# Patient Record
Sex: FEMALE | Race: BLACK OR AFRICAN AMERICAN | Employment: OTHER | URBAN - METROPOLITAN AREA
[De-identification: names, ages, dates, MRNs, and addresses within clinical notes are randomized per-mention and may not be internally consistent; named-entity substitution may affect disease eponyms.]

---

## 2017-06-09 ENCOUNTER — OP HISTORICAL/CONVERTED ENCOUNTER (OUTPATIENT)
Dept: OTHER | Age: 77
End: 2017-06-09

## 2017-08-28 ENCOUNTER — ED HISTORICAL/CONVERTED ENCOUNTER (OUTPATIENT)
Dept: OTHER | Age: 77
End: 2017-08-28

## 2018-10-19 ENCOUNTER — OP HISTORICAL/CONVERTED ENCOUNTER (OUTPATIENT)
Dept: OTHER | Age: 78
End: 2018-10-19

## 2019-12-11 ENCOUNTER — OP HISTORICAL/CONVERTED ENCOUNTER (OUTPATIENT)
Dept: OTHER | Age: 79
End: 2019-12-11

## 2020-05-05 ENCOUNTER — IP HISTORICAL/CONVERTED ENCOUNTER (OUTPATIENT)
Dept: OTHER | Age: 80
End: 2020-05-05

## 2021-04-09 ENCOUNTER — TRANSCRIBE ORDER (OUTPATIENT)
Dept: SCHEDULING | Age: 81
End: 2021-04-09

## 2021-04-09 DIAGNOSIS — Z12.31 VISIT FOR SCREENING MAMMOGRAM: Primary | ICD-10-CM

## 2021-04-27 ENCOUNTER — HOSPITAL ENCOUNTER (EMERGENCY)
Age: 81
Discharge: ANOTHER HOSPITAL | End: 2021-04-28
Attending: EMERGENCY MEDICINE
Payer: MEDICARE

## 2021-04-27 DIAGNOSIS — K92.2 GASTROINTESTINAL HEMORRHAGE, UNSPECIFIED GASTROINTESTINAL HEMORRHAGE TYPE: Primary | ICD-10-CM

## 2021-04-27 PROCEDURE — 36415 COLL VENOUS BLD VENIPUNCTURE: CPT

## 2021-04-27 PROCEDURE — 80053 COMPREHEN METABOLIC PANEL: CPT

## 2021-04-27 PROCEDURE — 86901 BLOOD TYPING SEROLOGIC RH(D): CPT

## 2021-04-27 PROCEDURE — 85025 COMPLETE CBC W/AUTO DIFF WBC: CPT

## 2021-04-27 PROCEDURE — 85610 PROTHROMBIN TIME: CPT

## 2021-04-27 PROCEDURE — 99283 EMERGENCY DEPT VISIT LOW MDM: CPT

## 2021-04-27 RX ORDER — METOPROLOL TARTRATE 50 MG/1
50 TABLET ORAL DAILY
COMMUNITY
End: 2021-04-28

## 2021-04-27 RX ORDER — VERAPAMIL HYDROCHLORIDE 240 MG/1
240 CAPSULE, EXTENDED RELEASE ORAL DAILY
COMMUNITY
End: 2021-10-22

## 2021-04-27 RX ORDER — GLIMEPIRIDE 4 MG/1
4 TABLET ORAL
COMMUNITY
End: 2021-10-22

## 2021-04-28 ENCOUNTER — HOSPITAL ENCOUNTER (INPATIENT)
Age: 81
LOS: 3 days | Discharge: HOME | DRG: 378 | End: 2021-05-01
Attending: EMERGENCY MEDICINE | Admitting: FAMILY MEDICINE
Payer: MEDICARE

## 2021-04-28 ENCOUNTER — APPOINTMENT (OUTPATIENT)
Dept: CT IMAGING | Age: 81
End: 2021-04-28
Attending: EMERGENCY MEDICINE
Payer: MEDICARE

## 2021-04-28 VITALS
HEART RATE: 72 BPM | SYSTOLIC BLOOD PRESSURE: 138 MMHG | TEMPERATURE: 98.3 F | HEIGHT: 63 IN | DIASTOLIC BLOOD PRESSURE: 68 MMHG | RESPIRATION RATE: 18 BRPM | WEIGHT: 150 LBS | OXYGEN SATURATION: 96 % | BODY MASS INDEX: 26.58 KG/M2

## 2021-04-28 DIAGNOSIS — K92.2 LOWER GI BLEED: Primary | ICD-10-CM

## 2021-04-28 LAB
ABO + RH BLD: NORMAL
ALBUMIN SERPL-MCNC: 3.5 G/DL (ref 3.5–5)
ALBUMIN/GLOB SERPL: 0.9 {RATIO} (ref 1.1–2.2)
ALP SERPL-CCNC: 258 U/L (ref 45–117)
ALT SERPL-CCNC: 21 U/L (ref 12–78)
ANION GAP SERPL CALC-SCNC: 10 MMOL/L (ref 5–15)
ANION GAP SERPL CALC-SCNC: 6 MMOL/L (ref 5–15)
AST SERPL W P-5'-P-CCNC: 10 U/L (ref 15–37)
ATRIAL RATE: 73 BPM
BASOPHILS # BLD: 0 K/UL (ref 0–0.1)
BASOPHILS NFR BLD: 0 % (ref 0–1)
BILIRUB SERPL-MCNC: 0.4 MG/DL (ref 0.2–1)
BLOOD GROUP ANTIBODIES SERPL: NEGATIVE
BUN SERPL-MCNC: 16 MG/DL (ref 6–20)
BUN SERPL-MCNC: 16 MG/DL (ref 6–20)
BUN/CREAT SERPL: 16 (ref 12–20)
BUN/CREAT SERPL: 20 (ref 12–20)
CA-I BLD-MCNC: 9.2 MG/DL (ref 8.5–10.1)
CALCIUM SERPL-MCNC: 9.2 MG/DL (ref 8.5–10.1)
CALCULATED P AXIS, ECG09: 63 DEGREES
CALCULATED R AXIS, ECG10: -22 DEGREES
CALCULATED T AXIS, ECG11: -32 DEGREES
CHLORIDE SERPL-SCNC: 103 MMOL/L (ref 97–108)
CHLORIDE SERPL-SCNC: 107 MMOL/L (ref 97–108)
CO2 SERPL-SCNC: 22 MMOL/L (ref 21–32)
CO2 SERPL-SCNC: 28 MMOL/L (ref 21–32)
COMMENT, HOLDF: NORMAL
COVID-19 RAPID TEST, COVR: NOT DETECTED
CREAT SERPL-MCNC: 0.79 MG/DL (ref 0.55–1.02)
CREAT SERPL-MCNC: 1.01 MG/DL (ref 0.55–1.02)
DIAGNOSIS, 93000: NORMAL
DIFFERENTIAL METHOD BLD: ABNORMAL
EOSINOPHIL # BLD: 0.1 K/UL (ref 0–0.4)
EOSINOPHIL NFR BLD: 2 % (ref 0–7)
ERYTHROCYTE [DISTWIDTH] IN BLOOD BY AUTOMATED COUNT: 13.1 % (ref 11.5–14.5)
ERYTHROCYTE [DISTWIDTH] IN BLOOD BY AUTOMATED COUNT: 13.2 % (ref 11.5–14.5)
EST. AVERAGE GLUCOSE BLD GHB EST-MCNC: 315 MG/DL
GLOBULIN SER CALC-MCNC: 3.7 G/DL (ref 2–4)
GLUCOSE BLD STRIP.AUTO-MCNC: 164 MG/DL (ref 65–100)
GLUCOSE BLD STRIP.AUTO-MCNC: 260 MG/DL (ref 65–100)
GLUCOSE BLD STRIP.AUTO-MCNC: 270 MG/DL (ref 65–100)
GLUCOSE BLD STRIP.AUTO-MCNC: 364 MG/DL (ref 65–100)
GLUCOSE SERPL-MCNC: 389 MG/DL (ref 65–100)
GLUCOSE SERPL-MCNC: 416 MG/DL (ref 65–100)
HBA1C MFR BLD: 12.6 % (ref 4–5.6)
HCT VFR BLD AUTO: 30.7 % (ref 35–47)
HCT VFR BLD AUTO: 32.6 % (ref 35–47)
HCT VFR BLD AUTO: 36.7 % (ref 35–47)
HCT VFR BLD AUTO: 40.2 % (ref 35–47)
HGB BLD-MCNC: 10.4 G/DL (ref 11.5–16)
HGB BLD-MCNC: 11.5 G/DL (ref 11.5–16)
HGB BLD-MCNC: 13.4 G/DL (ref 11.5–16)
HGB BLD-MCNC: 9.7 G/DL (ref 11.5–16)
IMM GRANULOCYTES # BLD AUTO: 0 K/UL (ref 0–0.04)
IMM GRANULOCYTES NFR BLD AUTO: 0 % (ref 0–0.5)
INR PPP: 1.2 (ref 0.9–1.1)
LYMPHOCYTES # BLD: 2.6 K/UL (ref 0.8–3.5)
LYMPHOCYTES NFR BLD: 48 % (ref 12–49)
MCH RBC QN AUTO: 27.3 PG (ref 26–34)
MCH RBC QN AUTO: 28 PG (ref 26–34)
MCHC RBC AUTO-ENTMCNC: 31.3 G/DL (ref 30–36.5)
MCHC RBC AUTO-ENTMCNC: 33.3 G/DL (ref 30–36.5)
MCV RBC AUTO: 84.1 FL (ref 80–99)
MCV RBC AUTO: 87 FL (ref 80–99)
MONOCYTES # BLD: 0.5 K/UL (ref 0–1)
MONOCYTES NFR BLD: 8 % (ref 5–13)
NEUTS SEG # BLD: 2.4 K/UL (ref 1.8–8)
NEUTS SEG NFR BLD: 42 % (ref 32–75)
NRBC # BLD: 0 K/UL (ref 0–0.01)
NRBC BLD-RTO: 0 PER 100 WBC
P-R INTERVAL, ECG05: 142 MS
PLATELET # BLD AUTO: 137 K/UL (ref 150–400)
PLATELET # BLD AUTO: 178 K/UL (ref 150–400)
PMV BLD AUTO: 13 FL (ref 8.9–12.9)
PMV BLD AUTO: 13.4 FL (ref 8.9–12.9)
POTASSIUM SERPL-SCNC: 3.5 MMOL/L (ref 3.5–5.1)
POTASSIUM SERPL-SCNC: 3.7 MMOL/L (ref 3.5–5.1)
PROT SERPL-MCNC: 7.2 G/DL (ref 6.4–8.2)
PROTHROMBIN TIME: 15.3 SEC (ref 11.9–14.7)
Q-T INTERVAL, ECG07: 434 MS
QRS DURATION, ECG06: 90 MS
QTC CALCULATION (BEZET), ECG08: 478 MS
RBC # BLD AUTO: 4.22 M/UL (ref 3.8–5.2)
RBC # BLD AUTO: 4.78 M/UL (ref 3.8–5.2)
SAMPLES BEING HELD,HOLD: NORMAL
SERVICE CMNT-IMP: ABNORMAL
SODIUM SERPL-SCNC: 137 MMOL/L (ref 136–145)
SODIUM SERPL-SCNC: 139 MMOL/L (ref 136–145)
SOURCE, COVRS: NORMAL
SPECIMEN EXP DATE BLD: NORMAL
VENTRICULAR RATE, ECG03: 73 BPM
WBC # BLD AUTO: 5.6 K/UL (ref 3.6–11)
WBC # BLD AUTO: 7.5 K/UL (ref 3.6–11)

## 2021-04-28 PROCEDURE — 36415 COLL VENOUS BLD VENIPUNCTURE: CPT

## 2021-04-28 PROCEDURE — 74011250636 HC RX REV CODE- 250/636: Performed by: EMERGENCY MEDICINE

## 2021-04-28 PROCEDURE — 93005 ELECTROCARDIOGRAM TRACING: CPT

## 2021-04-28 PROCEDURE — 74011000636 HC RX REV CODE- 636: Performed by: EMERGENCY MEDICINE

## 2021-04-28 PROCEDURE — 99285 EMERGENCY DEPT VISIT HI MDM: CPT

## 2021-04-28 PROCEDURE — 82962 GLUCOSE BLOOD TEST: CPT

## 2021-04-28 PROCEDURE — C9113 INJ PANTOPRAZOLE SODIUM, VIA: HCPCS | Performed by: NURSE PRACTITIONER

## 2021-04-28 PROCEDURE — 74011636637 HC RX REV CODE- 636/637: Performed by: NURSE PRACTITIONER

## 2021-04-28 PROCEDURE — 74011000250 HC RX REV CODE- 250: Performed by: PHYSICIAN ASSISTANT

## 2021-04-28 PROCEDURE — 83036 HEMOGLOBIN GLYCOSYLATED A1C: CPT

## 2021-04-28 PROCEDURE — 74174 CTA ABD&PLVS W/CONTRAST: CPT

## 2021-04-28 PROCEDURE — 85027 COMPLETE CBC AUTOMATED: CPT

## 2021-04-28 PROCEDURE — 96374 THER/PROPH/DIAG INJ IV PUSH: CPT

## 2021-04-28 PROCEDURE — 94761 N-INVAS EAR/PLS OXIMETRY MLT: CPT

## 2021-04-28 PROCEDURE — 85014 HEMATOCRIT: CPT

## 2021-04-28 PROCEDURE — 80048 BASIC METABOLIC PNL TOTAL CA: CPT

## 2021-04-28 PROCEDURE — 74011250636 HC RX REV CODE- 250/636: Performed by: NURSE PRACTITIONER

## 2021-04-28 PROCEDURE — 74011000250 HC RX REV CODE- 250: Performed by: NURSE PRACTITIONER

## 2021-04-28 PROCEDURE — 87635 SARS-COV-2 COVID-19 AMP PRB: CPT

## 2021-04-28 PROCEDURE — 65660000000 HC RM CCU STEPDOWN

## 2021-04-28 PROCEDURE — 74011250636 HC RX REV CODE- 250/636: Performed by: FAMILY MEDICINE

## 2021-04-28 RX ORDER — POLYETHYLENE GLYCOL 3350, SODIUM SULFATE, SODIUM CHLORIDE, POTASSIUM CHLORIDE, SODIUM ASCORBATE, AND ASCORBIC ACID 7.5-2.691G
1 KIT ORAL ONCE
Status: COMPLETED | OUTPATIENT
Start: 2021-04-28 | End: 2021-04-28

## 2021-04-28 RX ORDER — SODIUM CHLORIDE 0.9 % (FLUSH) 0.9 %
5-40 SYRINGE (ML) INJECTION EVERY 8 HOURS
Status: DISCONTINUED | OUTPATIENT
Start: 2021-04-28 | End: 2021-05-01 | Stop reason: HOSPADM

## 2021-04-28 RX ORDER — MAGNESIUM SULFATE 100 %
4 CRYSTALS MISCELLANEOUS AS NEEDED
Status: DISCONTINUED | OUTPATIENT
Start: 2021-04-28 | End: 2021-05-01 | Stop reason: HOSPADM

## 2021-04-28 RX ORDER — NAPROXEN SODIUM 220 MG
220 TABLET ORAL 2 TIMES DAILY WITH MEALS
COMMUNITY
End: 2021-05-01

## 2021-04-28 RX ORDER — SODIUM CHLORIDE 9 MG/ML
75 INJECTION, SOLUTION INTRAVENOUS CONTINUOUS
Status: DISCONTINUED | OUTPATIENT
Start: 2021-04-28 | End: 2021-05-01 | Stop reason: HOSPADM

## 2021-04-28 RX ORDER — DEXTROSE 50 % IN WATER (D50W) INTRAVENOUS SYRINGE
25-50 AS NEEDED
Status: DISCONTINUED | OUTPATIENT
Start: 2021-04-28 | End: 2021-05-01 | Stop reason: HOSPADM

## 2021-04-28 RX ORDER — SODIUM CHLORIDE 0.9 % (FLUSH) 0.9 %
5-40 SYRINGE (ML) INJECTION AS NEEDED
Status: DISCONTINUED | OUTPATIENT
Start: 2021-04-28 | End: 2021-05-01 | Stop reason: HOSPADM

## 2021-04-28 RX ORDER — INSULIN LISPRO 100 [IU]/ML
INJECTION, SOLUTION INTRAVENOUS; SUBCUTANEOUS
Status: DISCONTINUED | OUTPATIENT
Start: 2021-04-28 | End: 2021-05-01 | Stop reason: HOSPADM

## 2021-04-28 RX ORDER — ONDANSETRON 2 MG/ML
4 INJECTION INTRAMUSCULAR; INTRAVENOUS
Status: DISCONTINUED | OUTPATIENT
Start: 2021-04-28 | End: 2021-05-01 | Stop reason: HOSPADM

## 2021-04-28 RX ORDER — IBUPROFEN 400 MG/1
600 TABLET ORAL
COMMUNITY
End: 2021-04-28

## 2021-04-28 RX ADMIN — SODIUM CHLORIDE 500 ML: 9 INJECTION, SOLUTION INTRAVENOUS at 01:39

## 2021-04-28 RX ADMIN — SODIUM CHLORIDE 10 ML: 9 INJECTION INTRAMUSCULAR; INTRAVENOUS; SUBCUTANEOUS at 14:18

## 2021-04-28 RX ADMIN — PEG-3350, SODIUM SULFATE, SODIUM CHLORIDE, POTASSIUM CHLORIDE, SODIUM ASCORBATE AND ASCORBIC ACID 1 L: KIT at 15:00

## 2021-04-28 RX ADMIN — SODIUM CHLORIDE 1000 ML: 9 INJECTION, SOLUTION INTRAVENOUS at 06:19

## 2021-04-28 RX ADMIN — IOPAMIDOL 100 ML: 755 INJECTION, SOLUTION INTRAVENOUS at 04:35

## 2021-04-28 RX ADMIN — SODIUM CHLORIDE 10 ML: 9 INJECTION INTRAMUSCULAR; INTRAVENOUS; SUBCUTANEOUS at 22:11

## 2021-04-28 RX ADMIN — FAMOTIDINE 20 MG: 10 INJECTION INTRAVENOUS at 03:15

## 2021-04-28 RX ADMIN — SODIUM CHLORIDE 75 ML/HR: 9 INJECTION, SOLUTION INTRAVENOUS at 13:58

## 2021-04-28 RX ADMIN — SODIUM CHLORIDE 40 MG: 9 INJECTION INTRAMUSCULAR; INTRAVENOUS; SUBCUTANEOUS at 09:18

## 2021-04-28 RX ADMIN — SODIUM CHLORIDE 40 MG: 9 INJECTION INTRAMUSCULAR; INTRAVENOUS; SUBCUTANEOUS at 22:11

## 2021-04-28 RX ADMIN — INSULIN LISPRO 5 UNITS: 100 INJECTION, SOLUTION INTRAVENOUS; SUBCUTANEOUS at 14:17

## 2021-04-28 RX ADMIN — INSULIN LISPRO 7 UNITS: 100 INJECTION, SOLUTION INTRAVENOUS; SUBCUTANEOUS at 17:43

## 2021-04-28 NOTE — ED NOTES
Verbal shift change report given to Yves RN and Daiana Newton RN (oncoming nurse) by [address] (offgoing nurse). Report included the following information SBAR, ED Summary, MAR and Recent Results.

## 2021-04-28 NOTE — ACP (ADVANCE CARE PLANNING)
Advance Care Planning     Advance Care Planning (ACP) Physician/NP/PA Conversation      Date of Conversation: 4/28/2021  Conducted with: Patient with [address] Decision Maker:     Click here to complete [address] including selection of the [address] Relationship (ie \"Primary\")  Today we documented Decision Maker(s) consistent with Legal Next of Kin hierarchy. Yolette Landrum (son) [phone]    Care Preferences:    Hospitalization: \"If your health worsens and it becomes clear that your chance of recovery is unlikely, what would be your preference regarding hospitalization? \"  The patient would prefer hospitalization. Ventilation: \"If you were unable to breathe on your own and your chance of recovery was unlikely, what would be your preference about the use of a ventilator (breathing machine) if it was available to you? \"   The patient would desire the use of a ventilator. Resuscitation: \"In the event your heart stopped as a result of an underlying serious health condition, would you want attempts to be made to restart your heart, or would you prefer a natural death? \"   Yes, attempt to resuscitate. Additional topics discussed: treatment goals. Discussed with patient the possibility of needing a blood transfusion if her hgb decreases. She states that she is a Alev[employer] and she refuses any blood transfusions and products. She would like all other measures done to treat her and/or save her life.      Conversation Outcomes / Follow-Up Plan:   ACP complete - no further action today  Reviewed DNR/DNI and patient elects Full Code (Attempt Resuscitation)     Length of Voluntary ACP Conversation in minutes:  20 minutes    Afia Dumont NP

## 2021-04-28 NOTE — ED NOTES
Verbal shift change report given to [address] [place] [address] [address] (oncoming nurse) by Jia Camp RN (offgoing nurse). Report included the following information SBAR, Kardex, ED Summary, [facility] and Recent Results.

## 2021-04-28 NOTE — ED PROVIDER NOTES
EMERGENCY DEPARTMENT HISTORY AND PHYSICAL EXAM      Date: 4/27/2021  Patient Name: Salazar Zambrano      History of Presenting Illness     Chief Complaint   Patient presents with    Other       History Provided By: Patient    HPI: Salazar Zambrano, [de-identified] y.o. female with a past medical history significant diabetes and hypertension presents to the ED with cc of rectal bleed x2 that is status of that this afternoon. Patient denies taking any blood thinners. However she admits to take ibuprofen as needed pain. She took one yesterday. Patient denies any vomiting. Mild left lower quadrant abdominal pain. No chest pain or shortness of breath. No weakness. No vaginal bleed. No other complaints. There are no other complaints, changes, or physical findings at this time. PCP: Yosvany Hanson MD    Current Outpatient Medications   Medication Sig Dispense Refill    verapamil ER (VERELAN) 240 mg ER capsule Take 240 mg by mouth daily.  glimepiride (AMARYL) 4 mg tablet Take 4 mg by mouth every morning.  metoprolol tartrate (LOPRESSOR) 50 mg tablet Take 50 mg by mouth daily. Past History     Past Medical History:  Past Medical History:   Diagnosis Date    Diabetes ([address]     Hypertension        Past Surgical History:  No past surgical history on file. Family History:  No family history on file. Social History:  Social History     Tobacco Use    Smoking status: Never Smoker   Substance Use Topics    Alcohol use: Not on file    Drug use: Not on file       Allergies:  No Known Allergies      Review of Systems     Review of Systems   Constitutional: Negative. HENT: Negative. Eyes: Negative. Respiratory: Negative. Cardiovascular: Negative. Gastrointestinal: Positive for anal bleeding and blood in stool. Endocrine: Negative. Genitourinary: Negative. Musculoskeletal: Negative. Skin: Negative. Neurological: Negative. Psychiatric/Behavioral: Negative.     All other systems reviewed and are negative. Physical Exam     Physical Exam  Vitals signs and nursing note reviewed. Constitutional:       General: She is not in acute distress. Appearance: Normal appearance. She is normal weight. She is not ill-appearing or diaphoretic. HENT:      Head: Normocephalic. Right Ear: External ear normal.      Left Ear: External ear normal.      Nose: Nose normal. No congestion or rhinorrhea. Mouth/Throat:      Pharynx: Oropharynx is clear. No oropharyngeal exudate or posterior oropharyngeal erythema. Eyes:      General: No scleral icterus. Right eye: No discharge. Left eye: No discharge. Extraocular Movements: Extraocular movements intact. Conjunctiva/sclera: Conjunctivae normal.      Pupils: Pupils are equal, round, and reactive to light. Neck:      Musculoskeletal: Normal range of motion and neck supple. No neck rigidity or muscular tenderness. Cardiovascular:      Rate and Rhythm: Normal rate and regular rhythm. Pulses: Normal pulses. Heart sounds: Normal heart sounds. No murmur. Pulmonary:      Effort: Pulmonary effort is normal. No respiratory distress. Breath sounds: Normal breath sounds. No stridor. No wheezing, rhonchi or rales. Chest:      Chest wall: No tenderness. Abdominal:      General: Abdomen is flat. Bowel sounds are normal. There is no distension. Palpations: Abdomen is soft. Tenderness: There is no abdominal tenderness. There is no right CVA tenderness, left CVA tenderness, guarding or rebound. Genitourinary:     Rectum: Guaiac result positive. Comments: Patient had 1 episode of bowel movement which was bloody. Musculoskeletal: Normal range of motion. General: No swelling, tenderness, deformity or signs of injury. Right lower leg: No edema. Left lower leg: No edema. Skin:     General: Skin is warm. Capillary Refill: Capillary refill takes less than 2 seconds. Coloration: Skin is not jaundiced or pale. Findings: No bruising, erythema, lesion or rash. Neurological:      General: No focal deficit present. Mental Status: She is alert and oriented to person, place, and time. Mental status is at baseline. Cranial Nerves: No cranial nerve deficit. Sensory: No sensory deficit. Motor: No weakness. Coordination: Coordination normal.   Psychiatric:         Mood and Affect: Mood normal.         Behavior: Behavior normal.         Thought Content: Thought content normal.         Judgment: Judgment normal.         Lab and Diagnostic Study Results     Labs -     Recent Results (from the past 12 hour(s))   CBC WITH AUTOMATED DIFF    Collection Time: 04/27/21 11:04 PM   Result Value Ref Range    WBC 5.6 3.6 - 11.0 K/uL    RBC 4.78 3.80 - 5.20 M/uL    HGB 13.4 11.5 - 16.0 g/dL    HCT 40.2 35.0 - 47.0 %    MCV 84.1 80.0 - 99.0 FL    MCH 28.0 26.0 - 34.0 PG    MCHC 33.3 30.0 - 36.5 g/dL    RDW 13.2 11.5 - 14.5 %    PLATELET 783 939 - 574 K/uL    MPV 13.4 (H) 8.9 - 12.9 FL    NEUTROPHILS 42 32 - 75 %    LYMPHOCYTES 48 12 - 49 %    MONOCYTES 8 5 - 13 %    EOSINOPHILS 2 0 - 7 %    BASOPHILS 0 0 - 1 %    IMMATURE GRANULOCYTES 0 0.0 - 0.5 %    ABS. NEUTROPHILS 2.4 1.8 - 8.0 K/UL    ABS. LYMPHOCYTES 2.6 0.8 - 3.5 K/UL    ABS. MONOCYTES 0.5 0.0 - 1.0 K/UL    ABS. EOSINOPHILS 0.1 0.0 - 0.4 K/UL    ABS. BASOPHILS 0.0 0.0 - 0.1 K/UL    ABS. IMM.  GRANS. 0.0 0.00 - 0.04 K/UL    DF AUTOMATED     METABOLIC PANEL, COMPREHENSIVE    Collection Time: 04/27/21 11:04 PM   Result Value Ref Range    Sodium 137 136 - 145 mmol/L    Potassium 3.5 3.5 - 5.1 mmol/L    Chloride 103 97 - 108 mmol/L    CO2 28 21 - 32 mmol/L    Anion gap 6 5 - 15 mmol/L    Glucose 416 (H) 65 - 100 mg/dL    BUN 16 6 - 20 mg/dL    Creatinine 1.01 0.55 - 1.02 mg/dL    BUN/Creatinine ratio 16 12 - 20      GFR est AA >60 >60 ml/min/1.73m2    GFR est non-AA 53 (L) >60 ml/min/1.73m2    Calcium 9.2 8.5 - 10.1 mg/dL Bilirubin, total 0.4 0.2 - 1.0 mg/dL    AST (SGOT) 10 (L) 15 - 37 U/L    ALT (SGPT) 21 12 - 78 U/L    Alk. phosphatase 258 (H) 45 - 117 U/L    Protein, total 7.2 6.4 - 8.2 g/dL    Albumin 3.5 3.5 - 5.0 g/dL    Globulin 3.7 2.0 - 4.0 g/dL    A-G Ratio 0.9 (L) 1.1 - 2.2     TYPE & SCREEN    Collection Time: 04/27/21 11:04 PM   Result Value Ref Range    Crossmatch Expiration 04/30/2021,2359     ABO/Rh(D) Rosie Squire Positive     Antibody screen Negative    PROTHROMBIN TIME + INR    Collection Time: 04/27/21 11:04 PM   Result Value Ref Range    Prothrombin time 15.3 (H) 11.9 - 14.7 sec    INR 1.2 (H) 0.9 - 1.1         Radiologic Studies -   [unfilled]  CT Results  (Last 48 hours)    None        CXR Results  (Last 48 hours)    None          Medical Decision Making and ED Course   - I am the first and primary provider for this patient AND AM THE PRIMARY PROVIDER OF RECORD. - I reviewed the vital signs, available nursing notes, past medical history, past surgical history, family history and social history. - Initial assessment performed. The patients presenting problems have been discussed, and the staff are in agreement with the care plan formulated and outlined with them. I have encouraged them to ask questions as they arise throughout their visit. Vital Signs-Reviewed the patient's vital signs. Patient Vitals for the past 12 hrs:   Temp Pulse Resp BP SpO2   04/28/21 0305  72  138/68 96 %   04/28/21 0220  67  (!) 68/42 98 %   04/28/21 0210  60  (!) 147/130 100 %   04/27/21 2250 98.3 °F (36.8 °C) 96 18 (!) 187/105 97 %       EKG interpretation: (Preliminary): EKG was done at 3:22 AM.  Normal sinus rhythm. Rate of 73. Left axis deviation. Positive LVH. Positive T wave inversion in anterolateral leads. No STEMI. No ST elevation. No old EKG available for comparison at this time.       Records Reviewed: Nursing Notes        ED Course:              Provider Notes (Medical Decision Making):     MDM  Number of Diagnoses or Management Options  Gastrointestinal hemorrhage, unspecified gastrointestinal hemorrhage type: new, needed workup  Diagnosis management comments: Differential diagnosis include acute rectal bleed, GI bleed, diverticulosis, AV malformation, peptic ulcer disease, colon cancer, coagulopathy. Amount and/or Complexity of Data Reviewed  Clinical lab tests: ordered and reviewed  Tests in the radiology section of CPT®: ordered and reviewed  Tests in the medicine section of CPT®: reviewed and ordered  Independent visualization of images, tracings, or specimens: yes (EKG was done at 3:22 AM.  Normal sinus rhythm. Rate of 73. Left axis deviation. Positive LVH. Positive T wave inversion in anterolateral leads. No STEMI. No ST elevation. No old EKG available for comparison at this time.)    Risk of Complications, Morbidity, and/or Mortality  Presenting problems: high  Diagnostic procedures: high  Management options: high  General comments: Patient remained stable throughout course of treatment. First hemoglobin is 13.5. Vital signs stable. While patient is in our emergency department she had 2 more episodes of GI bleed. She also starts passing blood a blood clot while she was sitting on the ER bed. Case initially discussed with our admitting physician. She has stated that patient is a Jehovah witness and refused blood transfusion. She needs to have bleeding scan done. However we do not have the staff to do bleeding scan. Decision was made to transfer patient. Case discussed with Dr. Addie Mckeon, the IR at [facility].  He wanted us to transfer patient to [facility] however he wants us to do a CT of abdomen pelvis prior to transport patients to their emergency room. Case then was discussed with ER physician, Dr. Hermelinda Urena at [facility].  He accepted the patient's. We will transfer patient to their service. Case also discussed with patient.   Patient understood and agree with her management. Consultations:       Consultations:         Procedures and Critical Care       Performed by: Patrice Zhao DO  PROCEDURES:  Procedures               CRITICAL CARE NOTE :  1:38 AM  Amount of Critical Care Time: 75(minutes)    IMPENDING DETERIORATION -GI bleed  ASSOCIATED RISK FACTORS - Bleeding  MANAGEMENT- Bedside Assessment and Supervision of Care  INTERPRETATION -  CT Scan, Blood Pressure and Cardiac Output Measures   INTERVENTIONS - vascular control  CASE REVIEW - Hospitalist/Intensivist  TREATMENT RESPONSE -Stable  PERFORMED BY - Self    NOTES   :  I have spent critical care time involved in lab review, consultations with specialist, family decision- making, bedside attention and documentation. This time excludes time spent in any separate billed procedures. During this entire length of time I was immediately available to the patient . Patrice Zhao DO        Disposition     Disposition: Condition stable    Transferred to Another Facility        Diagnosis     Clinical Impression:   1. Gastrointestinal hemorrhage, unspecified gastrointestinal hemorrhage type        Attestations:    Patrice Zhao DO    Please note that this dictation was completed with [employer], the computer voice recognition software. Quite often unanticipated grammatical, syntax, homophones, and other interpretive errors are inadvertently transcribed by the computer software. Please disregard these errors. Please excuse any errors that have escaped final proofreading. Thank you.

## 2021-04-28 NOTE — ROUTINE PROCESS
TRANSFER - OUT REPORT:    Verbal [facility] [employer] [facility] to [address] [place] [address] (unit) for routine progression of care       Report consisted of patients Situation, Background, Assessment and   Recommendations(SBAR). Information from the following report(s) SBAR, ED Summary, [facility] and Recent Results was reviewed with the receiving nurse. Lines:   Peripheral IV 04/28/21 Right Antecubital (Active)   Site Assessment Clean, dry, & intact 04/28/21 0615   Phlebitis Assessment 0 04/28/21 0615   Infiltration Assessment 0 04/28/21 0615   Dressing Status Clean, dry, & intact 04/28/21 0615   Dressing Type Transparent 04/28/21 0615   Hub Color/Line Status Blue;Flushed;Patent 04/28/21 0615   Action Taken Blood drawn 04/28/21 0615       Peripheral IV 04/28/21 Left Wrist (Active)   Site Assessment Clean, dry, & intact 04/28/21 0920   Phlebitis Assessment 0 04/28/21 0920   Infiltration Assessment 0 04/28/21 0920   Dressing Status Clean, dry, & intact 04/28/21 0920        Opportunity for questions and clarification was provided.       Patient transported with:   [employer]

## 2021-04-28 NOTE — ED NOTES
Pt states she is no longer feeling weak and dizzy. Pt states she is \"feeling better now. \" will continue to monitor pt and pt's vitals.

## 2021-04-28 NOTE — ED NOTES
Pt presents to ED as a transfer of care from [address] [place] [address] for GI bleed. Pt was passing bright red clots in her stool last night.  Per EMS last /81

## 2021-04-28 NOTE — ED NOTES
Verbal shift change report given to Vyes RN and [address] RN (oncoming nurse) by David Lowe (offgoing nurse). Report included the following information SBAR, Kardex, ED Summary, [facility] and Recent Results.

## 2021-04-28 NOTE — H&P
[facility] Adult  Hospitalist Group  History and Physical    Primary Care Provider: Edenilson Maldonado MD  Date of Service:  4/28/2021    Subjective:     Vijaya Vallejo is a [de-identified] y.o. female with a past medical history of diabetes and hypertension who presented to [facility] ED with complaints of bloody stools x2. States that she felt constipated yesterday morning then had a normal bowel movement. After a couple of hours she felt like she was going to have diarrhea and had a bowel movement with a large amount of blood in it. After a couple more hours had a second episode of bloody diarrhea. Denies any nausea, vomiting or abdominal pain. Two more episodes of bloody stools reported in the ED. Takes aleve 220mg almost everyday for leg pains and cramps. Hospitalist was consulted for evaluation of admission. Denies any shortness of breath, chest pain or tightness. Review of Systems:    A comprehensive review of systems was negative except for that written in the History of Present Illness. Past Medical History:   Diagnosis Date    Diabetes ([address]     Hypertension       No past surgical history on file. Prior to Admission medications    Medication Sig Start Date End Date Taking? Authorizing Provider   verapamil ER (VERELAN) 240 mg ER capsule Take 240 mg by mouth daily. Yes Other, MD Jamal   glimepiride (AMARYL) 4 mg tablet Take 4 mg by mouth every morning. Yes Other, MD Jamal   metoprolol tartrate (LOPRESSOR) 50 mg tablet Take 50 mg by mouth daily. Yes Other, MD Jamal     Allergies   Allergen Reactions    Penicillins Other (comments)     Pt states it makes her pass out      No family history on file. SOCIAL HISTORY:  Patient resides at Home. Patient ambulates with Independent.    Smoking history: Denies   Alcohol history: Denies   Drug history: Denies         Objective:       Physical Exam:   Visit Vitals  BP (!) 147/91   Pulse 81   Temp 97.6 °F (36.4 °C)   Resp 22   SpO2 97%     General appearance: alert, cooperative, no distress, appears stated age  Lungs: clear to auscultation bilaterally  Heart: regular rate and rhythm, S1, S2 normal, no murmur, click, rub or gallop  Abdomen: soft, non-tender. Bowel sounds normal. No masses,  no organomegaly  Extremities: extremities normal, atraumatic, no cyanosis or edema  Pulses: 2+ and symmetric  Skin: Skin color, texture, turgor normal. No rashes or lesions  Neurologic: Grossly normal  Cap refill: Brisk, less than 3 seconds  Pulses: 2+, symmetric in all extremities    ECG:  Normal sinus rhythm   Possible Left atrial enlargement   Left ventricular hypertrophy    Data Review: All diagnostic labs and studies have been reviewed. Chest x-ray NA. CTA Abdomen pelv: IMPRESSION  Extensive underlying atherosclerotic vascular disease. 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 Diverticulosis without evidence of diverticulitis. Assessment:     Active Problems:    GI bleed (4/28/2021)        Plan:     Lower GI bleed   - Admit to remote telemetry   - reported  4 episodes of bleeding per rectum  - CTA- negative for active GI bleed   - Start PPI  And keep NPO  - Consult GI   - Monitor H&H. Patient refuses blood transfusions. Hgb 13.4 then 11.5 in ED     Hypertension   - Stable   - will restart home medications when stable. - Patient high risk for hypotension secondary to GI bleed   - Monitor     Diabetes   - Monitor glucose AC/HS   - ISS   - Diabetic diet when ok to eat     DVT prophylaxis: SCDs   NOK:  Flo Montes De Oca (son) [phone]  Code Status: Full code. FUNCTIONAL STATUS PRIOR TO HOSPITALIZATION (including history of recent falls): Independent    Signed By: Marky Tijerina NP     April 28, 2021

## 2021-04-28 NOTE — ED PROVIDER NOTES
Patient received in transfer from [facility] for a GI bleed. Patient reportedly had 2 episodes of bloody stool while in the emergency department there. Her hemoglobin was 13. She had a CTA of her abdomen which did not show any areas of active bleeding. Patient was transferred here for GI consult and bleeding scan. Patient arrived in stable condition. Vital signs stable. No change in her status. Past Medical History:   Diagnosis Date    [address] [address]     Hypertension        No past surgical history on file. No family history on file.     Social History     Socioeconomic History    Marital status:      Spouse name: Not on file    Number of children: Not on file    Years of education: Not on file    Highest education level: Not on file   Occupational History    Not on file   Social Needs    Financial resource strain: Not on file    Food insecurity     Worry: Not on file     Inability: Not on file    Transportation needs     Medical: Not on file     Non-medical: Not on file   Tobacco Use    Smoking status: Never Smoker   Substance and Sexual Activity    Alcohol use: Not on file    Drug use: Not on file    Sexual activity: Not on file   Lifestyle    Physical activity     Days per week: Not on file     Minutes per session: Not on file    Stress: Not on file   Relationships    Social connections     Talks on phone: Not on file     Gets together: Not on file     Attends [employer] service: Not on file     Active member of club or organization: Not on file     Attends meetings of clubs or organizations: Not on file     Relationship status: Not on file    Intimate partner violence     Fear of current or ex partner: Not on file     Emotionally abused: Not on file     Physically abused: Not on file     Forced sexual activity: Not on file   Other Topics Concern    Not on file   Social History Narrative    Not on file         ALLERGIES: Patient has no known allergies. Review of Systems   Constitutional: Negative for fever. HENT: Negative for facial swelling. Eyes: Negative for visual disturbance. Respiratory: Negative for chest tightness. Cardiovascular: Negative for chest pain. Gastrointestinal: Positive for blood in stool. Negative for abdominal pain. Genitourinary: Negative for difficulty urinating and dysuria. Musculoskeletal: Negative for arthralgias. Skin: Negative for rash. Neurological: Negative for headaches. Hematological: Negative for adenopathy. Psychiatric/Behavioral: Negative for suicidal ideas. There were no vitals filed for this visit. Physical Exam  Vitals signs and nursing note reviewed. Constitutional:       General: She is not in acute distress. Appearance: She is well-developed. HENT:      Head: Normocephalic and atraumatic. Eyes:      General: No scleral icterus. Conjunctiva/sclera: Conjunctivae normal.      Pupils: Pupils are equal, round, and reactive to light. Neck:      Musculoskeletal: Normal range of motion and neck supple. Cardiovascular:      Rate and Rhythm: Normal rate. Heart sounds: No murmur. Pulmonary:      Effort: Pulmonary effort is normal. No respiratory distress. Abdominal:      General: There is no distension. Musculoskeletal: Normal range of motion. Skin:     General: Skin is warm and dry. Findings: No rash. Neurological:      Mental Status: She is alert and oriented to person, place, and time. [facility]     Perfect Serve Consult for Admission  5:53 AM    ED Room Number: ER16/16  Patient Name and age:  Eliseo Pinedo [de-identified] y.o.  female  Working Diagnosis:   1. Lower GI bleed        COVID-19 Suspicion:  no  Sepsis present:  no  Reassessment needed: no  Code Status:  Full Code  Readmission: no  Isolation Requirements:  no  Recommended Level of Care:  med/surg  Department:[facility] Adult ED - 21   Other:  Initial hgb 13. Repeat pending.   Several bloody stools starting yesterday. No anticoagulation. VS stable. No pain. CTA at other facility showed no active bleed. Pt is Mosque and does not want blood products.     Procedures

## 2021-04-28 NOTE — ED NOTES
While assisting pt to bathroom pt started bleeding down pants leg and on floor. Assisted pt to bed. Large amount of blood with many dime sized blood clots noted in underwear, pants, bed, and floor. Dr Vanessa Monroy. Notified of writers findings. Pt continued to bleed after attempting to clean pt up. Pt's vitals dropped to 74/55. Dr Silvina Lynch notified and gave verbal order to start IV fluid bolus. Pt c/o dizziness and fatigue. Pt's pressure now at 116/63.  Will continue to monitor pt

## 2021-04-28 NOTE — CONSULTS
118 [facility].  217 [facility] 140 Valente Chapman, [address]  [phone]                     GI CONSULTATION NOTE      NAME:  Sloan Dale   :   1940   MRN:   [id number]     Consult Date: 2021     Chief Complaint: Rectal bleeding    History of Present Illness:  Patient is a [de-identified] y.o. who is seen in consultation at the request of KAITLYNN Grajeda for the above problem. She reports that two days ago she had a BM with a stool that was harder than normal, with mild anal pain when it passed, but that resolved immediately. She denies h/o constipation. She did not see any blood with that BM. Then last evening she had another BM that she states appeared normal, but was followed soon after by the urge for a BM that caused her to rush to the bathroom. It felt like she passed a diarrhea-like liquid stool, but when she wiped it was all BRB. The toilet appeared to be full of BRB as well. She informed her son who took has to [facility]. Her initial Hgb was 13.5. She had 2 or 3 additional bloody BMs while she was there. There had been discussion about obtaining an acute NM bleeding scan, but [facility] did not have the capabilities. The decision was made to transfer here to [facility], but prior to transport the [facility] staff requested a CTA be done. This was done and came back negative. She was then transferred here, arriving around 0600. She has not had an episode of rectal bleeding or a BM since arrival.  This note is written at 1045. Her Hgb upon arrival was 11.5, a 2 g drop. She denies SOB, dizziness, palpitations, abdominal pain, nausea, vomiting and melena. She has never had anything like this happen before. She has never had a colonoscopy or an EGD; never has seen GI. She denies ever having been diagnosed with diverticulitis. She is a [employer] and would not consent to receiving blood products if the needs would arise.         PMH:  Past Medical History:   Diagnosis Date  Diabetes ([address]     Hypertension        PSH:  No past surgical history on file. Allergies: Allergies   Allergen Reactions    Penicillins Other (comments)     Pt states it makes her pass out       Home Medications:  Prior to Admission Medications   Prescriptions Last Dose Informant Patient Reported? Taking?   glimepiride (AMARYL) 4 mg tablet 4/27/2021 at Unknown time  Yes Yes   Sig: Take 4 mg by mouth every morning. metoprolol tartrate (LOPRESSOR) 50 mg tablet 4/27/2021 at Unknown time  Yes Yes   Sig: Take 50 mg by mouth daily. verapamil ER (VERELAN) 240 mg ER capsule 4/27/2021 at Unknown time  Yes Yes   Sig: Take 240 mg by mouth daily. Facility-Administered Medications: None       Hospital Medications:  Current Facility-Administered Medications   Medication Dose Route Frequency    sodium chloride (NS) flush 5-40 mL  5-40 mL IntraVENous Q8H    sodium chloride (NS) flush 5-40 mL  5-40 mL IntraVENous PRN    ondansetron (ZOFRAN) injection 4 mg  4 mg IntraVENous Q4H PRN    glucose chewable tablet 16 g  4 Tab Oral PRN    dextrose (D50W) injection syrg 12.5-25 g  25-50 mL IntraVENous PRN    glucagon (GLUCAGEN) injection 1 mg  1 mg IntraMUSCular PRN    insulin lispro (HUMALOG) injection   SubCUTAneous AC&HS    pantoprazole (PROTONIX) 40 mg in 0.9% sodium chloride 10 mL injection  40 mg IntraVENous Q12H     Current Outpatient Medications   Medication Sig    verapamil ER (VERELAN) 240 mg ER capsule Take 240 mg by mouth daily.  glimepiride (AMARYL) 4 mg tablet Take 4 mg by mouth every morning.  metoprolol tartrate (LOPRESSOR) 50 mg tablet Take 50 mg by mouth daily. Social History:  Social History     Tobacco Use    Smoking status: Never Smoker   Substance Use Topics    Alcohol use: Not on file       Family History:  No family history on file.     Review of Systems:    Constitutional: negative fever, negative chills, negative weight loss  Eyes:   negative visual changes  ENT:   negative sore throat, tongue or lip swelling  Respiratory:  negative cough, negative dyspnea  Cards:  negative for chest pain, palpitations, lower extremity edema  GI:   See HPI  :  negative for frequency, dysuria  Integument:  negative for rash and pruritus  Heme:  negative for easy bruising and gum/nose bleeding  Musculoskel: negative for myalgias,  back pain and muscle weakness  Neuro: negative for headaches, dizziness, vertigo  Psych:  negative for feelings of anxiety, depression      Objective:     Patient Vitals for the past 8 hrs:   BP Temp Pulse Resp SpO2   04/28/21 0900 (!) 143/70 98.2 °F (36.8 °C) 82 27 95 %   04/28/21 0800 (!) 141/99 -- 83 28 97 %   04/28/21 0730 (!) 147/91 -- 81 22 97 %   04/28/21 0645 (!) 153/76 -- 79 23 97 %   04/28/21 0620 (!) 126/96 -- 93 19 97 %   04/28/21 0600 (!) 150/90 97.6 °F (36.4 °C) 89 18 99 %     No intake/output data recorded. No intake/output data recorded. PHYSICAL EXAM:  General appearance: cooperative, no distress  Skin: Extremities and face reveal no rashes, pallor or jaundice  HEENT: Sclerae anicteric. Extra-occular muscles are intact. Cardiovascular: Regular rate and rhythm. No murmurs, gallops, or rubs. Respiratory: Comfortable breathing with no accessory muscle use. Clear breath sounds with no wheezes, rales, or rhonchi. GI: Abdomen nondistended, soft, and nontender. Normal active bowel sounds. No enlargement of the liver or spleen. No masses palpable. Rectal: LAI not performed, but the padding on the bed under her has tinge of dried BRB, as does her panties and the pad under them. Musculoskeletal: No edema of the lower legs. Neurological: Gross memory appears intact. Patient is alert and oriented. Psychiatric: Mood appears appropriate with good judgement. No anxiety or agitation.       Data Review     Recent Labs     04/28/21 0616 04/27/21  2304   WBC 7.5 5.6   HGB 11.5 13.4   HCT 36.7 40.2   * 178     Recent Labs     04/28/21 0616 04/27/21  2304    137   K 3.7 3.5    103   CO2 22 28   BUN 16 16   CREA 0.79 1.01   * 416*   CA 9.2 9.2     Recent Labs     04/27/21  2304   *   TP 7.2   ALB 3.5   GLOB 3.7     Recent Labs     04/27/21 2304   INR 1.2*   PTP 15.3*        Imaging studies reviewed    Assessment / Plan :     Hematochezia, painless  - Initially large volume x several episodes, though no bleeding in the past 5 hours since transfer from [facility]  - DDx includes diverticular bleed (most likely), colitis, hemorrhoid, neoplasm  - CTA 4/27/21 - Stomach and small bowel appear normal. There is extensive  colonic diverticulosis without evidence of diverticulitis. No active gastrointestinal bleeding site appreciated. - Monitor H/H -- Pt is Oriental orthodox and will not consent to transfusion  - Hgb 13.5 on initial presentation --> 11.5 in 7 hr time  - If she has another episode of profuse bleeding, order a stat NM bleeding scan (otherwise it will not be of benefit)  - Plan for colonoscopy 4/29 with Dr. Alfonso Pretty, timing TBD   -- Clears today   -- Prep this evening   -- NPO at 0500 tomorrow         Patient C/Marium Haynes [address] Problem List:   Active Problems:    GI bleed (4/28/2021)    I have examined the patient. I have reviewed the chart and agree with the documentation recorded by the NP, including the assessment, treatment plan, and disposition. ASSESSMENT AND PLAN:  81-year-old lady is presented with several episodes of hematochezia and acute blood loss anemia. She denies any significant abdominal pain or syncopal episode. Differential diagnosis includes diverticulosis, polyps, arteriovenous malformations, hemorrhoids, GI tract neoplasms, etc.  Clear liquids p.o.   H&H every 6 hours and transfuse as needed  Prep for colonoscopy tomorrow. Further recommendations after colonoscopy. Thank you for consultation.       Williams Wallace MD

## 2021-04-28 NOTE — PROGRESS NOTES
Admission Medication Reconciliation: In progress:    Spoke with son Fifi Elias) by telephone @ [phone], unable to speak with patient face to face at this time due to general isolation precautions in the ED related to COVID-19 pandemic. Son is a generally historian, read from pill bottles which he states were kept in hr purse (which he has). RX query is available at this time, but called [facility] anyway @ [phone]) as this data is frequently not up-to-date. Son is unsure whether patient takes supplements or any other OTC medications. Interview included questions regarding use of:  PTA medications including prescription/OTC, vitamins, supplements, inhaled, topical, injectable, otic and ophthalmic medications    Medication changes (since last review): Added:  Aleve: son states she takes very regularly due to leg pain    Deleted:  Metoprolol succinate: son states there are several tablets left in pill bottle which dates back to June 2020. [facility] confirmed that last refill was in August 2020 #30 for 30 days supply. Thank you for allowing me to participate in the care of your patient. Nury BolañosD, RN # [phone]       [facility] pharmacy benefit data reflects medications filled and processed through the patient's insurance, however   this data does NOT capture whether the medication was picked up or is currently being taken by the patient. Allergies:  Penicillins    Significant PMH/Disease States:   Past Medical History:   Diagnosis Date    Diabetes ([address]     Hypertension      Chief Complaint for this Admission:    Chief Complaint   Patient presents with    Transfer Of Care     Prior to Admission Medications:   Prior to Admission Medications   Prescriptions Last Dose Informant Taking?   glimepiride (AMARYL) 4 mg tablet 4/27/2021 at Unknown time  Yes   Sig: Take 4 mg by mouth every morning.    naproxen sodium (Aleve) 220 mg tablet 4/27/2021 at Unknown time  Yes   Sig: Take 220 mg by mouth two (2) times daily (with meals). verapamil ER (VERELAN) 240 mg ER capsule 4/27/2021 at Unknown time  Yes   Sig: Take 240 mg by mouth daily. Facility-Administered Medications: None     Please contact the main inpatient pharmacy with any questions or concerns at [phone] and we will direct you to the clinical pharmacist covering this patient's care while in-house.    Bharath Faust

## 2021-04-29 ENCOUNTER — ANESTHESIA (OUTPATIENT)
Dept: ENDOSCOPY | Age: 81
DRG: 378 | End: 2021-04-29
Payer: MEDICARE

## 2021-04-29 ENCOUNTER — ANESTHESIA EVENT (OUTPATIENT)
Dept: ENDOSCOPY | Age: 81
DRG: 378 | End: 2021-04-29
Payer: MEDICARE

## 2021-04-29 LAB
ANION GAP SERPL CALC-SCNC: 9 MMOL/L (ref 5–15)
BASOPHILS # BLD: 0 K/UL (ref 0–0.1)
BASOPHILS NFR BLD: 1 % (ref 0–1)
BUN SERPL-MCNC: 12 MG/DL (ref 6–20)
BUN/CREAT SERPL: 17 (ref 12–20)
CALCIUM SERPL-MCNC: 8 MG/DL (ref 8.5–10.1)
CHLORIDE SERPL-SCNC: 113 MMOL/L (ref 97–108)
CO2 SERPL-SCNC: 21 MMOL/L (ref 21–32)
CREAT SERPL-MCNC: 0.72 MG/DL (ref 0.55–1.02)
DIFFERENTIAL METHOD BLD: ABNORMAL
EOSINOPHIL # BLD: 0.1 K/UL (ref 0–0.4)
EOSINOPHIL NFR BLD: 2 % (ref 0–7)
ERYTHROCYTE [DISTWIDTH] IN BLOOD BY AUTOMATED COUNT: 13.2 % (ref 11.5–14.5)
GLUCOSE BLD STRIP.AUTO-MCNC: 130 MG/DL (ref 65–100)
GLUCOSE BLD STRIP.AUTO-MCNC: 153 MG/DL (ref 65–100)
GLUCOSE BLD STRIP.AUTO-MCNC: 257 MG/DL (ref 65–100)
GLUCOSE BLD STRIP.AUTO-MCNC: 272 MG/DL (ref 65–100)
GLUCOSE SERPL-MCNC: 162 MG/DL (ref 65–100)
HCT VFR BLD AUTO: 26.3 % (ref 35–47)
HCT VFR BLD AUTO: 29.5 % (ref 35–47)
HGB BLD-MCNC: 8.5 G/DL (ref 11.5–16)
HGB BLD-MCNC: 9.4 G/DL (ref 11.5–16)
IMM GRANULOCYTES # BLD AUTO: 0 K/UL (ref 0–0.04)
IMM GRANULOCYTES NFR BLD AUTO: 0 % (ref 0–0.5)
LYMPHOCYTES # BLD: 2.7 K/UL (ref 0.8–3.5)
LYMPHOCYTES NFR BLD: 41 % (ref 12–49)
MCH RBC QN AUTO: 27.4 PG (ref 26–34)
MCHC RBC AUTO-ENTMCNC: 31.9 G/DL (ref 30–36.5)
MCV RBC AUTO: 86 FL (ref 80–99)
MONOCYTES # BLD: 0.6 K/UL (ref 0–1)
MONOCYTES NFR BLD: 10 % (ref 5–13)
NEUTS SEG # BLD: 3.1 K/UL (ref 1.8–8)
NEUTS SEG NFR BLD: 46 % (ref 32–75)
NRBC # BLD: 0 K/UL (ref 0–0.01)
NRBC BLD-RTO: 0 PER 100 WBC
PLATELET # BLD AUTO: 127 K/UL (ref 150–400)
PMV BLD AUTO: 12.8 FL (ref 8.9–12.9)
POTASSIUM SERPL-SCNC: 3.2 MMOL/L (ref 3.5–5.1)
RBC # BLD AUTO: 3.43 M/UL (ref 3.8–5.2)
SERVICE CMNT-IMP: ABNORMAL
SODIUM SERPL-SCNC: 143 MMOL/L (ref 136–145)
WBC # BLD AUTO: 6.6 K/UL (ref 3.6–11)

## 2021-04-29 PROCEDURE — 76040000007: Performed by: SPECIALIST

## 2021-04-29 PROCEDURE — 85025 COMPLETE CBC W/AUTO DIFF WBC: CPT

## 2021-04-29 PROCEDURE — C9113 INJ PANTOPRAZOLE SODIUM, VIA: HCPCS | Performed by: NURSE PRACTITIONER

## 2021-04-29 PROCEDURE — 74011636637 HC RX REV CODE- 636/637: Performed by: NURSE PRACTITIONER

## 2021-04-29 PROCEDURE — 77030013996 HC SNR POLYP ENDOSC OCOA -B: Performed by: SPECIALIST

## 2021-04-29 PROCEDURE — 74011250636 HC RX REV CODE- 250/636: Performed by: NURSE ANESTHETIST, CERTIFIED REGISTERED

## 2021-04-29 PROCEDURE — 88305 TISSUE EXAM BY PATHOLOGIST: CPT

## 2021-04-29 PROCEDURE — 2709999900 HC NON-CHARGEABLE SUPPLY: Performed by: SPECIALIST

## 2021-04-29 PROCEDURE — 82962 GLUCOSE BLOOD TEST: CPT

## 2021-04-29 PROCEDURE — 36415 COLL VENOUS BLD VENIPUNCTURE: CPT

## 2021-04-29 PROCEDURE — 74011250636 HC RX REV CODE- 250/636: Performed by: NURSE PRACTITIONER

## 2021-04-29 PROCEDURE — 76060000032 HC ANESTHESIA 0.5 TO 1 HR: Performed by: SPECIALIST

## 2021-04-29 PROCEDURE — 80048 BASIC METABOLIC PNL TOTAL CA: CPT

## 2021-04-29 PROCEDURE — 0DJD8ZZ INSPECTION OF LOWER INTESTINAL TRACT, VIA NATURAL OR ARTIFICIAL OPENING ENDOSCOPIC: ICD-10-PCS | Performed by: SPECIALIST

## 2021-04-29 PROCEDURE — 74011250636 HC RX REV CODE- 250/636: Performed by: SPECIALIST

## 2021-04-29 PROCEDURE — 74011000250 HC RX REV CODE- 250: Performed by: NURSE ANESTHETIST, CERTIFIED REGISTERED

## 2021-04-29 PROCEDURE — 74011000250 HC RX REV CODE- 250: Performed by: NURSE PRACTITIONER

## 2021-04-29 PROCEDURE — 85018 HEMOGLOBIN: CPT

## 2021-04-29 PROCEDURE — 65660000000 HC RM CCU STEPDOWN

## 2021-04-29 PROCEDURE — C1713 ANCHOR/SCREW BN/BN,TIS/BN: HCPCS | Performed by: SPECIALIST

## 2021-04-29 PROCEDURE — 74011250636 HC RX REV CODE- 250/636: Performed by: FAMILY MEDICINE

## 2021-04-29 RX ORDER — HYDRALAZINE HYDROCHLORIDE 20 MG/ML
10 INJECTION INTRAMUSCULAR; INTRAVENOUS
Status: DISCONTINUED | OUTPATIENT
Start: 2021-04-29 | End: 2021-05-01 | Stop reason: HOSPADM

## 2021-04-29 RX ORDER — ATROPINE SULFATE 0.1 MG/ML
0.5 INJECTION INTRAVENOUS
Status: DISCONTINUED | OUTPATIENT
Start: 2021-04-29 | End: 2021-04-29 | Stop reason: HOSPADM

## 2021-04-29 RX ORDER — SODIUM CHLORIDE 9 MG/ML
INJECTION, SOLUTION INTRAVENOUS
Status: DISCONTINUED | OUTPATIENT
Start: 2021-04-29 | End: 2021-04-29 | Stop reason: HOSPADM

## 2021-04-29 RX ORDER — DEXTROMETHORPHAN/PSEUDOEPHED 2.5-7.5/.8
1.2 DROPS ORAL
Status: DISCONTINUED | OUTPATIENT
Start: 2021-04-29 | End: 2021-04-29 | Stop reason: HOSPADM

## 2021-04-29 RX ORDER — SODIUM CHLORIDE 0.9 % (FLUSH) 0.9 %
5-40 SYRINGE (ML) INJECTION EVERY 8 HOURS
Status: DISCONTINUED | OUTPATIENT
Start: 2021-04-29 | End: 2021-05-01 | Stop reason: HOSPADM

## 2021-04-29 RX ORDER — SODIUM CHLORIDE 0.9 % (FLUSH) 0.9 %
5-40 SYRINGE (ML) INJECTION AS NEEDED
Status: DISCONTINUED | OUTPATIENT
Start: 2021-04-29 | End: 2021-05-01 | Stop reason: HOSPADM

## 2021-04-29 RX ORDER — SODIUM CHLORIDE 9 MG/ML
50 INJECTION, SOLUTION INTRAVENOUS CONTINUOUS
Status: DISPENSED | OUTPATIENT
Start: 2021-04-29 | End: 2021-04-29

## 2021-04-29 RX ORDER — NALOXONE HYDROCHLORIDE 0.4 MG/ML
0.4 INJECTION, SOLUTION INTRAMUSCULAR; INTRAVENOUS; SUBCUTANEOUS
Status: DISCONTINUED | OUTPATIENT
Start: 2021-04-29 | End: 2021-04-29 | Stop reason: HOSPADM

## 2021-04-29 RX ORDER — EPINEPHRINE 0.1 MG/ML
1 INJECTION INTRACARDIAC; INTRAVENOUS
Status: DISCONTINUED | OUTPATIENT
Start: 2021-04-29 | End: 2021-04-29 | Stop reason: HOSPADM

## 2021-04-29 RX ORDER — FLUMAZENIL 0.1 MG/ML
0.2 INJECTION INTRAVENOUS
Status: DISCONTINUED | OUTPATIENT
Start: 2021-04-29 | End: 2021-04-29 | Stop reason: HOSPADM

## 2021-04-29 RX ORDER — LIDOCAINE HYDROCHLORIDE 20 MG/ML
INJECTION, SOLUTION EPIDURAL; INFILTRATION; INTRACAUDAL; PERINEURAL AS NEEDED
Status: DISCONTINUED | OUTPATIENT
Start: 2021-04-29 | End: 2021-04-29 | Stop reason: HOSPADM

## 2021-04-29 RX ORDER — PROPOFOL 10 MG/ML
INJECTION, EMULSION INTRAVENOUS AS NEEDED
Status: DISCONTINUED | OUTPATIENT
Start: 2021-04-29 | End: 2021-04-29 | Stop reason: HOSPADM

## 2021-04-29 RX ADMIN — INSULIN LISPRO 3 UNITS: 100 INJECTION, SOLUTION INTRAVENOUS; SUBCUTANEOUS at 21:57

## 2021-04-29 RX ADMIN — PROPOFOL 25 MG: 10 INJECTION, EMULSION INTRAVENOUS at 17:36

## 2021-04-29 RX ADMIN — SODIUM CHLORIDE 40 MG: 9 INJECTION INTRAMUSCULAR; INTRAVENOUS; SUBCUTANEOUS at 21:45

## 2021-04-29 RX ADMIN — PROPOFOL 25 MG: 10 INJECTION, EMULSION INTRAVENOUS at 17:25

## 2021-04-29 RX ADMIN — PHENYLEPHRINE HYDROCHLORIDE 120 MCG: 10 INJECTION INTRAVENOUS at 17:36

## 2021-04-29 RX ADMIN — PHENYLEPHRINE HYDROCHLORIDE 80 MCG: 10 INJECTION INTRAVENOUS at 17:30

## 2021-04-29 RX ADMIN — PROPOFOL 25 MG: 10 INJECTION, EMULSION INTRAVENOUS at 17:40

## 2021-04-29 RX ADMIN — SODIUM CHLORIDE 75 ML/HR: 9 INJECTION, SOLUTION INTRAVENOUS at 05:44

## 2021-04-29 RX ADMIN — PROPOFOL 25 MG: 10 INJECTION, EMULSION INTRAVENOUS at 17:29

## 2021-04-29 RX ADMIN — PROPOFOL 25 MG: 10 INJECTION, EMULSION INTRAVENOUS at 17:31

## 2021-04-29 RX ADMIN — PHENYLEPHRINE HYDROCHLORIDE 40 MCG: 10 INJECTION INTRAVENOUS at 17:26

## 2021-04-29 RX ADMIN — PROPOFOL 25 MG: 10 INJECTION, EMULSION INTRAVENOUS at 17:18

## 2021-04-29 RX ADMIN — PROPOFOL 25 MG: 10 INJECTION, EMULSION INTRAVENOUS at 17:20

## 2021-04-29 RX ADMIN — PROPOFOL 25 MG: 10 INJECTION, EMULSION INTRAVENOUS at 17:17

## 2021-04-29 RX ADMIN — PROPOFOL 25 MG: 10 INJECTION, EMULSION INTRAVENOUS at 17:27

## 2021-04-29 RX ADMIN — INSULIN LISPRO 2 UNITS: 100 INJECTION, SOLUTION INTRAVENOUS; SUBCUTANEOUS at 19:00

## 2021-04-29 RX ADMIN — SODIUM CHLORIDE 40 MG: 9 INJECTION INTRAMUSCULAR; INTRAVENOUS; SUBCUTANEOUS at 09:28

## 2021-04-29 RX ADMIN — INSULIN LISPRO 5 UNITS: 100 INJECTION, SOLUTION INTRAVENOUS; SUBCUTANEOUS at 12:25

## 2021-04-29 RX ADMIN — SODIUM CHLORIDE 10 ML: 9 INJECTION INTRAMUSCULAR; INTRAVENOUS; SUBCUTANEOUS at 14:00

## 2021-04-29 RX ADMIN — SODIUM CHLORIDE 75 ML/HR: 9 INJECTION, SOLUTION INTRAVENOUS at 21:46

## 2021-04-29 RX ADMIN — LIDOCAINE HYDROCHLORIDE 20 MG: 20 INJECTION, SOLUTION EPIDURAL; INFILTRATION; INTRACAUDAL; PERINEURAL at 17:15

## 2021-04-29 RX ADMIN — SODIUM CHLORIDE: 900 INJECTION, SOLUTION INTRAVENOUS at 17:10

## 2021-04-29 RX ADMIN — PROPOFOL 25 MG: 10 INJECTION, EMULSION INTRAVENOUS at 17:21

## 2021-04-29 RX ADMIN — PHENYLEPHRINE HYDROCHLORIDE 80 MCG: 10 INJECTION INTRAVENOUS at 17:40

## 2021-04-29 RX ADMIN — PROPOFOL 25 MG: 10 INJECTION, EMULSION INTRAVENOUS at 17:23

## 2021-04-29 RX ADMIN — Medication 10 ML: at 21:45

## 2021-04-29 RX ADMIN — PROPOFOL 50 MG: 10 INJECTION, EMULSION INTRAVENOUS at 17:15

## 2021-04-29 NOTE — PROGRESS NOTES
[address] [facility]  Hospitalist Group                                                                                          Hospitalist Progress Note  Ana Antoine MD  Answering service: [phone] from in house phone        Date of Service:  2021  NAME:  Jignesh Sinha  :  1940  MRN:  [id number]      Admission Summary:   Jignesh Sinha is a [de-identified] y.o. female with a past medical history of diabetes and hypertension who presented to [facility] ED with complaints of bloody stools x2. States that she felt constipated yesterday morning then had a normal bowel movement. After a couple of hours she felt like she was going to have diarrhea and had a bowel movement with a large amount of blood in it. After a couple more hours had a second episode of bloody diarrhea. Denies any nausea, vomiting or abdominal pain. Two more episodes of bloody stools reported in the ED. Takes aleve 220mg almost everyday for leg pains and cramps    Interval history / Subjective:   Patient reports that she had a bloody bowel movement earlier today, denies any other complaints or problems     Assessment & Plan:     Lower GI bleed  For colonoscopy today  Monitor H&H  N.p.o.  Protonix twice daily  Transfuse as needed    Acute blood loss anemia  Patient Scientologist  Closely monitor H&H, further intervention per hospitalist, reassess as needed    Hypertension  Hydralazine as needed    Diabetes mellitus type 2  Sliding scale NovoLog insulin, Accu-Cheks, diet control, close monitoring        Code status: Full code  DVT prophylaxis: SCD    Care Plan discussed with: Patient/Family  Anticipated Disposition: Home w/Family  Anticipated Discharge: Less than 24 hours     Hospital Problems  Never Reviewed          Codes Class Noted POA    GI bleed ICD-10-CM: K92.2  ICD-9-CM: 578.9  2021 Unknown                Review of Systems:   A comprehensive review of systems was negative except for that written in the HPI. Vital Signs:    Last 24hrs VS reviewed since prior progress note. Most recent are:  Visit Vitals  BP (!) 148/98 (BP 1 Location: Right lower arm)   Pulse 88   Temp 98.2 °F (36.8 °C)   Resp 17   Wt 67.5 kg (148 lb 13 oz)   SpO2 97%   BMI 26.36 kg/m²         Intake/Output Summary (Last 24 hours) at 4/29/2021 1414  Last data filed at 4/29/2021 1200  Gross per 24 hour   Intake 1652.5 ml   Output 400 ml   Net 1252.5 ml        Physical Examination:     I had a face to face encounter with this patient and independently examined them on 4/29/2021 as outlined below:          General : alert x 3, awake, no acute distress, resting in bed, pleasant /female, appears to be stated age  HEENT: PEERL, EOMI, moist mucus membrane, TM clear  Neck: supple, no JVD, no meningeal signs  Chest: Clear to auscultation bilaterally   CVS: S1 S2 heard, Capillary refill less than 2 seconds  Abd: soft/ Non tender, non distended, BS physiological,   Ext: no clubbing, no cyanosis, no edema, brisk 2+ DP pulses  Neuro/Psych: pleasant mood and affect, CN 2-12 grossly intact, sensory grossly within normal limit, Strength 5/5 in all extremities, DTR 1+ x 4  Skin: warm     Data Review:    Review and/or order of clinical lab test      Labs:     Recent Labs     04/29/21  0544 04/28/21 2204 04/28/21  0616 04/28/21  0616   WBC 6.6  --   --  7.5   HGB 9.4* 10.4*   < > 11.5   HCT 29.5* 32.6*   < > 36.7   *  --   --  137*    < > = values in this interval not displayed. Recent Labs     04/29/21  0544 04/28/21  0616 04/27/21  2304    139 137   K 3.2* 3.7 3.5   * 107 103   CO2 21 22 28   BUN 12 16 16   CREA 0.72 0.79 1.01   * 389* 416*   CA 8.0* 9.2 9.2     Recent Labs     04/27/21  2304   ALT 21   *   TBILI 0.4   TP 7.2   ALB 3.5   GLOB 3.7     Recent Labs     04/27/21  2304   INR 1.2*   PTP 15.3*      No results for input(s): FE, TIBC, PSAT, FERR in the last 72 hours.    No results found for: FOL, RBCF   No results for input(s): PH, PCO2, PO2 in the last 72 hours. No results for input(s): CPK, CKNDX, TROIQ in the last 72 hours. No lab exists for component: CPKMB  No results found for: CHOL, CHOLX, CHLST, CHOLV, HDL, HDLP, LDL, LDLC, DLDLP, TGLX, TRIGL, TRIGP, CHHD, CHHDX  Lab Results   Component Value Date/Time    Glucose (POC) 257 (H) 04/29/2021 11:44 AM    Glucose (POC) 130 (H) 04/29/2021 06:37 AM    Glucose (POC) 164 (H) 04/28/2021 09:43 PM    Glucose (POC) 364 (H) 04/28/2021 05:08 PM    Glucose (POC) 260 (H) 04/28/2021 01:56 PM     No results found for: COLOR, APPRN, SPGRU, REFSG, RHINA, PROTU, GLUCU, KETU, BILU, UROU, NERY, LEUKU, GLUKE, EPSU, BACTU, WBCU, RBCU, CASTS, UCRY      Medications Reviewed:     Current Facility-Administered Medications   Medication Dose Route Frequency    sodium chloride (NS) flush 5-40 mL  5-40 mL IntraVENous Q8H    sodium chloride (NS) flush 5-40 mL  5-40 mL IntraVENous PRN    ondansetron (ZOFRAN) injection 4 mg  4 mg IntraVENous Q4H PRN    glucose chewable tablet 16 g  4 Tab Oral PRN    dextrose (D50W) injection syrg 12.5-25 g  25-50 mL IntraVENous PRN    glucagon (GLUCAGEN) injection 1 mg  1 mg IntraMUSCular PRN    insulin lispro (HUMALOG) injection   SubCUTAneous AC&HS    pantoprazole (PROTONIX) 40 mg in 0.9% sodium chloride 10 mL injection  40 mg IntraVENous Q12H    0.9% sodium chloride infusion  75 mL/hr IntraVENous CONTINUOUS     ______________________________________________________________________  EXPECTED LENGTH OF STAY: - - -  ACTUAL LENGTH OF STAY:          1      Please note that this dictation was completed with Comecer, the computer voice recognition software. Quite often unanticipated grammatical, syntax, homophones, and other interpretive errors are inadvertently transcribed by the computer software. Please disregard these errors. Please excuse any errors that have escaped final proofreading.                 Murphy Deutsch MD

## 2021-04-29 NOTE — PERIOP NOTES
TRANSFER - IN REPORT:    Verbal report received from Pepper Montgomery RN on [employer]  being received from [phone] for ordered procedure      Report consisted of patients Situation, Background, Assessment and   Recommendations(SBAR). Information from the following report(s) SBAR, Cardiac Rhythm SR and Pre Procedure Checklist was reviewed with the receiving nurse. Opportunity for questions and clarification was provided. Assessment completed upon patients arrival to unit and care assumed. TRANSFER - OUT REPORT:    Verbal report given to Pepper Montgomery RN on [employer]  being transferred to [phone] for routine progression of care       Report consisted of patients Situation, Background, Assessment and   Recommendations(SBAR). Information from the following report(s) SBAR, Procedure Summary, Cardiac Rhythm SR and Quality Measures was reviewed with the receiving nurse. Lines:   Peripheral IV 04/28/21 Right Antecubital (Active)   Site Assessment Clean, dry, & intact 04/29/21 1600   Phlebitis Assessment 0 04/29/21 1600   Infiltration Assessment 0 04/29/21 0800   Dressing Status Clean, dry, & intact 04/29/21 1600   Dressing Type Transparent 04/29/21 1600   Hub Color/Line Status Blue;Capped 04/29/21 1600   Action Taken Open ports on tubing capped 04/29/21 0800   Alcohol Cap Used Yes 04/29/21 0800       Peripheral IV 04/29/21 Anterior; Left Forearm (Active)   Site Assessment Clean, dry, & intact 04/29/21 1600   Phlebitis Assessment 0 04/29/21 1600   Infiltration Assessment 0 04/29/21 1600   Dressing Status Clean, dry, & intact 04/29/21 0800   Dressing Type Transparent 04/29/21 1600   Hub Color/Line Status Blue; Infusing 04/29/21 1600   Action Taken Open ports on tubing capped 04/29/21 0800   Alcohol Cap Used Yes 04/29/21 0800        Opportunity for questions and clarification was provided.       Patient transported with:   Trinity Glynn IV

## 2021-04-29 NOTE — PROGRESS NOTES
Problem: Falls - Risk of  Goal: *Absence of Falls  Description: Document Patience Rangel Fall Risk and appropriate interventions in the flowsheet. Outcome: Progressing Towards Goal  Note: Fall Risk Interventions:  Mobility Interventions: Communicate number of staff needed for ambulation/transfer              Elimination Interventions: Call light in reach              Problem: Patient Education: Go to Patient Education Activity  Goal: Patient/Family Education  Outcome: Progressing Towards Goal     Problem: Diabetes Self-Management  Goal: *Disease process and treatment process  Description: Define diabetes and identify own type of diabetes; list 3 options for treating diabetes. Outcome: Progressing Towards Goal  Goal: *Incorporating nutritional management into lifestyle  Description: Describe effect of type, amount and timing of food on blood glucose; list 3 methods for planning meals. Outcome: Progressing Towards Goal  Goal: *Incorporating physical activity into lifestyle  Description: State effect of exercise on blood glucose levels. Outcome: Progressing Towards Goal  Goal: *Developing strategies to promote health/change behavior  Description: Define the ABC's of diabetes; identify appropriate screenings, schedule and personal plan for screenings. Outcome: Progressing Towards Goal  Goal: *Using medications safely  Description: State effect of diabetes medications on diabetes; name diabetes medication taking, action and side effects. Outcome: Progressing Towards Goal  Goal: *Monitoring blood glucose, interpreting and using results  Description: Identify recommended blood glucose targets  and personal targets. Outcome: Progressing Towards Goal  Goal: *Prevention, detection, treatment of acute complications  Description: List symptoms of hyper- and hypoglycemia; describe how to treat low blood sugar and actions for lowering  high blood glucose level.   Outcome: Progressing Towards Goal  Goal: *Prevention, detection and treatment of chronic complications  Description: Define the natural course of diabetes and describe the relationship of blood glucose levels to long term complications of diabetes.   Outcome: Progressing Towards Goal  Goal: *Developing strategies to address psychosocial issues  Description: Describe feelings about living with diabetes; identify support needed and support network  Outcome: Progressing Towards Goal  Goal: *Insulin pump training  Outcome: Progressing Towards Goal  Goal: *Sick day guidelines  Outcome: Progressing Towards Goal  Goal: *Patient Specific Goal (EDIT GOAL, INSERT TEXT)  Outcome: Progressing Towards Goal     Problem: Patient Education: Go to Patient Education Activity  Goal: Patient/Family Education  Outcome: Progressing Towards Goal     Problem: Upper and Lower GI Bleed: Day 1  Goal: Off Pathway (Use only if patient is Off Pathway)  Outcome: Progressing Towards Goal  Goal: Activity/Safety  Outcome: Progressing Towards Goal  Goal: Consults, if ordered  Outcome: Progressing Towards Goal  Goal: Diagnostic Test/Procedures  Outcome: Progressing Towards Goal  Goal: Nutrition/Diet  Outcome: Progressing Towards Goal  Goal: Discharge Planning  Outcome: Progressing Towards Goal  Goal: Medications  Outcome: Progressing Towards Goal  Goal: Respiratory  Outcome: Progressing Towards Goal  Goal: Treatments/Interventions/Procedures  Outcome: Progressing Towards Goal  Goal: Psychosocial  Outcome: Progressing Towards Goal  Goal: *Optimal pain control at patient's stated goal  Outcome: Progressing Towards Goal  Goal: *Hemodynamically stable  Outcome: Progressing Towards Goal  Goal: *Demonstrates progressive activity  Outcome: Progressing Towards Goal     Problem: Upper and Lower GI Bleed: Day 2  Goal: Off Pathway (Use only if patient is Off Pathway)  Outcome: Progressing Towards Goal  Goal: Activity/Safety  Outcome: Progressing Towards Goal  Goal: Consults, if ordered  Outcome: Progressing Towards Goal  Goal: Diagnostic Test/Procedures  Outcome: Progressing Towards Goal  Goal: Nutrition/Diet  Outcome: Progressing Towards Goal  Goal: Discharge Planning  Outcome: Progressing Towards Goal  Goal: Medications  Outcome: Progressing Towards Goal  Goal: Respiratory  Outcome: Progressing Towards Goal  Goal: Treatments/Interventions/Procedures  Outcome: Progressing Towards Goal  Goal: Psychosocial  Outcome: Progressing Towards Goal  Goal: *Optimal pain control at patient's stated goal  Outcome: Progressing Towards Goal  Goal: *Hemodynamically stable  Outcome: Progressing Towards Goal  Goal: *Tolerating diet  Outcome: Progressing Towards Goal  Goal: *Demonstrates progressive activity  Outcome: Progressing Towards Goal

## 2021-04-29 NOTE — PROGRESS NOTES
1040 - Reason for Admission:   Lower GI bleed                   RUR Score:    8%              DME: None of hers. But her  passed in 2015 and she has all of his equipment. W/C, walker, canes, b/s commode. ADLs: Patient is primarily independent. Patient lives in her own home right next door but she stays a lot with her son and grandson.       Previous HH/SNF/rehab: No    ER Contacts: Shilpi Nugent - son - [phone]    Plan for utilizing home health:   No       PCP: First and Last name:  Lenny Lacey MD   Name of Practice:    Are you a current patient: Yes/No:    Approximate date of last visit:    Can you participate in a virtual visit with your PCP:                     Current Advanced Directive/Advance Care Plan: Full Code                         Transition of Care Plan:    Home with family

## 2021-04-29 NOTE — PROCEDURES
[address]  [address]                 Colonoscopy Procedure Note    Indications:   See Preoperative Diagnosis above  Referring Physician: Emma Gaviria MD  Anesthesia/Sedation: MAC anesthesia Propofol  Endoscopist:  Dr. Callie Pinto  Assistant:  Endoscopy Technician-1: Erick Snow  Endoscopy RN-1: Paty Agarwal RN  Preoperative diagnosis: Rectal Bleeding  Postoperative diagnosis: 1)Diverticulosis  2)Colon polyps    Procedure in Detail:  Informed consent was obtained for the procedure, including sedation. Risks of perforation, hemorrhage, adverse drug reaction, and aspiration were discussed. The patient was placed in the left lateral decubitus position. Based on the pre-procedure assessment, including review of the patient's medical history, medications, allergies, and review of systems, she had been deemed to be an appropriate candidate for moderate sedation; she was therefore sedated with the medications listed above. The patient was monitored continuously with ECG tracing, pulse oximetry, blood pressure monitoring, and direct observations. A rectal examination was performed. The [id number] was inserted into the rectum and advanced under direct vision to the cecum, which was identified by the ileocecal valve and appendiceal orifice. The quality of the colonic preparation was good. A careful inspection was made as the colonoscope was withdrawn, including a retroflexed view of the rectum; findings and interventions are described below. Appropriate photodocumentation was obtained. Findings:  Rectum: Small non bleeding hemorhhoids  Sigmoid: moderate diverticulosis;   Descending Colon: moderate diverticulosis;8 mm sessile polyp removed with hot snare  Transverse Colon: moderate diverticulosis; 6 mm sessile polyp removed with hot snare, 10 mm sessile polyp removed with hot snare after submucosal injection of 5 ml of O'Rise  Ascending Colon: moderate diverticulosis; Cecum: normal      Specimens:    colon polyps    EBL: None    Complications: None; patient tolerated the procedure well. Recommendations:     - Await pathology.      - Mechanical soft diet        Signed By: Bob Barrera MD                        April 29, 2021

## 2021-04-29 NOTE — PROGRESS NOTES
[address]  [address]  [address] Holli Torres, [address]  [phone]                     GI PROGRESS NOTE    Patient Name: Ольга Crawford      : 1940      MRN: [id number]  Admit Date: 2021  Today's Date: 2021    Subjective:     Patient on the phone on rounds, but pauses her conversation to deny abdominal pain and any further rectal bleeding. Her nurse reports that she completed the bowel prep this morning and has been having watery brown-tinged stools. Objective:     Blood pressure (!) 148/98, pulse 88, temperature 98.2 °F (36.8 °C), resp. rate 17, weight 67.5 kg (148 lb 13 oz), SpO2 97 %. Physical Exam:  General appearance: cooperative, no distress  HEENT: Sclerae anicteric. Extra-occular muscles are intact. Cardiovascular:  Respiratory: Comfortable breathing   GI: Abdomen nondistended  Neurological:  alert and oriented. Psychiatric:  No anxiety or agitation.       Data Review:    Recent Results (from the past 24 hour(s))   GLUCOSE, POC    Collection Time: 21  1:56 PM   Result Value Ref Range    Glucose (POC) 260 (H) 65 - 100 mg/dL    Performed by Anastacia Gonzales    HGB & HCT    Collection Time: 21  2:09 PM   Result Value Ref Range    HGB 9.7 (L) 11.5 - 16.0 g/dL    HCT 30.7 (L) 35.0 - 47.0 %   COVID-19 RAPID TEST    Collection Time: 21  2:10 PM   Result Value Ref Range    Specimen source Nasopharyngeal      COVID-19 rapid test Not detected NOTD     GLUCOSE, POC    Collection Time: 21  5:08 PM   Result Value Ref Range    Glucose (POC) 364 (H) 65 - 100 mg/dL    Performed by Anastacia Gonzales    GLUCOSE, POC    Collection Time: 21  9:43 PM   Result Value Ref Range    Glucose (POC) 164 (H) 65 - 100 mg/dL    Performed by Corrine Tang    HGB & HCT    Collection Time: 21 10:04 PM   Result Value Ref Range    HGB 10.4 (L) 11.5 - 16.0 g/dL    HCT 32.6 (L) 35.0 - 31.5 %   METABOLIC PANEL, BASIC    Collection Time: 21  5:44 AM Result Value Ref Range    Sodium 143 136 - 145 mmol/L    Potassium 3.2 (L) 3.5 - 5.1 mmol/L    Chloride 113 (H) 97 - 108 mmol/L    CO2 21 21 - 32 mmol/L    Anion gap 9 5 - 15 mmol/L    Glucose 162 (H) 65 - 100 mg/dL    BUN 12 6 - 20 MG/DL    Creatinine 0.72 0.55 - 1.02 MG/DL    BUN/Creatinine ratio 17 12 - 20      GFR est AA >60 >60 ml/min/1.73m2    GFR est non-AA >60 >60 ml/min/1.73m2    Calcium 8.0 (L) 8.5 - 10.1 MG/DL   CBC WITH AUTOMATED DIFF    Collection Time: 04/29/21  5:44 AM   Result Value Ref Range    WBC 6.6 3.6 - 11.0 K/uL    RBC 3.43 (L) 3.80 - 5.20 M/uL    HGB 9.4 (L) 11.5 - 16.0 g/dL    HCT 29.5 (L) 35.0 - 47.0 %    MCV 86.0 80.0 - 99.0 FL    MCH 27.4 26.0 - 34.0 PG    MCHC 31.9 30.0 - 36.5 g/dL    RDW 13.2 11.5 - 14.5 %    PLATELET 107 (L) 945 - 400 K/uL    MPV 12.8 8.9 - 12.9 FL    NRBC 0.0 0  WBC    ABSOLUTE NRBC 0.00 0.00 - 0.01 K/uL    NEUTROPHILS 46 32 - 75 %    LYMPHOCYTES 41 12 - 49 %    MONOCYTES 10 5 - 13 %    EOSINOPHILS 2 0 - 7 %    BASOPHILS 1 0 - 1 %    IMMATURE GRANULOCYTES 0 0.0 - 0.5 %    ABS. NEUTROPHILS 3.1 1.8 - 8.0 K/UL    ABS. LYMPHOCYTES 2.7 0.8 - 3.5 K/UL    ABS. MONOCYTES 0.6 0.0 - 1.0 K/UL    ABS. EOSINOPHILS 0.1 0.0 - 0.4 K/UL    ABS. BASOPHILS 0.0 0.0 - 0.1 K/UL    ABS. IMM.  GRANS. 0.0 0.00 - 0.04 K/UL    DF AUTOMATED     GLUCOSE, POC    Collection Time: 04/29/21  6:37 AM   Result Value Ref Range    Glucose (POC) 130 (H) 65 - 100 mg/dL    Performed by Vicky Forte    GLUCOSE, POC    Collection Time: 04/29/21 11:44 AM   Result Value Ref Range    Glucose (POC) 257 (H) 65 - 100 mg/dL    Performed by Maryse Whiting / Plan :     Hematochezia, painless  - Initially large volume x several episodes, though no bleeding since yesterday  - DDx includes diverticular bleed (most likely), colitis, hemorrhoid, neoplasm  - CTA 4/27/21 - Stomach and small bowel appear normal. There is extensive  colonic diverticulosis without evidence of diverticulitis. No active gastrointestinal bleeding site appreciated. - Monitor H/H -- Pt is [employer] and will not consent to transfusion  - Hgb 13.5 on initial presentation --> 9.4 this morning = 4 g drop in 24 hrs  - Plan for colonoscopy today with Dr. Trenton Gomez, tentatively on for [address]     Patient C/Marium Garcia 1106 Problem List:   Active Problems:    GI bleed (4/28/2021)               I have examined the patient. I have reviewed the chart and agree with the documentation recorded by the NP, including the assessment, treatment plan, and disposition.       Shellie Rachel MD

## 2021-04-29 NOTE — ANESTHESIA PREPROCEDURE EVALUATION
Relevant Problems   No relevant active problems       Anesthetic History   No history of anesthetic complications            Review of Systems / Medical History  Patient summary reviewed, nursing notes reviewed and pertinent labs reviewed    Pulmonary  Within defined limits                 Neuro/Psych   Within defined limits           Cardiovascular  Within defined limits  Hypertension                   GI/Hepatic/Renal  Within defined limits              Endo/Other  Within defined limits  Diabetes: type 2         Other Findings              Physical Exam    Airway  Mallampati: II  TM Distance: > 6 cm  Neck ROM: normal range of motion   Mouth opening: Normal     Cardiovascular  Regular rate and rhythm,  S1 and S2 normal,  no murmur, click, rub, or gallop             Dental  No notable dental hx       Pulmonary  Breath sounds clear to auscultation               Abdominal  GI exam deferred       Other Findings            Anesthetic Plan    ASA: 2  Anesthesia type: MAC            Anesthetic plan and risks discussed with: Patient

## 2021-04-29 NOTE — PERIOP NOTES

## 2021-04-29 NOTE — PROGRESS NOTES
2000 Report received from [place], [address] [place] [address]. Patient alert and oriented and resting quietly in bed, drinking bowel prep and requiring assistance to bedside commode. No reports of pain and no other needs expressed. 3209 Bedside shift change report given to Krishna Ortiz RN by Maria D Garcia RN. Report included the following information SBAR, Kardex, MAR, Accordion, and Recent Results.

## 2021-04-30 LAB
ANION GAP SERPL CALC-SCNC: 7 MMOL/L (ref 5–15)
BASOPHILS # BLD: 0 K/UL (ref 0–0.1)
BASOPHILS NFR BLD: 0 % (ref 0–1)
BUN SERPL-MCNC: 9 MG/DL (ref 6–20)
BUN/CREAT SERPL: 13 (ref 12–20)
CALCIUM SERPL-MCNC: 7.6 MG/DL (ref 8.5–10.1)
CHLORIDE SERPL-SCNC: 114 MMOL/L (ref 97–108)
CO2 SERPL-SCNC: 21 MMOL/L (ref 21–32)
CREAT SERPL-MCNC: 0.67 MG/DL (ref 0.55–1.02)
DIFFERENTIAL METHOD BLD: ABNORMAL
EOSINOPHIL # BLD: 0.1 K/UL (ref 0–0.4)
EOSINOPHIL NFR BLD: 2 % (ref 0–7)
ERYTHROCYTE [DISTWIDTH] IN BLOOD BY AUTOMATED COUNT: 13.5 % (ref 11.5–14.5)
FERRITIN SERPL-MCNC: 106 NG/ML (ref 8–252)
GLUCOSE BLD STRIP.AUTO-MCNC: 219 MG/DL (ref 65–100)
GLUCOSE BLD STRIP.AUTO-MCNC: 232 MG/DL (ref 65–100)
GLUCOSE BLD STRIP.AUTO-MCNC: 257 MG/DL (ref 65–100)
GLUCOSE BLD STRIP.AUTO-MCNC: 302 MG/DL (ref 65–100)
GLUCOSE SERPL-MCNC: 224 MG/DL (ref 65–100)
HCT VFR BLD AUTO: 25.8 % (ref 35–47)
HGB BLD-MCNC: 8.3 G/DL (ref 11.5–16)
IMM GRANULOCYTES # BLD AUTO: 0 K/UL (ref 0–0.04)
IMM GRANULOCYTES NFR BLD AUTO: 0 % (ref 0–0.5)
IRON SATN MFR SERPL: 16 % (ref 20–50)
IRON SERPL-MCNC: 32 UG/DL (ref 35–150)
LYMPHOCYTES # BLD: 2.1 K/UL (ref 0.8–3.5)
LYMPHOCYTES NFR BLD: 35 % (ref 12–49)
MCH RBC QN AUTO: 27.5 PG (ref 26–34)
MCHC RBC AUTO-ENTMCNC: 32.2 G/DL (ref 30–36.5)
MCV RBC AUTO: 85.4 FL (ref 80–99)
MONOCYTES # BLD: 0.6 K/UL (ref 0–1)
MONOCYTES NFR BLD: 10 % (ref 5–13)
NEUTS SEG # BLD: 3.2 K/UL (ref 1.8–8)
NEUTS SEG NFR BLD: 53 % (ref 32–75)
NRBC # BLD: 0 K/UL (ref 0–0.01)
NRBC BLD-RTO: 0 PER 100 WBC
PLATELET # BLD AUTO: 116 K/UL (ref 150–400)
PMV BLD AUTO: 12.9 FL (ref 8.9–12.9)
POTASSIUM SERPL-SCNC: 3.3 MMOL/L (ref 3.5–5.1)
RBC # BLD AUTO: 3.02 M/UL (ref 3.8–5.2)
SERVICE CMNT-IMP: ABNORMAL
SODIUM SERPL-SCNC: 142 MMOL/L (ref 136–145)
TIBC SERPL-MCNC: 206 UG/DL (ref 250–450)
WBC # BLD AUTO: 6.1 K/UL (ref 3.6–11)

## 2021-04-30 PROCEDURE — 74011636637 HC RX REV CODE- 636/637: Performed by: NURSE PRACTITIONER

## 2021-04-30 PROCEDURE — 82728 ASSAY OF FERRITIN: CPT

## 2021-04-30 PROCEDURE — 74011250636 HC RX REV CODE- 250/636: Performed by: NURSE PRACTITIONER

## 2021-04-30 PROCEDURE — 80048 BASIC METABOLIC PNL TOTAL CA: CPT

## 2021-04-30 PROCEDURE — 83540 ASSAY OF IRON: CPT

## 2021-04-30 PROCEDURE — 74011000250 HC RX REV CODE- 250: Performed by: NURSE PRACTITIONER

## 2021-04-30 PROCEDURE — C9113 INJ PANTOPRAZOLE SODIUM, VIA: HCPCS | Performed by: NURSE PRACTITIONER

## 2021-04-30 PROCEDURE — 36591 DRAW BLOOD OFF VENOUS DEVICE: CPT

## 2021-04-30 PROCEDURE — 82962 GLUCOSE BLOOD TEST: CPT

## 2021-04-30 PROCEDURE — 65660000000 HC RM CCU STEPDOWN

## 2021-04-30 PROCEDURE — 85025 COMPLETE CBC W/AUTO DIFF WBC: CPT

## 2021-04-30 RX ADMIN — SODIUM CHLORIDE 40 MG: 9 INJECTION INTRAMUSCULAR; INTRAVENOUS; SUBCUTANEOUS at 08:41

## 2021-04-30 RX ADMIN — INSULIN LISPRO 2 UNITS: 100 INJECTION, SOLUTION INTRAVENOUS; SUBCUTANEOUS at 21:37

## 2021-04-30 RX ADMIN — SODIUM CHLORIDE 10 ML: 9 INJECTION INTRAMUSCULAR; INTRAVENOUS; SUBCUTANEOUS at 14:00

## 2021-04-30 RX ADMIN — INSULIN LISPRO 3 UNITS: 100 INJECTION, SOLUTION INTRAVENOUS; SUBCUTANEOUS at 07:15

## 2021-04-30 RX ADMIN — Medication 10 ML: at 21:37

## 2021-04-30 RX ADMIN — Medication 10 ML: at 14:00

## 2021-04-30 RX ADMIN — SODIUM CHLORIDE 10 ML: 9 INJECTION INTRAMUSCULAR; INTRAVENOUS; SUBCUTANEOUS at 06:00

## 2021-04-30 RX ADMIN — Medication 10 ML: at 06:00

## 2021-04-30 RX ADMIN — INSULIN LISPRO 5 UNITS: 100 INJECTION, SOLUTION INTRAVENOUS; SUBCUTANEOUS at 17:48

## 2021-04-30 RX ADMIN — SODIUM CHLORIDE 10 ML: 9 INJECTION INTRAMUSCULAR; INTRAVENOUS; SUBCUTANEOUS at 21:37

## 2021-04-30 RX ADMIN — SODIUM CHLORIDE 40 MG: 9 INJECTION INTRAMUSCULAR; INTRAVENOUS; SUBCUTANEOUS at 21:21

## 2021-04-30 RX ADMIN — INSULIN LISPRO 7 UNITS: 100 INJECTION, SOLUTION INTRAVENOUS; SUBCUTANEOUS at 11:52

## 2021-04-30 NOTE — PROGRESS NOTES
TRANSFER - OUT REPORT:    Verbal report given to RN (name) on Ольга Crawford  being transferred to (unit) for routine progression of care       Report consisted of patients Situation, Background, Assessment and   Recommendations(SBAR). Information from the following report(s) SBAR, Kardex and Recent Results was reviewed with the receiving nurse. Lines:   Peripheral IV 04/28/21 Right Antecubital (Active)   Site Assessment Clean, dry, & intact 04/30/21 0930   Phlebitis Assessment 0 04/30/21 0930   Infiltration Assessment 0 04/30/21 0930   Dressing Status Clean, dry, & intact 04/30/21 0930   Dressing Type Transparent 04/30/21 0930   Hub Color/Line Status Blue;Capped 04/30/21 0930   Action Taken Open ports on tubing capped 04/30/21 0930   Alcohol Cap Used Yes 04/30/21 0930       Peripheral IV 04/29/21 Anterior; Left Forearm (Active)   Site Assessment Clean, dry, & intact 04/30/21 0930   Phlebitis Assessment 0 04/30/21 0930   Infiltration Assessment 0 04/30/21 0930   Dressing Status Clean, dry, & intact 04/30/21 0930   Dressing Type Transparent 04/30/21 0930   Hub Color/Line Status Blue;Capped 04/30/21 0930   Action Taken Open ports on tubing capped 04/30/21 0930   Alcohol Cap Used Yes 04/30/21 0930        Opportunity for questions and clarification was provided.       Patient transported with:   [employer]

## 2021-04-30 NOTE — PROGRESS NOTES
[address] [facility]  [address] [address] [address], [address]  [phone]                     GI PROGRESS NOTE    Patient Name: Millicent Vicente      : 1940      MRN: [id number]  Admit Date: 2021  Today's Date: 2021    Subjective:     She states that she is feeling ok today, a little tired. Her stomach was a little upset last night after she ate dinner, but attributes it to being npo and on clears for so long. She denies nausea and vomiting. She has not had any further rectal bleeding since admission. Objective:     Blood pressure (!) 142/82, pulse 99, temperature 98 °F (36.7 °C), resp. rate 20, height 5' 3\" (1.6 m), weight 67.5 kg (148 lb 13 oz), SpO2 99 %. Physical Exam:  General appearance: cooperative, no distress  HEENT: Sclerae anicteric. Extra-occular muscles are intact. Cardiovascular:  Respiratory: Comfortable breathing   GI: Abdomen nondistended  Neurological:  alert and oriented. Psychiatric:  No anxiety or agitation.       Data Review:    Recent Results (from the past 24 hour(s))   HGB & HCT    Collection Time: 21  3:54 PM   Result Value Ref Range    HGB 8.5 (L) 11.5 - 16.0 g/dL    HCT 26.3 (L) 35.0 - 47.0 %   GLUCOSE, POC    Collection Time: 21  6:41 PM   Result Value Ref Range    Glucose (POC) 153 (H) 65 - 100 mg/dL    Performed by Digna Guajardo    GLUCOSE, POC    Collection Time: 21  9:53 PM   Result Value Ref Range    Glucose (POC) 272 (H) 65 - 100 mg/dL    Performed by Kaylee CALHOUN    METABOLIC PANEL, BASIC    Collection Time: 21  1:27 AM   Result Value Ref Range    Sodium 142 136 - 145 mmol/L    Potassium 3.3 (L) 3.5 - 5.1 mmol/L    Chloride 114 (H) 97 - 108 mmol/L    CO2 21 21 - 32 mmol/L    Anion gap 7 5 - 15 mmol/L    Glucose 224 (H) 65 - 100 mg/dL    BUN 9 6 - 20 MG/DL    Creatinine 0.67 0.55 - 1.02 MG/DL    BUN/Creatinine ratio 13 12 - 20      GFR est AA >60 >60 ml/min/1.73m2    GFR est non-AA >60 >60 ml/min/1.73m2 Calcium 7.6 (L) 8.5 - 10.1 MG/DL   CBC WITH AUTOMATED DIFF    Collection Time: 04/30/21  1:27 AM   Result Value Ref Range    WBC 6.1 3.6 - 11.0 K/uL    RBC 3.02 (L) 3.80 - 5.20 M/uL    HGB 8.3 (L) 11.5 - 16.0 g/dL    HCT 25.8 (L) 35.0 - 47.0 %    MCV 85.4 80.0 - 99.0 FL    MCH 27.5 26.0 - 34.0 PG    MCHC 32.2 30.0 - 36.5 g/dL    RDW 13.5 11.5 - 14.5 %    PLATELET 366 (L) 090 - 400 K/uL    MPV 12.9 8.9 - 12.9 FL    NRBC 0.0 0  WBC    ABSOLUTE NRBC 0.00 0.00 - 0.01 K/uL    NEUTROPHILS 53 32 - 75 %    LYMPHOCYTES 35 12 - 49 %    MONOCYTES 10 5 - 13 %    EOSINOPHILS 2 0 - 7 %    BASOPHILS 0 0 - 1 %    IMMATURE GRANULOCYTES 0 0.0 - 0.5 %    ABS. NEUTROPHILS 3.2 1.8 - 8.0 K/UL    ABS. LYMPHOCYTES 2.1 0.8 - 3.5 K/UL    ABS. MONOCYTES 0.6 0.0 - 1.0 K/UL    ABS. EOSINOPHILS 0.1 0.0 - 0.4 K/UL    ABS. BASOPHILS 0.0 0.0 - 0.1 K/UL    ABS. IMM. GRANS. 0.0 0.00 - 0.04 K/UL    DF AUTOMATED     IRON PROFILE    Collection Time: 04/30/21  1:27 AM   Result Value Ref Range    Iron 32 (L) 35 - 150 ug/dL    TIBC 206 (L) 250 - 450 ug/dL    Iron % saturation 16 (L) 20 - 50 %   FERRITIN    Collection Time: 04/30/21  1:27 AM   Result Value Ref Range    Ferritin 106 8 - 252 NG/ML   GLUCOSE, POC    Collection Time: 04/30/21  7:08 AM   Result Value Ref Range    Glucose (POC) 232 (H) 65 - 100 mg/dL    Performed by Keo Cortes    GLUCOSE, POC    Collection Time: 04/30/21 11:48 AM   Result Value Ref Range    Glucose (POC) 302 (H) 65 - 100 mg/dL    Performed by Hermilo Carlton  PCT          Assessment / Plan :     Hematochezia, painless  - Initially large volume x several episodes, though no bleeding since yesterday  - DDx includes diverticular bleed (most likely), colitis, hemorrhoid, neoplasm  - CTA 4/27/21 - Stomach and small bowel appear normal. There is extensive  colonic diverticulosis without evidence of diverticulitis. No active gastrointestinal bleeding site appreciated.   - Monitor H/H -- Pt is [employer] and will not consent to transfusion  - Colonoscopy 4/29/21 - Small non bleeding hemorrhoids; moderate diverticulosis sigmoid through ascending; normal cecum  - Hgb 13.5 on initial presentation   - Hgb 8.3 this morning, from 8.5 yesterday  - She is stable without any further s/s of active GIB   - GI is ok for discharge when deemed appropriate         Patient Active Hospital Problem List:   Active Problems:    GI bleed (4/28/2021)    I have examined the patient. I have reviewed the chart and agree with the documentation recorded by the NP, including the assessment, treatment plan, and disposition.       Poonam Romeo MD

## 2021-04-30 NOTE — PROGRESS NOTES
TRANSFER - IN REPORT:    Verbal report received from Dulce Maria(name) on Coit Phi  being received from 6W(unit) for routine progression of care      Report consisted of patients Situation, Background, Assessment and   Recommendations(SBAR). Information from the following report(s) SBAR, Kardex and Intake/Output was reviewed with the receiving nurse. Opportunity for questions and clarification was provided. Assessment completed upon patients arrival to unit and care assumed.

## 2021-04-30 NOTE — PROGRESS NOTES
[facility]  Hospitalist Group                                                                                          Hospitalist Progress Note  Ana Antoine MD  Answering service: [phone] from in house phone        Date of Service:  2021  NAME:  Jignesh Sinha  :  1940  MRN:  [id number]      Admission Summary:   Jignesh Sinha is a [de-identified] y.o. female with a past medical history of diabetes and hypertension who presented to [facility] ED with complaints of bloody stools x2. States that she felt constipated yesterday morning then had a normal bowel movement. After a couple of hours she felt like she was going to have diarrhea and had a bowel movement with a large amount of blood in it. After a couple more hours had a second episode of bloody diarrhea. Denies any nausea, vomiting or abdominal pain. Two more episodes of bloody stools reported in the ED.   Takes aleve 220mg almost everyday for leg pains and cramps    Interval history / Subjective:   Patient resting in bed, denies any other complaints or problems s     Assessment & Plan:     Lower GI bleed  Colonoscopy with diverticulosis and nonbleeding hemorrhoids, descending colon with 3 mm sessile polyp removed with hot snare, transverse colon with 10 mm sessile polyp  Monitor H&H  Advance diet to GI light  Protonix twice daily  Transfuse as needed  If stable may be discharged later today or in the morning    Acute blood loss anemia  Patient [employer]ism  Closely monitor H&H, further intervention per hospitalist, reassess as needed    Hypertension  Hydralazine as needed    Diabetes mellitus type 2  Sliding scale NovoLog insulin, Accu-Cheks, diet control, close monitoring        Code status: Full code  DVT prophylaxis: SCD    Care Plan discussed with: Patient/Family  Anticipated Disposition: Home w/Family  Anticipated Discharge: Less than 24 hours     Hospital Problems  Never Reviewed          Codes Class Noted POA    GI bleed ICD-10-CM: K92.2  ICD-9-CM: 578.9  4/28/2021 Unknown                Review of Systems:   A comprehensive review of systems was negative except for that written in the HPI. Vital Signs:    Last 24hrs VS reviewed since prior progress note.  Most recent are:  Visit Vitals  BP (!) 145/80 (BP 1 Location: Right upper arm, BP Patient Position: Sitting)   Pulse 82   Temp 98.1 °F (36.7 °C)   Resp 20   Ht 5' 3\" (1.6 m)   Wt 67.5 kg (148 lb 13 oz)   SpO2 99%   BMI 26.36 kg/m²         Intake/Output Summary (Last 24 hours) at 4/30/2021 1248  Last data filed at 4/30/2021 0930  Gross per 24 hour   Intake 1740 ml   Output 1100 ml   Net 640 ml        Physical Examination:     I had a face to face encounter with this patient and independently examined them on 4/30/2021 as outlined below:          General : alert x 3, awake, no acute distress, resting in bed, pleasant /female, appears to be stated age  [de-identified]: PEERL, EOMI, moist mucus membrane, TM clear  Neck: supple, no JVD, no meningeal signs  Chest: Clear to auscultation bilaterally   CVS: S1 S2 heard, Capillary refill less than 2 seconds  Abd: soft/ Non tender, non distended, BS physiological,   Ext: no clubbing, no cyanosis, no edema, brisk 2+ DP pulses  Neuro/Psych: pleasant mood and affect, CN 2-12 grossly intact, sensory grossly within normal limit, Strength 5/5 in all extremities, DTR 1+ x 4  Skin: warm     Data Review:    Review and/or order of clinical lab test      Labs:     Recent Labs     04/30/21  0127 04/29/21  1554 04/29/21  0544   WBC 6.1  --  6.6   HGB 8.3* 8.5* 9.4*   HCT 25.8* 26.3* 29.5*   *  --  127*     Recent Labs     04/30/21  0127 04/29/21  0544 04/28/21  0616    143 139   K 3.3* 3.2* 3.7   * 113* 107   CO2 21 21 22   BUN 9 12 16   CREA 0.67 0.72 0.79   * 162* 389*   CA 7.6* 8.0* 9.2     Recent Labs     04/27/21  2304   ALT 21   *   TBILI 0.4   TP 7.2   ALB 3.5   GLOB 3.7     Recent Labs     04/27/21 2304   INR 1.2*   PTP 15.3*      Recent Labs     04/30/21  0127   TIBC 206*   PSAT 16*   FERR 106      No results found for: FOL, RBCF   No results for input(s): PH, PCO2, PO2 in the last 72 hours. No results for input(s): CPK, CKNDX, TROIQ in the last 72 hours.     No lab exists for component: CPKMB  No results found for: CHOL, CHOLX, CHLST, CHOLV, HDL, HDLP, LDL, LDLC, DLDLP, TGLX, TRIGL, TRIGP, CHHD, CHHDX  Lab Results   Component Value Date/Time    Glucose (POC) 302 (H) 04/30/2021 11:48 AM    Glucose (POC) 232 (H) 04/30/2021 07:08 AM    Glucose (POC) 272 (H) 04/29/2021 09:53 PM    Glucose (POC) 153 (H) 04/29/2021 06:41 PM    Glucose (POC) 257 (H) 04/29/2021 11:44 AM     No results found for: COLOR, APPRN, SPGRU, REFSG, RHINA, PROTU, GLUCU, KETU, BILU, UROU, NERY, LEUKU, GLUKE, EPSU, BACTU, WBCU, RBCU, CASTS, UCRY      Medications Reviewed:     Current Facility-Administered Medications   Medication Dose Route Frequency    hydrALAZINE (APRESOLINE) 20 mg/mL injection 10 mg  10 mg IntraVENous Q6H PRN    sodium chloride (NS) flush 5-40 mL  5-40 mL IntraVENous Q8H    sodium chloride (NS) flush 5-40 mL  5-40 mL IntraVENous PRN    sodium chloride (NS) flush 5-40 mL  5-40 mL IntraVENous Q8H    sodium chloride (NS) flush 5-40 mL  5-40 mL IntraVENous PRN    ondansetron (ZOFRAN) injection 4 mg  4 mg IntraVENous Q4H PRN    glucose chewable tablet 16 g  4 Tab Oral PRN    dextrose (D50W) injection syrg 12.5-25 g  25-50 mL IntraVENous PRN    glucagon (GLUCAGEN) injection 1 mg  1 mg IntraMUSCular PRN    insulin lispro (HUMALOG) injection   SubCUTAneous AC&HS    pantoprazole (PROTONIX) 40 mg in 0.9% sodium chloride 10 mL injection  40 mg IntraVENous Q12H    0.9% sodium chloride infusion  75 mL/hr IntraVENous CONTINUOUS     ______________________________________________________________________  EXPECTED LENGTH OF STAY: 3d 0h  ACTUAL LENGTH OF STAY:          2      Please note that this dictation was completed with Radha, the computer voice recognition software. Quite often unanticipated grammatical, syntax, homophones, and other interpretive errors are inadvertently transcribed by the computer software. Please disregard these errors. Please excuse any errors that have escaped final proofreading.                 Keyla Menjivar MD

## 2021-04-30 NOTE — ANESTHESIA POSTPROCEDURE EVALUATION
Post-Anesthesia Evaluation and Assessment    Patient: Nicole Mendoza MRN: [id number]  SSN: [id number]    YOB: 1940  Age: [de-identified] y.o. Sex: female      I have evaluated the patient and they are stable and ready for discharge from the PACU. Cardiovascular Function/Vital Signs  Visit Vitals  BP (!) 141/88 (BP 1 Location: Right upper arm, BP Patient Position: At rest)   Pulse 87   Temp 36.7 °C (98 °F)   Resp 20   Ht 5' 3\" (1.6 m)   Wt 67.5 kg (148 lb 13 oz)   SpO2 100%   BMI 26.36 kg/m²       Patient is status post MAC anesthesia for Procedure(s):  COLONOSCOPY  ENDOSCOPIC POLYPECTOMY  INJECTION. Nausea/Vomiting: None    Postoperative hydration reviewed and adequate. Pain:  Pain Scale 1: Numeric (0 - 10) (04/30/21 0207)  Pain Intensity 1: 0 (04/30/21 0207)   Managed    Neurological Status: At baseline    Mental Status, Level of Consciousness: Alert and  oriented to person, place, and time    Pulmonary Status:   O2 Device: None (Room air) (04/30/21 0207)   Adequate oxygenation and airway patent    Complications related to anesthesia: None    Post-anesthesia assessment completed. No concerns    Signed By: Jessica Hall MD     April 30, 2021              Procedure(s):  COLONOSCOPY  ENDOSCOPIC POLYPECTOMY  INJECTION. MAC    <BSHSIANPOST>    INITIAL Post-op Vital signs:   Vitals Value Taken Time   /95 04/30/21 0750   Temp 36.7 °C (98 °F) 04/30/21 0546   Pulse 91 04/30/21 0917   Resp 27 04/30/21 0737   SpO2 98 % 04/30/21 0751   Vitals shown include unvalidated device data.

## 2021-05-01 VITALS
WEIGHT: 148.81 LBS | HEIGHT: 63 IN | RESPIRATION RATE: 18 BRPM | SYSTOLIC BLOOD PRESSURE: 166 MMHG | TEMPERATURE: 99.2 F | OXYGEN SATURATION: 97 % | BODY MASS INDEX: 26.37 KG/M2 | HEART RATE: 95 BPM | DIASTOLIC BLOOD PRESSURE: 62 MMHG

## 2021-05-01 LAB
ANION GAP SERPL CALC-SCNC: 10 MMOL/L (ref 5–15)
BASOPHILS # BLD: 0 K/UL (ref 0–0.1)
BASOPHILS NFR BLD: 1 % (ref 0–1)
BUN SERPL-MCNC: 10 MG/DL (ref 6–20)
BUN/CREAT SERPL: 13 (ref 12–20)
CALCIUM SERPL-MCNC: 8.1 MG/DL (ref 8.5–10.1)
CHLORIDE SERPL-SCNC: 108 MMOL/L (ref 97–108)
CO2 SERPL-SCNC: 21 MMOL/L (ref 21–32)
CREAT SERPL-MCNC: 0.78 MG/DL (ref 0.55–1.02)
DIFFERENTIAL METHOD BLD: ABNORMAL
EOSINOPHIL # BLD: 0.2 K/UL (ref 0–0.4)
EOSINOPHIL NFR BLD: 2 % (ref 0–7)
ERYTHROCYTE [DISTWIDTH] IN BLOOD BY AUTOMATED COUNT: 13.2 % (ref 11.5–14.5)
GLUCOSE BLD STRIP.AUTO-MCNC: 226 MG/DL (ref 65–100)
GLUCOSE BLD STRIP.AUTO-MCNC: 354 MG/DL (ref 65–100)
GLUCOSE SERPL-MCNC: 227 MG/DL (ref 65–100)
HCT VFR BLD AUTO: 28.6 % (ref 35–47)
HCT VFR BLD AUTO: 28.9 % (ref 35–47)
HGB BLD-MCNC: 9 G/DL (ref 11.5–16)
HGB BLD-MCNC: 9.3 G/DL (ref 11.5–16)
IMM GRANULOCYTES # BLD AUTO: 0 K/UL (ref 0–0.04)
IMM GRANULOCYTES NFR BLD AUTO: 0 % (ref 0–0.5)
LYMPHOCYTES # BLD: 2 K/UL (ref 0.8–3.5)
LYMPHOCYTES NFR BLD: 30 % (ref 12–49)
MAGNESIUM SERPL-MCNC: 1.6 MG/DL (ref 1.6–2.4)
MCH RBC QN AUTO: 27.4 PG (ref 26–34)
MCHC RBC AUTO-ENTMCNC: 31.5 G/DL (ref 30–36.5)
MCV RBC AUTO: 86.9 FL (ref 80–99)
MONOCYTES # BLD: 0.7 K/UL (ref 0–1)
MONOCYTES NFR BLD: 10 % (ref 5–13)
NEUTS SEG # BLD: 3.8 K/UL (ref 1.8–8)
NEUTS SEG NFR BLD: 57 % (ref 32–75)
NRBC # BLD: 0 K/UL (ref 0–0.01)
NRBC BLD-RTO: 0 PER 100 WBC
PLATELET # BLD AUTO: 126 K/UL (ref 150–400)
PMV BLD AUTO: 12.9 FL (ref 8.9–12.9)
POTASSIUM SERPL-SCNC: 3.5 MMOL/L (ref 3.5–5.1)
RBC # BLD AUTO: 3.29 M/UL (ref 3.8–5.2)
SERVICE CMNT-IMP: ABNORMAL
SERVICE CMNT-IMP: ABNORMAL
SODIUM SERPL-SCNC: 139 MMOL/L (ref 136–145)
WBC # BLD AUTO: 6.7 K/UL (ref 3.6–11)

## 2021-05-01 PROCEDURE — 80048 BASIC METABOLIC PNL TOTAL CA: CPT

## 2021-05-01 PROCEDURE — 85025 COMPLETE CBC W/AUTO DIFF WBC: CPT

## 2021-05-01 PROCEDURE — 74011250636 HC RX REV CODE- 250/636: Performed by: NURSE PRACTITIONER

## 2021-05-01 PROCEDURE — C9113 INJ PANTOPRAZOLE SODIUM, VIA: HCPCS | Performed by: NURSE PRACTITIONER

## 2021-05-01 PROCEDURE — 74011250637 HC RX REV CODE- 250/637: Performed by: NURSE PRACTITIONER

## 2021-05-01 PROCEDURE — 74011636637 HC RX REV CODE- 636/637: Performed by: NURSE PRACTITIONER

## 2021-05-01 PROCEDURE — 85018 HEMOGLOBIN: CPT

## 2021-05-01 PROCEDURE — 36415 COLL VENOUS BLD VENIPUNCTURE: CPT

## 2021-05-01 PROCEDURE — 83735 ASSAY OF MAGNESIUM: CPT

## 2021-05-01 PROCEDURE — 74011000250 HC RX REV CODE- 250: Performed by: NURSE PRACTITIONER

## 2021-05-01 PROCEDURE — 82962 GLUCOSE BLOOD TEST: CPT

## 2021-05-01 RX ORDER — VERAPAMIL HYDROCHLORIDE 240 MG/1
240 TABLET, FILM COATED, EXTENDED RELEASE ORAL DAILY
Status: DISCONTINUED | OUTPATIENT
Start: 2021-05-01 | End: 2021-05-01 | Stop reason: HOSPADM

## 2021-05-01 RX ADMIN — VERAPAMIL HYDROCHLORIDE 240 MG: 240 TABLET, FILM COATED, EXTENDED RELEASE ORAL at 09:54

## 2021-05-01 RX ADMIN — Medication 10 ML: at 07:58

## 2021-05-01 RX ADMIN — SODIUM CHLORIDE 10 ML: 9 INJECTION INTRAMUSCULAR; INTRAVENOUS; SUBCUTANEOUS at 07:59

## 2021-05-01 RX ADMIN — INSULIN LISPRO 2 UNITS: 100 INJECTION, SOLUTION INTRAVENOUS; SUBCUTANEOUS at 07:58

## 2021-05-01 RX ADMIN — SODIUM CHLORIDE 40 MG: 9 INJECTION INTRAMUSCULAR; INTRAVENOUS; SUBCUTANEOUS at 09:54

## 2021-05-01 RX ADMIN — INSULIN LISPRO 7 UNITS: 100 INJECTION, SOLUTION INTRAVENOUS; SUBCUTANEOUS at 11:54

## 2021-05-01 NOTE — PROGRESS NOTES
Patient for discharge today, to be transported by son. IV removed, belongings packed and sent, no further questions. Problem: Falls - Risk of  Goal: *Absence of Falls  Description: Document Karen Lott Fall Risk and appropriate interventions in the flowsheet. Outcome: Resolved/Met     Problem: Patient Education: Go to Patient Education Activity  Goal: Patient/Family Education  Outcome: Resolved/Met     Problem: Diabetes Self-Management  Goal: *Disease process and treatment process  Description: Define diabetes and identify own type of diabetes; list 3 options for treating diabetes. Outcome: Resolved/Met  Goal: *Incorporating nutritional management into lifestyle  Description: Describe effect of type, amount and timing of food on blood glucose; list 3 methods for planning meals. Outcome: Resolved/Met  Goal: *Incorporating physical activity into lifestyle  Description: State effect of exercise on blood glucose levels. Outcome: Resolved/Met  Goal: *Developing strategies to promote health/change behavior  Description: Define the ABC's of diabetes; identify appropriate screenings, schedule and personal plan for screenings. Outcome: Resolved/Met  Goal: *Using medications safely  Description: State effect of diabetes medications on diabetes; name diabetes medication taking, action and side effects. Outcome: Resolved/Met  Goal: *Monitoring blood glucose, interpreting and using results  Description: Identify recommended blood glucose targets  and personal targets. Outcome: Resolved/Met  Goal: *Prevention, detection, treatment of acute complications  Description: List symptoms of hyper- and hypoglycemia; describe how to treat low blood sugar and actions for lowering  high blood glucose level.   Outcome: Resolved/Met  Goal: *Prevention, detection and treatment of chronic complications  Description: Define the natural course of diabetes and describe the relationship of blood glucose levels to long term complications of diabetes.   Outcome: Resolved/Met  Goal: *Developing strategies to address psychosocial issues  Description: Describe feelings about living with diabetes; identify support needed and support network  Outcome: Resolved/Met  Goal: *Insulin pump training  Outcome: Resolved/Met  Goal: *Sick day guidelines  Outcome: Resolved/Met  Goal: *Patient Specific Goal (EDIT GOAL, INSERT TEXT)  Outcome: Resolved/Met     Problem: Patient Education: Go to Patient Education Activity  Goal: Patient/Family Education  Outcome: Resolved/Met     Problem: Upper and Lower GI Bleed: Day 1  Goal: Off Pathway (Use only if patient is Off Pathway)  Outcome: Resolved/Met  Goal: Activity/Safety  Outcome: Resolved/Met  Goal: Consults, if ordered  Outcome: Resolved/Met  Goal: Diagnostic Test/Procedures  Outcome: Resolved/Met  Goal: Nutrition/Diet  Outcome: Resolved/Met  Goal: Discharge Planning  Outcome: Resolved/Met  Goal: Medications  Outcome: Resolved/Met  Goal: Respiratory  Outcome: Resolved/Met  Goal: Treatments/Interventions/Procedures  Outcome: Resolved/Met  Goal: Psychosocial  Outcome: Resolved/Met  Goal: *Optimal pain control at patient's stated goal  Outcome: Resolved/Met  Goal: *Hemodynamically stable  Outcome: Resolved/Met  Goal: *Demonstrates progressive activity  Outcome: Resolved/Met     Problem: Upper and Lower GI Bleed: Day 2  Goal: Off Pathway (Use only if patient is Off Pathway)  Outcome: Resolved/Met  Goal: Activity/Safety  Outcome: Resolved/Met  Goal: Consults, if ordered  Outcome: Resolved/Met  Goal: Diagnostic Test/Procedures  Outcome: Resolved/Met  Goal: Nutrition/Diet  Outcome: Resolved/Met  Goal: Discharge Planning  Outcome: Resolved/Met  Goal: Medications  Outcome: Resolved/Met  Goal: Respiratory  Outcome: Resolved/Met  Goal: Treatments/Interventions/Procedures  Outcome: Resolved/Met  Goal: Psychosocial  Outcome: Resolved/Met  Goal: *Optimal pain control at patient's stated goal  Outcome: Resolved/Met  Goal: *Hemodynamically stable  Outcome: Resolved/Met  Goal: *Tolerating diet  Outcome: Resolved/Met  Goal: *Demonstrates progressive activity  Outcome: Resolved/Met     Problem: Pressure Injury - Risk of  Goal: *Prevention of pressure injury  Description: Document Jon Scale and appropriate interventions in the flowsheet.   Outcome: Resolved/Met     Problem: Patient Education: Go to Patient Education Activity  Goal: Patient/Family Education  Outcome: Resolved/Met

## 2021-05-01 NOTE — DISCHARGE SUMMARY
Discharge Summary       PATIENT ID: Senthil Chin  MRN: [id number]   YOB: 1940    DATE OF ADMISSION: 4/28/2021  5:50 AM    DATE OF DISCHARGE: 05/01/2021  PRIMARY CARE PROVIDER: Julianna Kanner, MD     ATTENDING PHYSICIAN: Miguel Angel Razo MD  DISCHARGING PROVIDER: Tyron Hogan NP    To contact this individual call [phone] and ask the  to page. If unavailable ask to be transferred the Adult Hospitalist Department. CONSULTATIONS: IP CONSULT TO HOSPITALIST  IP CONSULT TO GASTROENTEROLOGY    PROCEDURES/SURGERIES: Procedure(s):  COLONOSCOPY  ENDOSCOPIC POLYPECTOMY  INJECTION    ADMITTING [address] [place] [address] COURSE:   Senthil Chin is a [de-identified] y.o. female with a past medical history of diabetes and hypertension who presented to [facility] ED with complaints of bloody stools x2. States that she felt constipated yesterday morning then had a normal bowel movement. After a couple of hours she felt like she was going to have diarrhea and had a bowel movement with a large amount of blood in it. After a couple more hours had a second episode of bloody diarrhea. Denies any nausea, vomiting or abdominal pain. Two more episodes of bloody stools reported in the ED. Takes aleve 220mg almost everyday for leg pains and cramps. Hospitalist was consulted for evaluation of admission. Denies any shortness of breath, chest pain or tightness.            DISCHARGE DIAGNOSES / PLAN:      Lower GI bleed  Colonoscopy (04/29)  with diverticulosis and nonbleeding hemorrhoids, descending colon with 3 mm sessile polyp removed with hot snare, transverse colon with 10 mm sessile polyp       Acute blood loss anemia  Patient Nondenominational  Closely monitor H&H       Hypertension  Continue home medications     Diabetes mellitus type 2  Continue home medications    With patient's permission, spoke with son Ashok Perry [phone]. Updated him on patient's condition and discharge instructions. Questions answered. ADDITIONAL CARE RECOMMENDATIONS:   Take medications as prescribed. Follow up with your PCP and GI. Your hemoglobin level should be checked within one week, Please follow up with your primary care provider to order this for you. PENDING TEST RESULTS:   At the time of discharge the following test results are still pending: Biopsy results    FOLLOW UP APPOINTMENTS:    Follow-up Information     Follow up With Specialties Details Why Contact Info    Justine Vivas MD Internal Medicine Schedule an appointment as soon as possible for a visit For follow up  Adelaida Gonzales [phone]      [facility]   For follow up  [address] [place] [address] [address] [facility] [address]             DIET: Advance diet as tolerated      ACTIVITY: Activity as tolerated    WOUND CARE: None    EQUIPMENT needed: None      DISCHARGE MEDICATIONS:  Current Discharge Medication List      CONTINUE these medications which have NOT CHANGED    Details   verapamil ER (VERELAN) 240 mg ER capsule Take 240 mg by mouth daily. glimepiride (AMARYL) 4 mg tablet Take 4 mg by mouth every morning. STOP taking these medications       naproxen sodium (Aleve) 220 mg tablet Comments:   Reason for Stopping:                 NOTIFY YOUR PHYSICIAN FOR ANY OF THE FOLLOWING:   Fever over 101 degrees for 24 hours. Chest pain, shortness of breath, fever, chills, nausea, vomiting, diarrhea, change in mentation, falling, weakness, bleeding. Severe pain or pain not relieved by medications. Or, any other signs or symptoms that you may have questions about.     DISPOSITION:   X Home With:   OT  PT  HH  RN       Long term SNF/Inpatient Rehab    Independent/assisted living    Hospice    Other:       PATIENT CONDITION AT DISCHARGE:     Functional status    Poor     Deconditioned    X Independent      Cognition    X Lucid     Forgetful     Dementia      Catheters/lines (plus indication)    Sauer     PICC     PEG    X None      Code status   X  Full code     DNR      PHYSICAL EXAMINATION AT DISCHARGE:  General:          Alert, cooperative, no distress, appears stated age. HEENT:           Atraumatic, anicteric sclerae, pink conjunctivae                          No oral ulcers, mucosa moist, throat clear, dentition fair  Neck:               Supple, symmetrical  Lungs:             Clear to auscultation bilaterally. No Wheezing or Rhonchi. No rales. Chest wall:      No tenderness  No Accessory muscle use. Heart:              Regular  rhythm,  No  murmur   No edema  Abdomen:        Soft, non-tender. Not distended. Bowel sounds normal  Extremities:     No cyanosis. No clubbing,                            Skin turgor normal, Capillary refill normal  Skin:                Not pale. Not Jaundiced  No rashes   Psych:             Not anxious or agitated.   Neurologic:      Alert, moves all extremities, answers questions appropriately and responds to commands       CHRONIC MEDICAL DIAGNOSES:  Problem List as of 5/1/2021 Never Reviewed          Codes Class Noted - Resolved    GI bleed ICD-10-CM: K92.2  ICD-9-CM: 578.9  4/28/2021 - Present              Greater than 45 minutes were spent with the patient on counseling and coordination of care    Signed:   Susan Caldwell NP  5/1/2021  9:22 AM

## 2021-05-01 NOTE — PROGRESS NOTES
Problem: Falls - Risk of  Goal: *Absence of Falls  Description: Document Shortybritney Leroy Fall Risk and appropriate interventions in the flowsheet.   Outcome: Progressing Towards Goal  Note: Fall Risk Interventions:  Mobility Interventions: Bed/chair exit alarm         Medication Interventions: Patient to call before getting OOB    Elimination Interventions: Call light in reach

## 2021-05-01 NOTE — DISCHARGE INSTRUCTIONS
Discharge Instructions       PATIENT ID: Anita Sánchez  MRN: [id number]   YOB: 1940    DATE OF ADMISSION: 4/28/2021  5:50 AM    DATE OF DISCHARGE: 5/1/2021    PRIMARY CARE PROVIDER: Pramod Mora MD     ATTENDING PHYSICIAN: Magalis Hart MD  DISCHARGING PROVIDER: Mandie Norton NP    To contact this individual call [phone] and ask the  to page. If unavailable ask to be transferred the Adult Hospitalist Department. DISCHARGE DIAGNOSES: GI Bleed    CONSULTATIONS: IP CONSULT TO HOSPITALIST  IP CONSULT TO GASTROENTEROLOGY    PROCEDURES/SURGERIES: Procedure(s):  COLONOSCOPY  ENDOSCOPIC POLYPECTOMY  INJECTION    PENDING TEST RESULTS:   At the time of discharge the following test results are still pending: biopsy results     FOLLOW UP APPOINTMENTS:   Follow-up Information     Follow up With Specialties Details Why Contact Info    Pramod Mora MD Internal Medicine Schedule an appointment as soon as possible for a visit For follow up  [facility]  [phone]      [address] [facility]   For follow up  [address] [id number] [facility] [address] [place] [address],[address]:   Take medications as prescribed. Follow up with your PCP and GI. Your hemoglobin level should be checked within one week, Please follow up with your primary care provider to order this for you. DIET: Advance diet as tolerated       ACTIVITY: Activity as tolerated    WOUND CARE: None     EQUIPMENT needed: None       DISCHARGE MEDICATIONS:   See Medication Reconciliation Form    · It is important that you take the medication exactly as they are prescribed. · Keep your medication in the bottles provided by the pharmacist and keep a list of the medication names, dosages, and times to be taken in your wallet. · Do not take other medications without consulting your doctor.        NOTIFY YOUR PHYSICIAN FOR ANY OF THE FOLLOWING:   Fever over 101 degrees for 24 hours. Chest pain, shortness of breath, fever, chills, nausea, vomiting, diarrhea, change in mentation, falling, weakness, bleeding. Severe pain or pain not relieved by medications. Or, any other signs or symptoms that you may have questions about.       DISPOSITION:  X  Home With:   OT  PT  HH  RN       SNF/Inpatient Rehab/LTAC    Independent/assisted living    Hospice    Other:       Signed:   Matti Darden NP  5/1/2021  9:08 AM

## 2021-05-01 NOTE — PROGRESS NOTES
Bedside shift change report given to Marika Lawrence (oncoming nurse) by Karthik Salamanca (offgoing nurse). Report included the following information SBAR, Kardex, ED Summary, OR Summary, Intake/Output, MAR and Recent Results.

## 2021-05-03 NOTE — PROGRESS NOTES
Physician Progress Note      PATIENT:               Emmie Garcia  CSN #:                  [id number]  :                       1940  ADMIT DATE:       2021 5:50 AM  DISCH DATE:        2021 12:13 PM  RESPONDING  PROVIDER #:        Bhakti De Paz NP          QUERY TEXT:    Patient admitted with GI bleeding, noted to have Diverticulosis of colon ). If possible, please document in progress notes and discharge summary the cause of the GI bleeding: The medical record reflects the following:  Risk Factors: GIB  Clinical Indicators:  IL COLONOSCOPY FLX W/ENDOSCOPIC MUCOSAL RESECTION [53026 (CPT?)]  COLONOSCOPY,REMV LESN,SNARE [[id number]]  Pre-procedure Diagnoses  Hematochezia [K92.1]  Acute blood loss anemia [D62]  Post-procedure Diagnoses  Polyp of transverse colon, unspecified type [K63.5]  Polyp of ascending colon, unspecified type [K63.5]  Polyp of descending colon, unspecified type [K63.5]  Diverticulosis of colon [K57.30]    Treatment: GI consult, colonoscopy, protonix IV    Thank you,  Kendra Thomas RN, CDI, CCDS  Certified Clinical   [phone]  Options provided:  -- GI bleeding due to Diverticulosis of colon  -- GI bleeding due to hemorrhoids  -- Other - I will add my own diagnosis  -- Disagree - Not applicable / Not valid  -- Disagree - Clinically unable to determine / Unknown  -- Refer to Clinical Documentation Reviewer    PROVIDER RESPONSE TEXT:    This patient has GI bleeding due to Diverticulosis of colon.     Query created by: Adriane Avery on 2021 2:57 PM      Electronically signed by:  Bhakti De Paz NP 5/3/2021 10:22 AM

## 2021-05-04 ENCOUNTER — HOSPITAL ENCOUNTER (INPATIENT)
Age: 81
LOS: 1 days | Discharge: NURSING, REHAB OR LONG-TERM CARE | DRG: 065 | End: 2021-05-06
Attending: EMERGENCY MEDICINE | Admitting: INTERNAL MEDICINE
Payer: MEDICARE

## 2021-05-04 ENCOUNTER — APPOINTMENT (OUTPATIENT)
Dept: GENERAL RADIOLOGY | Age: 81
DRG: 065 | End: 2021-05-04
Attending: EMERGENCY MEDICINE
Payer: MEDICARE

## 2021-05-04 ENCOUNTER — APPOINTMENT (OUTPATIENT)
Dept: CT IMAGING | Age: 81
DRG: 065 | End: 2021-05-04
Attending: EMERGENCY MEDICINE
Payer: MEDICARE
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
## 2021-05-04 DIAGNOSIS — R53.1 RIGHT SIDED WEAKNESS: ICD-10-CM

## 2021-05-04 DIAGNOSIS — R53.83 FATIGUE, UNSPECIFIED TYPE: Primary | ICD-10-CM

## 2021-05-04 DIAGNOSIS — R77.8 ELEVATED TROPONIN: ICD-10-CM

## 2021-05-04 LAB
ABO + RH BLD: NORMAL
ALBUMIN SERPL-MCNC: 3.1 G/DL (ref 3.5–5)
ALBUMIN/GLOB SERPL: 0.8 {RATIO} (ref 1.1–2.2)
ALP SERPL-CCNC: 145 U/L (ref 45–117)
ALT SERPL-CCNC: 19 U/L (ref 12–78)
ANION GAP SERPL CALC-SCNC: 7 MMOL/L (ref 5–15)
APPEARANCE UR: CLEAR
APTT PPP: 31.3 SEC (ref 21.2–34.1)
AST SERPL W P-5'-P-CCNC: 10 U/L (ref 15–37)
ATRIAL RATE: 74 BPM
BACTERIA URNS QL MICRO: NEGATIVE /HPF
BASOPHILS # BLD: 0 K/UL (ref 0–0.1)
BASOPHILS NFR BLD: 0 % (ref 0–1)
BILIRUB SERPL-MCNC: 0.6 MG/DL (ref 0.2–1)
BILIRUB UR QL: NEGATIVE
BLOOD GROUP ANTIBODIES SERPL: NEGATIVE
BNP SERPL-MCNC: 2855 PG/ML
BUN SERPL-MCNC: 18 MG/DL (ref 6–20)
BUN/CREAT SERPL: 14 (ref 12–20)
CA-I BLD-MCNC: 8.8 MG/DL (ref 8.5–10.1)
CALCULATED P AXIS, ECG09: 35 DEGREES
CALCULATED R AXIS, ECG10: -18 DEGREES
CALCULATED T AXIS, ECG11: -61 DEGREES
CHLORIDE SERPL-SCNC: 103 MMOL/L (ref 97–108)
CO2 SERPL-SCNC: 26 MMOL/L (ref 21–32)
COLOR UR: ABNORMAL
CREAT SERPL-MCNC: 1.33 MG/DL (ref 0.55–1.02)
DIAGNOSIS, 93000: NORMAL
DIFFERENTIAL METHOD BLD: ABNORMAL
EOSINOPHIL # BLD: 0.1 K/UL (ref 0–0.4)
EOSINOPHIL NFR BLD: 1 % (ref 0–7)
ERYTHROCYTE [DISTWIDTH] IN BLOOD BY AUTOMATED COUNT: 13.8 % (ref 11.5–14.5)
GLOBULIN SER CALC-MCNC: 4.1 G/DL (ref 2–4)
GLUCOSE BLD STRIP.AUTO-MCNC: 298 MG/DL (ref 65–100)
GLUCOSE SERPL-MCNC: 366 MG/DL (ref 65–100)
GLUCOSE UR STRIP.AUTO-MCNC: >300 MG/DL
HCT VFR BLD AUTO: 32.5 % (ref 35–47)
HGB BLD-MCNC: 10.4 G/DL (ref 11.5–16)
HGB UR QL STRIP: ABNORMAL
IMM GRANULOCYTES # BLD AUTO: 0 K/UL (ref 0–0.04)
IMM GRANULOCYTES NFR BLD AUTO: 0 % (ref 0–0.5)
INR PPP: 1.2 (ref 0.9–1.1)
KETONES UR QL STRIP.AUTO: 5 MG/DL
LACTATE SERPL-SCNC: 2 MMOL/L (ref 0.4–2)
LEUKOCYTE ESTERASE UR QL STRIP.AUTO: NEGATIVE
LYMPHOCYTES # BLD: 1.4 K/UL (ref 0.8–3.5)
LYMPHOCYTES NFR BLD: 19 % (ref 12–49)
MAGNESIUM SERPL-MCNC: 1.8 MG/DL (ref 1.6–2.4)
MCH RBC QN AUTO: 27.3 PG (ref 26–34)
MCHC RBC AUTO-ENTMCNC: 32 G/DL (ref 30–36.5)
MCV RBC AUTO: 85.3 FL (ref 80–99)
MONOCYTES # BLD: 0.8 K/UL (ref 0–1)
MONOCYTES NFR BLD: 11 % (ref 5–13)
MUCOUS THREADS URNS QL MICRO: ABNORMAL /LPF
NEUTS SEG # BLD: 4.9 K/UL (ref 1.8–8)
NEUTS SEG NFR BLD: 69 % (ref 32–75)
NITRITE UR QL STRIP.AUTO: POSITIVE
NRBC # BLD: 0 K/UL (ref 0–0.01)
NRBC BLD-RTO: 0 PER 100 WBC
P-R INTERVAL, ECG05: 148 MS
PERFORMED BY, TECHID: ABNORMAL
PH UR STRIP: 5 [PH] (ref 5–8)
PLATELET # BLD AUTO: 189 K/UL (ref 150–400)
PMV BLD AUTO: 12.9 FL (ref 8.9–12.9)
POTASSIUM SERPL-SCNC: 3.6 MMOL/L (ref 3.5–5.1)
PROCALCITONIN SERPL-MCNC: <0.05 NG/ML
PROT SERPL-MCNC: 7.2 G/DL (ref 6.4–8.2)
PROT UR STRIP-MCNC: NEGATIVE MG/DL
PROTHROMBIN TIME: 15.4 SEC (ref 11.9–14.7)
Q-T INTERVAL, ECG07: 518 MS
QRS DURATION, ECG06: 92 MS
QTC CALCULATION (BEZET), ECG08: 574 MS
RBC # BLD AUTO: 3.81 M/UL (ref 3.8–5.2)
RBC #/AREA URNS HPF: ABNORMAL /HPF (ref 0–5)
SODIUM SERPL-SCNC: 136 MMOL/L (ref 136–145)
SP GR UR REFRACTOMETRY: 1.03 (ref 1–1.03)
SPECIMEN EXP DATE BLD: NORMAL
THERAPEUTIC RANGE,PTTT: NORMAL SEC
TROPONIN I SERPL-MCNC: 0.09 NG/ML
TROPONIN I SERPL-MCNC: 0.09 NG/ML
TSH SERPL DL<=0.05 MIU/L-ACNC: 1.82 UIU/ML (ref 0.36–3.74)
UA: UC IF INDICATED,UAUC: ABNORMAL
UROBILINOGEN UR QL STRIP.AUTO: 0.1 EU/DL (ref 0.1–1)
VENTRICULAR RATE, ECG03: 74 BPM
WBC # BLD AUTO: 7.2 K/UL (ref 3.6–11)
WBC URNS QL MICRO: ABNORMAL /HPF (ref 0–4)

## 2021-05-04 PROCEDURE — 85025 COMPLETE CBC W/AUTO DIFF WBC: CPT

## 2021-05-04 PROCEDURE — 74011636637 HC RX REV CODE- 636/637: Performed by: FAMILY MEDICINE

## 2021-05-04 PROCEDURE — 70450 CT HEAD/BRAIN W/O DYE: CPT

## 2021-05-04 PROCEDURE — 93005 ELECTROCARDIOGRAM TRACING: CPT

## 2021-05-04 PROCEDURE — 85730 THROMBOPLASTIN TIME PARTIAL: CPT

## 2021-05-04 PROCEDURE — 84484 ASSAY OF TROPONIN QUANT: CPT

## 2021-05-04 PROCEDURE — 81001 URINALYSIS AUTO W/SCOPE: CPT

## 2021-05-04 PROCEDURE — 83735 ASSAY OF MAGNESIUM: CPT

## 2021-05-04 PROCEDURE — 84443 ASSAY THYROID STIM HORMONE: CPT

## 2021-05-04 PROCEDURE — 99218 HC RM OBSERVATION: CPT

## 2021-05-04 PROCEDURE — 85610 PROTHROMBIN TIME: CPT

## 2021-05-04 PROCEDURE — 83880 ASSAY OF NATRIURETIC PEPTIDE: CPT

## 2021-05-04 PROCEDURE — 83605 ASSAY OF LACTIC ACID: CPT

## 2021-05-04 PROCEDURE — 99284 EMERGENCY DEPT VISIT MOD MDM: CPT

## 2021-05-04 PROCEDURE — 72170 X-RAY EXAM OF PELVIS: CPT

## 2021-05-04 PROCEDURE — 71045 X-RAY EXAM CHEST 1 VIEW: CPT

## 2021-05-04 PROCEDURE — 86901 BLOOD TYPING SEROLOGIC RH(D): CPT

## 2021-05-04 PROCEDURE — 80053 COMPREHEN METABOLIC PANEL: CPT

## 2021-05-04 PROCEDURE — 82962 GLUCOSE BLOOD TEST: CPT

## 2021-05-04 PROCEDURE — 36415 COLL VENOUS BLD VENIPUNCTURE: CPT

## 2021-05-04 PROCEDURE — 84145 PROCALCITONIN (PCT): CPT

## 2021-05-04 RX ORDER — ACETAMINOPHEN 650 MG/1
650 SUPPOSITORY RECTAL
Status: DISCONTINUED | OUTPATIENT
Start: 2021-05-04 | End: 2021-05-07 | Stop reason: HOSPADM

## 2021-05-04 RX ORDER — HYDRALAZINE HYDROCHLORIDE 25 MG/1
25 TABLET, FILM COATED ORAL
Status: DISCONTINUED | OUTPATIENT
Start: 2021-05-04 | End: 2021-05-07 | Stop reason: HOSPADM

## 2021-05-04 RX ORDER — INSULIN LISPRO 100 [IU]/ML
INJECTION, SOLUTION INTRAVENOUS; SUBCUTANEOUS
Status: DISCONTINUED | OUTPATIENT
Start: 2021-05-04 | End: 2021-05-07 | Stop reason: HOSPADM

## 2021-05-04 RX ORDER — DEXTROSE 50 % IN WATER (D50W) INTRAVENOUS SYRINGE
25-50 AS NEEDED
Status: DISCONTINUED | OUTPATIENT
Start: 2021-05-04 | End: 2021-05-07 | Stop reason: HOSPADM

## 2021-05-04 RX ORDER — MAGNESIUM SULFATE 100 %
4 CRYSTALS MISCELLANEOUS AS NEEDED
Status: DISCONTINUED | OUTPATIENT
Start: 2021-05-04 | End: 2021-05-07 | Stop reason: HOSPADM

## 2021-05-04 RX ORDER — ACETAMINOPHEN 325 MG/1
650 TABLET ORAL
Status: DISCONTINUED | OUTPATIENT
Start: 2021-05-04 | End: 2021-05-07 | Stop reason: HOSPADM

## 2021-05-04 RX ADMIN — INSULIN LISPRO 3 UNITS: 100 INJECTION, SOLUTION INTRAVENOUS; SUBCUTANEOUS at 23:34

## 2021-05-04 NOTE — ED TRIAGE NOTES
Weakness on the right leg since Saturday when she came home from [facility], rectal bleeding and a colonoscopy performed there.

## 2021-05-04 NOTE — ED PROVIDER NOTES
EMERGENCY DEPARTMENT HISTORY AND PHYSICAL EXAM      Date: 5/4/2021  Patient Name: Eliseo Pinedo    History of Presenting Illness     Chief Complaint   Patient presents with    Fatigue    Fall       History Provided By: Patient    HPI: Eliseo Pinedo, [de-identified] y.o. female with a past medical history significant No significant past medical history presents to the ED with chief complaint of Fatigue and Fall  . 16-year-old female recently discharged after a GI bleed and outside facility. Has been feeling a little bit weaker than usual.  Today she felt fatigued and had a fall. Unsure if it was syncope. Unsure if she hit her head. Has no chest pain shortness of breath nausea vomiting diarrhea. No further black or bloody stool that she knows of. Does have some leg weakness that may have caused the fall. There are no other complaints, changes, or physical findings at this time. PCP: Joselyn Ram MD    Current Facility-Administered Medications   Medication Dose Route Frequency Provider Last Rate Last Admin    sodium chloride 0.9 % bolus infusion 1,000 mL  1,000 mL IntraVENous ONCE Maricarmen Beasley MD        glucose chewable tablet 16 g  4 Tab Oral PRN Penny Lopez MD        dextrose (D50W) injection syrg 12.5-25 g  25-50 mL IntraVENous PRN Penny Lopez MD        glucagon (GLUCAGEN) injection 1 mg  1 mg IntraMUSCular PRN Penny Lopze MD        insulin lispro (HUMALOG) injection   SubCUTAneous AC&HS Penny Lopez MD        hydrALAZINE (APRESOLINE) tablet 25 mg  25 mg Oral Q8H PRN Penny Lopez MD         Current Outpatient Medications   Medication Sig Dispense Refill    verapamil ER (VERELAN) 240 mg ER capsule Take 240 mg by mouth daily.  glimepiride (AMARYL) 4 mg tablet Take 4 mg by mouth every morning.          Past History     Past Medical History:  Past Medical History:   Diagnosis Date    Colon polyps     Diabetes ([facility] [address]     Diverticulosis     Hypertension        Past Surgical History:  Past Surgical History:   Procedure Laterality Date    COLONOSCOPY N/A 4/29/2021    COLONOSCOPY performed by Jey Espinoza MD at [facility] ENDOSCOPY       Family History:  Family History   Problem Relation Age of Onset    Diabetes Other        Social History:  Social History     Tobacco Use    Smoking status: Never Smoker    Smokeless tobacco: Never Used   Substance Use Topics    Alcohol use: Not Currently    Drug use: Never       Allergies: Allergies   Allergen Reactions    Penicillins Other (comments)     Pt states it makes her pass out         Review of Systems   Review of Systems   Constitutional: Negative. Negative for chills, fatigue and fever. HENT: Negative. Negative for congestion, nosebleeds and sore throat. Eyes: Negative. Negative for pain, discharge and visual disturbance. Respiratory: Negative. Negative for cough, chest tightness and shortness of breath. Cardiovascular: Negative for chest pain, palpitations and leg swelling. Gastrointestinal: Negative for abdominal pain, blood in stool, constipation, diarrhea, nausea and vomiting. Endocrine: Negative. Genitourinary: Negative. Negative for difficulty urinating, dysuria, pelvic pain and vaginal bleeding. Musculoskeletal: Positive for joint swelling. Negative for arthralgias, back pain and myalgias. Skin: Negative. Negative for rash and wound. Allergic/Immunologic: Negative. Neurological: Positive for weakness. Negative for dizziness, syncope, numbness and headaches. Hematological: Negative. Psychiatric/Behavioral: Negative. Negative for agitation, confusion and suicidal ideas. All other systems reviewed and are negative. Physical Exam   Physical Exam  Vitals signs and nursing note reviewed. Exam conducted with a chaperone present. Constitutional:       Appearance: Normal appearance. She is normal weight. HENT:      Head: Normocephalic and atraumatic.       Nose: Nose normal. Mouth/Throat:      Mouth: Mucous membranes are moist.      Pharynx: Oropharynx is clear. Eyes:      Extraocular Movements: Extraocular movements intact. Conjunctiva/sclera: Conjunctivae normal.      Pupils: Pupils are equal, round, and reactive to light. Neck:      Musculoskeletal: Normal range of motion and neck supple. Cardiovascular:      Rate and Rhythm: Normal rate and regular rhythm. Pulses: Normal pulses. Heart sounds: Normal heart sounds. Pulmonary:      Effort: Pulmonary effort is normal. No respiratory distress. Breath sounds: Normal breath sounds. Abdominal:      General: Abdomen is flat. Bowel sounds are normal. There is no distension. Palpations: Abdomen is soft. Tenderness: There is no abdominal tenderness. There is no guarding. Musculoskeletal: Normal range of motion. General: Swelling present. No tenderness, deformity or signs of injury. Right lower leg: No edema. Left lower leg: No edema. Skin:     General: Skin is warm and dry. Capillary Refill: Capillary refill takes less than 2 seconds. Findings: No lesion or rash. Neurological:      General: No focal deficit present. Mental Status: She is alert and oriented to person, place, and time. Mental status is at baseline. Cranial Nerves: No cranial nerve deficit. Psychiatric:         Mood and Affect: Mood normal.         Behavior: Behavior normal.         Thought Content:  Thought content normal.         Judgment: Judgment normal.         Diagnostic Study Results     Labs -     Recent Results (from the past 12 hour(s))   EKG, 12 LEAD, INITIAL    Collection Time: 05/04/21  1:44 PM   Result Value Ref Range    Ventricular Rate 74 BPM    Atrial Rate 74 BPM    P-R Interval 148 ms    QRS Duration 92 ms    Q-T Interval 518 ms    QTC Calculation (Bezet) 574 ms    Calculated P Axis 35 degrees    Calculated R Axis -18 degrees    Calculated T Axis -61 degrees    Diagnosis Sinus rhythm with Premature supraventricular complexes  Possible Left atrial enlargement  Left ventricular hypertrophy  T wave abnormality, consider inferior ischemia  T wave abnormality, consider anterolateral ischemia  Prolonged QT  Abnormal ECG  When compared with ECG of 04-MAY-2021 13:42, (Unconfirmed)  Previous ECG has undetermined rhythm, needs review  Confirmed by Kira Gómez ([id number]) on 5/4/2021 2:15:06 PM     CBC WITH AUTOMATED DIFF    Collection Time: 05/04/21  1:55 PM   Result Value Ref Range    WBC 7.2 3.6 - 11.0 K/uL    RBC 3.81 3.80 - 5.20 M/uL    HGB 10.4 (L) 11.5 - 16.0 g/dL    HCT 32.5 (L) 35.0 - 47.0 %    MCV 85.3 80.0 - 99.0 FL    MCH 27.3 26.0 - 34.0 PG    MCHC 32.0 30.0 - 36.5 g/dL    RDW 13.8 11.5 - 14.5 %    PLATELET 379 790 - 056 K/uL    MPV 12.9 8.9 - 12.9 FL    NRBC 0.0 0.0  WBC    ABSOLUTE NRBC 0.00 0.00 - 0.01 K/uL    NEUTROPHILS 69 32 - 75 %    LYMPHOCYTES 19 12 - 49 %    MONOCYTES 11 5 - 13 %    EOSINOPHILS 1 0 - 7 %    BASOPHILS 0 0 - 1 %    IMMATURE GRANULOCYTES 0 0 - 0.5 %    ABS. NEUTROPHILS 4.9 1.8 - 8.0 K/UL    ABS. LYMPHOCYTES 1.4 0.8 - 3.5 K/UL    ABS. MONOCYTES 0.8 0.0 - 1.0 K/UL    ABS. EOSINOPHILS 0.1 0.0 - 0.4 K/UL    ABS. BASOPHILS 0.0 0.0 - 0.1 K/UL    ABS. IMM. GRANS. 0.0 0.00 - 0.04 K/UL    DF AUTOMATED     METABOLIC PANEL, COMPREHENSIVE    Collection Time: 05/04/21  1:55 PM   Result Value Ref Range    Sodium 136 136 - 145 mmol/L    Potassium 3.6 3.5 - 5.1 mmol/L    Chloride 103 97 - 108 mmol/L    CO2 26 21 - 32 mmol/L    Anion gap 7 5 - 15 mmol/L    Glucose 366 (H) 65 - 100 mg/dL    BUN 18 6 - 20 mg/dL    Creatinine 1.33 (H) 0.55 - 1.02 mg/dL    BUN/Creatinine ratio 14 12 - 20      GFR est AA 47 (L) >60 ml/min/1.73m2    GFR est non-AA 38 (L) >60 ml/min/1.73m2    Calcium 8.8 8.5 - 10.1 mg/dL    Bilirubin, total 0.6 0.2 - 1.0 mg/dL    AST (SGOT) 10 (L) 15 - 37 U/L    ALT (SGPT) 19 12 - 78 U/L    Alk.  phosphatase 145 (H) 45 - 117 U/L    Protein, total 7.2 6.4 - 8.2 g/dL    Albumin 3.1 (L) 3.5 - 5.0 g/dL    Globulin 4.1 (H) 2.0 - 4.0 g/dL    A-G Ratio 0.8 (L) 1.1 - 2.2     TROPONIN I    Collection Time: 05/04/21  1:55 PM   Result Value Ref Range    Troponin-I, Qt. 0.09 (H) <0.05 ng/mL   BNP    Collection Time: 05/04/21  1:55 PM   Result Value Ref Range    NT pro-BNP 2,855 (H) <450 pg/mL   PROCALCITONIN    Collection Time: 05/04/21  1:55 PM   Result Value Ref Range    Procalcitonin <0.05 (H) 0 ng/mL   TSH 3RD GENERATION    Collection Time: 05/04/21  1:55 PM   Result Value Ref Range    TSH 1.82 0.36 - 3.74 uIU/mL   MAGNESIUM    Collection Time: 05/04/21  1:55 PM   Result Value Ref Range    Magnesium 1.8 1.6 - 2.4 mg/dL   TYPE & SCREEN    Collection Time: 05/04/21  2:23 PM   Result Value Ref Range    Crossmatch Expiration 05/07/2021,2359     ABO/Rh(D) Kalee Shaw Positive     Antibody screen Negative    PROTHROMBIN TIME + INR    Collection Time: 05/04/21  2:23 PM   Result Value Ref Range    Prothrombin time 15.4 (H) 11.9 - 14.7 sec    INR 1.2 (H) 0.9 - 1.1     PTT    Collection Time: 05/04/21  2:23 PM   Result Value Ref Range    aPTT 31.3 21.2 - 34.1 sec    aPTT, therapeutic range   sec   LACTIC ACID    Collection Time: 05/04/21  2:23 PM   Result Value Ref Range    Lactic acid 2.0 0.4 - 2.0 mmol/L   URINALYSIS W/ REFLEX CULTURE    Collection Time: 05/04/21  3:15 PM    Specimen: Urine   Result Value Ref Range    Color Yellow/Straw      Appearance Clear Clear      Specific gravity 1.026 1.003 - 1.030      pH (UA) 5.0 5.0 - 8.0      Protein Negative Negative mg/dL    Glucose >300 (A) Negative mg/dL    Ketone 5 (A) Negative mg/dL    Bilirubin Negative Negative      Blood Small (A) Negative      Urobilinogen 0.1 0.1 - 1.0 EU/dL    Nitrites Positive (A) Negative      Leukocyte Esterase Negative Negative      UA:UC IF INDICATED Culture not indicated by UA result Culture not indicated by UA result      WBC 0-4 0 - 4 /hpf    RBC 0-5 0 - 5 /hpf    Bacteria Negative Negative /hpf    Mucus Trace /lpf TROPONIN I    Collection Time: 05/04/21  4:00 PM   Result Value Ref Range    Troponin-I, Qt. 0.09 (H) <0.05 ng/mL       Radiologic Studies -   XR PELV 1 OR 2 V   Final Result   No fracture or destructive lesion is seen. The joint spaces are   maintained, as are the adjacent soft tissues. Tiny greater trochanteric spurs      CT HEAD WO CONT   Final Result   Age-appropriate atrophy. No acute findings. XR CHEST SNGL V   Final Result      No acute process. CT Results  (Last 48 hours)               05/04/21 1456  CT HEAD WO CONT Final result    Impression:  Age-appropriate atrophy. No acute findings. Narrative:  CT dose reduction was achieved through use of a standardized protocol tailored   for this examination and automatic exposure control for dose modulation. CT Head       History:        The sulci are prominent but symmetric. Periventricular and deep white matter   hypodensity is not unexpected for age. No acute abnormality seen gray or white   matter. Ventricles are symmetric and appropriate for atrophy. There is no   midline shift or mass effect, or evidence of hemorrhage. Bone windows show no   fracture. CXR Results  (Last 48 hours)               05/04/21 1401  XR CHEST SNGL V Final result    Impression:      No acute process. Narrative:  Examination: Chest Radiograph, Portable       Indication: Shortness of breath, fall       Comparison: Chest Radiograph  5/5/2020       Findings:       No focal consolidation, pleural effusion, pulmonary edema, or pneumothorax. Stable cardiomediastinal silhouette. No acute osseous abnormality. Dextroscoliosis of the spine. Medical Decision Making and ED Course   I am the first provider for this patient. I reviewed the vital signs, available nursing notes, past medical history, past surgical history, family history and social history. Vital Signs-Reviewed the patient's vital signs.   Patient Vitals for the past 12 hrs:   Temp Pulse Resp BP SpO2   05/04/21 1639  79 18 (!) 154/75 99 %   05/04/21 1331 98.4 °F (36.9 °C) 84 18 (!) 147/77 99 %       EKG interpretation:   EKG at 1344. Normal sinus rhythm with PVC. Prolonged QTC.  LVH. Diffuse T wave inversions to 3 aVF V3 through V6. Reason rule out dysrhythmia. Interpreted by ER physician. Records Reviewed: Previous Hospital chart. EMS run report      ED Course:   Initial assessment performed. The patients presenting problems have been discussed, and they are in agreement with the care plan formulated and outlined with them. I have encouraged them to ask questions as they arise throughout their visit.     Orders Placed This Encounter    REASON FOR NOT SELECTING BASAL INSULIN     Standing Status:   Standing     Number of Occurrences:   1     Order Specific Question:   Reason for Not Selecting Basal Insulin     Answer:   Concern for hypoglycemia    XR CHEST SNGL V     Standing Status:   Standing     Number of Occurrences:   1     Order Specific Question:   Transport     Answer:   Ambulatory [1]     Order Specific Question:   Reason for Exam     Answer:   sob fall    CT HEAD WO CONT     Standing Status:   Standing     Number of Occurrences:   1     Order Specific Question:   Transport     Answer:   Ambulatory [1]     Order Specific Question:   Reason for Exam     Answer:   head inj    XR PELV 1 OR 2 V     Standing Status:   Standing     Number of Occurrences:   1     Order Specific Question:   Transport     Answer:   BED [2]     Order Specific Question:   Reason for Exam     Answer:   pain    CBC WITH AUTOMATED DIFF     Standing Status:   Standing     Number of Occurrences:   1    METABOLIC PANEL, COMPREHENSIVE     Standing Status:   Standing     Number of Occurrences:   1    TROPONIN I     Standing Status:   Standing     Number of Occurrences:   1    BNP     Standing Status:   Standing     Number of Occurrences:   1    URINALYSIS W/ REFLEX CULTURE Standing Status:   Standing     Number of Occurrences:   1    PROCALCITONIN     Standing Status:   Standing     Number of Occurrences:   1    TSH 3RD GENERATION     Standing Status:   Standing     Number of Occurrences:   1    MAGNESIUM     Standing Status:   Standing     Number of Occurrences:   1    PROTHROMBIN TIME + INR     Standing Status:   Standing     Number of Occurrences:   1    PTT     Standing Status:   Standing     Number of Occurrences:   1    LACTIC ACID     Standing Status:   Standing     Number of Occurrences:   1    TROPONIN I     Standing Status:   Standing     Number of Occurrences:   1    NURSING-MISCELLANEOUS: Initiate hypoglycemic protocol if blood glucose is LESS THAN 70 mg/dL CONTINUOUS     Standing Status:   Standing     Number of Occurrences:   1     Order Specific Question:   Description of Order:     Answer:   Initiate hypoglycemic protocol if blood glucose is LESS THAN 70 mg/dL    NURSING-MISCELLANEOUS: FOR CONSCIOUS PATIENT: Administer 4 ounces fruit juice OR regular soda OR 4 glucose tablets. CONTINUOUS     Standing Status:   Standing     Number of Occurrences:   1     Order Specific Question:   Description of Order:     Answer:   FOR CONSCIOUS PATIENT: Administer 4 ounces fruit juice OR regular soda OR 4 glucose tablets.  NURSING-MISCELLANEOUS: If patient NPO, unconscious or unable to swallow and If Blood Glucose between 60 and 70 mg/dL: Follow instructions below If patient NPO, unconscious or unable to swallow and if blood glucose between 60 and 70 mg/dL: Give 25 . .. If patient NPO, unconscious or unable to swallow and if blood glucose between 60 and 70 mg/dL: Give 25 mL D50% IV. If blood glucose LESS THAN 60 mg/dL: Give 50 mL D50% IV. If no IV, administer glucagon 1 mg IM. Repeat blood glucose in 15 minutes. Continue to repeat blood glucose and treatment every 15 minutes until blood glucose is GREATER THAN 70 mg/dL and notify provider.      Standing Status: Standing     Number of Occurrences:   1     Order Specific Question:   Description of Order:     Answer: If patient NPO, unconscious or unable to swallow and If Blood Glucose between 60 and 70 mg/dL: Follow instructions below    NURSING-MISCELLANEOUS: Repeat finger stick blood glucose in 15 minutes AFTER treatment, if LESS THAN 70 repeat hypoglycemic protocol and notify provider. CONTINUOUS     Standing Status:   Standing     Number of Occurrences:   1     Order Specific Question:   Description of Order:     Answer:   Repeat finger stick blood glucose in 15 minutes AFTER treatment, if LESS THAN 70 repeat hypoglycemic protocol and notify provider.  NURSING-MISCELLANEOUS: Following Hypoglycemic Protocol: Once patient is fully alert and BG greater than 70 provide a small snack,  if NPO consider IV fluids with dextrose. CONTINUOUS     Standing Status:   Standing     Number of Occurrences:   1     Order Specific Question:   Description of Order:     Answer: Following Hypoglycemic Protocol: Once patient is fully alert and BG greater than 70 provide a small snack,  if NPO consider IV fluids with dextrose.  NURSING-MISCELLANEOUS: Document all interventions in the Electronic Medical  Record (EMR). CONTINUOUS     Standing Status:   Standing     Number of Occurrences:   1     Order Specific Question:   Description of Order:     Answer:   Document all interventions in the Electronic Medical  Record (EMR).  NURSING-MISCELLANEOUS: Blood glucose targets -- ICU: 140 - 180 mg/dL;  NON-ICU: 100 - 180 mg/dL CONTINUOUS     Standing Status:   Standing     Number of Occurrences:   1     Order Specific Question:   Description of Order:     Answer:   Blood glucose targets -- ICU: 140 - 180 mg/dL;  NON-ICU: 100 - 180 mg/dL    Cardiac Monitor     Standing Status:   Standing     Number of Occurrences:   1     Order Specific Question:   Type:      Answer:   Remote Telemetry     Order Specific Question:   Patient may go off unit without monitor     Answer:   No    EKG, 12 LEAD, INITIAL     Standing Status:   Standing     Number of Occurrences:   1     Order Specific Question:   Reason for Exam:     Answer:   weakness    EKG, 12 LEAD, INITIAL     Standing Status:   Standing     Number of Occurrences:   1     Order Specific Question:   Reason for Exam:     Answer:   syncope    TYPE & SCREEN     ENTER SURGERY DATE IF FOR PRE-OP TESTING. Standing Status:   Standing     Number of Occurrences:   1    sodium chloride 0.9 % bolus infusion 1,000 mL    glucose chewable tablet 16 g    dextrose (D50W) injection syrg 12.5-25 g    glucagon (GLUCAGEN) injection 1 mg    insulin lispro (HUMALOG) injection    hydrALAZINE (APRESOLINE) tablet 25 mg    INITIAL PHYSICIAN ORDER: OBSERVATION/OUTPATIENT IN A BED OBSERVATION; Remote Telemetry; Right Side Weakness     Standing Status:   Standing     Number of Occurrences:   1     Order Specific Question:   Patient Class: Answer:   OBSERVATION [104]     Order Specific Question:   Type of Bed     Answer:   Remote Telemetry [29]     Order Specific Question:   Reason for Observation     Answer:   Right Side Weakness     Order Specific Question:   Admitting Diagnosis     Answer:   Right sided weakness [[id number]]     Order Specific Question:   Admitting Physician     Answer:   Mando Townsend [[id number]]     Order Specific Question:   Attending Physician     Answer:   Mando Townsend [[id number]]              CONSULTANTS:  Consults      Provider Notes (Medical Decision Making):   [de-identified]year-old with new leg weakness. No pain. Recent fall versus syncope. Slight elevation of the troponin will repeat. No evidence of acute CT injury based on head CT. No evidence of acute anemia or acute GI bleed. Progress: Patient was signed out to me by Dr. Maday Pat with disposition pending work-up and reevaluation. On reevaluation spoke with patient and patient's son.   Noted onset of right-sided weakness beginning Saturday. Son reports they went to primary care physician's office today and he expressed the son that he was concerned about possibility of stroke so sent her to the emergency department for evaluation. Son reports that she has had difficulty ambulating and has had several falls since being discharged on Saturday. He feels that she is unsafe at home as he is at work during the day and unable to care for her while at home. Reviewed CT which showed no acute findings, labs otherwise were nondiagnostic however she did have a mildly elevated troponin which was flat on repeat. We will plan to admit for further work-up and management. Arthur Go MD        Procedures                       Disposition       Emergency Department Disposition: Admit      Diagnosis     Clinical Impression:   1. Fatigue, unspecified type    2. Fall, initial encounter    3. Right sided weakness    4. Elevated troponin        Attestations:    Please note that this dictation was completed with [employer], the computer voice recognition software. Quite often unanticipated grammatical, syntax, homophones, and other interpretive errors are inadvertently transcribed by the computer software. Please disregard these errors. Please excuse any errors that have escaped final proofreading. Thank you.

## 2021-05-05 ENCOUNTER — APPOINTMENT (OUTPATIENT)
Dept: NON INVASIVE DIAGNOSTICS | Age: 81
DRG: 065 | End: 2021-05-05
Attending: FAMILY MEDICINE
Payer: MEDICARE

## 2021-05-05 ENCOUNTER — APPOINTMENT (OUTPATIENT)
Dept: MRI IMAGING | Age: 81
DRG: 065 | End: 2021-05-05
Attending: FAMILY MEDICINE
Payer: MEDICARE

## 2021-05-05 LAB
ECHO AO ROOT DIAM: 2.6 CM
ECHO AR MAX VEL PISA: 355 CM/S
ECHO AV PEAK GRADIENT: 9 MMHG
ECHO AV REGURGITANT PHT: 691 MS
ECHO EST RA PRESSURE: 3 MMHG
ECHO LA AREA 4C: 19.59 CM2
ECHO LA MAJOR AXIS: 5 CM
ECHO LA MINOR AXIS: 2.83 CM
ECHO LA TO AORTIC ROOT RATIO: 1.92
ECHO LV E' SEPTAL VELOCITY: 4.48 CM/S
ECHO LV EJECTION FRACTION BIPLANE: 46.8 % (ref 55–100)
ECHO LV ESV A2C: 72.9 CM3
ECHO LV INTERNAL DIMENSION DIASTOLIC MMODE: 5.32 CM
ECHO LV INTERNAL DIMENSION SYSTOLIC MMODE: 4.07 CM
ECHO LV IVSD MMODE: 1.77 CM
ECHO LV POSTERIOR WALL DIASTOLIC MMODE: 1.94 CM
ECHO LVOT PEAK GRADIENT: 3 MMHG
ECHO LVOT SV: 83.6 CM3
ECHO MV A VELOCITY: 114 CM/S
ECHO MV AREA PHT: 2.44 CM2
ECHO MV E DECELERATION TIME (DT): 180 MS
ECHO MV E VELOCITY: 70.3 CM/S
ECHO MV E/A RATIO: 0.62
ECHO MV E/E' SEPTAL: 15.69
ECHO MV PRESSURE HALF TIME (PHT): 90 MS
ECHO PV PEAK INSTANTANEOUS GRADIENT SYSTOLIC: 3 MMHG
ECHO PV REGURGITANT MAX VELOCITY: 148 CM/S
ECHO PV REGURGITANT MAX VELOCITY: 410 CM/S
ECHO PV REGURGITANT MAX VELOCITY: 84.5 CM/S
ECHO PV REGURGITANT MAX VELOCITY: 87.5 CM/S
ECHO PVEIN A DURATION: 99 MS
ECHO PVEIN A VELOCITY: 33.1 CM/S
ECHO RA AREA 4C: 10.35 CM2
ECHO RIGHT VENTRICULAR SYSTOLIC PRESSURE (RVSP): 32 MMHG
ECHO RV INTERNAL DIMENSION: 2.7 CM
ECHO TV MAX VELOCITY: 271 CM/S
ECHO TV REGURGITANT PEAK GRADIENT: 29 MMHG
GLUCOSE BLD STRIP.AUTO-MCNC: 226 MG/DL (ref 65–100)
GLUCOSE BLD STRIP.AUTO-MCNC: 230 MG/DL (ref 65–100)
GLUCOSE BLD STRIP.AUTO-MCNC: 354 MG/DL (ref 65–100)
LEFT CCA DIST DIAS: 11.7 CM/S
LEFT CCA DIST SYS: 57.1 CM/S
LEFT CCA PROX DIAS: 13.1 CM/S
LEFT CCA PROX SYS: 62.6 CM/S
LEFT ECA DIAS: 7.64 CM/S
LEFT ECA SYS: 105 CM/S
LEFT ICA DIST DIAS: 36.3 CM/S
LEFT ICA DIST SYS: 107 CM/S
LEFT ICA PROX DIAS: 28.9 CM/S
LEFT ICA PROX SYS: 172 CM/S
LEFT SUBCLAVIAN DIAS: 0 CM/S
LEFT SUBCLAVIAN SYS: 102 CM/S
LEFT VERTEBRAL DIAS: 12.6 CM/S
LEFT VERTEBRAL SYS: 52.4 CM/S
MV DEC SLOPE: 2530 MM/S2
MV DEC SLOPE: 2530 MM/S2
PERFORMED BY, TECHID: ABNORMAL
RIGHT CCA DIST DIAS: 17.6 CM/S
RIGHT CCA DIST SYS: 81.7 CM/S
RIGHT CCA PROX DIAS: 14.5 CM/S
RIGHT CCA PROX SYS: 90.1 CM/S
RIGHT ECA DIAS: 11 CM/S
RIGHT ECA SYS: 197 CM/S
RIGHT ICA DIST DIAS: 33.7 CM/S
RIGHT ICA DIST SYS: 97.6 CM/S
RIGHT ICA PROX DIAS: 19.3 CM/S
RIGHT ICA PROX SYS: 148 CM/S
RIGHT SUBCLAVIAN DIAS: 0 CM/S
RIGHT SUBCLAVIAN SYS: 146 CM/S
RIGHT VERTEBRAL DIAS: 0 CM/S
RIGHT VERTEBRAL SYS: 19.4 CM/S

## 2021-05-05 PROCEDURE — 92610 EVALUATE SWALLOWING FUNCTION: CPT

## 2021-05-05 PROCEDURE — 99218 HC RM OBSERVATION: CPT

## 2021-05-05 PROCEDURE — 70551 MRI BRAIN STEM W/O DYE: CPT

## 2021-05-05 PROCEDURE — 97530 THERAPEUTIC ACTIVITIES: CPT

## 2021-05-05 PROCEDURE — 96374 THER/PROPH/DIAG INJ IV PUSH: CPT

## 2021-05-05 PROCEDURE — 93880 EXTRACRANIAL BILAT STUDY: CPT

## 2021-05-05 PROCEDURE — 74011636637 HC RX REV CODE- 636/637: Performed by: FAMILY MEDICINE

## 2021-05-05 PROCEDURE — 97161 PT EVAL LOW COMPLEX 20 MIN: CPT

## 2021-05-05 PROCEDURE — 97166 OT EVAL MOD COMPLEX 45 MIN: CPT

## 2021-05-05 PROCEDURE — 82962 GLUCOSE BLOOD TEST: CPT

## 2021-05-05 RX ADMIN — INSULIN LISPRO 2 UNITS: 100 INJECTION, SOLUTION INTRAVENOUS; SUBCUTANEOUS at 21:24

## 2021-05-05 RX ADMIN — INSULIN LISPRO 2 UNITS: 100 INJECTION, SOLUTION INTRAVENOUS; SUBCUTANEOUS at 16:30

## 2021-05-05 RX ADMIN — INSULIN LISPRO 2 UNITS: 100 INJECTION, SOLUTION INTRAVENOUS; SUBCUTANEOUS at 13:04

## 2021-05-05 NOTE — PROGRESS NOTES
PT eval order received and acknowledged. PT eval attempted at 0835 however pt off the floor in CVL. Will continue to follow patient and attempt PT eval at a later time. Thank you.

## 2021-05-05 NOTE — PROGRESS NOTES
OT eval order received and acknowledged. OT eval attempted at 8:40 am however pt off the floor in CVL at this time. Will continue to follow patient and attempt OT eval at a later time. Thank you.

## 2021-05-05 NOTE — H&P
History and Physical    Patient: Peggy Cali MRN: [id number]  SSN: [id number]    YOB: 1940  Age: [de-identified] y.o. Sex: female      Subjective:      Chief Complaint: Right-sided weakness    HPI: Peggy Cali is an [de-identified] y.o. female with past medical history of non-insulin-dependent diabetes mellitus type 2 and hypertension presenting to the ER with complaints of right-sided weakness. Ms. Dony Ge was discharged from [facility] on May 1 after treatment for GI bleed. Ms. Dony Ge had colonoscopy with multiple polyps removed. Since being home, Ms. Dony Ge has had right sided weakness and difficulty with ADLs. Son, Sindhu Martinez, has had to assist her with all daily activities. There has been no recent fever, chills, nausea, vomiting, diarrhea, abdominal pain, chest pain, palpitations, shortness of breath or cough. There have been no known sick contacts. Earlier today, son called primary care physician who recommended patient come to the ER out of concern for possible stroke. Ms. Dony Ge arrived to the ER with a temperature of 98.4 °F, blood pressure 147/77, pulse 84, respirations 18 and oxygen saturation 99% on room air. EKG has sinus rhythm with T wave inversions. T wave inversions are on previous EKGs (not acute findings). This evening, QT is prolonged. CT of the head has no evidence of acute intracranial abnormalities. Chest x-ray and pelvic x-ray have no evidence of injuries or acute process. Initial troponin is 0.09 and 2-hour troponin is 0.09. Creatinine is mildly elevated with a value of 1.33. Urine analysis has no evidence of acute infection and blood glucose level is 366. Hospital service has been asked to admit for further treatment and evaluation. I called son, Sindhu Martinez, this evening to obtain information on recent history. Sindhu Martinez can be reached at [phone].   Past medical history, past surgical history, family history, social history and home medication list was reviewed at the time of admission. Ms. Fran Caldwell is a full code. She denies current tobacco, alcohol or illicit drug use. Past Medical History:   Diagnosis Date    Colon polyps     Diabetes ([address]     Diverticulosis     Hypertension      Past Surgical History:   Procedure Laterality Date    COLONOSCOPY N/A 4/29/2021    COLONOSCOPY performed by Coretta Garcia MD at [facility] ENDOSCOPY      Family History   Problem Relation Age of Onset    Diabetes Other      Social History     Tobacco Use    Smoking status: Never Smoker    Smokeless tobacco: Never Used   Substance Use Topics    Alcohol use: Not Currently      Prior to Admission medications    Medication Sig Start Date End Date Taking? Authorizing Provider   verapamil ER (VERELAN) 240 mg ER capsule Take 240 mg by mouth daily. Other, MD Jamal   glimepiride (AMARYL) 4 mg tablet Take 4 mg by mouth every morning. Other, MD Jamal        Allergies   Allergen Reactions    Penicillins Other (comments)     Pt states it makes her pass out       Review of Systems:  Constitutional: Denies fevers, chills, fatigue, weakness, unexplained weight loss, night sweats. Head, Eyes, Ears, Nose, Mouth, Throat: Denies nasal congestion, sore throat, rhinorrhea, earache, ringing of the ears, difficulty hearing, facial pain, facial swelling. Respiratory: Denies shortness of breath, wheezing, cough, sputum production, hemoptysis. Denies use of oxygen at home. Cardiovascular: Denies chest pain, irregular heart beat, racing pulse, lower extremity edema, dizziness, dyspnea on exertion, orthopnea. Gastrointestinal: Denies nausea, vomiting, diarrhea, constipation, abdominal pain, loss of appetite, acid reflux, melena, hematochezia, change in bowel habits. Endocrine: Denies intolerance to heat or cold. Denies polyuria, polydipsia, polyphagia.    Genitourinary: Denies increased urinary frequency, dysuria, hematuria, urinary incontinence, increased urinary frequency  Integument/Breast: No rash, itching, new skin lesion. Musculoskeletal: Denies joint swelling, joint pain, myalgias, neck pain, back pain. Denies CVA tenderness. Neurological: Positive for right side weakness. Denies headaches, dizziness, confusion, tremors, numbness/tingling, weakness, problems with balance, loss of consciousness. Hematologic: Denies easy bleeding, easy bruising, lymphadenopathy. Behavioral/Psychiatric: Denies anxiety, depression, increased irritability, mood swings, delusions, hallucination, SI/HI. Objective:     Vitals:    05/04/21 1331 05/04/21 1639   BP: (!) 147/77 (!) 154/75   Pulse: 84 79   Resp: 18 18   Temp: 98.4 °F (36.9 °C)    SpO2: 99% 99%   Weight: 70.3 kg (155 lb)    Height: 5' 6\" (1.676 m)         Physical Exam:  General: Alert and Oriented x 3. Cooperative and friendly. No acute distress. Nourished and well developed. Poor historian. Head/Eyes: Normocephalic, atraumatic, EOMI, PERRLA. Nose/Mouth: Turbinates within normal limits, No drainage. Mucous membranes are moist.  Throat and Neck: No masses, JVD, thyromegaly or lymphadenopathy appreciated. Cervical spine has good range of motion without pain. Lung: Clear to auscultation bilaterally without wheezes, rhonchi or crackles. Good air movement bilaterally. Symmetric chest rise with respirations. Heart: Irregular rhythm. Normal S1/S2. No appreciated murmurs, rubs or gallops. No lower extremity edema. Abdomen: Soft, non-tender, non-distended. Bowel sounds present in all four quadrants. No masses appreciated. Extremities:  Atraumatic. Able to move all extremities symmetrically. No bony protuberances appreciated. Skin: Clean, dry and intact without appreciated lesions. Neurologic: Alert and oriented x3. Cranial nerves II through XII are intact. Facial features are symmetric and speech is clear. Ms. Lachelle Washburn has 5 out of 5 strength in all muscle groups. Sensation is intact. No deficits appreciated.   Psychiatric: Normal affect, normal thought process, good eye contact. Recent Results (from the past 24 hour(s))   EKG, 12 LEAD, INITIAL    Collection Time: 05/04/21  1:44 PM   Result Value Ref Range    Ventricular Rate 74 BPM    Atrial Rate 74 BPM    P-R Interval 148 ms    QRS Duration 92 ms    Q-T Interval 518 ms    QTC Calculation (Bezet) 574 ms    Calculated P Axis 35 degrees    Calculated R Axis -18 degrees    Calculated T Axis -61 degrees    Diagnosis       Sinus rhythm with Premature supraventricular complexes  Possible Left atrial enlargement  Left ventricular hypertrophy  T wave abnormality, consider inferior ischemia  T wave abnormality, consider anterolateral ischemia  Prolonged QT  Abnormal ECG  When compared with ECG of 04-MAY-2021 13:42, (Unconfirmed)  Previous ECG has undetermined rhythm, needs review  Confirmed by Arleen Toth ([id number]) on 5/4/2021 2:15:06 PM     CBC WITH AUTOMATED DIFF    Collection Time: 05/04/21  1:55 PM   Result Value Ref Range    WBC 7.2 3.6 - 11.0 K/uL    RBC 3.81 3.80 - 5.20 M/uL    HGB 10.4 (L) 11.5 - 16.0 g/dL    HCT 32.5 (L) 35.0 - 47.0 %    MCV 85.3 80.0 - 99.0 FL    MCH 27.3 26.0 - 34.0 PG    MCHC 32.0 30.0 - 36.5 g/dL    RDW 13.8 11.5 - 14.5 %    PLATELET 903 002 - 386 K/uL    MPV 12.9 8.9 - 12.9 FL    NRBC 0.0 0.0  WBC    ABSOLUTE NRBC 0.00 0.00 - 0.01 K/uL    NEUTROPHILS 69 32 - 75 %    LYMPHOCYTES 19 12 - 49 %    MONOCYTES 11 5 - 13 %    EOSINOPHILS 1 0 - 7 %    BASOPHILS 0 0 - 1 %    IMMATURE GRANULOCYTES 0 0 - 0.5 %    ABS. NEUTROPHILS 4.9 1.8 - 8.0 K/UL    ABS. LYMPHOCYTES 1.4 0.8 - 3.5 K/UL    ABS. MONOCYTES 0.8 0.0 - 1.0 K/UL    ABS. EOSINOPHILS 0.1 0.0 - 0.4 K/UL    ABS. BASOPHILS 0.0 0.0 - 0.1 K/UL    ABS. IMM.  GRANS. 0.0 0.00 - 0.04 K/UL    DF AUTOMATED     METABOLIC PANEL, COMPREHENSIVE    Collection Time: 05/04/21  1:55 PM   Result Value Ref Range    Sodium 136 136 - 145 mmol/L    Potassium 3.6 3.5 - 5.1 mmol/L    Chloride 103 97 - 108 mmol/L    CO2 26 21 - 32 mmol/L    Anion gap 7 5 - 15 mmol/L    Glucose 366 (H) 65 - 100 mg/dL    BUN 18 6 - 20 mg/dL    Creatinine 1.33 (H) 0.55 - 1.02 mg/dL    BUN/Creatinine ratio 14 12 - 20      GFR est AA 47 (L) >60 ml/min/1.73m2    GFR est non-AA 38 (L) >60 ml/min/1.73m2    Calcium 8.8 8.5 - 10.1 mg/dL    Bilirubin, total 0.6 0.2 - 1.0 mg/dL    AST (SGOT) 10 (L) 15 - 37 U/L    ALT (SGPT) 19 12 - 78 U/L    Alk.  phosphatase 145 (H) 45 - 117 U/L    Protein, total 7.2 6.4 - 8.2 g/dL    Albumin 3.1 (L) 3.5 - 5.0 g/dL    Globulin 4.1 (H) 2.0 - 4.0 g/dL    A-G Ratio 0.8 (L) 1.1 - 2.2     TROPONIN I    Collection Time: 05/04/21  1:55 PM   Result Value Ref Range    Troponin-I, Qt. 0.09 (H) <0.05 ng/mL   BNP    Collection Time: 05/04/21  1:55 PM   Result Value Ref Range    NT pro-BNP 2,855 (H) <450 pg/mL   PROCALCITONIN    Collection Time: 05/04/21  1:55 PM   Result Value Ref Range    Procalcitonin <0.05 (H) 0 ng/mL   TSH 3RD GENERATION    Collection Time: 05/04/21  1:55 PM   Result Value Ref Range    TSH 1.82 0.36 - 3.74 uIU/mL   MAGNESIUM    Collection Time: 05/04/21  1:55 PM   Result Value Ref Range    Magnesium 1.8 1.6 - 2.4 mg/dL   TYPE & SCREEN    Collection Time: 05/04/21  2:23 PM   Result Value Ref Range    Crossmatch Expiration 05/07/2021,2359     ABO/Rh(D) Rosie Squire Positive     Antibody screen Negative    PROTHROMBIN TIME + INR    Collection Time: 05/04/21  2:23 PM   Result Value Ref Range    Prothrombin time 15.4 (H) 11.9 - 14.7 sec    INR 1.2 (H) 0.9 - 1.1     PTT    Collection Time: 05/04/21  2:23 PM   Result Value Ref Range    aPTT 31.3 21.2 - 34.1 sec    aPTT, therapeutic range   sec   LACTIC ACID    Collection Time: 05/04/21  2:23 PM   Result Value Ref Range    Lactic acid 2.0 0.4 - 2.0 mmol/L   URINALYSIS W/ REFLEX CULTURE    Collection Time: 05/04/21  3:15 PM    Specimen: Urine   Result Value Ref Range    Color Yellow/Straw      Appearance Clear Clear      Specific gravity 1.026 1.003 - 1.030      pH (UA) 5.0 5.0 - 8.0      Protein Negative Negative mg/dL    Glucose >300 (A) Negative mg/dL    Ketone 5 (A) Negative mg/dL    Bilirubin Negative Negative      Blood Small (A) Negative      Urobilinogen 0.1 0.1 - 1.0 EU/dL    Nitrites Positive (A) Negative      Leukocyte Esterase Negative Negative      UA:UC IF INDICATED Culture not indicated by UA result Culture not indicated by UA result      WBC 0-4 0 - 4 /hpf    RBC 0-5 0 - 5 /hpf    Bacteria Negative Negative /hpf    Mucus Trace /lpf   TROPONIN I    Collection Time: 05/04/21  4:00 PM   Result Value Ref Range    Troponin-I, Qt. 0.09 (H) <0.05 ng/mL       XR Results (maximum last 3): Results from [place] encounter on 05/04/21   XR PELV 1 OR 2 V    Narrative 1 view      Impression No fracture or destructive lesion is seen. The joint spaces are  maintained, as are the adjacent soft tissues. Tiny greater trochanteric spurs   XR CHEST SNGL V    Narrative Examination: Chest Radiograph, Portable    Indication: Shortness of breath, fall    Comparison: Chest Radiograph  5/5/2020    Findings:    No focal consolidation, pleural effusion, pulmonary edema, or pneumothorax. Stable cardiomediastinal silhouette. No acute osseous abnormality. Dextroscoliosis of the spine. Impression No acute process. CT Results (maximum last 3): Results from [place] encounter on 05/04/21   CT HEAD WO CONT    Narrative CT dose reduction was achieved through use of a standardized protocol tailored  for this examination and automatic exposure control for dose modulation. CT Head     History:     The sulci are prominent but symmetric. Periventricular and deep white matter  hypodensity is not unexpected for age. No acute abnormality seen gray or white  matter. Ventricles are symmetric and appropriate for atrophy. There is no  midline shift or mass effect, or evidence of hemorrhage. Bone windows show no  fracture. Impression Age-appropriate atrophy. No acute findings.      Results from [facility] encounter on 04/27/21   CTA ABDOMEN PELV W CONT    Narrative PROCEDURE: CTA ABDOMEN PELV W CONT    HISTORY:GI bleeding    COMPARISON:None    Department policy stipulates all CT scans at this facility are performed using  dose reduction optimization techniques as appropriate to the performed exam,  including the following: Automated exposure control, adjustments of the mA  and/or KVP according to the patient size, and the use of iterative  reconstruction technique. TECHNIQUE: Precontrast and multiphase postcontrast CT angiogram of the abdomen  and pelvis performed and maximum intensity projection images (MIPS) generated. CHEST: No acute airspace process or pleural effusion seen at the lung bases. AORTA: Extensive atherosclerotic changes in the aorta and its branches. No  aneurysm, dissection. CELIAC AXIS: Severely stenotic at its origin with calcified plaque. Peripheral  branches opacify. SUPERIOR MESENTERIC ARTERY: Mildly narrowed at its origin. Peripheral branches  opacify. RENAL ARTERIES: Single renal artery to each kidney. They both appear stenotic at  the origins  INFERIOR MESENTERIC ARTERY: Normal  ILIAC ARTERIES: Calcified plaque but no significant stenosis  EXTERNAL/INTERNAL ILIAC ARTERIES:  Normal.    LIVER: Normal  GALLBLADDER: Normal  BILIARY TREE: Normal  PANCREAS: Head and body of the pancreas are normal. Tail of the pancreas shows  dilatation of the pancreatic duct. There is a cystic appearing region at the  tail of the pancreas associated with a small metallic densities suggesting  previous surgery  SPLEEN: Normal  ADRENAL GLANDS: Normal  KIDNEYS/URETERS/BLADDER: Small hypodense cortical lesions, otherwise  unremarkable. RETROPERITONEUM: Normal  BOWEL/MESENTERY: Stomach and small bowel appear normal. There is extensive  colonic diverticulosis without evidence of diverticulitis. No active gastrointestinal bleeding site appreciated.   APPENDIX:  Not clearly seen  PERITONEAL CAVITY: No free air or fluid  REPRODUCTIVE ORGANS:  Normal  BONE/TISSUES: No acute abnormality. OTHER: None      Impression Extensive underlying atherosclerotic vascular disease. 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 Diverticulosis without evidence of diverticulitis. Assessment:     Dhaval Crowley is an [de-identified] y.o. female who presents with right side weakness. On my physical examination, I did not appreciate any focal deficits. CT of the head has no evidence of acute intracranial process. Son reports that Ms. Josie Cross is having difficulty performing ADLs. He is interested in rehabilitation placement. Plan:     1. Admit to telemetry bed. 2. Neurology consult. Order MRI Brain. Order Q4H Neuro checks. Order echocardiogram and bilateral carotid duplex. Check lipid panel. Last hemoglobin A1C was 12.6 on April 28, 2021. 3. Keep NPO until speech evaluation performed. Order physical therapy, occupational therapy and speech therapy consults. 4. Fall and aspiration precautions. 5. Trend troponin. Monitor cardiac rhythm on telemetry. ER EKG has prolonged QT. 6. Acute kidney injury is likely prerenal and secondary to dehydration. Encourage p.o. intake. Recheck creatinine in the morning. 7. For history of non-insulin-dependent diabetes mellitus type 2, blood sugars currently uncontrolled with a value of 366. Order sensitive sliding scale insulin. Check blood glucose with meals and at bedtime. 8. For history of hypertension, order hydralazine as needed for systolic blood pressure greater than 150. I have held home dose of verapamil given elevated creatinine level. GI PPX: Diet ordered. DVT PPX: SCDs (admitted for acute TIA/CVA).     Signed By: Sruthi Valdez MD     May 4, 2021

## 2021-05-05 NOTE — PROGRESS NOTES
Problem: Falls - Risk of  Goal: *Absence of Falls  Description: Document Onalee Gum Fall Risk and appropriate interventions in the flowsheet.   Outcome: Progressing Towards Goal  Note: Fall Risk Interventions:                 Elimination Interventions: Bed/chair exit alarm, Patient to call for help with toileting needs, Call light in reach    History of Falls Interventions: Bed/chair exit alarm, Door open when patient unattended

## 2021-05-05 NOTE — PROGRESS NOTES
CM attempted to speak to patient to get a choice for SNF or IRF. Patient was using the restroom. CM will come back later.

## 2021-05-05 NOTE — CONSULTS
Came to see patient but she was out of room for testing. Chart reviewed. Patient is a [de-identified]year old woman with recent GI bleed admitted for right sided weakness.  Needs stroke workup:     - mri brain w/o cont  - CUS and TTE  - Hga1c and lipid panel  - atorvastatin 80mg, hold Asa due to recent history of gI bleed  - cardiac monitoring  - BP goal < 150/90   -pt/ot

## 2021-05-05 NOTE — ROUTINE PROCESS
TRANSFER - OUT REPORT:    Verbal report given to mike lerma on Jeimy Ranks  being transferred to 2s(unit) for routine progression of care       Report consisted of patients Situation, Background, Assessment and   Recommendations(SBAR). Information from the following report(s) ED Summary was reviewed with the receiving nurse. Lines:   Peripheral IV 05/04/21 Right Antecubital (Active)   Site Assessment Clean, dry, & intact 05/04/21 1448   Phlebitis Assessment 0 05/04/21 1448   Infiltration Assessment 0 05/04/21 1448   Dressing Status Clean, dry, & intact 05/04/21 1448   Dressing Type Transparent 05/04/21 1448   Hub Color/Line Status Pink 05/04/21 1448   Action Taken Blood drawn 05/04/21 1448        Opportunity for questions and clarification was provided.       Patient transported with:   Monitor  Tech

## 2021-05-05 NOTE — PROGRESS NOTES
SPEECH LANGUAGE PATHOLOGY BEDSIDE SWALLOW EVALUATIONS  Patient: Clara Singh [de-identified][de-identified] y.o. female)  Date: 5/5/2021  Primary Diagnosis: Right sided weakness [R53.1]        Precautions: fall, aspiration       ASSESSMENT :  Based on the objective data described below, the patient presents with minimal oral, oropharyngeal dysphagia, pharyngeal swallow WFL. Patient noted w/ left facial droop at baseline and during muscle activation. Reduced buccal tone and labial retraction on the left side. Oral motor appears slightly reduced/weak on the left side. Administered thin liquids via straw. Swallow initiation timely. HLE and protraction adequate to digital palpation. No overt s/sx of aspiration noted. Administered hard solid cracker. Prolonged mastication w/ reduced bolus cohesion. Patient attempted liquid wash, however some motor incoordination noted. Patient trying to place straw in mouth when chewing. No pharyngeal difficulty noted. Patient does not present w/ dysarthria or aphasia at this time. ?'able cognitive-linguistic deficits. Patient will benefit from skilled intervention to address the above impairments. Patients rehabilitation potential is considered to be Good     PLAN :  Recommendations and Planned Interventions:  Regular, thin. Aspiration precautions. Meal set-up. MBS as indicated. SLP to follow for possible cognitive -linguistic evaluation. Frequency/Duration: Patient will be followed by speech-language pathology daily to address goals. Discharge Recommendations: To Be Determined     SUBJECTIVE:   Patient Jacob Nick just don't know why all you people need to see me. Patient seen sitting chairside in room. She denies any difficulty w/ her swallowing at this time. She denies right sided weakness and reports she fell down in the yard \" so Im a little sore on my right, that's all. \"     OBJECTIVE:     CXR Results  (Last 48 hours)                 05/04/21 1401  XR CHEST SNGL V Final result Impression:      No acute process. Narrative:  Examination: Chest Radiograph, Portable       Indication: Shortness of breath, fall       Comparison: Chest Radiograph  5/5/2020       Findings:       No focal consolidation, pleural effusion, pulmonary edema, or pneumothorax. Stable cardiomediastinal silhouette. No acute osseous abnormality. Dextroscoliosis of the spine. CT Results  (Last 48 hours)                 05/04/21 1456  CT HEAD WO CONT Final result    Impression:  Age-appropriate atrophy. No acute findings. Narrative:  CT dose reduction was achieved through use of a standardized protocol tailored   for this examination and automatic exposure control for dose modulation. CT Head       History:        The sulci are prominent but symmetric. Periventricular and deep white matter   hypodensity is not unexpected for age. No acute abnormality seen gray or white   matter. Ventricles are symmetric and appropriate for atrophy. There is no   midline shift or mass effect, or evidence of hemorrhage. Bone windows show no   fracture.                     Past Medical History:   Diagnosis Date    Colon polyps     Diabetes ([facility] [address]     Diverticulosis     Hypertension      Past Surgical History:   Procedure Laterality Date    COLONOSCOPY N/A 4/29/2021    COLONOSCOPY performed by Coretta Garcia MD at [facility] ENDOSCOPY     Prior Level of Function/Home Situation: unknown  Home Situation  Home Environment: Private residence  # Steps to Enter: 4  Rails to Enter: Yes  Hand Rails : Bilateral  One/Two Story Residence: One story  Living Alone: Yes(son lives next door, stays with him at night)  Support Systems: Family member(s)  Patient Expects to be Discharged to[de-identified] Private residence  Current DME Used/Available at Home: Transfer bench(elevated frame)  Diet prior to admission: regular, thin  Current Diet:  regular, thin   Cognitive and Communication Status:  Neurologic State: Alert  Orientation Level: Oriented to person, Oriented to place  Cognition: Follows commands           Swallowing Evaluation:   Oral Assessment:  Oral Assessment  Labial: Left droop  Dentition: Intact  Lingual: No impairment  P.O. Trials:  Patient Position: uright in chair  Vocal quality prior to P.O.: No impairment  Consistency Presented: Thin liquid; Solid  How Presented: Straw;SLP-fed/presented;Self-fed/presented     Bolus Acceptance: No impairment  Bolus Formation/Control: Impaired  Type of Impairment: Spillage; Anterior  Propulsion: No impairment  Oral Residue: Less than 10% of bolus  Initiation of Swallow: No impairment  Laryngeal Elevation: Functional  Aspiration Signs/Symptoms: None       Oral Phase Severity: Minimal  Pharyngeal Phase Severity : No impairment  Voice:        Vocal Quality: No impairment                Pain:  Pain Scale 1: Numeric (0 - 10)  Pain Intensity 1: 0       After treatment:   Patient left in no apparent distress sitting up in chair, Call bell within reach, and Nursing notified    COMMUNICATION/EDUCATION:   Patient was educated regarding purpose of SLP assessment, POC, diet recs and sw safety precautions. Patient demonstrated [address] understanding as evidenced by verbal understanding, needs reinforcement. The patient's plan of care including recommendations, planned interventions, and recommended diet changes were discussed with: Registered nurse. Patient/family have participated as able in goal setting and plan of care. Thank you for this referral.  Lori Erickson, SLP [facility]  Time Calculation: 15 mins           Problem: Dysphagia (Adult)  Goal: *Acute Goals and Plan of Care (Insert Text)  Description: Speech Therapy Swallow Goals  Initiated 5/5/2021  -Patient will tolerate regular diet with thin liquids without clinical indicators of aspiration given no cues within 5-7day(s).          [ ] Not met  [ ]  MET   [ ] Progressing  [ ] Rolene Patricio  -Patient will tolerate PO trials without clinical indicators of aspiration given no cues within 5-7day(s). [ ] Not met  [ ]  MET   [ ] Progressing  [ ] Christine Hammond  -Patient will participate in modified barium swallow study within 5-7 day(s). [ ] Not met  [ ]  MET   [ ] Progressing  [ ] Christine Hammond  -Patient will demonstrate understanding of swallow safety precautions and aspiration precautions, diet recs with no cues within 5-7 day(s).         [ ] Not met  [ ]  MET   [ ] Progressing  [ ] Discontinue     Outcome: Not Progressing Towards Goal

## 2021-05-05 NOTE — PROGRESS NOTES
PHYSICAL THERAPY EVALUATION  Patient: Dhaval Crowley (89 y.o. female)  Date: 5/5/2021  Primary Diagnosis: Right sided weakness [R53.1]        Precautions: falls       ASSESSMENT  Pt is a [de-identified] yo female admitted on 5/4/2021 for right sided weakness; brain MRI still pending. Pt was recently admitted in [facility] from 4/28-5/1 for GI bleed undergoing colonoscopy with multiple polyps removed. PMH: DM, diverticulosis, HTN. Pt A&O x self, place, and time. Per pt report pt resides alone in a 1 story home with 4 CELIA, bilateral handrails but her son lives next door in a 1 story home with 6 steps and bilateral handrails and stays with him at night, pt required assistance for ADLS/IADLS since her DC from [facility] on 5/1; prior to that pt was I and used no AD with mobility prior to admission. DME: shower chair and elevated commode frame. Pt has had 1 fall in the past 3 months prior to admission    Based on the objective data described below, the patient presents with generalized weakness, impaired functional mobility, impaired amb, impaired balance, and decreased activity tolerance. Pt semi-supine in bed upon PT/OT arrival, agreeable to evaluation. Pt required CGA with additional time for bed mobility, CGA with additional time supine <> sit, CGA to min A with additional time for sit <> stand transfers. Pt amb 10 feet with gt belt, CGA to min A with additional time, and RW; demonstrating NBOS with short, shuffling, step to gt pattern with unsteadiness noted with directional changes. Pt did fair with session today with decreased light touch on left UE and right UE, impaired coordination bilatearally, and decreased right LE compared to left. Pt will benefit from continued skilled PT to address above deficits and return to PLOF. Current PT DC recommendation SNF vs IRF.      Current Level of Function Impacting Discharge (mobility/balance): SBA to CGA with additional time    Other factors to consider for discharge: severity of deficits       PLAN :  Recommendations and Planned Interventions: bed mobility training, transfer training, gait training, therapeutic exercises, patient and family training/education and therapeutic activities      Frequency/Duration: Patient will be followed by physical therapy:  5 times a week to address goals. Recommendation for discharge: (in order for the patient to meet his/her long term goals)  SNF vs IRF    This discharge recommendation:  Has been made in collaboration with the attending provider and/or case management    IF patient discharges home will need the following DME: none         SUBJECTIVE:   Patient stated i'm still recovering from my surgery I had at [facility]. It's only been 6 days since the colonoscopy.     OBJECTIVE DATA SUMMARY:   HISTORY:    Past Medical History:   Diagnosis Date    Colon polyps     Diabetes ([facility] [address]     Diverticulosis     Hypertension      Past Surgical History:   Procedure Laterality Date    COLONOSCOPY N/A 4/29/2021    COLONOSCOPY performed by Chloe Gonzalez MD at [place] [address]: Private residence  # Steps to Enter: 4  Rails to Enter: Yes  Hand Rails : Bilateral  One/Two Story Residence: One story  Living Alone: Yes(son lives next door, stays with him at night)  Support Systems: Family member(s)  Patient Expects to be Discharged to[de-identified] Private residence  Current DME Used/Available at Home: Transfer bench(elevated frame)    EXAMINATION/PRESENTATION/DECISION MAKING:   Critical Behavior:  Neurologic State: Alert  Orientation Level: Oriented to place, Oriented to person, Disoriented to time, Oriented to time        Hearing:   Auditory  Auditory Impairment: None  Skin:  Intact where visible   Edema: none noted   Range Of Motion:  AROM: Within functional limits           PROM: Within functional limits           Strength:    Strength: Generally decreased, functional(left LE grossly 4/5, right LE grossly 3/5)                    Tone & Sensation:   Tone: Normal              Sensation: Impaired(impaired light touch right LE decreased, left UE decreased )               Coordination:  Coordination: Generally decreased, functional    Finger Opposition Finger to Nose Dysdiadokinesis Proprioception   Right UE  [] Intact    [x] Impaired   [] Intact    [x] Impaired [] Intact    [] Impaired [] Intact    [x] Impaired   Left UE [] Intact    [x] Impaired [] Intact    [x] Impaired [] Intact    [] Impaired [] Intact    [x] Impaired    Heel to Gayle Heel taps Trace square Proprioception   Right LE  [] Intact    [x] Impaired [] Intact    [] Impaired [] Intact    [] Impaired [] Intact    [x] Impaired   Left LE [] Intact    [x] Impaired [] Intact    [] Impaired [] Intact    [] Impaired [] Intact    [x] Impaired       Vision:      Functional Mobility:  Bed Mobility:  Rolling: Contact guard assistance  Supine to Sit: Contact guard assistance     Scooting: Contact guard assistance  Transfers:  Sit to Stand: Contact guard assistance; Additional time  Stand to Sit: Contact guard assistance; Additional time                       Balance:   Sitting: Intact; Without support  Standing: Impaired; Without support  Standing - Static: Good  Standing - Dynamic : Fair  Ambulation/Gait Training:  Distance (ft): 10 Feet (ft)  Assistive Device: Gait belt;Walker, rolling  Ambulation - Level of Assistance: Contact guard assistance;Minimal assistance; Additional time     Gait Description (WDL): Exceptions to [facility]           Base of Support: Narrowed     Speed/Bhargavi: Shuffled; Slow           Therapeutic Exercises:   Not completed this session    Functional Measure:  [facility] [address] [address] AM-PAC 6 Clicks         Basic Mobility Inpatient Short Form  How much difficulty does the patient currently have. .. Unable A Lot A Little None   1. Turning over in bed (including adjusting bedclothes, sheets and blankets)? [] 1   [] 2   [x] 3   [] 4   2.   Sitting down on and standing up from a chair with arms ( e.g., wheelchair, bedside commode, etc.)   [] 1   [] 2   [x] 3   [] 4   3. Moving from lying on back to sitting on the side of the bed? [] 1   [] 2   [x] 3   [] 4          How much help from another person does the patient currently need. .. Total A Lot A Little None   4. Moving to and from a bed to a chair (including a wheelchair)? [] 1   [] 2   [x] 3   [] 4   5. Need to walk in hospital room? [] 1   [x] 2   [] 3   [] 4   6. Climbing 3-5 steps with a railing? [] 1   [x] 2   [] 3   [] 4   © 2007, Trustees of [facility] [employer], under license to [employer]. All rights reserved     Score:  Initial: 16/24 Most Recent: X (Date: 5/5/21 )   Interpretation of Tool:  Represents activities that are increasingly more difficult (i.e. Bed mobility, Transfers, Gait). Score 24 23 22-20 19-15 14-10 9-7 6   Modifier CH CI CJ CK CL CM CN         Physical Therapy Evaluation Charge Determination   History Examination Presentation Decision-Making   HIGH Complexity :3+ comorbidities / personal factors will impact the outcome/ POC  HIGH Complexity : 4+ Standardized tests and measures addressing body structure, function, activity limitation and / or participation in recreation  LOW Complexity : Stable, uncomplicated  Other outcome measures ampac 6  mod      Based on the above components, the patient evaluation is determined to be of the following complexity level: LOW     Pain Rating:  3/10 right side    Activity Tolerance:   Fair    After treatment patient left in no apparent distress:   Sitting in chair and Call bell within reach and nsg updated. GOALS:    Problem: Mobility Impaired (Adult and Pediatric)  Goal: *Acute Goals and Plan of Care (Insert Text)  Description: Pt will be I with LE HEP in 7 days. Pt will perform bed mobility with mod I in 7 days. Pt will perform transfers with mod I in 7 days. Pt will amb 25-50 feet with LRAD safely with mod I in 7 days.          Outcome: Not Met COMMUNICATION/EDUCATION:   The patients plan of care was discussed with: Occupational therapist and Registered nurse. Fall prevention education was provided and the patient/caregiver indicated understanding., Patient/family have participated as able in goal setting and plan of care. , and Patient/family agree to work toward stated goals and plan of care. PT/OT sessions occurred together for increased safety of pt and clinician.        Thank you for this referral.  Pepper Claudio, PT, DPT   Time Calculation: 23 mins

## 2021-05-05 NOTE — PROGRESS NOTES
OCCUPATIONAL THERAPY EVALUATION  Patient: Roshni Morales (14 y.o. female)  Date: 5/5/2021  Primary Diagnosis: Right sided weakness [R53.1]        Precautions: falls, CVA    ASSESSMENT  Pt is a [de-identified] yo female admitted on 5/4/2021 for right sided weakness; brain MRI still pending. Pt was recently admitted in [facility] from 4/28-5/1 for GI bleed undergoing colonoscopy with multiple polyps removed. PMH: DM, diverticulosis, HTN. Pt A&O x self, place, and time. Per pt report pt resides alone in a 1 story home with 4 CELIA, bilateral handrails but her son lives next door in a 1 story home with 6 steps and bilateral handrails and stays with him at night, pt required assistance for ADLS/IADLS since her DC from [facility] on 5/1; prior to that pt was I and used no AD with mobility prior to admission. DME: shower chair and elevated commode frame. Pt has had 1 fall in the past 3 months prior to admission. Based on the objective data described below, the patient presents with generalized weakness, decreased coordination, impaired sensation (decreased on LUE and RLE). Pt semi-supine in bed upon OT/PT arrival, agreeable to evaluation. Pt required CGA with additional time for bed mobility, CGA with additional time supine <> sit, CGA to min A with additional time for sit <> stand transfers. Pt amb around bed to recliner with gt belt, CGA to min A with additional time, and RW. Patient min A LE dressing to don socks, setup A feeding. Pt did fair with session today with decreased light touch on left UE and right UE, impaired coordination bilatearally, and decreased right LE compared to left. Patient would benefit from continued skilled OT services to address above deficits and improve safety and independence with self care and functional mobility/transfers. Recommend discharge to SNF vs IRF when medically appropriate.     Current Level of Function Impacting Discharge (ADLs/self-care): min A LE dressing, setup A feeding. Other factors to consider for discharge: time since onset, severity of deficits        PLAN :  Recommendations and Planned Interventions: self care training, functional mobility training, therapeutic exercise, balance training, therapeutic activities, endurance activities, patient education, home safety training and family training/education    Frequency/Duration: Patient will be followed by occupational therapy 5 times a week to address goals. Recommendation for discharge: (in order for the patient to meet his/her long term goals)  SNF vs IRF    This discharge recommendation:  Has been made in collaboration with the attending provider and/or case management    IF patient discharges home will need the following DME: TBD       SUBJECTIVE:   Patient stated My son has me sleep at his place at night.     OBJECTIVE DATA SUMMARY:   HISTORY:   Past Medical History:   Diagnosis Date    Colon polyps     [facility] ([facility] [address]     Diverticulosis     Hypertension      Past Surgical History:   Procedure Laterality Date    COLONOSCOPY N/A 4/29/2021    COLONOSCOPY performed by Nicole Conway MD at [facility] ENDOSCOPY       Expanded or extensive additional review of patient history:     Home Situation  Home Environment: Private residence  # Steps to Enter: 4  Rails to Enter: Yes  Hand Rails : Bilateral  One/Two Story Residence: One story  Living Alone: Yes(son lives next door, stays with him at night)  Support Systems: Family member(s)  Patient Expects to be Discharged to[de-identified] Private residence  Current DME Used/Available at Home: Transfer bench(elevated frame)    PLOF: Pt required A for ADLS/IADLS, mod I with mobility prior to admission, since recent discharge.      Hand dominance: Right    EXAMINATION OF PERFORMANCE DEFICITS:  Cognitive/Behavioral Status:  Neurologic State: Alert  Orientation Level: Oriented to person;Oriented to place  Cognition: Follows commands    Skin: intact where visible    Edema: none noted    Hearing: Auditory  Auditory Impairment: None    Vision/Perceptual:    No deficits noted at this time    Range of Motion:  AROM: Within functional limits  PROM: Within functional limits    Strength:  Strength: Generally decreased, functional     RUE Strength  Observation: grossly observed to be 3+/5     LUE Strength  Observation: grossly observed to be 3+/5    Coordination:  Coordination: Generally decreased, functional    Tone & Sensation:  Tone: Normal  Sensation: Impaired    Balance:  Sitting: Intact; Without support  Standing: Impaired; Without support  Standing - Static: Good  Standing - Dynamic : Fair    Functional Mobility and Transfers for ADLs:  Bed Mobility:  Rolling: Contact guard assistance  Supine to Sit: Contact guard assistance  Scooting: Contact guard assistance    Transfers:  Sit to Stand: Contact guard assistance; Additional time  Stand to Sit: Contact guard assistance; Additional time    ADL Assessment:  Feeding: Setup    Oral Facial Hygiene/Grooming: Other (comment)(pt declined at this time)    Lower Body Dressing: Minimum assistance(sitting EOB)    ADL Intervention and task modifications:  Feeding  Feeding Assistance: Set-up  Container Management: Set-up  Food to Mouth: Independent  Drink to Mouth: Independent    Lower Body Dressing Assistance  Dressing Assistance: Minimum assistance  Socks: Minimum assistance  Position Performed: Long sitting on bed;Seated edge of bed    Therapeutic Exercise:  Patient may benefit from UE HEP, initiate at next session as able     Functional Measure:    [facility] AM-PACTM \"6 Clicks\"                                                       Daily Activity Inpatient Short Form  How much help from another person does the patient currently need. .. Total; A Lot A Little None   1. Putting on and taking off regular lower body clothing? []  1 []  2 [x]  3 []  4   2. Bathing (including washing, rinsing, drying)? []  1 []  2 [x]  3 []  4   3.   Toileting, which includes using toilet, bedpan or urinal? [] 1 []  2 [x]  3 []  4   4. Putting on and taking off regular upper body clothing? []  1 []  2 [x]  3 []  4   5. Taking care of personal grooming such as brushing teeth? []  1 []  2 [x]  3 []  4   6. Eating meals? []  1 []  2 [x]  3 []  4   © 2007, Trustees of [address] [place] [address] [address] [address], under license to [employer]. All rights reserved     Score: 18/24     Interpretation of Tool:  Represents clinically-significant functional categories (i.e. Activities of daily living). Percentage of Impairment CH    0%   CI    1-19% CJ    20-39% CK    40-59% CL    60-79% CM    80-99% CN     100%   [facility]  Score 6-24 24 23 20-22 15-19 10-14 7-9 6        Occupational Therapy Evaluation Charge Determination   History Examination Decision-Making   MEDIUM Complexity : Expanded review of history including physical, cognitive and psychosocial  history  MEDIUM Complexity : 3-5 performance deficits relating to physical, cognitive , or psychosocial skils that result in activity limitations and / or participation restrictions MEDIUM Complexity : Patient may present with comorbidities that affect occupational performnce. Miniml to moderate modification of tasks or assistance (eg, physical or verbal ) with assesment(s) is necessary to enable patient to complete evaluation       Based on the above components, the patient evaluation is determined to be of the following complexity level: MEDIUM    Pain Rating:  3/10 R side    Activity Tolerance:   Fair    After treatment patient left in no apparent distress:    Sitting in chair and Call bell within reach    COMMUNICATION/EDUCATION:   The patients plan of care was discussed with: Physical therapist and Registered nurse. Patient/family have participated as able in goal setting and plan of care. and Patient/family agree to work toward stated goals and plan of care. This patients plan of care is appropriate for delegation to [facility].     OT/PT sessions occurred together for increased patient and clinician safety.     Problem: Self Care Deficits Care Plan (Adult)  Goal: *Acute Goals and Plan of Care (Insert Text)  Description: Pt will be mod I sup <> sit in prep for EOB ADLs  Pt will be mod I grooming sitting EOB  Pt will be mod I LE dressing sitting EOB/long sit  Pt will be mod I sit <>  prep for toileting LRAD  Pt will be mod I toileting/toilet transfer/cloth mgmt LRAD  Pt will be I following UE HEP in prep for self care tasks     Outcome: Not Met     Thank you for this referral.  Wisam Gutierrez, OTR/L  Time Calculation: 24 mins

## 2021-05-05 NOTE — PROGRESS NOTES
CM spoke to patient at bedside regarding SNF or IRF choice. Patient repeatedly stated that she did not want to stay at SNF or IRF for long term. AMINAH and Dr. Gonzalez Hope informed patient it would be short term. Patient's first preference is [facility] and any SNF in [place]. CM sent referrals via [employer].

## 2021-05-06 ENCOUNTER — APPOINTMENT (OUTPATIENT)
Dept: CT IMAGING | Age: 81
DRG: 065 | End: 2021-05-06
Attending: PSYCHIATRY & NEUROLOGY
Payer: MEDICARE

## 2021-05-06 VITALS
WEIGHT: 148.81 LBS | TEMPERATURE: 98.2 F | BODY MASS INDEX: 23.92 KG/M2 | SYSTOLIC BLOOD PRESSURE: 151 MMHG | RESPIRATION RATE: 16 BRPM | OXYGEN SATURATION: 96 % | HEART RATE: 90 BPM | HEIGHT: 66 IN | DIASTOLIC BLOOD PRESSURE: 79 MMHG

## 2021-05-06 PROBLEM — I63.9 CVA (CEREBRAL VASCULAR ACCIDENT) (HCC): Status: ACTIVE | Noted: 2021-05-06

## 2021-05-06 LAB
ANION GAP SERPL CALC-SCNC: 10 MMOL/L (ref 5–15)
BASOPHILS # BLD: 0 K/UL (ref 0–0.1)
BASOPHILS NFR BLD: 1 % (ref 0–1)
BUN SERPL-MCNC: 15 MG/DL (ref 6–20)
BUN/CREAT SERPL: 17 (ref 12–20)
CA-I BLD-MCNC: 8.6 MG/DL (ref 8.5–10.1)
CHLORIDE SERPL-SCNC: 104 MMOL/L (ref 97–108)
CO2 SERPL-SCNC: 24 MMOL/L (ref 21–32)
CREAT SERPL-MCNC: 0.88 MG/DL (ref 0.55–1.02)
DIFFERENTIAL METHOD BLD: ABNORMAL
EOSINOPHIL # BLD: 0.2 K/UL (ref 0–0.4)
EOSINOPHIL NFR BLD: 4 % (ref 0–7)
ERYTHROCYTE [DISTWIDTH] IN BLOOD BY AUTOMATED COUNT: 13.5 % (ref 11.5–14.5)
GLUCOSE BLD STRIP.AUTO-MCNC: 179 MG/DL (ref 65–100)
GLUCOSE BLD STRIP.AUTO-MCNC: 201 MG/DL (ref 65–100)
GLUCOSE BLD STRIP.AUTO-MCNC: 238 MG/DL (ref 65–100)
GLUCOSE BLD STRIP.AUTO-MCNC: 274 MG/DL (ref 65–100)
GLUCOSE BLD STRIP.AUTO-MCNC: 377 MG/DL (ref 65–100)
GLUCOSE SERPL-MCNC: 272 MG/DL (ref 65–100)
HCT VFR BLD AUTO: 29.8 % (ref 35–47)
HGB BLD-MCNC: 9.6 G/DL (ref 11.5–16)
IMM GRANULOCYTES # BLD AUTO: 0 K/UL (ref 0–0.04)
IMM GRANULOCYTES NFR BLD AUTO: 0 % (ref 0–0.5)
LYMPHOCYTES # BLD: 1.1 K/UL (ref 0.8–3.5)
LYMPHOCYTES NFR BLD: 19 % (ref 12–49)
MCH RBC QN AUTO: 27.6 PG (ref 26–34)
MCHC RBC AUTO-ENTMCNC: 32.2 G/DL (ref 30–36.5)
MCV RBC AUTO: 85.6 FL (ref 80–99)
MONOCYTES # BLD: 0.6 K/UL (ref 0–1)
MONOCYTES NFR BLD: 10 % (ref 5–13)
NEUTS SEG # BLD: 3.8 K/UL (ref 1.8–8)
NEUTS SEG NFR BLD: 66 % (ref 32–75)
NRBC # BLD: 0 K/UL (ref 0–0.01)
NRBC BLD-RTO: 0 PER 100 WBC
PERFORMED BY, TECHID: ABNORMAL
PLATELET # BLD AUTO: 184 K/UL (ref 150–400)
PMV BLD AUTO: 12.5 FL (ref 8.9–12.9)
POTASSIUM SERPL-SCNC: 3.1 MMOL/L (ref 3.5–5.1)
RBC # BLD AUTO: 3.48 M/UL (ref 3.8–5.2)
SODIUM SERPL-SCNC: 138 MMOL/L (ref 136–145)
TROPONIN I SERPL-MCNC: 0.09 NG/ML
TROPONIN I SERPL-MCNC: 0.1 NG/ML
TROPONIN I SERPL-MCNC: 0.11 NG/ML
WBC # BLD AUTO: 5.8 K/UL (ref 3.6–11)

## 2021-05-06 PROCEDURE — 92526 ORAL FUNCTION THERAPY: CPT

## 2021-05-06 PROCEDURE — 74011000636 HC RX REV CODE- 636: Performed by: INTERNAL MEDICINE

## 2021-05-06 PROCEDURE — 97116 GAIT TRAINING THERAPY: CPT

## 2021-05-06 PROCEDURE — 74011636637 HC RX REV CODE- 636/637: Performed by: INTERNAL MEDICINE

## 2021-05-06 PROCEDURE — 80048 BASIC METABOLIC PNL TOTAL CA: CPT

## 2021-05-06 PROCEDURE — 97110 THERAPEUTIC EXERCISES: CPT

## 2021-05-06 PROCEDURE — 36415 COLL VENOUS BLD VENIPUNCTURE: CPT

## 2021-05-06 PROCEDURE — 99218 HC RM OBSERVATION: CPT

## 2021-05-06 PROCEDURE — 82962 GLUCOSE BLOOD TEST: CPT

## 2021-05-06 PROCEDURE — 65270000029 HC RM PRIVATE

## 2021-05-06 PROCEDURE — 74011636637 HC RX REV CODE- 636/637: Performed by: FAMILY MEDICINE

## 2021-05-06 PROCEDURE — 85025 COMPLETE CBC W/AUTO DIFF WBC: CPT

## 2021-05-06 PROCEDURE — 74011250637 HC RX REV CODE- 250/637: Performed by: FAMILY MEDICINE

## 2021-05-06 PROCEDURE — 84484 ASSAY OF TROPONIN QUANT: CPT

## 2021-05-06 PROCEDURE — 70498 CT ANGIOGRAPHY NECK: CPT

## 2021-05-06 PROCEDURE — 80061 LIPID PANEL: CPT

## 2021-05-06 PROCEDURE — 97530 THERAPEUTIC ACTIVITIES: CPT

## 2021-05-06 PROCEDURE — 74011250637 HC RX REV CODE- 250/637: Performed by: INTERNAL MEDICINE

## 2021-05-06 RX ORDER — INSULIN LISPRO 100 [IU]/ML
INJECTION, SOLUTION INTRAVENOUS; SUBCUTANEOUS
Qty: 1 VIAL | Refills: 0 | Status: SHIPPED
Start: 2021-05-06 | End: 2021-11-20

## 2021-05-06 RX ORDER — LISINOPRIL 20 MG/1
20 TABLET ORAL DAILY
Status: DISCONTINUED | OUTPATIENT
Start: 2021-05-06 | End: 2021-05-07 | Stop reason: HOSPADM

## 2021-05-06 RX ORDER — INSULIN GLARGINE 100 [IU]/ML
INJECTION, SOLUTION SUBCUTANEOUS
Qty: 1 VIAL | Refills: 0 | Status: SHIPPED
Start: 2021-05-06 | End: 2021-10-22

## 2021-05-06 RX ORDER — POTASSIUM CHLORIDE 1.5 G/1.77G
40 POWDER, FOR SOLUTION ORAL
Status: COMPLETED | OUTPATIENT
Start: 2021-05-06 | End: 2021-05-06

## 2021-05-06 RX ORDER — LISINOPRIL 20 MG/1
20 TABLET ORAL DAILY
Qty: 30 TAB | Refills: 0 | Status: SHIPPED
Start: 2021-05-07 | End: 2021-11-20

## 2021-05-06 RX ORDER — INSULIN LISPRO 100 [IU]/ML
INJECTION, SOLUTION INTRAVENOUS; SUBCUTANEOUS
Qty: 1 VIAL | Refills: 0 | Status: SHIPPED
Start: 2021-05-06 | End: 2021-10-22

## 2021-05-06 RX ORDER — ACETAMINOPHEN 325 MG/1
650 TABLET ORAL
Qty: 60 TAB | Refills: 0 | Status: SHIPPED | OUTPATIENT
Start: 2021-05-06 | End: 2021-10-22

## 2021-05-06 RX ORDER — INSULIN LISPRO 100 [IU]/ML
3 INJECTION, SOLUTION INTRAVENOUS; SUBCUTANEOUS
Status: DISCONTINUED | OUTPATIENT
Start: 2021-05-06 | End: 2021-05-07 | Stop reason: HOSPADM

## 2021-05-06 RX ORDER — ATORVASTATIN CALCIUM 80 MG/1
80 TABLET, FILM COATED ORAL DAILY
Qty: 30 TAB | Refills: 0 | Status: SHIPPED | OUTPATIENT
Start: 2021-05-07 | End: 2021-11-20

## 2021-05-06 RX ORDER — VERAPAMIL HYDROCHLORIDE 120 MG/1
240 CAPSULE, EXTENDED RELEASE ORAL DAILY
Status: DISCONTINUED | OUTPATIENT
Start: 2021-05-06 | End: 2021-05-07 | Stop reason: HOSPADM

## 2021-05-06 RX ORDER — INSULIN GLARGINE 100 [IU]/ML
5 INJECTION, SOLUTION SUBCUTANEOUS
Status: DISCONTINUED | OUTPATIENT
Start: 2021-05-06 | End: 2021-05-07 | Stop reason: HOSPADM

## 2021-05-06 RX ORDER — ATORVASTATIN CALCIUM 40 MG/1
80 TABLET, FILM COATED ORAL DAILY
Status: DISCONTINUED | OUTPATIENT
Start: 2021-05-06 | End: 2021-05-07 | Stop reason: HOSPADM

## 2021-05-06 RX ADMIN — POTASSIUM CHLORIDE 40 MEQ: 1.5 FOR SOLUTION ORAL at 15:25

## 2021-05-06 RX ADMIN — INSULIN LISPRO 5 UNITS: 100 INJECTION, SOLUTION INTRAVENOUS; SUBCUTANEOUS at 12:39

## 2021-05-06 RX ADMIN — INSULIN LISPRO 3 UNITS: 100 INJECTION, SOLUTION INTRAVENOUS; SUBCUTANEOUS at 09:52

## 2021-05-06 RX ADMIN — IOPAMIDOL 100 ML: 755 INJECTION, SOLUTION INTRAVENOUS at 18:00

## 2021-05-06 RX ADMIN — INSULIN GLARGINE 5 UNITS: 100 INJECTION, SOLUTION SUBCUTANEOUS at 22:14

## 2021-05-06 RX ADMIN — POTASSIUM CHLORIDE 40 MEQ: 1.5 FOR SOLUTION ORAL at 12:39

## 2021-05-06 RX ADMIN — INSULIN LISPRO 2 UNITS: 100 INJECTION, SOLUTION INTRAVENOUS; SUBCUTANEOUS at 22:13

## 2021-05-06 RX ADMIN — INSULIN LISPRO 3 UNITS: 100 INJECTION, SOLUTION INTRAVENOUS; SUBCUTANEOUS at 12:40

## 2021-05-06 RX ADMIN — ACETAMINOPHEN 650 MG: 325 TABLET, FILM COATED ORAL at 10:02

## 2021-05-06 RX ADMIN — INSULIN LISPRO 3 UNITS: 100 INJECTION, SOLUTION INTRAVENOUS; SUBCUTANEOUS at 09:53

## 2021-05-06 RX ADMIN — VERAPAMIL HYDROCHLORIDE 240 MG: 120 CAPSULE, DELAYED RELEASE PELLETS ORAL at 10:03

## 2021-05-06 RX ADMIN — ACETAMINOPHEN 650 MG: 325 TABLET, FILM COATED ORAL at 02:54

## 2021-05-06 RX ADMIN — ATORVASTATIN CALCIUM 80 MG: 40 TABLET, FILM COATED ORAL at 09:52

## 2021-05-06 NOTE — PROGRESS NOTES
Patient has been accepted to [facility], [facility], and [facility]. Patient's first choice is [facility]. CM spoke to Dr. Autumn Watkins and he stated that patient will be ready for discharge tomorrow due to additional tests.

## 2021-05-06 NOTE — PROGRESS NOTES
PHYSICAL THERAPY TREATMENT  Patient: Roshni Morales (24 y.o. female)  Date: 5/6/2021  Diagnosis: Right sided weakness [R53.1]  [facility] [I63.9] <principal problem not specified>       Precautions:    Chart, physical therapy assessment, plan of care and goals were reviewed. ASSESSMENT  Patient continues with skilled PT services and is progressing towards goals. Patient improved significantly this visit with all mobility and was able to ambulate approx 60 ft today with no LOB noted and participated well with LE therex. Pt impressed with the improvements of her LE mobility and strength today compared to previous. Patient amb steadily and increased distance but still amb with slow connie and decreased step length bilaterally. Will cont to further progress towards PT goals. Current Level of Function Impacting Discharge (mobility/balance): weakness, safety    Other factors to consider for discharge: slight unsteadiness, assistance at home         PLAN :  Patient continues to benefit from skilled intervention to address the above impairments. Continue treatment per established plan of care. to address goals. Recommendation for discharge: (in order for the patient to meet his/her long term goals)  Therapy 3 hours per day 5-7 days per week    This discharge recommendation:  Has been made in collaboration with the attending provider and/or case management    IF patient discharges home will need the following DME: to be determined (TBD)       SUBJECTIVE:   Patient stated [place] you help get my legs up when we are done?     OBJECTIVE DATA SUMMARY:   Critical Behavior:  Neurologic State: (P) Alert  Orientation Level: Oriented to person, Oriented to place, Oriented to time  Cognition: (P) Appropriate decision making, Follows commands     Functional Mobility Training:    Transfers:  Sit to Stand: Contact guard assistance  Stand to Sit: Contact guard assistance       Balance:  Sitting: Intact; With support  Standing: Impaired; With support  Standing - Static: Good;Constant support  Standing - Dynamic : Fair;Constant support    Ambulation/Gait Training:  Distance (ft): 60 Feet (ft)  Assistive Device: Gait belt;Walker, rolling  Ambulation - Level of Assistance: Contact guard assistance  Base of Support: Narrowed  Speed/Bhargavi: Slow;Shuffled      Therapeutic Exercises:   10x LAQ, marches, AP    Pain Ratin/10 side     Activity Tolerance:   Good  Please refer to the flowsheet for vital signs taken during this treatment. After treatment patient left in no apparent distress:   Sitting in chair and Call bell within reach    COMMUNICATION/COLLABORATION:   The patients plan of care was discussed with: Physical therapist and Occupational therapy assistant. Trey Sandoval   Time Calculation: 24 mins         Problem: Mobility Impaired (Adult and Pediatric)  Goal: *Acute Goals and Plan of Care (Insert Text)  Description: Pt will be I with LE HEP in 7 days. Pt will perform bed mobility with mod I in 7 days. Pt will perform transfers with mod I in 7 days. Pt will amb 25-50 feet with LRAD safely with mod I in 7 days.          Outcome: Progressing Towards Goal

## 2021-05-06 NOTE — PROGRESS NOTES
Hospitalist Progress Note         Farzad Priest MD          Daily Progress Note: 5/6/2021      Subjective:   Patient seen and evaluated at bedside, patient currently has no active complaints, overnight events reviewed, discussed with RN at bedside. Problem List:  Problem List as of 5/6/2021 Never Reviewed          Codes Class Noted - Resolved    CVA (cerebral vascular accident) [facility]) ICD-10-CM: I63.9  ICD-9-CM: 434.91  5/6/2021 - Present        Right sided weakness ICD-10-CM: R53.1  ICD-9-CM: 728.87  5/4/2021 - Present        GI bleed ICD-10-CM: K92.2  ICD-9-CM: 578.9  4/28/2021 - Present              Medications reviewed  Current Facility-Administered Medications   Medication Dose Route Frequency    [Held by provider] lisinopriL (PRINIVIL, ZESTRIL) tablet 20 mg  20 mg Oral DAILY    verapamil ER (VERELAN) capsule 240 mg  240 mg Oral DAILY    insulin glargine (LANTUS) injection 5 Units  5 Units SubCUTAneous QHS    insulin lispro (HUMALOG) injection 3 Units  3 Units SubCUTAneous TIDAC    atorvastatin (LIPITOR) tablet 80 mg  80 mg Oral DAILY    glucose chewable tablet 16 g  4 Tab Oral PRN    dextrose (D50W) injection syrg 12.5-25 g  25-50 mL IntraVENous PRN    glucagon (GLUCAGEN) injection 1 mg  1 mg IntraMUSCular PRN    insulin lispro (HUMALOG) injection   SubCUTAneous AC&HS    hydrALAZINE (APRESOLINE) tablet 25 mg  25 mg Oral Q8H PRN    acetaminophen (TYLENOL) tablet 650 mg  650 mg Oral Q4H PRN    Or    acetaminophen (TYLENOL) solution 650 mg  650 mg Per NG tube Q4H PRN    Or    acetaminophen (TYLENOL) suppository 650 mg  650 mg Rectal Q4H PRN       Review of Systems:   A comprehensive review of systems was negative except for that written in the HPI.     Objective:   Physical Exam:     Visit Vitals  /70   Pulse 90   Temp 97.8 °F (36.6 °C)   Resp 16   Ht 5' 6\" (1.676 m)   Wt 67.5 kg (148 lb 13 oz)   SpO2 98%   BMI 24.02 kg/m²      O2 Device: None (Room air)    Temp (24hrs), Av.5 °F (36.9 °C), Min:97.8 °F (36.6 °C), Max:100.2 °F (37.9 °C)    No intake/output data recorded. No intake/output data recorded. General:  Alert, cooperative, no distress, appears stated age. Lungs:   Clear to auscultation bilaterally. Chest wall:  No tenderness or deformity. Heart:  Regular rate and rhythm, S1, S2 normal, no murmur, click, rub or gallop. Abdomen:   Soft, non-tender. Bowel sounds normal. No masses,  No organomegaly. Extremities: Extremities normal, atraumatic, no cyanosis or edema. Pulses: 2+ and symmetric all extremities. Skin: Skin color, texture, turgor normal. No rashes or lesions   Neurologic: CNII-XII intact. No gross sensory or motor deficits     Data Review:       Recent Days:  Recent Labs     21  1355   WBC 7.2   HGB 10.4*   HCT 32.5*        Recent Labs     21  1423 21  1355   NA  --  136   K  --  3.6   CL  --  103   CO2  --  26   GLU  --  366*   BUN  --  18   CREA  --  1.33*   CA  --  8.8   MG  --  1.8   ALB  --  3.1*   TBILI  --  0.6   ALT  --  19   INR 1.2*  --      No results for input(s): PH, PCO2, PO2, HCO3, FIO2 in the last 72 hours.     24 Hour Results:  Recent Results (from the past 24 hour(s))   ECHO ADULT COMPLETE    Collection Time: 21  9:30 AM   Result Value Ref Range    Aortic Regurgitant Pressure Half-time 691.00 ms    AR Max Pasha 355.00 cm/s    Pulmonic Regurgitant End Max Velocity 148.00 cm/s    AoV PG 9.00 mmHg    Ao Root D 2.60 cm    IVSd (M-mode) 1.77 (A) cm    LVIDd (M-mode) 5.32 (A) cm    LVIDs (M-mode) 4.07 cm    Pulmonic Regurgitant End Max Velocity 84.50 cm/s    LVOT Peak Gradient 3.00 mmHg    LVPWd (M-mode) 1.94 (A) cm    LV E' Septal Velocity 4.48 cm/s    BP EF 46.8 (A) 55.0 - 100.0 %    LV ES Vol A2C 72.90 cm3    E/E' septal 15.69     LVOT SV 83.6 cm3    Left Atrium Major Axis 5.00 cm    Left Atrium to Aortic Root Ratio 1.92     Pulmonic Regurgitant End Max Velocity 410.00 cm/s    Mitral Valve Deceleration [place] 2,530.00 mm/s2    Mitral Valve Deceleration [place] 2,530.00 mm/s2    Mitral Valve E Wave Deceleration Time 180.00 ms    Mitral Valve Pressure Half-time 90.00 ms    MV A Pasha 114.00 cm/s    MV E Pasha 70.30 cm/s    MVA (PHT) 2.44 cm2    MV E/A 0.62     Pulmonic Regurgitant End Max Velocity 87.50 cm/s    Pulmonic Valve Systolic Peak Instantaneous Gradient 3.00 mmHg    P Vein A Dur 99.00 ms    Pulmonary Vein \"A\" Wave Velocity 33.10 cm/s    Est. RA Pressure 3.00 mmHg    RVIDd 2.70 cm    RVSP 32.00 mmHg    Tricuspid Valve Max Velocity 271.00 cm/s    Triscuspid Valve Regurgitation Peak Gradient 29.00 mmHg    Right Atrial Area 4C 10.35 cm2    LA Area 4C 19.59 cm2    Left Atrium Minor Axis 2.83 cm   DUPLEX CAROTID BILATERAL    Collection Time: 05/05/21  9:56 AM   Result Value Ref Range    Left CCA dist sys 57.1 cm/s    Left CCA dist stev[place] 11.7 cm/s    Left CCA prox sys 62.6 cm/s    Left CCA prox [place] 13.1 cm/s    Left ICA dist sys 107.0 cm/s    Left ICA dist stevens 36.3 cm/s    Left ICA prox sys 172.0 cm/s    Left ICA prox [place] 28.9 cm/s    Left ECA sys 105.0 cm/s    LEFT EXTERNAL CAROTID ARTERY D 7.64 cm/s    Left subclavian sys 102.0 cm/s    LEFT SUBCLAVIAN ARTERY D 0.00 cm/s    Left vertebral sys 52.4 cm/s    LEFT VERTEBRAL ARTERY D 12.60 cm/s    Right cca dist sys 81.7 cm/s    Right CCA dist stevens 17.6 cm/s    Right CCA prox sys 90.1 cm/s    Right CCA prox stev[place] 14.5 cm/s    Right ICA dist sys 97.6 cm/s    Right ICA dist stevens 33.7 cm/s    Right ICA prox sys 148.0 cm/s    Right ICA prox [place] 19.3 cm/s    Right eca sys 197.0 cm/s    RIGHT EXTERNAL CAROTID ARTERY D 11.00 cm/s    Right subclavian sys 146.0 cm/s    RIGHT SUBCLAVIAN ARTERY D 0.00 cm/s    Right vertebral sys 19.4 cm/s    RIGHT VERTEBRAL ARTERY D 0.00 cm/s   GLUCOSE, POC    Collection Time: 05/05/21 12:33 PM   Result Value Ref Range    Glucose (POC) 226 (H) 65 - 100 mg/dL    Performed by Shona Leon    GLUCOSE, POC    Collection Time: 05/05/21 4:04 PM   Result Value Ref Range    Glucose (POC) 230 (H) 65 - 100 mg/dL    Performed by Shona Leon    GLUCOSE, POC    Collection Time: 05/05/21  8:05 PM   Result Value Ref Range    Glucose (POC) 354 (H) 65 - 100 mg/dL    Performed by Nicho Ugarte            Assessment/       Cerebrovascular accidentpatient presents with above-mentioned symptomatology and based on patient's clinical presentation as well as physical examination in conjunction with MRI results patient symptomatology is consistent with cerebrovascular accident secondary to multiple left-sided acute ischemic infarcts  Currently holding aspirin given prior GI bleed however will revisit with neurology about need for aspirin given MRI results  Continue Lipitor 80 mg once daily  Physical therapy occupational therapy evaluation appreciated, patient to go to skilled nursing facility  Follow-up CT angio neck  Transthoracic echo is negative for PFO  Neurology consult appreciated,     hyperglycemia type 2 diabetespatient found to be hyperglycemic over the past 24 hours  Initiate Lantus and lispro according to 24-hour coverage  Continue sliding scale    Hypertensionreinitiate home antihypertensive medications, hold lisinopril until repeat serum creatinine  Continue to monitor    New onset heart failureof note patient found to have new onset heart failure appears to be compensated, patient's ejection fraction was 45%  Obtain cardiology consult for optimization of medications    Elevated serum troponinof note patient was found to have an elevated serum troponin without any evidence of EKG changes  Continue to trend cardiac enzymes every 6x3  Follow-up cardiology recommendations    Acute kidney injurypatient presented with above-mentioned symptomatology was found to have a little elevated serum creatinine due to unclear etiology, differentials include prerenal versus renal versus postrenal etiologies  Obtain repeat serum creatinine  Obtain urinary electrolytes to calculate fractional excretion of sodium  Continue to monitor  Obtain nephrology consult    ProphylaxisSCDs  FENcardiac/diabetic diet, replete potassium and magnesium  Full code, patient surrogate decision-maker is her son,  Tyra Fofana [facility] tomorrow, discussed with RN and case management      Care Plan discussed with: Patient/Family/Consultant/Case Management/RN    Total time spent with patient: 35 minutes.     Silas Gowers, MD

## 2021-05-06 NOTE — CONSULTS
Consult Date: 5/6/2021    Consults Ms. Rosalba Santana is a [de-identified]year old woman with IDDM with admission last month for a GI bleed where a colonoscopy did not show any active bleeding who now comes in with right sided weakness which was noted sometime after her discharge from [facility] on 05/01/21. Her son brought her in because she was requiring more assistance with ADLS. MRI done yesterday shows left ICA territory embolic strokes. CUS shows left ICA  stenosis. Her Hga1c is > 12. BP elevated to 172/85.      Subjective     Past Medical History:   Diagnosis Date    Colon polyps     Diabetes ([address]     Diverticulosis     Hypertension       Past Surgical History:   Procedure Laterality Date    COLONOSCOPY N/A 4/29/2021    COLONOSCOPY performed by Nicole Conway MD at [facility] ENDOSCOPY     Family History   Problem Relation Age of Onset    Diabetes Other       Social History     Tobacco Use    Smoking status: Never Smoker    Smokeless tobacco: Never Used   Substance Use Topics    Alcohol use: Not Currently       Current Facility-Administered Medications   Medication Dose Route Frequency Provider Last Rate Last Admin    [Held by provider] lisinopriL (PRINIVIL, ZESTRIL) tablet 20 mg  20 mg Oral DAILY Temo Joseph MD        verapamil ER (VERELAN) capsule 240 mg  240 mg Oral DAILY Kardar, Ahmed Burnie Kehr, MD        insulin glargine (LANTUS) injection 5 Units  5 Units SubCUTAneous QHS Kardar, Ahmed Burnie Kehr, MD        insulin lispro (HUMALOG) injection 3 Units  3 Units SubCUTAneous Alver Schick, Ahmed Burnie Kehr, MD        atorvastatin (LIPITOR) tablet 80 mg  80 mg Oral DAILY Kardar, Ahmed Burnie Kehr, MD        glucose chewable tablet 16 g  4 Tab Oral PRN Marlen Harkins MD        dextrose (D50W) injection syrg 12.5-25 g  25-50 mL IntraVENous PRN Marlen Harkins MD        glucagon (GLUCAGEN) injection 1 mg  1 mg IntraMUSCular PRN Marlen Harkins MD        insulin lispro (HUMALOG) injection SubCUTAneous AC&HS Gala Riggins MD   2 Units at 05/05/21 2124    hydrALAZINE (APRESOLINE) tablet 25 mg  25 mg Oral Q8H PRN Gala Riggins MD        acetaminophen (TYLENOL) tablet 650 mg  650 mg Oral Q4H PRN Gala Riggins MD   650 mg at 05/06/21 0254    Or    acetaminophen (TYLENOL) solution 650 mg  650 mg Per NG tube Q4H PRN Gala Riggins MD        Or   Ford Sanchez acetaminophen (TYLENOL) suppository 650 mg  650 mg Rectal Q4H PRN Gala Riggins MD            Review of Systems   All other systems reviewed and are negative. Objective     Vital signs for last 24 hours:  Visit Vitals  /70   Pulse 90   Temp 97.8 °F (36.6 °C)   Resp 16   Ht 5' 6\" (1.676 m)   Wt 67.5 kg (148 lb 13 oz)   SpO2 98%   BMI 24.02 kg/m²       Intake/Output this shift:  Current Shift: No intake/output data recorded. Last 3 Shifts: No intake/output data recorded.     Data Review:   Recent Results (from the past 24 hour(s))   ECHO ADULT COMPLETE    Collection Time: 05/05/21  9:30 AM   Result Value Ref Range    Aortic Regurgitant Pressure Half-time 691.00 ms    AR Max Pasha 355.00 cm/s    Pulmonic Regurgitant End Max Velocity 148.00 cm/s    AoV PG 9.00 mmHg    Ao Root D 2.60 cm    IVSd (M-mode) 1.77 (A) cm    LVIDd (M-mode) 5.32 (A) cm    LVIDs (M-mode) 4.07 cm    Pulmonic Regurgitant End Max Velocity 84.50 cm/s    LVOT Peak Gradient 3.00 mmHg    LVPWd (M-mode) 1.94 (A) cm    LV E' Septal Velocity 4.48 cm/s    BP EF 46.8 (A) 55.0 - 100.0 %    LV ES Vol A2C 72.90 cm3    E/E' septal 15.69     LVOT SV 83.6 cm3    Left Atrium Major Axis 5.00 cm    Left Atrium to Aortic Root Ratio 1.92     Pulmonic Regurgitant End Max Velocity 410.00 cm/s    Mitral Valve Deceleration [place] 2,530.00 mm/s2    Mitral Valve Deceleration [place] 2,530.00 mm/s2    Mitral Valve E Wave Deceleration Time 180.00 ms    Mitral Valve Pressure Half-time 90.00 ms    MV A Pasha 114.00 cm/s    MV E Pasha 70.30 cm/s    MVA (PHT) 2.44 cm2    MV E/A 0.62     Pulmonic Regurgitant End Max Velocity 87.50 cm/s    Pulmonic Valve Systolic Peak Instantaneous Gradient 3.00 mmHg    P Vein A Dur 99.00 ms    Pulmonary Vein \"A\" Wave Velocity 33.10 cm/s    Est. RA Pressure 3.00 mmHg    RVIDd 2.70 cm    RVSP 32.00 mmHg    Tricuspid Valve Max Velocity 271.00 cm/s    Triscuspid Valve Regurgitation Peak Gradient 29.00 mmHg    Right Atrial Area 4C 10.35 cm2    LA Area 4C 19.59 cm2    Left Atrium Minor Axis 2.83 cm   DUPLEX CAROTID BILATERAL    Collection Time: 05/05/21  9:56 AM   Result Value Ref Range    Left CCA dist sys 57.1 cm/s    Left CCA dist [place] 11.7 cm/s    Left CCA prox sys 62.6 cm/s    Left CCA prox [place] 13.1 cm/s    Left ICA dist sys 107.0 cm/s    Left ICA dist [place] 36.3 cm/s    Left ICA prox sys 172.0 cm/s    Left ICA prox [place] 28.9 cm/s    Left ECA sys 105.0 cm/s    LEFT EXTERNAL CAROTID ARTERY D 7.64 cm/s    Left subclavian sys 102.0 cm/s    LEFT SUBCLAVIAN ARTERY D 0.00 cm/s    Left vertebral sys 52.4 cm/s    LEFT VERTEBRAL ARTERY D 12.60 cm/s    Right cca dist sys 81.7 cm/s    Right CCA dist [place] 17.6 cm/s    Right CCA prox sys 90.1 cm/s    Right CCA prox [place] 14.5 cm/s    Right ICA dist sys 97.6 cm/s    Right ICA dist [place] 33.7 cm/s    Right ICA prox sys 148.0 cm/s    Right ICA prox [place] 19.3 cm/s    Right eca sys 197.0 cm/s    RIGHT EXTERNAL CAROTID ARTERY D 11.00 cm/s    Right subclavian sys 146.0 cm/s    RIGHT SUBCLAVIAN ARTERY D 0.00 cm/s    Right vertebral sys 19.4 cm/s    RIGHT VERTEBRAL ARTERY D 0.00 cm/s   GLUCOSE, POC    Collection Time: 05/05/21 12:33 PM   Result Value Ref Range    Glucose (POC) 226 (H) 65 - 100 mg/dL    Performed by Shona Leon    GLUCOSE, POC    Collection Time: 05/05/21  4:04 PM   Result Value Ref Range    Glucose (POC) 230 (H) 65 - 100 mg/dL    Performed by Shona Leon    GLUCOSE, POC    Collection Time: 05/05/21  8:05 PM   Result Value Ref Range    Glucose (POC) 354 (H) 65 - 100 mg/dL    Performed by CARMELA GRAY    CBC WITH AUTOMATED DIFF Collection Time: 05/06/21  7:42 AM   Result Value Ref Range    WBC 5.8 3.6 - 11.0 K/uL    RBC 3.48 (L) 3.80 - 5.20 M/uL    HGB 9.6 (L) 11.5 - 16.0 g/dL    HCT 29.8 (L) 35.0 - 47.0 %    MCV 85.6 80.0 - 99.0 FL    MCH 27.6 26.0 - 34.0 PG    MCHC 32.2 30.0 - 36.5 g/dL    RDW 13.5 11.5 - 14.5 %    PLATELET 887 437 - 305 K/uL    MPV 12.5 8.9 - 12.9 FL    NRBC 0.0 0.0  WBC    ABSOLUTE NRBC 0.00 0.00 - 0.01 K/uL    NEUTROPHILS 66 32 - 75 %    LYMPHOCYTES 19 12 - 49 %    MONOCYTES 10 5 - 13 %    EOSINOPHILS 4 0 - 7 %    BASOPHILS 1 0 - 1 %    IMMATURE GRANULOCYTES 0 0 - 0.5 %    ABS. NEUTROPHILS 3.8 1.8 - 8.0 K/UL    ABS. LYMPHOCYTES 1.1 0.8 - 3.5 K/UL    ABS. MONOCYTES 0.6 0.0 - 1.0 K/UL    ABS. EOSINOPHILS 0.2 0.0 - 0.4 K/UL    ABS. BASOPHILS 0.0 0.0 - 0.1 K/UL    ABS. IMM. GRANS. 0.0 0.00 - 0.04 K/UL    DF AUTOMATED     GLUCOSE, POC    Collection Time: 05/06/21  8:08 AM   Result Value Ref Range    Glucose (POC) 274 (H) 65 - 100 mg/dL    Performed by Omega Good        Physical Exam    Neuro Physical Exam      General: Well developed, well nourished. Patient in no apparent distress. Neurological Exam:  Mental Status: Awake, alert, oriented x3, not oriented to situation   Cranial Nerves:   Intact visual fields PERRL, EOM's full, no nystagmus, no ptosis. Facial sensation is normal. Facial movement is symmetric. Palate is midline. Normal sternocleidomastoid strength. Tongue is midline. Hearing is intact bilaterally. Motor:  5/5 strength in upper and lower proximal and distal muscles. Normal bulk and tone. Reflexes:   Deep tendon reflexes 1+/4 and symmetrical.   Sensory:   Normal to light touch in all 4 ext    Gait:  Not tested    Tremor:   No tremor noted. Cerebellar:  No cerebellar signs present. Babinski:      Down b/l     Assessment and Plan: Ms. Daniella Duff is a [de-identified]year old woman who comes in for left hemispheric strokes with right sided weakness which is resolved.  Artery to artery embolization is a possible cause. Will do CTa neck to confirm stenosis. May need vascular surgery consult after this. - mri brain w/o cont: left ica strokes   - CUS: 50-69% stenosis of left ICA and TTE: Ef 40-45%, dilated left atrium   - Hga1c: > 12 and lipid panel: pending   - atorvastatin 80mg, hold Asa due to recent history of gI bleed  - cardiac monitoring  - BP goal < 150/90.  Lisinopril held until creatinine repeated.   -pt/ot

## 2021-05-06 NOTE — PROGRESS NOTES
SPEECH LANGUAGE PATHOLOGY DYSPHAGIA TREATMENT  Patient: Sloan Dale [de-identified][de-identified] y.o. female)  Date: 5/6/2021  Diagnosis: Right sided weakness [R53.1]  [facility] [I63.9] <principal problem not specified>       Precautions: aspiration      ASSESSMENT:  Patient denies any difficulty w/ current diet of regular, thin. She denies any changes in speech, language or cognition. Patient was able to carry on a complex conversation of her medical status. There were minor episodes where she paused for word-finding, but nothing significant. No aphasia, or dysarthria present. Administered thin liquids via water bottle. She ingested w/ single sips and consecutive swallows. Swallow initiation timely, HLE and protraction adequate to palpation. She masticated a cracker slowly and w/ adequate bolus cohesion. No significant oral residue after the swallow. No pharyngeal difficulty. PLAN:  Recommendations and Planned Interventions:  Continue w/ regular diet and thin liquids. No further acute ST needs warranted at this time. Patient may benefit from further cognitive-linguistic evaluation in the inpatient setting. SUBJECTIVE:   Patient alert and seen at bedside. She stated that they confirmed her stroke on the right side. MRI results reviewed. OBJECTIVE:     CXR Results  (Last 48 hours)      None          CT Results  (Last 48 hours)      None           Cognitive and Communication Status:  Neurologic State: Alert  Orientation Level: Oriented to person, Oriented to place, Oriented to time  Cognition: Appropriate decision making, Follows commands        Pain:  Pain Scale 1: FACES  Pain Intensity 1: 0       After treatment:   Patient left in no apparent distress in bed and Call bell within reach    COMMUNICATION/EDUCATION:   Patient was educated regarding her deficit(s) of dysphagia, swallow safety precautions, diet recs and POC.   She demonstrated Good understanding as evidenced by verbal neil Razo, SLP M.SDk CCC-SLP  Time Calculation: 10 mins

## 2021-05-06 NOTE — PROGRESS NOTES
OCCUPATIONAL THERAPY TREATMENT  Patient: Philis Najjar (45 y.o. female)  Date: 5/6/2021  Diagnosis: Right sided weakness [R53.1]  [facility] [I63.[facility]] <principal problem not specified>       Precautions:    Chart, occupational therapy assessment, plan of care, and goals were reviewed. ASSESSMENT  Patient continues with skilled OT services and is progressing towards goals. Upon BENNETT arrival, pt semi sitting in chair with CNA in room. Pt agreeable tx session. Pt completed donning of mesh underwear with setup assistance, pt completed sit>stand with CGA for pulling up of underwear. Pt completed donning/doffing of socks with setup assistance. Pt ambulated to bathroom with CGA, completed standing oral hygiene with Griffin (due to needed verbal cues) and face washing with setup assistance. Pt returned to chair, completed seated therex (see chart below). Pt demonstrated decreased fine and gross motor coordination with RUE at this time during UE therex. Pt left sitting in chair with breakfast set in front of her, call bell within reach, new pure wick in place, and answering hospital phone. Will continue to follow pt throughout rest of stay to progress towards OT goals. Recommending IRF at discharge. Current Level of Function Impacting Discharge (ADLs): setup assist LE dressing, setup/Griffin grooming at sink    Other factors to consider for discharge: time since onset, severity of deficits         PLAN :  Patient continues to benefit from skilled intervention to address the above impairments. Continue treatment per established plan of care. to address goals.     Recommend next OT session: self care training, functional mobility training, therapeutic exercise, balance training, therapeutic activities, endurance activities, patient education, home safety training and family training/education    Recommendation for discharge: (in order for the patient to meet his/her long term goals)  IRF    This discharge recommendation:  Has been made in collaboration with the attending provider and/or case management    IF patient discharges home will need the following DME: tbd       SUBJECTIVE:   Patient stated I would like to sit up and eat my breakfast.    OBJECTIVE DATA SUMMARY:   Cognitive/Behavioral Status:  Neurologic State: Alert     Cognition: Appropriate decision making; Follows commands      Functional Mobility and Transfers for ADLs:  Bed Mobility:  Scooting: Stand-by assistance    Transfers:  Sit to Stand: Contact guard assistance    Balance:  Sitting: Intact; With support  Standing: Impaired; With support  Standing - Static: Good;Constant support  Standing - Dynamic : Fair;Constant support    ADL Intervention:    Grooming  Position Performed: Standing  Washing Face: Set-up  Brushing Teeth: Minimum assistance    Lower Body Dressing Assistance  Underpants: Set-up  Socks: Set-up    Therapeutic Exercises:   Exercise Sets Reps AROM AAROM PROM Self PROM Comments   Shoulder flex/ext 1 10 [x] [] [] []    Cross-over shoulder touches 2 10 [x] [] [] []    Nose touches/horizontal abduction/adduction 2 10 [x] [] [] []          Pain:  0/10    Activity Tolerance:   Fair  Please refer to the flowsheet for vital signs taken during this treatment. After treatment patient left in no apparent distress:   Sitting in chair and Call bell within reach    COMMUNICATION/COLLABORATION:   The patients plan of care was discussed with: Registered nurse.      DONALD Bustillos  Time Calculation: 25 mins    Problem: Self Care Deficits Care Plan (Adult)  Goal: *Acute Goals and Plan of Care (Insert Text)  Description: Pt will be mod I sup <> sit in prep for EOB ADLs  Pt will be mod I grooming sitting EOB  Pt will be mod I LE dressing sitting EOB/long sit  Pt will be mod I sit <>  prep for toileting LRAD  Pt will be mod I toileting/toilet transfer/cloth mgmt LRAD  Pt will be I following UE HEP in prep for self care tasks     Outcome: Progressing Towards Goal

## 2021-05-06 NOTE — PROGRESS NOTES
Patient is being discharged to [facility] IRF to room 208; nurse can call report to [phone]. [facility] stated they would reserve a bed for patient to admit at 1800. CM spoke to patient donald Lee [phone] and he was fine with discharge. CM attempted to review IMM with patient; patient was off the unit.     CM reviewed IMM with donald Lee

## 2021-05-06 NOTE — ROUTINE PROCESS
[facility] BEDSIDE_[facility]_shift change report given to Tara Elena RN (oncoming nurse) by Jeffrey Victor RN (offgoing nurse).  Report included the following information from [employer]

## 2021-05-06 NOTE — CONSULTS
Nephrology Consult    Patient: Guerda Hayward MRN: [id number]  SSN: [id number]    YOB: 1940  Age: [de-identified] y.o. Sex: female      Subjective:   Reason for the consultation. Elevated serum creatinine and hypokalemia. Patient is 52-year-old man with underlying history of hypertension, diabetes mellitus type 2 was admitted with right-sided weakness, normotensive, afebrile, no leukocytosis, CT head no acute process, elevated serum creatinine and hypokalemia.     Past Medical History:   Diagnosis Date    Colon polyps     Diabetes ([address]     Diverticulosis     Hypertension      Past Surgical History:   Procedure Laterality Date    COLONOSCOPY N/A 4/29/2021    COLONOSCOPY performed by Jeremy Salguero MD at [facility] ENDOSCOPY      Family History   Problem Relation Age of Onset    Diabetes Other      Social History     Tobacco Use    Smoking status: Never Smoker    Smokeless tobacco: Never Used   Substance Use Topics    Alcohol use: Not Currently      Current Facility-Administered Medications   Medication Dose Route Frequency Provider Last Rate Last Admin    lisinopriL (PRINIVIL, ZESTRIL) tablet 20 mg  20 mg Oral DAILY Kathie Guy MD   Stopped at 05/06/21 0900    verapamil ER (VERELAN) capsule 240 mg  240 mg Oral DAILY Mj Francisco MD   240 mg at 05/06/21 1003    insulin glargine (LANTUS) injection 5 Units  5 Units SubCUTAneous QHS Michelle Hendrix MD        insulin lispro (HUMALOG) injection 3 Units  3 Units SubCUTAneous Michelle Cruz MD   3 Units at 05/06/21 4066    atorvastatin (LIPITOR) tablet 80 mg  80 mg Oral DAILY Mj Francisco MD   80 mg at 05/06/21 8236    potassium chloride (KLOR-CON) packet for solution 40 mEq  40 mEq Oral Q2H Tri Resendiz MD        glucose chewable tablet 16 g  4 Tab Oral PRN Jaqui Urbina MD        dextrose (D50W) injection syrg 12.5-25 g  25-50 mL IntraVENous PRN Jaqui Urbina MD        glucagon (GLUCAGEN) injection 1 mg  1 mg IntraMUSCular PRN Perez Patten MD        insulin lispro (HUMALOG) injection   SubCUTAneous AC&HS Perez Patten MD   3 Units at 05/06/21 8676    hydrALAZINE (APRESOLINE) tablet 25 mg  25 mg Oral Q8H PRN Perez Patten MD        acetaminophen (TYLENOL) tablet 650 mg  650 mg Oral Q4H PRN Perez Patten MD   650 mg at 05/06/21 1002    Or    acetaminophen (TYLENOL) solution 650 mg  650 mg Per NG tube Q4H PRN Perez Patten MD        Or   [facility] acetaminophen (TYLENOL) suppository 650 mg  650 mg Rectal Q4H PRN Perez Patten MD            Allergies   Allergen Reactions    Penicillins Other (comments)     Pt states it makes her pass out       Review of Systems:  A comprehensive review of systems was negative except for that written in the History of Present Illness. Objective:     Vitals:    05/06/21 0000 05/06/21 0250 05/06/21 0709 05/06/21 0800   BP:  (!) 172/85 136/70    Pulse: 95 89 90 (!) 103   Resp:  16     Temp:  98.4 °F (36.9 °C) 97.8 °F (36.6 °C)    SpO2:  98%     Weight:       Height:            Physical Exam:  General: NAD  Eyes: sclera anicteric  Oral Cavity: No thrush or ulcers  Neck: no JVD  Chest: Fair bilateral air entry  Heart: normal sounds  Abdomen: soft and non tender   : no stein  Lower Extremities: no edema  Skin: no rash  Neuro: intact  Psychiatric: non-depressed            Assessment:     Hospital Problems  Never Reviewed          Codes Class Noted POA    [facility]A [facility]cere[facility]) [facility] ICD-10-CM: I63.9  ICD-9-CM: 434.91  5/6/2021 Unknown        Right sided weakness ICD-10-CM: R53.1  ICD-9-CM: 728.87  5/4/2021 Unknown              Plan:     #1 acute kidney injury.    -Secondary to prerenal.    -Creatinine was elevated at 1.3 and has improved. -No indication for IV fluids.    -She looks euvolemic. 2.  Severe hypokalemia.    -Potassium 3.1.    -Mag is 1.8.    -Creatinine 0.8.    -No loose stools.    - Will give p.o. potassium 80 M EQ.    3.  Right-sided weakness. -CT head no acute process. Evaluated by neurology. 4.HTN:  -BP at goal  -on lisinopril 20 mg daily  Thank you for the consultation.     Signed By: Alberto Diallo MD     May 6, 2021

## 2021-05-06 NOTE — DISCHARGE SUMMARY
Hospitalist Discharge Summary     Patient ID:  Matthew Carter  [id number]  69 y.o.  1940 5/4/2021    PCP on record: Jcarlos Cabrales MD    Admit date: 5/4/2021  Discharge date and time: 5/6/2021    DISCHARGE DIAGNOSIS:    Cerebrovascular accident/hyperglycemia type 2 diabetes/hypertension/new onset heart failure with reduced ejection fraction/elevated serum troponin/acute kidney injury    CONSULTATIONS:  IP CONSULT TO NEUROLOGY  IP CONSULT TO NEPHROLOGY  IP CONSULT TO CARDIOLOGY    Excerpted HPI from H&P of Wilmar Finch MD:  Matthew Carter is an [de-identified] y.o. female with past medical history of non-insulin-dependent diabetes mellitus type 2 and hypertension presenting to the ER with complaints of right-sided weakness. Ms. Dillon Osorio was discharged from [facility] on May 1 after treatment for GI bleed. Ms. Dillon Osorio had colonoscopy with multiple polyps removed. Since being home, Ms. Dillon Osorio has had right sided weakness and difficulty with ADLs. Son, Calri, has had to assist her with all daily activities. There has been no recent fever, chills, nausea, vomiting, diarrhea, abdominal pain, chest pain, palpitations, shortness of breath or cough. There have been no known sick contacts. Earlier today, son called primary care physician who recommended patient come to the ER out of concern for possible stroke.     Ms. Dillon Osorio arrived to the ER with a temperature of 98.4 °F, blood pressure 147/77, pulse 84, respirations 18 and oxygen saturation 99% on room air. EKG has sinus rhythm with T wave inversions. T wave inversions are on previous EKGs (not acute findings). This evening, QT is prolonged. CT of the head has no evidence of acute intracranial abnormalities. Chest x-ray and pelvic x-ray have no evidence of injuries or acute process. Initial troponin is 0.09 and 2-hour troponin is 0.09. Creatinine is mildly elevated with a value of 1.33.   Urine analysis has no evidence of acute infection and blood glucose level is 366. Hospital service has been asked to admit for further treatment and evaluation.     I called son, Curtistine Siemens, this evening to obtain information on recent history. Curtistine Siemens can be reached at [phone]. Past medical history, past surgical history, family history, social history and home medication list was reviewed at the time of admission. Ms. Kiel Lau is a full code. She denies current tobacco, alcohol or illicit drug use.    ______________________________________________________________________  DISCHARGE SUMMARY/HOSPITAL COURSE:  for full details see H&P, daily progress notes, labs, consult notes. Patient was subsequently admitted to [address] [place] [address] for further evaluation as well as management, patient received an MRI brain that was consistent with a cerebrovascular accident, of note patient was started on high-dose Lipitor however aspirin was held secondary to recent GI bleed, patient's hypertensive medications were optimized, of note patient's echocardiogram showed heart failure, cardiology was consulted, patient's medications were optimized following which patient was deemed stable for discharge to rehab facility with close outpatient follow-up with neurology as well as cardiology. _______________________________________________________________________  Patient seen and examined by me on discharge day.   Pertinent Findings:  Gen:    Not in distress  Chest: Clear lungs  CVS:   Regular rhythm, s1/s2 no m/r/g  No edema  Abd:  Soft, not distended, not tender  Neuro:  Alert, Oriented x 4  _______________________________________________________________________  DISCHARGE MEDICATIONS:   Current Discharge Medication List      START taking these medications    Details   acetaminophen (TYLENOL) 325 mg tablet Take 2 Tabs by mouth every four (4) hours as needed for Pain or Fever (For temp greater than or equal to 38.5 C or 101.3 F (Unless hepatic failure or contrindicated). Give first line for fever. ). Qty: 60 Tab, Refills: 0      atorvastatin (LIPITOR) 80 mg tablet Take 1 Tab by mouth daily. Qty: 30 Tab, Refills: 0      !! insulin lispro (HUMALOG) 100 unit/mL injection INITIATE CORRECTIVE INSULIN PROTOCOL (MARY):  High Sensitivity (thin, ESRD):    AC (before meals), Q6H, and Q4H CORRECTIONAL SCALE only For Blood Sugar (mg/dl) of :             140-199=0 units            200-249=2 units  250-299=3 units  300-349=4 units  350 or greater = Call MD  Give in addition to basal medications. Do Not Hold for NPO    BEDTIME CORRECTIONAL sliding scale when scheduled:  200-249=1 units  250-349=2 units  350 or greater = Call MD  Give in addition to basal medications. Do Not Hold for NPO Fast Acting - Administer Immediately - or within 15 minutes of start of meal, if mealtime coverage. Qty: 1 Vial, Refills: 0      lisinopriL (PRINIVIL, ZESTRIL) 20 mg tablet Take 1 Tab by mouth daily. Qty: 30 Tab, Refills: 0      insulin glargine (LANTUS) 100 unit/mL injection Take 5 units daily at bedtime  Qty: 1 Vial, Refills: 0      !! insulin lispro (HUMALOG) 100 unit/mL injection Take 3 units tid  Qty: 1 Vial, Refills: 0       !! - Potential duplicate medications found. Please discuss with provider. CONTINUE these medications which have NOT CHANGED    Details   verapamil ER (VERELAN) 240 mg ER capsule Take 240 mg by mouth daily. glimepiride (AMARYL) 4 mg tablet Take 4 mg by mouth every morning. Patient Follow Up Instructions: Activity: PT/OT Eval and Treat  Diet: Cardiac Diet  Wound Care: As directed    Follow-up with PCP/Neurology in 2 weeks.   Follow-up tests/labs As per above physicians  Follow-up Information     Follow up With Specialties Details Why Contact Info    Massiel Dimas MD Internal Medicine In 1 week  Adelaida Gonzales 1997 [address] [facility] [place]      Alessandra West MD Cardiology, Cardio Vascular Surgery In 1 week  [address] [address] [phone]      Garnell Bloch, MD Neurology In 1 week  [address] [facility] Shyann Da Jorge [address] [place] [address]  [phone]          ________________________________________________________________    Risk of deterioration: Low    Condition at Discharge:  Stable  __________________________________________________________________    Disposition  IP Rehab    ____________________________________________________________________    Code Status: Full Code  ___________________________________________________________________      Total time in minutes spent coordinating this discharge (includes going over instructions, follow-up, prescriptions, and preparing report for sign off to her PCP) :  50 minutes    Signed:   Ren Chin MD

## 2021-05-06 NOTE — PROGRESS NOTES
Per Dr. Catia Guzman patient patient labwork ok to continue with CT angiogram of neck. [facility] pending results, card and neuro clearance.

## 2021-05-06 NOTE — CONSULTS
Consult    Patient: Salazar Zambrano MRN: [id number]  SSN: [id number]    YOB: 1940  Age: [de-identified] y.o. Sex: female      Subjective:      Salazar Zambrano is a [de-identified] y.o. female who is being seen for abnormal cardiac enzyme and CHF. Patient with history of diabetes mellitus, hypertension who was recently discharged from [facility] on 1 May after she presented there with bloody stool. She was taking Aleve to 20 mg almost on every day basis for leg cramps and leg pain. Patient had colonoscopy with multiple polyps removed. She had right-sided weakness and difficulties with the ADLs. Denied having any chest pain, shortness of breath, lower extremity swelling, recent change in her weight, but she complains of some abdominal pain, right-sided. She is non-smoker. She denied illicit drug use.       Past Medical History:   Diagnosis Date    Colon polyps     Diabetes ([address]     Diverticulosis     Hypertension      Past Surgical History:   Procedure Laterality Date    COLONOSCOPY N/A 4/29/2021    COLONOSCOPY performed by Felicia Medrano MD at [facility] ENDOSCOPY      Family History   Problem Relation Age of Onset    Diabetes Other      Social History     Tobacco Use    Smoking status: Never Smoker    Smokeless tobacco: Never Used   Substance Use Topics    Alcohol use: Not Currently      Current Facility-Administered Medications   Medication Dose Route Frequency Provider Last Rate Last Admin    lisinopriL (PRINIVIL, ZESTRIL) tablet 20 mg  20 mg Oral DAILY Dori Quna MD   Stopped at 05/06/21 0900    verapamil ER (VERELAN) capsule 240 mg  240 mg Oral DAILY Gema Taylor MD   240 mg at 05/06/21 1003    insulin glargine (LANTUS) injection 5 Units  5 Units SubCUTAneous [facility] Niko Hendrix MD        insulin lispro (HUMALOG) injection 3 Units  3 Units SubCUTAneous Renard Opitz, Tiburcio Distel, MD   3 Units at 05/06/21 1240    atorvastatin (LIPITOR) tablet 80 mg  80 mg Oral DAILY Corinne Loyal, MD   80 mg at 05/06/21 1552    potassium chloride (KLOR-CON) packet for solution 40 mEq  40 mEq Oral Doug Ponce MD   40 mEq at 05/06/21 1239    glucose chewable tablet 16 g  4 Tab Oral PRN April Mccollum MD        dextrose (D50W) injection syrg 12.5-25 g  25-50 mL IntraVENous PRN April Mccollum MD        glucagon (GLUCAGEN) injection 1 mg  1 mg IntraMUSCular PRN April Mccollum MD        insulin lispro (HUMALOG) injection   SubCUTAneous AC&HS April Mccollum MD   5 Units at 05/06/21 1239    hydrALAZINE (APRESOLINE) tablet 25 mg  25 mg Oral Q8H PRN April Mccollum MD        acetaminophen (TYLENOL) tablet 650 mg  650 mg Oral Q4H PRN April Mccollum MD   650 mg at 05/06/21 1002    Or    acetaminophen (TYLENOL) solution 650 mg  650 mg Per NG tube Q4H PRN April Mccollum MD        Or   [facility] acetaminophen (TYLENOL) suppository 650 mg  650 mg Rectal Q4H PRN April Mccollum MD            Allergies   Allergen Reactions    Penicillins Other (comments)     Pt states it makes her pass out       Review of Systems:  A comprehensive review of systems was negative except for that written in the History of Present Illness. Objective:     Vitals:    05/06/21 0000 05/06/21 0250 05/06/21 0709 05/06/21 0800   BP:  (!) 172/85 136/70    Pulse: 95 89 90 (!) 103   Resp:  16     Temp:  98.4 °F (36.9 °C) 97.8 °F (36.6 °C)    SpO2:  98%     Weight:       Height:            Physical Exam:  General:  Alert, cooperative, no distress, appears stated age. Eyes:  Conjunctivae/corneas clear. PERRL, EOMs intact. Fundi benign   Ears:  Normal TMs and external ear canals both ears. Nose: Nares normal. Septum midline. Mucosa normal. No drainage or sinus tenderness. Mouth/Throat: Lips, mucosa, and tongue normal. Teeth and gums normal.   Neck: Supple, symmetrical, trachea midline, no adenopathy, thyroid: no enlargment/tenderness/nodules, no carotid bruit and no JVD.    Back:   Symmetric, no curvature. ROM normal. No CVA tenderness. Lungs:   Clear to auscultation bilaterally. Heart:  Regular rate and rhythm, S1, S2 normal, no murmur, click, rub or gallop. Abdomen:   Soft, non-tender. Bowel sounds normal. No masses,  No organomegaly. Extremities: . Right-sided weakness   Pulses: 2+ and symmetric all extremities. Skin: Skin color, texture, turgor normal. No rashes or lesions   Lymph nodes: Cervical, supraclavicular, and axillary nodes normal.   Neurologic: CNII-XII intact. Normal strength, sensation and reflexes throughout. Assessment:     Hospital Problems  Never Reviewed          Codes Class Noted POA    CVA (cerebral vascular accident) [facility]) ICD-10-CM: I63.9  ICD-9-CM: 434.91  5/6/2021 Unknown        Right sided weakness ICD-10-CM: R53.1  ICD-9-CM: 728.87  5/4/2021 Unknown            Patient is an 71-year-old -American female with:  1.  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 Right occipital lobe encephalomalacia  3. Peripheral vascular disease, left ICA 50 to 69%, right ICA less than 50% stenosis  4. 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 Hypertension  6. Mixed hyperlipidemia  7. Acute kidney injury  8. Heart failure with reduced ejection fraction, compensated  9. Mild to moderate tricuspid regurgitation  10. Mild mitral regurgitation  11. Negative bubble study  12. Colonic polyps, tubular adenoma  1 13. Recent history of GI bleed  14. Right-sided weakness  15. Diabetes mellitus  Plan:     I personally reviewed echocardiogram that showed EF of 40 to 45% with grade 1 diastolic function, moderate LVH with aortic valve calcification without stenosis, mild to moderate TR, RVSP 32 mmHg with mild mitral regurgitation. Bubble study was negative. EKG showed sinus rhythm, PAC, LVH with nonspecific ST-T wave changes in the inferolateral leads.   Recent CT abdomen showed extensive underlying atherosclerotic cardiovascular disease, possible stenosis of the celiac artery and both renal arteries. Diverticulosis without diverticulitis. Hemoglobin of 9.6, platelet 673. Troponin were in the indeterminate range. The BNP was elevated. Creatinine has improved from 1.3-0.8. BUN is 15, bicarb 24, potassium 3.1. She had a recent colonoscopy that showed tubular adenoma in the ascending and descending and transverse colon. MRI of the brain showed multiple acute ischemic infarct of the left centrum semiovale, left body/splenium of the corpus callosum, right occipital lobe encephalomalacia. Carotid duplex showed less than 50% stenosis of the right ICA, moderate stenosis of left ICA in the 50 to 69% range. Flow in the vertebral arteries were antegrade. Recommendations:  1. Telemetry showed sinus rhythm without atrial fibrillation. 2.  Currently on atorvastatin 80 mg daily  3. Currently on verapamil, lisinopril. She will need a close monitor of kidney function. 4.  Aspirin is on hold due to recent history of GI bleed. Once okay with primary team then consider resuming. Thank you  For involving me in Mrs. Mahan [facility]. I will follow.   Signed By: Owen Todd MD     May 6, 2021

## 2021-05-07 ENCOUNTER — HOSPITAL ENCOUNTER (OUTPATIENT)
Dept: LAB | Age: 81
Discharge: HOME | End: 2021-05-07

## 2021-05-07 LAB
ALBUMIN SERPL-MCNC: 2.6 G/DL (ref 3.5–5)
ALBUMIN/GLOB SERPL: 0.7 {RATIO} (ref 1.1–2.2)
ALP SERPL-CCNC: 122 U/L (ref 45–117)
ALT SERPL-CCNC: 14 U/L (ref 12–78)
ANION GAP SERPL CALC-SCNC: 9 MMOL/L (ref 5–15)
AST SERPL W P-5'-P-CCNC: 6 U/L (ref 15–37)
BASOPHILS # BLD: 0 K/UL (ref 0–0.1)
BASOPHILS NFR BLD: 1 % (ref 0–1)
BILIRUB SERPL-MCNC: 0.5 MG/DL (ref 0.2–1)
BUN SERPL-MCNC: 16 MG/DL (ref 6–20)
BUN/CREAT SERPL: 18 (ref 12–20)
CA-I BLD-MCNC: 8.9 MG/DL (ref 8.5–10.1)
CHLORIDE SERPL-SCNC: 107 MMOL/L (ref 97–108)
CHOLEST SERPL-MCNC: 147 MG/DL
CO2 SERPL-SCNC: 24 MMOL/L (ref 21–32)
CREAT SERPL-MCNC: 0.88 MG/DL (ref 0.55–1.02)
DIFFERENTIAL METHOD BLD: ABNORMAL
EOSINOPHIL # BLD: 0.3 K/UL (ref 0–0.4)
EOSINOPHIL NFR BLD: 5 % (ref 0–7)
ERYTHROCYTE [DISTWIDTH] IN BLOOD BY AUTOMATED COUNT: 13.6 % (ref 11.5–14.5)
GLOBULIN SER CALC-MCNC: 3.8 G/DL (ref 2–4)
GLUCOSE SERPL-MCNC: 154 MG/DL (ref 65–100)
HCT VFR BLD AUTO: 29.6 % (ref 35–47)
HDLC SERPL-MCNC: 46 MG/DL
HDLC SERPL: 3.2 {RATIO} (ref 0–5)
HGB BLD-MCNC: 9.6 G/DL (ref 11.5–16)
IMM GRANULOCYTES # BLD AUTO: 0 K/UL (ref 0–0.04)
IMM GRANULOCYTES NFR BLD AUTO: 0 % (ref 0–0.5)
LDLC SERPL CALC-MCNC: 86.2 MG/DL (ref 0–100)
LIPID PROFILE,FLP: NORMAL
LYMPHOCYTES # BLD: 1.1 K/UL (ref 0.8–3.5)
LYMPHOCYTES NFR BLD: 21 % (ref 12–49)
MCH RBC QN AUTO: 27.8 PG (ref 26–34)
MCHC RBC AUTO-ENTMCNC: 32.4 G/DL (ref 30–36.5)
MCV RBC AUTO: 85.8 FL (ref 80–99)
MONOCYTES # BLD: 0.7 K/UL (ref 0–1)
MONOCYTES NFR BLD: 12 % (ref 5–13)
NEUTS SEG # BLD: 3.4 K/UL (ref 1.8–8)
NEUTS SEG NFR BLD: 61 % (ref 32–75)
NRBC # BLD: 0 K/UL (ref 0–0.01)
NRBC BLD-RTO: 0 PER 100 WBC
PLATELET # BLD AUTO: 206 K/UL (ref 150–400)
PMV BLD AUTO: 12.7 FL (ref 8.9–12.9)
POTASSIUM SERPL-SCNC: 3.5 MMOL/L (ref 3.5–5.1)
PROT SERPL-MCNC: 6.4 G/DL (ref 6.4–8.2)
RBC # BLD AUTO: 3.45 M/UL (ref 3.8–5.2)
SODIUM SERPL-SCNC: 140 MMOL/L (ref 136–145)
TRIGL SERPL-MCNC: 74 MG/DL (ref ?–150)
VLDLC SERPL CALC-MCNC: 14.8 MG/DL
WBC # BLD AUTO: 5.5 K/UL (ref 3.6–11)

## 2021-05-07 PROCEDURE — 80053 COMPREHEN METABOLIC PANEL: CPT

## 2021-05-07 PROCEDURE — 85025 COMPLETE CBC W/AUTO DIFF WBC: CPT

## 2021-05-07 PROCEDURE — 36415 COLL VENOUS BLD VENIPUNCTURE: CPT

## 2021-05-07 NOTE — ROUTINE PROCESS
[address] [place] [address] neurology was called the results of CT scan of neck and cleared patient to go to [facility] with needs of seeing as outpatient vascular surgery Dr Alicia Junior. Called Dr Janis gilesing troponin 0.11 ok to still send patient to [facility] Called report to Wendy gustafson at [facility]  Report given. Patient stated its ok not to call her son he was here and knew she was going tonight.  
 
[id number] transport arrived to  patient,  IV discontiunued, telemetry taken off sent back. Discharge plan of care/case management plan validated with provider discharge order.

## 2021-05-10 ENCOUNTER — HOSPITAL ENCOUNTER (OUTPATIENT)
Dept: LAB | Age: 81
Discharge: HOME | End: 2021-05-10

## 2021-05-10 LAB
BASOPHILS # BLD: 0 K/UL (ref 0–0.1)
BASOPHILS NFR BLD: 0 % (ref 0–1)
DIFFERENTIAL METHOD BLD: ABNORMAL
EOSINOPHIL # BLD: 0.2 K/UL (ref 0–0.4)
EOSINOPHIL NFR BLD: 4 % (ref 0–7)
ERYTHROCYTE [DISTWIDTH] IN BLOOD BY AUTOMATED COUNT: 13.8 % (ref 11.5–14.5)
HCT VFR BLD AUTO: 28.7 % (ref 35–47)
HGB BLD-MCNC: 9.1 G/DL (ref 11.5–16)
IMM GRANULOCYTES # BLD AUTO: 0 K/UL (ref 0–0.04)
IMM GRANULOCYTES NFR BLD AUTO: 0 % (ref 0–0.5)
LYMPHOCYTES # BLD: 1.3 K/UL (ref 0.8–3.5)
LYMPHOCYTES NFR BLD: 22 % (ref 12–49)
MCH RBC QN AUTO: 27.7 PG (ref 26–34)
MCHC RBC AUTO-ENTMCNC: 31.7 G/DL (ref 30–36.5)
MCV RBC AUTO: 87.5 FL (ref 80–99)
MONOCYTES # BLD: 0.5 K/UL (ref 0–1)
MONOCYTES NFR BLD: 8 % (ref 5–13)
NEUTS SEG # BLD: 3.8 K/UL (ref 1.8–8)
NEUTS SEG NFR BLD: 66 % (ref 32–75)
NRBC # BLD: 0 K/UL (ref 0–0.01)
NRBC BLD-RTO: 0 PER 100 WBC
PLATELET # BLD AUTO: 180 K/UL (ref 150–400)
PMV BLD AUTO: 12.7 FL (ref 8.9–12.9)
RBC # BLD AUTO: 3.28 M/UL (ref 3.8–5.2)
WBC # BLD AUTO: 5.8 K/UL (ref 3.6–11)

## 2021-05-10 PROCEDURE — 85025 COMPLETE CBC W/AUTO DIFF WBC: CPT

## 2021-05-10 PROCEDURE — 36415 COLL VENOUS BLD VENIPUNCTURE: CPT

## 2021-05-14 ENCOUNTER — HOSPITAL ENCOUNTER (OUTPATIENT)
Dept: LAB | Age: 81
Discharge: HOME | End: 2021-05-14

## 2021-05-14 LAB
ANION GAP SERPL CALC-SCNC: 7 MMOL/L (ref 5–15)
BASOPHILS # BLD: 0 K/UL (ref 0–0.1)
BASOPHILS NFR BLD: 0 % (ref 0–1)
BUN SERPL-MCNC: 17 MG/DL (ref 6–20)
BUN/CREAT SERPL: 17 (ref 12–20)
CA-I BLD-MCNC: 8.7 MG/DL (ref 8.5–10.1)
CHLORIDE SERPL-SCNC: 109 MMOL/L (ref 97–108)
CO2 SERPL-SCNC: 23 MMOL/L (ref 21–32)
CREAT SERPL-MCNC: 0.98 MG/DL (ref 0.55–1.02)
DIFFERENTIAL METHOD BLD: ABNORMAL
EOSINOPHIL # BLD: 0.2 K/UL (ref 0–0.4)
EOSINOPHIL NFR BLD: 3 % (ref 0–7)
ERYTHROCYTE [DISTWIDTH] IN BLOOD BY AUTOMATED COUNT: 13.3 % (ref 11.5–14.5)
GLUCOSE SERPL-MCNC: 175 MG/DL (ref 65–100)
HCT VFR BLD AUTO: 31.9 % (ref 35–47)
HGB BLD-MCNC: 10 G/DL (ref 11.5–16)
IMM GRANULOCYTES # BLD AUTO: 0 K/UL (ref 0–0.04)
IMM GRANULOCYTES NFR BLD AUTO: 0 % (ref 0–0.5)
LYMPHOCYTES # BLD: 2.1 K/UL (ref 0.8–3.5)
LYMPHOCYTES NFR BLD: 31 % (ref 12–49)
MCH RBC QN AUTO: 27.8 PG (ref 26–34)
MCHC RBC AUTO-ENTMCNC: 31.3 G/DL (ref 30–36.5)
MCV RBC AUTO: 88.6 FL (ref 80–99)
MONOCYTES # BLD: 0.7 K/UL (ref 0–1)
MONOCYTES NFR BLD: 11 % (ref 5–13)
NEUTS SEG # BLD: 3.6 K/UL (ref 1.8–8)
NEUTS SEG NFR BLD: 55 % (ref 32–75)
NRBC # BLD: 0 K/UL (ref 0–0.01)
NRBC BLD-RTO: 0 PER 100 WBC
PLATELET # BLD AUTO: 203 K/UL (ref 150–400)
PMV BLD AUTO: 12.5 FL (ref 8.9–12.9)
POTASSIUM SERPL-SCNC: 3.8 MMOL/L (ref 3.5–5.1)
RBC # BLD AUTO: 3.6 M/UL (ref 3.8–5.2)
SODIUM SERPL-SCNC: 139 MMOL/L (ref 136–145)
WBC # BLD AUTO: 6.7 K/UL (ref 3.6–11)

## 2021-05-14 PROCEDURE — 80048 BASIC METABOLIC PNL TOTAL CA: CPT

## 2021-05-14 PROCEDURE — 85025 COMPLETE CBC W/AUTO DIFF WBC: CPT

## 2021-05-19 ENCOUNTER — HOSPITAL ENCOUNTER (OUTPATIENT)
Dept: LAB | Age: 81
Discharge: HOME | End: 2021-05-19

## 2021-05-19 LAB
ANION GAP SERPL CALC-SCNC: 6 MMOL/L (ref 5–15)
BASOPHILS # BLD: 0 K/UL (ref 0–0.1)
BASOPHILS NFR BLD: 0 % (ref 0–1)
BUN SERPL-MCNC: 21 MG/DL (ref 6–20)
BUN/CREAT SERPL: 28 (ref 12–20)
CA-I BLD-MCNC: 8.9 MG/DL (ref 8.5–10.1)
CHLORIDE SERPL-SCNC: 107 MMOL/L (ref 97–108)
CO2 SERPL-SCNC: 25 MMOL/L (ref 21–32)
CREAT SERPL-MCNC: 0.76 MG/DL (ref 0.55–1.02)
DIFFERENTIAL METHOD BLD: ABNORMAL
EOSINOPHIL # BLD: 0.2 K/UL (ref 0–0.4)
EOSINOPHIL NFR BLD: 3 % (ref 0–7)
ERYTHROCYTE [DISTWIDTH] IN BLOOD BY AUTOMATED COUNT: 12.9 % (ref 11.5–14.5)
GLUCOSE SERPL-MCNC: 144 MG/DL (ref 65–100)
HCT VFR BLD AUTO: 29 % (ref 35–47)
HGB BLD-MCNC: 9.3 G/DL (ref 11.5–16)
IMM GRANULOCYTES # BLD AUTO: 0 K/UL (ref 0–0.04)
IMM GRANULOCYTES NFR BLD AUTO: 0 % (ref 0–0.5)
LYMPHOCYTES # BLD: 1.5 K/UL (ref 0.8–3.5)
LYMPHOCYTES NFR BLD: 25 % (ref 12–49)
MCH RBC QN AUTO: 27.4 PG (ref 26–34)
MCHC RBC AUTO-ENTMCNC: 32.1 G/DL (ref 30–36.5)
MCV RBC AUTO: 85.3 FL (ref 80–99)
MONOCYTES # BLD: 0.5 K/UL (ref 0–1)
MONOCYTES NFR BLD: 9 % (ref 5–13)
NEUTS SEG # BLD: 3.8 K/UL (ref 1.8–8)
NEUTS SEG NFR BLD: 63 % (ref 32–75)
NRBC # BLD: 0 K/UL (ref 0–0.01)
NRBC BLD-RTO: 0 PER 100 WBC
PLATELET # BLD AUTO: 199 K/UL (ref 150–400)
PMV BLD AUTO: 12.5 FL (ref 8.9–12.9)
POTASSIUM SERPL-SCNC: 3.6 MMOL/L (ref 3.5–5.1)
RBC # BLD AUTO: 3.4 M/UL (ref 3.8–5.2)
SODIUM SERPL-SCNC: 138 MMOL/L (ref 136–145)
WBC # BLD AUTO: 6.1 K/UL (ref 3.6–11)

## 2021-05-19 PROCEDURE — 85025 COMPLETE CBC W/AUTO DIFF WBC: CPT

## 2021-05-19 PROCEDURE — 80048 BASIC METABOLIC PNL TOTAL CA: CPT

## 2021-07-04 ENCOUNTER — HOSPITAL ENCOUNTER (INPATIENT)
Age: 81
LOS: 4 days | Discharge: HOME | DRG: 545 | End: 2021-07-08
Attending: STUDENT IN AN ORGANIZED HEALTH CARE EDUCATION/TRAINING PROGRAM | Admitting: INTERNAL MEDICINE
Payer: MEDICARE

## 2021-07-04 ENCOUNTER — APPOINTMENT (OUTPATIENT)
Dept: CT IMAGING | Age: 81
DRG: 545 | End: 2021-07-04
Attending: STUDENT IN AN ORGANIZED HEALTH CARE EDUCATION/TRAINING PROGRAM
Payer: MEDICARE

## 2021-07-04 DIAGNOSIS — R10.13 EPIGASTRIC PAIN: ICD-10-CM

## 2021-07-04 DIAGNOSIS — K85.80 OTHER ACUTE PANCREATITIS, UNSPECIFIED COMPLICATION STATUS: Primary | ICD-10-CM

## 2021-07-04 LAB
ALBUMIN SERPL-MCNC: 2.4 G/DL (ref 3.5–5)
ALBUMIN/GLOB SERPL: 0.5 {RATIO} (ref 1.1–2.2)
ALP SERPL-CCNC: 114 U/L (ref 45–117)
ALT SERPL-CCNC: 11 U/L (ref 12–78)
ANION GAP SERPL CALC-SCNC: 5 MMOL/L (ref 5–15)
APPEARANCE UR: CLEAR
AST SERPL W P-5'-P-CCNC: 13 U/L (ref 15–37)
ATRIAL RATE: 95 BPM
BACTERIA URNS QL MICRO: NEGATIVE /HPF
BASOPHILS # BLD: 0 K/UL (ref 0–0.1)
BASOPHILS NFR BLD: 0 % (ref 0–1)
BILIRUB DIRECT SERPL-MCNC: 0.1 MG/DL (ref 0–0.2)
BILIRUB SERPL-MCNC: 0.7 MG/DL (ref 0.2–1)
BILIRUB UR QL: NEGATIVE
BUN SERPL-MCNC: 15 MG/DL (ref 6–20)
BUN/CREAT SERPL: 17 (ref 12–20)
CA-I BLD-MCNC: 8.5 MG/DL (ref 8.5–10.1)
CALCULATED P AXIS, ECG09: 47 DEGREES
CALCULATED R AXIS, ECG10: -18 DEGREES
CALCULATED T AXIS, ECG11: -58 DEGREES
CHLORIDE SERPL-SCNC: 103 MMOL/L (ref 97–108)
CO2 SERPL-SCNC: 24 MMOL/L (ref 21–32)
COLOR UR: YELLOW
CREAT SERPL-MCNC: 0.86 MG/DL (ref 0.55–1.02)
DIAGNOSIS, 93000: NORMAL
DIFFERENTIAL METHOD BLD: ABNORMAL
EOSINOPHIL # BLD: 0.1 K/UL (ref 0–0.4)
EOSINOPHIL NFR BLD: 1 % (ref 0–7)
ERYTHROCYTE [DISTWIDTH] IN BLOOD BY AUTOMATED COUNT: 13.6 % (ref 11.5–14.5)
GLOBULIN SER CALC-MCNC: 4.4 G/DL (ref 2–4)
GLUCOSE BLD STRIP.AUTO-MCNC: 160 MG/DL (ref 65–117)
GLUCOSE SERPL-MCNC: 230 MG/DL (ref 65–100)
GLUCOSE UR STRIP.AUTO-MCNC: 14 MG/DL
HCT VFR BLD AUTO: 35.5 % (ref 35–47)
HGB BLD-MCNC: 11.4 G/DL (ref 11.5–16)
HGB UR QL STRIP: NEGATIVE
IMM GRANULOCYTES # BLD AUTO: 0 K/UL (ref 0–0.04)
IMM GRANULOCYTES NFR BLD AUTO: 0 % (ref 0–0.5)
KETONES UR QL STRIP.AUTO: NEGATIVE MG/DL
LACTATE SERPL-SCNC: 1.2 MMOL/L (ref 0.4–2)
LEUKOCYTE ESTERASE UR QL STRIP.AUTO: NEGATIVE
LIPASE SERPL-CCNC: 206 U/L (ref 73–393)
LYMPHOCYTES # BLD: 1.2 K/UL (ref 0.8–3.5)
LYMPHOCYTES NFR BLD: 12 % (ref 12–49)
MCH RBC QN AUTO: 26.3 PG (ref 26–34)
MCHC RBC AUTO-ENTMCNC: 32.1 G/DL (ref 30–36.5)
MCV RBC AUTO: 81.8 FL (ref 80–99)
MONOCYTES # BLD: 1.2 K/UL (ref 0–1)
MONOCYTES NFR BLD: 12 % (ref 5–13)
MUCOUS THREADS URNS QL MICRO: ABNORMAL /LPF
NEUTS SEG # BLD: 7.8 K/UL (ref 1.8–8)
NEUTS SEG NFR BLD: 75 % (ref 32–75)
NITRITE UR QL STRIP.AUTO: NEGATIVE
NRBC # BLD: 0 K/UL (ref 0–0.01)
NRBC BLD-RTO: 0 PER 100 WBC
P-R INTERVAL, ECG05: 150 MS
PERFORMED BY, TECHID: ABNORMAL
PH UR STRIP: 7 [PH] (ref 5–8)
PLATELET # BLD AUTO: 193 K/UL (ref 150–400)
PMV BLD AUTO: 12.9 FL (ref 8.9–12.9)
POTASSIUM SERPL-SCNC: 4.5 MMOL/L (ref 3.5–5.1)
PROT SERPL-MCNC: 6.8 G/DL (ref 6.4–8.2)
PROT UR STRIP-MCNC: NEGATIVE MG/DL
Q-T INTERVAL, ECG07: 406 MS
QRS DURATION, ECG06: 90 MS
QTC CALCULATION (BEZET), ECG08: 510 MS
RBC # BLD AUTO: 4.34 M/UL (ref 3.8–5.2)
RBC #/AREA URNS HPF: ABNORMAL /HPF (ref 0–5)
SODIUM SERPL-SCNC: 132 MMOL/L (ref 136–145)
SP GR UR REFRACTOMETRY: 1 (ref 1–1.03)
UROBILINOGEN UR QL STRIP.AUTO: NORMAL EU/DL (ref 0.1–1)
VENTRICULAR RATE, ECG03: 95 BPM
WBC # BLD AUTO: 10.3 K/UL (ref 3.6–11)
WBC URNS QL MICRO: ABNORMAL /HPF (ref 0–4)

## 2021-07-04 PROCEDURE — 80048 BASIC METABOLIC PNL TOTAL CA: CPT

## 2021-07-04 PROCEDURE — 85025 COMPLETE CBC W/AUTO DIFF WBC: CPT

## 2021-07-04 PROCEDURE — 65270000029 HC RM PRIVATE

## 2021-07-04 PROCEDURE — 36415 COLL VENOUS BLD VENIPUNCTURE: CPT

## 2021-07-04 PROCEDURE — 96375 TX/PRO/DX INJ NEW DRUG ADDON: CPT

## 2021-07-04 PROCEDURE — 93005 ELECTROCARDIOGRAM TRACING: CPT

## 2021-07-04 PROCEDURE — 96374 THER/PROPH/DIAG INJ IV PUSH: CPT

## 2021-07-04 PROCEDURE — 74177 CT ABD & PELVIS W/CONTRAST: CPT

## 2021-07-04 PROCEDURE — 99284 EMERGENCY DEPT VISIT MOD MDM: CPT

## 2021-07-04 PROCEDURE — 74011000636 HC RX REV CODE- 636: Performed by: STUDENT IN AN ORGANIZED HEALTH CARE EDUCATION/TRAINING PROGRAM

## 2021-07-04 PROCEDURE — 74011250636 HC RX REV CODE- 250/636: Performed by: STUDENT IN AN ORGANIZED HEALTH CARE EDUCATION/TRAINING PROGRAM

## 2021-07-04 PROCEDURE — 74011636637 HC RX REV CODE- 636/637: Performed by: STUDENT IN AN ORGANIZED HEALTH CARE EDUCATION/TRAINING PROGRAM

## 2021-07-04 PROCEDURE — 83605 ASSAY OF LACTIC ACID: CPT

## 2021-07-04 PROCEDURE — 83690 ASSAY OF LIPASE: CPT

## 2021-07-04 PROCEDURE — 80076 HEPATIC FUNCTION PANEL: CPT

## 2021-07-04 PROCEDURE — 82962 GLUCOSE BLOOD TEST: CPT

## 2021-07-04 PROCEDURE — 81003 URINALYSIS AUTO W/O SCOPE: CPT

## 2021-07-04 RX ORDER — LISINOPRIL 10 MG/1
20 TABLET ORAL DAILY
Status: DISCONTINUED | OUTPATIENT
Start: 2021-07-05 | End: 2021-07-08 | Stop reason: HOSPADM

## 2021-07-04 RX ORDER — SODIUM CHLORIDE 9 MG/ML
75 INJECTION, SOLUTION INTRAVENOUS CONTINUOUS
Status: DISCONTINUED | OUTPATIENT
Start: 2021-07-04 | End: 2021-07-08 | Stop reason: HOSPADM

## 2021-07-04 RX ORDER — ONDANSETRON 4 MG/1
4 TABLET, ORALLY DISINTEGRATING ORAL
Status: DISCONTINUED | OUTPATIENT
Start: 2021-07-04 | End: 2021-07-08 | Stop reason: HOSPADM

## 2021-07-04 RX ORDER — MAGNESIUM SULFATE 100 %
4 CRYSTALS MISCELLANEOUS AS NEEDED
Status: DISCONTINUED | OUTPATIENT
Start: 2021-07-04 | End: 2021-07-07

## 2021-07-04 RX ORDER — ACETAMINOPHEN 650 MG/1
650 SUPPOSITORY RECTAL
Status: DISCONTINUED | OUTPATIENT
Start: 2021-07-04 | End: 2021-07-08 | Stop reason: HOSPADM

## 2021-07-04 RX ORDER — POLYETHYLENE GLYCOL 3350 17 G/17G
17 POWDER, FOR SOLUTION ORAL DAILY PRN
Status: DISCONTINUED | OUTPATIENT
Start: 2021-07-04 | End: 2021-07-08 | Stop reason: HOSPADM

## 2021-07-04 RX ORDER — ONDANSETRON 2 MG/ML
4 INJECTION INTRAMUSCULAR; INTRAVENOUS
Status: DISCONTINUED | OUTPATIENT
Start: 2021-07-04 | End: 2021-07-08 | Stop reason: HOSPADM

## 2021-07-04 RX ORDER — MORPHINE SULFATE 2 MG/ML
2 INJECTION, SOLUTION INTRAMUSCULAR; INTRAVENOUS
Status: DISPENSED | OUTPATIENT
Start: 2021-07-04 | End: 2021-07-06

## 2021-07-04 RX ORDER — SODIUM CHLORIDE 0.9 % (FLUSH) 0.9 %
5-40 SYRINGE (ML) INJECTION AS NEEDED
Status: DISCONTINUED | OUTPATIENT
Start: 2021-07-04 | End: 2021-07-08 | Stop reason: HOSPADM

## 2021-07-04 RX ORDER — MORPHINE SULFATE 2 MG/ML
2 INJECTION, SOLUTION INTRAMUSCULAR; INTRAVENOUS
Status: COMPLETED | OUTPATIENT
Start: 2021-07-04 | End: 2021-07-04

## 2021-07-04 RX ORDER — ACETAMINOPHEN 325 MG/1
650 TABLET ORAL
Status: DISCONTINUED | OUTPATIENT
Start: 2021-07-04 | End: 2021-07-08 | Stop reason: HOSPADM

## 2021-07-04 RX ORDER — SODIUM CHLORIDE 0.9 % (FLUSH) 0.9 %
5-40 SYRINGE (ML) INJECTION EVERY 8 HOURS
Status: DISCONTINUED | OUTPATIENT
Start: 2021-07-04 | End: 2021-07-08 | Stop reason: HOSPADM

## 2021-07-04 RX ORDER — INSULIN GLARGINE 100 [IU]/ML
5 INJECTION, SOLUTION SUBCUTANEOUS
Status: DISCONTINUED | OUTPATIENT
Start: 2021-07-04 | End: 2021-07-07

## 2021-07-04 RX ORDER — DEXTROSE 50 % IN WATER (D50W) INTRAVENOUS SYRINGE
25-50 AS NEEDED
Status: DISCONTINUED | OUTPATIENT
Start: 2021-07-04 | End: 2021-07-08 | Stop reason: HOSPADM

## 2021-07-04 RX ORDER — VERAPAMIL HYDROCHLORIDE 120 MG/1
240 CAPSULE, EXTENDED RELEASE ORAL DAILY
Status: DISCONTINUED | OUTPATIENT
Start: 2021-07-05 | End: 2021-07-08 | Stop reason: HOSPADM

## 2021-07-04 RX ORDER — ATORVASTATIN CALCIUM 40 MG/1
40 TABLET, FILM COATED ORAL DAILY
Status: DISCONTINUED | OUTPATIENT
Start: 2021-07-05 | End: 2021-07-08 | Stop reason: HOSPADM

## 2021-07-04 RX ORDER — INSULIN LISPRO 100 [IU]/ML
INJECTION, SOLUTION INTRAVENOUS; SUBCUTANEOUS
Status: DISCONTINUED | OUTPATIENT
Start: 2021-07-04 | End: 2021-07-07

## 2021-07-04 RX ORDER — ONDANSETRON 2 MG/ML
4 INJECTION INTRAMUSCULAR; INTRAVENOUS ONCE
Status: COMPLETED | OUTPATIENT
Start: 2021-07-04 | End: 2021-07-04

## 2021-07-04 RX ADMIN — INSULIN GLARGINE 5 UNITS: 100 INJECTION, SOLUTION SUBCUTANEOUS at 21:00

## 2021-07-04 RX ADMIN — IOPAMIDOL 100 ML: 755 INJECTION, SOLUTION INTRAVENOUS at 14:47

## 2021-07-04 RX ADMIN — MORPHINE SULFATE 2 MG: 2 INJECTION, SOLUTION INTRAMUSCULAR; INTRAVENOUS at 13:39

## 2021-07-04 RX ADMIN — Medication 10 ML: at 20:25

## 2021-07-04 RX ADMIN — SODIUM CHLORIDE 1000 ML: 9 INJECTION, SOLUTION INTRAVENOUS at 13:38

## 2021-07-04 RX ADMIN — ONDANSETRON 4 MG: 2 INJECTION INTRAMUSCULAR; INTRAVENOUS at 19:08

## 2021-07-04 RX ADMIN — ONDANSETRON 4 MG: 2 INJECTION INTRAMUSCULAR; INTRAVENOUS at 13:38

## 2021-07-04 RX ADMIN — SODIUM CHLORIDE 75 ML/HR: 9 INJECTION, SOLUTION INTRAVENOUS at 20:21

## 2021-07-04 NOTE — ROUTINE PROCESS
TRANSFER - OUT REPORT:    Verbal report given to Indu Madrid (name) on [facility]  being transferred to 419(unit) for routine progression of care       Report consisted of patients Situation, Background, Assessment and   Recommendations(SBAR). Information from the following report(s) SBAR and ED Summary was reviewed with the receiving nurse. Lines:   Peripheral IV 07/04/21 Posterior;Right Hand (Active)        Opportunity for questions and clarification was provided.       Patient transported with:   Registered Nurse

## 2021-07-04 NOTE — ED PROVIDER NOTES
EMERGENCY DEPARTMENT HISTORY AND PHYSICAL EXAM      Date: 7/4/2021  Patient Name: River Jarquin    History of Presenting Illness     Chief Complaint   Patient presents with    Abdominal Pain       History Provided By: Patient    HPI: River Jarquin, [de-identified] y.o. female with a past medical history significant Diabetes, hypertension, diverticulosis presents to the ED with cc of abdominal pain. Patient states over the last 4 to 5 days has noticed diffuse lower abdominal pain, associated nausea with several episodes of vomiting. Patient went to see her primary care physician on Friday, was placed on a liquid diet, states since that time continues to complain of pain, describes sharp, left and right lower quadrant pain with radiation throughout abdominal area, nausea vomiting several times today. Patient denies any fevers, no loose stool or diarrhea, denies any pain or burning on urination, denies any chest pains or shortness of breath. There are no other complaints, changes, or physical findings at this time.     PCP: Leonard Maradiaga MD    Current Facility-Administered Medications   Medication Dose Route Frequency Provider Last Rate Last Admin    sodium chloride (NS) flush 5-40 mL  5-40 mL IntraVENous Q8H Jake Channing, PA-C   10 mL at 07/04/21 2025    sodium chloride (NS) flush 5-40 mL  5-40 mL IntraVENous PRN Jake Channing, PA-C        acetaminophen (TYLENOL) tablet 650 mg  650 mg Oral Q6H PRN Jake Channing, PA-C        Or    acetaminophen (TYLENOL) suppository 650 mg  650 mg Rectal Q6H PRN Jake Channing, PA-C        polyethylene glycol (MIRALAX) packet 17 g  17 g Oral DAILY PRN Jake Channing, PA-C        ondansetron (ZOFRAN ODT) tablet 4 mg  4 mg Oral Q8H PRN Jake Channing, PA-C        Or    ondansetron [facility]) injection 4 mg  4 mg IntraVENous Q6H PRN Jake Channing, PA-C   4 mg at 07/04/21 1908    0.9% sodium chloride infusion  75 mL/hr IntraVENous CONTINUOUS Ari Aidandystlaw Johns PA-C 75 mL/hr at 07/04/21 2021 75 mL/hr at 07/04/21 2021    insulin glargine (LANTUS) injection 5 Units  5 Units SubCUTAneous QHS Jennifer Carr PA-C   5 Units at 07/04/21 2100    [START ON 7/5/2021] lisinopriL (PRINIVIL, ZESTRIL) tablet 20 mg  20 mg Oral DAILY Jennifer Carr PA-C        [Held by provider] verapamil ER (VERELAN) capsule 240 mg  240 mg Oral DAILY Ozziel ZHANNA Carr        insulin lispro (HUMALOG) injection   SubCUTAneous AC&HS Jennifer Carr PA-C        glucose chewable tablet 16 g  4 Tablet Oral PRN Jennifer Carr PA-C        glucagon (GLUCAGEN) injection 1 mg  1 mg IntraMUSCular PRN Jennifer Carr PA-C        dextrose (D50W) injection syrg 12.5-25 g  25-50 mL IntraVENous PRN Jennifer Carr PA-C        morphine injection 2 mg  2 mg IntraVENous Q4H PRN Alice Zhu MD        [START ON 7/5/2021] atorvastatin (LIPITOR) tablet 40 mg  40 mg Oral DAILY Jennifer Carr PA-C           Past History     Past Medical History:  Past Medical History:   Diagnosis Date    Colon polyps     Diabetes ([address]     Diverticulosis     Hypertension        Past Surgical History:  Past Surgical History:   Procedure Laterality Date    COLONOSCOPY N/A 4/29/2021    COLONOSCOPY performed by Massiel Reyes MD at [facility]       Family History:  Family History   Problem Relation Age of Onset    Diabetes Other        Social History:  Social History     Tobacco Use    Smoking status: Never Smoker    Smokeless tobacco: Never Used   Substance Use Topics    Alcohol use: Not Currently    Drug use: Never       Allergies: Allergies   Allergen Reactions    Penicillins Other (comments)     Pt states it makes her pass out         Review of Systems     Review of Systems   Constitutional: Negative for activity change, appetite change, chills, fatigue and fever. HENT: Negative for sore throat. Respiratory: Negative for chest tightness and shortness of breath. Cardiovascular: Negative for chest pain and palpitations. Gastrointestinal: Positive for abdominal pain, nausea and vomiting. Genitourinary: Negative for decreased urine volume, dysuria, hematuria and urgency. Musculoskeletal: Negative for arthralgias and back pain. Skin: Negative for color change. Neurological: Negative for dizziness, numbness and headaches. Psychiatric/Behavioral: Negative for confusion. Physical Exam     Physical Exam  Constitutional:       General: She is not in acute distress. Appearance: She is well-developed and normal weight. She is not ill-appearing, toxic-appearing or diaphoretic. HENT:      Head: Normocephalic and atraumatic. Mouth/Throat:      Mouth: Mucous membranes are moist.   Eyes:      Extraocular Movements: Extraocular movements intact. Cardiovascular:      Rate and Rhythm: Normal rate and regular rhythm. Heart sounds: Normal heart sounds. Pulmonary:      Effort: Pulmonary effort is normal.      Breath sounds: Normal breath sounds. Abdominal:      General: Abdomen is flat. Bowel sounds are normal. There is no distension. Palpations: Abdomen is soft. Tenderness: There is generalized abdominal tenderness. There is no guarding or rebound. Skin:     General: Skin is warm and dry. Capillary Refill: Capillary refill takes less than 2 seconds. Coloration: Skin is not jaundiced. Neurological:      General: No focal deficit present. Mental Status: She is alert and oriented to person, place, and time.    Psychiatric:         Mood and Affect: Mood normal.         Behavior: Behavior normal.         Diagnostic Study Results     Labs -     Recent Results (from the past 12 hour(s))   CBC WITH AUTOMATED DIFF    Collection Time: 07/04/21  1:00 PM   Result Value Ref Range    WBC 10.3 3.6 - 11.0 K/uL    RBC 4.34 3.80 - 5.20 M/uL    HGB 11.4 (L) 11.5 - 16.0 g/dL    HCT 35.5 35.0 - 47.0 %    MCV 81.8 80.0 - 99.0 FL    MCH 26.3 26.0 - 34.0 PG    MCHC 32.1 30.0 - 36.5 g/dL    RDW 13.6 11.5 - 14.5 %    PLATELET 745 620 - 084 K/uL    MPV 12.9 8.9 - 12.9 FL    NRBC 0.0 0.0  WBC    ABSOLUTE NRBC 0.00 0.00 - 0.01 K/uL    NEUTROPHILS 75 32 - 75 %    LYMPHOCYTES 12 12 - 49 %    MONOCYTES 12 5 - 13 %    EOSINOPHILS 1 0 - 7 %    BASOPHILS 0 0 - 1 %    IMMATURE GRANULOCYTES 0 0 - 0.5 %    ABS. NEUTROPHILS 7.8 1.8 - 8.0 K/UL    ABS. LYMPHOCYTES 1.2 0.8 - 3.5 K/UL    ABS. MONOCYTES 1.2 (H) 0.0 - 1.0 K/UL    ABS. EOSINOPHILS 0.1 0.0 - 0.4 K/UL    ABS. BASOPHILS 0.0 0.0 - 0.1 K/UL    ABS. IMM.  GRANS. 0.0 0.00 - 0.04 K/UL    DF AUTOMATED     METABOLIC PANEL, BASIC    Collection Time: 07/04/21  1:00 PM   Result Value Ref Range    Sodium 132 (L) 136 - 145 mmol/L    Potassium 4.5 3.5 - 5.1 mmol/L    Chloride 103 97 - 108 mmol/L    CO2 24 21 - 32 mmol/L    Anion gap 5 5 - 15 mmol/L    Glucose 230 (H) 65 - 100 mg/dL    BUN 15 6 - 20 mg/dL    Creatinine 0.86 0.55 - 1.02 mg/dL    BUN/Creatinine ratio 17 12 - 20      GFR est AA >60 >60 ml/min/1.73m2    GFR est non-AA >60 >60 ml/min/1.73m2    Calcium 8.5 8.5 - 10.1 mg/dL   LIPASE    Collection Time: 07/04/21  1:00 PM   Result Value Ref Range    Lipase 206 73 - 393 U/L   EKG, 12 LEAD, INITIAL    Collection Time: 07/04/21  1:44 PM   Result Value Ref Range    Ventricular Rate 95 BPM    Atrial Rate 95 BPM    P-R Interval 150 ms    QRS Duration 90 ms    Q-T Interval 406 ms    QTC Calculation (Bezet) 510 ms    Calculated P Axis 47 degrees    Calculated R Axis -18 degrees    Calculated T Axis -58 degrees    Diagnosis       Sinus rhythm with occasional Premature ventricular complexes  Possible Left atrial enlargement  Left ventricular hypertrophy  Cannot rule out Septal infarct , age undetermined  ST & T wave abnormality, consider inferolateral ischemia  Prolonged QT  Abnormal ECG    Confirmed by [facility] KJZack BRUNO ([id number]) on 7/4/2021 6:20:59 PM     URINALYSIS W/ RFLX MICROSCOPIC    Collection Time: 07/04/21  3:00 PM Result Value Ref Range    Color Yellow      Appearance Clear Clear      Specific gravity 1.005 1.003 - 1.030      pH (UA) 7.0 5.0 - 8.0      Protein Negative Negative mg/dL    Glucose 14 (A) Negative mg/dL    Ketone Negative Negative mg/dL    Bilirubin Negative Negative      Blood Negative Negative      Urobilinogen Normal 0.1 - 1.0 EU/dL    Nitrites Negative Negative      Leukocyte Esterase Negative Negative      WBC 0-4 0 - 4 /hpf    RBC 0-5 0 - 5 /hpf    Bacteria Negative Negative /hpf    Mucus Trace /lpf   LACTIC ACID    Collection Time: 07/04/21  7:40 PM   Result Value Ref Range    Lactic acid 1.2 0.4 - 2.0 mmol/L   HEPATIC FUNCTION PANEL    Collection Time: 07/04/21  7:40 PM   Result Value Ref Range    Protein, total 6.8 6.4 - 8.2 g/dL    Albumin 2.4 (L) 3.5 - 5.0 g/dL    Globulin 4.4 (H) 2.0 - 4.0 g/dL    A-G Ratio 0.5 (L) 1.1 - 2.2      Bilirubin, total 0.7 0.2 - 1.0 mg/dL    Bilirubin, direct 0.1 0.0 - 0.2 mg/dL    Alk. phosphatase 114 45 - 117 U/L    AST (SGOT) 13 (L) 15 - 37 U/L    ALT (SGPT) 11 (L) 12 - 78 U/L   GLUCOSE, POC    Collection Time: 07/04/21  8:12 PM   Result Value Ref Range    Glucose (POC) 160 (H) 65 - 117 mg/dL    Performed by Scottie Delgado        Radiologic Studies -   [unfilled]  CT Results  (Last 48 hours)               07/04/21 1446  CT ABD PELV W CONT Final result    Impression:      Hypodense cystlike lesion of tail of pancreas, and dilatation of distal   pancreatic duct are similar to prior. Interval retropancreatic edema,? pancreatitis, versus vasculitis or other   nonspecific edema. Interval small volume free fluid in the pelvis. Left colon diverticula without apparent diverticulitis. Extensive atheromatous changes of regional arteries, with stenosis of visceral   arteries and renal arteries. Discussed with Dr. Adriana Cota. Interval small bowel loop in left inguinal hernia, does not appear obstructed. Other findings unchanged.                    Narrative: Indication: Abdominal pain. Nausea and vomiting. History of diverticulitis. Dose reduction: All CT scans done at this facility are performed using dose   reduction optimization techniques as appropriate to a performed exam including   the following: Automated exposure control, adjustments of the mA and/or kV   according to patient size, or use of iterative reconstruction technique. CT abdomen and pelvis, 100 cc [id number] 7/4/2021. Comparison 28 April 2021. Cardiomegaly. There is an oblong fluid density lesion in tail of pancreas not differentiated   from dilatation of the distal pancreatic duct. This measures approximately 1.8   cm AP x 5.4 cm longitudinal. High density foci at the tip the pancreas could   represent calcification or surgical foci, obscured by motion. There is interval edema of the retropancreatic fat, including surrounding the   SMA and celiac axis, which are partly calcified and stenotic similar to prior. Left renal vein appears slightly narrowed by surrounding edema with mild   dilatation of the peripheral aspect of the vein compared with prior. The IVC is   largely unopacified likely related to timing of contrast injection, with   contrast entering the IVC from bilateral renal vein. Small volume free fluid in the pelvis. Colonic diverticula more on the left, without apparent diverticulitis. Normal appearance of gallbladder, bile ducts, spleen. Small inferior right   hepatic cyst. Mildly prominent adrenal glands unchanged. Small bilateral hypodense lesions in the kidneys likely cysts. No   hydronephrosis. Atheromatous calcification and tortuosity of abdominal aorta and iliac arteries   without aneurysm. Calcific stenosis origin of bilateral renal artery. 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 Lumbar spondylosis and scoliosis. Previous supraumbilical herniation of fat and vessels unchanged. Small left inguinal hernia previously containing fat now also contains a small   bowel loop which is nonobstructed. CXR Results  (Last 48 hours)    None          Medical Decision Making and ED Course   I am the first provider for this patient. I reviewed the vital signs, available nursing notes, past medical history, past surgical history, family history and social history. Vital Signs-Reviewed the patient's vital signs. Patient Vitals for the past 12 hrs:   Temp Pulse Resp BP SpO2   07/04/21 2006 98.3 °F (36.8 °C) 99 16 (!) 155/97 98 %   07/04/21 1918    (!) 151/88    07/04/21 1236 98.1 °F (36.7 °C) 93 16 (!) 166/89 99 %       EKG interpretation: (Preliminary)  Completed at 1344, evaluated at 1347, normal sinus rhythm 95, no ST elevations, biphasic T waves in anterior lateral leads    Records Reviewed: Nursing Notes    The patient presents with abdominal pain with a differential diagnosis of appendicitis, biliary colic, cholecystitis, diverticulitis, gastritis, gastroenteritis and UTI      Provider Notes (Medical Decision Making):     MDM   81-year-old female, history of hypertension, diabetes, diverticulosis, presents to emergency department for 3 days of worsening abdominal pain. Physical exam shows uncomfortable female, no distress, stable vitals, afebrile. Abdomen soft nondistended, diffusely tender to palpation, no guarding. Basic lab work drawn, will order EKG, will order morphine, fluids, Zofran, obtain CT abdomen pelvis with IV contrast to rule out diverticulitis. ED Course:   Initial assessment performed. The patients presenting problems have been discussed, and they are in agreement with the care plan formulated and outlined with them. I have encouraged them to ask questions as they arise throughout their visit.     ED Course as of Jul 04 2235   Sarah López Jul 04, 2021   1556 Patient CT scan shows mass to pancreas with fluid around pancreas, questionable pancreatitis, patient's lipase 200, patient has no white count. Reassessed patient, still complaining of abdominal pain, given recurrent abdominal pain, will admit patient for pancreatitis. [PZ]   [id number] Spoke with Dr. Liam Gomez, will admit patient for abdominal pain, possible pancreatitis. [PZ]      ED Course User Index  [PZ] Kat King MD         Procedures       Chantel Camp MD  Procedures             Disposition       Admitted        Diagnosis     Clinical Impression:   1. Other acute pancreatitis, unspecified complication status        Attestations:    Chantel Camp MD    Please note that this dictation was completed with [employer] Text, the computer voice recognition software. Quite often unanticipated grammatical, syntax, homophones, and other interpretive errors are inadvertently transcribed by the computer software. Please disregard these errors. Please excuse any errors that have escaped final proofreading. Thank you.

## 2021-07-04 NOTE — H&P
History and Physical    Patient: River Jarquin MRN: [id number]  SSN: [id number]    YOB: 1940  Age: [de-identified] y.o. Sex: female      Subjective:      River Jarquin is a [de-identified] y.o. female with a past medical history significant for diabetes mellitus, hypertension, CHF w/ EF 45%, hx CVA w/ left-sided weakness, hx GI bleed, and colon polyps presenting to the ED with a primary complaint of progressively worsening abdominal pain since Thursday evening. Patient states she ate some \" bad chicken salad,\" and began throwing up. She reports bitter taste in her mouth. Also reports constipation over the last couple of days. She denies any fever, chills, chest pain, palpitations, shortness of breath, dysuria. In the ED, CT of abdomen/pelvis showing hypodense cystlike lesion of pancreatic tail and dilation of distal pancreatic duct similar to prior CT of abdomen. Also shows extensive atheromatous changes to arteries with stenosis. Patient admitted to hospitalist group for epigastric pain. GI consulted. Stool occult pending. Enteric bacteria panel pending. Past Medical History:   Diagnosis Date    Colon polyps     Diabetes ([address]     Diverticulosis     Hypertension      Past Surgical History:   Procedure Laterality Date    COLONOSCOPY N/A 4/29/2021    COLONOSCOPY performed by Robbie Montes De Oca MD at [facility] ENDOSCOPY      Family History   Problem Relation Age of Onset    Diabetes Other      Social History     Tobacco Use    Smoking status: Never Smoker    Smokeless tobacco: Never Used   Substance Use Topics    Alcohol use: Not Currently      Prior to Admission medications    Medication Sig Start Date End Date Taking? Authorizing Provider   acetaminophen (TYLENOL) 325 mg tablet Take 2 Tabs by mouth every four (4) hours as needed for Pain or Fever (For temp greater than or equal to 38.5 C or 101.3 F (Unless hepatic failure or contrindicated). Give first line for fever. ). 5/6/21   Rain Hope Poly Glass MD   atorvastatin (LIPITOR) 80 mg tablet Take 1 Tab by mouth daily. 5/7/21   Kitty Davila MD   insulin lispro (HUMALOG) 100 unit/mL injection INITIATE CORRECTIVE INSULIN PROTOCOL (MARY):  High Sensitivity (thin, ESRD):    AC (before meals), Q6H, and Q4H CORRECTIONAL SCALE only For Blood Sugar (mg/dl) of :             140-199=0 units            200-249=2 units  250-299=3 units  300-349=4 units  350 or greater = Call MD  Give in addition to basal medications. Do Not Hold for NPO    BEDTIME CORRECTIONAL sliding scale when scheduled:  200-249=1 units  250-349=2 units  350 or greater = Call MD  Give in addition to basal medications. Do Not Hold for NPO Fast Acting - Administer Immediately - or within 15 minutes of start of meal, if mealtime coverage. 5/6/21   Kitty Davila MD   lisinopriL (PRINIVIL, ZESTRIL) 20 mg tablet Take 1 Tab by mouth daily. 5/7/21   Kitty Davila MD   insulin glargine (LANTUS) 100 unit/mL injection Take 5 units daily at bedtime 5/6/21   Kitty Davila MD   insulin lispro (HUMALOG) 100 unit/mL injection Take 3 units tid 5/6/21   Kitty Davila MD   verapamil ER (VERELAN) 240 mg ER capsule Take 240 mg by mouth daily. Jamal Couch MD   glimepiride (AMARYL) 4 mg tablet Take 4 mg by mouth every morning.     Jamal Couch MD        Allergies   Allergen Reactions    Penicillins Other (comments)     Pt states it makes her pass out       Review of Systems:  Constitutional: No fevers, No chills, No fatigue, No weakness  Eyes: No visual disturbance  ENT: No nasal congestion, No sore throat  Respiratory: No cough, No sputum, No wheezing, No SOB  Cardiovascular: No chest pain, No lower extremity edema, No Palpitations   Gastrointestinal: + nausea, + vomiting, + abdominal pain, + constipation, No diarrhea  Genitourinary: No frequency, No dysuria, No hematuria  Integument/Breast: No rash, No skin lesion(s), No dryness  Musculoskeletal: No arthralgias, No neck pain, No back pain  Neurological: No headaches, No dizziness, No confusion,  No seizures  Behavioral/Psychiatric: No anxiety, No depression      Objective:     Vitals:    07/04/21 1236   BP: (!) 166/89   Pulse: 93   Resp: 16   Temp: 98.1 °F (36.7 °C)   SpO2: 99%   Weight: 65.3 kg (144 lb)   Height: 5' 6\" (1.676 m)        Physical Exam:  General: alert, cooperative, no distress  Eye: conjunctivae/corneas clear. PERRL, EOM's intact. Throat and Neck: normal and no erythema or exudates noted. No mass   Lung: clear to auscultation bilaterally  Heart: regular rate and rhythm, normal S1/S2. Abdomen: Soft. Tenderness in epigastric region. Bowel sounds normal. No masses,  Extremities:  able to move all extremities normal, atraumatic  Skin: Normal.  Neurologic: AOx3. Cranial nerves 2-12 and sensation grossly intact. Psychiatric: non focal    Recent Results (from the past 24 hour(s))   CBC WITH AUTOMATED DIFF    Collection Time: 07/04/21  1:00 PM   Result Value Ref Range    WBC 10.3 3.6 - 11.0 K/uL    RBC 4.34 3.80 - 5.20 M/uL    HGB 11.4 (L) 11.5 - 16.0 g/dL    HCT 35.5 35.0 - 47.0 %    MCV 81.8 80.0 - 99.0 FL    MCH 26.3 26.0 - 34.0 PG    MCHC 32.1 30.0 - 36.5 g/dL    RDW 13.6 11.5 - 14.5 %    PLATELET 050 802 - 195 K/uL    MPV 12.9 8.9 - 12.9 FL    NRBC 0.0 0.0  WBC    ABSOLUTE NRBC 0.00 0.00 - 0.01 K/uL    NEUTROPHILS 75 32 - 75 %    LYMPHOCYTES 12 12 - 49 %    MONOCYTES 12 5 - 13 %    EOSINOPHILS 1 0 - 7 %    BASOPHILS 0 0 - 1 %    IMMATURE GRANULOCYTES 0 0 - 0.5 %    ABS. NEUTROPHILS 7.8 1.8 - 8.0 K/UL    ABS. LYMPHOCYTES 1.2 0.8 - 3.5 K/UL    ABS. MONOCYTES 1.2 (H) 0.0 - 1.0 K/UL    ABS. EOSINOPHILS 0.1 0.0 - 0.4 K/UL    ABS. BASOPHILS 0.0 0.0 - 0.1 K/UL    ABS. IMM.  GRANS. 0.0 0.00 - 0.04 K/UL    DF AUTOMATED     METABOLIC PANEL, BASIC    Collection Time: 07/04/21  1:00 PM   Result Value Ref Range    Sodium 132 (L) 136 - 145 mmol/L    Potassium 4.5 3.5 - 5.1 mmol/L    Chloride 103 97 - 108 mmol/L    CO2 24 21 - 32 mmol/L    Anion gap 5 5 - 15 mmol/L    Glucose 230 (H) 65 - 100 mg/dL    BUN 15 6 - 20 mg/dL    Creatinine 0.86 0.55 - 1.02 mg/dL    BUN/Creatinine ratio 17 12 - 20      GFR est AA >60 >60 ml/min/1.73m2    GFR est non-AA >60 >60 ml/min/1.73m2    Calcium 8.5 8.5 - 10.1 mg/dL   LIPASE    Collection Time: 07/04/21  1:00 PM   Result Value Ref Range    Lipase 206 73 - 393 U/L   URINALYSIS W/ RFLX MICROSCOPIC    Collection Time: 07/04/21  3:00 PM   Result Value Ref Range    Color Yellow      Appearance Clear Clear      Specific gravity 1.005 1.003 - 1.030      pH (UA) 7.0 5.0 - 8.0      Protein Negative Negative mg/dL    Glucose 14 (A) Negative mg/dL    Ketone Negative Negative mg/dL    Bilirubin Negative Negative      Blood Negative Negative      Urobilinogen Normal 0.1 - 1.0 EU/dL    Nitrites Negative Negative      Leukocyte Esterase Negative Negative      WBC 0-4 0 - 4 /hpf    RBC 0-5 0 - 5 /hpf    Bacteria Negative Negative /hpf    Mucus Trace /lpf       XR Results (maximum last 3): Results from [place] encounter on 05/04/21    XR PELV 1 OR 2 V    Narrative  1 view    Impression  No fracture or destructive lesion is seen. The joint spaces are  maintained, as are the adjacent soft tissues. Tiny greater trochanteric spurs      XR CHEST SNGL V    Narrative  Examination: Chest Radiograph, Portable    Indication: Shortness of breath, fall    Comparison: Chest Radiograph  5/5/2020    Findings:    No focal consolidation, pleural effusion, pulmonary edema, or pneumothorax. Stable cardiomediastinal silhouette. No acute osseous abnormality. Dextroscoliosis of the spine. Impression  No acute process. CT Results (maximum last 3): Results from [place] encounter on 07/04/21    CT ABD PELV W CONT    Narrative  Indication: Abdominal pain. Nausea and vomiting. History of diverticulitis.     Dose reduction: All CT scans done at this facility are performed using dose  reduction optimization techniques as appropriate to a performed exam including  the following: Automated exposure control, adjustments of the mA and/or kV  according to patient size, or use of iterative reconstruction technique. CT abdomen and pelvis, 100 cc Isovue-370 7/4/2021. Comparison 28 April 2021. Cardiomegaly. There is an oblong fluid density lesion in tail of pancreas not differentiated  from dilatation of the distal pancreatic duct. This measures approximately 1.8  cm AP x 5.4 cm longitudinal. High density foci at the tip the pancreas could  represent calcification or surgical foci, obscured by motion. There is interval edema of the retropancreatic fat, including surrounding the  SMA and celiac axis, which are partly calcified and stenotic similar to prior. Left renal vein appears slightly narrowed by surrounding edema with mild  dilatation of the peripheral aspect of the vein compared with prior. The IVC is  largely unopacified likely related to timing of contrast injection, with  contrast entering the IVC from bilateral renal vein. Small volume free fluid in the pelvis. Colonic diverticula more on the left, without apparent diverticulitis. Normal appearance of gallbladder, bile ducts, spleen. Small inferior right  hepatic cyst. Mildly prominent adrenal glands unchanged. Small bilateral hypodense lesions in the kidneys likely cysts. No  hydronephrosis. Atheromatous calcification and tortuosity of abdominal aorta and iliac arteries  without aneurysm. Calcific stenosis origin of bilateral renal artery. At least partial occlusion of a lateral branch of right internal iliac artery  unchanged from prior. Lumbar spondylosis and scoliosis. Previous supraumbilical herniation of fat and vessels unchanged. Small left inguinal hernia previously containing fat now also contains a small  bowel loop which is nonobstructed.     Impression  Hypodense cystlike lesion of tail of pancreas, and dilatation of distal  pancreatic duct are similar to prior. Interval retropancreatic edema,? pancreatitis, versus vasculitis or other  nonspecific edema. Interval small volume free fluid in the pelvis. Left colon diverticula without apparent diverticulitis. Extensive atheromatous changes of regional arteries, with stenosis of visceral  arteries and renal arteries. Discussed with Dr. Pranav Bear. Interval small bowel loop in left inguinal hernia, does not appear obstructed. Other findings unchanged. Results from Hospital Encounter encounter on 05/04/21    CTA NECK    Narrative  Study: Neck CTA without and with contrast    Clinical Indication: Left ICA stenosis. Comparison: Report from the carotid duplex dated 5/5/2021. The associated images  are not available for independent review at the time of dictation. Technique: Routine unenhanced axial acquisition of the neck was performed. Subsequently, contiguous thin section axial acquisition of the neck was  performed in the arterial phase from the thoracic inlet to the skull base  following the intravenous administration of 100 mL Isovue-370. Coronal,  sagittal, and MIP reconstructions were generated and reviewed. Dose reduction:  All CT scans at this facility are performed using dose reduction optimization  techniques as appropriate to a performed exam including the following-automated  exposure control, adjustments of mA and/or Kv according to patient size, or use  of iterative reconstructive technique. Findings:    Calcified plaque throughout the aortic arch and great vessel origins without  significant stenosis. The common carotid arteries are patent without high-grade stenosis calcific  plaque in the bilateral carotid bulbs causing 25-30% stenosis of the right  internal carotid artery origin and 60-65% stenosis of the left internal carotid  artery origin based on NASCET criteria.  Mild to moderate stenosis of the right  external carotid artery origin. The left external carotid artery is  unremarkable. The right vertebral artery origin is not well evaluated due to streak artifact  related to the adjacent dense contrast bolus. Otherwise the right cervical  vertebral artery is hypoplastic but patent. The left cervical vertebral artery  is patent without high-grade stenosis. Nonspecific bilateral thyroid hypodensities. The lung apices are clear. Multilevel cervical spondylosis. Impression  1. Moderate (60-65%) stenosis of the left ICA origin. 2.  Mild (25-30%) stenosis of the right ICA origin. 3.  Mild-to-moderate stenosis of the right ECA origin. 4.  Hypoplastic right vertebral artery. Please note that degrees of carotid stenosis are measured in accordance with  NASCET criteria. CT HEAD WO CONT    Narrative  CT dose reduction was achieved through use of a standardized protocol tailored  for this examination and automatic exposure control for dose modulation. CT Head    History:    The sulci are prominent but symmetric. Periventricular and deep white matter  hypodensity is not unexpected for age. No acute abnormality seen gray or white  matter. Ventricles are symmetric and appropriate for atrophy. There is no  midline shift or mass effect, or evidence of hemorrhage. Bone windows show no  fracture. Impression  Age-appropriate atrophy. No acute findings. MRI Results (maximum last 3): Results from [place] encounter on 05/04/21    MRI BRAIN WO CONT    Narrative  Study: MRI of the brain without contrast.    Clinical Indication: Right-sided weakness. Comparison: Head CT dated 5/4/2021. Technique: Multiplanar, multisequence, unenhanced MR imaging of the brain was  performed. Findings:    The ventricles are normal and unchanged in size and configuration. Ovoid restricted diffusion involving the left body and splenium of the corpus  callosum compatible with an ischemic infarct.  Additional small punctate acute  ischemic infarct involving the left centrum semiovale. No acute hemorrhage or abnormal extra axial fluid. Additional scattered foci of  T2/FLAIR hyperintense signal abnormality within the cerebral hemispheric white  matter are nonspecific but most commonly associated with chronic small vessel  ischemic changes. Small region of encephalomalacia with hemosiderin staining  involving the right occipital lobe. The unenhanced sella is unremarkable. The cerebellar tonsils are normal in position. The expected major arterial flow voids are present. The orbits are unremarkable. No abnormal signal within the included paranasal  sinuses or mastoid air cells. No evidence of an aggressive calvarial or extra  calvarial lesion. Impression  1. Multiple acute ischemic infarcts involving the left centrum semiovale and  left body/splenium of the corpus callosum. 2.  Right occipital lobe encephalomalacia and mild-to-moderate chronic small  vessel ischemic changes. Nuclear Medicine Results (maximum last 3): No results found for this or any previous visit. US Results (maximum last 3): No results found for this or any previous visit. Active Problems:    Epigastric pain (7/4/2021)        Assessment/Plan:     1. Abdominal pain  2. Hx GI bleed  - CT of abdomen/pelvis showing hypodense cystlike lesion of pancreatic tail and dilation of distal pancreatic duct similar to prior CT of abdomen. Also shows extensive atheromatous changes to arteries with stenosis. - GI consulted, however not provider available until Tuesday   - Stool occult pending. Hgb stable. - Enteric bacteria panel pending  - Send stool for ova & parasites  - S/p 1 L IV fluid bolus. Continue IV fluids.  - PRN pain mgmt    3. CHF w/ EF 45%  - Continue home medications    4. Diabetes mellitus  - ISS and accu-checks  - Lantus 5 units bedtime    5. Hypertension  - Continue home medications    6.  Hx CVA with residual left-sided weakness  - Continue statin      DVT Prophylaxis: SCDs   Code Status: Full  POA    Total Time >55 minutes      Signed By: Haroon Shelton PA-C     July 4, 2021

## 2021-07-04 NOTE — ED TRIAGE NOTES
[facility] pt stated that since she ate on Thursday she then vomited with epigastric ABD pain; pt also stated that she has been feeling constipated; pt did go see her PCP on Friday who placed her on a liquid diet

## 2021-07-05 LAB
ALBUMIN SERPL-MCNC: 2.8 G/DL (ref 3.5–5)
ALBUMIN/GLOB SERPL: 0.6 {RATIO} (ref 1.1–2.2)
ALP SERPL-CCNC: 113 U/L (ref 45–117)
ALT SERPL-CCNC: 10 U/L (ref 12–78)
ANION GAP SERPL CALC-SCNC: 7 MMOL/L (ref 5–15)
AST SERPL W P-5'-P-CCNC: 6 U/L (ref 15–37)
BASOPHILS # BLD: 0 K/UL (ref 0–0.1)
BASOPHILS NFR BLD: 0 % (ref 0–1)
BILIRUB SERPL-MCNC: 0.8 MG/DL (ref 0.2–1)
BUN SERPL-MCNC: 8 MG/DL (ref 6–20)
BUN/CREAT SERPL: 11 (ref 12–20)
CA-I BLD-MCNC: 8.8 MG/DL (ref 8.5–10.1)
CHLORIDE SERPL-SCNC: 104 MMOL/L (ref 97–108)
CO2 SERPL-SCNC: 24 MMOL/L (ref 21–32)
CREAT SERPL-MCNC: 0.73 MG/DL (ref 0.55–1.02)
DIFFERENTIAL METHOD BLD: NORMAL
EOSINOPHIL # BLD: 0.1 K/UL (ref 0–0.4)
EOSINOPHIL NFR BLD: 1 % (ref 0–7)
ERYTHROCYTE [DISTWIDTH] IN BLOOD BY AUTOMATED COUNT: 13.5 % (ref 11.5–14.5)
GLOBULIN SER CALC-MCNC: 4.4 G/DL (ref 2–4)
GLUCOSE BLD STRIP.AUTO-MCNC: 114 MG/DL (ref 65–117)
GLUCOSE BLD STRIP.AUTO-MCNC: 143 MG/DL (ref 65–117)
GLUCOSE BLD STRIP.AUTO-MCNC: 147 MG/DL (ref 65–117)
GLUCOSE BLD STRIP.AUTO-MCNC: 217 MG/DL (ref 65–117)
GLUCOSE SERPL-MCNC: 114 MG/DL (ref 65–100)
HCT VFR BLD AUTO: 37.5 % (ref 35–47)
HGB BLD-MCNC: 12 G/DL (ref 11.5–16)
IMM GRANULOCYTES # BLD AUTO: 0 K/UL (ref 0–0.04)
IMM GRANULOCYTES NFR BLD AUTO: 0 % (ref 0–0.5)
LYMPHOCYTES # BLD: 1.3 K/UL (ref 0.8–3.5)
LYMPHOCYTES NFR BLD: 15 % (ref 12–49)
MCH RBC QN AUTO: 26.3 PG (ref 26–34)
MCHC RBC AUTO-ENTMCNC: 32 G/DL (ref 30–36.5)
MCV RBC AUTO: 82.2 FL (ref 80–99)
MONOCYTES # BLD: 1 K/UL (ref 0–1)
MONOCYTES NFR BLD: 12 % (ref 5–13)
NEUTS SEG # BLD: 5.9 K/UL (ref 1.8–8)
NEUTS SEG NFR BLD: 72 % (ref 32–75)
NRBC # BLD: 0 K/UL (ref 0–0.01)
NRBC BLD-RTO: 0 PER 100 WBC
PERFORMED BY, TECHID: ABNORMAL
PERFORMED BY, TECHID: NORMAL
PLATELET # BLD AUTO: 210 K/UL (ref 150–400)
PMV BLD AUTO: 12.5 FL (ref 8.9–12.9)
POTASSIUM SERPL-SCNC: 3.5 MMOL/L (ref 3.5–5.1)
PROT SERPL-MCNC: 7.2 G/DL (ref 6.4–8.2)
RBC # BLD AUTO: 4.56 M/UL (ref 3.8–5.2)
SODIUM SERPL-SCNC: 135 MMOL/L (ref 136–145)
WBC # BLD AUTO: 8.3 K/UL (ref 3.6–11)

## 2021-07-05 PROCEDURE — 74011636637 HC RX REV CODE- 636/637: Performed by: STUDENT IN AN ORGANIZED HEALTH CARE EDUCATION/TRAINING PROGRAM

## 2021-07-05 PROCEDURE — 85025 COMPLETE CBC W/AUTO DIFF WBC: CPT

## 2021-07-05 PROCEDURE — 82962 GLUCOSE BLOOD TEST: CPT

## 2021-07-05 PROCEDURE — 36415 COLL VENOUS BLD VENIPUNCTURE: CPT

## 2021-07-05 PROCEDURE — 74011250637 HC RX REV CODE- 250/637: Performed by: STUDENT IN AN ORGANIZED HEALTH CARE EDUCATION/TRAINING PROGRAM

## 2021-07-05 PROCEDURE — 80053 COMPREHEN METABOLIC PANEL: CPT

## 2021-07-05 PROCEDURE — 65270000029 HC RM PRIVATE

## 2021-07-05 PROCEDURE — 74011250636 HC RX REV CODE- 250/636: Performed by: STUDENT IN AN ORGANIZED HEALTH CARE EDUCATION/TRAINING PROGRAM

## 2021-07-05 PROCEDURE — 74011250636 HC RX REV CODE- 250/636: Performed by: INTERNAL MEDICINE

## 2021-07-05 RX ADMIN — MORPHINE SULFATE 2 MG: 2 INJECTION, SOLUTION INTRAMUSCULAR; INTRAVENOUS at 00:08

## 2021-07-05 RX ADMIN — INSULIN GLARGINE 5 UNITS: 100 INJECTION, SOLUTION SUBCUTANEOUS at 21:46

## 2021-07-05 RX ADMIN — Medication 10 ML: at 14:37

## 2021-07-05 RX ADMIN — ONDANSETRON 4 MG: 2 INJECTION INTRAMUSCULAR; INTRAVENOUS at 14:36

## 2021-07-05 RX ADMIN — MORPHINE SULFATE 2 MG: 2 INJECTION, SOLUTION INTRAMUSCULAR; INTRAVENOUS at 14:45

## 2021-07-05 RX ADMIN — LISINOPRIL 20 MG: 10 TABLET ORAL at 09:29

## 2021-07-05 RX ADMIN — ATORVASTATIN CALCIUM 40 MG: 40 TABLET, FILM COATED ORAL at 09:29

## 2021-07-05 RX ADMIN — ONDANSETRON 4 MG: 2 INJECTION INTRAMUSCULAR; INTRAVENOUS at 00:13

## 2021-07-05 RX ADMIN — SODIUM CHLORIDE 75 ML/HR: 9 INJECTION, SOLUTION INTRAVENOUS at 09:29

## 2021-07-05 RX ADMIN — MORPHINE SULFATE 2 MG: 2 INJECTION, SOLUTION INTRAMUSCULAR; INTRAVENOUS at 09:29

## 2021-07-05 RX ADMIN — MORPHINE SULFATE 2 MG: 2 INJECTION, SOLUTION INTRAMUSCULAR; INTRAVENOUS at 20:14

## 2021-07-05 RX ADMIN — INSULIN LISPRO 4 UNITS: 100 INJECTION, SOLUTION INTRAVENOUS; SUBCUTANEOUS at 21:46

## 2021-07-05 RX ADMIN — Medication 10 ML: at 20:14

## 2021-07-05 NOTE — PROGRESS NOTES
Skin Assessment    Dual skin assessment completed by Carroll Banks RN and Reyna Alvarez RN. Patient skin intact with no areas of concern noted.

## 2021-07-05 NOTE — PROGRESS NOTES
Hospitalist Progress Note    Subjective:   Daily Progress Note: 7/5/2021 7:53 AM    Hospital Course:  Darcy Wakefield is a [de-identified] y.o. female with a past medical history significant for diabetes mellitus, hypertension, CHF w/ EF 45%, hx CVA w/ left-sided weakness, hx GI bleed, and colon polyps presenting to the ED with a primary complaint of progressively worsening abdominal pain since Thursday evening. Patient states she ate some \" bad chicken salad,\" and began throwing up. She reports bitter taste in her mouth. Also reports constipation over the last couple of days. She denies any fever, chills, chest pain, palpitations, shortness of breath, dysuria. In the ED, CT of abdomen/pelvis showing hypodense cystlike lesion of pancreatic tail and dilation of distal pancreatic duct similar to prior CT of abdomen. Also shows extensive atheromatous changes to arteries with stenosis. Patient admitted to hospitalist group for epigastric pain. GI consulted. Stool occult pending. Enteric bacteria panel pending. Subjective:    Patient seen and examined at bedside. She reports improvement in epigastric pain and N/V, but has not had any food besides clear liquids since Friday morning. Will try on soft diet.      Current Facility-Administered Medications   Medication Dose Route Frequency    sodium chloride (NS) flush 5-40 mL  5-40 mL IntraVENous Q8H    sodium chloride (NS) flush 5-40 mL  5-40 mL IntraVENous PRN    acetaminophen (TYLENOL) tablet 650 mg  650 mg Oral Q6H PRN    Or    acetaminophen (TYLENOL) suppository 650 mg  650 mg Rectal Q6H PRN    polyethylene glycol (MIRALAX) packet 17 g  17 g Oral DAILY PRN    ondansetron (ZOFRAN ODT) tablet 4 mg  4 mg Oral Q8H PRN    Or    ondansetron (ZOFRAN) injection 4 mg  4 mg IntraVENous Q6H PRN    0.9% sodium chloride infusion  75 mL/hr IntraVENous CONTINUOUS    insulin glargine (LANTUS) injection 5 Units  5 Units SubCUTAneous QHS    lisinopriL (PRINIVIL, ZESTRIL) tablet 20 mg  20 mg Oral DAILY    [Held by provider] verapamil ER (VERELAN) capsule 240 mg  240 mg Oral DAILY    insulin lispro (HUMALOG) injection   SubCUTAneous AC&HS    glucose chewable tablet 16 g  4 Tablet Oral PRN    glucagon (GLUCAGEN) injection 1 mg  1 mg IntraMUSCular PRN    dextrose (D50W) injection syrg 12.5-25 g  25-50 mL IntraVENous PRN    morphine injection 2 mg  2 mg IntraVENous Q4H PRN    atorvastatin (LIPITOR) tablet 40 mg  40 mg Oral DAILY        Review of Systems  Constitutional: No fevers, No chills, No fatigue, No weakness  Eyes: No visual disturbance  ENT: No nasal congestion, No sore throat  Respiratory: No cough, No sputum, No wheezing, No SOB  Cardiovascular: No chest pain, No lower extremity edema, No Palpitations   Gastrointestinal: + nausea, + vomiting, + abdominal pain, + constipation, No diarrhea  Genitourinary: No frequency, No dysuria, No hematuria  Integument/Breast: No rash, No skin lesion(s), No dryness  Musculoskeletal: No arthralgias, No neck pain, No back pain  Neurological: No headaches, No dizziness, No confusion,  No seizures  Behavioral/Psychiatric: No anxiety, No depression      Objective:     Visit Vitals  BP (!) 159/88   Pulse 91   Temp 98 °F (36.7 °C)   Resp 18   Ht 5' 6\" (1.676 m)   Wt 65.3 kg (144 lb)   SpO2 98%   BMI 23.24 kg/m²      O2 Device: None (Room air)    Temp (24hrs), Av.1 °F (36.7 °C), Min:97.9 °F (36.6 °C), Max:98.3 °F (36.8 °C)      No intake/output data recorded. No intake/output data recorded. PHYSICAL EXAM:  General: alert, cooperative, no distress  Eye: conjunctivae/corneas clear. PERRL, EOM's intact. Throat and Neck: normal and no erythema or exudates noted. No mass   Lung: clear to auscultation bilaterally  Heart: regular rate and rhythm, normal S1/S2. Abdomen: Soft. Tenderness in epigastric region. Bowel sounds normal. No masses,  Extremities:  able to move all extremities normal, atraumatic  Skin: Normal.  Neurologic: AOx3. Cranial nerves 2-12 and sensation grossly intact. Psychiatric: non focal       Data Review    Recent Results (from the past 24 hour(s))   CBC WITH AUTOMATED DIFF    Collection Time: 07/04/21  1:00 PM   Result Value Ref Range    WBC 10.3 3.6 - 11.0 K/uL    RBC 4.34 3.80 - 5.20 M/uL    HGB 11.4 (L) 11.5 - 16.0 g/dL    HCT 35.5 35.0 - 47.0 %    MCV 81.8 80.0 - 99.0 FL    MCH 26.3 26.0 - 34.0 PG    MCHC 32.1 30.0 - 36.5 g/dL    RDW 13.6 11.5 - 14.5 %    PLATELET 238 481 - 408 K/uL    MPV 12.9 8.9 - 12.9 FL    NRBC 0.0 0.0  WBC    ABSOLUTE NRBC 0.00 0.00 - 0.01 K/uL    NEUTROPHILS 75 32 - 75 %    LYMPHOCYTES 12 12 - 49 %    MONOCYTES 12 5 - 13 %    EOSINOPHILS 1 0 - 7 %    BASOPHILS 0 0 - 1 %    IMMATURE GRANULOCYTES 0 0 - 0.5 %    ABS. NEUTROPHILS 7.8 1.8 - 8.0 K/UL    ABS. LYMPHOCYTES 1.2 0.8 - 3.5 K/UL    ABS. MONOCYTES 1.2 (H) 0.0 - 1.0 K/UL    ABS. EOSINOPHILS 0.1 0.0 - 0.4 K/UL    ABS. BASOPHILS 0.0 0.0 - 0.1 K/UL    ABS. IMM.  GRANS. 0.0 0.00 - 0.04 K/UL    DF AUTOMATED     METABOLIC PANEL, BASIC    Collection Time: 07/04/21  1:00 PM   Result Value Ref Range    Sodium 132 (L) 136 - 145 mmol/L    Potassium 4.5 3.5 - 5.1 mmol/L    Chloride 103 97 - 108 mmol/L    CO2 24 21 - 32 mmol/L    Anion gap 5 5 - 15 mmol/L    Glucose 230 (H) 65 - 100 mg/dL    BUN 15 6 - 20 mg/dL    Creatinine 0.86 0.55 - 1.02 mg/dL    BUN/Creatinine ratio 17 12 - 20      GFR est AA >60 >60 ml/min/1.73m2    GFR est non-AA >60 >60 ml/min/1.73m2    Calcium 8.5 8.5 - 10.1 mg/dL   LIPASE    Collection Time: 07/04/21  1:00 PM   Result Value Ref Range    Lipase 206 73 - 393 U/L   EKG, 12 LEAD, INITIAL    Collection Time: 07/04/21  1:44 PM   Result Value Ref Range    Ventricular Rate 95 BPM    Atrial Rate 95 BPM    P-R Interval 150 ms    QRS Duration 90 ms    Q-T Interval 406 ms    QTC Calculation (Bezet) 510 ms    Calculated P Axis 47 degrees    Calculated R Axis -18 degrees    Calculated T Axis -58 degrees    Diagnosis       Sinus rhythm with occasional Premature ventricular complexes  Possible Left atrial enlargement  Left ventricular hypertrophy  Cannot rule out Septal infarct , age undetermined  ST & T wave abnormality, consider inferolateral ischemia  Prolonged QT  Abnormal ECG    Confirmed by Ta Driver ([id number]) on 7/4/2021 6:20:59 PM     URINALYSIS W/ RFLX MICROSCOPIC    Collection Time: 07/04/21  3:00 PM   Result Value Ref Range    Color Yellow      Appearance Clear Clear      Specific gravity 1.005 1.003 - 1.030      pH (UA) 7.0 5.0 - 8.0      Protein Negative Negative mg/dL    Glucose 14 (A) Negative mg/dL    Ketone Negative Negative mg/dL    Bilirubin Negative Negative      Blood Negative Negative      Urobilinogen Normal 0.1 - 1.0 EU/dL    Nitrites Negative Negative      Leukocyte Esterase Negative Negative      WBC 0-4 0 - 4 /hpf    RBC 0-5 0 - 5 /hpf    Bacteria Negative Negative /hpf    Mucus Trace /lpf   LACTIC ACID    Collection Time: 07/04/21  7:40 PM   Result Value Ref Range    Lactic acid 1.2 0.4 - 2.0 mmol/L   HEPATIC FUNCTION PANEL    Collection Time: 07/04/21  7:40 PM   Result Value Ref Range    Protein, total 6.8 6.4 - 8.2 g/dL    Albumin 2.4 (L) 3.5 - 5.0 g/dL    Globulin 4.4 (H) 2.0 - 4.0 g/dL    A-G Ratio 0.5 (L) 1.1 - 2.2      Bilirubin, total 0.7 0.2 - 1.0 mg/dL    Bilirubin, direct 0.1 0.0 - 0.2 mg/dL    Alk. phosphatase 114 45 - 117 U/L    AST (SGOT) 13 (L) 15 - 37 U/L    ALT (SGPT) 11 (L) 12 - 78 U/L   GLUCOSE, POC    Collection Time: 07/04/21  8:12 PM   Result Value Ref Range    Glucose (POC) 160 (H) 65 - 117 mg/dL    Performed by Katherine Mathews    GLUCOSE, POC    Collection Time: 07/05/21  7:19 AM   Result Value Ref Range    Glucose (POC) 114 65 - 117 mg/dL    Performed by LEONARDA TINA        CT ABD PELV W CONT   Final Result      Hypodense cystlike lesion of tail of pancreas, and dilatation of distal   pancreatic duct are similar to prior.    Interval retropancreatic edema,? pancreatitis, versus vasculitis or other nonspecific edema. Interval small volume free fluid in the pelvis. Left colon diverticula without apparent diverticulitis. Extensive atheromatous changes of regional arteries, with stenosis of visceral   arteries and renal arteries. Discussed with Dr. Areli Bailey. Interval small bowel loop in left inguinal hernia, does not appear obstructed. Other findings unchanged. Active Problems:    Epigastric pain (7/4/2021)        Assessment/Plan:     1. Abdominal pain  2. Vomiting, nausea  3. Hx GI bleed  - CT of abdomen/pelvis showing hypodense cystlike lesion of pancreatic tail and dilation of distal pancreatic duct similar to prior CT of abdomen. Also shows extensive atheromatous changes to arteries with stenosis. - Stool occult pending. Hgb stable. - Enteric bacteria panel pending  - Send stool for ova & parasites  - S/p 1 L IV fluid bolus. Continue IV fluids.  - PRN pain mgmt  - Try on soft diet today. NPO at midnight in case she requires EGD tomorrow. - GI consulted, however not provider available until Tuesday      4. CHF w/ EF 45%  - Continue home medications     5. Diabetes mellitus  - ISS and accu-checks  - Lantus 5 units bedtime     6. Hypertension  - Continue home medications     7. Hx CVA with residual left-sided weakness  - Continue statin        DVT Prophylaxis: SCDs   Code Status: Full  POA  Dispo: Pending GI eval. Awaiting labs for GI workup including enteric bacteria panel and stool samples. Care Plan discussed with: patient and nursing. Total time spent with patient: >35 minutes.

## 2021-07-05 NOTE — PROGRESS NOTES
Problem: Falls - Risk of  Goal: *Absence of Falls  Description: Document Mary Correa Fall Risk and appropriate interventions in the flowsheet.   Outcome: Progressing Towards Goal  Note: Fall Risk Interventions:            Medication Interventions: Evaluate medications/consider consulting pharmacy, Patient to call before getting OOB, Teach patient to arise slowly                   Problem: Patient Education: Go to Patient Education Activity  Goal: Patient/Family Education  Outcome: Progressing Towards Goal     Problem: Pain  Goal: *Control of Pain  Outcome: Progressing Towards Goal  Goal: *PALLIATIVE CARE:  Alleviation of Pain  Outcome: Progressing Towards Goal     Problem: Patient Education: Go to Patient Education Activity  Goal: Patient/Family Education  Outcome: Progressing Towards Goal

## 2021-07-06 LAB
BASOPHILS # BLD: 0 K/UL (ref 0–0.1)
BASOPHILS NFR BLD: 0 % (ref 0–1)
CRP SERPL-MCNC: 13.2 MG/DL (ref 0–0.6)
DIFFERENTIAL METHOD BLD: ABNORMAL
EOSINOPHIL # BLD: 0.2 K/UL (ref 0–0.4)
EOSINOPHIL NFR BLD: 3 % (ref 0–7)
ERYTHROCYTE [DISTWIDTH] IN BLOOD BY AUTOMATED COUNT: 13.3 % (ref 11.5–14.5)
ERYTHROCYTE [SEDIMENTATION RATE] IN BLOOD: 33 MM/HR
GLUCOSE BLD STRIP.AUTO-MCNC: 100 MG/DL (ref 65–117)
GLUCOSE BLD STRIP.AUTO-MCNC: 101 MG/DL (ref 65–117)
GLUCOSE BLD STRIP.AUTO-MCNC: 101 MG/DL (ref 65–117)
GLUCOSE BLD STRIP.AUTO-MCNC: 336 MG/DL (ref 65–117)
GLUCOSE BLD STRIP.AUTO-MCNC: 94 MG/DL (ref 65–117)
HCT VFR BLD AUTO: 32.7 % (ref 35–47)
HGB BLD-MCNC: 10.5 G/DL (ref 11.5–16)
IMM GRANULOCYTES # BLD AUTO: 0 K/UL (ref 0–0.04)
IMM GRANULOCYTES NFR BLD AUTO: 0 % (ref 0–0.5)
LYMPHOCYTES # BLD: 1.4 K/UL (ref 0.8–3.5)
LYMPHOCYTES NFR BLD: 23 % (ref 12–49)
MCH RBC QN AUTO: 26.3 PG (ref 26–34)
MCHC RBC AUTO-ENTMCNC: 32.1 G/DL (ref 30–36.5)
MCV RBC AUTO: 82 FL (ref 80–99)
MONOCYTES # BLD: 0.6 K/UL (ref 0–1)
MONOCYTES NFR BLD: 11 % (ref 5–13)
NEUTS SEG # BLD: 3.7 K/UL (ref 1.8–8)
NEUTS SEG NFR BLD: 63 % (ref 32–75)
NRBC # BLD: 0 K/UL (ref 0–0.01)
NRBC BLD-RTO: 0 PER 100 WBC
PERFORMED BY, TECHID: ABNORMAL
PERFORMED BY, TECHID: NORMAL
PLATELET # BLD AUTO: 185 K/UL (ref 150–400)
PMV BLD AUTO: 12.9 FL (ref 8.9–12.9)
RBC # BLD AUTO: 3.99 M/UL (ref 3.8–5.2)
WBC # BLD AUTO: 5.9 K/UL (ref 3.6–11)

## 2021-07-06 PROCEDURE — 99222 1ST HOSP IP/OBS MODERATE 55: CPT | Performed by: SURGERY

## 2021-07-06 PROCEDURE — 86301 IMMUNOASSAY TUMOR CA 19-9: CPT

## 2021-07-06 PROCEDURE — 86038 ANTINUCLEAR ANTIBODIES: CPT

## 2021-07-06 PROCEDURE — 36415 COLL VENOUS BLD VENIPUNCTURE: CPT

## 2021-07-06 PROCEDURE — 74011636637 HC RX REV CODE- 636/637: Performed by: STUDENT IN AN ORGANIZED HEALTH CARE EDUCATION/TRAINING PROGRAM

## 2021-07-06 PROCEDURE — 86140 C-REACTIVE PROTEIN: CPT

## 2021-07-06 PROCEDURE — 83520 IMMUNOASSAY QUANT NOS NONAB: CPT

## 2021-07-06 PROCEDURE — 74011250636 HC RX REV CODE- 250/636: Performed by: STUDENT IN AN ORGANIZED HEALTH CARE EDUCATION/TRAINING PROGRAM

## 2021-07-06 PROCEDURE — 82962 GLUCOSE BLOOD TEST: CPT

## 2021-07-06 PROCEDURE — 74011250637 HC RX REV CODE- 250/637: Performed by: STUDENT IN AN ORGANIZED HEALTH CARE EDUCATION/TRAINING PROGRAM

## 2021-07-06 PROCEDURE — 85025 COMPLETE CBC W/AUTO DIFF WBC: CPT

## 2021-07-06 PROCEDURE — 85652 RBC SED RATE AUTOMATED: CPT

## 2021-07-06 PROCEDURE — 74011250636 HC RX REV CODE- 250/636: Performed by: INTERNAL MEDICINE

## 2021-07-06 PROCEDURE — 65270000029 HC RM PRIVATE

## 2021-07-06 PROCEDURE — 74011250636 HC RX REV CODE- 250/636: Performed by: PHYSICIAN ASSISTANT

## 2021-07-06 RX ORDER — SODIUM CHLORIDE 9 MG/ML
75 INJECTION, SOLUTION INTRAVENOUS CONTINUOUS
Status: DISCONTINUED | OUTPATIENT
Start: 2021-07-06 | End: 2021-07-06

## 2021-07-06 RX ORDER — POLYETHYLENE GLYCOL 3350 17 G/17G
17 POWDER, FOR SOLUTION ORAL DAILY
Status: DISCONTINUED | OUTPATIENT
Start: 2021-07-07 | End: 2021-07-08 | Stop reason: HOSPADM

## 2021-07-06 RX ADMIN — MORPHINE SULFATE 2 MG: 2 INJECTION, SOLUTION INTRAMUSCULAR; INTRAVENOUS at 11:45

## 2021-07-06 RX ADMIN — INSULIN GLARGINE 5 UNITS: 100 INJECTION, SOLUTION SUBCUTANEOUS at 21:53

## 2021-07-06 RX ADMIN — INSULIN LISPRO 8 UNITS: 100 INJECTION, SOLUTION INTRAVENOUS; SUBCUTANEOUS at 22:00

## 2021-07-06 RX ADMIN — METHYLPREDNISOLONE SODIUM SUCCINATE 125 MG: 125 INJECTION, POWDER, FOR SOLUTION INTRAMUSCULAR; INTRAVENOUS at 22:00

## 2021-07-06 RX ADMIN — Medication 10 ML: at 14:00

## 2021-07-06 RX ADMIN — MORPHINE SULFATE 2 MG: 2 INJECTION, SOLUTION INTRAMUSCULAR; INTRAVENOUS at 00:19

## 2021-07-06 RX ADMIN — ATORVASTATIN CALCIUM 40 MG: 40 TABLET, FILM COATED ORAL at 09:28

## 2021-07-06 RX ADMIN — LISINOPRIL 20 MG: 10 TABLET ORAL at 09:28

## 2021-07-06 RX ADMIN — SODIUM CHLORIDE 75 ML/HR: 9 INJECTION, SOLUTION INTRAVENOUS at 14:22

## 2021-07-06 RX ADMIN — Medication 10 ML: at 21:54

## 2021-07-06 RX ADMIN — METHYLPREDNISOLONE SODIUM SUCCINATE 125 MG: 125 INJECTION, POWDER, FOR SOLUTION INTRAMUSCULAR; INTRAVENOUS at 14:58

## 2021-07-06 NOTE — PROGRESS NOTES
Bedside and Verbal shift change report given to Kimberly MCDONALD (oncoming nurse) by Magali Nunez (offgoing nurse). Report included the following information SBAR, MAR and Recent Results.

## 2021-07-06 NOTE — PROGRESS NOTES
Hospitalist Progress Note    Subjective:   Daily Progress Note: 7/6/2021 7:53 AM    Hospital Course:  Lu Vargas is a [de-identified] y.o. female with a past medical history significant for diabetes mellitus, hypertension, CHF w/ EF 45%, hx CVA w/ left-sided weakness, hx GI bleed, and colon polyps presenting to the ED with a primary complaint of progressively worsening abdominal pain since Thursday evening. Patient states she ate some \" bad chicken salad,\" and began throwing up. She reports bitter taste in her mouth. Also reports constipation over the last couple of days. She denies any fever, chills, chest pain, palpitations, shortness of breath, dysuria. In the ED, CT of abdomen/pelvis showing hypodense cystlike lesion of pancreatic tail and dilation of distal pancreatic duct similar to prior CT of abdomen. Also shows extensive atheromatous changes to arteries with stenosis. GI consulted. Stool occult pending. Enteric bacteria panel pending. Subjective:    Patient seen and examined at bedside.   She reports improvement in epigastric pain and N/V, but has not had any food besides clear liquids since Friday     Current Facility-Administered Medications   Medication Dose Route Frequency    sodium chloride (NS) flush 5-40 mL  5-40 mL IntraVENous Q8H    sodium chloride (NS) flush 5-40 mL  5-40 mL IntraVENous PRN    acetaminophen (TYLENOL) tablet 650 mg  650 mg Oral Q6H PRN    Or    acetaminophen (TYLENOL) suppository 650 mg  650 mg Rectal Q6H PRN    polyethylene glycol (MIRALAX) packet 17 g  17 g Oral DAILY PRN    ondansetron (ZOFRAN ODT) tablet 4 mg  4 mg Oral Q8H PRN    Or    ondansetron (ZOFRAN) injection 4 mg  4 mg IntraVENous Q6H PRN    0.9% sodium chloride infusion  75 mL/hr IntraVENous CONTINUOUS    insulin glargine (LANTUS) injection 5 Units  5 Units SubCUTAneous QHS    lisinopriL (PRINIVIL, ZESTRIL) tablet 20 mg  20 mg Oral DAILY    [Held by provider] verapamil ER (VERELAN) capsule 240 mg  240 mg Oral DAILY    insulin lispro (HUMALOG) injection   SubCUTAneous AC&HS    glucose chewable tablet 16 g  4 Tablet Oral PRN    glucagon (GLUCAGEN) injection 1 mg  1 mg IntraMUSCular PRN    dextrose (D50W) injection syrg 12.5-25 g  25-50 mL IntraVENous PRN    atorvastatin (LIPITOR) tablet 40 mg  40 mg Oral DAILY        Review of Systems  Review of Systems   Constitutional: Positive for malaise/fatigue and weight loss. HENT: Negative. Eyes: Negative. Respiratory: Negative for cough and shortness of breath. Cardiovascular: Negative for chest pain and leg swelling. Gastrointestinal: Positive for abdominal pain. Negative for nausea and vomiting. Genitourinary: Negative. Musculoskeletal: Negative. Skin: Negative. Neurological: Negative. Psychiatric/Behavioral: Negative. Objective:     Visit Vitals  BP (!) 176/91 (BP 1 Location: Left upper arm, BP Patient Position: At rest;Sitting) Comment: notify RN   Pulse 95   Temp 97.6 °F (36.4 °C)   Resp 18   Ht 5' 6\" (1.676 m)   Wt 65.3 kg (144 lb)   SpO2 99%   BMI 23.24 kg/m²      O2 Device: None (Room air)    Temp (24hrs), Av.1 °F (36.7 °C), Min:97.4 °F (36.3 °C), Max:99 °F (37.2 °C)      No intake/output data recorded.  1901 -  0700  In: -   Out: 2 [Urine:2]    PHYSICAL EXAM:  Physical Exam  Vitals reviewed. Constitutional:       General: She is not in acute distress. Appearance: She is not ill-appearing. HENT:      Head: Normocephalic and atraumatic. Mouth/Throat:      Mouth: Mucous membranes are moist.      Pharynx: Oropharynx is clear. Eyes:      Conjunctiva/sclera: Conjunctivae normal.   Cardiovascular:      Rate and Rhythm: Normal rate and regular rhythm. Pulmonary:      Breath sounds: Normal breath sounds. Abdominal:      General: Abdomen is flat. Bowel sounds are normal.      Palpations: Abdomen is soft. Tenderness: There is abdominal tenderness (mid abdomen).    Musculoskeletal:         General: Normal range of motion. Cervical back: Normal range of motion and neck supple. Skin:     General: Skin is warm and dry. Neurological:      General: No focal deficit present. Mental Status: She is alert and oriented to person, place, and time. Mental status is at baseline. Psychiatric:         Mood and Affect: Mood normal.         Data Review    Recent Results (from the past 24 hour(s))   GLUCOSE, POC    Collection Time: 07/05/21  4:11 PM   Result Value Ref Range    Glucose (POC) 143 (H) 65 - 117 mg/dL    Performed by [address] [place] [address], POC    Collection Time: 07/05/21  7:45 PM   Result Value Ref Range    Glucose (POC) 217 (H) 65 - 117 mg/dL    Performed by [facility]    GLUCOSE, POC    Collection Time: 07/06/21  2:12 AM   Result Value Ref Range    Glucose (POC) 94 65 - 117 mg/dL    Performed by Aurelia Winchester    CBC WITH AUTOMATED DIFF    Collection Time: 07/06/21  6:15 AM   Result Value Ref Range    WBC 5.9 3.6 - 11.0 K/uL    RBC 3.99 3.80 - 5.20 M/uL    HGB 10.5 (L) 11.5 - 16.0 g/dL    HCT 32.7 (L) 35.0 - 47.0 %    MCV 82.0 80.0 - 99.0 FL    MCH 26.3 26.0 - 34.0 PG    MCHC 32.1 30.0 - 36.5 g/dL    RDW 13.3 11.5 - 14.5 %    PLATELET 296 765 - 435 K/uL    MPV 12.9 8.9 - 12.9 FL    NRBC 0.0 0.0  WBC    ABSOLUTE NRBC 0.00 0.00 - 0.01 K/uL    NEUTROPHILS 63 32 - 75 %    LYMPHOCYTES 23 12 - 49 %    MONOCYTES 11 5 - 13 %    EOSINOPHILS 3 0 - 7 %    BASOPHILS 0 0 - 1 %    IMMATURE GRANULOCYTES 0 0 - 0.5 %    ABS. NEUTROPHILS 3.7 1.8 - 8.0 K/UL    ABS. LYMPHOCYTES 1.4 0.8 - 3.5 K/UL    ABS. MONOCYTES 0.6 0.0 - 1.0 K/UL    ABS. EOSINOPHILS 0.2 0.0 - 0.4 K/UL    ABS. BASOPHILS 0.0 0.0 - 0.1 K/UL    ABS. IMM.  GRANS. 0.0 0.00 - 0.04 K/UL    DF AUTOMATED     GLUCOSE, POC    Collection Time: 07/06/21  7:34 AM   Result Value Ref Range    Glucose (POC) 101 65 - 117 mg/dL    Performed by Maddie Villafana, POC    Collection Time: 07/06/21 11:29 AM   Result Value Ref Range    Glucose (POC) 101 65 - 117 mg/dL    Performed by [facility]        CT ABD PELV W CONT   Final Result      Hypodense cystlike lesion of tail of pancreas, and dilatation of distal   pancreatic duct are similar to prior. Interval retropancreatic edema,? pancreatitis, versus vasculitis or other   nonspecific edema. Interval small volume free fluid in the pelvis. Left colon diverticula without apparent diverticulitis. Extensive atheromatous changes of regional arteries, with stenosis of visceral   arteries and renal arteries. Discussed with Dr. Neptali Lowry. Interval small bowel loop in left inguinal hernia, does not appear obstructed. Other findings unchanged. Active Problems:    Epigastric pain (7/4/2021)        Assessment/Plan:     1. Abdominal pain  2. Vomiting, nausea  3. Hx GI bleed  - CT of abdomen/pelvis showing hypodense cystlike lesion of pancreatic tail and dilation of distal pancreatic duct similar to prior CT of abdomen. Also shows extensive atheromatous changes to arteries with stenosis. - Stool occult pending. Hgb stable. - Enteric bacteria panel pending  - Send stool for ova & parasites  - Continue IV fluids.  - PRN pain mgmt  -Encourage p.o. intake  Awaiting decision on EGD  - GI consult pending  -Send CA 19-9  -ESR and CRP for concern for vasculitis will send vasculitis panel if elevated     4. CHF w/ EF 45%  - Continue home medications     5. Diabetes mellitus  - ISS and accu-checks  - Lantus 5 units bedtime     6. Hypertension  - Continue home medications     7. Hx CVA with residual left-sided weakness  - Continue statin        DVT Prophylaxis: SCDs   Code Status: Full  Dispo: Pending GI eval. Awaiting labs for GI workup including enteric bacteria panel and stool samples. Care Plan discussed with: patient and nursing. Total time spent with patient: >35 minutes.

## 2021-07-06 NOTE — PROGRESS NOTES
Reason for Admission:  Abdominal pain                     RUR Score:15%                     Plan for utilizing home health:  Patient stated Home Health just ended but patient cannot recall name of agency        PCP: First and Last name:  Arturo Mathias MD     Name of Practice:    Are you a current patient: Yes/No: Yes   Approximate date of last visit: July 2021   Can you participate in a virtual visit with your PCP:                     Current Advanced Directive/Advance Care Plan: Full Code  None    Healthcare Decision Maker:   Click here to complete [place] including selection of the Healthcare Decision Maker Relationship (ie \"Primary\")           Primary- Umesh Pang, donald [phone]                  Transition of Care Plan:      * Disposition-Home  * PCP F/U    Nichole Reveles is an [de-identified] yr old female who presents to hospital with complaint of abdominal pain. PMH significant for  Diabetes, diverticulosis, HTN and colon polyps. Demographics verified. Patient reports modified independence with ADL's/ IADL's. Patient has access to a RW, wheelchair and bedside commode. Patient states her 5 children are very supportive and her son lives close to her. Patient has utilized [facility] re[facility] and home health services in the past.    Care Management Interventions  PCP Verified by CM:  Yes  Discharge Durable Medical Equipment: No  Physical Therapy Consult: No  Occupational Therapy Consult: No  Speech Therapy Consult: No  Current Support Network: Own Home  The Plan for Transition of Care is Related to the Following Treatment Goals : Home with PCP F/U  The Patient and/or Patient Representative was Provided with a Choice of Provider and Agrees with the Discharge Plan?: Yes  Name of the Patient Representative Who was Provided with a Choice of Provider and Agrees with the Discharge Plan: Patient  [employer] Provided?: No  Discharge Location  Discharge Placement: Home  [employer], [employer], [id number]  Complex Care Coordinator/Francesco LESLIE: [phone]

## 2021-07-06 NOTE — CONSULTS
Gastroenterology Consult     Referring Physician: Ana Fenton MD     Consult Date: 7/6/2021     Subjective:     Chief Complaint: Progressively worsening abdominal pain after eating chicken salad. History of Present Illness: Mary Matamoros is a [de-identified] y.o. female who is seen in consultation for Epigastric pain and abnormal CT of the abdomen. In the ED, CT of abdomen/pelvis showing hypodense cystlike lesion of pancreatic tail and dilation of distal pancreatic duct similar to prior CT of abdomen.  Also shows extensive atheromatous changes to arteries with stenosis. Stool occult pending.  Enteric bacteria panel pending. She is on IV steroids. She currently denies abdominal pain, denies nausea, vomiting, diarrhea, or constipation. She states she just wants to eat. She reports being in [facility] in which she had a polyp removed, she was sent home and then had a stroke and was hospitalized at [facility]. She does have left side weakness. She states she did go to rehab. She states her abdomen was tender but currently it has improved. She states she has not had a BM since her admission. She does have a history of GI Bleed. HgB is stable at 10.5 this am. LFT's are Low. Lipase 206 on 7-4-21. Elevated CRP of 13.20    CT abdomen and pelvis, 100 cc [id number] 7/4/2021. Comparison 28 April 2021.     Cardiomegaly. There is an oblong fluid density lesion in tail of pancreas not differentiated  from dilatation of the distal pancreatic duct. This measures approximately 1.8  cm AP x 5.4 cm longitudinal. High density foci at the tip the pancreas could  represent calcification or surgical foci, obscured by motion. There is interval edema of the retropancreatic fat, including surrounding the  SMA and celiac axis, which are partly calcified and stenotic similar to prior. Left renal vein appears slightly narrowed by surrounding edema with mild  dilatation of the peripheral aspect of the vein compared with prior.  The IVC is  largely unopacified likely related to timing of contrast injection, with  contrast entering the IVC from bilateral renal vein. Small volume free fluid in the pelvis. Colonic diverticula more on the left, without apparent diverticulitis. Normal appearance of gallbladder, bile ducts, spleen. Small inferior right  hepatic cyst. Mildly prominent adrenal glands unchanged. Small bilateral hypodense lesions in the kidneys likely cysts. No  hydronephrosis. Atheromatous calcification and tortuosity of abdominal aorta and iliac arteries  without aneurysm. Calcific stenosis origin of bilateral renal artery. At least partial occlusion of a lateral branch of right internal iliac artery  unchanged from prior. Lumbar spondylosis and scoliosis. Previous supraumbilical herniation of fat and vessels unchanged. Small left inguinal hernia previously containing fat now also contains a small  bowel loop which is nonobstructed.     IMPRESSION     Hypodense cystlike lesion of tail of pancreas, and dilatation of distal  pancreatic duct are similar to prior. Interval retropancreatic edema,? pancreatitis, versus vasculitis or other  nonspecific edema. Interval small volume free fluid in the pelvis. Left colon diverticula without apparent diverticulitis. Extensive atheromatous changes of regional arteries, with stenosis of visceral  arteries and renal arteries. Discussed with Dr. Avis Stuart. Interval small bowel loop in left inguinal hernia, does not appear obstructed. Other findings unchanged.     Past Medical History:   Diagnosis Date    Colon polyps     Diabetes ([address]     Diverticulosis     Hypertension      Past Surgical History:   Procedure Laterality Date    COLONOSCOPY N/A 4/29/2021    COLONOSCOPY performed by Omar Albarran MD at [facility] ENDOSCOPY      Family History   Problem Relation Age of Onset    Diabetes Other      Social History     Tobacco Use    Smoking status: Never Smoker    Smokeless tobacco: Never Used   Substance Use Topics    Alcohol use: Not Currently      Allergies   Allergen Reactions    Penicillins Other (comments)     Pt states it makes her pass out     Current Facility-Administered Medications   Medication Dose Route Frequency    methylPREDNISolone (PF) (Solu-MEDROL) injection 125 mg  125 mg IntraVENous Q12H    sodium chloride (NS) flush 5-40 mL  5-40 mL IntraVENous Q8H    sodium chloride (NS) flush 5-40 mL  5-40 mL IntraVENous PRN    acetaminophen (TYLENOL) tablet 650 mg  650 mg Oral Q6H PRN    Or    acetaminophen (TYLENOL) suppository 650 mg  650 mg Rectal Q6H PRN    polyethylene glycol (MIRALAX) packet 17 g  17 g Oral DAILY PRN    ondansetron (ZOFRAN ODT) tablet 4 mg  4 mg Oral Q8H PRN    Or    ondansetron (ZOFRAN) injection 4 mg  4 mg IntraVENous Q6H PRN    0.9% sodium chloride infusion  75 mL/hr IntraVENous CONTINUOUS    insulin glargine (LANTUS) injection 5 Units  5 Units SubCUTAneous QHS    lisinopriL (PRINIVIL, ZESTRIL) tablet 20 mg  20 mg Oral DAILY    verapamil ER (VERELAN) capsule 240 mg  240 mg Oral DAILY    insulin lispro (HUMALOG) injection   SubCUTAneous AC&HS    glucose chewable tablet 16 g  4 Tablet Oral PRN    glucagon (GLUCAGEN) injection 1 mg  1 mg IntraMUSCular PRN    dextrose (D50W) injection syrg 12.5-25 g  25-50 mL IntraVENous PRN    atorvastatin (LIPITOR) tablet 40 mg  40 mg Oral DAILY        Review of Systems:  A detailed 10 organ review of systems is obtained with pertinent positives as listed in the History of Present Illness and Past Medical History. All others are negative. Objective:     Physical Exam:  Visit Vitals  BP (!) 176/91 (BP 1 Location: Left upper arm, BP Patient Position: At rest;Sitting) Comment: notify RN   Pulse 94   Temp 97.6 °F (36.4 °C)   Resp 18   Ht 5' 6\" (1.676 m)   Wt 65.3 kg (144 lb)   SpO2 99%   BMI 23.24 kg/m²        Skin:  Extremities and face reveal no rashes. No juarez erythema. No telangiectasias on the chest wall.   HEENT: Sclerae anicteric. Extra-occular muscles are intact. No oral ulcers. No abnormal pigmentation of the lips. The neck is supple. Cardiovascular: Regular rate and rhythm. Respiratory:  Comfortable breathing with no accessory muscle use. GI:  Abdomen nondistended, soft, and nontender. Normal active bowel sounds. No enlargement of the liver or spleen. No masses palpable. Rectal:  Deferred  Musculoskeletal: Extremities have good range of motion. Neurological:  Gross memory appears intact. Patient is alert and oriented. Psychiatric:  Mood appears appropriate with judgement intact. Lymphatic:  No cervical or supraclavicular adenopathy. Lab/Data Review:  CMP: No results found for: NA, K, CL, CO2, AGAP, GLU, BUN, CREA, GFRAA, GFRNA, CA, MG, PHOS, ALB, TBIL, TP, ALB, GLOB, AGRAT, ALT  CBC:   Lab Results   Component Value Date/Time    WBC 5.9 07/06/2021 06:15 AM    HGB 10.5 (L) 07/06/2021 06:15 AM    HCT 32.7 (L) 07/06/2021 06:15 AM     07/06/2021 06:15 AM     COAGS: No results found for: APTT, PTP, INR, INREXT, INREXT  Liver Panel: No results found for: ALB, CBIL, TBIL, TP, GLOB, AGRAT, ASTPOC, ALTPOC, ALT, AP  Pancreatic Markers: No results found for: AMYLPOCT, AML, LIPPOCT, LPSE      Assessment/Plan:     1. Abdominal Pain/ Acute Pancreatitis      CT of abdomen/pelvis showing hypodense cystlike lesion of pancreatic tail and dilation of distal pancreatic duct similar to prior CT of abdomen.  Also shows extensive atheromatous changes to arteries with stenosis. Thank you for allowing me to participate in this patients care     Clear Liquid diet      Enteric Bacteria panel pending      Stool panel pending      Possible EUS when lipase improved      Lipase 206 this am  2. Possible Vasculitis      ESR and CRP elevated concern for vasculitis      ANCA panel pending      Continue IV steroids        pending  3. CHF with EF 45%   4. Type 2 Diabetes    5.  Hx of CVA with left side weakness       Currently not on anti-coagulation, hx of GI Bleed. Thank you for the privilege to care for this pleasant lady  Plan discussed with Dr. Tiana Grimaldo and he approves.

## 2021-07-06 NOTE — PROGRESS NOTES
Nutrition Education    · Verbally reviewed information with Patient  · Educated on DM MNT  · Written educational materials provided- Carb counting for ppl with DM, MyPlate handout  · Contact name and number provided. · Refer to Patient Education activity for more details. RD spoke to pt bedside. Pt reports not following DM diet PTA. Diet recall B- padilla, eggs, biscuit, gravy L- chicken salad D- fried chicken, mashed potatoes, peas, salads. Bevs - reg pepsi 16 oz/d, water 32 oz/d, Apple or orange juice 8 oz/d. Reports does not drink any coffee. BG PTA ALDEN. Current BG running  mg/dL. RD discussed importance of following DM diet to manage BG and reduce risk of neuropathy. RD reviewed cho foods and BG impacts. Reviewed protein impacts on BG and sources. Discussed importance of pairing protein and cho with all meals for glucose management. Encouraged pt to look into CGM and check BG daily once d/c. Reviewed importance of listening to body's cues to for signs of hypo/hyperglycemia. Encouraged pt to have OJ w/ hypoglycemia and wait to have protein rich foods until BG improves. RD reviewed foods to increase and limit for optimal BG. Additionally advised pt to have more lean proteins (reviewed sources), low fat dairy, healthy fats (fish, nuts/seeds, olive oil, avocados), variety of f/v etc. Reviewed foods to limit including: whole fat dairy, SSB to no more than 8oz/d (preferably sugar free), processed and refined goods, cured/deli meats etc. Encouraged water intake of 64 oz/d. Reviewed balanced meals as evidenced by MyPlate. Discussed having each meal contain half plate filled with f/v and other half lean proteins and complex chos. Told pt to increase whole grains for fullness and optimal BG control, discussed food sources. Encouraged pt to limit fruit juice and pick whole fruit/foods instead. RD gave pt contact info and encouraged to reach out with further questions or concerns.     Lab Results   Component Value Date/Time    Hemoglobin A1c 12.6 (H) 04/28/2021 06:16 AM         Electronically signed by Jodee Villa RD on 7/6/2021 at 12:44 PM    Contact Number: Ext [phone]

## 2021-07-07 ENCOUNTER — APPOINTMENT (OUTPATIENT)
Dept: CT IMAGING | Age: 81
DRG: 545 | End: 2021-07-07
Attending: PHYSICIAN ASSISTANT
Payer: MEDICARE

## 2021-07-07 LAB
ALBUMIN SERPL-MCNC: 2.9 G/DL (ref 3.5–5)
ALBUMIN/GLOB SERPL: 0.6 {RATIO} (ref 1.1–2.2)
ALP SERPL-CCNC: 116 U/L (ref 45–117)
ALT SERPL-CCNC: 12 U/L (ref 12–78)
ANION GAP SERPL CALC-SCNC: 10 MMOL/L (ref 5–15)
AST SERPL W P-5'-P-CCNC: 8 U/L (ref 15–37)
BASOPHILS # BLD: 0 K/UL (ref 0–0.1)
BASOPHILS NFR BLD: 0 % (ref 0–1)
BILIRUB SERPL-MCNC: 0.6 MG/DL (ref 0.2–1)
BUN SERPL-MCNC: 14 MG/DL (ref 6–20)
BUN/CREAT SERPL: 16 (ref 12–20)
CA-I BLD-MCNC: 9.1 MG/DL (ref 8.5–10.1)
CANCER AG19-9 SERPL-ACNC: 33 U/ML (ref 0–35)
CHLORIDE SERPL-SCNC: 103 MMOL/L (ref 97–108)
CO2 SERPL-SCNC: 22 MMOL/L (ref 21–32)
CREAT SERPL-MCNC: 0.87 MG/DL (ref 0.55–1.02)
DIFFERENTIAL METHOD BLD: ABNORMAL
EOSINOPHIL # BLD: 0 K/UL (ref 0–0.4)
EOSINOPHIL NFR BLD: 0 % (ref 0–7)
ERYTHROCYTE [DISTWIDTH] IN BLOOD BY AUTOMATED COUNT: 13.2 % (ref 11.5–14.5)
GLOBULIN SER CALC-MCNC: 4.7 G/DL (ref 2–4)
GLUCOSE BLD STRIP.AUTO-MCNC: 260 MG/DL (ref 65–117)
GLUCOSE BLD STRIP.AUTO-MCNC: 298 MG/DL (ref 65–117)
GLUCOSE BLD STRIP.AUTO-MCNC: 331 MG/DL (ref 65–117)
GLUCOSE SERPL-MCNC: 257 MG/DL (ref 65–100)
HCT VFR BLD AUTO: 37.4 % (ref 35–47)
HGB BLD-MCNC: 11.8 G/DL (ref 11.5–16)
IMM GRANULOCYTES # BLD AUTO: 0 K/UL (ref 0–0.04)
IMM GRANULOCYTES NFR BLD AUTO: 0 % (ref 0–0.5)
LIPASE SERPL-CCNC: 90 U/L (ref 73–393)
LYMPHOCYTES # BLD: 0.5 K/UL (ref 0.8–3.5)
LYMPHOCYTES NFR BLD: 9 % (ref 12–49)
MCH RBC QN AUTO: 25.8 PG (ref 26–34)
MCHC RBC AUTO-ENTMCNC: 31.6 G/DL (ref 30–36.5)
MCV RBC AUTO: 81.7 FL (ref 80–99)
MONOCYTES # BLD: 0.1 K/UL (ref 0–1)
MONOCYTES NFR BLD: 2 % (ref 5–13)
NEUTS SEG # BLD: 5.2 K/UL (ref 1.8–8)
NEUTS SEG NFR BLD: 89 % (ref 32–75)
NRBC # BLD: 0 K/UL (ref 0–0.01)
NRBC BLD-RTO: 0 PER 100 WBC
PERFORMED BY, TECHID: ABNORMAL
PLATELET # BLD AUTO: 218 K/UL (ref 150–400)
PMV BLD AUTO: 12.9 FL (ref 8.9–12.9)
POTASSIUM SERPL-SCNC: 3.7 MMOL/L (ref 3.5–5.1)
PROT SERPL-MCNC: 7.6 G/DL (ref 6.4–8.2)
RBC # BLD AUTO: 4.58 M/UL (ref 3.8–5.2)
SODIUM SERPL-SCNC: 135 MMOL/L (ref 136–145)
WBC # BLD AUTO: 5.8 K/UL (ref 3.6–11)

## 2021-07-07 PROCEDURE — 74011250636 HC RX REV CODE- 250/636: Performed by: STUDENT IN AN ORGANIZED HEALTH CARE EDUCATION/TRAINING PROGRAM

## 2021-07-07 PROCEDURE — 74011636637 HC RX REV CODE- 636/637: Performed by: PHYSICIAN ASSISTANT

## 2021-07-07 PROCEDURE — 82962 GLUCOSE BLOOD TEST: CPT

## 2021-07-07 PROCEDURE — 80053 COMPREHEN METABOLIC PANEL: CPT

## 2021-07-07 PROCEDURE — 74176 CT ABD & PELVIS W/O CONTRAST: CPT

## 2021-07-07 PROCEDURE — 74011250637 HC RX REV CODE- 250/637: Performed by: STUDENT IN AN ORGANIZED HEALTH CARE EDUCATION/TRAINING PROGRAM

## 2021-07-07 PROCEDURE — 65270000029 HC RM PRIVATE

## 2021-07-07 PROCEDURE — 74011636637 HC RX REV CODE- 636/637: Performed by: STUDENT IN AN ORGANIZED HEALTH CARE EDUCATION/TRAINING PROGRAM

## 2021-07-07 PROCEDURE — 36415 COLL VENOUS BLD VENIPUNCTURE: CPT

## 2021-07-07 PROCEDURE — 74011250637 HC RX REV CODE- 250/637: Performed by: NURSE PRACTITIONER

## 2021-07-07 PROCEDURE — 83690 ASSAY OF LIPASE: CPT

## 2021-07-07 PROCEDURE — 74011250636 HC RX REV CODE- 250/636: Performed by: PHYSICIAN ASSISTANT

## 2021-07-07 PROCEDURE — 85025 COMPLETE CBC W/AUTO DIFF WBC: CPT

## 2021-07-07 PROCEDURE — 83036 HEMOGLOBIN GLYCOSYLATED A1C: CPT

## 2021-07-07 RX ORDER — DEXTROSE 50 % IN WATER (D50W) INTRAVENOUS SYRINGE
25-50 AS NEEDED
Status: DISCONTINUED | OUTPATIENT
Start: 2021-07-07 | End: 2021-07-08 | Stop reason: HOSPADM

## 2021-07-07 RX ORDER — INSULIN GLARGINE 100 [IU]/ML
20 INJECTION, SOLUTION SUBCUTANEOUS
Status: DISCONTINUED | OUTPATIENT
Start: 2021-07-07 | End: 2021-07-08 | Stop reason: HOSPADM

## 2021-07-07 RX ORDER — MAGNESIUM SULFATE 100 %
4 CRYSTALS MISCELLANEOUS AS NEEDED
Status: DISCONTINUED | OUTPATIENT
Start: 2021-07-07 | End: 2021-07-08 | Stop reason: HOSPADM

## 2021-07-07 RX ORDER — INSULIN LISPRO 100 [IU]/ML
INJECTION, SOLUTION INTRAVENOUS; SUBCUTANEOUS
Status: DISCONTINUED | OUTPATIENT
Start: 2021-07-07 | End: 2021-07-08 | Stop reason: HOSPADM

## 2021-07-07 RX ADMIN — INSULIN LISPRO 8 UNITS: 100 INJECTION, SOLUTION INTRAVENOUS; SUBCUTANEOUS at 12:37

## 2021-07-07 RX ADMIN — SODIUM CHLORIDE 75 ML/HR: 9 INJECTION, SOLUTION INTRAVENOUS at 16:27

## 2021-07-07 RX ADMIN — POLYETHYLENE GLYCOL 3350 17 G: 17 POWDER, FOR SOLUTION ORAL at 09:13

## 2021-07-07 RX ADMIN — Medication 10 ML: at 22:56

## 2021-07-07 RX ADMIN — INSULIN GLARGINE 20 UNITS: 100 INJECTION, SOLUTION SUBCUTANEOUS at 22:47

## 2021-07-07 RX ADMIN — ONDANSETRON 4 MG: 2 INJECTION INTRAMUSCULAR; INTRAVENOUS at 10:01

## 2021-07-07 RX ADMIN — METHYLPREDNISOLONE SODIUM SUCCINATE 125 MG: 125 INJECTION, POWDER, FOR SOLUTION INTRAMUSCULAR; INTRAVENOUS at 09:13

## 2021-07-07 RX ADMIN — INSULIN LISPRO 6 UNITS: 100 INJECTION, SOLUTION INTRAVENOUS; SUBCUTANEOUS at 09:14

## 2021-07-07 RX ADMIN — Medication 10 ML: at 16:28

## 2021-07-07 RX ADMIN — LISINOPRIL 20 MG: 10 TABLET ORAL at 09:12

## 2021-07-07 RX ADMIN — Medication 10 ML: at 05:48

## 2021-07-07 RX ADMIN — METHYLPREDNISOLONE SODIUM SUCCINATE 125 MG: 125 INJECTION, POWDER, FOR SOLUTION INTRAMUSCULAR; INTRAVENOUS at 20:12

## 2021-07-07 RX ADMIN — INSULIN LISPRO 2 UNITS: 100 INJECTION, SOLUTION INTRAVENOUS; SUBCUTANEOUS at 22:47

## 2021-07-07 RX ADMIN — ATORVASTATIN CALCIUM 40 MG: 40 TABLET, FILM COATED ORAL at 09:12

## 2021-07-07 RX ADMIN — INSULIN LISPRO 7 UNITS: 100 INJECTION, SOLUTION INTRAVENOUS; SUBCUTANEOUS at 16:28

## 2021-07-07 RX ADMIN — VERAPAMIL HYDROCHLORIDE 240 MG: 120 CAPSULE, DELAYED RELEASE PELLETS ORAL at 09:13

## 2021-07-07 NOTE — CONSULTS
History and Physical    Chief complaints: Abdominal pain. History of Presenting Illness:  Brenda Andino is a [de-identified] y.o. pleasant woman currently hospitalized with a vague abdominal pain. I was consulted for abnormal CT scan findings including hernia. Patient examined this afternoon. Patient was accompanied by her with her son as well. Patient apparently have this abdominal pain and discomfort for at least for 5-month duration. She also claims she has a significant weight loss duration of 6 months. Pain is located epigastric area mid abdomen. Pain is described dull aching pain throughout the day. Patient also has a poor appetite. Diet make it worse. Patient bowel movements normal.  There is no other relieving factor. Patient had a CT scan done which shows some abdominal findings of distal pancreatic cystic fluid collections and the retroperitoneal edema. Patient also noted to have inguinal hernia on the left side. Past Medical History:   Diagnosis Date    Colon polyps     Diabetes ([address]     Diverticulosis     Hypertension       Past Surgical History:   Procedure Laterality Date    COLONOSCOPY N/A 4/29/2021    COLONOSCOPY performed by Bharati Vilchis MD at [facility] ENDOSCOPY     Family History   Problem Relation Age of Onset    Diabetes Other       Social History     Tobacco Use    Smoking status: Never Smoker    Smokeless tobacco: Never Used   Substance Use Topics    Alcohol use: Not Currently       Prior to Admission medications    Medication Sig Start Date End Date Taking? Authorizing Provider   acetaminophen (TYLENOL) 325 mg tablet Take 2 Tabs by mouth every four (4) hours as needed for Pain or Fever (For temp greater than or equal to 38.5 C or 101.3 F (Unless hepatic failure or contrindicated). Give first line for fever. ). 5/6/21   Lei Jimenez MD   atorvastatin (LIPITOR) 80 mg tablet Take 1 Tab by mouth daily.  5/7/21   Lei Jiemnez MD   insulin lispro (HUMALOG) 100 unit/mL injection INITIATE CORRECTIVE INSULIN PROTOCOL (MARY):  High Sensitivity (thin, ESRD):    AC (before meals), Q6H, and Q4H CORRECTIONAL SCALE only For Blood Sugar (mg/dl) of :             140-199=0 units            200-249=2 units  250-299=3 units  300-349=4 units  350 or greater = Call MD  Give in addition to basal medications. Do Not Hold for NPO    BEDTIME CORRECTIONAL sliding scale when scheduled:  200-249=1 units  250-349=2 units  350 or greater = Call MD  Give in addition to basal medications. Do Not Hold for NPO Fast Acting - Administer Immediately - or within 15 minutes of start of meal, if mealtime coverage. 5/6/21   Gerrit Harada, MD   lisinopriL (PRINIVIL, ZESTRIL) 20 mg tablet Take 1 Tab by mouth daily. 5/7/21   Gerrit Harada, MD   insulin glargine (LANTUS) 100 unit/mL injection Take 5 units daily at bedtime 5/6/21   Gerrit Harada, MD   insulin lispro (HUMALOG) 100 unit/mL injection Take 3 units tid 5/6/21   Gerrit Harada, MD   verapamil ER (VERELAN) 240 mg ER capsule Take 240 mg by mouth daily. Jamal Couch MD   glimepiride (AMARYL) 4 mg tablet Take 4 mg by mouth every morning. Jamal Couch MD     Allergies   Allergen Reactions    Penicillins Other (comments)     Pt states it makes her pass out        Review of Systems:  Pertinent review of systems discussed in HPI, and rest of organ systems personally reviewed and they are negative. Objective:   Vital signs reviewed:      Visit Vitals  /77 (BP 1 Location: Right arm)   Pulse 80   Temp 98.3 °F (36.8 °C)   Resp 16   Ht 5' 6\" (1.676 m)   Wt 144 lb (65.3 kg)   SpO2 98%   BMI 23.24 kg/m²       Physical Exam:   General appearance:   Patient is awake and alert  Head and neck atraumatic  ENT oral mucosa is normal no jaundice no hoarse voice  Eyes: Pupils equal gaze appropriate.   Cardiac system regular rate  Pulmonary no audible wheeze  Chest wall excursion is normal with respiration cycle  Abdomen soft mildly tender on deep palpation patient does have reducible hernia in the left groin. Groin is not tender. Skin is warm and moist.  Hematologic system no bruising  Neurologically intact nonfocal, cranial nerves intact  Extremities moving all 4 extremities. Vascular status: Well-perfused distal lower leg. Data Review: Labs are reviewed. Discussed  Recent Results (from the past 24 hour(s))   CBC WITH AUTOMATED DIFF    Collection Time: 07/06/21  6:15 AM   Result Value Ref Range    WBC 5.9 3.6 - 11.0 K/uL    RBC 3.99 3.80 - 5.20 M/uL    HGB 10.5 (L) 11.5 - 16.0 g/dL    HCT 32.7 (L) 35.0 - 47.0 %    MCV 82.0 80.0 - 99.0 FL    MCH 26.3 26.0 - 34.0 PG    MCHC 32.1 30.0 - 36.5 g/dL    RDW 13.3 11.5 - 14.5 %    PLATELET 806 813 - 065 K/uL    MPV 12.9 8.9 - 12.9 FL    NRBC 0.0 0.0  WBC    ABSOLUTE NRBC 0.00 0.00 - 0.01 K/uL    NEUTROPHILS 63 32 - 75 %    LYMPHOCYTES 23 12 - 49 %    MONOCYTES 11 5 - 13 %    EOSINOPHILS 3 0 - 7 %    BASOPHILS 0 0 - 1 %    IMMATURE GRANULOCYTES 0 0 - 0.5 %    ABS. NEUTROPHILS 3.7 1.8 - 8.0 K/UL    ABS. LYMPHOCYTES 1.4 0.8 - 3.5 K/UL    ABS. MONOCYTES 0.6 0.0 - 1.0 K/UL    ABS. EOSINOPHILS 0.2 0.0 - 0.4 K/UL    ABS. BASOPHILS 0.0 0.0 - 0.1 K/UL    ABS. IMM.  GRANS. 0.0 0.00 - 0.04 K/UL    DF AUTOMATED     SED RATE (ESR)    Collection Time: 07/06/21  6:15 AM   Result Value Ref Range    Sed rate, automated 33 mm/hr   C REACTIVE PROTEIN, QT    Collection Time: 07/06/21  6:15 AM   Result Value Ref Range    C-Reactive protein 13.20 (H) 0.00 - 0.60 mg/dL   GLUCOSE, POC    Collection Time: 07/06/21  7:34 AM   Result Value Ref Range    Glucose (POC) 101 65 - 117 mg/dL    Performed by Scott Shafer, POC    Collection Time: 07/06/21 11:29 AM   Result Value Ref Range    Glucose (POC) 101 65 - 117 mg/dL    Performed by Shona BLANCO, POC    Collection Time: 07/06/21  5:35 PM   Result Value Ref Range    Glucose (POC) 100 65 - 117 mg/dL    Performed by Kelby Li POC    Collection Time: 07/06/21  9:16 PM   Result Value Ref Range    Glucose (POC) 336 (H) 65 - 117 mg/dL    Performed by Robbie BABIN Raymond)          Imagings reviewed: discussed as below. Assessment:     Active Problems:    Epigastric pain (7/4/2021)        Plan:     I reviewed the CT scan abdomen pelvis. Patient does have a distal pancreatic cystic fluid collections. Also significant retroperitoneal edema. Etiology unclear. Patient's right inguinal hernia is asymptomatic. It is easily reducible on the left groin. I do not think inguinal hernia is a cause of patient's abdominal discomfort. Patient still have a gallbladder as well. Patient does have mild pancreatitis at least based on biochemical profile. Etiology unclear about retroperitoneal edema with tail of pancreatic cystic fluid collections. I do recommend ERCP or MRCP to delineate the proximal pancreatic duct stricture. Also recommend a gallbladder ultrasound as well. Etiology is unclear, possible differential diagnosis including probable chronic pancreatitis secondary to possible gallbladder disease, chronic pancreatic duct stricture secondary to this. But this does not explain retroperitoneal edema. I will continue monitor her progress.   I thank you for consultation and I will participate with the care of this patient

## 2021-07-07 NOTE — PROGRESS NOTES
Patient went down to CT Scan and IV site to right wrist came out and MD on call was notified and awaiting a response; patient refused to be stuck again because she was stuck many times yesterday. She is not being observed with any signs of discomfort or nausea and vomiting and diet was upgraded today to a regular low fat tolerating without any complaints.

## 2021-07-07 NOTE — PROGRESS NOTES
Progress Note    Patient: Pema Olmedo MRN: [id number]  SSN: [id number]    YOB: 1940  Age: [de-identified] y.o. Sex: female      Admit Date: 7/4/2021    LOS: 3 days     Subjective:   Patient seen resting at this time. She denies abdominal pain, nausea, vomiting, diarrhea, or constipation. Her lipase improved this am to 90. Will advance her diet. She is still on IV steroids. Abdomen is soft, non-tender, bowel sounds normo-active. CT Abdomen: IMPRESSION   Hypodense cystlike lesion of tail of pancreas, and dilatation of distal  pancreatic duct are similar to prior. Interval retropancreatic edema,? pancreatitis, versus vasculitis or other  nonspecific edema. Interval small volume free fluid in the pelvis. Left colon diverticula without apparent diverticulitis. Extensive atheromatous changes of regional arteries, with stenosis of visceral  arteries and renal arteries. Discussed with Dr. Angelique Carrel. Interval small bowel loop in left inguinal hernia, does not appear obstructed. Other findings unchanged. Objective:     Vitals:    07/07/21 1105 07/07/21 1200 07/07/21 1550 07/07/21 1600   BP: (!) 166/88  (!) 142/79    Pulse: 100 94 98 97   Resp: 20  16    Temp: 97.4 °F (36.3 °C)  98 °F (36.7 °C)    SpO2: 100%  98%    Weight:       Height:            Intake and Output:  Current Shift: No intake/output data recorded. Last three shifts: 07/05 1901 - 07/07 0700  In: 360 [P.O.:360]  Out: 2 [Urine:2]    Physical Exam:   Skin:  Extremities and face reveal no rashes. No juarez erythema. HEENT: Sclerae anicteric. Extra-occular muscles are intact. No abnormal pigmentation of the lips. The neck is supple. Cardiovascular: Regular rate and rhythm. Respiratory:  Comfortable breathing with no accessory muscle use. GI:  Abdomen nondistended, soft, and nontender. No enlargement of the liver or spleen. No masses palpable. Rectal:  Deferred  Musculoskeletal: Extremities have good range of motion.   Neurological: Gross memory appears intact. Patient is alert and oriented. Psychiatric:  Mood appears appropriate with judgement intact. Lymphatic:  No visible adenopathy      Lab/Data Review:  Recent Results (from the past 24 hour(s))   GLUCOSE, POC    Collection Time: 07/06/21  5:35 PM   Result Value Ref Range    Glucose (POC) 100 65 - 117 mg/dL    Performed by [address] [place] [address], POC    Collection Time: 07/06/21  9:16 PM   Result Value Ref Range    Glucose (POC) 336 (H) 65 - 117 mg/dL    Performed by Fay Emery ([place])    GLUCOSE, POC    Collection Time: 07/07/21  7:24 AM   Result Value Ref Range    Glucose (POC) 260 (H) 65 - 117 mg/dL    Performed by Dae Madden    METABOLIC PANEL, COMPREHENSIVE    Collection Time: 07/07/21  7:46 AM   Result Value Ref Range    Sodium 135 (L) 136 - 145 mmol/L    Potassium 3.7 3.5 - 5.1 mmol/L    Chloride 103 97 - 108 mmol/L    CO2 22 21 - 32 mmol/L    Anion gap 10 5 - 15 mmol/L    Glucose 257 (H) 65 - 100 mg/dL    BUN 14 6 - 20 mg/dL    Creatinine 0.87 0.55 - 1.02 mg/dL    BUN/Creatinine ratio 16 12 - 20      GFR est AA >60 >60 ml/min/1.73m2    GFR est non-AA >60 >60 ml/min/1.73m2    Calcium 9.1 8.5 - 10.1 mg/dL    Bilirubin, total 0.6 0.2 - 1.0 mg/dL    AST (SGOT) 8 (L) 15 - 37 U/L    ALT (SGPT) 12 12 - 78 U/L    Alk.  phosphatase 116 45 - 117 U/L    Protein, total 7.6 6.4 - 8.2 g/dL    Albumin 2.9 (L) 3.5 - 5.0 g/dL    Globulin 4.7 (H) 2.0 - 4.0 g/dL    A-G Ratio 0.6 (L) 1.1 - 2.2     CBC WITH AUTOMATED DIFF    Collection Time: 07/07/21  7:46 AM   Result Value Ref Range    WBC 5.8 3.6 - 11.0 K/uL    RBC 4.58 3.80 - 5.20 M/uL    HGB 11.8 11.5 - 16.0 g/dL    HCT 37.4 35.0 - 47.0 %    MCV 81.7 80.0 - 99.0 FL    MCH 25.8 (L) 26.0 - 34.0 PG    MCHC 31.6 30.0 - 36.5 g/dL    RDW 13.2 11.5 - 14.5 %    PLATELET 964 036 - 289 K/uL    MPV 12.9 8.9 - 12.9 FL    NRBC 0.0 0.0  WBC    ABSOLUTE NRBC 0.00 0.00 - 0.01 K/uL    NEUTROPHILS 89 (H) 32 - 75 %    LYMPHOCYTES 9 (L) 12 - 49 %    MONOCYTES 2 (L) 5 - 13 %    EOSINOPHILS 0 0 - 7 %    BASOPHILS 0 0 - 1 %    IMMATURE GRANULOCYTES 0 0 - 0.5 %    ABS. NEUTROPHILS 5.2 1.8 - 8.0 K/UL    ABS. LYMPHOCYTES 0.5 (L) 0.8 - 3.5 K/UL    ABS. MONOCYTES 0.1 0.0 - 1.0 K/UL    ABS. EOSINOPHILS 0.0 0.0 - 0.4 K/UL    ABS. BASOPHILS 0.0 0.0 - 0.1 K/UL    ABS. IMM. GRANS. 0.0 0.00 - 0.04 K/UL    DF AUTOMATED     LIPASE    Collection Time: 07/07/21  7:46 AM   Result Value Ref Range    Lipase 90 73 - 393 U/L   GLUCOSE, POC    Collection Time: 07/07/21 10:56 AM   Result Value Ref Range    Glucose (POC) 331 (H) 65 - 117 mg/dL    Performed by Dae Madden    GLUCOSE, POC    Collection Time: 07/07/21  3:49 PM   Result Value Ref Range    Glucose (POC) 298 (H) 65 - 117 mg/dL    Performed by Jami Ireland               CT ABD PELV W CONT   Final Result      Hypodense cystlike lesion of tail of pancreas, and dilatation of distal   pancreatic duct are similar to prior. Interval retropancreatic edema,? pancreatitis, versus vasculitis or other   nonspecific edema. Interval small volume free fluid in the pelvis. Left colon diverticula without apparent diverticulitis. Extensive atheromatous changes of regional arteries, with stenosis of visceral   arteries and renal arteries. Discussed with Dr. Chris Stroud. Interval small bowel loop in left inguinal hernia, does not appear obstructed. Other findings unchanged. CT ABD PELV WO CONT    (Results Pending)       Assessment:     Active Problems:    Epigastric pain (7/4/2021)        Plan:   1. Abdominal Pain/ Acute Pancreatitis      CT of abdomen/pelvis showing hypodense cystlike lesion of pancreatic tail and dilation of distal pancreatic duct similar to prior CT of abdomen. Also shows extensive atheromatous changes to arteries with stenosis.   Thank you for allowing me to participate in this patients care     Low fat diet     Enteric Bacteria panel pending      Stool panel pending      EUS as out patient      Lipase 90 this am  2. Possible Vasculitis      ESR and CRP elevated concern for vasculitis      ANCA panel pending      Continue IV steroids        33  3. CHF with EF 45%   4. Type 2 Diabetes    5. Hx of CVA with left side weakness       Currently not on anti-coagulation, hx of GI Bleed. Thank you for allowing me to participate in this patients care  Plan discussed with Dr. Sangeetha Gee and he approves.     Signed By: Teresa Baeza NP     July 7, 2021

## 2021-07-07 NOTE — PROGRESS NOTES
Problem: Falls - Risk of  Goal: *Absence of Falls  Description: Document Rahul Mccormick Fall Risk and appropriate interventions in the flowsheet.   Outcome: Progressing Towards Goal  Note: Fall Risk Interventions:  Mobility Interventions: Bed/chair exit alarm         Medication Interventions: Bed/chair exit alarm                   Problem: Patient Education: Go to Patient Education Activity  Goal: Patient/Family Education  Outcome: Progressing Towards Goal     Problem: Pain  Goal: *Control of Pain  Outcome: Progressing Towards Goal  Note: Pt denies pain     Problem: Patient Education: Go to Patient Education Activity  Goal: Patient/Family Education  Outcome: Progressing Towards Goal

## 2021-07-07 NOTE — PROGRESS NOTES
Hospitalist Progress Note    Subjective:   Daily Progress Note: 7/7/2021 7:53 AM    Hospital Course:  Sam Verduzco is a [de-identified] y.o. female with a past medical history significant for diabetes mellitus, hypertension, CHF w/ EF 45%, hx CVA w/ left-sided weakness, hx GI bleed, and colon polyps presenting to the ED with a primary complaint of progressively worsening abdominal pain since Thursday evening. Patient states she ate some \" bad chicken salad,\" and began throwing up. She reports bitter taste in her mouth. Also reports constipation over the last couple of days. She denies any fever, chills, chest pain, palpitations, shortness of breath, dysuria. In the ED, CT of abdomen/pelvis showing hypodense cystlike lesion of pancreatic tail and dilation of distal pancreatic duct similar to prior CT of abdomen. Also shows extensive atheromatous changes to arteries with stenosis. Suspicious for vasculitis GI consulted. Enteric bacteria panel pending. Pulse steroids started with improvement in 24 hours. Subjective:    Patient seen and examined at bedside.   Abdominal pain improved and starting p.o. diet    Current Facility-Administered Medications   Medication Dose Route Frequency    insulin glargine (LANTUS) injection 20 Units  20 Units SubCUTAneous QHS    glucose chewable tablet 16 g  4 Tablet Oral PRN    dextrose (D50W) injection syrg 12.5-25 g  25-50 mL IntraVENous PRN    glucagon (GLUCAGEN) injection 1 mg  1 mg IntraMUSCular PRN    insulin lispro (HUMALOG) injection   SubCUTAneous AC&HS    methylPREDNISolone (PF) (Solu-MEDROL) injection 125 mg  125 mg IntraVENous Q12H    polyethylene glycol (MIRALAX) packet 17 g  17 g Oral DAILY    sodium chloride (NS) flush 5-40 mL  5-40 mL IntraVENous Q8H    sodium chloride (NS) flush 5-40 mL  5-40 mL IntraVENous PRN    acetaminophen (TYLENOL) tablet 650 mg  650 mg Oral Q6H PRN    Or    acetaminophen (TYLENOL) suppository 650 mg  650 mg Rectal Q6H PRN    polyethylene glycol (MIRALAX) packet 17 g  17 g Oral DAILY PRN    ondansetron (ZOFRAN ODT) tablet 4 mg  4 mg Oral Q8H PRN    Or    ondansetron (ZOFRAN) injection 4 mg  4 mg IntraVENous Q6H PRN    0.9% sodium chloride infusion  75 mL/hr IntraVENous CONTINUOUS    lisinopriL (PRINIVIL, ZESTRIL) tablet 20 mg  20 mg Oral DAILY    verapamil ER (VERELAN) capsule 240 mg  240 mg Oral DAILY    glucose chewable tablet 16 g  4 Tablet Oral PRN    glucagon (GLUCAGEN) injection 1 mg  1 mg IntraMUSCular PRN    dextrose (D50W) injection syrg 12.5-25 g  25-50 mL IntraVENous PRN    atorvastatin (LIPITOR) tablet 40 mg  40 mg Oral DAILY        Review of Systems  Review of Systems   Constitutional: Positive for malaise/fatigue and weight loss. HENT: Negative. Eyes: Negative. Respiratory: Negative for cough and shortness of breath. Cardiovascular: Negative for chest pain and leg swelling. Gastrointestinal: Negative for abdominal pain, nausea and vomiting. Genitourinary: Negative. Musculoskeletal: Negative. Skin: Negative. Neurological: Negative. Psychiatric/Behavioral: Negative. Objective:     Visit Vitals  BP (!) 166/88 (BP 1 Location: Left upper arm, BP Patient Position: Sitting) Comment: notify RN   Pulse 94   Temp 97.4 °F (36.3 °C)   Resp 20   Ht 5' 6\" (1.676 m)   Wt 65.3 kg (144 lb)   SpO2 100%   BMI 23.24 kg/m²      O2 Device: None (Room air)    Temp (24hrs), Av.8 °F (36.6 °C), Min:97.4 °F (36.3 °C), Max:98.3 °F (36.8 °C)      No intake/output data recorded.  1901 -  0700  In: 360 [P.O.:360]  Out: 2 [Urine:2]    PHYSICAL EXAM:  Physical Exam  Vitals reviewed. Constitutional:       General: She is not in acute distress. Appearance: She is not ill-appearing. HENT:      Head: Normocephalic and atraumatic. Mouth/Throat:      Mouth: Mucous membranes are moist.      Pharynx: Oropharynx is clear.    Eyes:      Conjunctiva/sclera: Conjunctivae normal.   Cardiovascular: Rate and Rhythm: Normal rate and regular rhythm. Pulmonary:      Breath sounds: Normal breath sounds. Abdominal:      General: Abdomen is flat. Bowel sounds are normal.      Palpations: Abdomen is soft. Tenderness: There is no abdominal tenderness (mid abdomen). Musculoskeletal:         General: Normal range of motion. Cervical back: Normal range of motion and neck supple. Skin:     General: Skin is warm and dry. Neurological:      General: No focal deficit present. Mental Status: She is alert and oriented to person, place, and time. Mental status is at baseline. Psychiatric:         Mood and Affect: Mood normal.         Data Review    Recent Results (from the past 24 hour(s))   GLUCOSE, POC    Collection Time: 07/06/21  5:35 PM   Result Value Ref Range    Glucose (POC) 100 65 - 117 mg/dL    Performed by [address] [place] [address], POC    Collection Time: 07/06/21  9:16 PM   Result Value Ref Range    Glucose (POC) 336 (H) 65 - 117 mg/dL    Performed by David Andre ([place])    GLUCOSE, POC    Collection Time: 07/07/21  7:24 AM   Result Value Ref Range    Glucose (POC) 260 (H) 65 - 117 mg/dL    Performed by [facility]    METABOLIC PANEL, COMPREHENSIVE    Collection Time: 07/07/21  7:46 AM   Result Value Ref Range    Sodium 135 (L) 136 - 145 mmol/L    Potassium 3.7 3.5 - 5.1 mmol/L    Chloride 103 97 - 108 mmol/L    CO2 22 21 - 32 mmol/L    Anion gap 10 5 - 15 mmol/L    Glucose 257 (H) 65 - 100 mg/dL    BUN 14 6 - 20 mg/dL    Creatinine 0.87 0.55 - 1.02 mg/dL    BUN/Creatinine ratio 16 12 - 20      GFR est AA >60 >60 ml/min/1.73m2    GFR est non-AA >60 >60 ml/min/1.73m2    Calcium 9.1 8.5 - 10.1 mg/dL    Bilirubin, total 0.6 0.2 - 1.0 mg/dL    AST (SGOT) 8 (L) 15 - 37 U/L    ALT (SGPT) 12 12 - 78 U/L    Alk.  phosphatase 116 45 - 117 U/L    Protein, total 7.6 6.4 - 8.2 g/dL    Albumin 2.9 (L) 3.5 - 5.0 g/dL    Globulin 4.7 (H) 2.0 - 4.0 g/dL    A-G Ratio 0.6 (L) 1.1 - 2.2 CBC WITH AUTOMATED DIFF    Collection Time: 07/07/21  7:46 AM   Result Value Ref Range    WBC 5.8 3.6 - 11.0 K/uL    RBC 4.58 3.80 - 5.20 M/uL    HGB 11.8 11.5 - 16.0 g/dL    HCT 37.4 35.0 - 47.0 %    MCV 81.7 80.0 - 99.0 FL    MCH 25.8 (L) 26.0 - 34.0 PG    MCHC 31.6 30.0 - 36.5 g/dL    RDW 13.2 11.5 - 14.5 %    PLATELET 076 268 - 996 K/uL    MPV 12.9 8.9 - 12.9 FL    NRBC 0.0 0.0  WBC    ABSOLUTE NRBC 0.00 0.00 - 0.01 K/uL    NEUTROPHILS 89 (H) 32 - 75 %    LYMPHOCYTES 9 (L) 12 - 49 %    MONOCYTES 2 (L) 5 - 13 %    EOSINOPHILS 0 0 - 7 %    BASOPHILS 0 0 - 1 %    IMMATURE GRANULOCYTES 0 0 - 0.5 %    ABS. NEUTROPHILS 5.2 1.8 - 8.0 K/UL    ABS. LYMPHOCYTES 0.5 (L) 0.8 - 3.5 K/UL    ABS. MONOCYTES 0.1 0.0 - 1.0 K/UL    ABS. EOSINOPHILS 0.0 0.0 - 0.4 K/UL    ABS. BASOPHILS 0.0 0.0 - 0.1 K/UL    ABS. IMM. GRANS. 0.0 0.00 - 0.04 K/UL    DF AUTOMATED     LIPASE    Collection Time: 07/07/21  7:46 AM   Result Value Ref Range    Lipase 90 73 - 393 U/L   GLUCOSE, POC    Collection Time: 07/07/21 10:56 AM   Result Value Ref Range    Glucose (POC) 331 (H) 65 - 117 mg/dL    Performed by Tetebeto Madden        CT ABD PELV W CONT   Final Result      Hypodense cystlike lesion of tail of pancreas, and dilatation of distal   pancreatic duct are similar to prior. Interval retropancreatic edema,? pancreatitis, versus vasculitis or other   nonspecific edema. Interval small volume free fluid in the pelvis. Left colon diverticula without apparent diverticulitis. Extensive atheromatous changes of regional arteries, with stenosis of visceral   arteries and renal arteries. Discussed with Dr. Neptali Lowry. Interval small bowel loop in left inguinal hernia, does not appear obstructed. Other findings unchanged. Active Problems:    Epigastric pain (7/4/2021)        Assessment/Plan:     1. Abdominal pain  2. Vomiting, nausea  3.  Hx GI bleed  - CT of abdomen/pelvis showing hypodense cystlike lesion of pancreatic tail and dilation of distal pancreatic duct similar to prior CT of abdomen. Also shows extensive atheromatous changes to arteries with stenosis. Vasculitis in the differential   - Enteric bacteria panel pending  - Send stool for ova & parasites  - Continue IV fluids.  - PRN pain mgmt  -Encourage p.o. intake  Awaiting decision on EGD  -GI consult pending  -CA 19-9 normal  -ESR and CRP, significantly elevated, for concern for vasculitis will send vasculitis panel if elevated  Trend CRP     4. CHF w/ EF 45%  - Continue home medications     5. Diabetes mellitus  - ISS, resistant scale and accu-checks  - Lantus 20 units bedtime     6. Hypertension  - Continue home medications     7. Hx CVA with residual left-sided weakness  - Continue statin     8. Vasculitis  CRP elevated  Vasculitis panel pending  Vascular surgery consult  May need to follow-up as an outpatient with rheumatology  Continue pulse steroids     DVT Prophylaxis: SCDs   Code Status: Full  Dispo: Pending GI eval. Awaiting labs for GI workup including enteric bacteria panel and stool samples. Vasculitis panel pending    Care Plan discussed with: patient and nursing. Total time spent with patient: >35 minutes.

## 2021-07-07 NOTE — PROGRESS NOTES
Patient voiced complaints of pain to right hand IV site and another site to right wrist was started and infusing without and additional complaints voiced.

## 2021-07-08 VITALS
WEIGHT: 143.96 LBS | BODY MASS INDEX: 23.14 KG/M2 | HEART RATE: 80 BPM | TEMPERATURE: 98.1 F | OXYGEN SATURATION: 98 % | DIASTOLIC BLOOD PRESSURE: 73 MMHG | RESPIRATION RATE: 18 BRPM | SYSTOLIC BLOOD PRESSURE: 134 MMHG | HEIGHT: 66 IN

## 2021-07-08 PROBLEM — I77.6 VASCULITIS (HCC): Status: ACTIVE | Noted: 2021-07-08

## 2021-07-08 PROBLEM — K86.89 PANCREATIC MASS: Status: ACTIVE | Noted: 2021-07-08

## 2021-07-08 LAB
ALBUMIN SERPL-MCNC: 2.9 G/DL (ref 3.5–5)
ALBUMIN/GLOB SERPL: 0.7 {RATIO} (ref 1.1–2.2)
ALP SERPL-CCNC: 113 U/L (ref 45–117)
ALT SERPL-CCNC: 18 U/L (ref 12–78)
ANA TITR SER IF: NEGATIVE {TITER}
ANION GAP SERPL CALC-SCNC: 6 MMOL/L (ref 5–15)
AST SERPL W P-5'-P-CCNC: 9 U/L (ref 15–37)
BASOPHILS # BLD: 0 K/UL (ref 0–0.1)
BASOPHILS NFR BLD: 0 % (ref 0–1)
BILIRUB SERPL-MCNC: 0.4 MG/DL (ref 0.2–1)
BUN SERPL-MCNC: 20 MG/DL (ref 6–20)
BUN/CREAT SERPL: 19 (ref 12–20)
C-ANCA TITR SER IF: NORMAL TITER
CA-I BLD-MCNC: 9.3 MG/DL (ref 8.5–10.1)
CHLORIDE SERPL-SCNC: 101 MMOL/L (ref 97–108)
CO2 SERPL-SCNC: 27 MMOL/L (ref 21–32)
CREAT SERPL-MCNC: 1.07 MG/DL (ref 0.55–1.02)
CRP SERPL HS-MCNC: >9.5 MG/L
DIFFERENTIAL METHOD BLD: ABNORMAL
EOSINOPHIL # BLD: 0 K/UL (ref 0–0.4)
EOSINOPHIL NFR BLD: 0 % (ref 0–7)
ERYTHROCYTE [DISTWIDTH] IN BLOOD BY AUTOMATED COUNT: 13.3 % (ref 11.5–14.5)
EST. AVERAGE GLUCOSE BLD GHB EST-MCNC: 200 MG/DL
GLOBULIN SER CALC-MCNC: 4.1 G/DL (ref 2–4)
GLUCOSE BLD STRIP.AUTO-MCNC: 333 MG/DL (ref 65–117)
GLUCOSE BLD STRIP.AUTO-MCNC: 394 MG/DL (ref 65–117)
GLUCOSE SERPL-MCNC: 401 MG/DL (ref 65–100)
HBA1C MFR BLD: 8.6 % (ref 4–5.6)
HCT VFR BLD AUTO: 35 % (ref 35–47)
HGB BLD-MCNC: 11.3 G/DL (ref 11.5–16)
IMM GRANULOCYTES # BLD AUTO: 0.1 K/UL (ref 0–0.04)
IMM GRANULOCYTES NFR BLD AUTO: 0 % (ref 0–0.5)
LIPASE SERPL-CCNC: 110 U/L (ref 73–393)
LYMPHOCYTES # BLD: 0.3 K/UL (ref 0.8–3.5)
LYMPHOCYTES NFR BLD: 2 % (ref 12–49)
MCH RBC QN AUTO: 26.4 PG (ref 26–34)
MCHC RBC AUTO-ENTMCNC: 32.3 G/DL (ref 30–36.5)
MCV RBC AUTO: 81.8 FL (ref 80–99)
MONOCYTES # BLD: 0.4 K/UL (ref 0–1)
MONOCYTES NFR BLD: 3 % (ref 5–13)
MYELOPEROXIDASE AB SER IA-ACNC: <9 U/ML (ref 0–9)
NEUTS SEG # BLD: 12.9 K/UL (ref 1.8–8)
NEUTS SEG NFR BLD: 95 % (ref 32–75)
NRBC # BLD: 0 K/UL (ref 0–0.01)
NRBC BLD-RTO: 0 PER 100 WBC
P-ANCA ATYPICAL TITR SER IF: NORMAL TITER
P-ANCA TITR SER IF: NORMAL TITER
PERFORMED BY, TECHID: ABNORMAL
PERFORMED BY, TECHID: ABNORMAL
PLATELET # BLD AUTO: 215 K/UL (ref 150–400)
PMV BLD AUTO: 12.4 FL (ref 8.9–12.9)
POTASSIUM SERPL-SCNC: 4.2 MMOL/L (ref 3.5–5.1)
PROT SERPL-MCNC: 7 G/DL (ref 6.4–8.2)
PROTEINASE3 AB SER IA-ACNC: <3.5 U/ML (ref 0–3.5)
RBC # BLD AUTO: 4.28 M/UL (ref 3.8–5.2)
SODIUM SERPL-SCNC: 134 MMOL/L (ref 136–145)
WBC # BLD AUTO: 13.7 K/UL (ref 3.6–11)

## 2021-07-08 PROCEDURE — 82962 GLUCOSE BLOOD TEST: CPT

## 2021-07-08 PROCEDURE — 85025 COMPLETE CBC W/AUTO DIFF WBC: CPT

## 2021-07-08 PROCEDURE — 86141 C-REACTIVE PROTEIN HS: CPT

## 2021-07-08 PROCEDURE — 74011250636 HC RX REV CODE- 250/636: Performed by: PHYSICIAN ASSISTANT

## 2021-07-08 PROCEDURE — 36415 COLL VENOUS BLD VENIPUNCTURE: CPT

## 2021-07-08 PROCEDURE — 74011250637 HC RX REV CODE- 250/637: Performed by: NURSE PRACTITIONER

## 2021-07-08 PROCEDURE — 83690 ASSAY OF LIPASE: CPT

## 2021-07-08 PROCEDURE — 74011636637 HC RX REV CODE- 636/637: Performed by: PHYSICIAN ASSISTANT

## 2021-07-08 PROCEDURE — 74011250637 HC RX REV CODE- 250/637: Performed by: STUDENT IN AN ORGANIZED HEALTH CARE EDUCATION/TRAINING PROGRAM

## 2021-07-08 PROCEDURE — 97161 PT EVAL LOW COMPLEX 20 MIN: CPT | Performed by: PHYSICAL THERAPIST

## 2021-07-08 PROCEDURE — 97116 GAIT TRAINING THERAPY: CPT | Performed by: PHYSICAL THERAPIST

## 2021-07-08 PROCEDURE — 80053 COMPREHEN METABOLIC PANEL: CPT

## 2021-07-08 RX ORDER — PREDNISONE 20 MG/1
60 TABLET ORAL DAILY
Qty: 21 TABLET | Refills: 0 | Status: SHIPPED | OUTPATIENT
Start: 2021-07-08 | End: 2021-10-22

## 2021-07-08 RX ADMIN — INSULIN LISPRO 10 UNITS: 100 INJECTION, SOLUTION INTRAVENOUS; SUBCUTANEOUS at 08:50

## 2021-07-08 RX ADMIN — LISINOPRIL 20 MG: 10 TABLET ORAL at 08:48

## 2021-07-08 RX ADMIN — VERAPAMIL HYDROCHLORIDE 240 MG: 120 CAPSULE, DELAYED RELEASE PELLETS ORAL at 08:49

## 2021-07-08 RX ADMIN — METHYLPREDNISOLONE SODIUM SUCCINATE 125 MG: 125 INJECTION, POWDER, FOR SOLUTION INTRAMUSCULAR; INTRAVENOUS at 08:50

## 2021-07-08 RX ADMIN — INSULIN LISPRO 15 UNITS: 100 INJECTION, SOLUTION INTRAVENOUS; SUBCUTANEOUS at 12:35

## 2021-07-08 RX ADMIN — Medication 10 ML: at 06:42

## 2021-07-08 RX ADMIN — ATORVASTATIN CALCIUM 40 MG: 40 TABLET, FILM COATED ORAL at 08:48

## 2021-07-08 RX ADMIN — POLYETHYLENE GLYCOL 3350 17 G: 17 POWDER, FOR SOLUTION ORAL at 08:49

## 2021-07-08 NOTE — PROGRESS NOTES
Patient was transferred to room 1 W and patient will call son and sister in the morning; she was introduced to new room mate and call bell within reach.

## 2021-07-08 NOTE — DISCHARGE SUMMARY
Admit date: 7/4/2021   Admitting Provider: Mahogany Gutierrez MD    Discharge date: 7/8/2021  Discharging Provider: Piter Gayle PA-C      * Admission Diagnoses: Epigastric pain [R10.13]    * Discharge Diagnoses:    Hospital Problems as of 7/8/2021 Never Reviewed        Codes Class Noted - Resolved POA    Vasculitis ([facility] [address] ICD-10-CM: I77.6  ICD-9-CM: 447.6  7/8/2021 - Present Unknown        Pancreatic mass ICD-10-CM: K86.89  ICD-9-CM: 577.8  7/8/2021 - Present Unknown        Epigastric pain ICD-10-CM: R10.13  ICD-9-CM: 789.06  7/4/2021 - Present Unknown              * Hospital Course:   Nancy Gaming a [de-identified] y.o. female with a past medical history significant for diabetes mellitus, hypertension, CHF w/ EF 45%, hx CVA w/ left-sided weakness, hx GI bleed, and colon polyps presenting to the ED with a primary complaint of progressively worsening abdominal pain since Thursday evening. Loreta Abreu states she ate some \" bad chicken salad,\" and began throwing up.  She reports bitter taste in her mouth.  Also reports constipation over the last couple of days. Abel Murdock denies any fever, chills, chest pain, palpitations, shortness of breath, dysuria. In the ED, CT of abdomen/pelvis showing hypodense cystlike lesion of pancreatic tail and dilation of distal pancreatic duct similar to prior CT of abdomen.  Also shows extensive atheromatous changes to arteries with stenosis. Suspicious for vasculitis GI consulted. Pulse steroids started with improvement in 24 hours. Abdominal pain has improved and patient is tolerating p.o. diet. Patient will continue steroids 60 mg as an outpatient. She will follow up with Dr. Nadia Bob, primary care physician in 24 hours. Patient will be evaluated rheumatology, Dr. Deja Guo and will call for an appointment. Patient will follow up with Dr. Tigre Victor in approximately 2 weeks in regards to the pancreatic duct dilatation. P-ANCA and vasculitis studies still pending.     * Procedures:   * No surgery found *      Consults:   Gastroenterology    Significant Diagnostic Studies: As discussed in hospital course    Discharge Exam:  Visit Vitals  /73 (BP 1 Location: Left upper arm, BP Patient Position: Sitting)   Pulse 80   Temp 98.1 °F (36.7 °C)   Resp 18   Ht 5' 6\" (1.676 m)   Wt 65.3 kg (143 lb 15.4 oz)   SpO2 98%   BMI 23.24 kg/m²     PHYSICAL EXAM:  Constitutional: Alert in no acute distress   HEENT: Sclerae anicteric, The neck is supple. Cardiovascular: Regular rate and rhythm. No murmurs, gallops, or rubs. Jasvir Golas Respiratory: Clear breath sounds with no wheezes, rales, or rhonchi. GI: Abdomen nondistended, soft, and nontender. Normal active bowel sounds. Rectal: Deferred   Musculoskeletal: No pitting edema of the lower legs. Extremities have good range of motion. Neurological:  Patient is alert and oriented. Cranial nerves II-XII intact  Psychiatric: Mood appears appropriate with judgement intact. Lymphatic: No cervical or supraclavicular adenopathy. Skin: No rashes or breakdown of the skin      * Discharge Condition: stable  * Disposition: Home    Discharge Medications:  Current Discharge Medication List      START taking these medications    Details   predniSONE (DELTASONE) 20 mg tablet Take 60 mg by mouth daily. Qty: 21 Tablet, Refills: 0  Start date: 7/8/2021         CONTINUE these medications which have NOT CHANGED    Details   acetaminophen (TYLENOL) 325 mg tablet Take 2 Tabs by mouth every four (4) hours as needed for Pain or Fever (For temp greater than or equal to 38.5 C or 101.3 F (Unless hepatic failure or contrindicated). Give first line for fever. ). Qty: 60 Tab, Refills: 0      atorvastatin (LIPITOR) 80 mg tablet Take 1 Tab by mouth daily.   Qty: 30 Tab, Refills: 0      !! insulin lispro (HUMALOG) 100 unit/mL injection INITIATE CORRECTIVE INSULIN PROTOCOL (MARY):  High Sensitivity (thin, ESRD):    AC (before meals), Q6H, and Q4H CORRECTIONAL SCALE only For Blood Sugar (mg/dl) of : 140-199=0 units            200-249=2 units  250-299=3 units  300-349=4 units  350 or greater = Call MD  Give in addition to basal medications. Do Not Hold for NPO    BEDTIME CORRECTIONAL sliding scale when scheduled:  200-249=1 units  250-349=2 units  350 or greater = Call MD  Give in addition to basal medications. Do Not Hold for NPO Fast Acting - Administer Immediately - or within 15 minutes of start of meal, if mealtime coverage. Qty: 1 Vial, Refills: 0      lisinopriL (PRINIVIL, ZESTRIL) 20 mg tablet Take 1 Tab by mouth daily. Qty: 30 Tab, Refills: 0      insulin glargine (LANTUS) 100 unit/mL injection Take 5 units daily at bedtime  Qty: 1 Vial, Refills: 0      !! insulin lispro (HUMALOG) 100 unit/mL injection Take 3 units tid  Qty: 1 Vial, Refills: 0      verapamil ER (VERELAN) 240 mg ER capsule Take 240 mg by mouth daily. glimepiride (AMARYL) 4 mg tablet Take 4 mg by mouth every morning. !! - Potential duplicate medications found. Please discuss with provider. * Follow-up Care/Patient Instructions:   Activity: Activity as tolerated  Diet: Diabetic Diet  Wound Care: None needed    Follow-up Information     Follow up With Specialties Details Why Contact Info    Josey Frank MD Internal Medicine In 1 day  Adelaida Gonzales [address] [place] [address] [place]      Lo Shah MD Rheumatology In 1 week For vasculitis work up [address] [place] [address] [address]      Duke Roldan MD Gastroenterology In 1 week Pancreatic mass evaluation [address] [id number] [phone]            Discharge summary greater than 35 minutes spent with the patient performing discharge instructions, medication review and physical exam    Signed:  Lorena Hernandez PA-C  7/8/2021  11:27 AM

## 2021-07-08 NOTE — PROGRESS NOTES
SENA Plan:    -d/c home  -PCP follow up          Patient scheduled to discharge home today. Medicare pt has received, reviewed, and signed 2nd IM letter informing them of their right to appeal the discharge. Signed copy has been placed on pt bedside chart.         DIONNA Cope

## 2021-07-08 NOTE — PROGRESS NOTES
Discharge plan of care/case management plan validated with provider discharge order. Discharge instructions reviewed with patient at bedside. Patient acknowledged understanding of dc instructions and follow up appointments scheduled for her. Patient provided with printed prescription for fill at local pharmacy. All patient belongings accounted for, patient escorted to private vehicle via wheelchair.

## 2021-07-08 NOTE — PROGRESS NOTES
Physician Progress Note      PATIENT:               Christian Conti  CSN #:                  [id number]  :                       1940  ADMIT DATE:       2021 12:28 PM  [address] [place] DATE:  RESPONDING  PROVIDER #:        Marge BARRAGAN PA-C          QUERY TEXT:    Dear Attending,    Pt admitted with abdominal pain. Noted documentation of acute pancreatitis in  GI consultant note. If possible, please document in progress notes and discharge summary:    The medical record reflects the following:  Risk Factors: [de-identified]year old female, abdominal and epigastric pain  Clinical Indicators:  GI consult -  Abdominal Pain/ Acute Pancreatitis   CT Abd - Hypodense cystlike lesion of tail of pancreas, and dilatation of distal  pancreatic duct are similar to prior. Interval retropancreatic edema,? pancreatitis, versus vasculitis or other  nonspecific edema. Interval small volume free fluid in the pelvis. Left colon diverticula without apparent diverticulitis. Extensive atheromatous changes of regional arteries, with stenosis of visceral  arteries and renal arteries. Treatment: NPO    Please call [phone] with any questions  Options provided:  -- Acute pancreatitis confirmed present on admission  -- Acute pancreatitis ruled out  -- Other - I will add my own diagnosis  -- Disagree - Not applicable / Not valid  -- Disagree - Clinically unable to determine / Unknown  -- Refer to Clinical Documentation Reviewer    PROVIDER RESPONSE TEXT:    The diagnosis of acute pancreatitis was ruled out. Query created by:  Elvira Sellers on 2021 8:51 AM      Electronically signed by:  Racquel Grigsby PA-C 2021 8:56 AM

## 2021-07-08 NOTE — PROGRESS NOTES
Problem: Patient Education: Go to Patient Education Activity  Goal: Patient/Family Education  Outcome: Not Met     Problem: Mobility Impaired (Adult and Pediatric)  Goal: *Acute Goals and Plan of Care (Insert Text)  Description: Pt will be I with LE HEP in 7 days. Pt will perform bed mobility with IND in 7 days. Pt will perform transfers with IND in 7 days. Pt will amb 150 feet with LRAD safely with mod I in 7 days. Outcome: Not Met     PHYSICAL THERAPY EVALUATION  Patient: Sami Johns (10 y.o. female)  Date: 7/8/2021  Primary Diagnosis: Epigastric pain [R10.13]        Precautions: Fall    ASSESSMENT  [de-identified] yof who came to the hospital on 7/4/21 due to progressively worsening abdominal pain since Thursday evening. Patient states she ate some \" bad chicken salad,\" and began throwing up. She reports bitter taste in her mouth. Also reports constipation over the last couple of days. CT of abdomen/pelvis showing hypodense cystlike lesion of pancreatic tail and dilation of distal pancreatic duct similar to prior CT of abdomen. Also shows extensive atheromatous changes to arteries with stenosis. PMHx:  diabetes mellitus, hypertension, CHF w/ EF 45%, hx CVA w/ left-sided weakness, hx GI bleed, Diverticulosis, colon polyps. Pt lives in [facility] with 6 CELIA with L railing. Pt reports that she did not use AD at baseline but owns w/c, SPC, FWW and tub transfer bench. Her son lives next door and is able to check in on her often. Pt found sitting up EOB. A&O X4. Pt was supervision with transfers, and ambulation without AD 25 feet in room. Pt demos slight decreased balance and would benefit from acute PT to address her limitations. Pt rec DC with HH PT for safety assessment. Patient will benefit from skilled therapy intervention to address the above noted impairments.        PLAN :  Recommendations and Planned Interventions: bed mobility training, transfer training, gait training, therapeutic exercises, neuromuscular re-education, and therapeutic activities      Frequency/Duration: Patient will be followed by physical therapy:  2 times a week to address goals. Recommendation for discharge: (in order for the patient to meet his/her long term goals)   PT    This discharge recommendation:  Has not yet been discussed the attending provider and/or case management    IF patient discharges home will need the following DME: none         SUBJECTIVE:   Patient stated I feel close to my normal.    OBJECTIVE DATA SUMMARY:   HISTORY:    Past Medical History:   Diagnosis Date    Colon polyps     Diabetes ([facility] [address]     Diverticulosis     Hypertension      Past Surgical History:   Procedure Laterality Date    COLONOSCOPY N/A 4/29/2021    COLONOSCOPY performed by Marvin Ramirez MD at [facility] ENDOSCOPY       Personal factors and/or comorbidities impacting plan of care: see pt chart    Home Situation  Home Environment: Private residence  # Steps to Enter: 6  Rails to Enter: Yes  Hand Rails : Left  One/Two Story Residence: One story  Living Alone: Yes  Support Systems:  (Son lives next door)  Patient Expects to be Discharged to[de-identified] House  Current DME Used/Available at Home: Cane, straight, Walker, rolling, Wheelchair, Tub transfer bench  Tub or Shower Type: Tub    PLOF: Pt IND for ADLS/IADLS, IND with mobility prior to admission. EXAMINATION/PRESENTATION/DECISION MAKING:   Critical Behavior:  Neurologic State: Alert  Orientation Level: Oriented X4        Hearing:   Auditory  Auditory Impairment: None    Range Of Motion:         WFL                 Strength:        Decreased                 Functional Mobility:  Bed Mobility:              Transfers:  Sit to Stand: Supervision  Stand to Sit: Supervision  Stand Pivot Transfers: Supervision     Bed to Chair: Supervision              Balance:   Sitting: Intact  Standing: Impaired  Standing - Static: Good  Standing - Dynamic : Good  Ambulation/Gait Training:  Distance (ft): 25 Feet (ft)  Assistive Device: Gait belt  Ambulation - Level of Assistance: Supervision     Gait Description (WDL): Exceptions to Ramez 1850 AM-PAC 6 Clicks         Basic Mobility Inpatient Short Form  How much difficulty does the patient currently have. .. Unable A Lot A Little None   1. Turning over in bed (including adjusting bedclothes, sheets and blankets)? [] 1   [] 2   [x] 3   [] 4   2. Sitting down on and standing up from a chair with arms ( e.g., wheelchair, bedside commode, etc.)   [] 1   [] 2   [x] 3   [] 4   3. Moving from lying on back to sitting on the side of the bed? [] 1   [] 2   [x] 3   [] 4          How much help from another person does the patient currently need. .. Total A Lot A Little None   4. Moving to and from a bed to a chair (including a wheelchair)? [] 1   [] 2   [x] 3   [] 4   5. Need to walk in hospital room? [] 1   [] 2   [x] 3   [] 4   6. Climbing 3-5 steps with a railing? [] 1   [] 2   [x] 3   [] 4   © , Trustees of [facility] [employer], under license to [employer]. All rights reserved     Score:  Initial:  Most Recent: X (Date: -- )   Interpretation of Tool:  Represents activities that are increasingly more difficult (i.e. Bed mobility, Transfers, Gait).   Score 24 23 22-20 19-15 14-10 9-7 6   Modifier CH CI CJ CK CL CM CN          Physical Therapy Evaluation Charge Determination   History Examination Presentation Decision-Making   LOW Complexity : Zero comorbidities / personal factors that will impact the outcome / POC LOW Complexity : 1-2 Standardized tests and measures addressing body structure, function, activity limitation and / or participation in recreation  LOW Complexity : Stable, uncomplicated  Other Functional Measure [facility] 6       Based on the above components, the patient evaluation is determined to be of the following complexity level: LOW     Pain Ratin/10    Activity Tolerance:   Good  Please refer to the flowsheet for vital signs taken during this treatment. After treatment patient left in no apparent distress:   Sitting in chair    COMMUNICATION/EDUCATION:   The patients plan of care was discussed with: Registered nurse. Patient understands intent and goals of therapy, but is neutral about his/her participation.     Thank you for this referral.  Sandra Ndiaye, PT, DPT   Time Calculation: 23 mins

## 2021-10-22 ENCOUNTER — APPOINTMENT (OUTPATIENT)
Dept: CT IMAGING | Age: 81
DRG: 246 | End: 2021-10-22
Attending: NURSE PRACTITIONER
Payer: MEDICARE

## 2021-10-22 ENCOUNTER — HOSPITAL ENCOUNTER (INPATIENT)
Age: 81
LOS: 8 days | Discharge: HOME HEALTH | DRG: 246 | End: 2021-10-30
Attending: HOSPITALIST | Admitting: HOSPITALIST
Payer: MEDICARE

## 2021-10-22 ENCOUNTER — APPOINTMENT (OUTPATIENT)
Dept: GENERAL RADIOLOGY | Age: 81
DRG: 246 | End: 2021-10-22
Attending: NURSE PRACTITIONER
Payer: MEDICARE

## 2021-10-22 DIAGNOSIS — R10.13 ABDOMINAL PAIN, EPIGASTRIC: ICD-10-CM

## 2021-10-22 DIAGNOSIS — R77.8 ELEVATED TROPONIN: ICD-10-CM

## 2021-10-22 DIAGNOSIS — R07.89 OTHER CHEST PAIN: ICD-10-CM

## 2021-10-22 DIAGNOSIS — K85.90 ACUTE PANCREATITIS, UNSPECIFIED COMPLICATION STATUS, UNSPECIFIED PANCREATITIS TYPE: Primary | ICD-10-CM

## 2021-10-22 LAB
ALBUMIN SERPL-MCNC: 3.4 G/DL (ref 3.5–5)
ALBUMIN/GLOB SERPL: 0.8 {RATIO} (ref 1.1–2.2)
ALP SERPL-CCNC: 149 U/L (ref 45–117)
ALT SERPL-CCNC: 40 U/L (ref 12–78)
ANION GAP SERPL CALC-SCNC: 8 MMOL/L (ref 5–15)
AST SERPL W P-5'-P-CCNC: 23 U/L (ref 15–37)
BASOPHILS # BLD: 0 K/UL (ref 0–0.1)
BASOPHILS NFR BLD: 0 % (ref 0–1)
BILIRUB SERPL-MCNC: 1 MG/DL (ref 0.2–1)
BUN SERPL-MCNC: 17 MG/DL (ref 6–20)
BUN/CREAT SERPL: 19 (ref 12–20)
CA-I BLD-MCNC: 9.1 MG/DL (ref 8.5–10.1)
CHLORIDE SERPL-SCNC: 101 MMOL/L (ref 97–108)
CO2 SERPL-SCNC: 25 MMOL/L (ref 21–32)
CREAT SERPL-MCNC: 0.89 MG/DL (ref 0.55–1.02)
DIFFERENTIAL METHOD BLD: ABNORMAL
EOSINOPHIL # BLD: 0 K/UL (ref 0–0.4)
EOSINOPHIL NFR BLD: 0 % (ref 0–7)
ERYTHROCYTE [DISTWIDTH] IN BLOOD BY AUTOMATED COUNT: 14.9 % (ref 11.5–14.5)
GLOBULIN SER CALC-MCNC: 4.2 G/DL (ref 2–4)
GLUCOSE SERPL-MCNC: 279 MG/DL (ref 65–100)
HCT VFR BLD AUTO: 43.8 % (ref 35–47)
HGB BLD-MCNC: 14.3 G/DL (ref 11.5–16)
IMM GRANULOCYTES # BLD AUTO: 0 K/UL (ref 0–0.04)
IMM GRANULOCYTES NFR BLD AUTO: 0 % (ref 0–0.5)
LIPASE SERPL-CCNC: >3000 U/L (ref 73–393)
LYMPHOCYTES # BLD: 1 K/UL (ref 0.8–3.5)
LYMPHOCYTES NFR BLD: 16 % (ref 12–49)
MCH RBC QN AUTO: 26.2 PG (ref 26–34)
MCHC RBC AUTO-ENTMCNC: 32.6 G/DL (ref 30–36.5)
MCV RBC AUTO: 80.2 FL (ref 80–99)
MONOCYTES # BLD: 0.6 K/UL (ref 0–1)
MONOCYTES NFR BLD: 9 % (ref 5–13)
NEUTS SEG # BLD: 4.9 K/UL (ref 1.8–8)
NEUTS SEG NFR BLD: 75 % (ref 32–75)
NRBC # BLD: 0 K/UL (ref 0–0.01)
NRBC BLD-RTO: 0 PER 100 WBC
PLATELET # BLD AUTO: 156 K/UL (ref 150–400)
POTASSIUM SERPL-SCNC: 3.8 MMOL/L (ref 3.5–5.1)
PROT SERPL-MCNC: 7.6 G/DL (ref 6.4–8.2)
RBC # BLD AUTO: 5.46 M/UL (ref 3.8–5.2)
SODIUM SERPL-SCNC: 134 MMOL/L (ref 136–145)
TROPONIN-HIGH SENSITIVITY: 599 NG/L (ref 0–51)
WBC # BLD AUTO: 6.5 K/UL (ref 3.6–11)

## 2021-10-22 PROCEDURE — 85025 COMPLETE CBC W/AUTO DIFF WBC: CPT

## 2021-10-22 PROCEDURE — 80053 COMPREHEN METABOLIC PANEL: CPT

## 2021-10-22 PROCEDURE — 36415 COLL VENOUS BLD VENIPUNCTURE: CPT

## 2021-10-22 PROCEDURE — 93005 ELECTROCARDIOGRAM TRACING: CPT

## 2021-10-22 PROCEDURE — 74011000636 HC RX REV CODE- 636: Performed by: NURSE PRACTITIONER

## 2021-10-22 PROCEDURE — 83690 ASSAY OF LIPASE: CPT

## 2021-10-22 PROCEDURE — 96374 THER/PROPH/DIAG INJ IV PUSH: CPT

## 2021-10-22 PROCEDURE — 96375 TX/PRO/DX INJ NEW DRUG ADDON: CPT

## 2021-10-22 PROCEDURE — 84484 ASSAY OF TROPONIN QUANT: CPT

## 2021-10-22 PROCEDURE — 74177 CT ABD & PELVIS W/CONTRAST: CPT

## 2021-10-22 PROCEDURE — 99284 EMERGENCY DEPT VISIT MOD MDM: CPT

## 2021-10-22 PROCEDURE — 84478 ASSAY OF TRIGLYCERIDES: CPT

## 2021-10-22 PROCEDURE — 71045 X-RAY EXAM CHEST 1 VIEW: CPT

## 2021-10-22 PROCEDURE — 65270000029 HC RM PRIVATE

## 2021-10-22 PROCEDURE — 74011250636 HC RX REV CODE- 250/636: Performed by: NURSE PRACTITIONER

## 2021-10-22 RX ORDER — SODIUM CHLORIDE AND POTASSIUM CHLORIDE .9; .15 G/100ML; G/100ML
1000 SOLUTION INTRAVENOUS CONTINUOUS
Status: DISCONTINUED | OUTPATIENT
Start: 2021-10-23 | End: 2021-10-23

## 2021-10-22 RX ORDER — VERAPAMIL HYDROCHLORIDE 240 MG/1
240 TABLET, FILM COATED, EXTENDED RELEASE ORAL DAILY
COMMUNITY
Start: 2021-09-21 | End: 2022-03-18

## 2021-10-22 RX ORDER — MAGNESIUM SULFATE 100 %
4 CRYSTALS MISCELLANEOUS AS NEEDED
Status: DISCONTINUED | OUTPATIENT
Start: 2021-10-22 | End: 2021-10-30 | Stop reason: HOSPADM

## 2021-10-22 RX ORDER — ONDANSETRON 2 MG/ML
4 INJECTION INTRAMUSCULAR; INTRAVENOUS
Status: DISCONTINUED | OUTPATIENT
Start: 2021-10-22 | End: 2021-10-30 | Stop reason: HOSPADM

## 2021-10-22 RX ORDER — HYDROMORPHONE HYDROCHLORIDE 1 MG/ML
0.5 INJECTION, SOLUTION INTRAMUSCULAR; INTRAVENOUS; SUBCUTANEOUS
Status: DISPENSED | OUTPATIENT
Start: 2021-10-22 | End: 2021-10-24

## 2021-10-22 RX ORDER — MORPHINE SULFATE 4 MG/ML
4 INJECTION INTRAVENOUS ONCE
Status: COMPLETED | OUTPATIENT
Start: 2021-10-22 | End: 2021-10-22

## 2021-10-22 RX ORDER — ACETAMINOPHEN 325 MG/1
650 TABLET ORAL
Status: DISCONTINUED | OUTPATIENT
Start: 2021-10-22 | End: 2021-10-30 | Stop reason: HOSPADM

## 2021-10-22 RX ORDER — INSULIN GLARGINE 100 [IU]/ML
22 INJECTION, SOLUTION SUBCUTANEOUS
COMMUNITY
Start: 2021-05-20 | End: 2021-11-20

## 2021-10-22 RX ORDER — ONDANSETRON 2 MG/ML
4 INJECTION INTRAMUSCULAR; INTRAVENOUS
Status: COMPLETED | OUTPATIENT
Start: 2021-10-22 | End: 2021-10-22

## 2021-10-22 RX ORDER — DEXTROSE 50 % IN WATER (D50W) INTRAVENOUS SYRINGE
25-50 AS NEEDED
Status: DISCONTINUED | OUTPATIENT
Start: 2021-10-22 | End: 2021-10-30 | Stop reason: HOSPADM

## 2021-10-22 RX ORDER — SODIUM CHLORIDE 0.9 % (FLUSH) 0.9 %
5-40 SYRINGE (ML) INJECTION EVERY 8 HOURS
Status: DISCONTINUED | OUTPATIENT
Start: 2021-10-22 | End: 2021-10-23

## 2021-10-22 RX ORDER — INSULIN LISPRO 100 [IU]/ML
INJECTION, SOLUTION INTRAVENOUS; SUBCUTANEOUS EVERY 4 HOURS
Status: DISCONTINUED | OUTPATIENT
Start: 2021-10-23 | End: 2021-10-23

## 2021-10-22 RX ORDER — ENOXAPARIN SODIUM 100 MG/ML
40 INJECTION SUBCUTANEOUS EVERY 24 HOURS
Status: DISCONTINUED | OUTPATIENT
Start: 2021-10-23 | End: 2021-10-23

## 2021-10-22 RX ORDER — SODIUM CHLORIDE 9 MG/ML
125 INJECTION, SOLUTION INTRAVENOUS CONTINUOUS
Status: DISCONTINUED | OUTPATIENT
Start: 2021-10-22 | End: 2021-10-23

## 2021-10-22 RX ORDER — SODIUM CHLORIDE 0.9 % (FLUSH) 0.9 %
5-40 SYRINGE (ML) INJECTION AS NEEDED
Status: DISCONTINUED | OUTPATIENT
Start: 2021-10-22 | End: 2021-10-30 | Stop reason: HOSPADM

## 2021-10-22 RX ADMIN — MORPHINE SULFATE 4 MG: 4 INJECTION INTRAVENOUS at 21:06

## 2021-10-22 RX ADMIN — IOPAMIDOL 100 ML: 755 INJECTION, SOLUTION INTRAVENOUS at 22:26

## 2021-10-22 RX ADMIN — SODIUM CHLORIDE 125 ML/HR: 9 INJECTION, SOLUTION INTRAVENOUS at 23:41

## 2021-10-22 RX ADMIN — ONDANSETRON 4 MG: 2 INJECTION INTRAMUSCULAR; INTRAVENOUS at 21:06

## 2021-10-22 NOTE — ED PROVIDER NOTES
EMERGENCY DEPARTMENT HISTORY AND PHYSICAL EXAM      Date: 10/22/2021  Patient Name: Rosalba Hanna    History of Presenting Illness     Chief Complaint   Patient presents with    Abdominal Pain    Vomiting       History Provided By: Patient    HPI: Rosalba Hanna, [de-identified] y.o. female with a past medical history significant diabetes, hypertension, hyperlipidemia and diverticulities, pancreatitis, abnormal pancreatic findings on CT presents to the ED with cc of abdominal pain. Onset of umbilical and epigastric pain this morning with one episode of vomiting. Prior to that she was having some problems with constipation and took an enema with results of hard small stool. Her last normal bowel movement was 2 days ago. She denies any fever, shortness of breath, chest pain, body aches, muscle aches, changes in medications. She took her medications today including her verapamil. She is also incidentally had a cough for which she is taking benzonatate with some improvement. She has no concerns or known exposures to Covid. There are no other complaints, changes, or physical findings at this time. PCP: Clementine Patton MD    No current facility-administered medications on file prior to encounter. Current Outpatient Medications on File Prior to Encounter   Medication Sig Dispense Refill    predniSONE (DELTASONE) 20 mg tablet Take 60 mg by mouth daily. 21 Tablet 0    acetaminophen (TYLENOL) 325 mg tablet Take 2 Tabs by mouth every four (4) hours as needed for Pain or Fever (For temp greater than or equal to 38.5 C or 101.3 F (Unless hepatic failure or contrindicated). Give first line for fever. ). 60 Tab 0    atorvastatin (LIPITOR) 80 mg tablet Take 1 Tab by mouth daily.  30 Tab 0    insulin lispro (HUMALOG) 100 unit/mL injection INITIATE CORRECTIVE INSULIN PROTOCOL (MARY):  High Sensitivity (thin, ESRD):    AC (before meals), Q6H, and Q4H CORRECTIONAL SCALE only For Blood Sugar (mg/dl) of :             140-199=0 units 200-249=2 units  250-299=3 units  300-349=4 units  350 or greater = Call MD  Give in addition to basal medications. Do Not Hold for NPO    BEDTIME CORRECTIONAL sliding scale when scheduled:  200-249=1 units  250-349=2 units  350 or greater = Call MD  Give in addition to basal medications. Do Not Hold for NPO Fast Acting - Administer Immediately - or within 15 minutes of start of meal, if mealtime coverage. 1 Vial 0    lisinopriL (PRINIVIL, ZESTRIL) 20 mg tablet Take 1 Tab by mouth daily. 30 Tab 0    insulin glargine (LANTUS) 100 unit/mL injection Take 5 units daily at bedtime 1 Vial 0    insulin lispro (HUMALOG) 100 unit/mL injection Take 3 units tid 1 Vial 0    verapamil ER (VERELAN) 240 mg ER capsule Take 240 mg by mouth daily.  glimepiride (AMARYL) 4 mg tablet Take 4 mg by mouth every morning. Past History     Past Medical History:  Past Medical History:   Diagnosis Date    Colon polyps     Diabetes ([address]     Diverticulosis     Hypertension        Past Surgical History:  Past Surgical History:   Procedure Laterality Date    COLONOSCOPY N/A 4/29/2021    COLON[facility]COPY performed by Tony Griffin MD at [facility] ENDOSCOPY       Family History:  Family History   Problem Relation Age of Onset    Diabetes Other        Social History:  Social History     Tobacco Use    Smoking status: Never Smoker    Smokeless tobacco: Never Used   Substance Use Topics    Alcohol use: Not Currently    Drug use: Never       Allergies: Allergies   Allergen Reactions    Insulins Itching    Penicillins Other (comments)     Pt states it makes her pass out         Review of Systems     Review of Systems   Constitutional: Positive for appetite change. Negative for fever and unexpected weight change. HENT: Negative for sinus pressure, sinus pain, sore throat and trouble swallowing. Eyes: Negative. Respiratory: Positive for cough. Negative for shortness of breath and wheezing.     Cardiovascular: Negative for chest pain, palpitations and leg swelling. Gastrointestinal: Positive for abdominal pain and constipation. Negative for diarrhea, nausea and vomiting. Endocrine: Negative. Genitourinary: Negative for decreased urine volume, dysuria, flank pain, hematuria, pelvic pain and vaginal bleeding. Musculoskeletal: Negative. Skin: Negative. Allergic/Immunologic: Negative. Neurological: Negative for dizziness, syncope and light-headedness. Hematological: Negative. Psychiatric/Behavioral: Negative for confusion and sleep disturbance. The patient is not nervous/anxious. Physical Exam     Physical Exam  Vitals and nursing note reviewed. Constitutional:       Appearance: She is well-developed and normal weight. HENT:      Head: Normocephalic and atraumatic. Right Ear: External ear normal.      Left Ear: External ear normal.      Nose: Nose normal.      Mouth/Throat:      Pharynx: Oropharynx is clear. Eyes:      Extraocular Movements: Extraocular movements intact. Conjunctiva/sclera: Conjunctivae normal.      Pupils: Pupils are equal, round, and reactive to light. Neck:      Vascular: No JVD. Trachea: No tracheal deviation. Cardiovascular:      Rate and Rhythm: Normal rate and regular rhythm. Pulses:           Radial pulses are 2+ on the right side and 2+ on the left side. Heart sounds: Normal heart sounds. Pulmonary:      Effort: Pulmonary effort is normal.      Breath sounds: Normal breath sounds. Abdominal:      General: Abdomen is protuberant. Bowel sounds are decreased. There is no distension. Palpations: Abdomen is soft. There is no hepatomegaly or splenomegaly. Tenderness: There is abdominal tenderness in the epigastric area and periumbilical area. There is no guarding or rebound. Hernia: There is no hernia in the umbilical area. Musculoskeletal:         General: Normal range of motion.       Cervical back: Normal range of motion and neck supple. Right lower leg: No tenderness. No edema. Left lower leg: No tenderness. No edema. Skin:     General: Skin is warm and dry. Capillary Refill: Capillary refill takes less than 2 seconds. Findings: No bruising or lesion. Neurological:      General: No focal deficit present. Mental Status: She is alert and oriented to person, place, and time. Sensory: No sensory deficit. Gait: Gait normal.   Psychiatric:         Mood and Affect: Mood normal.         Behavior: Behavior normal.         Thought Content: Thought content normal.         Judgment: Judgment normal.         Lab and Diagnostic Study Results     Labs -     Recent Results (from the past 12 hour(s))   CBC WITH AUTOMATED DIFF    Collection Time: 10/22/21  9:10 PM   Result Value Ref Range    WBC 6.5 3.6 - 11.0 K/uL    RBC 5.46 (H) 3.80 - 5.20 M/uL    HGB 14.3 11.5 - 16.0 g/dL    HCT 43.8 35.0 - 47.0 %    MCV 80.2 80.0 - 99.0 FL    MCH 26.2 26.0 - 34.0 PG    MCHC 32.6 30.0 - 36.5 g/dL    RDW 14.9 (H) 11.5 - 14.5 %    PLATELET 297 865 - 284 K/uL    NRBC 0.0 0.0  WBC    ABSOLUTE NRBC 0.00 0.00 - 0.01 K/uL    NEUTROPHILS 75 32 - 75 %    LYMPHOCYTES 16 12 - 49 %    MONOCYTES 9 5 - 13 %    EOSINOPHILS 0 0 - 7 %    BASOPHILS 0 0 - 1 %    IMMATURE GRANULOCYTES 0 0 - 0.5 %    ABS. NEUTROPHILS 4.9 1.8 - 8.0 K/UL    ABS. LYMPHOCYTES 1.0 0.8 - 3.5 K/UL    ABS. MONOCYTES 0.6 0.0 - 1.0 K/UL    ABS. EOSINOPHILS 0.0 0.0 - 0.4 K/UL    ABS. BASOPHILS 0.0 0.0 - 0.1 K/UL    ABS. IMM.  GRANS. 0.0 0.00 - 0.04 K/UL    DF AUTOMATED     METABOLIC PANEL, COMPREHENSIVE    Collection Time: 10/22/21  9:10 PM   Result Value Ref Range    Sodium 134 (L) 136 - 145 mmol/L    Potassium 3.8 3.5 - 5.1 mmol/L    Chloride 101 97 - 108 mmol/L    CO2 25 21 - 32 mmol/L    Anion gap 8 5 - 15 mmol/L    Glucose 279 (H) 65 - 100 mg/dL    BUN 17 6 - 20 mg/dL    Creatinine 0.89 0.55 - 1.02 mg/dL    BUN/Creatinine ratio 19 12 - 20      GFR est AA >60 >60 ml/min/1.73m2    GFR est non-AA >60 >60 ml/min/1.73m2    Calcium 9.1 8.5 - 10.1 mg/dL    Bilirubin, total 1.0 0.2 - 1.0 mg/dL    AST (SGOT) 23 15 - 37 U/L    ALT (SGPT) 40 12 - 78 U/L    Alk. phosphatase 149 (H) 45 - 117 U/L    Protein, total 7.6 6.4 - 8.2 g/dL    Albumin 3.4 (L) 3.5 - 5.0 g/dL    Globulin 4.2 (H) 2.0 - 4.0 g/dL    A-G Ratio 0.8 (L) 1.1 - 2.2     LIPASE    Collection Time: 10/22/21  9:10 PM   Result Value Ref Range    Lipase >3,000 (H) 73 - 393 U/L   TROPONIN-HIGH SENSITIVITY    Collection Time: 10/22/21  9:10 PM   Result Value Ref Range    Troponin-High Sensitivity 599 (HH) 0 - 51 ng/L       Radiologic Studies -   @lastxrresult@  CT Results  (Last 48 hours)    None        CXR Results  (Last 48 hours)               10/22/21 2009  XR CHEST PORT Final result    Impression:  Left basilar airspace opacity which could represent atelectasis or   pneumonia. Narrative:  Exam: XR CHEST PORT         Indication:  cough; Comparison: Chest x-ray from May 4, 2021       Findings:       Mild cardiomegaly. Calcified tortuous thoracic aorta. Possible left basilar   airspace opacity. No pneumothorax. Osteopenia. Similar dextroconvex scoliosis of   the thoracic spine. EKG 1934: Ventricular rate 93 bpm, WI interval 136 ms, QRS duration 84 ms,  ms,  ms interpretation sinus rhythm with marked sinus arrhythmia, left atrial enlargement, left ventricular hypertrophy, prolonged QT abnormal ECG. Reviewed by ED attending and compared to previous ECG from July    Medical Decision Making   - I am the first provider for this patient. - I reviewed the vital signs, available nursing notes, past medical history, past surgical history, family history and social history. - Initial assessment performed. The patients presenting problems have been discussed, and they are in agreement with the care plan formulated and outlined with them.   I have encouraged them to ask questions as they arise throughout their visit. Vital Signs-Reviewed the patient's vital signs. Patient Vitals for the past 12 hrs:   Temp Pulse Resp BP SpO2   10/22/21 2213  80 18 116/74 96 %   10/22/21 1927 98.2 °F (36.8 °C) 93 16 (!) 178/101 98 %       Records Reviewed: Nursing Notes, Old Medical Records, Previous electrocardiograms, Previous Radiology Studies and Previous Laboratory Studies    The patient presents with abdominal pain with a differential diagnosis of abdominal pain, cholecystitis, gastritis, gastroenteritis, GERD, obstruction, pancreatitis, PUD, UTI and vomiting      ED Course:     ED Course as of Oct 22 2303   Fri Oct 22, 2021   2032 CXR reviewed. Imaging directly visualized. Awaiting remaining labs    [KR]   18 Spoke with Dr. Ed Valenzuela contacted for cardiology consultation for elevated troponin of 599. She was seen by him on previous admission. Ordered to continue to trend troponin no heparin drip at this time. Dr. Param Stuart aware and will be admitting patient. CT results are still pending final report. Patient is aware of admission and agreeable. [KR]      ED Course User Index  [KR] Vida Brandt NP       Provider Notes (Medical Decision Making):     MDM  Number of Diagnoses or Management Options  Diagnosis management comments: Work-up with basic labs, urinalysis, CT of the abdomen and chest x-ray. EKG given patient's age, race and complaints of epigastric pain. Also diabetic. Disposition   Disposition: Condition stable  Admitted to Floor Medical Floor the case was discussed with the admitting physician Dr. Param Stuart    Admitted        Diagnosis     Clinical Impression:   1. Acute pancreatitis, unspecified complication status, unspecified pancreatitis type    2. Abdominal pain, epigastric    3. Elevated troponin        Attestations:    Hollis Sherfif NP    Please note that this dictation was completed with [employer], the [employer] voice recognition software.   Quite often unanticipated grammatical, syntax, homophones, and other interpretive errors are inadvertently transcribed by the computer software. Please disregard these errors. Please excuse any errors that have escaped final proofreading. Thank you.

## 2021-10-22 NOTE — Clinical Note
[place]-Sonia catheter removed.
Balloon catheter removed
Balloon catheter removed
Contrast: Isovue. Contrast concentration: 370. Amount: 60 mL.
Diagnostic wire inserted.
Diagnostic wire removed. Guidewire tip is intact.
[place]-Sonia catheter inserted.
Guide catheter inserted over the wire.
Guidewire advanced and used as the primary guidewire crosses lesion.
Guidewire removed. Guidewire is intact.
History and physical documented and up to date, allergies reviewed, lab results reviewed, pre-procedure education provided, patient verbalized understanding of procedure, procedural consent signed and patient is NPO.
ID band present and verified.
Lesion 1: Guidewire inserted
Measurements taken: pressures.
Measurements taken: sats. PA Sat drawn.   Results: 50.4
Multiple views of the left coronary artery obtained using hand injection.
Multiple views of the right coronary artery obtained using hand injection.  Post balloon angioplasty
Multiple views of the right coronary artery obtained using hand injection. Post stent deployment.
No specimen collected. Estimated Blood Loss: <30 mL.
Physician has arrived.
Physician reviewing films.
Right brachial vein. Accessed unsuccessfully. Micropuncture needle used. Using ultrasound guidance.  Number of attempts =  1.
Right brachial vein. Accessed unsuccessfully. Micropuncture needle used. Using ultrasound guidance. Number of attempts =  2.
Right brachial vein. Accessed unsuccessfully. Micropuncture needle used. Using ultrasound guidance. Number of attempts =  3.
Right brachial vein. Accessed unsuccessfully. Micropuncture needle used. Using ultrasound guidance. Number of attempts =  4.
Right brachial vein. Accessed unsuccessfully. Micropuncture needle used. Using ultrasound guidance. Number of attempts =  5.
Right brachial vein. Accessed unsuccessfully. Micropuncture needle used. Using ultrasound guidance. Number of attempts =  6.
Right brachial vein. Micropuncture needle used. Using ultrasound guidance. Number of attempts =  7.
Right groin, right radial and right brachial clipped prepped with ChloraPrep and draped.
Right radial artery. Accessed successfully. Micropuncture needle used. Number of attempts =  1.  Aleksandr Mandrail inserted
Sheath #1: Dressed using transparent dressing. Site: clean, dry, & intact, no bleeding and no hematoma.
Sheath #1: Sheath: inserted. Sheath inserted/placed in the right brachialcephalic  vein.
Sheath #1: Sheath: inserted. Sheath inserted/placed in the right radial  artery.  6F Glidesheath Slender
Sheath #2: Closed using R-Band. Radial band pressure set at: 9.
Sheath #2: Sheath: removed.
Single view of the right coronary artery obtained using hand injection.
Single view of the right coronary artery obtained using hand injection.  Post angioplasty
Single view of the right coronary artery obtained using hand injection.  Post stent deployment
Stent delivery system removed.
Stent delivery system removed.
TRANSFER - OUT REPORT:     Verbal report given to: PACU, (at bedside). Report consisted of patient's Situation, Background, Assessment and   Recommendations(SBAR). Opportunity for questions and clarification was provided.
inserted over the wire. Catheter used: Left 4.
inserted over the wire. Catheter used: Left 4.
inserted over the wire. Catheter used: Right 4.
removed over the wire.
removed over the wire. Catheter used: Right 4.
done

## 2021-10-23 LAB
ALBUMIN SERPL-MCNC: 2.9 G/DL (ref 3.5–5)
ALBUMIN/GLOB SERPL: 0.9 {RATIO} (ref 1.1–2.2)
ALP SERPL-CCNC: 124 U/L (ref 45–117)
ALT SERPL-CCNC: 30 U/L (ref 12–78)
ANION GAP SERPL CALC-SCNC: 7 MMOL/L (ref 5–15)
APPEARANCE UR: CLEAR
AST SERPL W P-5'-P-CCNC: 12 U/L (ref 15–37)
BACTERIA URNS QL MICRO: NEGATIVE /HPF
BILIRUB SERPL-MCNC: 1 MG/DL (ref 0.2–1)
BILIRUB UR QL: NEGATIVE
BUN SERPL-MCNC: 18 MG/DL (ref 6–20)
BUN/CREAT SERPL: 19 (ref 12–20)
CA-I BLD-MCNC: 8.7 MG/DL (ref 8.5–10.1)
CHLORIDE SERPL-SCNC: 106 MMOL/L (ref 97–108)
CO2 SERPL-SCNC: 26 MMOL/L (ref 21–32)
COLOR UR: ABNORMAL
CREAT SERPL-MCNC: 0.93 MG/DL (ref 0.55–1.02)
ERYTHROCYTE [DISTWIDTH] IN BLOOD BY AUTOMATED COUNT: 14.9 % (ref 11.5–14.5)
GLOBULIN SER CALC-MCNC: 3.3 G/DL (ref 2–4)
GLUCOSE BLD STRIP.AUTO-MCNC: 153 MG/DL (ref 65–117)
GLUCOSE BLD STRIP.AUTO-MCNC: 160 MG/DL (ref 65–117)
GLUCOSE BLD STRIP.AUTO-MCNC: 184 MG/DL (ref 65–117)
GLUCOSE BLD STRIP.AUTO-MCNC: 205 MG/DL (ref 65–117)
GLUCOSE BLD STRIP.AUTO-MCNC: 232 MG/DL (ref 65–117)
GLUCOSE SERPL-MCNC: 187 MG/DL (ref 65–100)
GLUCOSE UR STRIP.AUTO-MCNC: NEGATIVE MG/DL
HCT VFR BLD AUTO: 39.1 % (ref 35–47)
HGB BLD-MCNC: 12.6 G/DL (ref 11.5–16)
HGB UR QL STRIP: ABNORMAL
KETONES UR QL STRIP.AUTO: NEGATIVE MG/DL
LEUKOCYTE ESTERASE UR QL STRIP.AUTO: NEGATIVE
LIPASE SERPL-CCNC: 2342 U/L (ref 73–393)
MAGNESIUM SERPL-MCNC: 1.9 MG/DL (ref 1.6–2.4)
MCH RBC QN AUTO: 26.2 PG (ref 26–34)
MCHC RBC AUTO-ENTMCNC: 32.2 G/DL (ref 30–36.5)
MCV RBC AUTO: 81.3 FL (ref 80–99)
MUCOUS THREADS URNS QL MICRO: ABNORMAL /LPF
NITRITE UR QL STRIP.AUTO: NEGATIVE
NRBC # BLD: 0 K/UL (ref 0–0.01)
NRBC BLD-RTO: 0 PER 100 WBC
PERFORMED BY, TECHID: ABNORMAL
PH UR STRIP: 5 [PH] (ref 5–8)
PHOSPHATE SERPL-MCNC: 3.8 MG/DL (ref 2.6–4.7)
PLATELET # BLD AUTO: 171 K/UL (ref 150–400)
PMV BLD AUTO: 12.4 FL (ref 8.9–12.9)
POTASSIUM SERPL-SCNC: 3.6 MMOL/L (ref 3.5–5.1)
PROT SERPL-MCNC: 6.2 G/DL (ref 6.4–8.2)
PROT UR STRIP-MCNC: NEGATIVE MG/DL
RBC # BLD AUTO: 4.81 M/UL (ref 3.8–5.2)
RBC #/AREA URNS HPF: ABNORMAL /HPF (ref 0–5)
SODIUM SERPL-SCNC: 139 MMOL/L (ref 136–145)
SP GR UR REFRACTOMETRY: 1.03 (ref 1–1.03)
TRIGL SERPL-MCNC: 58 MG/DL (ref ?–150)
TROPONIN-HIGH SENSITIVITY: 540 NG/L (ref 0–51)
UA: UC IF INDICATED,UAUC: ABNORMAL
UROBILINOGEN UR QL STRIP.AUTO: 0.1 EU/DL (ref 0.1–1)
WBC # BLD AUTO: 6.2 K/UL (ref 3.6–11)
WBC URNS QL MICRO: ABNORMAL /HPF (ref 0–4)

## 2021-10-23 PROCEDURE — 74011250636 HC RX REV CODE- 250/636: Performed by: HOSPITALIST

## 2021-10-23 PROCEDURE — 83690 ASSAY OF LIPASE: CPT

## 2021-10-23 PROCEDURE — 84100 ASSAY OF PHOSPHORUS: CPT

## 2021-10-23 PROCEDURE — 65270000029 HC RM PRIVATE

## 2021-10-23 PROCEDURE — 82962 GLUCOSE BLOOD TEST: CPT

## 2021-10-23 PROCEDURE — 81001 URINALYSIS AUTO W/SCOPE: CPT

## 2021-10-23 PROCEDURE — 74011250636 HC RX REV CODE- 250/636: Performed by: NURSE PRACTITIONER

## 2021-10-23 PROCEDURE — 80053 COMPREHEN METABOLIC PANEL: CPT

## 2021-10-23 PROCEDURE — 74011250637 HC RX REV CODE- 250/637: Performed by: NURSE PRACTITIONER

## 2021-10-23 PROCEDURE — 83735 ASSAY OF MAGNESIUM: CPT

## 2021-10-23 PROCEDURE — 85027 COMPLETE CBC AUTOMATED: CPT

## 2021-10-23 PROCEDURE — 36415 COLL VENOUS BLD VENIPUNCTURE: CPT

## 2021-10-23 RX ORDER — SODIUM CHLORIDE 9 MG/ML
100 INJECTION, SOLUTION INTRAVENOUS CONTINUOUS
Status: DISCONTINUED | OUTPATIENT
Start: 2021-10-23 | End: 2021-10-24

## 2021-10-23 RX ORDER — ENOXAPARIN SODIUM 100 MG/ML
40 INJECTION SUBCUTANEOUS EVERY 24 HOURS
Status: DISCONTINUED | OUTPATIENT
Start: 2021-10-24 | End: 2021-10-30 | Stop reason: HOSPADM

## 2021-10-23 RX ORDER — GLIPIZIDE 5 MG/1
5 TABLET ORAL
Status: DISCONTINUED | OUTPATIENT
Start: 2021-10-23 | End: 2021-10-30 | Stop reason: HOSPADM

## 2021-10-23 RX ADMIN — POTASSIUM CHLORIDE AND SODIUM CHLORIDE 1000 ML: 900; 150 INJECTION, SOLUTION INTRAVENOUS at 01:42

## 2021-10-23 RX ADMIN — GLIPIZIDE 5 MG: 5 TABLET ORAL at 15:40

## 2021-10-23 RX ADMIN — Medication 10 ML: at 15:43

## 2021-10-23 RX ADMIN — HYDROMORPHONE HYDROCHLORIDE 0.5 MG: 1 INJECTION, SOLUTION INTRAMUSCULAR; INTRAVENOUS; SUBCUTANEOUS at 23:12

## 2021-10-23 RX ADMIN — HYDROMORPHONE HYDROCHLORIDE 0.5 MG: 1 INJECTION, SOLUTION INTRAMUSCULAR; INTRAVENOUS; SUBCUTANEOUS at 02:12

## 2021-10-23 RX ADMIN — SODIUM CHLORIDE 100 ML/HR: 9 INJECTION, SOLUTION INTRAVENOUS at 22:03

## 2021-10-23 RX ADMIN — ONDANSETRON 4 MG: 2 INJECTION INTRAMUSCULAR; INTRAVENOUS at 02:12

## 2021-10-23 RX ADMIN — ONDANSETRON 4 MG: 2 INJECTION INTRAMUSCULAR; INTRAVENOUS at 23:12

## 2021-10-23 RX ADMIN — HYDROMORPHONE HYDROCHLORIDE 0.5 MG: 1 INJECTION, SOLUTION INTRAMUSCULAR; INTRAVENOUS; SUBCUTANEOUS at 15:40

## 2021-10-23 RX ADMIN — Medication 10 ML: at 00:33

## 2021-10-23 NOTE — PROGRESS NOTES
Hospitalist Progress Note         ROCKY Mason FNP-C    Daily Progress Note: 10/23/2021      Subjective:   Subjective   Patient examined alert and oriented lying in bed  Reports continue abdominal pain  No overnight events reported  No acute distress noted on examination    Review of Systems:   Review of Systems   Constitutional: Negative. HENT: Negative. Eyes: Negative. Respiratory: Negative. Cardiovascular: Negative. Gastrointestinal: Positive for abdominal pain and nausea. Genitourinary: Negative. Musculoskeletal: Negative. Skin: Negative. Neurological: Negative. Endo/Heme/Allergies: Negative. Psychiatric/Behavioral: Negative. Objective:   Objective      Vitals:  Patient Vitals for the past 12 hrs:   Temp Pulse Resp BP SpO2   10/23/21 0748 97.5 °F (36.4 °C) 75 18 138/85 96 %   10/23/21 0356  80      10/23/21 0153 98 °F (36.7 °C) 80 16 131/66 96 %   10/23/21 0015  73 21 121/83 95 %        Physical Exam:  Physical Exam  Vitals and nursing note reviewed. Constitutional:       Appearance: Normal appearance. HENT:      Mouth/Throat:      Pharynx: Oropharynx is clear. Eyes:      Conjunctiva/sclera: Conjunctivae normal.   Cardiovascular:      Rate and Rhythm: Regular rhythm. Pulses: Normal pulses. Pulmonary:      Effort: Pulmonary effort is normal.      Breath sounds: Normal breath sounds. Abdominal:      General: Bowel sounds are normal.      Palpations: Abdomen is soft. Tenderness: There is abdominal tenderness. Musculoskeletal:      Cervical back: Normal range of motion. Skin:     General: Skin is warm and dry. Capillary Refill: Capillary refill takes less than 2 seconds. Neurological:      Mental Status: She is alert and oriented to person, place, and time.    Psychiatric:         Mood and Affect: Mood normal.         Behavior: Behavior normal.          Lab Results:  Recent Results (from the past 24 hour(s))   CBC WITH AUTOMATED DIFF    Collection Time: 10/22/21  9:10 PM   Result Value Ref Range    WBC 6.5 3.6 - 11.0 K/uL    RBC 5.46 (H) 3.80 - 5.20 M/uL    HGB 14.3 11.5 - 16.0 g/dL    HCT 43.8 35.0 - 47.0 %    MCV 80.2 80.0 - 99.0 FL    MCH 26.2 26.0 - 34.0 PG    MCHC 32.6 30.0 - 36.5 g/dL    RDW 14.9 (H) 11.5 - 14.5 %    PLATELET 330 754 - 759 K/uL    NRBC 0.0 0.0  WBC    ABSOLUTE NRBC 0.00 0.00 - 0.01 K/uL    NEUTROPHILS 75 32 - 75 %    LYMPHOCYTES 16 12 - 49 %    MONOCYTES 9 5 - 13 %    EOSINOPHILS 0 0 - 7 %    BASOPHILS 0 0 - 1 %    IMMATURE GRANULOCYTES 0 0 - 0.5 %    ABS. NEUTROPHILS 4.9 1.8 - 8.0 K/UL    ABS. LYMPHOCYTES 1.0 0.8 - 3.5 K/UL    ABS. MONOCYTES 0.6 0.0 - 1.0 K/UL    ABS. EOSINOPHILS 0.0 0.0 - 0.4 K/UL    ABS. BASOPHILS 0.0 0.0 - 0.1 K/UL    ABS. IMM. GRANS. 0.0 0.00 - 0.04 K/UL    DF AUTOMATED     METABOLIC PANEL, COMPREHENSIVE    Collection Time: 10/22/21  9:10 PM   Result Value Ref Range    Sodium 134 (L) 136 - 145 mmol/L    Potassium 3.8 3.5 - 5.1 mmol/L    Chloride 101 97 - 108 mmol/L    CO2 25 21 - 32 mmol/L    Anion gap 8 5 - 15 mmol/L    Glucose 279 (H) 65 - 100 mg/dL    BUN 17 6 - 20 mg/dL    Creatinine 0.89 0.55 - 1.02 mg/dL    BUN/Creatinine ratio 19 12 - 20      GFR est AA >60 >60 ml/min/1.73m2    GFR est non-AA >60 >60 ml/min/1.73m2    Calcium 9.1 8.5 - 10.1 mg/dL    Bilirubin, total 1.0 0.2 - 1.0 mg/dL    AST (SGOT) 23 15 - 37 U/L    ALT (SGPT) 40 12 - 78 U/L    Alk.  phosphatase 149 (H) 45 - 117 U/L    Protein, total 7.6 6.4 - 8.2 g/dL    Albumin 3.4 (L) 3.5 - 5.0 g/dL    Globulin 4.2 (H) 2.0 - 4.0 g/dL    A-G Ratio 0.8 (L) 1.1 - 2.2     LIPASE    Collection Time: 10/22/21  9:10 PM   Result Value Ref Range    Lipase >3,000 (H) 73 - 393 U/L   TROPONIN-HIGH SENSITIVITY    Collection Time: 10/22/21  9:10 PM   Result Value Ref Range    Troponin-High Sensitivity 599 (HH) 0 - 51 ng/L   TROPONIN-HIGH SENSITIVITY    Collection Time: 10/22/21 11:37 PM   Result Value Ref Range    Troponin-High Sensitivity 540 (HH) 0 - 51 ng/L   URINALYSIS W/ REFLEX CULTURE    Collection Time: 10/23/21  2:30 AM    Specimen: Urine   Result Value Ref Range    Color Yellow/Straw      Appearance Clear Clear      Specific gravity 1.026 1.003 - 1.030      pH (UA) 5.0 5.0 - 8.0      Protein Negative Negative mg/dL    Glucose Negative Negative mg/dL    Ketone Negative Negative mg/dL    Bilirubin Negative Negative      Blood Small (A) Negative      Urobilinogen 0.1 0.1 - 1.0 EU/dL    Nitrites Negative Negative      Leukocyte Esterase Negative Negative      UA:UC IF INDICATED Culture not indicated by UA result Culture not indicated by UA result      WBC 5-10 0 - 4 /hpf    RBC 5-10 0 - 5 /hpf    Bacteria Negative Negative /hpf    Mucus Trace /lpf   GLUCOSE, POC    Collection Time: 10/23/21  4:19 AM   Result Value Ref Range    Glucose (POC) 205 (H) 65 - 117 mg/dL    Performed by [facility]    METABOLIC PANEL, COMPREHENSIVE    Collection Time: 10/23/21  7:30 AM   Result Value Ref Range    Sodium 139 136 - 145 mmol/L    Potassium 3.6 3.5 - 5.1 mmol/L    Chloride 106 97 - 108 mmol/L    CO2 26 21 - 32 mmol/L    Anion gap 7 5 - 15 mmol/L    Glucose 187 (H) 65 - 100 mg/dL    BUN 18 6 - 20 mg/dL    Creatinine 0.93 0.55 - 1.02 mg/dL    BUN/Creatinine ratio 19 12 - 20      GFR est AA >60 >60 ml/min/1.73m2    GFR est non-AA 58 (L) >60 ml/min/1.73m2    Calcium 8.7 8.5 - 10.1 mg/dL    Bilirubin, total 1.0 0.2 - 1.0 mg/dL    AST (SGOT) 12 (L) 15 - 37 U/L    ALT (SGPT) 30 12 - 78 U/L    Alk.  phosphatase 124 (H) 45 - 117 U/L    Protein, total 6.2 (L) 6.4 - 8.2 g/dL    Albumin 2.9 (L) 3.5 - 5.0 g/dL    Globulin 3.3 2.0 - 4.0 g/dL    A-G Ratio 0.9 (L) 1.1 - 2.2     LIPASE    Collection Time: 10/23/21  7:30 AM   Result Value Ref Range    Lipase 2,342 (H) 73 - 393 U/L   MAGNESIUM    Collection Time: 10/23/21  7:30 AM   Result Value Ref Range    Magnesium 1.9 1.6 - 2.4 mg/dL   PHOSPHORUS    Collection Time: 10/23/21  7:30 AM   Result Value Ref Range    Phosphorus 3.8 2.6 - 4.7 mg/dL   CBC W/O DIFF    Collection Time: 10/23/21  7:30 AM   Result Value Ref Range    WBC 6.2 3.6 - 11.0 K/uL    RBC 4.81 3.80 - 5.20 M/uL    HGB 12.6 11.5 - 16.0 g/dL    HCT 39.1 35.0 - 47.0 %    MCV 81.3 80.0 - 99.0 FL    MCH 26.2 26.0 - 34.0 PG    MCHC 32.2 30.0 - 36.5 g/dL    RDW 14.9 (H) 11.5 - 14.5 %    PLATELET 362 941 - 120 K/uL    MPV 12.4 8.9 - 12.9 FL    NRBC 0.0 0.0  WBC    ABSOLUTE NRBC 0.00 0.00 - 0.01 K/uL   GLUCOSE, POC    Collection Time: 10/23/21  7:32 AM   Result Value Ref Range    Glucose (POC) 184 (H) 65 - 117 mg/dL    Performed by Dequan Thomson           Diagnostic Images:  CT Results  (Last 48 hours)               10/22/21 2225  CT ABD PELV W CONT Final result    Impression:  CHF/pulmonary edema suggested and partially visualized at the lung   bases with dilated cardiomyopathy suggested as described. Probable acute on   chronic pancreatitis and with persistent finding of a significantly dilated   pancreatic duct in the pancreatic tail which is inseparable from the pancreatic   tail cystic lesion. Diverticulosis without diverticulitis. Arteriosclerotic   changes of the aortoiliac vasculature. Degenerative disc disease and lumbar   scoliosis. Other findings as above. Narrative:  HISTORY:  abdominal pain   Dose reduction technique: All CT scans at this facility are performed using dose reduction optimization   technique as appropriate on the exam including the following: Automated exposure   control, adjustment of the MA and/or KV according to patient size and/or use of   iterative reconstructive technique. TECHNIQUE:  CT ABD PELV W CONT                            100 cc Isovue-370 injected. COMPARISON: 4 months prior, 7/4/2021   LIMITATIONS: None       CHEST: No acute airspace process or pleural effusion seen at the lung bases.                LIVER: Normal   GALLBLADDER: Interstitial septal thickening at the posterior lung bases   overlying moderate bilateral pleural effusions which are partially visualized. Multichamber cardiomegaly also partially visualized and with what appear to be   dilated ventricular chambers and dilated right atrium. BILIARY TREE: Normal   PANCREAS: Peripancreatic stranding/fluid with some of the fluid extending in the   central retroperitoneum. Marked dilation of the pancreatic duct at the level of   the pancreatic tail which terminates in a partially calcified multicystic lesion   as on prior study. The duct measures up to 1.2 cm maximum in the pancreatic tail   but is difficult to discern from the pancreatic tail cystic lesion. SPLEEN: Normal   ADRENAL GLANDS: Normal   KIDNEYS/URETERS/BLADDER: Normal. Negative for acute abnormality. Bilateral renal   cysts    AORTA/RETROPERITONEUM: Normal   BOWEL/MESENTERY: Diffuse diverticulosis of the colon without evidence of acute   diverticulitis   APPENDIX: Identified and normal   PERITONEAL CAVITY: Normal   REPRODUCTIVE ORGANS: Normal   BONE/TISSUES: Moderate multilevel degenerative disc and degenerative arthritic   changes of the lumbar spine with a lumbar scoliosis convex to the left as on   prior.        OTHER: None                 Current Medications:    Current Facility-Administered Medications:     glipiZIDE (GLUCOTROL) tablet 5 mg, 5 mg, Oral, ACBJesus, NP    glucose chewable tablet 16 g, 4 Tablet, Oral, PRN, Gómez Walter MD    dextrose (D50W) injection syrg 12.5-25 g, 25-50 mL, IntraVENous, PRN, Gómez Walter MD    glucagon (GLUCAGEN) injection 1 mg, 1 mg, IntraMUSCular, PRN, Gómez Walter MD    sodium chloride (NS) flush 5-40 mL, 5-40 mL, IntraVENous, Q8H, Gómez Walter MD, 10 mL at 10/23/21 0033    sodium chloride (NS) flush 5-40 mL, 5-40 mL, IntraVENous, PRN, Gómez Walter MD    acetaminophen (TYLENOL) tablet 650 mg, 650 mg, Oral, Q4H PRN, Gómez Walter MD    HYDROmorphone (DILAUDID) syringe 0.5 mg, 0.5 mg, IntraVENous, Q4H PRN, Kaylyn Amor MD, 0.5 mg at 10/23/21 0212    ondansetron [facility]) injection 4 mg, 4 mg, IntraVENous, Q4H PRN, Kaylyn Amor MD, 4 mg at 10/23/21 0212    enoxaparin (LOVENOX) injection 40 mg, 40 mg, SubCUTAneous, Q24H, Kaylyn Amor MD    0.9% sodium chloride with KCl 20 mEq/L infusion 1,000 mL, 1,000 mL, IntraVENous, CONTINUOUS, Kaylyn Amor MD, Last Rate: 125 mL/hr at 10/23/21 0142, 1,000 mL at 10/23/21 0142       ASSESSMENT:  [de-identified] y.o. female with history of diabetes, hypertension and history of diverticulosis/colon polyps presents to the emergency room complaining of abdominal pain around the navel with no radiation. Laboratory data lipase elevated more than 3000 suggestive of pancreatitis in the pain character. CT report later available also suggestive of acute on chronic pancreatitis. CT abdomen shows dilated pancreatic duct in the pancreatic tail which is inseparable from the pancreatic tail cystic lesion, acute on chronic pancreatitis. 1. Acute pancreatitis:  -lipase 3000-->2342  -suspect hereditary  -denies etoh usage  -Gentle IV hydration  -prn pain management  -GI consulted    2. Elevated troponin:  -troponin were 599 and has trended down to 540.  -suspect demand ischemia  -Echo back in May showed an EF of 40 to 45% with global hypokinesis, grade 1 diastolic dysfunction  -Cardiology following, echo pending    3. Benign essential hypertension:   -On verapamil and Prinivil at home    4. Type 2 DM:   -refuses insulin reports causing itching  -start glipizide 5mg daily  -obtain A1c      Full Code  Dvt Prophylaxis Lovenox  GI Prophylaxis protonix  Disposition:  · GI evaluation  · Diet toleration when initiated  · Echocardiogram      Above treatment plan reviewed and discussed with patient in detail at bedside, all questions answered. Care Plan discussed with: Interdisciplinary team    Total time spent with patient: >35 minutes.     Pepper Chicas Dheeraj Rojas NP

## 2021-10-23 NOTE — PROGRESS NOTES
Reason for Admission:  Abdominal pain                     RUR Score:          13%           Plan for utilizing home health:      No need for home health at his time. PCP: First and Last name:  Hetal Barlow MD     Name of Practice:    Are you a current patient: Yes/No: yes   Approximate date of last visit: yesterday   Can you participate in a virtual visit with your PCP:                     Current Advanced Directive/Advance Care Plan: Full Code      Healthcare Decision Maker:   Click here to complete [facility] including selection of the Healthcare Decision Maker Relationship (ie \"Primary\")             Primary Decision Maker: tracie hobbs - Son - [phone]                  Transition of Care Plan:      Patient lives in her own home during the day and stays with her son, next door, at night. Patient home is a one story home with 4 steps to entrance. Patient has a cane and walker and a wheelchair at home. Patient get her prescriptions from [facility]. Patient states she feels safe at home and does have a lot of support. Patient was in [facility] for 2 weeks after she had a stroke 2 years ago. Patient has not received HH or SNF. Confirmed patient address and phone number as well as son's number on demographic sheet.

## 2021-10-23 NOTE — ROUTINE PROCESS
.. TRANSFER - OUT REPORT:    Verbal report given to Angela Daniels on Michaell Needle  being transferred to [facility] room 567 for routine progression of care       Report consisted of patients Situation, Background, Assessment and   Recommendations(SBAR). Information from the following report(s) SBAR was reviewed with the receiving nurse. Lines:   Peripheral IV 10/22/21 Left Antecubital (Active)        Opportunity for questions and clarification was provided.       Patient transported with:   Monitor

## 2021-10-23 NOTE — ED NOTES
Bedside shift change report given to Luis (oncoming nurse) by Zain Diaz (offgoing nurse). Report included the following information SBAR, ED Summary, Procedure Summary, Intake/Output, MAR, Recent Results and Cardiac Rhythm NSR.

## 2021-10-23 NOTE — PROGRESS NOTES
Patient refused insuline. Physician notified and advised nurse to discontinue medication and  that if BS is greater than 250 to call attending physician.

## 2021-10-23 NOTE — CONSULTS
Consult    Patient: Glenna Babin MRN: [id number]  SSN: [id number]    YOB: 1940  Age: [de-identified] y.o. Sex: female      Subjective:      Glenna Babin is a [de-identified] y.o. female who is being seen for elevated troponin. Patient with diabetes mellitus, hypertension, diverticulosis, peripheral vascular disease, history of GI bleed, and colonic polyps who presented complaining of severe epigastric and periumbilical abdominal pain without radiation. It was sharp in nature associated with nausea and vomiting. She vomited one time but denied having any blood in the vomitus. She denied recent change in her weight or lower extremity swelling. She denied having any chest pain or shortness of breath. This morning she feels better. She was admitted for diagnosis of pancreatitis. Her troponin was elevated hence cardiology was consulted. She denied having any fever or chills. Gait is steady. She was brought by her son to the ED. She does not smoke neither drink alcohol.     Past Medical History:   Diagnosis Date    Colon polyps     Diabetes ([facility] [address]     Diverticulosis     Hypertension     Ill-defined condition      Past Surgical History:   Procedure Laterality Date    COLONOSCOPY N/A 4/29/2021    COLONOSCOPY performed by Keyonna Gayle MD at [address] [place] [address] ENDOSCOPY      Family History   Problem Relation Age of Onset    Diabetes Other      Social History     Tobacco Use    Smoking status: Never Smoker    Smokeless tobacco: Never Used   Substance Use Topics    Alcohol use: Not Currently      Current Facility-Administered Medications   Medication Dose Route Frequency Provider Last Rate Last Admin    insulin lispro (HUMALOG) injection   SubCUTAneous Q4H Amihs Carlisle MD        glucose chewable tablet 16 g  4 Tablet Oral PRN Amish Carlisle MD        dextrose (D50W) injection syrg 12.5-25 g  25-50 mL IntraVENous PRN Amish Carlisle MD        glucagon (GLUCAGEN) injection 1 mg  1 mg IntraMUSCular PRN Royer Clayton MD        sodium chloride (NS) flush 5-40 mL  5-40 mL IntraVENous Q8H Royer Clayton MD   10 mL at 10/23/21 0033    sodium chloride (NS) flush 5-40 mL  5-40 mL IntraVENous PRN Royer Clayton MD        acetaminophen (TYLENOL) tablet 650 mg  650 mg Oral Q4H PRN Royer Clayton MD        HYDROmorphone (DILAUDID) syringe 0.5 mg  0.5 mg IntraVENous Q4H PRN Royer Clayton MD   0.5 mg at 10/23/21 0212    ondansetron (ZOFRAN) injection 4 mg  4 mg IntraVENous Q4H PRN Royer Clayton MD   4 mg at 10/23/21 0212    enoxaparin (LOVENOX) injection 40 mg  40 mg SubCUTAneous Q24H Royer Clayton MD        0.9% sodium chloride with KCl 20 mEq/L infusion 1,000 mL  1,000 mL IntraVENous CONTINUOUS Royer Clayton  mL/hr at 10/23/21 0142 1,000 mL at 10/23/21 0142        Allergies   Allergen Reactions    Insulins Itching    Penicillins Other (comments)     Pt states it makes her pass out       Review of Systems:  A comprehensive review of systems was negative except for that written in the History of Present Illness. Objective:     Vitals:    10/23/21 0000 10/23/21 0015 10/23/21 0153 10/23/21 0356   BP:  121/83 131/66    Pulse: 80 73 80 80   Resp:  21 16    Temp:   98 °F (36.7 °C)    SpO2:  95% 96%    Weight:       Height:            Physical Exam:  General:  Alert, cooperative, no distress, appears stated age. Eyes:  Conjunctivae/corneas clear. PERRL, EOMs intact. Fundi benign   Ears:  Normal TMs and external ear canals both ears. Nose: Nares normal. Septum midline. Mucosa normal. No drainage or sinus tenderness. Mouth/Throat: Lips, mucosa, and tongue normal. Teeth and gums normal.   Neck: Supple, symmetrical, trachea midline, no adenopathy, thyroid: no enlargment/tenderness/nodules, no carotid bruit and no JVD. Back:   Symmetric, no curvature. ROM normal. No CVA tenderness. Lungs:   Clear to auscultation bilaterally. Heart:  Regular rate and rhythm, S1, S2 normal, no murmur, click, rub or gallop. Abdomen:    Abdomen is tender to palpation. .   Extremities: Extremities normal, atraumatic, no cyanosis or edema. Pulses: 2+ and symmetric all extremities. Skin: Skin color, texture, turgor normal. No rashes or lesions   Lymph nodes: Cervical, supraclavicular, and axillary nodes normal.   Neurologic: CNII-XII intact. Normal strength, sensation and reflexes throughout. Assessment:     Hospital Problems  Date Reviewed: 10/22/2021        Codes Class Noted POA    Acute pancreatitis ICD-10-CM: K85.90  ICD-9-CM: 521.7  10/22/2021 Yes        Elevated troponin ICD-10-CM: R77.8  ICD-9-CM: 790.6  10/22/2021 Unknown            Patient is an 71-year-old -American female with acute pancreatitis and type II non-STEMI:  1. 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 Right occipital lobe encephalomalacia  3. Peripheral vascular disease, left ICA 50 to 69%, right ICA less than 50% stenosis  4. 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 Hypertension with hypertensive heart disease  6. Mixed hyperlipidemia  7. Diabetes mellitus  8. Heart failure with reduced ejection fraction, compensated  9. Mild to moderate tricuspid regurgitation  10. Mild mitral regurgitation  11. Negative bubble study  12. Colonic polyps, tubular adenoma  1 13. Recent history of GI bleed  14. Right-sided weakness    Plan:     She appears to be euvolemic. She is laying flat and appears to be comfortable. Abdomen is tender to palpation. Echo back in May showed an EF of 40 to 45% with global hypokinesis, grade 1 diastolic dysfunction. Aortic calcification noted but no significant valvular stenosis. Mild to moderate TR. Her troponin were 599 and has trended down to 540. This could be representing type II non-STEMI. At this point I do not think heparin gtt. of any use. She denied having any chest pain or shortness of breath. Lipase was greater than 3000. Treatment for pancreatitis as per primary team.  Her hemoglobin is 12.6 and platelet is 735. She is currently on Lovenox for DVT prophylaxis. We will recheck an echocardiogram.  Blood pressure is well controlled. On telemetry she was in sinus rhythm but some T wave changes noted. Anyhow she had similar changes before. We will recheck troponin in the morning. Further recommendation depending on clinical progression and echocardiogram finding    Thank you  For involving me in Mrs. Jo Kruse care. I will follow.     Signed By: Dee Newton MD     October 23, 2021

## 2021-10-23 NOTE — PROGRESS NOTES
10/23/21 1557   Peripheral IV 10/23/21 Anterior;Right;Mid Forearm   Placement Date/Time: 10/23/21 1536   Number of Attempts: 1  Inserted By: Rupal Hernandez RN  Size: 22 G  Orientation: Anterior;Right;Mid  Location: Forearm   Site Assessment Clean, dry, & intact   Phlebitis Assessment 0   Infiltration Assessment 0   Dressing Status Clean, dry, & intact   Dressing Type Tape;Transparent   Hub Color/Line Status Blue;Flushed;Patent  (brisk blood return)   Alcohol Cap Used Yes

## 2021-10-23 NOTE — H&P
History and Physical              Subjective :   Chief Complaint : Abdominal pain around the navel    Source of information : Patient, ED provider, old medical records. History of present illness:   [de-identified] y.o. female with history of diabetes, hypertension and history of diverticulosis/colon polyps presents to the emergency room complaining of abdominal pain around the navel with no radiation. It started like 2 days ago not feeling good and sick to the stomach, since yesterday started having pain. That is radiating around the abdomen to the back. Associated with shortness of breath worse with activity, no exacerbating relieving factors. Pain is constantly there and very sharp rating it 8 on scale of 10. No fever or chills. Laboratory data lipase elevated more than 3000 suggestive of pancreatitis in the pain character. CT report later available also suggestive of acute on chronic pancreatitis. Past Medical History:   Diagnosis Date    Colon polyps     Diabetes ([address]     Diverticulosis     Hypertension      Past Surgical History:   Procedure Laterality Date    COLONOSCOPY N/A 4/29/2021    COLONOSCOPY performed by Dillon Jerome MD at [facility] ENDOSCOPY     Family History   Problem Relation Age of Onset    Diabetes Other       Social History     Tobacco Use    Smoking status: Never Smoker    Smokeless tobacco: Never Used   Substance Use Topics    Alcohol use: Not Currently       Prior to Admission medications    Medication Sig Start Date End Date Taking? Authorizing Provider   insulin glargine (LANTUS) 100 unit/mL injection 22 Units by SubCUTAneous route nightly. 5/20/21  Yes Provider, Historical   verapamil ER (CALAN-SR) 240 mg CR tablet Take 240 mg by mouth daily. 9/21/21   Provider, Historical   atorvastatin (LIPITOR) 80 mg tablet Take 1 Tab by mouth daily. 5/7/21   Porsha Sesay MD   lisinopriL (PRINIVIL, ZESTRIL) 20 mg tablet Take 1 Tab by mouth daily.  5/7/21   Marycruz Palacios Kehinde Veras MD   insulin lispro (HUMALOG) 100 unit/mL injection Take 3 units tid 5/6/21   Oneil Clinton MD     Allergies   Allergen Reactions    Insulins Itching    Penicillins Other (comments)     Pt states it makes her pass out             Review of Systems:  Constitutional: Appetite is not good,  no fever, no chills, no night sweats. Eye: No recent visual disturbances, no discharge, no double vision. Ear/nose/mouth/throat : No hearing disturbance, no ear pain, no nasal congestion, no sore throat, + trouble swallowing. Respiratory : Shortness of breath with little activity no trouble breathing, no cough,  no hemoptysis, no wheezing. Cardiovascular : No chest pain, no palpitation,  no orthopnea, no peripheral edema. Gastrointestinal : No nausea, no vomiting, no diarrhea, No constipation, No heartburn, No abdominal pain. Genitourinary : No dysuria, no hematuria,   Lymphatics : Unable to answer   Endocrine : No excessive thirst, No polyuria No cold intolerance, No heat intolerance. Immunologic :  No seasonal allergies. Musculoskeletal : No joint swelling, No pain, No effusion, . Integumentary : She has trouble with itching from medications adverse effects. Hematology :   No tendency to bleed easy. Neurology : Denies change in mental status,  No headache,  Psychiatric : Unable to get information    Vitals:     Patient Vitals for the past 12 hrs:   Temp Pulse Resp BP SpO2   10/22/21 2213  80 18 116/74 96 %   10/22/21 1927 98.2 °F (36.8 °C) 93 16 (!) 178/101 98 %       Physical Exam:   General : Looks very tired, uncomfortable. Faith Chakraborty HEENT : PERRLA, dry oral mucosa, atraumatic normocephalic, Normal ear and nose. Neck : Supple, no JVD, no masses noted, no carotid bruit. Lungs : Breath sounds with moderate air entry bilaterally, no wheezes or rales, no accessory muscle use. CVS : Rhythm rate regular, S1+, S2+, no murmur or gallop.   Abdomen : Soft, nontender, no organomegaly, bowel sounds active. Extremities : No edema noted,  pedal pulses palpable. Musculoskeletal : Fair range of motion, no joint swelling or effusion, muscle tone and power appears decreased. Skin : Dry, poor skin turgor. No pathological rash. Lymphatic : No cervical lymphadenopathy. Neurological : Awake, alert, oriented to time place person. Psychiatric : Mood and affect seems anxious appropriate to current situation. Data Review:   Recent Results (from the past 24 hour(s))   CBC WITH AUTOMATED DIFF    Collection Time: 10/22/21  9:10 PM   Result Value Ref Range    WBC 6.5 3.6 - 11.0 K/uL    RBC 5.46 (H) 3.80 - 5.20 M/uL    HGB 14.3 11.5 - 16.0 g/dL    HCT 43.8 35.0 - 47.0 %    MCV 80.2 80.0 - 99.0 FL    MCH 26.2 26.0 - 34.0 PG    MCHC 32.6 30.0 - 36.5 g/dL    RDW 14.9 (H) 11.5 - 14.5 %    PLATELET 618 250 - 367 K/uL    NRBC 0.0 0.0  WBC    ABSOLUTE NRBC 0.00 0.00 - 0.01 K/uL    NEUTROPHILS 75 32 - 75 %    LYMPHOCYTES 16 12 - 49 %    MONOCYTES 9 5 - 13 %    EOSINOPHILS 0 0 - 7 %    BASOPHILS 0 0 - 1 %    IMMATURE GRANULOCYTES 0 0 - 0.5 %    ABS. NEUTROPHILS 4.9 1.8 - 8.0 K/UL    ABS. LYMPHOCYTES 1.0 0.8 - 3.5 K/UL    ABS. MONOCYTES 0.6 0.0 - 1.0 K/UL    ABS. EOSINOPHILS 0.0 0.0 - 0.4 K/UL    ABS. BASOPHILS 0.0 0.0 - 0.1 K/UL    ABS. IMM. GRANS. 0.0 0.00 - 0.04 K/UL    DF AUTOMATED     METABOLIC PANEL, COMPREHENSIVE    Collection Time: 10/22/21  9:10 PM   Result Value Ref Range    Sodium 134 (L) 136 - 145 mmol/L    Potassium 3.8 3.5 - 5.1 mmol/L    Chloride 101 97 - 108 mmol/L    CO2 25 21 - 32 mmol/L    Anion gap 8 5 - 15 mmol/L    Glucose 279 (H) 65 - 100 mg/dL    BUN 17 6 - 20 mg/dL    Creatinine 0.89 0.55 - 1.02 mg/dL    BUN/Creatinine ratio 19 12 - 20      GFR est AA >60 >60 ml/min/1.73m2    GFR est non-AA >60 >60 ml/min/1.73m2    Calcium 9.1 8.5 - 10.1 mg/dL    Bilirubin, total 1.0 0.2 - 1.0 mg/dL    AST (SGOT) 23 15 - 37 U/L    ALT (SGPT) 40 12 - 78 U/L    Alk.  phosphatase 149 (H) 45 - 117 U/L    Protein, total 7.6 6.4 - 8.2 g/dL    Albumin 3.4 (L) 3.5 - 5.0 g/dL    Globulin 4.2 (H) 2.0 - 4.0 g/dL    A-G Ratio 0.8 (L) 1.1 - 2.2     LIPASE    Collection Time: 10/22/21  9:10 PM   Result Value Ref Range    Lipase >3,000 (H) 73 - 393 U/L   TROPONIN-HIGH SENSITIVITY    Collection Time: 10/22/21  9:10 PM   Result Value Ref Range    Troponin-High Sensitivity 599 (HH) 0 - 51 ng/L       Radiologic Studies :   CT Results  (Last 48 hours)               10/22/21 2225  CT ABD PELV W CONT Final result    Impression:  CHF/pulmonary edema suggested and partially visualized at the lung   bases with dilated cardiomyopathy suggested as described. Probable acute on   chronic pancreatitis and with persistent finding of a significantly dilated   pancreatic duct in the pancreatic tail which is inseparable from the pancreatic   tail cystic lesion. Diverticulosis without diverticulitis. Arteriosclerotic   changes of the aortoiliac vasculature. Degenerative disc disease and lumbar   scoliosis. Other findings as above. Narrative:  HISTORY:  abdominal pain   Dose reduction technique: All CT scans at this facility are performed using dose reduction optimization   technique as appropriate on the exam including the following: Automated exposure   control, adjustment of the MA and/or KV according to patient size and/or use of   iterative reconstructive technique. TECHNIQUE:  CT ABD PELV W CONT                            100 cc Isovue-370 injected. COMPARISON: 4 months prior, 7/4/2021   LIMITATIONS: None       CHEST: No acute airspace process or pleural effusion seen at the lung bases. LIVER: Normal   GALLBLADDER: Interstitial septal thickening at the posterior lung bases   overlying moderate bilateral pleural effusions which are partially visualized. Multichamber cardiomegaly also partially visualized and with what appear to be   dilated ventricular chambers and dilated right atrium.    BILIARY TREE: Normal PANCREAS: Peripancreatic stranding/fluid with some of the fluid extending in the   central retroperitoneum. Marked dilation of the pancreatic duct at the level of   the pancreatic tail which terminates in a partially calcified multicystic lesion   as on prior study. The duct measures up to 1.2 cm maximum in the pancreatic tail   but is difficult to discern from the pancreatic tail cystic lesion. SPLEEN: Normal   ADRENAL GLANDS: Normal   KIDNEYS/URETERS/BLADDER: Normal. Negative for acute abnormality. Bilateral renal   cysts    AORTA/RETROPERITONEUM: Normal   BOWEL/MESENTERY: Diffuse diverticulosis of the colon without evidence of acute   diverticulitis   APPENDIX: Identified and normal   PERITONEAL CAVITY: Normal   REPRODUCTIVE ORGANS: Normal   BONE/TISSUES: Moderate multilevel degenerative disc and degenerative arthritic   changes of the lumbar spine with a lumbar scoliosis convex to the left as on   prior. OTHER: None               CXR Results  (Last 48 hours)               10/22/21 2009  XR CHEST PORT Final result    Impression:  Left basilar airspace opacity which could represent atelectasis or   pneumonia. Narrative:  Exam: XR CHEST PORT         Indication:  cough; Comparison: Chest x-ray from May 4, 2021       Findings:       Mild cardiomegaly. Calcified tortuous thoracic aorta. Possible left basilar   airspace opacity. No pneumothorax. Osteopenia. Similar dextroconvex scoliosis of   the thoracic spine. Assessment and Plan :     Acute pancreatitis: CT report suggested seems to have some chronic changes also. Kept her n.p.o. and started on IV fluids. Due to allergy did not start on any antibiotic at this time, will follow with recommendations from gastroenterology. Diabetes mellitus type 2: Uncontrolled, she is on Lantus and bolus insulin with each meal.  States that discontinued just a few days ago due to severe itching.   When I kept her on sliding scale insulin she refused to have it because afraid of side effects. We will continue to monitor blood sugars and at this point not sure what to do. Benign essential hypertension: On verapamil and Prinivil which we will continue if she is able to tolerate to take by mouth. Elevated troponin suggestive of non-ST elevation MI. But this may be a demand ischemia versus patient current situation. ED provider already contacted cardiology, we will follow with their recommendations. Recommended for a repeat troponin which we are going to do. Heart failure with ejection fraction 40 to 45% with grade 1 diastolic dysfunction that was done in May of this year. Seems to be stable no evidence of decompensation at this time. Admitted to medical floor, full CODE STATUS, home medications reviewed and verified. Has no advance medical directives. CC : Jimmy Hassan MD  Signed By: Jamarcus Christine MD     October 22, 2021      This dictation was done by dragon, computer voice recognition software. Often unanticipated grammatical, syntax, [facility] phones and other interpretive errors are inadvertently transcribed. Please excuse errors that have escaped final proofreading.

## 2021-10-24 LAB
ANION GAP SERPL CALC-SCNC: 13 MMOL/L (ref 5–15)
ATRIAL RATE: 93 BPM
BUN SERPL-MCNC: 12 MG/DL (ref 6–20)
BUN/CREAT SERPL: 14 (ref 12–20)
CA-I BLD-MCNC: 8.4 MG/DL (ref 8.5–10.1)
CALCULATED P AXIS, ECG09: 24 DEGREES
CALCULATED R AXIS, ECG10: -25 DEGREES
CALCULATED T AXIS, ECG11: -66 DEGREES
CHLORIDE SERPL-SCNC: 110 MMOL/L (ref 97–108)
CO2 SERPL-SCNC: 16 MMOL/L (ref 21–32)
CREAT SERPL-MCNC: 0.85 MG/DL (ref 0.55–1.02)
DIAGNOSIS, 93000: NORMAL
GLUCOSE BLD STRIP.AUTO-MCNC: 203 MG/DL (ref 65–117)
GLUCOSE BLD STRIP.AUTO-MCNC: 99 MG/DL (ref 65–117)
GLUCOSE SERPL-MCNC: 201 MG/DL (ref 65–100)
P-R INTERVAL, ECG05: 136 MS
PERFORMED BY, TECHID: ABNORMAL
PERFORMED BY, TECHID: NORMAL
POTASSIUM SERPL-SCNC: 4.6 MMOL/L (ref 3.5–5.1)
Q-T INTERVAL, ECG07: 438 MS
QRS DURATION, ECG06: 84 MS
QTC CALCULATION (BEZET), ECG08: 544 MS
SODIUM SERPL-SCNC: 139 MMOL/L (ref 136–145)
VENTRICULAR RATE, ECG03: 93 BPM

## 2021-10-24 PROCEDURE — 74011250636 HC RX REV CODE- 250/636: Performed by: NURSE PRACTITIONER

## 2021-10-24 PROCEDURE — 74011250637 HC RX REV CODE- 250/637: Performed by: NURSE PRACTITIONER

## 2021-10-24 PROCEDURE — 65270000029 HC RM PRIVATE

## 2021-10-24 PROCEDURE — 82962 GLUCOSE BLOOD TEST: CPT

## 2021-10-24 PROCEDURE — 74011636637 HC RX REV CODE- 636/637: Performed by: PHYSICIAN ASSISTANT

## 2021-10-24 PROCEDURE — 83036 HEMOGLOBIN GLYCOSYLATED A1C: CPT

## 2021-10-24 PROCEDURE — 74011250637 HC RX REV CODE- 250/637: Performed by: INTERNAL MEDICINE

## 2021-10-24 PROCEDURE — 74011250636 HC RX REV CODE- 250/636: Performed by: PHYSICIAN ASSISTANT

## 2021-10-24 PROCEDURE — 80048 BASIC METABOLIC PNL TOTAL CA: CPT

## 2021-10-24 PROCEDURE — 74011250637 HC RX REV CODE- 250/637: Performed by: HOSPITALIST

## 2021-10-24 PROCEDURE — 36415 COLL VENOUS BLD VENIPUNCTURE: CPT

## 2021-10-24 PROCEDURE — 74011250636 HC RX REV CODE- 250/636: Performed by: HOSPITALIST

## 2021-10-24 RX ORDER — FUROSEMIDE 10 MG/ML
40 INJECTION INTRAMUSCULAR; INTRAVENOUS ONCE
Status: COMPLETED | OUTPATIENT
Start: 2021-10-24 | End: 2021-10-24

## 2021-10-24 RX ORDER — MAGNESIUM SULFATE 100 %
4 CRYSTALS MISCELLANEOUS AS NEEDED
Status: DISCONTINUED | OUTPATIENT
Start: 2021-10-24 | End: 2021-10-30 | Stop reason: HOSPADM

## 2021-10-24 RX ORDER — PANTOPRAZOLE SODIUM 40 MG/1
40 TABLET, DELAYED RELEASE ORAL
Status: DISCONTINUED | OUTPATIENT
Start: 2021-10-25 | End: 2021-10-30 | Stop reason: HOSPADM

## 2021-10-24 RX ORDER — HYDROMORPHONE HYDROCHLORIDE 1 MG/ML
0.5 INJECTION, SOLUTION INTRAMUSCULAR; INTRAVENOUS; SUBCUTANEOUS
Status: DISPENSED | OUTPATIENT
Start: 2021-10-24 | End: 2021-10-26

## 2021-10-24 RX ORDER — GUAIFENESIN DEXTROMETHORPHAN HYDROBROMIDE ORAL SOLUTION 10; 100 MG/5ML; MG/5ML
10 SOLUTION ORAL
Status: DISCONTINUED | OUTPATIENT
Start: 2021-10-24 | End: 2021-10-30 | Stop reason: HOSPADM

## 2021-10-24 RX ORDER — DEXTROSE 50 % IN WATER (D50W) INTRAVENOUS SYRINGE
25-50 AS NEEDED
Status: DISCONTINUED | OUTPATIENT
Start: 2021-10-24 | End: 2021-10-30 | Stop reason: HOSPADM

## 2021-10-24 RX ORDER — DIPHENHYDRAMINE HYDROCHLORIDE 50 MG/ML
12.5 INJECTION, SOLUTION INTRAMUSCULAR; INTRAVENOUS
Status: DISCONTINUED | OUTPATIENT
Start: 2021-10-24 | End: 2021-10-30 | Stop reason: HOSPADM

## 2021-10-24 RX ORDER — INSULIN LISPRO 100 [IU]/ML
INJECTION, SOLUTION INTRAVENOUS; SUBCUTANEOUS
Status: DISCONTINUED | OUTPATIENT
Start: 2021-10-24 | End: 2021-10-30 | Stop reason: HOSPADM

## 2021-10-24 RX ADMIN — SODIUM CHLORIDE 100 ML/HR: 9 INJECTION, SOLUTION INTRAVENOUS at 10:15

## 2021-10-24 RX ADMIN — FUROSEMIDE 40 MG: 10 INJECTION, SOLUTION INTRAMUSCULAR; INTRAVENOUS at 15:16

## 2021-10-24 RX ADMIN — Medication 10 ML: at 16:02

## 2021-10-24 RX ADMIN — SODIUM CHLORIDE 100 ML/HR: 9 INJECTION, SOLUTION INTRAVENOUS at 06:45

## 2021-10-24 RX ADMIN — INSULIN LISPRO 3 UNITS: 100 INJECTION, SOLUTION INTRAVENOUS; SUBCUTANEOUS at 18:18

## 2021-10-24 RX ADMIN — HYDROMORPHONE HYDROCHLORIDE 0.5 MG: 1 INJECTION, SOLUTION INTRAMUSCULAR; INTRAVENOUS; SUBCUTANEOUS at 22:44

## 2021-10-24 RX ADMIN — BENZOCAINE AND MENTHOL 1 LOZENGE: 15; 3.6 LOZENGE ORAL at 12:39

## 2021-10-24 RX ADMIN — ENOXAPARIN SODIUM 40 MG: 40 INJECTION SUBCUTANEOUS at 10:15

## 2021-10-24 RX ADMIN — HYDROMORPHONE HYDROCHLORIDE 0.5 MG: 1 INJECTION, SOLUTION INTRAMUSCULAR; INTRAVENOUS; SUBCUTANEOUS at 16:02

## 2021-10-24 RX ADMIN — GUAIFENESIN DEXTROMETHORPHAN HYDROBROMIDE ORAL SOLUTION 10 ML: 10; 100 SOLUTION ORAL at 21:43

## 2021-10-24 RX ADMIN — GLIPIZIDE 5 MG: 5 TABLET ORAL at 10:15

## 2021-10-24 RX ADMIN — HYDROMORPHONE HYDROCHLORIDE 0.5 MG: 1 INJECTION, SOLUTION INTRAMUSCULAR; INTRAVENOUS; SUBCUTANEOUS at 06:45

## 2021-10-24 NOTE — PROGRESS NOTES
Progress Note    Patient: Elsy Bates MRN: [id number]  SSN: [id number]    YOB: 1940  Age: [de-identified] y.o. Sex: female      Admit Date: 10/22/2021    LOS: 2 days     Subjective:     No acute events overnight    Objective:     Vitals:    10/24/21 0000 10/24/21 0119 10/24/21 0400 10/24/21 0735   BP:  133/83  (!) 148/91   Pulse: 86 (!) 105 98 (!) 104   Resp:  20  19   Temp:  97.7 °F (36.5 °C)  97.7 °F (36.5 °C)   SpO2:  93%  95%   Weight:       Height:            Intake and Output:  Current Shift: No intake/output data recorded. Last three shifts: 10/22 1901 - 10/24 0700  In: 758 [I.V.:758]  Out: 200 [Urine:200]    Physical Exam:   General:  Alert, cooperative, no distress, appears stated age. Eyes:  Conjunctivae/corneas clear. PERRL, EOMs intact. Fundi benign   Ears:  Normal TMs and external ear canals both ears. Nose: Nares normal. Septum midline. Mucosa normal. No drainage or sinus tenderness. Mouth/Throat: Lips, mucosa, and tongue normal. Teeth and gums normal.   Neck: Supple, symmetrical, trachea midline, no adenopathy, thyroid: no enlargment/tenderness/nodules, no carotid bruit and no JVD. Back:   Symmetric, no curvature. ROM normal. No CVA tenderness. Lungs:   Clear to auscultation bilaterally. Heart:  Regular rate and rhythm, S1, S2 normal, no murmur, click, rub or gallop. Abdomen:   Soft, non-tender. Bowel sounds normal. No masses,  No organomegaly. Extremities: Extremities normal, atraumatic, no cyanosis or edema. Pulses: 2+ and symmetric all extremities. Skin: Skin color, texture, turgor normal. No rashes or lesions   Lymph nodes: Cervical, supraclavicular, and axillary nodes normal.   Neurologic: CNII-XII intact. Normal strength, sensation and reflexes throughout. Lab/Data Review: All lab results for the last 24 hours reviewed.          Assessment:     Active Problems:    Acute pancreatitis (10/22/2021)      Elevated troponin (10/22/2021)    Patient is an 80-year-old -American female with acute pancreatitis and type II non-STEMI:  1.  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  Right occipital lobe encephalomalacia  3.  Peripheral vascular disease, left ICA 50 to 69%, right ICA less than 50% stenosis  4.  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  Hypertension with hypertensive heart disease  6.  Mixed hyperlipidemia  7.    Diabetes mellitus  8.  Heart failure with reduced ejection fraction, compensated  9.  Mild to moderate tricuspid regurgitation  10.  Mild mitral regurgitation  11.  Negative bubble study  12.  Colonic polyps, tubular adenoma  1 13.  Recent history of GI bleed  14.  Right-sided weakness       Plan:       She is laying flat and appears to be comfortable. Troponin has trended down. She appears to be responding to Lasix anyhow there is no accurate ins and outs.   Follow-up on echocardiogram  Signed By: Eva Stubbs MD     October 24, 2021

## 2021-10-24 NOTE — PROGRESS NOTES
Hospitalist Progress Note             Daily Progress Note: 10/24/2021      Subjective:   Subjective   Patient examined alert and oriented lying in bed  Reports continue abdominal pain  No overnight events reported  No acute distress noted on examination    Review of Systems:   Review of Systems   Constitutional: Negative. HENT: Negative. Eyes: Negative. Respiratory: Negative. Cardiovascular: Negative. Gastrointestinal: Positive for abdominal pain and nausea. Genitourinary: Negative. Musculoskeletal: Negative. Skin: Negative. Neurological: Negative. Endo/Heme/Allergies: Negative. Psychiatric/Behavioral: Negative. Objective:   Objective      Vitals:  Patient Vitals for the past 12 hrs:   Temp Pulse Resp BP SpO2   10/24/21 1122     95 %   10/24/21 0735 97.7 °F (36.5 °C) (!) 104 19 (!) 148/91 95 %   10/24/21 0400  98      10/24/21 0119 97.7 °F (36.5 °C) (!) 105 20 133/83 93 %        Physical Exam:  Physical Exam  Vitals and nursing note reviewed. Constitutional:       Appearance: Normal appearance. HENT:      Mouth/Throat:      Pharynx: Oropharynx is clear. Eyes:      Conjunctiva/sclera: Conjunctivae normal.   Cardiovascular:      Rate and Rhythm: Regular rhythm. Pulses: Normal pulses. Pulmonary:      Effort: Pulmonary effort is normal.      Breath sounds: Normal breath sounds. Abdominal:      General: Bowel sounds are normal.      Palpations: Abdomen is soft. Tenderness: There is abdominal tenderness. Musculoskeletal:      Cervical back: Normal range of motion. Skin:     General: Skin is warm and dry. Capillary Refill: Capillary refill takes less than 2 seconds. Neurological:      Mental Status: She is alert and oriented to person, place, and time.    Psychiatric:         Mood and Affect: Mood normal.         Behavior: Behavior normal.          Lab Results:  Recent Results (from the past 24 hour(s))   GLUCOSE, POC    Collection Time: 10/23/21  3:21 PM   Result Value Ref Range    Glucose (POC) 153 (H) 65 - 117 mg/dL    Performed by Rich Jason, POC    Collection Time: 10/23/21  8:01 PM   Result Value Ref Range    Glucose (POC) 232 (H) 65 - 117 mg/dL    Performed by Tavo Sorto (LPN)           Diagnostic Images:  CT Results  (Last 48 hours)               10/22/21 2225  CT ABD PELV W CONT Final result    Impression:  CHF/pulmonary edema suggested and partially visualized at the lung   bases with dilated cardiomyopathy suggested as described. Probable acute on   chronic pancreatitis and with persistent finding of a significantly dilated   pancreatic duct in the pancreatic tail which is inseparable from the pancreatic   tail cystic lesion. Diverticulosis without diverticulitis. Arteriosclerotic   changes of the aortoiliac vasculature. Degenerative disc disease and lumbar   scoliosis. Other findings as above. Narrative:  HISTORY:  abdominal pain   Dose reduction technique: All CT scans at this facility are performed using dose reduction optimization   technique as appropriate on the exam including the following: Automated exposure   control, adjustment of the MA and/or KV according to patient size and/or use of   iterative reconstructive technique. TECHNIQUE:  CT ABD PELV W CONT                            100 cc Isovue-370 injected. COMPARISON: 4 months prior, 7/4/2021   LIMITATIONS: None       CHEST: No acute airspace process or pleural effusion seen at the lung bases. LIVER: Normal   GALLBLADDER: Interstitial septal thickening at the posterior lung bases   overlying moderate bilateral pleural effusions which are partially visualized. Multichamber cardiomegaly also partially visualized and with what appear to be   dilated ventricular chambers and dilated right atrium. BILIARY TREE: Normal   PANCREAS: Peripancreatic stranding/fluid with some of the fluid extending in the   central retroperitoneum. Marked dilation of the pancreatic duct at the level of   the pancreatic tail which terminates in a partially calcified multicystic lesion   as on prior study. The duct measures up to 1.2 cm maximum in the pancreatic tail   but is difficult to discern from the pancreatic tail cystic lesion. SPLEEN: Normal   ADRENAL GLANDS: Normal   KIDNEYS/URETERS/BLADDER: Normal. Negative for acute abnormality. Bilateral renal   cysts    AORTA/RETROPERITONEUM: Normal   BOWEL/MESENTERY: Diffuse diverticulosis of the colon without evidence of acute   diverticulitis   APPENDIX: Identified and normal   PERITONEAL CAVITY: Normal   REPRODUCTIVE ORGANS: Normal   BONE/TISSUES: Moderate multilevel degenerative disc and degenerative arthritic   changes of the lumbar spine with a lumbar scoliosis convex to the left as on   prior.        OTHER: None                 Current Medications:    Current Facility-Administered Medications:     benzocaine-menthoL (CEPACOL) lozenge 1 Lozenge, 1 Lozenge, Mucous Membrane, PRN, Rah Macdonald MD    glipiZIDE (GLUCOTROL) tablet 5 mg, 5 mg, Oral, ACB, Mayito, Barbra, NP, 5 mg at 10/24/21 1015    0.9% sodium chloride infusion, 100 mL/hr, IntraVENous, CONTINUOUS, Esme Pickard NP, Last Rate: 100 mL/hr at 10/24/21 1015, 100 mL/hr at 10/24/21 1015    enoxaparin (LOVENOX) injection 40 mg, 40 mg, SubCUTAneous, Q24H, Esme Pickard NP, 40 mg at 10/24/21 1015    glucose chewable tablet 16 g, 4 Tablet, Oral, PRN, Robe Altamirano MD    dextrose (D50W) injection syrg 12.5-25 g, 25-50 mL, IntraVENous, PRN, Robe Altamirano MD    glucagon (GLUCAGEN) injection 1 mg, 1 mg, IntraMUSCular, PRN, Robe Altamirano MD    sodium chloride (NS) flush 5-40 mL, 5-40 mL, IntraVENous, PRN, Robe Altamirano MD    acetaminophen (TYLENOL) tablet 650 mg, 650 mg, Oral, Q4H PRN, Robe Altamirano MD    ondansetron (ZOFRAN) injection 4 mg, 4 mg, IntraVENous, Q4H PRN, Robe Altamirano MD, 4 mg at 10/23/21 7204       ASSESSMENT:  [de-identified] y.o. female with history of diabetes, hypertension and history of diverticulosis/colon polyps presents to the emergency room complaining of abdominal pain around the navel with no radiation. Lipase was greater than 3000 consistent with pancreatitis. Lipase trending down appropriately. CT scan of the abdomen pelvis revealed pulmonary edema consistent with congestive heart failure as well as a dilated pancreatic duct in the pancreatic tail with a pancreatic tail cystic lesion. Troponin was elevated at greater than 500 and cardiac consultation, Dr. Marie Pandya. Echocardiogram pending. 1. Acute pancreatitis:  -lipase 3000-->2342  -Gentle IV hydration  -prn pain management  -GI consulted  Triglycerides normal    2. Elevated troponin:  -troponin were 599 and has trended down to 540.  -suspect demand ischemia  -Echo back in May showed an EF of 40 to 45% with global hypokinesis, grade 1 diastolic dysfunction  Cardiology following,   Echo pending  Denies chest pain    3. Benign essential hypertension:   -On verapamil and Prinivil at home  Holding medications currently    4. Type 2 DM:   Trying Humalog sliding scale with Benadryl secondary to the itching  start glipizide 5mg daily      Full Code  Dvt Prophylaxis Lovenox  GI Prophylaxis protonix  Disposition:  · GI evaluation  · Diet toleration when initiated  · Echocardiogram      Above treatment plan reviewed and discussed with patient in detail at bedside, all questions answered. Care Plan discussed with: Interdisciplinary team    Total time spent with patient: >35 minutes.     Leslie Caldwell PA-C

## 2021-10-25 ENCOUNTER — APPOINTMENT (OUTPATIENT)
Dept: NON INVASIVE DIAGNOSTICS | Age: 81
DRG: 246 | End: 2021-10-25
Attending: INTERNAL MEDICINE
Payer: MEDICARE

## 2021-10-25 LAB
ALBUMIN SERPL-MCNC: 2.4 G/DL (ref 3.5–5)
ALBUMIN/GLOB SERPL: 0.8 {RATIO} (ref 1.1–2.2)
ALP SERPL-CCNC: 106 U/L (ref 45–117)
ALT SERPL-CCNC: 17 U/L (ref 12–78)
ANION GAP SERPL CALC-SCNC: 10 MMOL/L (ref 5–15)
AST SERPL W P-5'-P-CCNC: 12 U/L (ref 15–37)
BASOPHILS # BLD: 0 K/UL (ref 0–0.1)
BASOPHILS NFR BLD: 0 % (ref 0–1)
BILIRUB SERPL-MCNC: 1.2 MG/DL (ref 0.2–1)
BUN SERPL-MCNC: 12 MG/DL (ref 6–20)
BUN/CREAT SERPL: 16 (ref 12–20)
CA-I BLD-MCNC: 8.6 MG/DL (ref 8.5–10.1)
CHLORIDE SERPL-SCNC: 108 MMOL/L (ref 97–108)
CO2 SERPL-SCNC: 23 MMOL/L (ref 21–32)
CREAT SERPL-MCNC: 0.76 MG/DL (ref 0.55–1.02)
DIFFERENTIAL METHOD BLD: ABNORMAL
ECHO AO ROOT DIAM: 2.7 CM
ECHO AR MAX VEL PISA: 280 CM/S
ECHO AV PEAK GRADIENT: 13 MMHG
ECHO AV PEAK VELOCITY: 181 CM/S
ECHO AV REGURGITANT PHT: 558 MS
ECHO EST RA PRESSURE: 3 MMHG
ECHO LA AREA 4C: 23.25 CM2
ECHO LA MAJOR AXIS: 3.9 CM
ECHO LA MINOR AXIS: 2.29 CM
ECHO LV E' SEPTAL VELOCITY: 5.75 CM/S
ECHO LV EDV A2C: 121 CM3
ECHO LV EJECTION FRACTION BIPLANE: 23.2 % (ref 55–100)
ECHO LV ESV A2C: 86.9 CM3
ECHO LV INTERNAL DIMENSION DIASTOLIC: 4.95 CM (ref 3.9–5.3)
ECHO LV INTERNAL DIMENSION SYSTOLIC: 4.43 CM
ECHO LV IVSD: 1.35 CM (ref 0.6–0.9)
ECHO LV MASS 2D: 270.7 G (ref 67–162)
ECHO LV MASS INDEX 2D: 159.2 G/M2 (ref 43–95)
ECHO LV POSTERIOR WALL DIASTOLIC: 1.34 CM (ref 0.6–0.9)
ECHO LVOT PEAK GRADIENT: 3 MMHG
ECHO LVOT PEAK VELOCITY: 93.2 CM/S
ECHO MV A VELOCITY: 78.4 CM/S
ECHO MV AREA PHT: 5 CM2
ECHO MV E DECELERATION TIME (DT): 151 MS
ECHO MV E VELOCITY: 129 CM/S
ECHO MV E/A RATIO: 1.65
ECHO MV E/E' SEPTAL: 22.43
ECHO MV PRESSURE HALF TIME (PHT): 44 MS
ECHO PV MAX VELOCITY: 70.9 CM/S
ECHO PV PEAK INSTANTANEOUS GRADIENT SYSTOLIC: 2 MMHG
ECHO PV PEAK INSTANTANEOUS GRADIENT SYSTOLIC: 7 MMHG
ECHO PV REGURGITANT MAX VELOCITY: 130 CM/S
ECHO RA AREA 4C: 16.5 CM2
ECHO RIGHT VENTRICULAR SYSTOLIC PRESSURE (RVSP): 41 MMHG
ECHO RV INTERNAL DIMENSION: 3.51 CM
ECHO RV TAPSE: 2 CM (ref 1.5–2)
ECHO TV MAX VELOCITY: 307 CM/S
ECHO TV REGURGITANT PEAK GRADIENT: 38 MMHG
EOSINOPHIL # BLD: 0.2 K/UL (ref 0–0.4)
EOSINOPHIL NFR BLD: 2 % (ref 0–7)
ERYTHROCYTE [DISTWIDTH] IN BLOOD BY AUTOMATED COUNT: 14.9 % (ref 11.5–14.5)
EST. AVERAGE GLUCOSE BLD GHB EST-MCNC: 194 MG/DL
GLOBULIN SER CALC-MCNC: 3.1 G/DL (ref 2–4)
GLUCOSE BLD STRIP.AUTO-MCNC: 109 MG/DL (ref 65–117)
GLUCOSE BLD STRIP.AUTO-MCNC: 130 MG/DL (ref 65–117)
GLUCOSE BLD STRIP.AUTO-MCNC: 157 MG/DL (ref 65–117)
GLUCOSE BLD STRIP.AUTO-MCNC: 184 MG/DL (ref 65–117)
GLUCOSE SERPL-MCNC: 123 MG/DL (ref 65–100)
HBA1C MFR BLD: 8.4 % (ref 4–5.6)
HCT VFR BLD AUTO: 33.6 % (ref 35–47)
HGB BLD-MCNC: 10.8 G/DL (ref 11.5–16)
IMM GRANULOCYTES # BLD AUTO: 0 K/UL (ref 0–0.04)
IMM GRANULOCYTES NFR BLD AUTO: 0 % (ref 0–0.5)
LIPASE SERPL-CCNC: 110 U/L (ref 73–393)
LYMPHOCYTES # BLD: 1.3 K/UL (ref 0.8–3.5)
LYMPHOCYTES NFR BLD: 15 % (ref 12–49)
MCH RBC QN AUTO: 26.3 PG (ref 26–34)
MCHC RBC AUTO-ENTMCNC: 32.1 G/DL (ref 30–36.5)
MCV RBC AUTO: 82 FL (ref 80–99)
MONOCYTES # BLD: 1 K/UL (ref 0–1)
MONOCYTES NFR BLD: 11 % (ref 5–13)
MV DEC SLOPE: 9670 MM/S2
MV DEC SLOPE: 9670 MM/S2
NEUTS SEG # BLD: 6.2 K/UL (ref 1.8–8)
NEUTS SEG NFR BLD: 72 % (ref 32–75)
NRBC # BLD: 0 K/UL (ref 0–0.01)
NRBC BLD-RTO: 0 PER 100 WBC
PERFORMED BY, TECHID: ABNORMAL
PERFORMED BY, TECHID: NORMAL
PLATELET # BLD AUTO: 143 K/UL (ref 150–400)
PMV BLD AUTO: 13.1 FL (ref 8.9–12.9)
POTASSIUM SERPL-SCNC: 3.6 MMOL/L (ref 3.5–5.1)
PROT SERPL-MCNC: 5.5 G/DL (ref 6.4–8.2)
RBC # BLD AUTO: 4.1 M/UL (ref 3.8–5.2)
SODIUM SERPL-SCNC: 141 MMOL/L (ref 136–145)
WBC # BLD AUTO: 8.7 K/UL (ref 3.6–11)

## 2021-10-25 PROCEDURE — 74011250637 HC RX REV CODE- 250/637: Performed by: PHYSICIAN ASSISTANT

## 2021-10-25 PROCEDURE — 74011250637 HC RX REV CODE- 250/637: Performed by: INTERNAL MEDICINE

## 2021-10-25 PROCEDURE — 74011250636 HC RX REV CODE- 250/636: Performed by: NURSE PRACTITIONER

## 2021-10-25 PROCEDURE — 85025 COMPLETE CBC W/AUTO DIFF WBC: CPT

## 2021-10-25 PROCEDURE — 83690 ASSAY OF LIPASE: CPT

## 2021-10-25 PROCEDURE — 82962 GLUCOSE BLOOD TEST: CPT

## 2021-10-25 PROCEDURE — 36415 COLL VENOUS BLD VENIPUNCTURE: CPT

## 2021-10-25 PROCEDURE — 74011250636 HC RX REV CODE- 250/636: Performed by: PHYSICIAN ASSISTANT

## 2021-10-25 PROCEDURE — 93306 TTE W/DOPPLER COMPLETE: CPT

## 2021-10-25 PROCEDURE — 65270000029 HC RM PRIVATE

## 2021-10-25 PROCEDURE — 80053 COMPREHEN METABOLIC PANEL: CPT

## 2021-10-25 PROCEDURE — 74011250636 HC RX REV CODE- 250/636: Performed by: HOSPITALIST

## 2021-10-25 PROCEDURE — 74011250637 HC RX REV CODE- 250/637: Performed by: NURSE PRACTITIONER

## 2021-10-25 RX ADMIN — ENOXAPARIN SODIUM 40 MG: 40 INJECTION SUBCUTANEOUS at 10:25

## 2021-10-25 RX ADMIN — PANTOPRAZOLE SODIUM 40 MG: 40 TABLET, DELAYED RELEASE ORAL at 06:01

## 2021-10-25 RX ADMIN — GUAIFENESIN DEXTROMETHORPHAN HYDROBROMIDE ORAL SOLUTION 10 ML: 10; 100 SOLUTION ORAL at 04:23

## 2021-10-25 RX ADMIN — HYDROMORPHONE HYDROCHLORIDE 0.5 MG: 1 INJECTION, SOLUTION INTRAMUSCULAR; INTRAVENOUS; SUBCUTANEOUS at 13:30

## 2021-10-25 RX ADMIN — ONDANSETRON 4 MG: 2 INJECTION INTRAMUSCULAR; INTRAVENOUS at 21:07

## 2021-10-25 RX ADMIN — GLIPIZIDE 5 MG: 5 TABLET ORAL at 10:25

## 2021-10-25 RX ADMIN — GUAIFENESIN DEXTROMETHORPHAN HYDROBROMIDE ORAL SOLUTION 10 ML: 10; 100 SOLUTION ORAL at 16:32

## 2021-10-25 RX ADMIN — HYDROMORPHONE HYDROCHLORIDE 0.5 MG: 1 INJECTION, SOLUTION INTRAMUSCULAR; INTRAVENOUS; SUBCUTANEOUS at 21:07

## 2021-10-25 RX ADMIN — HYDROMORPHONE HYDROCHLORIDE 0.5 MG: 1 INJECTION, SOLUTION INTRAMUSCULAR; INTRAVENOUS; SUBCUTANEOUS at 06:01

## 2021-10-25 NOTE — PROGRESS NOTES
CM reviewed chart. Patient is awaiting cardiology consult based on echo results. CM will continue to follow.

## 2021-10-25 NOTE — PROGRESS NOTES
Hospitalist Progress Note             Daily Progress Note: 10/25/2021      Subjective:   Subjective   Patient examined alert and oriented lying in bed  Dental pain resolved  No overnight events reported  No acute distress noted on examination    Review of Systems:   Review of Systems   Constitutional: Negative. HENT: Negative. Eyes: Negative. Respiratory: Negative. Cardiovascular: Negative. Gastrointestinal: Negative for abdominal pain and nausea. Genitourinary: Negative. Musculoskeletal: Negative. Skin: Negative. Neurological: Negative. Endo/Heme/Allergies: Negative. Psychiatric/Behavioral: Negative. Objective:   Objective      Vitals:  Patient Vitals for the past 12 hrs:   Temp Pulse Resp BP SpO2   10/25/21 1520 98.3 °F (36.8 °C) 94 16 (!) 150/96 97 %   10/25/21 1200  84      10/25/21 0821 98.5 °F (36.9 °C) 84 18 128/77 96 %   10/25/21 0800  78           Physical Exam:  Physical Exam  Vitals and nursing note reviewed. Constitutional:       Appearance: Normal appearance. HENT:      Mouth/Throat:      Pharynx: Oropharynx is clear. Eyes:      Conjunctiva/sclera: Conjunctivae normal.   Cardiovascular:      Rate and Rhythm: Regular rhythm. Pulses: Normal pulses. Pulmonary:      Effort: Pulmonary effort is normal.      Breath sounds: Normal breath sounds. Abdominal:      General: Bowel sounds are normal.      Palpations: Abdomen is soft. Musculoskeletal:      Cervical back: Normal range of motion. Skin:     General: Skin is warm and dry. Capillary Refill: Capillary refill takes less than 2 seconds. Neurological:      Mental Status: She is alert and oriented to person, place, and time.    Psychiatric:         Mood and Affect: Mood normal.         Behavior: Behavior normal.          Lab Results:  Recent Results (from the past 24 hour(s))   GLUCOSE, POC    Collection Time: 10/24/21  9:11 PM   Result Value Ref Range    Glucose (POC) 99 65 - 117 mg/dL    Performed by Yolie Rosa    GLUCOSE, POC    Collection Time: 10/25/21  7:41 AM   Result Value Ref Range    Glucose (POC) 109 65 - 117 mg/dL    Performed by Orly Workman ([facility])    CBC WITH AUTOMATED DIFF    Collection Time: 10/25/21  8:48 AM   Result Value Ref Range    WBC 8.7 3.6 - 11.0 K/uL    RBC 4.10 3.80 - 5.20 M/uL    HGB 10.8 (L) 11.5 - 16.0 g/dL    HCT 33.6 (L) 35.0 - 47.0 %    MCV 82.0 80.0 - 99.0 FL    MCH 26.3 26.0 - 34.0 PG    MCHC 32.1 30.0 - 36.5 g/dL    RDW 14.9 (H) 11.5 - 14.5 %    PLATELET 661 (L) 981 - 400 K/uL    MPV 13.1 (H) 8.9 - 12.9 FL    NRBC 0.0 0.0  WBC    ABSOLUTE NRBC 0.00 0.00 - 0.01 K/uL    NEUTROPHILS 72 32 - 75 %    LYMPHOCYTES 15 12 - 49 %    MONOCYTES 11 5 - 13 %    EOSINOPHILS 2 0 - 7 %    BASOPHILS 0 0 - 1 %    IMMATURE GRANULOCYTES 0 0 - 0.5 %    ABS. NEUTROPHILS 6.2 1.8 - 8.0 K/UL    ABS. LYMPHOCYTES 1.3 0.8 - 3.5 K/UL    ABS. MONOCYTES 1.0 0.0 - 1.0 K/UL    ABS. EOSINOPHILS 0.2 0.0 - 0.4 K/UL    ABS. BASOPHILS 0.0 0.0 - 0.1 K/UL    ABS. IMM. GRANS. 0.0 0.00 - 0.04 K/UL    DF AUTOMATED     METABOLIC PANEL, COMPREHENSIVE    Collection Time: 10/25/21  8:48 AM   Result Value Ref Range    Sodium 141 136 - 145 mmol/L    Potassium 3.6 3.5 - 5.1 mmol/L    Chloride 108 97 - 108 mmol/L    CO2 23 21 - 32 mmol/L    Anion gap 10 5 - 15 mmol/L    Glucose 123 (H) 65 - 100 mg/dL    BUN 12 6 - 20 mg/dL    Creatinine 0.76 0.55 - 1.02 mg/dL    BUN/Creatinine ratio 16 12 - 20      GFR est AA >60 >60 ml/min/1.73m2    GFR est non-AA >60 >60 ml/min/1.73m2    Calcium 8.6 8.5 - 10.1 mg/dL    Bilirubin, total 1.2 (H) 0.2 - 1.0 mg/dL    AST (SGOT) 12 (L) 15 - 37 U/L    ALT (SGPT) 17 12 - 78 U/L    Alk.  phosphatase 106 45 - 117 U/L    Protein, total 5.5 (L) 6.4 - 8.2 g/dL    Albumin 2.4 (L) 3.5 - 5.0 g/dL    Globulin 3.1 2.0 - 4.0 g/dL    A-G Ratio 0.8 (L) 1.1 - 2.2     LIPASE    Collection Time: 10/25/21  8:48 AM   Result Value Ref Range    Lipase 110 73 - 393 U/L   ECHO ADULT COMPLETE Collection Time: 10/25/21 12:04 PM   Result Value Ref Range    Aortic Regurgitant Pressure Half-time 558.00 ms    AR Max Pasha 280.00 cm/s    Aortic Valve Systolic Peak Velocity 820.72 cm/s    AoV PG 13.00 mmHg    Ao Root D 2.70 cm    IVSd 1.35 (A) 0.60 - 0.90 cm    LVIDd 4.95 3.90 - 5.30 cm    LVIDs 4.43 cm    LVOT Peak Velocity 93.20 cm/s    LVOT Peak Gradient 3.00 mmHg    LVPWd 1.34 (A) 0.60 - 0.90 cm    LV E' Septal Velocity 5.75 cm/s    LV ED Vol A2C 121.00 cm3    LV ES Vol A2C 86.90 cm3    E/E' septal 22.43     Left Atrium Major Axis 3.90 cm    Mitral Valve Deceleration [place] 9,670.00 mm/s2    Mitral Valve Deceleration [place] 9,670.00 mm/s2    Mitral Valve E Wave Deceleration Time 151.00 ms    Mitral Valve Pressure Half-time 44.00 ms    MV A Pasha 78.40 cm/s    MV E Pasha 129.00 cm/s    MVA (PHT) 5.00 cm2    MV E/A 1.65     Pulmonic Regurgitant End Max Velocity 130.00 cm/s    Pulmonic Valve Systolic Peak Instantaneous Gradient 7.00 mmHg    Pulmonic Valve Max Velocity 70.90 cm/s    Pulmonic Valve Systolic Peak Instantaneous Gradient 2.00 mmHg    Est. RA Pressure 3.00 mmHg    RVIDd 3.51 cm    RVSP 41.00 mmHg    Tricuspid Valve Max Velocity 307.00 cm/s    Triscuspid Valve Regurgitation Peak Gradient 38.00 mmHg    Tapse 2.00 1.50 - 2.00 cm    Right Atrial Area 4C 16.50 cm2    LA Area 4C 23.25 cm2    BP EF 23.2 55.0 - 100.0 %    LV Mass .7 67.0 - 162.0 g    LV Mass AL Index 159.2 43.0 - 95.0 g/m2    Left Atrium Minor Axis 2.29 cm   GLUCOSE, POC    Collection Time: 10/25/21 12:08 PM   Result Value Ref Range    Glucose (POC) 157 (H) 65 - 117 mg/dL    Performed by ION SIMON    GLUCOSE, POC    Collection Time: 10/25/21  3:01 PM   Result Value Ref Range    Glucose (POC) 130 (H) 65 - 117 mg/dL    Performed by Mere Guzman ([facility])           Diagnostic Images:  CT Results  (Last 48 hours)    None          Current Medications:    Current Facility-Administered Medications:     benzocaine-menthoL (CEPACOL) lozenge 1 Lozenge, 1 Lozenge, Mucous Membrane, PRN, Mary Baker MD, 1 Lozenge at 10/24/21 1239    HYDROmorphone (DILAUDID) syringe 0.5 mg, 0.5 mg, IntraVENous, Q4H PRN, Treasure Gaviria PA-C, 0.5 mg at 10/25/21 1330    glucose chewable tablet 16 g, 4 Tablet, Oral, PRN, Treasure Gaviria PA-C    dextrose (D50W) injection syrg 12.5-25 g, 25-50 mL, IntraVENous, PRN, Treasure Gaviria PA-C    glucagon (GLUCAGEN) injection 1 mg, 1 mg, IntraMUSCular, PRN, Treasure Gaviria PA-C    insulin lispro (HUMALOG) injection, , SubCUTAneous, AC&HS, Treasure Gaviria PA-C, 3 Units at 10/24/21 1818    diphenhydrAMINE (BENADRYL) injection 12.5 mg, 12.5 mg, IntraVENous, Q6H PRN, Alejandro Gaviria PA-C    pantoprazole (PROTONIX) tablet 40 mg, 40 mg, Oral, ACB, Alejandro Gaviria PA-C, 40 mg at 10/25/21 0601    guaiFENesin-dextromethorphan (TUSSI-ORGANIDIN DM)  mg/5 mL oral solution 10 mL, 10 mL, Oral, Q4H PRN, Farzad Hendrix MD, 10 mL at 10/25/21 0423    glipiZIDE (GLUCOTROL) tablet 5 mg, 5 mg, Oral, ACBJohn Paul NP, 5 mg at 10/25/21 1025    enoxaparin (LOVENOX) injection 40 mg, 40 mg, SubCUTAneous, Q24H, Kristine Edmond NP, 40 mg at 10/25/21 1025    glucose chewable tablet 16 g, 4 Tablet, Oral, PRN, Tanesha Ornelas MD    dextrose (D50W) injection syrg 12.5-25 g, 25-50 mL, IntraVENous, PRN, Tanesha Ornelas MD    glucagon (GLUCAGEN) injection 1 mg, 1 mg, IntraMUSCular, PRN, Tanesah Ornelas MD    sodium chloride (NS) flush 5-40 mL, 5-40 mL, IntraVENous, PRN, Tanesha Ornelas MD, 10 mL at 10/24/21 1602    acetaminophen (TYLENOL) tablet 650 mg, 650 mg, Oral, Q4H PRN, Tanesha Ornelas MD    ondansetron (ZOFRAN) injection 4 mg, 4 mg, IntraVENous, Q4H PRN, Tanesha Ornelas MD, 4 mg at 10/23/21 7637       ASSESSMENT:  [de-identified] y.o. female with history of diabetes, hypertension and history of diverticulosis/colon polyps presents to the emergency room complaining of abdominal pain around the navel with no radiation. Lipase was greater than 3000 consistent with pancreatitis. Lipase trending down appropriately. CT scan of the abdomen pelvis revealed pulmonary edema consistent with congestive heart failure as well as a dilated pancreatic duct in the pancreatic tail with a pancreatic tail cystic lesion. Troponin was elevated at greater than 500 and cardiac consultation, Dr. Juan Pablo Olivarez. Echocardiogram with ejection fracture of 20 to 25% and moderate tricuspid valve regurgitation. Right-sided pressures 41 mm per mercury. .    1. Acute pancreatitis:  -lipase 3000-->2342, normal  -Gentle IV hydration  -prn pain management  -GI consulted  Triglycerides normal    2. Elevated troponin:  -troponin were 599 and has trended down to 540.  -suspect demand ischemia  -Echo back in May showed an EF of 40 to 45% with global hypokinesis, grade 1 diastolic dysfunction  Cardiology following,   Echo EF of 20-25%  Denies chest pain    3. Benign essential hypertension:   -On verapamil and Prinivil at home  Holding medications currently    4. Type 2 DM:   Trying Humalog sliding scale with Benadryl secondary to the itching  start glipizide 5mg daily    Full Code  Dvt Prophylaxis Lovenox  GI Prophylaxis protonix  Disposition:  · GI evaluation  · Diet toleration when initiated  · Echocardiogram      Above treatment plan reviewed and discussed with patient in detail at bedside, all questions answered. Care Plan discussed with: Interdisciplinary team    Total time spent with patient: >35 minutes.     Liliane Dominguez PA-C

## 2021-10-26 LAB
ALBUMIN SERPL-MCNC: 2.4 G/DL (ref 3.5–5)
ALBUMIN/GLOB SERPL: 0.7 {RATIO} (ref 1.1–2.2)
ALP SERPL-CCNC: 110 U/L (ref 45–117)
ALT SERPL-CCNC: 16 U/L (ref 12–78)
ANION GAP SERPL CALC-SCNC: 7 MMOL/L (ref 5–15)
ANION GAP SERPL CALC-SCNC: 7 MMOL/L (ref 5–15)
AST SERPL W P-5'-P-CCNC: 9 U/L (ref 15–37)
BASOPHILS # BLD: 0 K/UL (ref 0–0.1)
BASOPHILS NFR BLD: 0 % (ref 0–1)
BILIRUB SERPL-MCNC: 1 MG/DL (ref 0.2–1)
BUN SERPL-MCNC: 12 MG/DL (ref 6–20)
BUN SERPL-MCNC: 13 MG/DL (ref 6–20)
BUN/CREAT SERPL: 16 (ref 12–20)
BUN/CREAT SERPL: 17 (ref 12–20)
CA-I BLD-MCNC: 8.6 MG/DL (ref 8.5–10.1)
CA-I BLD-MCNC: 8.7 MG/DL (ref 8.5–10.1)
CHLORIDE SERPL-SCNC: 104 MMOL/L (ref 97–108)
CHLORIDE SERPL-SCNC: 104 MMOL/L (ref 97–108)
CO2 SERPL-SCNC: 27 MMOL/L (ref 21–32)
CO2 SERPL-SCNC: 27 MMOL/L (ref 21–32)
CREAT SERPL-MCNC: 0.75 MG/DL (ref 0.55–1.02)
CREAT SERPL-MCNC: 0.75 MG/DL (ref 0.55–1.02)
DIFFERENTIAL METHOD BLD: ABNORMAL
EOSINOPHIL # BLD: 0.1 K/UL (ref 0–0.4)
EOSINOPHIL NFR BLD: 2 % (ref 0–7)
ERYTHROCYTE [DISTWIDTH] IN BLOOD BY AUTOMATED COUNT: 14.8 % (ref 11.5–14.5)
GLOBULIN SER CALC-MCNC: 3.4 G/DL (ref 2–4)
GLUCOSE BLD STRIP.AUTO-MCNC: 129 MG/DL (ref 65–117)
GLUCOSE BLD STRIP.AUTO-MCNC: 186 MG/DL (ref 65–117)
GLUCOSE BLD STRIP.AUTO-MCNC: 188 MG/DL (ref 65–117)
GLUCOSE BLD STRIP.AUTO-MCNC: 191 MG/DL (ref 65–117)
GLUCOSE SERPL-MCNC: 156 MG/DL (ref 65–100)
GLUCOSE SERPL-MCNC: 157 MG/DL (ref 65–100)
HCT VFR BLD AUTO: 35.6 % (ref 35–47)
HGB BLD-MCNC: 11.5 G/DL (ref 11.5–16)
IMM GRANULOCYTES # BLD AUTO: 0 K/UL (ref 0–0.04)
IMM GRANULOCYTES NFR BLD AUTO: 0 % (ref 0–0.5)
LIPASE SERPL-CCNC: 116 U/L (ref 73–393)
LYMPHOCYTES # BLD: 0.9 K/UL (ref 0.8–3.5)
LYMPHOCYTES NFR BLD: 13 % (ref 12–49)
MCH RBC QN AUTO: 26.5 PG (ref 26–34)
MCHC RBC AUTO-ENTMCNC: 32.3 G/DL (ref 30–36.5)
MCV RBC AUTO: 82 FL (ref 80–99)
MONOCYTES # BLD: 0.9 K/UL (ref 0–1)
MONOCYTES NFR BLD: 14 % (ref 5–13)
NEUTS SEG # BLD: 4.7 K/UL (ref 1.8–8)
NEUTS SEG NFR BLD: 71 % (ref 32–75)
NRBC # BLD: 0 K/UL (ref 0–0.01)
NRBC BLD-RTO: 0 PER 100 WBC
PERFORMED BY, TECHID: ABNORMAL
PLATELET # BLD AUTO: 159 K/UL (ref 150–400)
PMV BLD AUTO: 12.7 FL (ref 8.9–12.9)
POTASSIUM SERPL-SCNC: 3.4 MMOL/L (ref 3.5–5.1)
POTASSIUM SERPL-SCNC: 3.4 MMOL/L (ref 3.5–5.1)
PROT SERPL-MCNC: 5.8 G/DL (ref 6.4–8.2)
RBC # BLD AUTO: 4.34 M/UL (ref 3.8–5.2)
SODIUM SERPL-SCNC: 138 MMOL/L (ref 136–145)
SODIUM SERPL-SCNC: 138 MMOL/L (ref 136–145)
WBC # BLD AUTO: 6.6 K/UL (ref 3.6–11)

## 2021-10-26 PROCEDURE — 74011250637 HC RX REV CODE- 250/637: Performed by: INTERNAL MEDICINE

## 2021-10-26 PROCEDURE — 74011636637 HC RX REV CODE- 636/637: Performed by: PHYSICIAN ASSISTANT

## 2021-10-26 PROCEDURE — 82787 IGG 1 2 3 OR 4 EACH: CPT

## 2021-10-26 PROCEDURE — 74011250636 HC RX REV CODE- 250/636: Performed by: INTERNAL MEDICINE

## 2021-10-26 PROCEDURE — 74011250636 HC RX REV CODE- 250/636: Performed by: HOSPITALIST

## 2021-10-26 PROCEDURE — 74011250637 HC RX REV CODE- 250/637: Performed by: NURSE PRACTITIONER

## 2021-10-26 PROCEDURE — 86301 IMMUNOASSAY TUMOR CA 19-9: CPT

## 2021-10-26 PROCEDURE — 74011250636 HC RX REV CODE- 250/636: Performed by: NURSE PRACTITIONER

## 2021-10-26 PROCEDURE — 74011250637 HC RX REV CODE- 250/637: Performed by: PHYSICIAN ASSISTANT

## 2021-10-26 PROCEDURE — 82962 GLUCOSE BLOOD TEST: CPT

## 2021-10-26 PROCEDURE — 65270000029 HC RM PRIVATE

## 2021-10-26 PROCEDURE — 80053 COMPREHEN METABOLIC PANEL: CPT

## 2021-10-26 PROCEDURE — 83690 ASSAY OF LIPASE: CPT

## 2021-10-26 PROCEDURE — 74011250636 HC RX REV CODE- 250/636: Performed by: PHYSICIAN ASSISTANT

## 2021-10-26 PROCEDURE — 80048 BASIC METABOLIC PNL TOTAL CA: CPT

## 2021-10-26 PROCEDURE — 36415 COLL VENOUS BLD VENIPUNCTURE: CPT

## 2021-10-26 PROCEDURE — 85025 COMPLETE CBC W/AUTO DIFF WBC: CPT

## 2021-10-26 RX ORDER — OXYCODONE HYDROCHLORIDE 5 MG/1
5 TABLET ORAL
Status: DISCONTINUED | OUTPATIENT
Start: 2021-10-26 | End: 2021-10-30 | Stop reason: HOSPADM

## 2021-10-26 RX ORDER — FUROSEMIDE 10 MG/ML
40 INJECTION INTRAMUSCULAR; INTRAVENOUS 2 TIMES DAILY
Status: DISCONTINUED | OUTPATIENT
Start: 2021-10-26 | End: 2021-10-30 | Stop reason: HOSPADM

## 2021-10-26 RX ORDER — POTASSIUM CHLORIDE 1.5 G/1.77G
20 POWDER, FOR SOLUTION ORAL 2 TIMES DAILY WITH MEALS
Status: COMPLETED | OUTPATIENT
Start: 2021-10-26 | End: 2021-10-27

## 2021-10-26 RX ADMIN — GUAIFENESIN DEXTROMETHORPHAN HYDROBROMIDE ORAL SOLUTION 10 ML: 10; 100 SOLUTION ORAL at 21:01

## 2021-10-26 RX ADMIN — GUAIFENESIN DEXTROMETHORPHAN HYDROBROMIDE ORAL SOLUTION 10 ML: 10; 100 SOLUTION ORAL at 09:38

## 2021-10-26 RX ADMIN — INSULIN LISPRO 2 UNITS: 100 INJECTION, SOLUTION INTRAVENOUS; SUBCUTANEOUS at 09:14

## 2021-10-26 RX ADMIN — PANTOPRAZOLE SODIUM 40 MG: 40 TABLET, DELAYED RELEASE ORAL at 09:14

## 2021-10-26 RX ADMIN — ONDANSETRON 4 MG: 2 INJECTION INTRAMUSCULAR; INTRAVENOUS at 21:02

## 2021-10-26 RX ADMIN — INSULIN LISPRO 2 UNITS: 100 INJECTION, SOLUTION INTRAVENOUS; SUBCUTANEOUS at 11:52

## 2021-10-26 RX ADMIN — HYDROMORPHONE HYDROCHLORIDE 0.5 MG: 1 INJECTION, SOLUTION INTRAMUSCULAR; INTRAVENOUS; SUBCUTANEOUS at 09:38

## 2021-10-26 RX ADMIN — FUROSEMIDE 40 MG: 10 INJECTION, SOLUTION INTRAMUSCULAR; INTRAVENOUS at 20:49

## 2021-10-26 RX ADMIN — POTASSIUM CHLORIDE 20 MEQ: 1.5 POWDER, FOR SOLUTION ORAL at 17:29

## 2021-10-26 RX ADMIN — ENOXAPARIN SODIUM 40 MG: 40 INJECTION SUBCUTANEOUS at 09:14

## 2021-10-26 RX ADMIN — HYDROMORPHONE HYDROCHLORIDE 0.5 MG: 1 INJECTION, SOLUTION INTRAMUSCULAR; INTRAVENOUS; SUBCUTANEOUS at 05:06

## 2021-10-26 RX ADMIN — GUAIFENESIN DEXTROMETHORPHAN HYDROBROMIDE ORAL SOLUTION 10 ML: 10; 100 SOLUTION ORAL at 02:20

## 2021-10-26 RX ADMIN — INSULIN LISPRO 2 UNITS: 100 INJECTION, SOLUTION INTRAVENOUS; SUBCUTANEOUS at 16:31

## 2021-10-26 RX ADMIN — FUROSEMIDE 40 MG: 10 INJECTION, SOLUTION INTRAMUSCULAR; INTRAVENOUS at 13:46

## 2021-10-26 RX ADMIN — OXYCODONE 5 MG: 5 TABLET ORAL at 16:11

## 2021-10-26 RX ADMIN — GLIPIZIDE 5 MG: 5 TABLET ORAL at 09:14

## 2021-10-26 RX ADMIN — OXYCODONE 5 MG: 5 TABLET ORAL at 22:07

## 2021-10-26 NOTE — PROGRESS NOTES
Progress Note    Patient: Geoffrey Mckeon MRN: [id number]  SSN: [id number]    YOB: 1940  Age: [de-identified] y.o. Sex: female      Admit Date: 10/22/2021    LOS: 4 days     Subjective:     No acute events overnight    Objective:     Vitals:    10/25/21 2000 10/25/21 2045 10/26/21 0000 10/26/21 0822   BP:  (!) 155/91     Pulse: 98 100 (!) 101 94   Resp:  17  20   Temp:  98.9 °F (37.2 °C)  98.7 °F (37.1 °C)   SpO2:  96%  98%   Weight:       Height:            Intake and Output:  Current Shift: No intake/output data recorded. Last three shifts: 10/24 1901 - 10/26 0700  In: 800 [P.O.:800]  Out: -     Physical Exam:   General:  Alert, cooperative, no distress, appears stated age. Eyes:  Conjunctivae/corneas clear. PERRL, EOMs intact. Fundi benign   Ears:  Normal TMs and external ear canals both ears. Nose: Nares normal. Septum midline. Mucosa normal. No drainage or sinus tenderness. Mouth/Throat: Lips, mucosa, and tongue normal. Teeth and gums normal.   Neck: Supple, symmetrical, trachea midline, no adenopathy, thyroid: no enlargment/tenderness/nodules, no carotid bruit and no JVD. Back:   Symmetric, no curvature. ROM normal. No CVA tenderness. Lungs:   Clear to auscultation bilaterally. Heart:  Regular rate and rhythm, S1, S2 normal, no murmur, click, rub or gallop. Abdomen:   Soft, non-tender. Bowel sounds normal. No masses,  No organomegaly. Extremities: Extremities normal, atraumatic, no cyanosis or edema. Pulses: 2+ and symmetric all extremities. Skin: Skin color, texture, turgor normal. No rashes or lesions   Lymph nodes: Cervical, supraclavicular, and axillary nodes normal.   Neurologic: CNII-XII intact. Normal strength, sensation and reflexes throughout. Lab/Data Review: All lab results for the last 24 hours reviewed.          Assessment:     Active Problems:    Acute pancreatitis (10/22/2021)      Elevated troponin (10/22/2021)    Patient is an 60-year-old -American female with acute pancreatitis and type II non-STEMI:  1.  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  Right occipital lobe encephalomalacia  3.  Peripheral vascular disease, left ICA 50 to 69%, right ICA less than 50% stenosis  4.  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  Hypertension with hypertensive heart disease  6.  Mixed hyperlipidemia  7.    Diabetes mellitus  8.  Heart failure with reduced ejection fraction, compensated  9.  Mild to moderate tricuspid regurgitation  10.  Mild mitral regurgitation  11.  Negative bubble study  12.  Colonic polyps, tubular adenoma  1 13.  Recent history of GI bleed  14.  Right-sided weakness       Plan:     Results of echocardiogram were discussed with the patient. Ejection fraction is 25%. This is probably resulting in the shortness of breath and coughing spells. Replete potassium. Her lipase is down to 110. She is being evaluated for possible pancreatic cancer. We will start Lasix IV. If malignancy is ruled out then might consider cardiac catheterization to rule out significant CAD.     Signed By: Yanni Ace MD     October 26, 2021

## 2021-10-26 NOTE — PROGRESS NOTES
Physician Progress Note      PATIENT:               Robert Watkins  CSN #:                  [id number]  :                       1940  ADMIT DATE:       10/22/2021 7:37 PM  DISCH DATE:  RESPONDING  PROVIDER #:        ROSEMARIE BARRAGAN PA-C          QUERY TEXT:    Pt admitted with Pancreatitis and has Systolic CHF documented. If possible, please document in progress notes and discharge summary further specificity regarding the type and acuity of CHF:    The medical record reflects the following:  Risk Factors: CHF, CAD, HTN, CKD, PANCREATITIS, DM  Clinical Indicators: 10/25 PN: \"CT scan of the abdomen pelvis revealed pulmonary edema consistent with congestive heart failure. \" H&P: \"Heart failure with ejection fraction 40 to 45% with grade 1 diastolic dysfunction that was done in May of this year. \"10/24 PN: \" She appears to be responding to Lasix. \"  Treatment: CARDIOLOGY CONSULTED, LASIX IV, CXR, CT CHEST, I&O MONITORING, LAB MONITORING. Thank you,  KRYSTINA Figueroa, RN, [facility], [facility] Specialist  [phone] or [email or web address]  Options provided:  -- Acute on Chronic Systolic CHF/HFrEF  -- Acute Systolic CHF/HFrEF  -- Chronic Systolic CHF/HFrEF  -- Other - I will add my own diagnosis  -- Disagree - Not applicable / Not valid  -- Disagree - Clinically unable to determine / Unknown  -- Refer to Clinical Documentation Reviewer    PROVIDER RESPONSE TEXT:    This patient is in acute systolic CHF/HFrEF. Query created by: Blanca Torres on 10/26/2021 8:11 AM      QUERY TEXT:    Pt admitted with PANCREATITIS. Noted documentation of NSTEMI on 10/24 by ordered CARDIOLOGY consultant.   If possible, please document in progress notes and discharge summary:    The medical record reflects the following:  Risk Factors: CHF, CAD, HTN, CKD, DM, PANCREATITIS, ELEVATED TROPONIN LEVEL  Clinical Indicators: 10/24 CARD PN: \"Patient is an 60-year-old -American female with acute pancreatitis and type II non-STEMI. \" TROP: 587, 016 10/24 ATTENDING PN: \"suspect demand ischemia. \"  Treatment: CARDIOLOGY CONSULTED, EKG, CT CHEST, LASIX IV, LAB MONITORING.     Thank you,  AN SandovalN, RN, [place], [facility] Specialist  [phone] or [email or web address]  Options provided:  -- NSTEMI TYPE II confirmed present on admission  -- NSTEMI TYPE II ruled out, demand ischemia only  -- Other - I will add my own diagnosis  -- Disagree - Not applicable / Not valid  -- Disagree - Clinically unable to determine / Unknown  -- Refer to Clinical Documentation Reviewer    PROVIDER RESPONSE TEXT:    Needs ischemic work up    Query created by: Addi Gunderson on 10/26/2021 8:12 AM      Electronically signed by:  Remberto Charles PA-C 10/26/2021 8:17 AM

## 2021-10-26 NOTE — CONSULTS
Consult    Patient: Prachi Wu MRN: [id number]  SSN: [id number]    YOB: 1940  Age: [de-identified] y.o. Sex: female      Subjective:      Prachi Wu is a [de-identified] y.o. female who is being seen for pancreatitis  Hx from medical record most of . Klaudia Jacobo She presented to the emergency room 4 days ago with complaining of abdominal pain, 6-10/10 at epigastric area no radiation. in ER lipase elevated more than 3000,  CT suggeted of acute on chronic pancreatitis. dilated pancreatic duct in the pancreatic tail which is inseparable from the pancreatic tail cystic lesion, acute on chronic pancreatitis. She has been seen by cardiology for elevation of tropion.   No much chest or abdominal pain today , no nausea or vomiting      Past Medical History:   Diagnosis Date    Colon polyps     Diabetes ([address]     Diverticulosis     Hypertension     Ill-defined condition      Past Surgical History:   Procedure Laterality Date    COLONOSCOPY N/A 4/29/2021    COLONOSCOPY performed by Gena Russell MD at [facility] ENDOSCOPY      Family History   Problem Relation Age of Onset    Diabetes Other      Social History     Tobacco Use    Smoking status: Never Smoker    Smokeless tobacco: Never Used   Substance Use Topics    Alcohol use: Not Currently      Current Facility-Administered Medications   Medication Dose Route Frequency Provider Last Rate Last Admin    benzocaine-menthoL (CEPACOL) lozenge 1 Lozenge  1 Lozenge Mucous Membrane PRN Serafin Connell MD   1 Lozenge at 10/24/21 1239    HYDROmorphone (DILAUDID) syringe 0.5 mg  0.5 mg IntraVENous Q4H PRN Donovan Ganser, PA-C   0.5 mg at 10/25/21 2107    glucose chewable tablet 16 g  4 Tablet Oral PRN Donovan Ganser, PA-C        dextrose (D50W) injection syrg 12.5-25 g  25-50 mL IntraVENous PRN Owen Gaviria PA-C        glucagon (GLUCAGEN) injection 1 mg  1 mg IntraMUSCular PRN Owen Gaviria PA-C        insulin lispro (HUMALOG) injection   SubCUTAneous AC&HS Rae Hernadez PA-C   3 Units at 10/24/21 1818    diphenhydrAMINE (BENADRYL) injection 12.5 mg  12.5 mg IntraVENous Q6H PRN Jose Gaviria PA-C        pantoprazole (PROTONIX) tablet 40 mg  40 mg Oral ACB Rae Hernadez PA-C   40 mg at 10/25/21 0601    guaiFENesin-dextromethorphan (TUSSI-ORGANIDIN DM)  mg/5 mL oral solution 10 mL  10 mL Oral Q4H PRN Manuel York MD   10 mL at 10/25/21 1632    glipiZIDE (GLUCOTROL) tablet 5 mg  5 mg Oral ACB Katy Ortega NP   5 mg at 10/25/21 1025    enoxaparin (LOVENOX) injection 40 mg  40 mg SubCUTAneous Q24H Katy Ortega NP   40 mg at 10/25/21 1025    glucose chewable tablet 16 g  4 Tablet Oral PRN Michelle Cuevas MD        dextrose (D50W) injection syrg 12.5-25 g  25-50 mL IntraVENous PRN Michelle Cuevas MD        glucagon (GLUCAGEN) injection 1 mg  1 mg IntraMUSCular PRN Michelle Cuevas MD        sodium chloride (NS) flush 5-40 mL  5-40 mL IntraVENous PRN Michelle Cuevas MD   10 mL at 10/24/21 1602    acetaminophen (TYLENOL) tablet 650 mg  650 mg Oral Q4H PRN Michelle Cuevas MD        ondansetron [facility]) injection 4 mg  4 mg IntraVENous Q4H PRN Michelle Cuevas MD   4 mg at 10/25/21 2107        Allergies   Allergen Reactions    Insulins Itching    Penicillins Other (comments)     Pt states it makes her pass out       Review of Systems:  Review of Systems   Unable to perform ROS: Mental acuity      All other negative except in history   Objective:     Vitals:    10/25/21 1200 10/25/21 1520 10/25/21 1600 10/25/21 2045   BP:  (!) 150/96  (!) 155/91   Pulse: 84 94 87 100   Resp:  16  17   Temp:  98.3 °F (36.8 °C)  98.9 °F (37.2 °C)   SpO2:  97%  96%   Weight:       Height:            Physical Exam:  Physical Exam  Constitutional:       Appearance: She is ill-appearing. HENT:      Head: Normocephalic. Mouth/Throat:      Mouth: Mucous membranes are dry.    Cardiovascular: Rate and Rhythm: Tachycardia present. Pulses: Normal pulses. Pulmonary:      Effort: Pulmonary effort is normal.      Breath sounds: Normal breath sounds. Abdominal:      General: Abdomen is flat. Bowel sounds are normal. There is no distension. Tenderness: There is no rebound. Hernia: No hernia is present. Musculoskeletal:      Cervical back: Normal range of motion. Skin:     Findings: No erythema or rash. Neurological:      General: No focal deficit present. Psychiatric:         Mood and Affect: Mood normal.          Recent Results (from the past 24 hour(s))   GLUCOSE, POC    Collection Time: 10/25/21  7:41 AM   Result Value Ref Range    Glucose (POC) 109 65 - 117 mg/dL    Performed by Javier Chandra (SON)    CBC WITH AUTOMATED DIFF    Collection Time: 10/25/21  8:48 AM   Result Value Ref Range    WBC 8.7 3.6 - 11.0 K/uL    RBC 4.10 3.80 - 5.20 M/uL    HGB 10.8 (L) 11.5 - 16.0 g/dL    HCT 33.6 (L) 35.0 - 47.0 %    MCV 82.0 80.0 - 99.0 FL    MCH 26.3 26.0 - 34.0 PG    MCHC 32.1 30.0 - 36.5 g/dL    RDW 14.9 (H) 11.5 - 14.5 %    PLATELET 760 (L) 705 - 400 K/uL    MPV 13.1 (H) 8.9 - 12.9 FL    NRBC 0.0 0.0  WBC    ABSOLUTE NRBC 0.00 0.00 - 0.01 K/uL    NEUTROPHILS 72 32 - 75 %    LYMPHOCYTES 15 12 - 49 %    MONOCYTES 11 5 - 13 %    EOSINOPHILS 2 0 - 7 %    BASOPHILS 0 0 - 1 %    IMMATURE GRANULOCYTES 0 0 - 0.5 %    ABS. NEUTROPHILS 6.2 1.8 - 8.0 K/UL    ABS. LYMPHOCYTES 1.3 0.8 - 3.5 K/UL    ABS. MONOCYTES 1.0 0.0 - 1.0 K/UL    ABS. EOSINOPHILS 0.2 0.0 - 0.4 K/UL    ABS. BASOPHILS 0.0 0.0 - 0.1 K/UL    ABS. IMM.  GRANS. 0.0 0.00 - 0.04 K/UL    DF AUTOMATED     METABOLIC PANEL, COMPREHENSIVE    Collection Time: 10/25/21  8:48 AM   Result Value Ref Range    Sodium 141 136 - 145 mmol/L    Potassium 3.6 3.5 - 5.1 mmol/L    Chloride 108 97 - 108 mmol/L    CO2 23 21 - 32 mmol/L    Anion gap 10 5 - 15 mmol/L    Glucose 123 (H) 65 - 100 mg/dL    BUN 12 6 - 20 mg/dL    Creatinine 0.76 0.55 - 1.02 mg/dL    BUN/Creatinine ratio 16 12 - 20      GFR est AA >60 >60 ml/min/1.73m2    GFR est non-AA >60 >60 ml/min/1.73m2    Calcium 8.6 8.5 - 10.1 mg/dL    Bilirubin, total 1.2 (H) 0.2 - 1.0 mg/dL    AST (SGOT) 12 (L) 15 - 37 U/L    ALT (SGPT) 17 12 - 78 U/L    Alk.  phosphatase 106 45 - 117 U/L    Protein, total 5.5 (L) 6.4 - 8.2 g/dL    Albumin 2.4 (L) 3.5 - 5.0 g/dL    Globulin 3.1 2.0 - 4.0 g/dL    A-G Ratio 0.8 (L) 1.1 - 2.2     LIPASE    Collection Time: 10/25/21  8:48 AM   Result Value Ref Range    Lipase 110 73 - 393 U/L   ECHO ADULT COMPLETE    Collection Time: 10/25/21 12:04 PM   Result Value Ref Range    Aortic Regurgitant Pressure Half-time 558.00 ms    AR Max Pasha 280.00 cm/s    Aortic Valve Systolic Peak Velocity 092.35 cm/s    AoV PG 13.00 mmHg    Ao Root D 2.70 cm    IVSd 1.35 (A) 0.60 - 0.90 cm    LVIDd 4.95 3.90 - 5.30 cm    LVIDs 4.43 cm    LVOT Peak Velocity 93.20 cm/s    LVOT Peak Gradient 3.00 mmHg    LVPWd 1.34 (A) 0.60 - 0.90 cm    LV E' Septal Velocity 5.75 cm/s    LV ED Vol A2C 121.00 cm3    LV ES Vol A2C 86.90 cm3    E/E' septal 22.43     Left Atrium Major Axis 3.90 cm    Mitral Valve Deceleration [place] 9,670.00 mm/s2    Mitral Valve Deceleration [place] 9,670.00 mm/s2    Mitral Valve E Wave Deceleration Time 151.00 ms    Mitral Valve Pressure Half-time 44.00 ms    MV A Pasha 78.40 cm/s    MV E Pasha 129.00 cm/s    MVA (PHT) 5.00 cm2    MV E/A 1.65     Pulmonic Regurgitant End Max Velocity 130.00 cm/s    Pulmonic Valve Systolic Peak Instantaneous Gradient 7.00 mmHg    Pulmonic Valve Max Velocity 70.90 cm/s    Pulmonic Valve Systolic Peak Instantaneous Gradient 2.00 mmHg    Est. RA Pressure 3.00 mmHg    RVIDd 3.51 cm    RVSP 41.00 mmHg    Tricuspid Valve Max Velocity 307.00 cm/s    Triscuspid Valve Regurgitation Peak Gradient 38.00 mmHg    Tapse 2.00 1.50 - 2.00 cm    Right Atrial Area 4C 16.50 cm2    LA Area 4C 23.25 cm2    BP EF 23.2 55.0 - 100.0 %    LV Mass .7 67.0 - 162.0 g    LV Mass AL Index 159.2 43.0 - 95.0 g/m2    Left Atrium Minor Axis 2.29 cm   GLUCOSE, POC    Collection Time: 10/25/21 12:08 PM   Result Value Ref Range    Glucose (POC) 157 (H) 65 - 117 mg/dL    Performed by ION SIMON    GLUCOSE, POC    Collection Time: 10/25/21  3:01 PM   Result Value Ref Range    Glucose (POC) 130 (H) 65 - 117 mg/dL    Performed by Allison Patten (SON)    GLUCOSE, POC    Collection Time: 10/25/21  8:47 PM   Result Value Ref Range    Glucose (POC) 184 (H) 65 - 117 mg/dL    Performed by JASSON SIMON         CT ABD PELV W CONT   Final Result   CHF/pulmonary edema suggested and partially visualized at the lung   bases with dilated cardiomyopathy suggested as described. Probable acute on   chronic pancreatitis and with persistent finding of a significantly dilated   pancreatic duct in the pancreatic tail which is inseparable from the pancreatic   tail cystic lesion. Diverticulosis without diverticulitis. Arteriosclerotic   changes of the aortoiliac vasculature. Degenerative disc disease and lumbar   scoliosis. Other findings as above. XR CHEST PORT   Final Result   Left basilar airspace opacity which could represent atelectasis or   pneumonia.                Assessment:     Hospital Problems  Date Reviewed: 10/22/2021        Codes Class Noted POA    Acute pancreatitis ICD-10-CM: K85.90  ICD-9-CM: 816.0  10/22/2021 Yes        Elevated troponin ICD-10-CM: R77.8  ICD-9-CM: 790.6  10/22/2021 Unknown          non-STEMI:  1.  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  Right occipital lobe encephalomalacia  3.  Peripheral vascular disease, left ICA 50 to 69%, right ICA less than 50% stenosis  4.  Bilateral renal artery stenosis and a celiac artery stenosis    Plan:   Abdominal Acute pancreatitis:  -lipase 3000-->norml  -denies etoh usage, can not totally rule  out  Autoimmune due to most of lesion at tail of  pancrease   triglycerides were normal  Rule out malignancy, IPMN    Will send CA 19-9, IG G 4   Lower fat diet    Will follow to see if MRCP /EUSFNA is needed if CA 19-9 elevated.    Signed By: Karla Baez MD     October 25, 2021         Thank you for allowing me to participate in this patients care  Cc Referring Physician   Ortega Rodriguez MD

## 2021-10-26 NOTE — PROGRESS NOTES
Hospitalist Progress Note             Daily Progress Note: 10/26/2021      Subjective:   Subjective   Patient examined alert and oriented lying in bed  No overnight events reported  No acute distress noted on examination    Review of Systems:   Review of Systems   Constitutional: Negative. HENT: Negative. Eyes: Negative. Respiratory: Positive for cough. Cardiovascular: Negative. Gastrointestinal: Negative for abdominal pain and nausea. Genitourinary: Negative. Musculoskeletal: Negative. Skin: Negative. Neurological: Negative. Endo/Heme/Allergies: Negative. Psychiatric/Behavioral: Negative. Objective:   Objective      Vitals:  Patient Vitals for the past 12 hrs:   Temp Pulse Resp SpO2   10/26/21 0822 98.7 °F (37.1 °C) 94 20 98 %        Physical Exam:  Physical Exam  Vitals and nursing note reviewed. Constitutional:       Appearance: Normal appearance. HENT:      Mouth/Throat:      Pharynx: Oropharynx is clear. Eyes:      Conjunctiva/sclera: Conjunctivae normal.   Cardiovascular:      Rate and Rhythm: Regular rhythm. Pulses: Normal pulses. Pulmonary:      Effort: Pulmonary effort is normal.      Breath sounds: Normal breath sounds. Abdominal:      General: Bowel sounds are normal.      Palpations: Abdomen is soft. Musculoskeletal:      Cervical back: Normal range of motion. Skin:     General: Skin is warm and dry. Capillary Refill: Capillary refill takes less than 2 seconds. Neurological:      Mental Status: She is alert and oriented to person, place, and time.    Psychiatric:         Mood and Affect: Mood normal.         Behavior: Behavior normal.          Lab Results:  Recent Results (from the past 24 hour(s))   GLUCOSE, POC    Collection Time: 10/25/21  3:01 PM   Result Value Ref Range    Glucose (POC) 130 (H) 65 - 117 mg/dL    Performed by Afsaneh Samuel (SHAMA)    GLUCOSE, POC    Collection Time: 10/25/21  8:47 PM   Result Value Ref Range Glucose (POC) 184 (H) 65 - 117 mg/dL    Performed by Akash Panda, POC    Collection Time: 10/26/21  8:30 AM   Result Value Ref Range    Glucose (POC) 188 (H) 65 - 117 mg/dL    Performed by Itzel Beech    GLUCOSE, POC    Collection Time: 10/26/21 11:17 AM   Result Value Ref Range    Glucose (POC) 186 (H) 65 - 117 mg/dL    Performed by Itzel Beech           Diagnostic Images:  CT Results  (Last 48 hours)    None          Current Medications:    Current Facility-Administered Medications:     potassium chloride (KLOR-CON) packet for solution 20 mEq, 20 mEq, Oral, BID WITH MEALS, Kamryn Smart MD    furosemide (LASIX) injection 40 mg, 40 mg, IntraVENous, BID, Kamryn Smart MD    benzocaine-menthoL (CEPACOL) lozenge 1 Lozenge, 1 Lozenge, Mucous Membrane, PRN, Christian James MD, 1 Lozenge at 10/24/21 1239    glucose chewable tablet 16 g, 4 Tablet, Oral, PRN, Argelia Gaviria PA-C    dextrose (D50W) injection syrg 12.5-25 g, 25-50 mL, IntraVENous, PRN, Argelia Gaviria PA-C    glucagon (GLUCAGEN) injection 1 mg, 1 mg, IntraMUSCular, PRN, Argelia Gaviria PA-C    insulin lispro (HUMALOG) injection, , SubCUTAneous, AC&HS, Argelia Gaviria PA-C, 2 Units at 10/26/21 1152    diphenhydrAMINE (BENADRYL) injection 12.5 mg, 12.5 mg, IntraVENous, Q6H PRN, Alejandro Gaviria PA-C    pantoprazole (PROTONIX) tablet 40 mg, 40 mg, Oral, ACB, Alejandro Gaviria PA-C, 40 mg at 10/26/21 0914    guaiFENesin-dextromethorphan (TUSSI-ORGANIDIN DM)  mg/5 mL oral solution 10 mL, 10 mL, Oral, Q4H PRN, Farzad Hendrix Mt, MD, 10 mL at 10/26/21 0938    glipiZIDE (GLUCOTROL) tablet 5 mg, 5 mg, Oral, ACB, Barbra Edmond NP, 5 mg at 10/26/21 0914    enoxaparin (LOVENOX) injection 40 mg, 40 mg, SubCUTAneous, Q24H, Barbra Edmond NP, 40 mg at 10/26/21 0914    glucose chewable tablet 16 g, 4 Tablet, Oral, PRN, Gómez Walter MD    dextrose (D50W) injection syrg 12.5-25 g, 25-50 mL, IntraVENous, PRN, Kristian Whiteside MD    glucagon (GLUCAGEN) injection 1 mg, 1 mg, IntraMUSCular, PRN, Kristian Whiteside MD    sodium chloride (NS) flush 5-40 mL, 5-40 mL, IntraVENous, PRN, Kristian Whiteside MD, 10 mL at 10/24/21 1602    acetaminophen (TYLENOL) tablet 650 mg, 650 mg, Oral, Q4H PRN, Kristian Whiteside MD    ondansetron [facility]) injection 4 mg, 4 mg, IntraVENous, Q4H PRN, Kristian Whiteside MD, 4 mg at 10/25/21 2107       ASSESSMENT:  [de-identified] y.o. female with history of diabetes, hypertension and history of diverticulosis/colon polyps presents to the emergency room complaining of abdominal pain around the navel with no radiation. Lipase was greater than 3000 consistent with pancreatitis. Lipase trending down appropriately. CT scan of the abdomen pelvis revealed pulmonary edema consistent with congestive heart failure as well as a dilated pancreatic duct in the pancreatic tail with a pancreatic tail cystic lesion. Troponin was elevated at greater than 500 and cardiac consultation, Dr. Dc Cevallos. Echocardiogram with ejection fracture of 20 to 25% and moderate tricuspid valve regurgitation. Right-sided pressures 41 mm per mercury. CA 19-9 pending. Awaiting ischemic work-up    1. Acute pancreatitis:  -lipase 3000-->2342, normal  -Gentle IV hydration  -prn pain management  -GI consulted  Triglycerides normal    2. Elevated troponin:  -troponin were 599 and has trended down to 540.  -suspect demand ischemia  -Echo back in May showed an EF of 40 to 45% with global hypokinesis, grade 1 diastolic dysfunction  Cardiology following,   Echo EF of 20-25%  Denies chest pain    3. Benign essential hypertension:   -On verapamil and Prinivil at home  Holding medications currently    4.  Type 2 DM:   Trying Humalog sliding scale with Benadryl secondary to the itching  start glipizide 5mg daily    Full Code  Dvt Prophylaxis Lovenox  GI Prophylaxis protonix  Disposition:  · GI evaluation  · Diet toleration when initiated      Above treatment plan reviewed and discussed with patient in detail at bedside, all questions answered. Care Plan discussed with: Interdisciplinary team    Total time spent with patient: >35 minutes.     Lis Berrios PA-C

## 2021-10-27 ENCOUNTER — APPOINTMENT (OUTPATIENT)
Dept: GENERAL RADIOLOGY | Age: 81
DRG: 246 | End: 2021-10-27
Attending: PHYSICIAN ASSISTANT
Payer: MEDICARE

## 2021-10-27 LAB
ALBUMIN SERPL-MCNC: 2.5 G/DL (ref 3.5–5)
ALBUMIN/GLOB SERPL: 0.7 {RATIO} (ref 1.1–2.2)
ALP SERPL-CCNC: 107 U/L (ref 45–117)
ALT SERPL-CCNC: 14 U/L (ref 12–78)
ANION GAP SERPL CALC-SCNC: 8 MMOL/L (ref 5–15)
AST SERPL W P-5'-P-CCNC: 9 U/L (ref 15–37)
BASOPHILS # BLD: 0 K/UL (ref 0–0.1)
BASOPHILS NFR BLD: 0 % (ref 0–1)
BILIRUB SERPL-MCNC: 1.2 MG/DL (ref 0.2–1)
BUN SERPL-MCNC: 14 MG/DL (ref 6–20)
BUN/CREAT SERPL: 12 (ref 12–20)
CA-I BLD-MCNC: 8.8 MG/DL (ref 8.5–10.1)
CANCER AG19-9 SERPL-ACNC: 31 U/ML (ref 0–35)
CHLORIDE SERPL-SCNC: 98 MMOL/L (ref 97–108)
CO2 SERPL-SCNC: 29 MMOL/L (ref 21–32)
CREAT SERPL-MCNC: 1.2 MG/DL (ref 0.55–1.02)
DIFFERENTIAL METHOD BLD: ABNORMAL
EOSINOPHIL # BLD: 0.1 K/UL (ref 0–0.4)
EOSINOPHIL NFR BLD: 2 % (ref 0–7)
ERYTHROCYTE [DISTWIDTH] IN BLOOD BY AUTOMATED COUNT: 14.5 % (ref 11.5–14.5)
GLOBULIN SER CALC-MCNC: 3.5 G/DL (ref 2–4)
GLUCOSE BLD STRIP.AUTO-MCNC: 153 MG/DL (ref 65–117)
GLUCOSE BLD STRIP.AUTO-MCNC: 173 MG/DL (ref 65–117)
GLUCOSE BLD STRIP.AUTO-MCNC: 245 MG/DL (ref 65–117)
GLUCOSE BLD STRIP.AUTO-MCNC: 292 MG/DL (ref 65–117)
GLUCOSE SERPL-MCNC: 351 MG/DL (ref 65–100)
HCT VFR BLD AUTO: 36.4 % (ref 35–47)
HGB BLD-MCNC: 11.7 G/DL (ref 11.5–16)
IGG4 SER-MCNC: 18 MG/DL (ref 2–96)
IMM GRANULOCYTES # BLD AUTO: 0 K/UL (ref 0–0.04)
IMM GRANULOCYTES NFR BLD AUTO: 0 % (ref 0–0.5)
LIPASE SERPL-CCNC: 100 U/L (ref 73–393)
LYMPHOCYTES # BLD: 1.3 K/UL (ref 0.8–3.5)
LYMPHOCYTES NFR BLD: 16 % (ref 12–49)
MCH RBC QN AUTO: 26.1 PG (ref 26–34)
MCHC RBC AUTO-ENTMCNC: 32.1 G/DL (ref 30–36.5)
MCV RBC AUTO: 81.1 FL (ref 80–99)
MONOCYTES # BLD: 1.1 K/UL (ref 0–1)
MONOCYTES NFR BLD: 14 % (ref 5–13)
NEUTS SEG # BLD: 5.2 K/UL (ref 1.8–8)
NEUTS SEG NFR BLD: 68 % (ref 32–75)
NRBC # BLD: 0 K/UL (ref 0–0.01)
NRBC BLD-RTO: 0 PER 100 WBC
PERFORMED BY, TECHID: ABNORMAL
PLATELET # BLD AUTO: 184 K/UL (ref 150–400)
PMV BLD AUTO: 12.8 FL (ref 8.9–12.9)
POTASSIUM SERPL-SCNC: 3.6 MMOL/L (ref 3.5–5.1)
PROT SERPL-MCNC: 6 G/DL (ref 6.4–8.2)
RBC # BLD AUTO: 4.49 M/UL (ref 3.8–5.2)
SODIUM SERPL-SCNC: 135 MMOL/L (ref 136–145)
WBC # BLD AUTO: 7.7 K/UL (ref 3.6–11)

## 2021-10-27 PROCEDURE — 83690 ASSAY OF LIPASE: CPT

## 2021-10-27 PROCEDURE — 74011250637 HC RX REV CODE- 250/637: Performed by: PHYSICIAN ASSISTANT

## 2021-10-27 PROCEDURE — 74011250636 HC RX REV CODE- 250/636: Performed by: HOSPITALIST

## 2021-10-27 PROCEDURE — 65270000029 HC RM PRIVATE

## 2021-10-27 PROCEDURE — 74011250637 HC RX REV CODE- 250/637: Performed by: INTERNAL MEDICINE

## 2021-10-27 PROCEDURE — 85025 COMPLETE CBC W/AUTO DIFF WBC: CPT

## 2021-10-27 PROCEDURE — 74011250637 HC RX REV CODE- 250/637: Performed by: NURSE PRACTITIONER

## 2021-10-27 PROCEDURE — 74011250636 HC RX REV CODE- 250/636: Performed by: PHYSICIAN ASSISTANT

## 2021-10-27 PROCEDURE — 73502 X-RAY EXAM HIP UNI 2-3 VIEWS: CPT

## 2021-10-27 PROCEDURE — 80053 COMPREHEN METABOLIC PANEL: CPT

## 2021-10-27 PROCEDURE — 82962 GLUCOSE BLOOD TEST: CPT

## 2021-10-27 PROCEDURE — 74011636637 HC RX REV CODE- 636/637: Performed by: PHYSICIAN ASSISTANT

## 2021-10-27 PROCEDURE — 36415 COLL VENOUS BLD VENIPUNCTURE: CPT

## 2021-10-27 PROCEDURE — 74011250636 HC RX REV CODE- 250/636: Performed by: NURSE PRACTITIONER

## 2021-10-27 PROCEDURE — 74011250636 HC RX REV CODE- 250/636: Performed by: INTERNAL MEDICINE

## 2021-10-27 RX ORDER — LISINOPRIL 10 MG/1
20 TABLET ORAL DAILY
Status: CANCELLED | OUTPATIENT
Start: 2021-10-28

## 2021-10-27 RX ORDER — INSULIN GLARGINE 100 [IU]/ML
20 INJECTION, SOLUTION SUBCUTANEOUS
Status: DISCONTINUED | OUTPATIENT
Start: 2021-10-27 | End: 2021-10-30 | Stop reason: HOSPADM

## 2021-10-27 RX ORDER — LABETALOL HCL 20 MG/4 ML
10 SYRINGE (ML) INTRAVENOUS
Status: DISCONTINUED | OUTPATIENT
Start: 2021-10-27 | End: 2021-10-30 | Stop reason: HOSPADM

## 2021-10-27 RX ORDER — VERAPAMIL HYDROCHLORIDE 240 MG/1
240 TABLET, FILM COATED, EXTENDED RELEASE ORAL DAILY
Status: CANCELLED | OUTPATIENT
Start: 2021-10-28

## 2021-10-27 RX ADMIN — OXYCODONE 5 MG: 5 TABLET ORAL at 22:55

## 2021-10-27 RX ADMIN — OXYCODONE 5 MG: 5 TABLET ORAL at 04:08

## 2021-10-27 RX ADMIN — DIPHENHYDRAMINE HYDROCHLORIDE 12.5 MG: 50 INJECTION, SOLUTION INTRAMUSCULAR; INTRAVENOUS at 09:05

## 2021-10-27 RX ADMIN — POTASSIUM CHLORIDE 20 MEQ: 1.5 POWDER, FOR SOLUTION ORAL at 17:30

## 2021-10-27 RX ADMIN — OXYCODONE 5 MG: 5 TABLET ORAL at 15:09

## 2021-10-27 RX ADMIN — ONDANSETRON 4 MG: 2 INJECTION INTRAMUSCULAR; INTRAVENOUS at 21:56

## 2021-10-27 RX ADMIN — POTASSIUM CHLORIDE 20 MEQ: 1.5 POWDER, FOR SOLUTION ORAL at 09:05

## 2021-10-27 RX ADMIN — GLIPIZIDE 5 MG: 5 TABLET ORAL at 09:05

## 2021-10-27 RX ADMIN — GUAIFENESIN DEXTROMETHORPHAN HYDROBROMIDE ORAL SOLUTION 10 ML: 10; 100 SOLUTION ORAL at 15:09

## 2021-10-27 RX ADMIN — FUROSEMIDE 40 MG: 10 INJECTION, SOLUTION INTRAMUSCULAR; INTRAVENOUS at 09:05

## 2021-10-27 RX ADMIN — INSULIN LISPRO 2 UNITS: 100 INJECTION, SOLUTION INTRAVENOUS; SUBCUTANEOUS at 18:01

## 2021-10-27 RX ADMIN — OXYCODONE 5 MG: 5 TABLET ORAL at 09:25

## 2021-10-27 RX ADMIN — ENOXAPARIN SODIUM 40 MG: 40 INJECTION SUBCUTANEOUS at 09:05

## 2021-10-27 RX ADMIN — INSULIN GLARGINE 20 UNITS: 100 INJECTION, SOLUTION SUBCUTANEOUS at 21:56

## 2021-10-27 RX ADMIN — INSULIN HUMAN 10 UNITS: 100 INJECTION, SUSPENSION SUBCUTANEOUS at 21:57

## 2021-10-27 RX ADMIN — PANTOPRAZOLE SODIUM 40 MG: 40 TABLET, DELAYED RELEASE ORAL at 09:05

## 2021-10-27 RX ADMIN — INSULIN LISPRO 5 UNITS: 100 INJECTION, SOLUTION INTRAVENOUS; SUBCUTANEOUS at 09:06

## 2021-10-27 RX ADMIN — INSULIN LISPRO 3 UNITS: 100 INJECTION, SOLUTION INTRAVENOUS; SUBCUTANEOUS at 12:47

## 2021-10-27 NOTE — PROGRESS NOTES
CM reviewed chart and spoke to primary physician. Patient is still being seen by cardiology and GI. CM will continue to follow for any possible discharge needs.

## 2021-10-27 NOTE — PROGRESS NOTES
Progress Note    Patient: Susan Machado MRN: [id number]  SSN: [id number]    YOB: 1940  Age: [de-identified] y.o.   Sex: female      Admit Date: 10/22/2021    LOS: 5 days     Subjective:   no pain, no nausea vomiting, able to eat,    Past Medical History:   Diagnosis Date    Colon polyps     Diabetes ([address]     Diverticulosis     Hypertension     Ill-defined condition         Current Facility-Administered Medications:     labetaloL (NORMODYNE;TRANDATE) 20 mg/4 mL (5 mg/mL) injection 10 mg, 10 mg, IntraVENous, Q4H PRN, Mikael Christianson MD    insulin glargine (LANTUS) injection 20 Units, 20 Units, SubCUTAneous, QHS, Alejandro Gaviria PA-C    insulin NPH (NOVOLIN N, HUMULIN N) injection 10 Units, 10 Units, SubCUTAneous, BID, Alejandro Gaviria PA-C    [Held by provider] furosemide (LASIX) injection 40 mg, 40 mg, IntraVENous, BID, Kamryn Smart MD, 40 mg at 10/27/21 0905    oxyCODONE IR (ROXICODONE) tablet 5 mg, 5 mg, Oral, Q6H PRN, Alejandro Gaviria PA-C, 5 mg at 10/27/21 1509    benzocaine-menthoL (CEPACOL) lozenge 1 Lozenge, 1 Lozenge, Mucous Membrane, PRN, Suyapa Dao MD, 1 Lozenge at 10/24/21 1239    glucose chewable tablet 16 g, 4 Tablet, Oral, PRN, Alejandro Gaviria PA-C    dextrose (D50W) injection syrg 12.5-25 g, 25-50 mL, IntraVENous, PRN, Hamida Gaviria PA-C    glucagon (GLUCAGEN) injection 1 mg, 1 mg, IntraMUSCular, PRN, Alejandro Gaviria PA-C    insulin lispro (HUMALOG) injection, , SubCUTAneous, AC&HS, Hamida Gaviria PA-C, 2 Units at 10/27/21 1801    diphenhydrAMINE (BENADRYL) injection 12.5 mg, 12.5 mg, IntraVENous, Q6H PRN, Alejandro Gaviria PA-C, 12.5 mg at 10/27/21 0905    pantoprazole (PROTONIX) tablet 40 mg, 40 mg, Oral, ACB, Alejandro Gaviria PA-C, 40 mg at 10/27/21 0905    guaiFENesin-dextromethorphan (TUSSI-ORGANIDIN DM)  mg/5 mL oral solution 10 mL, 10 mL, Oral, Q4H PRN, Farzad Hendrix MD, 10 mL at 10/27/21 1506   glipiZIDE (GLUCOTROL) tablet 5 mg, 5 mg, Oral, ACB, Mayito, KAITLYNN Belle, 5 mg at 10/27/21 0905    enoxaparin (LOVENOX) injection 40 mg, 40 mg, SubCUTAneous, Q24H, Moise Lee NP, 40 mg at 10/27/21 0905    glucose chewable tablet 16 g, 4 Tablet, Oral, PRN, Jayme Whiteside MD    dextrose (D50W) injection syrg 12.5-25 g, 25-50 mL, IntraVENous, PRN, Jayme Whiteside MD    glucagon (GLUCAGEN) injection 1 mg, 1 mg, IntraMUSCular, PRN, Jayme Whiteside MD    sodium chloride (NS) flush 5-40 mL, 5-40 mL, IntraVENous, PRN, Jayme Whiteside MD, 10 mL at 10/24/21 1602    acetaminophen (TYLENOL) tablet 650 mg, 650 mg, Oral, Q4H PRN, Jayme Whiteside MD    ondansetron [facility]) injection 4 mg, 4 mg, IntraVENous, Q4H PRN, Jayme Whiteside MD, 4 mg at 10/26/21 2102    Objective:     Vitals:    10/27/21 0142 10/27/21 0742 10/27/21 1047 10/27/21 1457   BP:  139/84  121/79   Pulse: 84 87  98   Resp:  18  18   Temp:  98 °F (36.7 °C)  98.2 °F (36.8 °C)   SpO2:  95% 97% 99%   Weight:       Height:            Intake and Output:  Current Shift: No intake/output data recorded. Last three shifts: No intake/output data recorded. Physical Exam:   Physical Exam  Constitutional:       Appearance: She is ill-appearing. HENT:      Mouth/Throat:      Mouth: Mucous membranes are dry. Cardiovascular:      Rate and Rhythm: Normal rate. Pulses: Normal pulses. Pulmonary:      Effort: Pulmonary effort is normal.   Abdominal:      General: Abdomen is flat. Tenderness: There is no abdominal tenderness. Hernia: No hernia is present. Musculoskeletal:      Cervical back: Normal range of motion. Neurological:      General: No focal deficit present.    Psychiatric:         Mood and Affect: Mood normal.          Lab/Data Review:  Recent Results (from the past 24 hour(s))   GLUCOSE, POC    Collection Time: 10/26/21  7:30 PM   Result Value Ref Range    Glucose (POC) 129 (H) 65 - 117 mg/dL Performed by Claudy Divers (CNA)    GLUCOSE, POC    Collection Time: 10/27/21  7:43 AM   Result Value Ref Range    Glucose (POC) 292 (H) 65 - 117 mg/dL    Performed by Merrill Wall    CBC WITH AUTOMATED DIFF    Collection Time: 10/27/21  9:03 AM   Result Value Ref Range    WBC 7.7 3.6 - 11.0 K/uL    RBC 4.49 3.80 - 5.20 M/uL    HGB 11.7 11.5 - 16.0 g/dL    HCT 36.4 35.0 - 47.0 %    MCV 81.1 80.0 - 99.0 FL    MCH 26.1 26.0 - 34.0 PG    MCHC 32.1 30.0 - 36.5 g/dL    RDW 14.5 11.5 - 14.5 %    PLATELET 289 179 - 547 K/uL    MPV 12.8 8.9 - 12.9 FL    NRBC 0.0 0.0  WBC    ABSOLUTE NRBC 0.00 0.00 - 0.01 K/uL    NEUTROPHILS 68 32 - 75 %    LYMPHOCYTES 16 12 - 49 %    MONOCYTES 14 (H) 5 - 13 %    EOSINOPHILS 2 0 - 7 %    BASOPHILS 0 0 - 1 %    IMMATURE GRANULOCYTES 0 0 - 0.5 %    ABS. NEUTROPHILS 5.2 1.8 - 8.0 K/UL    ABS. LYMPHOCYTES 1.3 0.8 - 3.5 K/UL    ABS. MONOCYTES 1.1 (H) 0.0 - 1.0 K/UL    ABS. EOSINOPHILS 0.1 0.0 - 0.4 K/UL    ABS. BASOPHILS 0.0 0.0 - 0.1 K/UL    ABS. IMM. GRANS. 0.0 0.00 - 0.04 K/UL    DF AUTOMATED     METABOLIC PANEL, COMPREHENSIVE    Collection Time: 10/27/21  9:03 AM   Result Value Ref Range    Sodium 135 (L) 136 - 145 mmol/L    Potassium 3.6 3.5 - 5.1 mmol/L    Chloride 98 97 - 108 mmol/L    CO2 29 21 - 32 mmol/L    Anion gap 8 5 - 15 mmol/L    Glucose 351 (H) 65 - 100 mg/dL    BUN 14 6 - 20 mg/dL    Creatinine 1.20 (H) 0.55 - 1.02 mg/dL    BUN/Creatinine ratio 12 12 - 20      GFR est AA 52 (L) >60 ml/min/1.73m2    GFR est non-AA 43 (L) >60 ml/min/1.73m2    Calcium 8.8 8.5 - 10.1 mg/dL    Bilirubin, total 1.2 (H) 0.2 - 1.0 mg/dL    AST (SGOT) 9 (L) 15 - 37 U/L    ALT (SGPT) 14 12 - 78 U/L    Alk.  phosphatase 107 45 - 117 U/L    Protein, total 6.0 (L) 6.4 - 8.2 g/dL    Albumin 2.5 (L) 3.5 - 5.0 g/dL    Globulin 3.5 2.0 - 4.0 g/dL    A-G Ratio 0.7 (L) 1.1 - 2.2     LIPASE    Collection Time: 10/27/21  9:03 AM   Result Value Ref Range    Lipase 100 73 - 393 U/L   GLUCOSE, POC Collection Time: 10/27/21 11:05 AM   Result Value Ref Range    Glucose (POC) 245 (H) 65 - 117 mg/dL    Performed by Josey Conti    GLUCOSE, POC    Collection Time: 10/27/21  3:52 PM   Result Value Ref Range    Glucose (POC) 153 (H) 65 - 117 mg/dL    Performed by Josey Conti         CT ABD PELV W CONT   Final Result   CHF/pulmonary edema suggested and partially visualized at the lung   bases with dilated cardiomyopathy suggested as described. Probable acute on   chronic pancreatitis and with persistent finding of a significantly dilated   pancreatic duct in the pancreatic tail which is inseparable from the pancreatic   tail cystic lesion. Diverticulosis without diverticulitis. Arteriosclerotic   changes of the aortoiliac vasculature. Degenerative disc disease and lumbar   scoliosis. Other findings as above. XR CHEST PORT   Final Result   Left basilar airspace opacity which could represent atelectasis or   pneumonia.             XR HIPS BI W OR WO AP PELV    (Results Pending)        Assessment:     Active Problems:    Acute pancreatitis (10/22/2021)      Elevated troponin (10/22/2021)    abdominal jessy    Abdominal Acute pancreatitis:  -lipase normal now, triglycerides were normal  -denies etoh usage, can not totally rule  out  autoimmune due to most of lesion at tail of  pancrease   CA 19-9 was normal 31, IG G 4 also  In normal range    The patient has a IPMN, given her advanced age, no biliary study is needed,       Lower fat diet  I will sign off now, outpatient follow-up after 3 to 6 months    Signed By: Ailyn Hernandez MD     October 27, 2021        Thank you for allowing me to participate in this patients care  Cc Referring Physician   Tien Jamison MD

## 2021-10-27 NOTE — PROGRESS NOTES
Progress Note    Patient: Brittney Rojas MRN: [id number]  SSN: [id number]    YOB: 1940  Age: [de-identified] y.o.   Sex: female      Admit Date: 10/22/2021    LOS: 4 days     Subjective:   More alert, no pain  Past Medical History:   Diagnosis Date    Colon polyps     Diabetes ([facility] [address]     Diverticulosis     Hypertension     Ill-defined condition         Current Facility-Administered Medications:     potassium chloride (KLOR-CON) packet for solution 20 mEq, 20 mEq, Oral, BID WITH MEALS, Kamryn Smart MD, 20 mEq at 10/26/21 1729    furosemide (LASIX) injection 40 mg, 40 mg, IntraVENous, BID, Kamryn Smart MD, 40 mg at 10/26/21 2049    oxyCODONE IR (ROXICODONE) tablet 5 mg, 5 mg, Oral, Q6H PRN, Alejandro Gaviria PA-C, 5 mg at 10/26/21 1611    benzocaine-menthoL (CEPACOL) lozenge 1 Lozenge, 1 Lozenge, Mucous Membrane, PRN, Ashli Rodriguez MD, 1 Lozenge at 10/24/21 1239    glucose chewable tablet 16 g, 4 Tablet, Oral, PRN, Alejandro Gaviria PA-C    dextrose (D50W) injection syrg 12.5-25 g, 25-50 mL, IntraVENous, PRN, Latasha Gaviria PA-C    glucagon (GLUCAGEN) injection 1 mg, 1 mg, IntraMUSCular, PRN, Latasha Gaviria PA-C    insulin lispro (HUMALOG) injection, , SubCUTAneous, AC&HS, Latasha Gaviria PA-C, 2 Units at 10/26/21 1631    diphenhydrAMINE (BENADRYL) injection 12.5 mg, 12.5 mg, IntraVENous, Q6H PRN, Alejandro Gaviria PA-C    pantoprazole (PROTONIX) tablet 40 mg, 40 mg, Oral, ACB, Alejandro Gaviria PA-C, 40 mg at 10/26/21 0914    guaiFENesin-dextromethorphan (TUSSI-ORGANIDIN DM)  mg/5 mL oral solution 10 mL, 10 mL, Oral, Q4H PRN, Farzad Hendrix MD, 10 mL at 10/26/21 2101    glipiZIDE (GLUCOTROL) tablet 5 mg, 5 mg, Oral, ACB, Barbra Edmond NP, 5 mg at 10/26/21 0914    enoxaparin (LOVENOX) injection 40 mg, 40 mg, SubCUTAneous, Q24H, Barbra Edmond NP, 40 mg at 10/26/21 0914    glucose chewable tablet 16 g, 4 Tablet, Oral, PRN, Esvin De Souza MD    dextrose (D50W) injection syrg 12.5-25 g, 25-50 mL, IntraVENous, PRN, Esvin De Souza MD    glucagon (GLUCAGEN) injection 1 mg, 1 mg, IntraMUSCular, PRN, Esvin De Souza MD    sodium chloride (NS) flush 5-40 mL, 5-40 mL, IntraVENous, PRN, Esvin De Souza MD, 10 mL at 10/24/21 1602    acetaminophen (TYLENOL) tablet 650 mg, 650 mg, Oral, Q4H PRN, Esvin De Souza MD    [facility]) injection 4 mg, 4 mg, IntraVENous, Q4H PRN, Esvin De Souza MD, 4 mg at 10/26/21 2102    Objective:     Vitals:    10/26/21 0813 10/26/21 0822 10/26/21 1427 10/26/21 1936   BP:   (!) 147/78 127/78   Pulse:  94 (!) 102 (!) 106   Resp:  20 20 19   Temp:  98.7 °F (37.1 °C) 97.9 °F (36.6 °C) 98.2 °F (36.8 °C)   SpO2: 98% 98% 96% 98%   Weight:       Height:            Intake and Output:  Current Shift: No intake/output data recorded. Last three shifts: No intake/output data recorded. Physical Exam:   Physical Exam  Constitutional:       Appearance: She is ill-appearing. HENT:      Mouth/Throat:      Mouth: Mucous membranes are dry. Cardiovascular:      Rate and Rhythm: Normal rate. Pulses: Normal pulses. Pulmonary:      Effort: Pulmonary effort is normal.   Abdominal:      General: Abdomen is flat. Tenderness: There is no abdominal tenderness. Hernia: No hernia is present. Musculoskeletal:      Cervical back: Normal range of motion. Neurological:      General: No focal deficit present.    Psychiatric:         Mood and Affect: Mood normal.          Lab/Data Review:  Recent Results (from the past 24 hour(s))   GLUCOSE, POC    Collection Time: 10/26/21  8:30 AM   Result Value Ref Range    Glucose (POC) 188 (H) 65 - 117 mg/dL    Performed by Kenji Cifuentes    GLUCOSE, POC    Collection Time: 10/26/21 11:17 AM   Result Value Ref Range    Glucose (POC) 186 (H) 65 - 117 mg/dL    Performed by Kenji Cifuentes    CBC WITH AUTOMATED DIFF    Collection Time: 10/26/21 11:50 AM   Result Value Ref Range    WBC 6.6 3.6 - 11.0 K/uL    RBC 4.34 3.80 - 5.20 M/uL    HGB 11.5 11.5 - 16.0 g/dL    HCT 35.6 35.0 - 47.0 %    MCV 82.0 80.0 - 99.0 FL    MCH 26.5 26.0 - 34.0 PG    MCHC 32.3 30.0 - 36.5 g/dL    RDW 14.8 (H) 11.5 - 14.5 %    PLATELET 882 693 - 712 K/uL    MPV 12.7 8.9 - 12.9 FL    NRBC 0.0 0.0  WBC    ABSOLUTE NRBC 0.00 0.00 - 0.01 K/uL    NEUTROPHILS 71 32 - 75 %    LYMPHOCYTES 13 12 - 49 %    MONOCYTES 14 (H) 5 - 13 %    EOSINOPHILS 2 0 - 7 %    BASOPHILS 0 0 - 1 %    IMMATURE GRANULOCYTES 0 0 - 0.5 %    ABS. NEUTROPHILS 4.7 1.8 - 8.0 K/UL    ABS. LYMPHOCYTES 0.9 0.8 - 3.5 K/UL    ABS. MONOCYTES 0.9 0.0 - 1.0 K/UL    ABS. EOSINOPHILS 0.1 0.0 - 0.4 K/UL    ABS. BASOPHILS 0.0 0.0 - 0.1 K/UL    ABS. IMM. GRANS. 0.0 0.00 - 0.04 K/UL    DF AUTOMATED     METABOLIC PANEL, COMPREHENSIVE    Collection Time: 10/26/21 11:50 AM   Result Value Ref Range    Sodium 138 136 - 145 mmol/L    Potassium 3.4 (L) 3.5 - 5.1 mmol/L    Chloride 104 97 - 108 mmol/L    CO2 27 21 - 32 mmol/L    Anion gap 7 5 - 15 mmol/L    Glucose 156 (H) 65 - 100 mg/dL    BUN 13 6 - 20 mg/dL    Creatinine 0.75 0.55 - 1.02 mg/dL    BUN/Creatinine ratio 17 12 - 20      GFR est AA >60 >60 ml/min/1.73m2    GFR est non-AA >60 >60 ml/min/1.73m2    Calcium 8.7 8.5 - 10.1 mg/dL    Bilirubin, total 1.0 0.2 - 1.0 mg/dL    AST (SGOT) 9 (L) 15 - 37 U/L    ALT (SGPT) 16 12 - 78 U/L    Alk.  phosphatase 110 45 - 117 U/L    Protein, total 5.8 (L) 6.4 - 8.2 g/dL    Albumin 2.4 (L) 3.5 - 5.0 g/dL    Globulin 3.4 2.0 - 4.0 g/dL    A-G Ratio 0.7 (L) 1.1 - 2.2     LIPASE    Collection Time: 10/26/21 11:50 AM   Result Value Ref Range    Lipase 116 73 - 519 U/L   METABOLIC PANEL, BASIC    Collection Time: 10/26/21 11:50 AM   Result Value Ref Range    Sodium 138 136 - 145 mmol/L    Potassium 3.4 (L) 3.5 - 5.1 mmol/L    Chloride 104 97 - 108 mmol/L    CO2 27 21 - 32 mmol/L    Anion gap 7 5 - 15 mmol/L    Glucose 157 (H) 65 - 100 mg/dL    BUN 12 6 - 20 mg/dL    Creatinine 0.75 0.55 - 1.02 mg/dL    BUN/Creatinine ratio 16 12 - 20      GFR est AA >60 >60 ml/min/1.73m2    GFR est non-AA >60 >60 ml/min/1.73m2    Calcium 8.6 8.5 - 10.1 mg/dL   GLUCOSE, POC    Collection Time: 10/26/21  4:23 PM   Result Value Ref Range    Glucose (POC) 191 (H) 65 - 117 mg/dL    Performed by Elizabeth Erwin    GLUCOSE, POC    Collection Time: 10/26/21  7:30 PM   Result Value Ref Range    Glucose (POC) 129 (H) 65 - 117 mg/dL    Performed by Jovany Elamdle (CNA)         CT ABD PELV W CONT   Final Result   CHF/pulmonary edema suggested and partially visualized at the lung   bases with dilated cardiomyopathy suggested as described. Probable acute on   chronic pancreatitis and with persistent finding of a significantly dilated   pancreatic duct in the pancreatic tail which is inseparable from the pancreatic   tail cystic lesion. Diverticulosis without diverticulitis. Arteriosclerotic   changes of the aortoiliac vasculature. Degenerative disc disease and lumbar   scoliosis. Other findings as above. XR CHEST PORT   Final Result   Left basilar airspace opacity which could represent atelectasis or   pneumonia. Assessment:     Active Problems:    Acute pancreatitis (10/22/2021)      Elevated troponin (10/22/2021)    abdominal jessy    Abdominal Acute pancreatitis:  -lipase normal now, triglycerides were normal  -denies etoh usage, can not totally rule  out  autoimmune due to most of lesion at tail of  pancrease     Rule out malignancy,may be a IPMN     Will send CA 19-9, IG G 4  pending  Lower fat diet  Need cardiac study for cardiac ischemia, should not wait pancreatic evaluation result.       Will follow to see if MRCP /EUSFNA is needed if CA 19-9 elevated or /with + ig g 4  Plan:       Signed By: Angelica Chin MD     October 26, 2021        Thank you for allowing me to participate in this patients care  Cc Referring Physician   Rea Toribio MD

## 2021-10-27 NOTE — PROGRESS NOTES
Hospitalist Progress Note             Daily Progress Note: 10/27/2021      Subjective:   Subjective   Patient examined alert and oriented lying in bed  No overnight events reported  No acute distress noted on examination    Review of Systems:   Review of Systems   Constitutional: Negative. HENT: Negative. Eyes: Negative. Respiratory: Positive for cough. Cardiovascular: Negative. Gastrointestinal: Negative for abdominal pain and nausea. Genitourinary: Negative. Musculoskeletal: Negative. Skin: Negative. Neurological: Negative. Endo/Heme/Allergies: Negative. Psychiatric/Behavioral: Negative. Objective:   Objective      Vitals:  Patient Vitals for the past 12 hrs:   Temp Pulse Resp BP SpO2   10/27/21 1457 98.2 °F (36.8 °C) 98 18 121/79 99 %   10/27/21 1047     97 %   10/27/21 0742 98 °F (36.7 °C) 87 18 139/84 95 %        Physical Exam:  Physical Exam  Vitals and nursing note reviewed. Constitutional:       Appearance: Normal appearance. HENT:      Mouth/Throat:      Pharynx: Oropharynx is clear. Eyes:      Conjunctiva/sclera: Conjunctivae normal.   Cardiovascular:      Rate and Rhythm: Regular rhythm. Pulses: Normal pulses. Pulmonary:      Effort: Pulmonary effort is normal.      Breath sounds: Normal breath sounds. Abdominal:      General: Bowel sounds are normal.      Palpations: Abdomen is soft. Musculoskeletal:      Cervical back: Normal range of motion. Skin:     General: Skin is warm and dry. Capillary Refill: Capillary refill takes less than 2 seconds. Neurological:      Mental Status: She is alert and oriented to person, place, and time.    Psychiatric:         Mood and Affect: Mood normal.         Behavior: Behavior normal.          Lab Results:  Recent Results (from the past 24 hour(s))   GLUCOSE, POC    Collection Time: 10/26/21  7:30 PM   Result Value Ref Range    Glucose (POC) 129 (H) 65 - 117 mg/dL    Performed by Brandi Pennington (CNA)    GLUCOSE, POC    Collection Time: 10/27/21  7:43 AM   Result Value Ref Range    Glucose (POC) 292 (H) 65 - 117 mg/dL    Performed by Blue Luu    CBC WITH AUTOMATED DIFF    Collection Time: 10/27/21  9:03 AM   Result Value Ref Range    WBC 7.7 3.6 - 11.0 K/uL    RBC 4.49 3.80 - 5.20 M/uL    HGB 11.7 11.5 - 16.0 g/dL    HCT 36.4 35.0 - 47.0 %    MCV 81.1 80.0 - 99.0 FL    MCH 26.1 26.0 - 34.0 PG    MCHC 32.1 30.0 - 36.5 g/dL    RDW 14.5 11.5 - 14.5 %    PLATELET 418 532 - 318 K/uL    MPV 12.8 8.9 - 12.9 FL    NRBC 0.0 0.0  WBC    ABSOLUTE NRBC 0.00 0.00 - 0.01 K/uL    NEUTROPHILS 68 32 - 75 %    LYMPHOCYTES 16 12 - 49 %    MONOCYTES 14 (H) 5 - 13 %    EOSINOPHILS 2 0 - 7 %    BASOPHILS 0 0 - 1 %    IMMATURE GRANULOCYTES 0 0 - 0.5 %    ABS. NEUTROPHILS 5.2 1.8 - 8.0 K/UL    ABS. LYMPHOCYTES 1.3 0.8 - 3.5 K/UL    ABS. MONOCYTES 1.1 (H) 0.0 - 1.0 K/UL    ABS. EOSINOPHILS 0.1 0.0 - 0.4 K/UL    ABS. BASOPHILS 0.0 0.0 - 0.1 K/UL    ABS. IMM. GRANS. 0.0 0.00 - 0.04 K/UL    DF AUTOMATED     METABOLIC PANEL, COMPREHENSIVE    Collection Time: 10/27/21  9:03 AM   Result Value Ref Range    Sodium 135 (L) 136 - 145 mmol/L    Potassium 3.6 3.5 - 5.1 mmol/L    Chloride 98 97 - 108 mmol/L    CO2 29 21 - 32 mmol/L    Anion gap 8 5 - 15 mmol/L    Glucose 351 (H) 65 - 100 mg/dL    BUN 14 6 - 20 mg/dL    Creatinine 1.20 (H) 0.55 - 1.02 mg/dL    BUN/Creatinine ratio 12 12 - 20      GFR est AA 52 (L) >60 ml/min/1.73m2    GFR est non-AA 43 (L) >60 ml/min/1.73m2    Calcium 8.8 8.5 - 10.1 mg/dL    Bilirubin, total 1.2 (H) 0.2 - 1.0 mg/dL    AST (SGOT) 9 (L) 15 - 37 U/L    ALT (SGPT) 14 12 - 78 U/L    Alk.  phosphatase 107 45 - 117 U/L    Protein, total 6.0 (L) 6.4 - 8.2 g/dL    Albumin 2.5 (L) 3.5 - 5.0 g/dL    Globulin 3.5 2.0 - 4.0 g/dL    A-G Ratio 0.7 (L) 1.1 - 2.2     LIPASE    Collection Time: 10/27/21  9:03 AM   Result Value Ref Range    Lipase 100 73 - 393 U/L   GLUCOSE, POC    Collection Time: 10/27/21 11:05 AM Result Value Ref Range    Glucose (POC) 245 (H) 65 - 117 mg/dL    Performed by Aly Ziegler    GLUCOSE, POC    Collection Time: 10/27/21  3:52 PM   Result Value Ref Range    Glucose (POC) 153 (H) 65 - 117 mg/dL    Performed by Aly Ziegler           Diagnostic Images:  CT Results  (Last 48 hours)    None          Current Medications:    Current Facility-Administered Medications:     labetaloL (NORMODYNE;TRANDATE) 20 mg/4 mL (5 mg/mL) injection 10 mg, 10 mg, IntraVENous, Q4H PRN, Valeria Lambert MD    insulin glargine (LANTUS) injection 20 Units, 20 Units, SubCUTAneous, QHS, Alejandro Gaviria PA-C    insulin NPH (NOVOLIN N, HUMULIN N) injection 10 Units, 10 Units, SubCUTAneous, BID, Alejandro Gaviria PA-C    potassium chloride (KLOR-CON) packet for solution 20 mEq, 20 mEq, Oral, BID WITH MEALS, Kamryn Smart MD, 20 mEq at 10/27/21 0905    [Held by provider] furosemide (LASIX) injection 40 mg, 40 mg, IntraVENous, BID, Kamryn Smart MD, 40 mg at 10/27/21 0905    oxyCODONE IR (ROXICODONE) tablet 5 mg, 5 mg, Oral, Q6H PRN, Alejandro Gaviria PA-C, 5 mg at 10/27/21 1509    benzocaine-menthoL (CEPACOL) lozenge 1 Lozenge, 1 Lozenge, Mucous Membrane, PRN, Valeria Lambert MD, 1 Lozenge at 10/24/21 1239    glucose chewable tablet 16 g, 4 Tablet, Oral, PRN, Alejandro Gaviria PA-C    dextrose (D50W) injection syrg 12.5-25 g, 25-50 mL, IntraVENous, PRN, Emma Gaviria PA-C    glucagon (GLUCAGEN) injection 1 mg, 1 mg, IntraMUSCular, PRN, Emma Gaviria PA-C    insulin lispro (HUMALOG) injection, , SubCUTAneous, AC&HS, Emma Gaviria PA-C, 3 Units at 10/27/21 1247    diphenhydrAMINE (BENADRYL) injection 12.5 mg, 12.5 mg, IntraVENous, Q6H PRN, Alejandro Gaviria PA-C, 12.5 mg at 10/27/21 0905    pantoprazole (PROTONIX) tablet 40 mg, 40 mg, Oral, ACB, Alejandro Gaviria PA-C, 40 mg at 10/27/21 0905    guaiFENesin-dextromethorphan (TUSSI-ORGANIDIN DM)  mg/5 mL oral solution 10 mL, 10 mL, Oral, Q4H PRN, Farzad Hendrix Mt, MD, 10 mL at 10/27/21 1509    glipiZIDE (GLUCOTROL) tablet 5 mg, 5 mg, Oral, ACB, Jesus Norwood NP, 5 mg at 10/27/21 0905    enoxaparin (LOVENOX) injection 40 mg, 40 mg, SubCUTAneous, Q24H, Jesus Norwood NP, 40 mg at 10/27/21 2401    glucose chewable tablet 16 g, 4 Tablet, Oral, PRN, Gómez Walter MD    dextrose (D50W) injection syrg 12.5-25 g, 25-50 mL, IntraVENous, PRN, Gómez Walter MD    glucagon (GLUCAGEN) injection 1 mg, 1 mg, IntraMUSCular, PRN, Gómez Walter MD    sodium chloride (NS) flush 5-40 mL, 5-40 mL, IntraVENous, PRN, Gómez Walter MD, 10 mL at 10/24/21 1602    acetaminophen (TYLENOL) tablet 650 mg, 650 mg, Oral, Q4H PRN, Gómez Walter MD    ondansetron [facility]) injection 4 mg, 4 mg, IntraVENous, Q4H PRN, Gómez Walter MD, 4 mg at 10/26/21 2102       ASSESSMENT:  [de-identified] y.o. female with history of diabetes, hypertension and history of diverticulosis/colon polyps presents to the emergency room complaining of abdominal pain around the navel with no radiation. Lipase was greater than 3000 consistent with pancreatitis. Lipase trending down appropriately. CT scan of the abdomen pelvis revealed pulmonary edema consistent with congestive heart failure as well as a dilated pancreatic duct in the pancreatic tail with a pancreatic tail cystic lesion. Troponin was elevated at greater than 500 and cardiac consultation, Dr. Neil Vergara. Echocardiogram with ejection fracture of 20 to 25% and moderate tricuspid valve regurgitation. Right-sided pressures 41 mm per mercury. CA 19-9 pending. Awaiting ischemic work-up    1. Acute pancreatitis:  -lipase 3000-->2342, normal  -Gentle IV hydration  -prn pain management  -GI consulted  Triglycerides normal  CA 19-9 31 and normal    2.  Elevated troponin:  -troponin were 599 and has trended down to 540.  -suspect demand ischemia  -Echo back in May showed an EF of 40 to 45% with global hypokinesis, grade 1 diastolic dysfunction  Cardiology following,   Echo EF of 20-25%  Denies chest pain    3. Benign essential hypertension:   -On verapamil and Prinivil at home    4. Type 2 DM:   Trying Humalog sliding scale with Benadryl secondary to the itching  start glipizide 5mg daily    Full Code  Dvt Prophylaxis Lovenox  GI Prophylaxis protonix  Disposition:  · GI evaluation  · Diet toleration when initiated      Above treatment plan reviewed and discussed with patient in detail at bedside, all questions answered. Care Plan discussed with: Interdisciplinary team    Total time spent with patient: >35 minutes.     Aubrie Siddiqui PA-C

## 2021-10-27 NOTE — PROGRESS NOTES
Problem: Pressure Injury - Risk of  Goal: *Prevention of pressure injury  Description: Document Jon Scale and appropriate interventions in the flowsheet. Outcome: Progressing Towards Goal  Note: Pressure Injury Interventions:  Sensory Interventions: Keep linens dry and wrinkle-free, Minimize linen layers    Moisture Interventions: Absorbent underpads, Apply protective barrier, creams and emollients    Activity Interventions: Increase time out of bed, PT/OT evaluation    Mobility Interventions: HOB 30 degrees or less, PT/OT evaluation, Turn and reposition approx. every two hours(pillow and wedges)    Nutrition Interventions: Document food/fluid/supplement intake    Friction and Shear Interventions: Minimize layers      Problem: Patient Education: Go to Patient Education Activity  Goal: Patient/Family Education  Outcome: Progressing Towards Goal     Problem: Falls - Risk of  Goal: *Absence of Falls  Description: Document Sourav Fall Risk and appropriate interventions in the flowsheet.   Outcome: Progressing Towards Goal  Note: Fall Risk Interventions:  Mobility Interventions: Bed/chair exit alarm, PT Consult for mobility concerns      Medication Interventions: Assess postural VS orthostatic hypotension, Bed/chair exit alarm, Patient to call before getting OOB, Teach patient to arise slowly       Problem: Patient Education: Go to Patient Education Activity  Goal: Patient/Family Education  Outcome: Progressing Towards Goal     Problem: Pain  Goal: *Control of Pain  Outcome: Progressing Towards Goal  Note:   Assess pain characteristics (eg: Intensity scale; onset; location; quality; severity; duration; frequency; radiation)  Assess pain management - barriers (eg: Past pain experiences)  Identify pain expectations (eg: Patient's pain goal; somatic experiences; behavioral changes; affect)  Identify pain medication concerns (eg: Cultural considerations; addiction concerns)  Goal: *PALLIATIVE CARE:  Alleviation of Pain  Outcome: Progressing Towards Goal     Problem: Patient Education: Go to Patient Education Activity  Goal: Patient/Family Education  Outcome: Progressing Towards Goal

## 2021-10-27 NOTE — PROGRESS NOTES
Patient's /68. Patient ambulating with pain. Rechecked BP within an hour and it came down to 163/76 with no intervention. Covering provider, Dr. Antoinette Marcelo notified. PRN medication added for SBP > 180. Will continue to monitor.     Moise Pickard RN

## 2021-10-27 NOTE — PROGRESS NOTES
Progress Note    Patient: Jerson Cabral MRN: [id number]  SSN: [id number]    YOB: 1940  Age: [de-identified] y.o. Sex: female      Admit Date: 10/22/2021    LOS: 5 days     Subjective:     No acute events overnight    Objective:     Vitals:    10/26/21 2351 10/27/21 0058 10/27/21 0142 10/27/21 0742   BP: (!) 184/68 (!) 163/76  139/84   Pulse: 64 94 84 87   Resp: 21 18   Temp: 99.1 °F (37.3 °C)   98 °F (36.7 °C)   SpO2: 98%   95%   Weight:       Height:            Intake and Output:  Current Shift: No intake/output data recorded. Last three shifts: No intake/output data recorded. Physical Exam:   General:  Alert, cooperative, no distress, appears stated age. Eyes:  Conjunctivae/corneas clear. PERRL, EOMs intact. Fundi benign   Ears:  Normal TMs and external ear canals both ears. Nose: Nares normal. Septum midline. Mucosa normal. No drainage or sinus tenderness. Mouth/Throat: Lips, mucosa, and tongue normal. Teeth and gums normal.   Neck: Supple, symmetrical, trachea midline, no adenopathy, thyroid: no enlargment/tenderness/nodules, no carotid bruit and no JVD. Back:   Symmetric, no curvature. ROM normal. No CVA tenderness. Lungs:   Clear to auscultation bilaterally. Heart:  Regular rate and rhythm, S1, S2 normal, no murmur, click, rub or gallop. Abdomen:   Soft, non-tender. Bowel sounds normal. No masses,  No organomegaly. Extremities: Extremities normal, atraumatic, no cyanosis or edema. Pulses: 2+ and symmetric all extremities. Skin: Skin color, texture, turgor normal. No rashes or lesions   Lymph nodes: Cervical, supraclavicular, and axillary nodes normal.   Neurologic: CNII-XII intact. Normal strength, sensation and reflexes throughout. Lab/Data Review: All lab results for the last 24 hours reviewed.          Assessment:     Active Problems:    Acute pancreatitis (10/22/2021)      Elevated troponin (10/22/2021)    Patient is an 52-year-old -American female with acute pancreatitis and type II non-STEMI:  1.  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  Right occipital lobe encephalomalacia  3.  Peripheral vascular disease, left ICA 50 to 69%, right ICA less than 50% stenosis  4.  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  Hypertension with hypertensive heart disease  6.  Mixed hyperlipidemia  7.    Diabetes mellitus  8.  Heart failure with reduced ejection fraction, compensated  9.  Mild to moderate tricuspid regurgitation  10.  Mild mitral regurgitation  11.  Negative bubble study  12.  Colonic polyps, tubular adenoma  1 13.  Recent history of GI bleed  14.  Right-sided weakness       Plan:     Patient has been losing weight. She complains of some epigastric discomfort. We will rule out any pancreatic cancer activity first prior to deciding on further ischemic evaluation. The ischemia evaluation will include cardiac catheterization but we will await the CA 19.9 results. She denied having any chest pain. Kidney function has got worse. We will recheck BMP again in a.m. Hold off any more diuretic.   Signed By: Christin Coburn MD     October 27, 2021

## 2021-10-28 LAB
ALBUMIN SERPL-MCNC: 2.2 G/DL (ref 3.5–5)
ALBUMIN/GLOB SERPL: 0.5 {RATIO} (ref 1.1–2.2)
ALP SERPL-CCNC: 96 U/L (ref 45–117)
ALT SERPL-CCNC: 13 U/L (ref 12–78)
ANION GAP SERPL CALC-SCNC: 8 MMOL/L (ref 5–15)
AST SERPL W P-5'-P-CCNC: 9 U/L (ref 15–37)
ATRIAL RATE: 90 BPM
BASOPHILS # BLD: 0 K/UL (ref 0–0.1)
BASOPHILS NFR BLD: 0 % (ref 0–1)
BILIRUB SERPL-MCNC: 0.8 MG/DL (ref 0.2–1)
BUN SERPL-MCNC: 20 MG/DL (ref 6–20)
BUN/CREAT SERPL: 21 (ref 12–20)
CA-I BLD-MCNC: 9 MG/DL (ref 8.5–10.1)
CALCULATED P AXIS, ECG09: 37 DEGREES
CALCULATED R AXIS, ECG10: -14 DEGREES
CALCULATED T AXIS, ECG11: -78 DEGREES
CHLORIDE SERPL-SCNC: 98 MMOL/L (ref 97–108)
CO2 SERPL-SCNC: 27 MMOL/L (ref 21–32)
CREAT SERPL-MCNC: 0.94 MG/DL (ref 0.55–1.02)
DIAGNOSIS, 93000: NORMAL
DIFFERENTIAL METHOD BLD: ABNORMAL
EOSINOPHIL # BLD: 0.1 K/UL (ref 0–0.4)
EOSINOPHIL NFR BLD: 2 % (ref 0–7)
ERYTHROCYTE [DISTWIDTH] IN BLOOD BY AUTOMATED COUNT: 14.6 % (ref 11.5–14.5)
GLOBULIN SER CALC-MCNC: 4.1 G/DL (ref 2–4)
GLUCOSE BLD STRIP.AUTO-MCNC: 120 MG/DL (ref 65–117)
GLUCOSE BLD STRIP.AUTO-MCNC: 173 MG/DL (ref 65–117)
GLUCOSE BLD STRIP.AUTO-MCNC: 185 MG/DL (ref 65–117)
GLUCOSE BLD STRIP.AUTO-MCNC: 187 MG/DL (ref 65–117)
GLUCOSE SERPL-MCNC: 204 MG/DL (ref 65–100)
HCT VFR BLD AUTO: 34 % (ref 35–47)
HGB BLD-MCNC: 11.2 G/DL (ref 11.5–16)
IMM GRANULOCYTES # BLD AUTO: 0 K/UL (ref 0–0.04)
IMM GRANULOCYTES NFR BLD AUTO: 0 % (ref 0–0.5)
LIPASE SERPL-CCNC: 91 U/L (ref 73–393)
LYMPHOCYTES # BLD: 1.1 K/UL (ref 0.8–3.5)
LYMPHOCYTES NFR BLD: 18 % (ref 12–49)
MCH RBC QN AUTO: 26.2 PG (ref 26–34)
MCHC RBC AUTO-ENTMCNC: 32.9 G/DL (ref 30–36.5)
MCV RBC AUTO: 79.6 FL (ref 80–99)
MONOCYTES # BLD: 1 K/UL (ref 0–1)
MONOCYTES NFR BLD: 17 % (ref 5–13)
NEUTS SEG # BLD: 3.8 K/UL (ref 1.8–8)
NEUTS SEG NFR BLD: 63 % (ref 32–75)
NRBC # BLD: 0 K/UL (ref 0–0.01)
NRBC BLD-RTO: 0 PER 100 WBC
P-R INTERVAL, ECG05: 136 MS
PERFORMED BY, TECHID: ABNORMAL
PLATELET # BLD AUTO: 194 K/UL (ref 150–400)
PMV BLD AUTO: 11.7 FL (ref 8.9–12.9)
POTASSIUM SERPL-SCNC: 3.5 MMOL/L (ref 3.5–5.1)
PROT SERPL-MCNC: 6.3 G/DL (ref 6.4–8.2)
Q-T INTERVAL, ECG07: 420 MS
QRS DURATION, ECG06: 86 MS
QTC CALCULATION (BEZET), ECG08: 513 MS
RBC # BLD AUTO: 4.27 M/UL (ref 3.8–5.2)
SODIUM SERPL-SCNC: 133 MMOL/L (ref 136–145)
VENTRICULAR RATE, ECG03: 90 BPM
WBC # BLD AUTO: 6.1 K/UL (ref 3.6–11)

## 2021-10-28 PROCEDURE — C1769 GUIDE WIRE: HCPCS | Performed by: INTERNAL MEDICINE

## 2021-10-28 PROCEDURE — 77030025703 HC SYR ANGI VACLOK MRTM -A: Performed by: INTERNAL MEDICINE

## 2021-10-28 PROCEDURE — 77030013516 HC DEV INFL ANGI MRTM -B: Performed by: INTERNAL MEDICINE

## 2021-10-28 PROCEDURE — 74011250636 HC RX REV CODE- 250/636: Performed by: INTERNAL MEDICINE

## 2021-10-28 PROCEDURE — 94760 N-INVAS EAR/PLS OXIMETRY 1: CPT

## 2021-10-28 PROCEDURE — 77030008542 HC TBNG MON PRSS EDWD -A: Performed by: INTERNAL MEDICINE

## 2021-10-28 PROCEDURE — 77030003394 HC NDL ART COOK -A: Performed by: INTERNAL MEDICINE

## 2021-10-28 PROCEDURE — 36415 COLL VENOUS BLD VENIPUNCTURE: CPT

## 2021-10-28 PROCEDURE — 93453 R&L HRT CATH W/VENTRICLGRPHY: CPT | Performed by: INTERNAL MEDICINE

## 2021-10-28 PROCEDURE — 74011000258 HC RX REV CODE- 258: Performed by: INTERNAL MEDICINE

## 2021-10-28 PROCEDURE — 65270000029 HC RM PRIVATE

## 2021-10-28 PROCEDURE — 93005 ELECTROCARDIOGRAM TRACING: CPT

## 2021-10-28 PROCEDURE — C1874 STENT, COATED/COV W/DEL SYS: HCPCS | Performed by: INTERNAL MEDICINE

## 2021-10-28 PROCEDURE — C1725 CATH, TRANSLUMIN NON-LASER: HCPCS | Performed by: INTERNAL MEDICINE

## 2021-10-28 PROCEDURE — 77030041700 HC CATH BLLN WDG PRES TELE -B: Performed by: INTERNAL MEDICINE

## 2021-10-28 PROCEDURE — 74011250636 HC RX REV CODE- 250/636: Performed by: NURSE PRACTITIONER

## 2021-10-28 PROCEDURE — B2111ZZ FLUOROSCOPY OF MULTIPLE CORONARY ARTERIES USING LOW OSMOLAR CONTRAST: ICD-10-PCS | Performed by: INTERNAL MEDICINE

## 2021-10-28 PROCEDURE — 76210000005 HC OR PH I REC 5 TO 5.5 HR: Performed by: INTERNAL MEDICINE

## 2021-10-28 PROCEDURE — 80053 COMPREHEN METABOLIC PANEL: CPT

## 2021-10-28 PROCEDURE — 74011000636 HC RX REV CODE- 636: Performed by: INTERNAL MEDICINE

## 2021-10-28 PROCEDURE — 74011250637 HC RX REV CODE- 250/637: Performed by: NURSE PRACTITIONER

## 2021-10-28 PROCEDURE — 82962 GLUCOSE BLOOD TEST: CPT

## 2021-10-28 PROCEDURE — 83690 ASSAY OF LIPASE: CPT

## 2021-10-28 PROCEDURE — 77030019698 HC SYR ANGI MDLON MRTM -A: Performed by: INTERNAL MEDICINE

## 2021-10-28 PROCEDURE — 99152 MOD SED SAME PHYS/QHP 5/>YRS: CPT | Performed by: INTERNAL MEDICINE

## 2021-10-28 PROCEDURE — 4A023N8 MEASUREMENT OF CARDIAC SAMPLING AND PRESSURE, BILATERAL, PERCUTANEOUS APPROACH: ICD-10-PCS | Performed by: INTERNAL MEDICINE

## 2021-10-28 PROCEDURE — 74011636637 HC RX REV CODE- 636/637: Performed by: PHYSICIAN ASSISTANT

## 2021-10-28 PROCEDURE — 74011250637 HC RX REV CODE- 250/637: Performed by: INTERNAL MEDICINE

## 2021-10-28 PROCEDURE — 85025 COMPLETE CBC W/AUTO DIFF WBC: CPT

## 2021-10-28 PROCEDURE — 74011000250 HC RX REV CODE- 250: Performed by: INTERNAL MEDICINE

## 2021-10-28 PROCEDURE — 74011250637 HC RX REV CODE- 250/637: Performed by: PHYSICIAN ASSISTANT

## 2021-10-28 PROCEDURE — C1887 CATHETER, GUIDING: HCPCS | Performed by: INTERNAL MEDICINE

## 2021-10-28 PROCEDURE — C1894 INTRO/SHEATH, NON-LASER: HCPCS | Performed by: INTERNAL MEDICINE

## 2021-10-28 PROCEDURE — 77030016700 HC CATH ANGI DX INFN2 CARD -B: Performed by: INTERNAL MEDICINE

## 2021-10-28 PROCEDURE — 92928 PRQ TCAT PLMT NTRAC ST 1 LES: CPT | Performed by: INTERNAL MEDICINE

## 2021-10-28 PROCEDURE — 027035Z DILATION OF CORONARY ARTERY, ONE ARTERY WITH TWO DRUG-ELUTING INTRALUMINAL DEVICES, PERCUTANEOUS APPROACH: ICD-10-PCS | Performed by: INTERNAL MEDICINE

## 2021-10-28 PROCEDURE — 77030041029 HC DEV TORQ GDWIRE MRTM -A: Performed by: INTERNAL MEDICINE

## 2021-10-28 PROCEDURE — 99153 MOD SED SAME PHYS/QHP EA: CPT | Performed by: INTERNAL MEDICINE

## 2021-10-28 DEVICE — IMPLANTABLE DEVICE: Type: IMPLANTABLE DEVICE | Status: FUNCTIONAL

## 2021-10-28 RX ORDER — CLOPIDOGREL 300 MG/1
TABLET, FILM COATED ORAL AS NEEDED
Status: DISCONTINUED | OUTPATIENT
Start: 2021-10-28 | End: 2021-10-28 | Stop reason: HOSPADM

## 2021-10-28 RX ORDER — GUAIFENESIN 100 MG/5ML
LIQUID (ML) ORAL AS NEEDED
Status: DISCONTINUED | OUTPATIENT
Start: 2021-10-28 | End: 2021-10-28 | Stop reason: HOSPADM

## 2021-10-28 RX ORDER — GUAIFENESIN 100 MG/5ML
81 LIQUID (ML) ORAL DAILY
Status: DISCONTINUED | OUTPATIENT
Start: 2021-10-29 | End: 2021-10-30 | Stop reason: HOSPADM

## 2021-10-28 RX ORDER — SODIUM CHLORIDE 0.9 % (FLUSH) 0.9 %
5-40 SYRINGE (ML) INJECTION AS NEEDED
Status: DISCONTINUED | OUTPATIENT
Start: 2021-10-28 | End: 2021-10-30 | Stop reason: HOSPADM

## 2021-10-28 RX ORDER — HEPARIN SODIUM 1000 [USP'U]/ML
INJECTION, SOLUTION INTRAVENOUS; SUBCUTANEOUS AS NEEDED
Status: DISCONTINUED | OUTPATIENT
Start: 2021-10-28 | End: 2021-10-28 | Stop reason: HOSPADM

## 2021-10-28 RX ORDER — LIDOCAINE HYDROCHLORIDE 10 MG/ML
INJECTION INFILTRATION; PERINEURAL AS NEEDED
Status: DISCONTINUED | OUTPATIENT
Start: 2021-10-28 | End: 2021-10-28 | Stop reason: HOSPADM

## 2021-10-28 RX ORDER — SODIUM CHLORIDE 0.9 % (FLUSH) 0.9 %
5-40 SYRINGE (ML) INJECTION EVERY 8 HOURS
Status: DISCONTINUED | OUTPATIENT
Start: 2021-10-28 | End: 2021-10-30 | Stop reason: HOSPADM

## 2021-10-28 RX ORDER — CLOPIDOGREL BISULFATE 75 MG/1
75 TABLET ORAL DAILY
Status: DISCONTINUED | OUTPATIENT
Start: 2021-10-29 | End: 2021-10-30 | Stop reason: HOSPADM

## 2021-10-28 RX ORDER — FENTANYL CITRATE 50 UG/ML
INJECTION, SOLUTION INTRAMUSCULAR; INTRAVENOUS AS NEEDED
Status: DISCONTINUED | OUTPATIENT
Start: 2021-10-28 | End: 2021-10-28 | Stop reason: HOSPADM

## 2021-10-28 RX ADMIN — ENOXAPARIN SODIUM 40 MG: 40 INJECTION SUBCUTANEOUS at 08:52

## 2021-10-28 RX ADMIN — GUAIFENESIN DEXTROMETHORPHAN HYDROBROMIDE ORAL SOLUTION 10 ML: 10; 100 SOLUTION ORAL at 14:22

## 2021-10-28 RX ADMIN — OXYCODONE 5 MG: 5 TABLET ORAL at 14:22

## 2021-10-28 RX ADMIN — INSULIN HUMAN 10 UNITS: 100 INJECTION, SUSPENSION SUBCUTANEOUS at 08:53

## 2021-10-28 RX ADMIN — GLIPIZIDE 5 MG: 5 TABLET ORAL at 08:52

## 2021-10-28 RX ADMIN — INSULIN LISPRO 2 UNITS: 100 INJECTION, SOLUTION INTRAVENOUS; SUBCUTANEOUS at 08:52

## 2021-10-28 RX ADMIN — OXYCODONE 5 MG: 5 TABLET ORAL at 22:47

## 2021-10-28 RX ADMIN — INSULIN LISPRO 2 UNITS: 100 INJECTION, SOLUTION INTRAVENOUS; SUBCUTANEOUS at 17:23

## 2021-10-28 RX ADMIN — PANTOPRAZOLE SODIUM 40 MG: 40 TABLET, DELAYED RELEASE ORAL at 08:51

## 2021-10-28 RX ADMIN — Medication 10 ML: at 17:24

## 2021-10-28 RX ADMIN — Medication 10 ML: at 22:48

## 2021-10-28 RX ADMIN — OXYCODONE 5 MG: 5 TABLET ORAL at 08:54

## 2021-10-28 NOTE — PROGRESS NOTES
CM reviewed chart and spoke to primary physician. Patient has been cleared by GI, but is having a cardiac catheterization today. CM will continue to follow patient for any possible discharge needs.

## 2021-10-28 NOTE — PROGRESS NOTES
Spiritual Care Assessment/Progress Note  [facility]      NAME: Glenna Silveira      MRN: [id number]  AGE: [de-identified] y.o. SEX: female  Buddhism Affiliation: Madelinh witness   Language: English     10/28/2021     Total Time (in minutes): 10     Spiritual Assessment begun in [address] [facility] through conversation with:         [x]Patient        [] Family    [] Friend(s)        Reason for Consult: Initial visit     Spiritual beliefs: (Please include comment if needed)     [] Identifies with a hernandez tradition:         [] Supported by a hernandez community:            [] Claims no spiritual orientation:           [] Seeking spiritual identity:                [] Adheres to an individual form of spirituality:           [x] Not able to assess:                           Identified resources for coping:      [] Prayer                               [] Music                  [] Guided Imagery     [] Family/friends                 [] Pet visits     [] Devotional reading                         [x] Unknown     [] Other:                                              Interventions offered during this visit: (See comments for more details)    Patient Interventions: Initial visit           Plan of Care:     [] Support spiritual and/or cultural needs    [] Support AMD and/or advance care planning process      [] Support grieving process   [] Coordinate Rites and/or Rituals    [] Coordination with community clergy   [] No spiritual needs identified at this time   [] Detailed Plan of Care below (See Comments)  [] Make referral to Music Therapy  [] Make referral to Pet Therapy     [] Make referral to Addiction services  [] Make referral to [facility]  [] Make referral to Spiritual Care Partner  [] No future visits requested        [x] Follow up upon further referrals     Comments:  Visited patient in the [address] for initial assessment.   Patient seemed to be in another unit and was present during the visit. No visitors were present. Chaplains will follow up if further referrals are requested. Chaplain Brady Renteria M.Div.    can be reached by calling the  at [facility]  [phone]

## 2021-10-28 NOTE — PROGRESS NOTES
Progress Note    Patient: Glenna Silveira MRN: [id number]  SSN: [id number]    YOB: 1940  Age: [de-identified] y.o. Sex: female      Admit Date: 10/22/2021    LOS: 6 days     Subjective:     No acute events overnight    Objective:     Vitals:    10/27/21 1047 10/27/21 1457 10/27/21 1949 10/27/21 2337   BP:  121/79 111/69 (!) 143/84   Pulse:  98 (!) 104 85   Resp:  18 18 20   Temp:  98.2 °F (36.8 °C) 98.3 °F (36.8 °C) 98.2 °F (36.8 °C)   SpO2: 97% 99% 99% 98%   Weight:       Height:            Intake and Output:  Current Shift: No intake/output data recorded. Last three shifts: No intake/output data recorded. Physical Exam:   General:  Alert, cooperative, no distress, appears stated age. Eyes:  Conjunctivae/corneas clear. PERRL, EOMs intact. Fundi benign   Ears:  Normal TMs and external ear canals both ears. Nose: Nares normal. Septum midline. Mucosa normal. No drainage or sinus tenderness. Mouth/Throat: Lips, mucosa, and tongue normal. Teeth and gums normal.   Neck: Supple, symmetrical, trachea midline, no adenopathy, thyroid: no enlargment/tenderness/nodules, no carotid bruit and no JVD. Back:   Symmetric, no curvature. ROM normal. No CVA tenderness. Lungs:   Clear to auscultation bilaterally. Heart:  Regular rate and rhythm, S1, S2 normal, no murmur, click, rub or gallop. Abdomen:   Soft, non-tender. Bowel sounds normal. No masses,  No organomegaly. Extremities: Extremities normal, atraumatic, no cyanosis or edema. Pulses: 2+ and symmetric all extremities. Skin: Skin color, texture, turgor normal. No rashes or lesions   Lymph nodes: Cervical, supraclavicular, and axillary nodes normal.   Neurologic: CNII-XII intact. Normal strength, sensation and reflexes throughout. Lab/Data Review: All lab results for the last 24 hours reviewed.          Assessment:     Active Problems:    Acute pancreatitis (10/22/2021)      Elevated troponin (10/22/2021)    Patient is an [de-identified]year-old -American female with acute pancreatitis and type II non-STEMI:  1.  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  Right occipital lobe encephalomalacia  3.  Peripheral vascular disease, left ICA 50 to 69%, right ICA less than 50% stenosis  4.  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  Hypertension with hypertensive heart disease  6.  Mixed hyperlipidemia  7.    Diabetes mellitus  8.  Heart failure with reduced ejection fraction, compensated  9.  Mild to moderate tricuspid regurgitation  10.  Mild mitral regurgitation  11.  Negative bubble study  12.  Colonic polyps, tubular adenoma  1 13.  Recent history of GI bleed  14.  Right-sided weakness       Plan:     Patient's IgG4 was negative and CA 19.9 is negative. Patient with recent drop in ejection fraction. We will repeat BMP and if her kidney function is stable then we will proceed with cardiac catheterization. I have explained to the patient indication, alternative, risk benefits and complications of left heart catheterization coronary angiogram and possible PCI and she verbalized understanding and agreed to proceed with the procedure. Addendum:  I have discussed with Dr. Mac Queen her son regarding the cardiac catheterization and he verbalized understanding and agreed to proceed with the procedure.   Signed By: Yvonne Hobbs MD     October 28, 2021

## 2021-10-28 NOTE — PROGRESS NOTES
Hospitalist Progress Note               Daily Progress Note: 10/28/2021      Subjective:   Hospital course to date: [de-identified] y.o. female with history of diabetes, hypertension and history of diverticulosis/colon polyps presents to the emergency room complaining of abdominal pain around the navel with no radiation. Lipase was greater than 3000 consistent with pancreatitis. Lipase trending down appropriately. CT scan of the abdomen pelvis revealed pulmonary edema consistent with congestive heart failure as well as a dilated pancreatic duct in the pancreatic tail with a pancreatic tail cystic lesion. Troponin was elevated at greater than 500 and cardiac consultation, Dr. Serafin Meier. Echocardiogram with ejection fracture of 20 to 25% and moderate tricuspid valve regurgitation. Right-sided pressures 41 mm    -----    Patient is seen today for follow-up. Creatinine bumped up to 1.2 yesterday, was 0.7 prior. Cardiology considering cardiac catheterization. CA 199 is negative. Complains of left hip pain. No abdominal pain.   X-ray of the hips obtained last night, no report yet    Problem List:  Problem List as of 10/28/2021 Date Reviewed: 10/22/2021        Codes Class Noted - Resolved    Acute pancreatitis ICD-10-CM: K85.90  ICD-9-CM: 577.0  10/22/2021 - Present        Elevated troponin ICD-10-CM: R77.8  ICD-9-CM: 790.6  10/22/2021 - Present        Vasculitis ([address] ICD-10-CM: I77.6  ICD-9-CM: 447.6  7/8/2021 - Present        Pancreatic mass ICD-10-CM: K86.89  ICD-9-CM: 577.8  7/8/2021 - Present        Epigastric pain ICD-10-CM: R10.13  ICD-9-CM: 789.06  7/4/2021 - Present        CVA (cerebral vascular accident) [facility]) ICD-10-CM: I63.9  ICD-9-CM: 434.91  5/6/2021 - Present        Right sided weakness ICD-10-CM: R53.1  ICD-9-CM: 728.87  5/4/2021 - Present        GI bleed ICD-10-CM: K92.2  ICD-9-CM: 578.9  4/28/2021 - Present              Medications reviewed  Current Facility-Administered Medications Medication Dose Route Frequency    labetaloL (NORMODYNE;TRANDATE) 20 mg/4 mL (5 mg/mL) injection 10 mg  10 mg IntraVENous Q4H PRN    insulin glargine (LANTUS) injection 20 Units  20 Units SubCUTAneous QHS    insulin NPH (NOVOLIN N, HUMULIN N) injection 10 Units  10 Units SubCUTAneous BID    [Held by provider] furosemide (LASIX) injection 40 mg  40 mg IntraVENous BID    oxyCODONE IR (ROXICODONE) tablet 5 mg  5 mg Oral Q6H PRN    benzocaine-menthoL (CEPACOL) lozenge 1 Lozenge  1 Lozenge Mucous Membrane PRN    glucose chewable tablet 16 g  4 Tablet Oral PRN    dextrose (D50W) injection syrg 12.5-25 g  25-50 mL IntraVENous PRN    glucagon (GLUCAGEN) injection 1 mg  1 mg IntraMUSCular PRN    insulin lispro (HUMALOG) injection   SubCUTAneous AC&HS    diphenhydrAMINE (BENADRYL) injection 12.5 mg  12.5 mg IntraVENous Q6H PRN    pantoprazole (PROTONIX) tablet 40 mg  40 mg Oral ACB    guaiFENesin-dextromethorphan (TUSSI-ORGANIDIN DM)  mg/5 mL oral solution 10 mL  10 mL Oral Q4H PRN    glipiZIDE (GLUCOTROL) tablet 5 mg  5 mg Oral ACB    enoxaparin (LOVENOX) injection 40 mg  40 mg SubCUTAneous Q24H    glucose chewable tablet 16 g  4 Tablet Oral PRN    dextrose (D50W) injection syrg 12.5-25 g  25-50 mL IntraVENous PRN    glucagon (GLUCAGEN) injection 1 mg  1 mg IntraMUSCular PRN    sodium chloride (NS) flush 5-40 mL  5-40 mL IntraVENous PRN    acetaminophen (TYLENOL) tablet 650 mg  650 mg Oral Q4H PRN    ondansetron (ZOFRAN) injection 4 mg  4 mg IntraVENous Q4H PRN       Review of Systems:   A comprehensive review of systems was negative except for that written in the HPI. Objective:   Physical Exam:     Visit Vitals  BP (!) 143/84   Pulse 85   Temp 98.2 °F (36.8 °C)   Resp 20   Ht 5' 5\" (1.651 m)   Wt 63.5 kg (140 lb)   SpO2 98%   BMI 23.30 kg/m²      O2 Device: None (Room air)    Temp (24hrs), Av.2 °F (36.8 °C), Min:98.2 °F (36.8 °C), Max:98.3 °F (36.8 °C)    No intake/output data recorded. No intake/output data recorded. General:   Awake and alert   Lungs:   Clear to auscultation bilaterally. Chest wall:  No tenderness or deformity. Heart:  Regular rate and rhythm, S1, S2 normal, no murmur, click, rub or gallop. Abdomen:   Soft, non-tender. Bowel sounds normal. No masses,  No organomegaly. Extremities: Extremities normal, atraumatic, no cyanosis or edema. Pulses: 2+ and symmetric all extremities. Skin: Skin color, texture, turgor normal. No rashes or lesions   Neurologic: CNII-XII intact. No gross focal deficits         Data Review:       Recent Days:  Recent Labs     10/27/21  0903 10/26/21  1150 10/25/21  0848   WBC 7.7 6.6 8.7   HGB 11.7 11.5 10.8*   HCT 36.4 35.6 33.6*    159 143*     Recent Labs     10/27/21  0903 10/26/21  1150 10/25/21  0848   * 138  138 141   K 3.6 3.4*  3.4* 3.6   CL 98 104  104 108   CO2 29 27  27 23   * 156*  157* 123*   BUN 14 13  12 12   CREA 1.20* 0.75  0.75 0.76   CA 8.8 8.7  8.6 8.6   ALB 2.5* 2.4* 2.4*   TBILI 1.2* 1.0 1.2*   ALT 14 16 17     No results for input(s): PH, PCO2, PO2, HCO3, FIO2 in the last 72 hours. 24 Hour Results:  Recent Results (from the past 24 hour(s))   CBC WITH AUTOMATED DIFF    Collection Time: 10/27/21  9:03 AM   Result Value Ref Range    WBC 7.7 3.6 - 11.0 K/uL    RBC 4.49 3.80 - 5.20 M/uL    HGB 11.7 11.5 - 16.0 g/dL    HCT 36.4 35.0 - 47.0 %    MCV 81.1 80.0 - 99.0 FL    MCH 26.1 26.0 - 34.0 PG    MCHC 32.1 30.0 - 36.5 g/dL    RDW 14.5 11.5 - 14.5 %    PLATELET 906 462 - 858 K/uL    MPV 12.8 8.9 - 12.9 FL    NRBC 0.0 0.0  WBC    ABSOLUTE NRBC 0.00 0.00 - 0.01 K/uL    NEUTROPHILS 68 32 - 75 %    LYMPHOCYTES 16 12 - 49 %    MONOCYTES 14 (H) 5 - 13 %    EOSINOPHILS 2 0 - 7 %    BASOPHILS 0 0 - 1 %    IMMATURE GRANULOCYTES 0 0 - 0.5 %    ABS. NEUTROPHILS 5.2 1.8 - 8.0 K/UL    ABS. LYMPHOCYTES 1.3 0.8 - 3.5 K/UL    ABS. MONOCYTES 1.1 (H) 0.0 - 1.0 K/UL    ABS.  EOSINOPHILS 0.1 0.0 - 0.4 K/UL ABS. BASOPHILS 0.0 0.0 - 0.1 K/UL    ABS. IMM. GRANS. 0.0 0.00 - 0.04 K/UL    DF AUTOMATED     METABOLIC PANEL, COMPREHENSIVE    Collection Time: 10/27/21  9:03 AM   Result Value Ref Range    Sodium 135 (L) 136 - 145 mmol/L    Potassium 3.6 3.5 - 5.1 mmol/L    Chloride 98 97 - 108 mmol/L    CO2 29 21 - 32 mmol/L    Anion gap 8 5 - 15 mmol/L    Glucose 351 (H) 65 - 100 mg/dL    BUN 14 6 - 20 mg/dL    Creatinine 1.20 (H) 0.55 - 1.02 mg/dL    BUN/Creatinine ratio 12 12 - 20      GFR est AA 52 (L) >60 ml/min/1.73m2    GFR est non-AA 43 (L) >60 ml/min/1.73m2    Calcium 8.8 8.5 - 10.1 mg/dL    Bilirubin, total 1.2 (H) 0.2 - 1.0 mg/dL    AST (SGOT) 9 (L) 15 - 37 U/L    ALT (SGPT) 14 12 - 78 U/L    Alk. phosphatase 107 45 - 117 U/L    Protein, total 6.0 (L) 6.4 - 8.2 g/dL    Albumin 2.5 (L) 3.5 - 5.0 g/dL    Globulin 3.5 2.0 - 4.0 g/dL    A-G Ratio 0.7 (L) 1.1 - 2.2     LIPASE    Collection Time: 10/27/21  9:03 AM   Result Value Ref Range    Lipase 100 73 - 393 U/L   GLUCOSE, POC    Collection Time: 10/27/21 11:05 AM   Result Value Ref Range    Glucose (POC) 245 (H) 65 - 117 mg/dL    Performed by Conner Vann    GLUCOSE, POC    Collection Time: 10/27/21  3:52 PM   Result Value Ref Range    Glucose (POC) 153 (H) 65 - 117 mg/dL    Performed by Conner Vann    GLUCOSE, POC    Collection Time: 10/27/21  7:47 PM   Result Value Ref Range    Glucose (POC) 173 (H) 65 - 117 mg/dL    Performed by Felix Hager (LPN)    GLUCOSE, POC    Collection Time: 10/28/21  7:41 AM   Result Value Ref Range    Glucose (POC) 185 (H) 65 - 117 mg/dL    Performed by Ayleen No        CT ABD PELV W CONT   Final Result   CHF/pulmonary edema suggested and partially visualized at the lung   bases with dilated cardiomyopathy suggested as described. Probable acute on   chronic pancreatitis and with persistent finding of a significantly dilated   pancreatic duct in the pancreatic tail which is inseparable from the pancreatic   tail cystic lesion. Diverticulosis without diverticulitis. Arteriosclerotic   changes of the aortoiliac vasculature. Degenerative disc disease and lumbar   scoliosis. Other findings as above. XR CHEST PORT   Final Result   Left basilar airspace opacity which could represent atelectasis or   pneumonia. XR HIP LT W OR WO PELV 2-3 VWS    (Results Pending)   XR HIP RT W OR WO PELV 2-3 VWS    (Results Pending)        Assessment:  Acute pancreatitis, appears to be idiopathic. Lipase has normalized   -Suspected intraductal papillary mucinous neoplasm    Acute non-STEMI, type II    Acute on chronic systolic heart failure, EF 20%. Previous EF in May was 40%   -Now compensated    History of multiple strokes    Acute kidney injury (creat 0.75-->1.20)    Diabetes mellitus type 2. Blood sugar this morning 185    Peripheral vascular disease with mild right carotid stenosis and moderate left carotid stenosis    Bilateral renal artery stenosis    Hyperlipidemia      Plan:  Cardiology planning cardiac catheterization if renal function is stable  Increase activity    Care Plan discussed with: Patient/Family    Disposition: Continued inpatient care, pending cardiac catheterization    Total time spent with patient: 30 minutes.     Ros Chanel MD

## 2021-10-29 ENCOUNTER — APPOINTMENT (OUTPATIENT)
Dept: GENERAL RADIOLOGY | Age: 81
DRG: 246 | End: 2021-10-29
Attending: INTERNAL MEDICINE
Payer: MEDICARE

## 2021-10-29 LAB
ALBUMIN SERPL-MCNC: 2.2 G/DL (ref 3.5–5)
ALBUMIN/GLOB SERPL: 0.7 {RATIO} (ref 1.1–2.2)
ALP SERPL-CCNC: 92 U/L (ref 45–117)
ALT SERPL-CCNC: 12 U/L (ref 12–78)
ANION GAP SERPL CALC-SCNC: 13 MMOL/L (ref 5–15)
AST SERPL W P-5'-P-CCNC: 21 U/L (ref 15–37)
ATRIAL RATE: 141 BPM
ATRIAL RATE: 82 BPM
BASOPHILS # BLD: 0 K/UL (ref 0–0.1)
BASOPHILS NFR BLD: 0 % (ref 0–1)
BILIRUB SERPL-MCNC: 0.7 MG/DL (ref 0.2–1)
BUN SERPL-MCNC: 16 MG/DL (ref 6–20)
BUN/CREAT SERPL: 24 (ref 12–20)
CA-I BLD-MCNC: 8.7 MG/DL (ref 8.5–10.1)
CALCULATED P AXIS, ECG09: 39 DEGREES
CALCULATED R AXIS, ECG10: -13 DEGREES
CALCULATED R AXIS, ECG10: -2 DEGREES
CALCULATED T AXIS, ECG11: -152 DEGREES
CALCULATED T AXIS, ECG11: -54 DEGREES
CHLORIDE SERPL-SCNC: 98 MMOL/L (ref 97–108)
CO2 SERPL-SCNC: 23 MMOL/L (ref 21–32)
CREAT SERPL-MCNC: 0.66 MG/DL (ref 0.55–1.02)
DIAGNOSIS, 93000: NORMAL
DIAGNOSIS, 93000: NORMAL
DIFFERENTIAL METHOD BLD: ABNORMAL
EOSINOPHIL # BLD: 0.1 K/UL (ref 0–0.4)
EOSINOPHIL NFR BLD: 2 % (ref 0–7)
ERYTHROCYTE [DISTWIDTH] IN BLOOD BY AUTOMATED COUNT: 14.5 % (ref 11.5–14.5)
GLOBULIN SER CALC-MCNC: 3.3 G/DL (ref 2–4)
GLUCOSE BLD STRIP.AUTO-MCNC: 145 MG/DL (ref 65–117)
GLUCOSE BLD STRIP.AUTO-MCNC: 164 MG/DL (ref 65–117)
GLUCOSE BLD STRIP.AUTO-MCNC: 187 MG/DL (ref 65–117)
GLUCOSE BLD STRIP.AUTO-MCNC: 210 MG/DL (ref 65–117)
GLUCOSE SERPL-MCNC: 130 MG/DL (ref 65–100)
HCT VFR BLD AUTO: 34 % (ref 35–47)
HGB BLD-MCNC: 11 G/DL (ref 11.5–16)
IMM GRANULOCYTES # BLD AUTO: 0 K/UL (ref 0–0.04)
IMM GRANULOCYTES NFR BLD AUTO: 0 % (ref 0–0.5)
LIPASE SERPL-CCNC: 56 U/L (ref 73–393)
LYMPHOCYTES # BLD: 0.9 K/UL (ref 0.8–3.5)
LYMPHOCYTES NFR BLD: 16 % (ref 12–49)
MCH RBC QN AUTO: 26.1 PG (ref 26–34)
MCHC RBC AUTO-ENTMCNC: 32.4 G/DL (ref 30–36.5)
MCV RBC AUTO: 80.6 FL (ref 80–99)
MONOCYTES # BLD: 1 K/UL (ref 0–1)
MONOCYTES NFR BLD: 18 % (ref 5–13)
NEUTS SEG # BLD: 3.4 K/UL (ref 1.8–8)
NEUTS SEG NFR BLD: 64 % (ref 32–75)
NRBC # BLD: 0 K/UL (ref 0–0.01)
NRBC BLD-RTO: 0 PER 100 WBC
P-R INTERVAL, ECG05: 136 MS
PERFORMED BY, TECHID: ABNORMAL
PLATELET # BLD AUTO: 184 K/UL (ref 150–400)
PMV BLD AUTO: 13 FL (ref 8.9–12.9)
POTASSIUM SERPL-SCNC: 3.7 MMOL/L (ref 3.5–5.1)
PROT SERPL-MCNC: 5.5 G/DL (ref 6.4–8.2)
Q-T INTERVAL, ECG07: 330 MS
Q-T INTERVAL, ECG07: 428 MS
QRS DURATION, ECG06: 90 MS
QRS DURATION, ECG06: 92 MS
QTC CALCULATION (BEZET), ECG08: 487 MS
QTC CALCULATION (BEZET), ECG08: 500 MS
RBC # BLD AUTO: 4.22 M/UL (ref 3.8–5.2)
SODIUM SERPL-SCNC: 134 MMOL/L (ref 136–145)
VENTRICULAR RATE, ECG03: 131 BPM
VENTRICULAR RATE, ECG03: 82 BPM
WBC # BLD AUTO: 5.4 K/UL (ref 3.6–11)

## 2021-10-29 PROCEDURE — 74011250637 HC RX REV CODE- 250/637: Performed by: NURSE PRACTITIONER

## 2021-10-29 PROCEDURE — 74011250636 HC RX REV CODE- 250/636: Performed by: HOSPITALIST

## 2021-10-29 PROCEDURE — 85025 COMPLETE CBC W/AUTO DIFF WBC: CPT

## 2021-10-29 PROCEDURE — 93010 ELECTROCARDIOGRAM REPORT: CPT | Performed by: INTERNAL MEDICINE

## 2021-10-29 PROCEDURE — 93005 ELECTROCARDIOGRAM TRACING: CPT

## 2021-10-29 PROCEDURE — 74011636637 HC RX REV CODE- 636/637: Performed by: PHYSICIAN ASSISTANT

## 2021-10-29 PROCEDURE — 83690 ASSAY OF LIPASE: CPT

## 2021-10-29 PROCEDURE — 97161 PT EVAL LOW COMPLEX 20 MIN: CPT

## 2021-10-29 PROCEDURE — 71045 X-RAY EXAM CHEST 1 VIEW: CPT

## 2021-10-29 PROCEDURE — 36415 COLL VENOUS BLD VENIPUNCTURE: CPT

## 2021-10-29 PROCEDURE — 80053 COMPREHEN METABOLIC PANEL: CPT

## 2021-10-29 PROCEDURE — 97530 THERAPEUTIC ACTIVITIES: CPT

## 2021-10-29 PROCEDURE — 65270000029 HC RM PRIVATE

## 2021-10-29 PROCEDURE — 82962 GLUCOSE BLOOD TEST: CPT

## 2021-10-29 PROCEDURE — 74011250637 HC RX REV CODE- 250/637: Performed by: INTERNAL MEDICINE

## 2021-10-29 PROCEDURE — 74011250637 HC RX REV CODE- 250/637: Performed by: PHYSICIAN ASSISTANT

## 2021-10-29 PROCEDURE — 74011000250 HC RX REV CODE- 250: Performed by: INTERNAL MEDICINE

## 2021-10-29 PROCEDURE — 74011250636 HC RX REV CODE- 250/636: Performed by: NURSE PRACTITIONER

## 2021-10-29 RX ORDER — GUAIFENESIN 100 MG/5ML
81 LIQUID (ML) ORAL DAILY
Qty: 30 TABLET | Refills: 0 | Status: SHIPPED
Start: 2021-10-30 | End: 2021-11-24

## 2021-10-29 RX ORDER — GLIPIZIDE 5 MG/1
5 TABLET ORAL
Qty: 30 TABLET | Refills: 0 | Status: ON HOLD | OUTPATIENT
Start: 2021-10-30 | End: 2022-03-22 | Stop reason: SDUPTHER

## 2021-10-29 RX ORDER — PRAVASTATIN SODIUM 20 MG/1
10 TABLET ORAL
Status: DISCONTINUED | OUTPATIENT
Start: 2021-10-29 | End: 2021-10-30 | Stop reason: HOSPADM

## 2021-10-29 RX ORDER — FUROSEMIDE 40 MG/1
TABLET ORAL
Qty: 30 TABLET | Refills: 0 | Status: SHIPPED | OUTPATIENT
Start: 2021-10-30 | End: 2021-10-30

## 2021-10-29 RX ORDER — CARVEDILOL 3.12 MG/1
3.12 TABLET ORAL 2 TIMES DAILY WITH MEALS
Status: DISCONTINUED | OUTPATIENT
Start: 2021-10-29 | End: 2021-10-30 | Stop reason: HOSPADM

## 2021-10-29 RX ORDER — CLOPIDOGREL BISULFATE 75 MG/1
75 TABLET ORAL DAILY
Qty: 30 TABLET | Refills: 0 | Status: ON HOLD | OUTPATIENT
Start: 2021-10-30

## 2021-10-29 RX ORDER — DILTIAZEM HYDROCHLORIDE 5 MG/ML
10 INJECTION INTRAVENOUS ONCE
Status: COMPLETED | OUTPATIENT
Start: 2021-10-29 | End: 2021-10-29

## 2021-10-29 RX ORDER — DEXTROMETHORPHAN POLISTIREX 30 MG/5ML
30 SUSPENSION ORAL EVERY 12 HOURS
Status: DISCONTINUED | OUTPATIENT
Start: 2021-10-29 | End: 2021-10-30 | Stop reason: HOSPADM

## 2021-10-29 RX ORDER — DILTIAZEM HCL/D5W 125 MG/125
5 PLASTIC BAG, INJECTION (ML) INTRAVENOUS CONTINUOUS
Status: DISCONTINUED | OUTPATIENT
Start: 2021-10-29 | End: 2021-10-30 | Stop reason: HOSPADM

## 2021-10-29 RX ORDER — FUROSEMIDE 40 MG/1
20 TABLET ORAL DAILY
Status: DISCONTINUED | OUTPATIENT
Start: 2021-10-29 | End: 2021-10-30 | Stop reason: HOSPADM

## 2021-10-29 RX ORDER — CARVEDILOL 3.12 MG/1
3.12 TABLET ORAL 2 TIMES DAILY WITH MEALS
Qty: 60 TABLET | Refills: 0 | Status: ON HOLD | OUTPATIENT
Start: 2021-10-29

## 2021-10-29 RX ORDER — BISACODYL 5 MG
10 TABLET, DELAYED RELEASE (ENTERIC COATED) ORAL DAILY PRN
Status: DISCONTINUED | OUTPATIENT
Start: 2021-10-29 | End: 2021-10-30 | Stop reason: HOSPADM

## 2021-10-29 RX ADMIN — DEXTROMETHORPHAN 30 MG: 30 SUSPENSION, EXTENDED RELEASE ORAL at 11:40

## 2021-10-29 RX ADMIN — DEXTROMETHORPHAN 30 MG: 30 SUSPENSION, EXTENDED RELEASE ORAL at 23:30

## 2021-10-29 RX ADMIN — OXYCODONE 5 MG: 5 TABLET ORAL at 09:37

## 2021-10-29 RX ADMIN — Medication 5 MG/HR: at 11:52

## 2021-10-29 RX ADMIN — PRAVASTATIN SODIUM 10 MG: 20 TABLET ORAL at 22:35

## 2021-10-29 RX ADMIN — LABETALOL HYDROCHLORIDE 10 MG: 5 INJECTION, SOLUTION INTRAVENOUS at 03:02

## 2021-10-29 RX ADMIN — DILTIAZEM HYDROCHLORIDE 10 MG: 5 INJECTION INTRAVENOUS at 11:40

## 2021-10-29 RX ADMIN — FUROSEMIDE 20 MG: 40 TABLET ORAL at 09:36

## 2021-10-29 RX ADMIN — ONDANSETRON 4 MG: 2 INJECTION INTRAMUSCULAR; INTRAVENOUS at 14:19

## 2021-10-29 RX ADMIN — OXYCODONE 5 MG: 5 TABLET ORAL at 18:44

## 2021-10-29 RX ADMIN — Medication 10 ML: at 06:12

## 2021-10-29 RX ADMIN — GLIPIZIDE 5 MG: 5 TABLET ORAL at 09:25

## 2021-10-29 RX ADMIN — PANTOPRAZOLE SODIUM 40 MG: 40 TABLET, DELAYED RELEASE ORAL at 09:26

## 2021-10-29 RX ADMIN — INSULIN HUMAN 10 UNITS: 100 INJECTION, SUSPENSION SUBCUTANEOUS at 23:30

## 2021-10-29 RX ADMIN — CARVEDILOL 3.12 MG: 3.12 TABLET, FILM COATED ORAL at 17:41

## 2021-10-29 RX ADMIN — OXYCODONE 5 MG: 5 TABLET ORAL at 03:43

## 2021-10-29 RX ADMIN — ASPIRIN 81 MG CHEWABLE TABLET 81 MG: 81 TABLET CHEWABLE at 09:26

## 2021-10-29 RX ADMIN — ENOXAPARIN SODIUM 40 MG: 40 INJECTION SUBCUTANEOUS at 09:26

## 2021-10-29 RX ADMIN — Medication 10 ML: at 14:32

## 2021-10-29 RX ADMIN — INSULIN LISPRO 2 UNITS: 100 INJECTION, SOLUTION INTRAVENOUS; SUBCUTANEOUS at 11:40

## 2021-10-29 RX ADMIN — Medication 10 ML: at 22:36

## 2021-10-29 RX ADMIN — CLOPIDOGREL BISULFATE 75 MG: 75 TABLET ORAL at 09:26

## 2021-10-29 RX ADMIN — CARVEDILOL 3.12 MG: 3.12 TABLET, FILM COATED ORAL at 09:36

## 2021-10-29 RX ADMIN — BISACODYL 10 MG: 5 TABLET, COATED ORAL at 14:15

## 2021-10-29 RX ADMIN — ONDANSETRON 4 MG: 2 INJECTION INTRAMUSCULAR; INTRAVENOUS at 07:29

## 2021-10-29 RX ADMIN — INSULIN GLARGINE 20 UNITS: 100 INJECTION, SOLUTION SUBCUTANEOUS at 23:30

## 2021-10-29 RX ADMIN — INSULIN LISPRO 3 UNITS: 100 INJECTION, SOLUTION INTRAVENOUS; SUBCUTANEOUS at 17:41

## 2021-10-29 NOTE — PROGRESS NOTES
Progress Note    Patient: Janessa Glover MRN: [id number]  SSN: [id number]    YOB: 1940  Age: [de-identified] y.o. Sex: female      Admit Date: 10/22/2021    LOS: 7 days     Subjective:     Patient complains of left hip pain. She denied having any chest pain. She had shortness of breath for a little while last night but this has resolved. She is laying flat. She is feeling nauseated this morning. Objective:     Vitals:    10/29/21 0058 10/29/21 0301 10/29/21 0438 10/29/21 0728   BP: (!) 172/102 (!) 175/92 (!) 144/91 (!) 142/88   Pulse: 98 95 93 89   Resp: 20 20 20 20   Temp: 98.1 °F (36.7 °C) 98.1 °F (36.7 °C) 98.6 °F (37 °C) 97.9 °F (36.6 °C)   SpO2: 98% 97% 97% 99%   Weight:       Height:            Intake and Output:  Current Shift: No intake/output data recorded. Last three shifts: No intake/output data recorded. Physical Exam:   General:  Alert, cooperative, no distress, appears stated age. Eyes:  Conjunctivae/corneas clear. PERRL, EOMs intact. Fundi benign   Ears:  Normal TMs and external ear canals both ears. Nose: Nares normal. Septum midline. Mucosa normal. No drainage or sinus tenderness. Mouth/Throat: Lips, mucosa, and tongue normal. Teeth and gums normal.   Neck: Supple, symmetrical, trachea midline, no adenopathy, thyroid: no enlargment/tenderness/nodules, no carotid bruit and no JVD. Back:   Symmetric, no curvature. ROM normal. No CVA tenderness. Lungs:   Clear to auscultation bilaterally. Heart:  Regular rate and rhythm, S1, S2 normal, no murmur, click, rub or gallop. Abdomen:   Soft, non-tender. Bowel sounds normal. No masses,  No organomegaly. Extremities: Extremities normal, atraumatic, no cyanosis or edema. Pulses: 2+ and symmetric all extremities. Skin: Skin color, texture, turgor normal. No rashes or lesions   Lymph nodes: Cervical, supraclavicular, and axillary nodes normal.   Neurologic: CNII-XII intact.  Normal strength, sensation and reflexes throughout. Lab/Data Review: All lab results for the last 24 hours reviewed. Assessment:     Active Problems:    Acute pancreatitis (10/22/2021)      Elevated troponin (10/22/2021)    Patient is an 41-year-old -American female with acute pancreatitis and type II non-STEMI:  1.  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  Right occipital lobe encephalomalacia  3.  Peripheral vascular disease, left ICA 50 to 69%, right ICA less than 50% stenosis  4.  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  Hypertension with hypertensive heart disease  6.  Mixed hyperlipidemia  7.    Diabetes mellitus  8.  Heart failure with reduced ejection fraction, compensated  9.  Mild to moderate tricuspid regurgitation  10.  Mild mitral regurgitation  11.  Negative bubble study  12.  Colonic polyps, tubular adenoma  1 13.  Recent history of GI bleed  14.  Right-sided weakness       Plan:     She underwent cardiac catheterization yesterday with PCI of mid RCA. She received 2 stents. Size 3.0 x 34 overlapped with a 2.75 x 12 mm drug-eluting stent postdilated with a 3.0 noncompliant balloon up to 16 tuan. Currently on aspirin, Plavix that we will continue. Blood pressure is elevated. We will add carvedilol. Her kidney function is pending this morning. We will add low-dose statin. With her weight loss hesitant to start a high-dose high intensity statin therapy. Blood pressure is probably driven by pain and discomfort in the hip. She had an x-ray that was unremarkable of the left and right hips.   Signed By: Justo Lopez MD     October 29, 2021

## 2021-10-29 NOTE — PROGRESS NOTES
CM initially planned for discharge today until patient converted to afib. PT/OT need to see patient but unable to work with patient until HR stabilizes. Patient discharge disposition. Patient lives at home alone, but open to the idea of Merged with [facility] if needed.

## 2021-10-29 NOTE — PROGRESS NOTES
PT consult received and acknowledged. PT eval attempted , however , per nsg , pt.not appropriate for PT eval at this time sec to  being in A-fib . PT will continue to monitor and reattempt for PT eval when medically appropriate .  Thanks

## 2021-10-29 NOTE — PROGRESS NOTES
Comprehensive Nutrition Assessment    Type and Reason for Visit: Initial (Poor po)    Nutrition Recommendations/Plan:   Continue current diet  Nursing to monitor BM/ need for laxatives, %PO and ONS intake    Nutrition Assessment:  Admitted 2/2 acute pancreatitis. Current appetite reported as improving. Per pt bites -1/4 PO cardiac diet. Nutrition intervention: RD to add ensure x1/d and additional snacks to increase PO. Pt reports can not eat much at one time therefore RD rec SFM. Labs: . [facility] 11/34. A1C 8.4. Na 134. Alb 2.2. Gluc poc 145. TP 5.5. Lipase 56. Meds reviewed. Malnutrition Assessment:  Malnutrition Status:  Mild malnutrition    Context:  Acute illness     Findings of the 6 clinical characteristics of malnutrition:   Energy Intake:  7 - 50% or less of est energy requirements for 5 or more days  Weight Loss:  No significant weight loss     Body Fat Loss:  No significant body fat loss,     Muscle Mass Loss:  No significant muscle mass loss,    Fluid Accumulation:  No significant fluid accumulation,        Nutrition Related Findings:  No NFPE performed, appears nourished. Pt reports some nausea and has alterting RN and taking meds PRN. Reports no BM for x1/wk. Denies constipation and reports no BM as she has had minimal PO intake. Denies C/S issues. No edema doc. Wounds:    None       Current Nutrition Therapies:  ADULT ORAL NUTRITION SUPPLEMENT PM Snack; Low Calorie/High Protein  ADULT ORAL NUTRITION SUPPLEMENT HS Snack; Snack; Applesauce and peanut butter crackers  ADULT DIET Regular; Low Fat/Low Chol/High Fiber/KYA; Likes: oatmeal, cornflakes    Anthropometric Measures:  · Height:  5' 5\" (165.1 cm)  · Current Body Wt:  63.5 kg (140 lb)   · Admission Body Wt:  140 lb    · Usual Body Wt:  63.5 kg (140 lb)     · Ideal Body Wt:  125 lbs:  112 %   · BMI Category:  Normal weight (BMI 18.5-24. 9)     Wt Readings from Last 3 Encounters:   10/22/21 63.5 kg (140 lb)   07/08/21 65.3 kg (143 lb 15.4 oz) 05/05/21 67.5 kg (148 lb 13 oz)   ·     Nutrition Diagnosis:   · Inadequate oral intake related to  (suboptimal oral intake related to nausea from pancreatitis) as evidenced by intake 0-25%      Nutrition Interventions:   Food and/or Nutrient Delivery: Continue current diet  Nutrition Education and Counseling: No recommendations at this time  Coordination of Nutrition Care: Continue to monitor while inpatient    Nutrition Monitoring and Evaluation:   Behavioral-Environmental Outcomes: None identified  Food/Nutrient Intake Outcomes: Food and nutrient intake  Physical Signs/Symptoms Outcomes: Biochemical data, Weight, Nausea/vomiting, Constipation    Discharge Planning:    Continue current diet     Electronically signed by Sunshine Allen RD on 10/29/2021 at 12:02 PM  Contact: Ext [phone]

## 2021-10-29 NOTE — PROGRESS NOTES
OT eval order received and acknowledged. After speaking with PT, per RN pt not appropriate for OT/PT evals at this time 2' to being in A-Fib. Will continue to follow pt and attempt OT eval at a later time as medically appropriate. Thank you.

## 2021-10-29 NOTE — PROGRESS NOTES
Hospitalist Progress Note               Daily Progress Note: 10/29/2021      Subjective:   Hospital course to date: [de-identified] y.o. female with history of diabetes, hypertension and history of diverticulosis/colon polyps presents to the emergency room complaining of abdominal pain around the navel with no radiation. Lipase was greater than 3000 consistent with pancreatitis. Lipase trending down appropriately. CT scan of the abdomen pelvis revealed pulmonary edema consistent with congestive heart failure as well as a dilated pancreatic duct in the pancreatic tail with a pancreatic tail cystic lesion. Troponin was elevated at greater than 500 and cardiac consultation, Dr. Dl Parmar. Echocardiogram with ejection fracture of 20 to 25% and moderate tricuspid valve regurgitation. Right-sided pressures 41 mm    -----    Patient is seen today for follow-up. She underwent PCI to the right coronary artery yesterday. Today creatinine remained stable at 0.6. She still complains of pain over the left lateral hip, appears to be trochanteric bursitis. She is worried about going home by herself and being able to get around    About an hour after I saw the patient I was notified that she had gone into rapid atrial fibrillation with a heart rate of 141. She had a stable blood pressure and denied any chest pain.   She has no prior history of atrial fibrillation    She was given diltiazem 10 mg IV and started on a drip at 5 mg/h with improvement in her heart rate    We will notify her primary cardiologist    Problem List:  Problem List as of 10/29/2021 Date Reviewed: 10/22/2021        Codes Class Noted - Resolved    Acute pancreatitis ICD-10-CM: K85.90  ICD-9-CM: 577.0  10/22/2021 - Present        Elevated troponin ICD-10-CM: R77.8  ICD-9-CM: 790.6  10/22/2021 - Present        Vasculitis ([facility] [address] ICD-10-CM: I77.6  ICD-9-CM: 447.6  7/8/2021 - Present        Pancreatic mass ICD-10-CM: F87.42  ICD-9-CM: 577.8  7/8/2021 - Present        Epigastric pain ICD-10-CM: R10.13  ICD-9-CM: 789.06  7/4/2021 - Present        CVA (cerebral vascular accident) [facility]) ICD-10-CM: I63.9  ICD-9-CM: 434.91  5/6/2021 - Present        Right sided weakness ICD-10-CM: R53.1  ICD-9-CM: 728.87  5/4/2021 - Present        GI bleed ICD-10-CM: K92.2  ICD-9-CM: 578.9  4/28/2021 - Present              Medications reviewed  Current Facility-Administered Medications   Medication Dose Route Frequency    pravastatin (PRAVACHOL) tablet 10 mg  10 mg Oral QHS    carvediloL (COREG) tablet 3.125 mg  3.125 mg Oral BID WITH MEALS    furosemide (LASIX) tablet 20 mg  20 mg Oral DAILY    dextromethorphan (DELSYM) 30 mg/5 mL syrup 30 mg  30 mg Oral Q12H    sodium chloride (NS) flush 5-40 mL  5-40 mL IntraVENous Q8H    sodium chloride (NS) flush 5-40 mL  5-40 mL IntraVENous PRN    aspirin chewable tablet 81 mg  81 mg Oral DAILY    clopidogreL (PLAVIX) tablet 75 mg  75 mg Oral DAILY    labetaloL (NORMODYNE;TRANDATE) 20 mg/4 mL (5 mg/mL) injection 10 mg  10 mg IntraVENous Q4H PRN    insulin glargine (LANTUS) injection 20 Units  20 Units SubCUTAneous QHS    insulin NPH (NOVOLIN N, HUMULIN N) injection 10 Units  10 Units SubCUTAneous BID    [Held by provider] furosemide (LASIX) injection 40 mg  40 mg IntraVENous BID    oxyCODONE IR (ROXICODONE) tablet 5 mg  5 mg Oral Q6H PRN    benzocaine-menthoL (CEPACOL) lozenge 1 Lozenge  1 Lozenge Mucous Membrane PRN    glucose chewable tablet 16 g  4 Tablet Oral PRN    dextrose (D50W) injection syrg 12.5-25 g  25-50 mL IntraVENous PRN    glucagon (GLUCAGEN) injection 1 mg  1 mg IntraMUSCular PRN    insulin lispro (HUMALOG) injection   SubCUTAneous AC&HS    diphenhydrAMINE (BENADRYL) injection 12.5 mg  12.5 mg IntraVENous Q6H PRN    pantoprazole (PROTONIX) tablet 40 mg  40 mg Oral ACB    guaiFENesin-dextromethorphan (TUSSI-ORGANIDIN DM)  mg/5 mL oral solution 10 mL  10 mL Oral Q4H PRN    glipiZIDE (GLUCOTROL) tablet 5 mg  5 mg Oral ACB    enoxaparin (LOVENOX) injection 40 mg  40 mg SubCUTAneous Q24H    glucose chewable tablet 16 g  4 Tablet Oral PRN    dextrose (D50W) injection syrg 12.5-25 g  25-50 mL IntraVENous PRN    glucagon (GLUCAGEN) injection 1 mg  1 mg IntraMUSCular PRN    sodium chloride (NS) flush 5-40 mL  5-40 mL IntraVENous PRN    acetaminophen (TYLENOL) tablet 650 mg  650 mg Oral Q4H PRN    ondansetron (ZOFRAN) injection 4 mg  4 mg IntraVENous Q4H PRN       Review of Systems:   A comprehensive review of systems was negative except for that written in the HPI. Objective:   Physical Exam:     Visit Vitals  BP (!) 142/88 (BP 1 Location: Right upper arm, BP Patient Position: At rest)   Pulse 89   Temp 97.9 °F (36.6 °C)   Resp 20   Ht 5' 5\" (1.651 m)   Wt 63.5 kg (140 lb)   SpO2 99%   BMI 23.30 kg/m²      O2 Device: None (Room air)    Temp (24hrs), Av.4 °F (36.9 °C), Min:97.9 °F (36.6 °C), Max:98.7 °F (37.1 °C)    No intake/output data recorded. No intake/output data recorded. General:   Awake and alert   Lungs:   Clear to auscultation bilaterally. Chest wall:  No tenderness or deformity. Heart:   Irregular and tachycardic, S1, S2 normal, no murmur, click, rub or gallop. Abdomen:   Soft, non-tender. Bowel sounds normal. No masses,  No organomegaly. Extremities: Extremities normal, atraumatic, no cyanosis or edema. There is tenderness over the left trochanteric bursa   Pulses: 2+ and symmetric all extremities. Skin: Skin color, texture, turgor normal. No rashes or lesions   Neurologic: CNII-XII intact.   No gross focal deficits         Data Review:       Recent Days:  Recent Labs     10/29/21  0652 10/28/21  0950 10/27/21  0903   WBC 5.4 6.1 7.7   HGB 11.0* 11.2* 11.7   HCT 34.0* 34.0* 36.4    194 184     Recent Labs     10/29/21  0652 10/28/21  0950 10/27/21  0903   * 133* 135*   K 3.7 3.5 3.6   CL 98 98 98   CO2 23 27 29   * 204* 351*   BUN 16 20 14   CREA 0.66 0.94 1.20* CA 8.7 9.0 8.8   ALB 2.2* 2.2* 2.5*   TBILI 0.7 0.8 1.2*   ALT 12 13 14     No results for input(s): PH, PCO2, PO2, HCO3, FIO2 in the last 72 hours.     24 Hour Results:  Recent Results (from the past 24 hour(s))   EKG, 12 LEAD, INITIAL    Collection Time: 10/28/21 12:50 PM   Result Value Ref Range    Ventricular Rate 90 BPM    Atrial Rate 90 BPM    P-R Interval 136 ms    QRS Duration 86 ms    Q-T Interval 420 ms    QTC Calculation (Bezet) 513 ms    Calculated P Axis 37 degrees    Calculated R Axis -14 degrees    Calculated T Axis -78 degrees    Diagnosis       Sinus rhythm with occasional Premature ventricular complexes  Possible Left atrial enlargement  Left ventricular hypertrophy  ST & T wave abnormality, consider inferior ischemia  ST & T wave abnormality, consider anterolateral ischemia  Prolonged QT  Abnormal ECG    Confirmed by Gautam Rojas MD, [facility] ([id number]) on 10/28/2021 6:36:27 PM     GLUCOSE, POC    Collection Time: 10/28/21  1:05 PM   Result Value Ref Range    Glucose (POC) 173 (H) 65 - 117 mg/dL    Performed by Carolin Hardy    GLUCOSE, POC    Collection Time: 10/28/21  4:38 PM   Result Value Ref Range    Glucose (POC) 187 (H) 65 - 117 mg/dL    Performed by Carolin Hardy    GLUCOSE, POC    Collection Time: 10/28/21  9:33 PM   Result Value Ref Range    Glucose (POC) 120 (H) 65 - 117 mg/dL    Performed by Tony Alvarado    CBC WITH AUTOMATED DIFF    Collection Time: 10/29/21  6:52 AM   Result Value Ref Range    WBC 5.4 3.6 - 11.0 K/uL    RBC 4.22 3.80 - 5.20 M/uL    HGB 11.0 (L) 11.5 - 16.0 g/dL    HCT 34.0 (L) 35.0 - 47.0 %    MCV 80.6 80.0 - 99.0 FL    MCH 26.1 26.0 - 34.0 PG    MCHC 32.4 30.0 - 36.5 g/dL    RDW 14.5 11.5 - 14.5 %    PLATELET 967 526 - 402 K/uL    MPV 13.0 (H) 8.9 - 12.9 FL    NRBC 0.0 0.0  WBC    ABSOLUTE NRBC 0.00 0.00 - 0.01 K/uL    NEUTROPHILS 64 32 - 75 %    LYMPHOCYTES 16 12 - 49 %    MONOCYTES 18 (H) 5 - 13 %    EOSINOPHILS 2 0 - 7 %    BASOPHILS 0 0 - 1 %    IMMATURE GRANULOCYTES 0 0 - 0.5 %    ABS. NEUTROPHILS 3.4 1.8 - 8.0 K/UL    ABS. LYMPHOCYTES 0.9 0.8 - 3.5 K/UL    ABS. MONOCYTES 1.0 0.0 - 1.0 K/UL    ABS. EOSINOPHILS 0.1 0.0 - 0.4 K/UL    ABS. BASOPHILS 0.0 0.0 - 0.1 K/UL    ABS. IMM. GRANS. 0.0 0.00 - 0.04 K/UL    DF AUTOMATED     METABOLIC PANEL, COMPREHENSIVE    Collection Time: 10/29/21  6:52 AM   Result Value Ref Range    Sodium 134 (L) 136 - 145 mmol/L    Potassium 3.7 3.5 - 5.1 mmol/L    Chloride 98 97 - 108 mmol/L    CO2 23 21 - 32 mmol/L    Anion gap 13 5 - 15 mmol/L    Glucose 130 (H) 65 - 100 mg/dL    BUN 16 6 - 20 mg/dL    Creatinine 0.66 0.55 - 1.02 mg/dL    BUN/Creatinine ratio 24 (H) 12 - 20      GFR est AA >60 >60 ml/min/1.73m2    GFR est non-AA >60 >60 ml/min/1.73m2    Calcium 8.7 8.5 - 10.1 mg/dL    Bilirubin, total 0.7 0.2 - 1.0 mg/dL    AST (SGOT) 21 15 - 37 U/L    ALT (SGPT) 12 12 - 78 U/L    Alk. phosphatase 92 45 - 117 U/L    Protein, total 5.5 (L) 6.4 - 8.2 g/dL    Albumin 2.2 (L) 3.5 - 5.0 g/dL    Globulin 3.3 2.0 - 4.0 g/dL    A-G Ratio 0.7 (L) 1.1 - 2.2     LIPASE    Collection Time: 10/29/21  6:52 AM   Result Value Ref Range    Lipase 56 (L) 73 - 393 U/L   GLUCOSE, POC    Collection Time: 10/29/21  7:33 AM   Result Value Ref Range    Glucose (POC) 145 (H) 65 - 117 mg/dL    Performed by Edwina GRAFF        XR HIP RT W OR WO PELV 2-3 VWS   Final Result   No fracture or destructive lesion is seen. The joint spaces are   maintained, as are the adjacent soft tissues. Small trochanteric spurs. Thin layer of periosteal new bone along the medial   femoral shaft      XR HIP LT W OR WO PELV 2-3 VWS   Final Result   No fracture or destructive lesion is seen. The joint spaces are   maintained, as are the adjacent soft tissues. CT ABD PELV W CONT   Final Result   CHF/pulmonary edema suggested and partially visualized at the lung   bases with dilated cardiomyopathy suggested as described.  Probable acute on   chronic pancreatitis and with persistent finding of a significantly dilated   pancreatic duct in the pancreatic tail which is inseparable from the pancreatic   tail cystic lesion. Diverticulosis without diverticulitis. Arteriosclerotic   changes of the aortoiliac vasculature. Degenerative disc disease and lumbar   scoliosis. Other findings as above. XR CHEST PORT   Final Result   Left basilar airspace opacity which could represent atelectasis or   pneumonia. Assessment:  Acute pancreatitis, appears to be idiopathic. Lipase has normalized   -Suspected intraductal papillary mucinous neoplasm    Acute non-STEMI, type II, status post PCI to right coronary artery    Acute on chronic systolic heart failure, EF 20%. Previous EF in May was 40%   -Now compensated    New onset/paroxysmal atrial fibrillation with RVR    History of multiple strokes    Acute kidney injury (creat 0.75-->1.20) resolved    Diabetes mellitus type 2. Blood sugar this morning 185    Peripheral vascular disease with mild right carotid stenosis and moderate left carotid stenosis    Bilateral renal artery stenosis    Hyperlipidemia    Left trochanteric bursitis      Plan:  PT/OT evaluations    Continue IV diltiazem    Care Plan discussed with: Patient/Family    Disposition: Continued inpatient care    Total time spent with patient: 30 minutes.     Henok Huddleston MD

## 2021-10-29 NOTE — PROGRESS NOTES
PHYSICAL THERAPY EVALUATION  Patient: Constantino Brunner (89 y.o. female)  Date: 10/29/2021  Primary Diagnosis: Acute pancreatitis [K85.90]  Elevated troponin [R77.8]  Procedure(s) (LRB):  LEFT AND RIGHT HEART CATH / CORONARY ANGIOGRAPHY (N/A)  Percutaneous Coronary Intervention (N/A) 1 Day Post-Op   Precautions: falls       ASSESSMENT  This is a [de-identified] y/o female who  came to [facility] ED with c/o  abdominal pain around the navel with no radiation , admitted on 10/22/21 for acute pancreatitis ,elevated troponin , [place] R heart catheterization / coronary angiography on 10/28/21 . Pls look below for PMH. Pt received semi-supine in bed , agreeable for PT eval/tx . Pt is A& O x 4 ,  per pt report , she lives alone ( son and grandson lives next door ) in a single story home with 4 steps to enter , HR on L side ,  was IND with ADL's and IND for functional transfers/mobility with no AD prior to admission . DME owned : Rw, standard walker , rollator , SPC ,and  w/c    Based on the objective data described below, the patient presents with c/o pain at L hip on movement - 10/10 , decreased strength , +3/5 grossly in   b/l LE , balance deficits , generalized weakness , decreased activity tolerance and increased need for assist with functional mobility ( needs SBA for rolling from side to side  , CGA/Johnnie for supine <>sit transfers , intact static sitting balance ,  CGA for sit <>stand and SPT ,good static  standing balance with support, is able to ambulate - 36' with RW, CGA with slow connie and  decreased step length  b/l Le ) . Pt would benefit from continued skilled PT services to address current impairments and improve overall IND and safety with  functional transfers/mobility. Recommend d/c to home with HHPT and 24 x7 family care vs SNF  pending caregiver availability  when medically appropriate . Pt does not want to go to SNF.      Current Level of Function Impacting Discharge (mobility/balance): Pt requires CGA for transfers/mobility    Functional Outcome Measure: The patient scored 18 on the AM PAC basic mobility outcome measure which is indicative of medium complexity. Other factors to consider for discharge: lives alone , needs 24 x 7 care        PLAN :  Recommendations and Planned Interventions: bed mobility training, transfer training, gait training, therapeutic exercises, neuromuscular re-education, patient and family training/education, and therapeutic activities      Frequency/Duration: Patient will be followed by physical therapy:  3-5x/week to address goals. Recommendation for discharge: (in order for the patient to meet his/her long term goals)  Home with [id number] [facility] and 24 x7 family care vs SNF  pending caregiver availability     This discharge recommendation:  Has been made in collaboration with the attending provider and/or case management    IF patient discharges home will need the following DME: none         SUBJECTIVE:   Patient stated my L hip hurts     OBJECTIVE DATA SUMMARY:   HISTORY:    Past Medical History:   Diagnosis Date    Colon polyps     Diabetes ([facility] [address]     Diverticulosis     Hypertension     Ill-defined condition      Past Surgical History:   Procedure Laterality Date    COLONOSCOPY N/A 4/29/2021    COLONOSCOPY performed by Letty Burk MD at [facility] ENDOSCOPY       Personal factors and/or comorbidities impacting plan of care:     Home Situation  Home Environment: Private residence  # Steps to Enter: 4  Rails to Enter: Yes  Hand Rails : Left  Wheelchair Ramp: No  One/Two Story Residence: One story  Living Alone: Yes  Support Systems: Child(francisco) (son and grandson lives next door )  Patient Expects to be Discharged to[de-identified] [employer]  Current DME Used/Available at Home: Ivonne Budd, rolling, Walker, rollator, Walker, Commode, bedside, [employer], straight, Shower chair, Wheelchair    PLOF: Pt IND for ADLS/IADLS, IND with mobility prior to admission.      EXAMINATION/PRESENTATION/DECISION MAKING: Critical Behavior:  Neurologic State: Alert  Orientation Level: Oriented X4  Cognition: Follows commands     Hearing: Auditory  Auditory Impairment: None  Skin:  intact where exposed    Range Of Motion:  AROM: Generally decreased, functional     Strength:    Strength: Generally decreased, functional (3+/5 grossly for b/l LE)    Tone & Sensation:   Tone: Normal    Sensation: Intact    Functional Mobility:  Bed Mobility:  Rolling: Stand-by assistance  Supine to Sit: Minimum assistance  Sit to Supine: Contact guard assistance  Scooting: Stand-by assistance  Transfers:  Sit to Stand: Contact guard assistance  Stand to Sit: Contact guard assistance  Stand Pivot Transfers: Contact guard assistance       Balance:   Sitting: Intact; Without support  Standing: Impaired; Without support  Standing - Static: Good;Constant support  Standing - Dynamic : Fair;Constant support  Ambulation/Gait Training:  Distance (ft): 40 Feet (ft)  Assistive Device: Walker, rolling  Ambulation - Level of Assistance: Contact guard assistance    Speed/Bhargavi: Slow  Step Length: Right shortened;Left shortened    Functional Measure:    [address] [facility] [address] [id number] AM-PAC 6 Clicks         Basic Mobility Inpatient Short Form  How much difficulty does the patient currently have. .. Unable A Lot A Little None   1. Turning over in bed (including adjusting bedclothes, sheets and blankets)? [] 1   [] 2   [x] 3   [] 4   2. Sitting down on and standing up from a chair with arms ( e.g., wheelchair, bedside commode, etc.)   [] 1   [] 2   [x] 3   [] 4   3. Moving from lying on back to sitting on the side of the bed? [] 1   [] 2   [x] 3   [] 4          How much help from another person does the patient currently need. .. Total A Lot A Little None   4. Moving to and from a bed to a chair (including a wheelchair)? [] 1   [] 2   [x] 3   [] 4   5. Need to walk in hospital room? [] 1   [] 2   [x] 3   [] 4   6. Climbing 3-5 steps with a railing?    [] 1   [] 2 [x] 3   [] 4   © 2007, Trustees of [facility] [employer], under license to [employer]. All rights reserved     Score:  Initial: 18 10/29/21   Interpretation of Tool:  Represents activities that are increasingly more difficult (i.e. Bed mobility, Transfers, Gait). Score 24 23 22-20 19-15 14-10 9-7 6   Modifier CH CI CJ CK CL CM CN          Physical Therapy Evaluation Charge Determination   History Examination Presentation Decision-Making   MEDIUM  Complexity : 1-2 comorbidities / personal factors will impact the outcome/ POC  MEDIUM Complexity : 3 Standardized tests and measures addressing body structure, function, activity limitation and / or participation in recreation  MEDIUM Complexity : Evolving with changing characteristics  Other Functional Measure [facility] 6 medium complexity      Based on the above components, the patient evaluation is determined to be of the following complexity level: MEDIUM    Pain Rating:  Pain at L hip - 10/10    Activity Tolerance:   Fair  Please refer to the flowsheet for vital signs taken during this treatment. After treatment patient left in no apparent distress:   Supine in bed, Call bell within reach, and Bed / chair alarm activated    COMMUNICATION/EDUCATION:   The patients plan of care was discussed with: Registered nurse. Fall prevention education was provided and the patient/caregiver indicated understanding. and Patient/family agree to work toward stated goals and plan of care.     Problem: Mobility Impaired (Adult and Pediatric)  Goal: *Acute Goals and Plan of Care (Insert Text)  Outcome: Not Met       Thank you for this referral.  Allison Lee   Time Calculation: 32 mins

## 2021-10-30 VITALS
TEMPERATURE: 97.8 F | SYSTOLIC BLOOD PRESSURE: 126 MMHG | WEIGHT: 140 LBS | OXYGEN SATURATION: 99 % | HEIGHT: 65 IN | HEART RATE: 84 BPM | RESPIRATION RATE: 20 BRPM | DIASTOLIC BLOOD PRESSURE: 76 MMHG | BODY MASS INDEX: 23.32 KG/M2

## 2021-10-30 PROBLEM — M71.9 BURSITIS OF MULTIPLE SITES: Status: ACTIVE | Noted: 2021-10-30

## 2021-10-30 PROBLEM — I21.4 NSTEMI (NON-ST ELEVATED MYOCARDIAL INFARCTION) (HCC): Status: ACTIVE | Noted: 2021-10-30

## 2021-10-30 LAB
ALBUMIN SERPL-MCNC: 2.3 G/DL (ref 3.5–5)
ALBUMIN/GLOB SERPL: 0.7 {RATIO} (ref 1.1–2.2)
ALP SERPL-CCNC: 95 U/L (ref 45–117)
ALT SERPL-CCNC: 13 U/L (ref 12–78)
ANION GAP SERPL CALC-SCNC: 9 MMOL/L (ref 5–15)
AST SERPL W P-5'-P-CCNC: 17 U/L (ref 15–37)
BASOPHILS # BLD: 0 K/UL (ref 0–0.1)
BASOPHILS NFR BLD: 0 % (ref 0–1)
BILIRUB SERPL-MCNC: 0.6 MG/DL (ref 0.2–1)
BUN SERPL-MCNC: 16 MG/DL (ref 6–20)
BUN/CREAT SERPL: 17 (ref 12–20)
CA-I BLD-MCNC: 8.6 MG/DL (ref 8.5–10.1)
CHLORIDE SERPL-SCNC: 95 MMOL/L (ref 97–108)
CO2 SERPL-SCNC: 27 MMOL/L (ref 21–32)
CREAT SERPL-MCNC: 0.93 MG/DL (ref 0.55–1.02)
DIFFERENTIAL METHOD BLD: ABNORMAL
EOSINOPHIL # BLD: 0.2 K/UL (ref 0–0.4)
EOSINOPHIL NFR BLD: 5 % (ref 0–7)
ERYTHROCYTE [DISTWIDTH] IN BLOOD BY AUTOMATED COUNT: 14.5 % (ref 11.5–14.5)
GLOBULIN SER CALC-MCNC: 3.5 G/DL (ref 2–4)
GLUCOSE BLD STRIP.AUTO-MCNC: 106 MG/DL (ref 65–117)
GLUCOSE BLD STRIP.AUTO-MCNC: 215 MG/DL (ref 65–117)
GLUCOSE SERPL-MCNC: 205 MG/DL (ref 65–100)
HCT VFR BLD AUTO: 34.2 % (ref 35–47)
HGB BLD-MCNC: 11 G/DL (ref 11.5–16)
IMM GRANULOCYTES # BLD AUTO: 0 K/UL (ref 0–0.04)
IMM GRANULOCYTES NFR BLD AUTO: 0 % (ref 0–0.5)
LIPASE SERPL-CCNC: 84 U/L (ref 73–393)
LYMPHOCYTES # BLD: 1.1 K/UL (ref 0.8–3.5)
LYMPHOCYTES NFR BLD: 21 % (ref 12–49)
MCH RBC QN AUTO: 26 PG (ref 26–34)
MCHC RBC AUTO-ENTMCNC: 32.2 G/DL (ref 30–36.5)
MCV RBC AUTO: 80.9 FL (ref 80–99)
MONOCYTES # BLD: 0.8 K/UL (ref 0–1)
MONOCYTES NFR BLD: 17 % (ref 5–13)
NEUTS SEG # BLD: 2.9 K/UL (ref 1.8–8)
NEUTS SEG NFR BLD: 57 % (ref 32–75)
NRBC # BLD: 0 K/UL (ref 0–0.01)
NRBC BLD-RTO: 0 PER 100 WBC
PERFORMED BY, TECHID: ABNORMAL
PERFORMED BY, TECHID: NORMAL
PLATELET # BLD AUTO: 198 K/UL (ref 150–400)
PMV BLD AUTO: 12.5 FL (ref 8.9–12.9)
POTASSIUM SERPL-SCNC: 3.8 MMOL/L (ref 3.5–5.1)
PROT SERPL-MCNC: 5.8 G/DL (ref 6.4–8.2)
RBC # BLD AUTO: 4.23 M/UL (ref 3.8–5.2)
SODIUM SERPL-SCNC: 131 MMOL/L (ref 136–145)
WBC # BLD AUTO: 5 K/UL (ref 3.6–11)

## 2021-10-30 PROCEDURE — 82962 GLUCOSE BLOOD TEST: CPT

## 2021-10-30 PROCEDURE — 83690 ASSAY OF LIPASE: CPT

## 2021-10-30 PROCEDURE — 74011250636 HC RX REV CODE- 250/636: Performed by: HOSPITALIST

## 2021-10-30 PROCEDURE — 74011250636 HC RX REV CODE- 250/636: Performed by: NURSE PRACTITIONER

## 2021-10-30 PROCEDURE — 74011636637 HC RX REV CODE- 636/637: Performed by: PHYSICIAN ASSISTANT

## 2021-10-30 PROCEDURE — 74011250637 HC RX REV CODE- 250/637: Performed by: INTERNAL MEDICINE

## 2021-10-30 PROCEDURE — 36415 COLL VENOUS BLD VENIPUNCTURE: CPT

## 2021-10-30 PROCEDURE — 80053 COMPREHEN METABOLIC PANEL: CPT

## 2021-10-30 PROCEDURE — 74011250637 HC RX REV CODE- 250/637: Performed by: NURSE PRACTITIONER

## 2021-10-30 PROCEDURE — 74011250637 HC RX REV CODE- 250/637: Performed by: PHYSICIAN ASSISTANT

## 2021-10-30 PROCEDURE — 85025 COMPLETE CBC W/AUTO DIFF WBC: CPT

## 2021-10-30 RX ORDER — ATORVASTATIN CALCIUM 10 MG/1
10 TABLET, FILM COATED ORAL DAILY
Qty: 30 TABLET | Refills: 1 | Status: SHIPPED | OUTPATIENT
Start: 2021-10-30 | End: 2021-11-20

## 2021-10-30 RX ADMIN — CLOPIDOGREL BISULFATE 75 MG: 75 TABLET ORAL at 08:19

## 2021-10-30 RX ADMIN — INSULIN LISPRO 3 UNITS: 100 INJECTION, SOLUTION INTRAVENOUS; SUBCUTANEOUS at 08:17

## 2021-10-30 RX ADMIN — OXYCODONE 5 MG: 5 TABLET ORAL at 01:10

## 2021-10-30 RX ADMIN — ONDANSETRON 4 MG: 2 INJECTION INTRAMUSCULAR; INTRAVENOUS at 01:09

## 2021-10-30 RX ADMIN — GLIPIZIDE 5 MG: 5 TABLET ORAL at 08:19

## 2021-10-30 RX ADMIN — CARVEDILOL 3.12 MG: 3.12 TABLET, FILM COATED ORAL at 08:18

## 2021-10-30 RX ADMIN — ASPIRIN 81 MG CHEWABLE TABLET 81 MG: 81 TABLET CHEWABLE at 08:19

## 2021-10-30 RX ADMIN — OXYCODONE 5 MG: 5 TABLET ORAL at 08:19

## 2021-10-30 RX ADMIN — DEXTROMETHORPHAN 30 MG: 30 SUSPENSION, EXTENDED RELEASE ORAL at 08:18

## 2021-10-30 RX ADMIN — Medication 10 ML: at 06:29

## 2021-10-30 RX ADMIN — PANTOPRAZOLE SODIUM 40 MG: 40 TABLET, DELAYED RELEASE ORAL at 08:18

## 2021-10-30 RX ADMIN — ENOXAPARIN SODIUM 40 MG: 40 INJECTION SUBCUTANEOUS at 08:17

## 2021-10-30 RX ADMIN — FUROSEMIDE 20 MG: 40 TABLET ORAL at 08:19

## 2021-10-30 RX ADMIN — INSULIN HUMAN 10 UNITS: 100 INJECTION, SUSPENSION SUBCUTANEOUS at 08:16

## 2021-10-30 NOTE — PROGRESS NOTES
Discharge instructions reviewed with the patient. All questions answered. IV removed. Tele removed and returned. Wheeled patient downstairs via transport chair and patients son picked up the patient to take her home.

## 2021-10-30 NOTE — DISCHARGE SUMMARY
Admit date: 10/22/2021   Admitting Provider: Fabi Montalvo MD    Discharge date: 10/30/2021  Discharging Provider: Thong Moss PA-C      * Admission Diagnoses: Acute pancreatitis [K85.90]  Elevated troponin [R77.8]    * Discharge Diagnoses:    Hospital Problems as of 10/30/2021 Date Reviewed: 10/22/2021        Codes Class Noted - Resolved POA    NSTEMI (non-ST elevated myocardial infarction) ([facility] [address] ICD-10-CM: I21.4  ICD-9-CM: 410.70  10/30/2021 - Present Unknown        Bursitis of multiple sites ICD-10-CM: M71.9  ICD-9-CM: 726.8  10/30/2021 - Present Unknown        Acute pancreatitis ICD-10-CM: K85.90  ICD-9-CM: 694.8  10/22/2021 - Present Yes        Elevated troponin ICD-10-CM: R77.8  ICD-9-CM: 790.6  10/22/2021 - Present Unknown              * Hospital Course:   [de-identified] y.o. female with history of diabetes, hypertension and history of diverticulosis/colon polyps presents to the emergency room complaining of abdominal pain around the navel with no radiation. Lipase was greater than 3000 consistent with pancreatitis. Lipase trending down appropriately. CT scan of the abdomen pelvis revealed pulmonary edema consistent with congestive heart failure as well as a dilated pancreatic duct in the pancreatic tail with a pancreatic tail cystic lesion. Troponin was elevated at greater than 500 and cardiac consultation, Dr. Luciana Junior. Echocardiogram with ejection fracture of 20 to 25% and moderate tricuspid valve regurgitation. Right-sided pressures 41 mm per mercury. CA 19-9 normal.  Cardiac catheterization performed by Dr. Luciana Junior with PCI and stent placement to the right coronary artery x2. Patient developed atrial fibrillation with spontaneous conversion and placed on Coreg. Patient with ongoing hip pain and bilateral x-rays revealed no acute abnormality most likely bursitis. Patient follow-up with orthopedics in regards to bursitis.   Patient will follow up with Dr. Mandeep Johnston, gastroenterology concerning the pancreatitis and cyst in the tail of the pancreas. Patient will follow up with primary care physician Dr. Niesha Moreno as well as Dr. Loraine Bardales as these appointments already been scheduled. Discussed discharge instructions with the patient and her son Izabella Carreon via phone. Patient will be placed on home health  * Procedures:   Procedure(s):  LEFT AND RIGHT HEART CATH / CORONARY ANGIOGRAPHY  Percutaneous Coronary Intervention     Cardiac cath:  · Severe mid RCA disease status post drug-eluting stent with 3.0 x 34, overlapping with a 2.75 x 12 mm drug-eluting stent postdilated with a 3.0 noncompliant balloon up to 16 tuan  · Nonobstructive coronary artery disease of 1st and 2nd diagonal and proximal LCx  · Cardiac output 2.5 L/min, PA pressure is 60/18 with a mean of 35 mmHg. Consults:   Gastroenterology, cardiology    Significant Diagnostic Studies: As discussed in hospital course    Discharge Exam:  Visit Vitals  /80 (BP Patient Position: Sitting)   Pulse 86   Temp 97.7 °F (36.5 °C)   Resp 20   Ht 5' 5\" (1.651 m)   Wt 63.5 kg (140 lb)   SpO2 98%   BMI 23.30 kg/m²     PHYSICAL EXAM:  Constitutional: Alert in no acute distress   HEENT: Sclerae anicteric, The neck is supple. Cardiovascular: Regular rate and rhythm. No murmurs, gallops, or rubs. Crystal Belg[place] Respiratory: Clear breath sounds with no wheezes, rales, or rhonchi. GI: Abdomen nondistended, soft, and nontender. Normal active bowel sounds. Rectal: Deferred   Musculoskeletal: No pitting edema of the lower legs. Extremities have good range of motion. Neurological:  Patient is alert and oriented. Cranial nerves II-XII intact  Psychiatric: Mood appears appropriate with judgement intact. Lymphatic: No cervical or supraclavicular adenopathy.    Skin: No rashes or breakdown of the skin      * Discharge Condition: stable  * Disposition: Home    Discharge Medications:  Current Discharge Medication List      START taking these medications    Details   !! atorvastatin (Lipitor) 10 mg tablet Take 1 Tablet by mouth daily. Qty: 30 Tablet, Refills: 1  Start date: 10/30/2021      clopidogreL (PLAVIX) 75 mg tab Take 1 Tablet by mouth daily. Qty: 30 Tablet, Refills: 0  Start date: 10/30/2021      carvediloL (COREG) 3.125 mg tablet Take 1 Tablet by mouth two (2) times daily (with meals). Qty: 60 Tablet, Refills: 0  Start date: 10/29/2021      glipiZIDE (GLUCOTROL) 5 mg tablet Take 1 Tablet by mouth Daily (before breakfast). Qty: 30 Tablet, Refills: 0  Start date: 10/30/2021      aspirin 81 mg chewable tablet Take 1 Tablet by mouth daily. Qty: 30 Tablet, Refills: 0  Start date: 10/30/2021       !! - Potential duplicate medications found. Please discuss with provider. CONTINUE these medications which have NOT CHANGED    Details   insulin glargine (LANTUS) 100 unit/mL injection 22 Units by SubCUTAneous route nightly. verapamil ER (CALAN-SR) 240 mg CR tablet Take 240 mg by mouth daily. !! atorvastatin (LIPITOR) 80 mg tablet Take 1 Tab by mouth daily. Qty: 30 Tab, Refills: 0      lisinopriL (PRINIVIL, ZESTRIL) 20 mg tablet Take 1 Tab by mouth daily. Qty: 30 Tab, Refills: 0      insulin lispro (HUMALOG) 100 unit/mL injection Take 3 units tid  Qty: 1 Vial, Refills: 0       !! - Potential duplicate medications found. Please discuss with provider. * Follow-up Care/Patient Instructions: Activity: Activity as tolerated  Diet: Diabetic Diet  Wound Care: None needed    Follow-up Information     Follow up With Specialties Details Why Lluvia Us MD Internal Medicine On 11/8/2021 @2:30pm Adelaida [address] 1997 [address]      Rufina Baumgarten, MD Cardiology, [address] [facility] Vascular Surgery On 11/16/2021 @9:30am. Patient needs to bring photo ID, list of medication, insurance information, and a mask.  [facility] [address], [place] Orthopedic Surgery In 1 week  [address] [place] [address] [address]kw[address]  [facility] [address]  [phone]      Keon Estrada MD Gastroenterology, Internal Medicine In 1 week  [address]  [phone]            Discharge summary greater than 35 minutes spent with the patient performing discharge instructions, medication review and physical exam    Signed:  Alfonso Zaldivar PA-C  10/30/2021  10:18 AM

## 2021-10-30 NOTE — DISCHARGE INSTRUCTIONS
Patient Education        Coronary Angiogram: What to Expect at Home  Your Recovery     A coronary angiogram is a test to examine the large blood vessel of your heart (coronary artery). The doctor inserted a thin, flexible tube (catheter) into a blood vessel in your groin. In some cases, the catheter is placed in a blood vessel in the arm. Your groin or arm may have a bruise and feel sore for a day or two after the procedure. You can do light activities around the house but nothing strenuous for several days. This care sheet gives you a general idea about how long it will take for you to recover. But each person recovers at a different pace. Follow the steps below to feel better as quickly as possible. How can you care for yourself at home? Activity    · If the doctor gave you a sedative:  ? For 24 hours, don't do anything that requires attention to detail, such as going to work, making important decisions, or signing any legal documents. It takes time for the medicine's effects to completely wear off.  ? For your safety, do not drive or operate any machinery that could be dangerous. Wait until the medicine wears off and you can think clearly and react easily.     · Do not do strenuous exercise and do not lift, pull, or push anything heavy until your doctor says it is okay. This may be for a day or two. You can walk around the house and do light activity, such as cooking.     · If the catheter was placed in your groin, try not to walk up stairs for the first couple of days.     · If the catheter was placed in your arm near your wrist, do not bend your wrist deeply for the first couple of days. Be careful using your hand to get into and out of a chair or bed.     · If your doctor recommends it, get more exercise. Walking is a good choice. Bit by bit, increase the amount you walk every day. Try for at least 30 minutes on most days of the week. Diet    · Drink plenty of fluids to help your body flush out the dye. If you have kidney, heart, or liver disease and have to limit fluids, talk with your doctor before you increase the amount of fluids you drink.     · Keep eating a heart-healthy diet that has lots of fruits, vegetables, and whole grains. If you have not been eating this way, talk to your doctor. You also may want to talk to a dietitian. This expert can help you to learn about healthy foods and plan meals. Medicines    · Your doctor will tell you if and when you can restart your medicines. He or she will also give you instructions about taking any new medicines.     · If you take aspirin or some other blood thinner, ask your doctor if and when to start taking it again. Make sure that you understand exactly what your doctor wants you to do.     · Your doctor may prescribe a blood-thinning medicine like aspirin or clopidogrel (Plavix). It is very important that you take these medicines exactly as directed in order to keep the coronary artery open and reduce your risk of a heart attack. Be safe with medicines. Call your doctor if you think you are having a problem with your medicine. Care of the catheter site    · For 1 or 2 days, keep a bandage over the spot where the catheter was inserted. The bandage probably will fall off in this time.     · Put ice or a cold pack on the area for 10 to 20 minutes at a time to help with soreness or swelling. Put a thin cloth between the ice and your skin.     · You may shower 24 to 48 hours after the procedure, if your doctor okays it. Pat the incision dry.     · Do not soak the catheter site until it is healed. Don't take a bath for 1 week, or until your doctor tells you it is okay.     · Watch for bleeding from the site. A small amount of blood (up to the size of a quarter) on the bandage can be normal.     · If you are bleeding, lie down and press on the area for 15 minutes to try to make it stop. If the bleeding does not stop, call your doctor or seek immediate medical care. Follow-up care is a key part of your treatment and safety. Be sure to make and go to all appointments, and call your doctor if you are having problems. It's also a good idea to know your test results and keep a list of the medicines you take. When should you call for help? Call 911 anytime you think you may need emergency care. For example, call if:    · You passed out (lost consciousness).     · You have severe trouble breathing.     · You have sudden chest pain and shortness of breath, or you cough up blood.     · You have symptoms of a heart attack. These may include:  ? Chest pain or pressure, or a strange feeling in the chest.  ? Sweating. ? Shortness of breath. ? Nausea or vomiting. ? Pain, pressure, or a strange feeling in the back, neck, jaw, or upper belly, or in one or both shoulders or arms. ? Lightheadedness or sudden weakness. ? A fast or irregular heartbeat. After you call 911, the  may tel you to chew 1 adult-strength or 2 to 4 low-dose aspirin. Wait for an ambulance. Do not try to drive yourself.     · You have been diagnosed with angina, and you have symptoms that do not go away with rest or are not getting better within 5 minutes after you take a dose of nitroglycerin. Call your doctor now or seek immediate medical care if:    · You are bleeding from the area where the catheter was put in your artery.     · You have a fast-growing, painful lump at the catheter site.     · You have signs of infection, such as:  ? Increased pain, swelling, warmth, or redness. ? Red streaks leading from the catheter site. ? Pus draining from the catheter site. ? A fever.     · Your leg, arm, or hand is painful, looks blue, or feels cold, numb, or tingly. Watch closely for changes in your health, and be sure to contact your doctor if you have any problems. Where can you learn more?   Go to http://www.gray.com/  Enter D192 in the search box to learn more about \"Coronary Angiogram: What to Expect at Home. \"  Current as of: April 29, 2021               Content Version: 13.0  © 2006-2021 [employer]. Care instructions adapted under license by [employer] (which disclaims liability or warranty for this information). If you have questions about a medical condition or this instruction, always ask your healthcare professional. [address] any warranty or liability for your use of this information. Patient Education        Pancreatitis: Care Instructions  Overview     The pancreas is an organ behind the stomach. It makes hormones and enzymes to help your body digest food. But if these enzymes attack the pancreas, it can get inflamed. This is called pancreatitis. Most cases are caused by gallstones or by heavy alcohol use. If you take care of yourself at home, it will help you get better. It will also help you avoid more problems with your pancreas. How can you care for yourself at home? · Drink clear liquids and eat bland foods until you feel better. [place] foods include rice, dry toast, and crackers. They also include bananas and applesauce. · Eat a low-fat diet until your doctor says your pancreas is healed. · Do not drink alcohol. Tell your doctor if you need help to quit. Counseling, support groups, and sometimes medicines can help you stay sober. · Be safe with medicines. Read and follow all instructions on the label. ? If the doctor gave you a prescription medicine for pain, take it as prescribed. ? If you are not taking a prescription pain medicine, ask your doctor if you can take an over-the-counter medicine. · If your doctor prescribed antibiotics, take them as directed. Do not stop taking them just because you feel better. You need to take the full course of antibiotics. · Get extra rest until you feel better. To prevent future problems with your pancreas  · Do not drink alcohol.   · Tell your doctors and pharmacist that you've had pancreatitis. They can help you avoid medicines that may cause this problem again. Where can you learn more? Go to http://www.gray.com/  Enter J381 in the search box to learn more about \"Pancreatitis: Care Instructions. \"  Current as of: February 10, 2021               Content Version: 13.0  © 5316-4288 [employer]. Care instructions adapted under license by [employer] (which disclaims liability or warranty for this information). If you have questions about a medical condition or this instruction, always ask your healthcare professional. [place] [address] any warranty or liability for your use of this information.

## 2021-10-30 NOTE — PROGRESS NOTES
Progress Note      10/30/2021 6:28 AM  NAME: Sammie Montero   MRN:  [id number]   Admit Diagnosis: Acute pancreatitis [K85.90]  Elevated troponin [R77.8]      Problem List:   Cardiology cross cover progress note-[facility]  (Shivani Meyer M.D.)    PROBLEM LIST:   1. Acute pancreatitis  2. Acute non-ST elevation myocardial infarction (non-STEMI) type II  3. Coronary artery disease       3a. PCI to the right coronary artery (Oct 28, 2021)  4. Acute on chronic systolic heart failure (HFrEF)  5.  Cardiomyopathy (ejection fraction =20%)  6. History of multiple ischemic infarcts persistent right-sided weakness  7. Peripheral vascular disease  8. Bilateral renal artery stenosis  9. Hypertension with hypertensive heart disease  10. Mixed dyslipidemia    11. Type 2 diabetes  12. Valvular heart disease         12a. Mild to moderate tricuspid regurgitation          12b. Mild mitral regurgitation  13. Recent gastrointestinal bleed (GIB)  14. Anemia  15. Hyponatremia       Subjective: The patient is seen and examined in room 481. There were no acute cardiovascular events reported overnight. Currently, she denies any cardiovascular complaints. Specifically, she denies chest pain or shortness of breath. Discussed with RN events overnight.      Medications Personally Reviewed:    Current Facility-Administered Medications   Medication Dose Route Frequency    pravastatin (PRAVACHOL) tablet 10 mg  10 mg Oral QHS    carvediloL (COREG) tablet 3.125 mg  3.125 mg Oral BID WITH MEALS    furosemide (LASIX) tablet 20 mg  20 mg Oral DAILY    dextromethorphan (DELSYM) 30 mg/5 mL syrup 30 mg  30 mg Oral Q12H    dilTIAZem (CARDIZEM) 125 mg/125 mL (1 mg/mL) dextrose 5% infusion  5 mg/hr IntraVENous CONTINUOUS    bisacodyL (DULCOLAX) tablet 10 mg  10 mg Oral DAILY PRN    sodium chloride (NS) flush 5-40 mL  5-40 mL IntraVENous Q8H    sodium chloride (NS) flush 5-40 mL  5-40 mL IntraVENous PRN    aspirin chewable tablet 81 mg  81 mg Oral DAILY    clopidogreL (PLAVIX) tablet 75 mg  75 mg Oral DAILY    labetaloL (NORMODYNE;TRANDATE) 20 mg/4 mL (5 mg/mL) injection 10 mg  10 mg IntraVENous Q4H PRN    insulin glargine (LANTUS) injection 20 Units  20 Units SubCUTAneous QHS    insulin NPH (NOVOLIN N, HUMULIN N) injection 10 Units  10 Units SubCUTAneous BID    [Held by provider] furosemide (LASIX) injection 40 mg  40 mg IntraVENous BID    oxyCODONE IR (ROXICODONE) tablet 5 mg  5 mg Oral Q6H PRN    benzocaine-menthoL (CEPACOL) lozenge 1 Lozenge  1 Lozenge Mucous Membrane PRN    glucose chewable tablet 16 g  4 Tablet Oral PRN    dextrose (D50W) injection syrg 12.5-25 g  25-50 mL IntraVENous PRN    glucagon (GLUCAGEN) injection 1 mg  1 mg IntraMUSCular PRN    insulin lispro (HUMALOG) injection   SubCUTAneous AC&HS    diphenhydrAMINE (BENADRYL) injection 12.5 mg  12.5 mg IntraVENous Q6H PRN    pantoprazole (PROTONIX) tablet 40 mg  40 mg Oral ACB    guaiFENesin-dextromethorphan (TUSSI-ORGANIDIN DM)  mg/5 mL oral solution 10 mL  10 mL Oral Q4H PRN    glipiZIDE (GLUCOTROL) tablet 5 mg  5 mg Oral ACB    enoxaparin (LOVENOX) injection 40 mg  40 mg SubCUTAneous Q24H    glucose chewable tablet 16 g  4 Tablet Oral PRN    dextrose (D50W) injection syrg 12.5-25 g  25-50 mL IntraVENous PRN    glucagon (GLUCAGEN) injection 1 mg  1 mg IntraMUSCular PRN    sodium chloride (NS) flush 5-40 mL  5-40 mL IntraVENous PRN    acetaminophen (TYLENOL) tablet 650 mg  650 mg Oral Q4H PRN    ondansetron (ZOFRAN) injection 4 mg  4 mg IntraVENous Q4H PRN           Objective:      Physical Exam:  Last 24hrs VS reviewed since prior progress note.  Most recent are:    Visit Vitals  /70   Pulse 75   Temp 98 °F (36.7 °C)   Resp 20   Ht 5' 5\" (1.651 m)   Wt 63.5 kg (140 lb)   SpO2 95%   BMI 23.30 kg/m²     No intake or output data in the 24 hours ending 10/30/21 0628     General Appearance: Well developed, thin, in no acute respiratory distress. Chest: Lungs clear to auscultation bilaterally. Cardiovascular: JVP is not elevated, PMI is not 2, normal intensity S1-S2, without S3. Abdomen: Soft, non-tender, bowel sounds are active. Extremities: No edema bilaterally. Data Review    Telemetry: Sinus rhythm without ventricular ectopy    EKG:   []  No new EKG for review    Lab Data Personally Reviewed:    Recent Labs     10/29/21  0652 10/28/21  0950   WBC 5.4 6.1   HGB 11.0* 11.2*   HCT 34.0* 34.0*    194     No results for input(s): INR, PTP, APTT, INREXT in the last 72 hours. Recent Labs     10/29/21  0652 10/28/21  0950 10/27/21  0903   * 133* 135*   K 3.7 3.5 3.6   CL 98 98 98   CO2 23 27 29   BUN 16 20 14   CREA 0.66 0.94 1.20*   * 204* 351*   CA 8.7 9.0 8.8     No results for input(s): CPK, CKNDX, TROIQ in the last 72 hours. No lab exists for component: CPKMB  Lab Results   Component Value Date/Time    Cholesterol, total 147 05/06/2021 07:42 AM    HDL Cholesterol 46 05/06/2021 07:42 AM    LDL, calculated 86.2 05/06/2021 07:42 AM    Triglyceride 58 10/22/2021 11:37 PM    CHOL/HDL Ratio 3.2 05/06/2021 07:42 AM       Recent Labs     10/29/21  0652 10/28/21  0950 10/27/21  0903   AP 92 96 107   TP 5.5* 6.3* 6.0*   ALB 2.2* 2.2* 2.5*   GLOB 3.3 4.1* 3.5   LPSE 56* 91 100     No results for input(s): PH, PCO2, PO2 in the last 72 hours. Assessment/Plan:   1. From a cardiovascular standpoint the patient may be discharged home  2. Continue current cardiovascular medications including aspirin, carvedilol, clopidogrel, furosemide, glipizide, insulin, and pravastatin  3.   The patient is to follow-up with her primary cardiologist within 1 to 2 weeks after discharge     Bing Jeffery MD

## 2021-10-30 NOTE — PROGRESS NOTES
CM acknowledged discharge order. Patient with recommendations for [facility] with 24/7 care vs. SNF. CM informed her of these recommendations. Patient does not want to go to a facility at time of discharge. She states her son lives next door to her and can check on her. Patient is agreeable to home health at this time. No preference in company. CM sent referral.  CM called donald Bae [phone], no answer to telephone, CM left detailed message requesting return telephone call. Important message from medicare signed and placed in chart. Discharge order modified to home with home health. Discharge plan of care/case management plan validated with provider discharge order. Patient states that her son is at work and she will try to contact him to pick her up today. CM again inquired about SNF recommendations, patient is declining facility placement.

## 2021-11-17 NOTE — PROGRESS NOTES
Patient was given printed teaching materials by the cath lab staff after procedure explaining the importance of medication compliance. Patient was also given my contact information and encouraged to contact me if she has any questions or needs further assistance. Phase 2 outpatient cardiac rehab referral is not appropriate for this patient at this time due to physical and/or mental limitations, diagnosis and/or treatment plans. VS reassessed. RR Reg. No distress noted.  Will continue to monitor       Anish Frazier RN  11/17/21 4108

## 2021-11-19 ENCOUNTER — APPOINTMENT (OUTPATIENT)
Dept: GENERAL RADIOLOGY | Age: 81
DRG: 438 | End: 2021-11-19
Attending: EMERGENCY MEDICINE
Payer: MEDICARE

## 2021-11-19 ENCOUNTER — HOSPITAL ENCOUNTER (INPATIENT)
Age: 81
LOS: 4 days | Discharge: HOME HEALTH | DRG: 438 | End: 2021-11-24
Attending: EMERGENCY MEDICINE | Admitting: HOSPITALIST
Payer: MEDICARE

## 2021-11-19 ENCOUNTER — APPOINTMENT (OUTPATIENT)
Dept: CT IMAGING | Age: 81
DRG: 438 | End: 2021-11-19
Attending: EMERGENCY MEDICINE
Payer: MEDICARE

## 2021-11-19 DIAGNOSIS — R10.84 ABDOMINAL PAIN, GENERALIZED: Primary | ICD-10-CM

## 2021-11-19 DIAGNOSIS — K85.10 ACUTE BILIARY PANCREATITIS, UNSPECIFIED COMPLICATION STATUS: ICD-10-CM

## 2021-11-19 LAB
BASOPHILS # BLD: 0 K/UL (ref 0–0.1)
BASOPHILS NFR BLD: 1 % (ref 0–1)
DIFFERENTIAL METHOD BLD: ABNORMAL
EOSINOPHIL # BLD: 0.2 K/UL (ref 0–0.4)
EOSINOPHIL NFR BLD: 4 % (ref 0–7)
ERYTHROCYTE [DISTWIDTH] IN BLOOD BY AUTOMATED COUNT: 15.5 % (ref 11.5–14.5)
HCT VFR BLD AUTO: 40.5 % (ref 35–47)
HGB BLD-MCNC: 12.9 G/DL (ref 11.5–16)
IMM GRANULOCYTES # BLD AUTO: 0 K/UL (ref 0–0.04)
IMM GRANULOCYTES NFR BLD AUTO: 0 % (ref 0–0.5)
LYMPHOCYTES # BLD: 1.3 K/UL (ref 0.8–3.5)
LYMPHOCYTES NFR BLD: 29 % (ref 12–49)
MCH RBC QN AUTO: 26 PG (ref 26–34)
MCHC RBC AUTO-ENTMCNC: 31.9 G/DL (ref 30–36.5)
MCV RBC AUTO: 81.5 FL (ref 80–99)
MONOCYTES # BLD: 0.5 K/UL (ref 0–1)
MONOCYTES NFR BLD: 13 % (ref 5–13)
NEUTS SEG # BLD: 2.3 K/UL (ref 1.8–8)
NEUTS SEG NFR BLD: 53 % (ref 32–75)
NRBC # BLD: 0 K/UL (ref 0–0.01)
NRBC BLD-RTO: 0 PER 100 WBC
PLATELET # BLD AUTO: 139 K/UL (ref 150–400)
PMV BLD AUTO: 12.6 FL (ref 8.9–12.9)
RBC # BLD AUTO: 4.97 M/UL (ref 3.8–5.2)
WBC # BLD AUTO: 4.3 K/UL (ref 3.6–11)

## 2021-11-19 PROCEDURE — 80053 COMPREHEN METABOLIC PANEL: CPT

## 2021-11-19 PROCEDURE — 96375 TX/PRO/DX INJ NEW DRUG ADDON: CPT

## 2021-11-19 PROCEDURE — 36415 COLL VENOUS BLD VENIPUNCTURE: CPT

## 2021-11-19 PROCEDURE — 96374 THER/PROPH/DIAG INJ IV PUSH: CPT

## 2021-11-19 PROCEDURE — 84484 ASSAY OF TROPONIN QUANT: CPT

## 2021-11-19 PROCEDURE — 85025 COMPLETE CBC W/AUTO DIFF WBC: CPT

## 2021-11-19 PROCEDURE — 93005 ELECTROCARDIOGRAM TRACING: CPT

## 2021-11-19 PROCEDURE — 99285 EMERGENCY DEPT VISIT HI MDM: CPT

## 2021-11-19 PROCEDURE — 83880 ASSAY OF NATRIURETIC PEPTIDE: CPT

## 2021-11-19 PROCEDURE — 83690 ASSAY OF LIPASE: CPT

## 2021-11-19 PROCEDURE — 71045 X-RAY EXAM CHEST 1 VIEW: CPT

## 2021-11-19 PROCEDURE — 74011250636 HC RX REV CODE- 250/636: Performed by: EMERGENCY MEDICINE

## 2021-11-19 RX ORDER — ONDANSETRON 2 MG/ML
4 INJECTION INTRAMUSCULAR; INTRAVENOUS
Status: COMPLETED | OUTPATIENT
Start: 2021-11-19 | End: 2021-11-19

## 2021-11-19 RX ORDER — MORPHINE SULFATE 4 MG/ML
4 INJECTION INTRAVENOUS
Status: COMPLETED | OUTPATIENT
Start: 2021-11-19 | End: 2021-11-19

## 2021-11-19 RX ADMIN — ONDANSETRON 4 MG: 2 INJECTION INTRAMUSCULAR; INTRAVENOUS at 23:06

## 2021-11-19 RX ADMIN — MORPHINE SULFATE 4 MG: 4 INJECTION INTRAVENOUS at 23:06

## 2021-11-20 ENCOUNTER — APPOINTMENT (OUTPATIENT)
Dept: CT IMAGING | Age: 81
DRG: 438 | End: 2021-11-20
Attending: EMERGENCY MEDICINE
Payer: MEDICARE

## 2021-11-20 PROBLEM — R10.9 ABDOMINAL PAIN: Status: ACTIVE | Noted: 2021-11-20

## 2021-11-20 PROBLEM — K85.90 ACUTE PANCREATITIS: Status: ACTIVE | Noted: 2021-11-20

## 2021-11-20 LAB
ALBUMIN SERPL-MCNC: 3.1 G/DL (ref 3.5–5)
ALBUMIN/GLOB SERPL: 0.9 {RATIO} (ref 1.1–2.2)
ALP SERPL-CCNC: 144 U/L (ref 45–117)
ALT SERPL-CCNC: 32 U/L (ref 12–78)
ANION GAP SERPL CALC-SCNC: 7 MMOL/L (ref 5–15)
AST SERPL W P-5'-P-CCNC: 35 U/L (ref 15–37)
ATRIAL RATE: 100 BPM
BILIRUB SERPL-MCNC: 0.5 MG/DL (ref 0.2–1)
BNP SERPL-MCNC: 7717 PG/ML
BUN SERPL-MCNC: 21 MG/DL (ref 6–20)
BUN/CREAT SERPL: 17 (ref 12–20)
CA-I BLD-MCNC: 8.9 MG/DL (ref 8.5–10.1)
CALCULATED P AXIS, ECG09: 24 DEGREES
CALCULATED R AXIS, ECG10: -21 DEGREES
CALCULATED T AXIS, ECG11: -74 DEGREES
CHLORIDE SERPL-SCNC: 101 MMOL/L (ref 97–108)
CO2 SERPL-SCNC: 26 MMOL/L (ref 21–32)
CREAT SERPL-MCNC: 1.25 MG/DL (ref 0.55–1.02)
DIAGNOSIS, 93000: NORMAL
GLOBULIN SER CALC-MCNC: 3.3 G/DL (ref 2–4)
GLUCOSE BLD STRIP.AUTO-MCNC: 193 MG/DL (ref 65–117)
GLUCOSE BLD STRIP.AUTO-MCNC: 204 MG/DL (ref 65–117)
GLUCOSE BLD STRIP.AUTO-MCNC: 237 MG/DL (ref 65–117)
GLUCOSE SERPL-MCNC: 329 MG/DL (ref 65–100)
LIPASE SERPL-CCNC: >3000 U/L (ref 73–393)
P-R INTERVAL, ECG05: 138 MS
PERFORMED BY, TECHID: ABNORMAL
POTASSIUM SERPL-SCNC: 4.4 MMOL/L (ref 3.5–5.1)
PROT SERPL-MCNC: 6.4 G/DL (ref 6.4–8.2)
Q-T INTERVAL, ECG07: 388 MS
QRS DURATION, ECG06: 78 MS
QTC CALCULATION (BEZET), ECG08: 500 MS
SODIUM SERPL-SCNC: 134 MMOL/L (ref 136–145)
TROPONIN-HIGH SENSITIVITY: 533 NG/L (ref 0–51)
TROPONIN-HIGH SENSITIVITY: 538 NG/L (ref 0–51)
VENTRICULAR RATE, ECG03: 100 BPM

## 2021-11-20 PROCEDURE — 74177 CT ABD & PELVIS W/CONTRAST: CPT

## 2021-11-20 PROCEDURE — 74011250637 HC RX REV CODE- 250/637: Performed by: HOSPITALIST

## 2021-11-20 PROCEDURE — 74011250636 HC RX REV CODE- 250/636: Performed by: HOSPITALIST

## 2021-11-20 PROCEDURE — 82962 GLUCOSE BLOOD TEST: CPT

## 2021-11-20 PROCEDURE — 84484 ASSAY OF TROPONIN QUANT: CPT

## 2021-11-20 PROCEDURE — 94760 N-INVAS EAR/PLS OXIMETRY 1: CPT

## 2021-11-20 PROCEDURE — 65270000029 HC RM PRIVATE

## 2021-11-20 PROCEDURE — 77010033678 HC OXYGEN DAILY

## 2021-11-20 PROCEDURE — 74011000250 HC RX REV CODE- 250: Performed by: HOSPITALIST

## 2021-11-20 PROCEDURE — 36415 COLL VENOUS BLD VENIPUNCTURE: CPT

## 2021-11-20 PROCEDURE — 74011000636 HC RX REV CODE- 636: Performed by: EMERGENCY MEDICINE

## 2021-11-20 RX ORDER — FUROSEMIDE 40 MG/1
20 TABLET ORAL DAILY
Status: DISCONTINUED | OUTPATIENT
Start: 2021-11-20 | End: 2021-11-22

## 2021-11-20 RX ORDER — GUAIFENESIN 100 MG/5ML
81 LIQUID (ML) ORAL DAILY
Status: DISCONTINUED | OUTPATIENT
Start: 2021-11-20 | End: 2021-11-23

## 2021-11-20 RX ORDER — MAGNESIUM SULFATE 100 %
4 CRYSTALS MISCELLANEOUS AS NEEDED
Status: DISCONTINUED | OUTPATIENT
Start: 2021-11-20 | End: 2021-11-24 | Stop reason: HOSPADM

## 2021-11-20 RX ORDER — POTASSIUM CHLORIDE 750 MG/1
20 TABLET, FILM COATED, EXTENDED RELEASE ORAL
Status: DISCONTINUED | OUTPATIENT
Start: 2021-11-20 | End: 2021-11-24 | Stop reason: HOSPADM

## 2021-11-20 RX ORDER — ONDANSETRON 2 MG/ML
4 INJECTION INTRAMUSCULAR; INTRAVENOUS
Status: DISCONTINUED | OUTPATIENT
Start: 2021-11-20 | End: 2021-11-24 | Stop reason: HOSPADM

## 2021-11-20 RX ORDER — ENOXAPARIN SODIUM 100 MG/ML
40 INJECTION SUBCUTANEOUS EVERY 24 HOURS
Status: DISCONTINUED | OUTPATIENT
Start: 2021-11-20 | End: 2021-11-23

## 2021-11-20 RX ORDER — FUROSEMIDE 20 MG/1
20 TABLET ORAL DAILY
COMMUNITY
Start: 2021-11-16 | End: 2021-11-24

## 2021-11-20 RX ORDER — CARVEDILOL 3.12 MG/1
3.12 TABLET ORAL 2 TIMES DAILY WITH MEALS
Status: DISCONTINUED | OUTPATIENT
Start: 2021-11-20 | End: 2021-11-21

## 2021-11-20 RX ORDER — HYDROMORPHONE HYDROCHLORIDE 1 MG/ML
0.5 INJECTION, SOLUTION INTRAMUSCULAR; INTRAVENOUS; SUBCUTANEOUS
Status: DISCONTINUED | OUTPATIENT
Start: 2021-11-20 | End: 2021-11-20

## 2021-11-20 RX ORDER — PANTOPRAZOLE SODIUM 40 MG/1
40 TABLET, DELAYED RELEASE ORAL
Status: DISCONTINUED | OUTPATIENT
Start: 2021-11-20 | End: 2021-11-24 | Stop reason: HOSPADM

## 2021-11-20 RX ORDER — SODIUM CHLORIDE 0.9 % (FLUSH) 0.9 %
5-40 SYRINGE (ML) INJECTION EVERY 8 HOURS
Status: DISCONTINUED | OUTPATIENT
Start: 2021-11-20 | End: 2021-11-24 | Stop reason: HOSPADM

## 2021-11-20 RX ORDER — ACETAMINOPHEN 325 MG/1
650 TABLET ORAL
Status: DISCONTINUED | OUTPATIENT
Start: 2021-11-20 | End: 2021-11-24 | Stop reason: HOSPADM

## 2021-11-20 RX ORDER — POTASSIUM CHLORIDE 750 MG/1
10 CAPSULE, EXTENDED RELEASE ORAL DAILY
COMMUNITY
Start: 2021-11-16 | End: 2022-03-18

## 2021-11-20 RX ORDER — CLOPIDOGREL BISULFATE 75 MG/1
75 TABLET ORAL DAILY
Status: DISCONTINUED | OUTPATIENT
Start: 2021-11-20 | End: 2021-11-21

## 2021-11-20 RX ORDER — PRAVASTATIN SODIUM 10 MG/1
10 TABLET ORAL
Status: DISCONTINUED | OUTPATIENT
Start: 2021-11-20 | End: 2021-11-24 | Stop reason: HOSPADM

## 2021-11-20 RX ORDER — SODIUM CHLORIDE 9 MG/ML
1000 INJECTION, SOLUTION INTRAVENOUS CONTINUOUS
Status: DISCONTINUED | OUTPATIENT
Start: 2021-11-20 | End: 2021-11-21

## 2021-11-20 RX ORDER — GLIPIZIDE 5 MG/1
5 TABLET ORAL
Status: DISCONTINUED | OUTPATIENT
Start: 2021-11-20 | End: 2021-11-24 | Stop reason: HOSPADM

## 2021-11-20 RX ORDER — HYDROMORPHONE HYDROCHLORIDE 1 MG/ML
0.5 INJECTION, SOLUTION INTRAMUSCULAR; INTRAVENOUS; SUBCUTANEOUS
Status: DISPENSED | OUTPATIENT
Start: 2021-11-20 | End: 2021-11-22

## 2021-11-20 RX ORDER — DEXTROSE 50 % IN WATER (D50W) INTRAVENOUS SYRINGE
25-50 AS NEEDED
Status: DISCONTINUED | OUTPATIENT
Start: 2021-11-20 | End: 2021-11-24 | Stop reason: HOSPADM

## 2021-11-20 RX ORDER — VERAPAMIL HYDROCHLORIDE 240 MG/1
240 TABLET, FILM COATED, EXTENDED RELEASE ORAL DAILY
Status: DISCONTINUED | OUTPATIENT
Start: 2021-11-20 | End: 2021-11-21 | Stop reason: CLARIF

## 2021-11-20 RX ORDER — PRAVASTATIN SODIUM 10 MG/1
10 TABLET ORAL
COMMUNITY
Start: 2021-11-16 | End: 2022-03-18

## 2021-11-20 RX ORDER — SODIUM CHLORIDE 0.9 % (FLUSH) 0.9 %
5-40 SYRINGE (ML) INJECTION AS NEEDED
Status: DISCONTINUED | OUTPATIENT
Start: 2021-11-20 | End: 2021-11-24 | Stop reason: HOSPADM

## 2021-11-20 RX ADMIN — PRAVASTATIN SODIUM 10 MG: 10 TABLET ORAL at 21:52

## 2021-11-20 RX ADMIN — CLOPIDOGREL BISULFATE 75 MG: 75 TABLET ORAL at 09:24

## 2021-11-20 RX ADMIN — ASPIRIN 81 MG CHEWABLE TABLET 81 MG: 81 TABLET CHEWABLE at 09:24

## 2021-11-20 RX ADMIN — FUROSEMIDE 20 MG: 40 TABLET ORAL at 09:24

## 2021-11-20 RX ADMIN — HYDROMORPHONE HYDROCHLORIDE 0.5 MG: 1 INJECTION, SOLUTION INTRAMUSCULAR; INTRAVENOUS; SUBCUTANEOUS at 18:47

## 2021-11-20 RX ADMIN — ONDANSETRON 4 MG: 2 INJECTION INTRAMUSCULAR; INTRAVENOUS at 22:26

## 2021-11-20 RX ADMIN — IOPAMIDOL 100 ML: 755 INJECTION, SOLUTION INTRAVENOUS at 01:02

## 2021-11-20 RX ADMIN — PROCHLORPERAZINE EDISYLATE 5 MG: 5 INJECTION INTRAMUSCULAR; INTRAVENOUS at 14:06

## 2021-11-20 RX ADMIN — PROCHLORPERAZINE EDISYLATE 5 MG: 5 INJECTION INTRAMUSCULAR; INTRAVENOUS at 09:25

## 2021-11-20 RX ADMIN — Medication 10 ML: at 22:06

## 2021-11-20 RX ADMIN — ENOXAPARIN SODIUM 40 MG: 40 INJECTION SUBCUTANEOUS at 09:24

## 2021-11-20 RX ADMIN — PANTOPRAZOLE SODIUM 40 MG: 40 TABLET, DELAYED RELEASE ORAL at 09:24

## 2021-11-20 NOTE — PROGRESS NOTES
Progress Note    Patient: Kathy Victor MRN: [id number]  SSN: [id number]    YOB: 1940  Age: [de-identified] y.o. 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 She requested for full liquis diet, on IVF, pain management     Her AECHF may be due to stress from pancreatitis. Technical situation, given aute pancreatitis warrants fluids. Will be judicious      Review of Systems:  Constitutional:  denies weight loss, no fever, no chills, no night sweats  Eye: No recent visual disturbances, no discharge, no double vision  Ear/nose/mouth/throat : No hearing disturbance, no ear pain, no nasal congestion, no sore throat, no trouble swallowing. Respiratory : +++ trouble breathing, no cough, no shortness of breath, no hemoptysis, no wheezing  Cardiovascular : No chest pain, no palpitation, no racing of heart, no orthopnea, no paroxysmal nocturnal dyspnea, no peripheral edema  Gastrointestinal : +++  nausea, ++++  vomiting, no diarrhea, constipation, +++ heartburn, ++++ abdominal pain  Genitourinary : No dysuria, no hematuria, no increased frequency, incontinence,  Lymphatics : No swollen glands -Neck, axillary, inguinal  Endocrine : No excessive thirst, no polyuria no cold intolerance, no heat intolerance. Immunologic : No hives, urticaria, no seasonal allergies,   Musculoskeletal : Left upper back pain.   No joint swelling, pain, effusion,  no neck pain,   Integumentary : No rash, no pruritus, no ecchymosis  Hematology : No petechiae, No easy bruising,  No tendency to bleed easy  Neurology : Denies change in mental status, no abnormal balance, no headache, no confusion, numbness, tingling,  Psychiatric : No mood swings, no anxiety, depression      Objective:     Vitals:    11/20/21 0430 11/20/21 0530 11/20/21 0805 11/20/21 0956   BP: (!) 139/97 (!) 148/98 (!) 144/88    Pulse: 88 92 93    Resp: 25 25 25    Temp:   97.7 °F (36.5 °C)    SpO2: 98% 97% 99% 98%   Weight:       Height:            Intake and Output:  Current Shift: No intake/output data recorded. Last three shifts: No intake/output data recorded. Physical Exam:   General : Sleeping very comfortably. HEENT : atraumatic normocephalic, Normal ear and nose. Neck : Supple, no JVD, no masses noted, no carotid bruit. Lungs : Breath sounds with moderate air entry bilaterally, no wheezes or rales, no accessory muscle use. CVS : Rhythm rate regular, S1+, S2+, no murmur or gallop. Abdomen : Soft, nontender,  bowel sounds active. Extremities : No edema noted,  pedal pulses palpable. Musculoskeletal :, no joint swelling or effusion,   Skin : Dry, warm. No pathological rash. Lymphatic : No cervical lymphadenopathy. Neurological : She was completely oriented, no deficits neurologically on arrival to the emergency room per emergency room physician. I known this patient last admission. Psychiatric : Unable to evaluate as she is sleeping. Lab/Data Review:  Recent Results (from the past 24 hour(s))   CBC WITH AUTOMATED DIFF    Collection Time: 11/19/21 11:04 PM   Result Value Ref Range    WBC 4.3 3.6 - 11.0 K/uL    RBC 4.97 3.80 - 5.20 M/uL    HGB 12.9 11.5 - 16.0 g/dL    HCT 40.5 35.0 - 47.0 %    MCV 81.5 80.0 - 99.0 FL    MCH 26.0 26.0 - 34.0 PG    MCHC 31.9 30.0 - 36.5 g/dL    RDW 15.5 (H) 11.5 - 14.5 %    PLATELET 136 (L) 868 - 400 K/uL    MPV 12.6 8.9 - 12.9 FL    NRBC 0.0 0.0  WBC    ABSOLUTE NRBC 0.00 0.00 - 0.01 K/uL    NEUTROPHILS 53 32 - 75 %    LYMPHOCYTES 29 12 - 49 %    MONOCYTES 13 5 - 13 %    EOSINOPHILS 4 0 - 7 %    BASOPHILS 1 0 - 1 %    IMMATURE GRANULOCYTES 0 0 - 0.5 %    ABS. NEUTROPHILS 2.3 1.8 - 8.0 K/UL    ABS. LYMPHOCYTES 1.3 0.8 - 3.5 K/UL    ABS. MONOCYTES 0.5 0.0 - 1.0 K/UL    ABS. EOSINOPHILS 0.2 0.0 - 0.4 K/UL    ABS.  BASOPHILS 0.0 0.0 - 0.1 K/UL    ABS. IMM. GRANS. 0.0 0.00 - 0.04 K/UL    DF AUTOMATED     METABOLIC PANEL, COMPREHENSIVE    Collection Time: 11/19/21 11:04 PM   Result Value Ref Range    Sodium 134 (L) 136 - 145 mmol/L    Potassium 4.4 3.5 - 5.1 mmol/L    Chloride 101 97 - 108 mmol/L    CO2 26 21 - 32 mmol/L    Anion gap 7 5 - 15 mmol/L    Glucose 329 (H) 65 - 100 mg/dL    BUN 21 (H) 6 - 20 mg/dL    Creatinine 1.25 (H) 0.55 - 1.02 mg/dL    BUN/Creatinine ratio 17 12 - 20      GFR est AA 50 (L) >60 ml/min/1.73m2    GFR est non-AA 41 (L) >60 ml/min/1.73m2    Calcium 8.9 8.5 - 10.1 mg/dL    Bilirubin, total 0.5 0.2 - 1.0 mg/dL    AST (SGOT) 35 15 - 37 U/L    ALT (SGPT) 32 12 - 78 U/L    Alk.  phosphatase 144 (H) 45 - 117 U/L    Protein, total 6.4 6.4 - 8.2 g/dL    Albumin 3.1 (L) 3.5 - 5.0 g/dL    Globulin 3.3 2.0 - 4.0 g/dL    A-G Ratio 0.9 (L) 1.1 - 2.2     LIPASE    Collection Time: 11/19/21 11:04 PM   Result Value Ref Range    Lipase >3,000 (H) 73 - 393 U/L   TROPONIN-HIGH SENSITIVITY    Collection Time: 11/19/21 11:04 PM   Result Value Ref Range    Troponin-High Sensitivity 533 (HH) 0 - 51 ng/L   NT-PRO BNP    Collection Time: 11/19/21 11:04 PM   Result Value Ref Range    NT pro-BNP 7,717 (H) <450 pg/mL   TROPONIN-HIGH SENSITIVITY    Collection Time: 11/20/21  3:00 AM   Result Value Ref Range    Troponin-High Sensitivity 538 (HH) 0 - 51 ng/L   GLUCOSE, POC    Collection Time: 11/20/21  9:30 AM   Result Value Ref Range    Glucose (POC) 193 (H) 65 - 117 mg/dL    Performed by Joaquin Mayorga and plan:      (1) Acute pancreatitis: full liquis, pain and IVF, consult gi for autoimmune consideration    (2) AECHFrEF: 4L, wean as tolerated,     (3) JERAMY: continue IVF    (4) GERD: protonix    DVT ppx: lovenox     DISPO: home when pain free and on rrom air        Signed By: Sidney Garcia MD     November 20, 2021

## 2021-11-20 NOTE — ED PROVIDER NOTES
EMERGENCY DEPARTMENT HISTORY AND PHYSICAL EXAM      Date: 11/19/2021  Patient Name: Carole Caldwell      History of Presenting Illness     Chief Complaint   Patient presents with    Abdominal Pain    Respiratory Distress       History Provided By: Patient    HPI: Carole Caldwell, [de-identified] y.o. female with a past medical history significant myocardial infarction presents to the ED with cc of epigastric abdominal pain. Patient for symptoms been going on for approximately 2 weeks. Patient also reports short of breath during the same time. Patient denies any chest pain, denies fevers or chills. Patient reports the pain is periumbilical, without radiation, without noted aggravating alleviating factors. Patient reports recent diagnosis of pancreatitis, also had cardiac stents placed due to a non-STEMI within the last 6 weeks. Patient reports nausea without vomiting. There are no other complaints, changes, or physical findings at this time. PCP: Jada Ayala MD    Current Outpatient Medications   Medication Sig Dispense Refill    atorvastatin (Lipitor) 10 mg tablet Take 1 Tablet by mouth daily. 30 Tablet 1    clopidogreL (PLAVIX) 75 mg tab Take 1 Tablet by mouth daily. 30 Tablet 0    carvediloL (COREG) 3.125 mg tablet Take 1 Tablet by mouth two (2) times daily (with meals). 60 Tablet 0    glipiZIDE (GLUCOTROL) 5 mg tablet Take 1 Tablet by mouth Daily (before breakfast). 30 Tablet 0    aspirin 81 mg chewable tablet Take 1 Tablet by mouth daily. 30 Tablet 0    insulin glargine (LANTUS) 100 unit/mL injection 22 Units by SubCUTAneous route nightly.  verapamil ER (CALAN-SR) 240 mg CR tablet Take 240 mg by mouth daily.  atorvastatin (LIPITOR) 80 mg tablet Take 1 Tab by mouth daily. 30 Tab 0    lisinopriL (PRINIVIL, ZESTRIL) 20 mg tablet Take 1 Tab by mouth daily.  30 Tab 0    insulin lispro (HUMALOG) 100 unit/mL injection Take 3 units tid 1 Vial 0       Past History     Past Medical History:  Past Medical History:   Diagnosis Date    Colon polyps     Diabetes ([address]     Diverticulosis     Hypertension     Ill-defined condition        Past Surgical History:  Past Surgical History:   Procedure Laterality Date    COLONOSCOPY N/A 4/29/2021    COLONOSCOPY performed by Jacob Hermosillo MD at [facility] ENDOSCOPY       Family History:  Family History   Problem Relation Age of Onset    Diabetes Other        Social History:  Social History     Tobacco Use    Smoking status: Never Smoker    Smokeless tobacco: Never Used   Vaping Use    Vaping Use: Never used   Substance Use Topics    Alcohol use: Not Currently    Drug use: Never       Allergies: Allergies   Allergen Reactions    Insulins Itching    Penicillins Other (comments)     Pt states it makes her pass out         Review of Systems   Review of Systems   Constitutional: Negative for chills and fever. HENT: Negative for sinus pressure and sinus pain. Eyes: Negative for photophobia and redness. Respiratory: Positive for shortness of breath and wheezing. Cardiovascular: Negative for chest pain and palpitations. Gastrointestinal: Positive for abdominal pain and nausea. Genitourinary: Negative for flank pain and hematuria. Musculoskeletal: Negative for arthralgias and gait problem. Skin: Negative for color change and pallor. Neurological: Negative for dizziness and weakness. Review of Systems    Physical Exam   Physical Exam  Constitutional:       General: No acute distress. Appearance: Normal appearance. Not toxic-appearing. HENT:      Head: Normocephalic and atraumatic. Nose: Nose normal.      Mouth/Throat:      Mouth: Mucous membranes are moist.   Eyes:      Extraocular Movements: Extraocular movements intact. Pupils: Pupils are equal, round, and reactive to light. Cardiovascular:      Rate and Rhythm: Normal rate. Pulses: Normal pulses. Pulmonary:      Effort: Pulmonary effort is normal.      Breath sounds: No stridor. Abdominal:      General: Abdomen is flat. There is no distension. Soft without guarding or rebound. Musculoskeletal:         General: Normal range of motion. Cervical back: Normal range of motion and neck supple. Skin:     General: Skin is warm and dry. Capillary Refill: Capillary refill takes less than 2 seconds. Neurological:      General: No focal deficit present. Mental Status: Aert and oriented to person, place, and time. Psychiatric:         Mood and Affect: Mood normal.         Behavior: Behavior normal.       Physical Exam    Lab and Diagnostic Study Results     Labs -     Recent Results (from the past 12 hour(s))   CBC WITH AUTOMATED DIFF    Collection Time: 11/19/21 11:04 PM   Result Value Ref Range    WBC 4.3 3.6 - 11.0 K/uL    RBC 4.97 3.80 - 5.20 M/uL    HGB 12.9 11.5 - 16.0 g/dL    HCT 40.5 35.0 - 47.0 %    MCV 81.5 80.0 - 99.0 FL    MCH 26.0 26.0 - 34.0 PG    MCHC 31.9 30.0 - 36.5 g/dL    RDW 15.5 (H) 11.5 - 14.5 %    PLATELET 827 (L) 584 - 400 K/uL    MPV 12.6 8.9 - 12.9 FL    NRBC 0.0 0.0  WBC    ABSOLUTE NRBC 0.00 0.00 - 0.01 K/uL    NEUTROPHILS 53 32 - 75 %    LYMPHOCYTES 29 12 - 49 %    MONOCYTES 13 5 - 13 %    EOSINOPHILS 4 0 - 7 %    BASOPHILS 1 0 - 1 %    IMMATURE GRANULOCYTES 0 0 - 0.5 %    ABS. NEUTROPHILS 2.3 1.8 - 8.0 K/UL    ABS. LYMPHOCYTES 1.3 0.8 - 3.5 K/UL    ABS. MONOCYTES 0.5 0.0 - 1.0 K/UL    ABS. EOSINOPHILS 0.2 0.0 - 0.4 K/UL    ABS. BASOPHILS 0.0 0.0 - 0.1 K/UL    ABS. IMM.  GRANS. 0.0 0.00 - 0.04 K/UL    DF AUTOMATED     METABOLIC PANEL, COMPREHENSIVE    Collection Time: 11/19/21 11:04 PM   Result Value Ref Range    Sodium 134 (L) 136 - 145 mmol/L    Potassium 4.4 3.5 - 5.1 mmol/L    Chloride 101 97 - 108 mmol/L    CO2 26 21 - 32 mmol/L    Anion gap 7 5 - 15 mmol/L    Glucose 329 (H) 65 - 100 mg/dL    BUN 21 (H) 6 - 20 mg/dL    Creatinine 1.25 (H) 0.55 - 1.02 mg/dL    BUN/Creatinine ratio 17 12 - 20      GFR est AA 50 (L) >60 ml/min/1.73m2    GFR est non-AA 41 (L) >60 ml/min/1.73m2    Calcium 8.9 8.5 - 10.1 mg/dL    Bilirubin, total 0.5 0.2 - 1.0 mg/dL    AST (SGOT) 35 15 - 37 U/L    ALT (SGPT) 32 12 - 78 U/L    Alk. phosphatase 144 (H) 45 - 117 U/L    Protein, total 6.4 6.4 - 8.2 g/dL    Albumin 3.1 (L) 3.5 - 5.0 g/dL    Globulin 3.3 2.0 - 4.0 g/dL    A-G Ratio 0.9 (L) 1.1 - 2.2     LIPASE    Collection Time: 11/19/21 11:04 PM   Result Value Ref Range    Lipase >3,000 (H) 73 - 393 U/L   TROPONIN-HIGH SENSITIVITY    Collection Time: 11/19/21 11:04 PM   Result Value Ref Range    Troponin-High Sensitivity 533 (HH) 0 - 51 ng/L   NT-PRO BNP    Collection Time: 11/19/21 11:04 PM   Result Value Ref Range    NT pro-BNP 7,717 (H) <450 pg/mL       Radiologic Studies -   [unfilled]  CT Results  (Last 48 hours)               11/20/21 0102  CT ABD PELV W CONT Final result    Impression:  1. Pancreatitis with resolving peripancreatic inflammation. No pseudocyst or   organized fluid collection. Cystic dilatation of the main pancreatic duct at the   tail unchanged. 2. Mild thickening of the distal gastric antrum which could represent gastritis   in the appropriate clinical setting. No definite ulcer identified. 3. Small supraumbilical ventral hernia containing omental fat, unchanged. 4. Moderate right and small left pleural effusions. Narrative:  Exam: CT ABD PELV W CONT       TECHNIQUE: Multiple transaxial CT images of the abdomen and pelvis were obtained   following the uneventful administration of 100 mL Isovue-370 intravenous   contrast. Coronal and sagittal reformatted images were provided. Dose reduction: All CT scans at this facility are performed using dose reduction   optimization techniques as appropriate to a performed exam including the   following: Automated exposure control, adjustments of the mA and/or kV according   to patient size, or use of iterative reconstruction technique.        COMPARISON: CT of the abdomen and pelvis from October 22, 2021       HISTORY: Abd pain, periumbilical, recent pancreatitis       FINDINGS:       Lower thorax: Moderate right and small left pleural effusions. Bibasilar   airspace disease. Cardiomegaly with severe coronary artery calcifications. Abdomen and pelvis:   Liver: Normal morphology. Normal attenuation and enhancement. Curvilinear   hypodensity along the gallbladder fossa which is unchanged. Additional   subcentimeter hypodensity scattered throughout liver not well evaluated but   grossly unchanged. Gallbladder: Nondistended. Biliary system: Nondilated. Pancreas: Improved peripancreatic stranding. Again seen is focal prominence of   the main pancreatic duct with pronounced ductal dilatation at the tail segment   measuring up to 14 mm. There are cystic dilation of the terminal ducts at the   tail with peripheral calcification. Spleen: No splenomegaly or focal splenic lesion. Adrenal glands: Normal.   Kidneys and ureters: Kidneys enhance symmetrically. No hydronephrosis. Scattered   cortically-based hypodensities are too small to characterize but most likely   renal cysts. Urinary bladder: Normal   Reproductive organs: Age-appropriate uterus. No large adnexal mass. Bowel: Stomach is mildly distended. There is pronounced thickening of the distal   gastric antrum. Small and large bowel are normal in caliber. Colonic   diverticulosis. Normal appendix. Peritoneum: No ascites. No pneumoperitoneum. Left ankle hernia containing fat. Lymph nodes: No abdominal/pelvic lymphadenopathy. Aorta and other vessels: Calcific intimal plaque throughout the aorta and pelvic   vasculature. Bones: Scoliosis. No acute bony abnormality. No aggressive osseous lesion. Superficial soft tissues: Supraumbilical ventral hernia containing small amount   of herniated omentum, unchanged.                CXR Results  (Last 48 hours)               11/19/21 2245  XR CHEST PORT Final result Impression:  Interstitial pulmonary edema. Trace right and small left pleural   effusion. Unchanged left retrocardiac consolidation. Narrative:  Exam: XR CHEST PORT         Indication:  SOB; Comparison: Radiograph from October 29, 2021       Findings:       Cardiac mediastinal silhouette is enlarged, unchanged. Calcifications of   thoracic aorta. Unchanged left retrocardiac consolidation. Streaky perihilar   densities with associated mild septal thickening. Trace right pleural effusion. Probable small left pleural effusion, unchanged. No pneumothorax. Medical Decision Making and ED Course   - I am the first and primary provider for this patient AND AM THE PRIMARY PROVIDER OF RECORD. - I reviewed the vital signs, available nursing notes, past medical history, past surgical history, family history and social history. - Initial assessment performed. The patients presenting problems have been discussed, and the staff are in agreement with the care plan formulated and outlined with them. I have encouraged them to ask questions as they arise throughout their visit. Vital Signs-Reviewed the patient's vital signs. Patient Vitals for the past 12 hrs:   Temp Pulse Resp BP SpO2   11/19/21 2309 97.6 °F (36.4 °C) -- -- -- --   11/19/21 2225 (!) 96.4 °F (35.8 °C) (!) 103 22 (!) 161/106 97 %            Provider Notes (Medical Decision Making): MDM                 Disposition     Disposition: Admitted to Floor Medical Floor the case was discussed with the admitting physician          Diagnosis     Clinical Impression:   1. Abdominal pain, generalized    2. Acute biliary pancreatitis, unspecified complication status        Attestations:    Blanquita Dobbins MD    Please note that this dictation was completed with [employer], the computer voice recognition software.   Quite often unanticipated grammatical, syntax, homophones, and other interpretive errors are inadvertently transcribed by the computer software. Please disregard these errors. Please excuse any errors that have escaped final proofreading. Thank you.

## 2021-11-20 NOTE — H&P
History and Physical              Subjective :   Chief Complaint : Shortness of breath, abdominal pain continued since discharge      Source of information : Mostly from the ED provider and nursing staff, patient is in good deep sleep when I saw her unable to stay awake to give me information. History of present illness:   [de-identified] y.o. female with a history of diabetes, hypertension, coronary artery disease presents to the emergency room with continued epigastric abdominal pain. It is going on since last admission and discharge, no improvement. She was admitted for pancreatitis last admission, and she continued to have the pain. Denied any fever or chills. No aggravating relieving factors. She has diabetes that is not controlled well, but she feels allergy to the penicillins and cannot take them. CT of the abdomen suggest pancreatitis but some resolution of peripancreatic inflammation. Past Medical History:   Diagnosis Date    Colon polyps     Diabetes ([address]     Diverticulosis     Hypertension     Ill-defined condition      Past Surgical History:   Procedure Laterality Date    COLONOSCOPY N/A 4/29/2021    COLONOSCOPY performed by Dillon Jerome MD at [facility] ENDOSCOPY     Family History   Problem Relation Age of Onset    Diabetes Other       Social History     Tobacco Use    Smoking status: Never Smoker    Smokeless tobacco: Never Used   Substance Use Topics    Alcohol use: Not Currently       Prior to Admission medications    Medication Sig Start Date End Date Taking? Authorizing Provider   furosemide (LASIX) 20 mg tablet Take 20 mg by mouth daily. 11/16/21   Provider, Historical   potassium chloride SA (MICRO-K) 10 mEq capsule Take 10 mEq by mouth daily. 11/16/21   Provider, Historical   pravastatin (PRAVACHOL) 10 mg tablet Take 10 mg by mouth nightly. 11/16/21   Provider, Historical   clopidogreL (PLAVIX) 75 mg tab Take 1 Tablet by mouth daily.  10/30/21   Marlen Hernandez MD   carvediloL ([address]) 3.125 mg tablet Take 1 Tablet by mouth two (2) times daily (with meals). 10/29/21   Dorina Munoz MD   glipiZIDE (GLUCOTROL) 5 mg tablet Take 1 Tablet by mouth Daily (before breakfast). 10/30/21   Piper Garcia MD   aspirin 81 mg chewable tablet Take 1 Tablet by mouth daily. 10/30/21   Dorina Munoz MD   verapamil ER (CALAN-SR) 240 mg CR tablet Take 240 mg by mouth daily. 9/21/21   Provider, Historical     Allergies   Allergen Reactions    Insulins Itching    Penicillins Other (comments)     Pt states it makes her pass out             Review of Systems: Unable to obtain as patient is sleeping and unable to stay awake. Vitals:     Patient Vitals for the past 12 hrs:   Temp Pulse Resp BP SpO2   11/19/21 2309 97.6 °F (36.4 °C) -- -- -- --   11/19/21 2225 (!) 96.4 °F (35.8 °C) (!) 103 22 (!) 161/106 97 %       Physical Exam:   General : Sleeping very comfortably. HEENT : atraumatic normocephalic, Normal ear and nose. Neck : Supple, no JVD, no masses noted, no carotid bruit. Lungs : Breath sounds with moderate air entry bilaterally, no wheezes or rales, no accessory muscle use. CVS : Rhythm rate regular, S1+, S2+, no murmur or gallop. Abdomen : Soft, nontender,  bowel sounds active. Extremities : No edema noted,  pedal pulses palpable. Musculoskeletal :, no joint swelling or effusion,   Skin : Dry, warm. No pathological rash. Lymphatic : No cervical lymphadenopathy. Neurological : She was completely oriented, no deficits neurologically on arrival to the emergency room per emergency room physician. I known this patient last admission. Psychiatric : Unable to evaluate as she is sleeping.         Data Review:   Recent Results (from the past 24 hour(s))   CBC WITH AUTOMATED DIFF    Collection Time: 11/19/21 11:04 PM   Result Value Ref Range    WBC 4.3 3.6 - 11.0 K/uL    RBC 4.97 3.80 - 5.20 M/uL    HGB 12.9 11.5 - 16.0 g/dL    HCT 40.5 35.0 - 47.0 %    MCV 81.5 80.0 - 99.0 FL    MCH 26.0 26.0 - 34.0 PG    MCHC 31.9 30.0 - 36.5 g/dL    RDW 15.5 (H) 11.5 - 14.5 %    PLATELET 411 (L) 173 - 400 K/uL    MPV 12.6 8.9 - 12.9 FL    NRBC 0.0 0.0  WBC    ABSOLUTE NRBC 0.00 0.00 - 0.01 K/uL    NEUTROPHILS 53 32 - 75 %    LYMPHOCYTES 29 12 - 49 %    MONOCYTES 13 5 - 13 %    EOSINOPHILS 4 0 - 7 %    BASOPHILS 1 0 - 1 %    IMMATURE GRANULOCYTES 0 0 - 0.5 %    ABS. NEUTROPHILS 2.3 1.8 - 8.0 K/UL    ABS. LYMPHOCYTES 1.3 0.8 - 3.5 K/UL    ABS. MONOCYTES 0.5 0.0 - 1.0 K/UL    ABS. EOSINOPHILS 0.2 0.0 - 0.4 K/UL    ABS. BASOPHILS 0.0 0.0 - 0.1 K/UL    ABS. IMM. GRANS. 0.0 0.00 - 0.04 K/UL    DF AUTOMATED     METABOLIC PANEL, COMPREHENSIVE    Collection Time: 11/19/21 11:04 PM   Result Value Ref Range    Sodium 134 (L) 136 - 145 mmol/L    Potassium 4.4 3.5 - 5.1 mmol/L    Chloride 101 97 - 108 mmol/L    CO2 26 21 - 32 mmol/L    Anion gap 7 5 - 15 mmol/L    Glucose 329 (H) 65 - 100 mg/dL    BUN 21 (H) 6 - 20 mg/dL    Creatinine 1.25 (H) 0.55 - 1.02 mg/dL    BUN/Creatinine ratio 17 12 - 20      GFR est AA 50 (L) >60 ml/min/1.73m2    GFR est non-AA 41 (L) >60 ml/min/1.73m2    Calcium 8.9 8.5 - 10.1 mg/dL    Bilirubin, total 0.5 0.2 - 1.0 mg/dL    AST (SGOT) 35 15 - 37 U/L    ALT (SGPT) 32 12 - 78 U/L    Alk.  phosphatase 144 (H) 45 - 117 U/L    Protein, total 6.4 6.4 - 8.2 g/dL    Albumin 3.1 (L) 3.5 - 5.0 g/dL    Globulin 3.3 2.0 - 4.0 g/dL    A-G Ratio 0.9 (L) 1.1 - 2.2     LIPASE    Collection Time: 11/19/21 11:04 PM   Result Value Ref Range    Lipase >3,000 (H) 73 - 393 U/L   TROPONIN-HIGH SENSITIVITY    Collection Time: 11/19/21 11:04 PM   Result Value Ref Range    Troponin-High Sensitivity 533 (HH) 0 - 51 ng/L   NT-PRO BNP    Collection Time: 11/19/21 11:04 PM   Result Value Ref Range    NT pro-BNP 7,717 (H) <450 pg/mL   TROPONIN-HIGH SENSITIVITY    Collection Time: 11/20/21  3:00 AM   Result Value Ref Range    Troponin-High Sensitivity 538 (HH) 0 - 51 ng/L       Radiologic Studies :   CT Results  (Last 48 hours) 11/20/21 0102  CT ABD PELV W CONT Final result    Impression:  1. Pancreatitis with resolving peripancreatic inflammation. No pseudocyst or   organized fluid collection. Cystic dilatation of the main pancreatic duct at the   tail unchanged. 2. Mild thickening of the distal gastric antrum which could represent gastritis   in the appropriate clinical setting. No definite ulcer identified. 3. Small supraumbilical ventral hernia containing omental fat, unchanged. 4. Moderate right and small left pleural effusions. Narrative:  Exam: CT ABD PELV W CONT       TECHNIQUE: Multiple transaxial CT images of the abdomen and pelvis were obtained   following the uneventful administration of 100 mL Isovue-370 intravenous   contrast. Coronal and sagittal reformatted images were provided. Dose reduction: All CT scans at this facility are performed using dose reduction   optimization techniques as appropriate to a performed exam including the   following: Automated exposure control, adjustments of the mA and/or kV according   to patient size, or use of iterative reconstruction technique. COMPARISON: CT of the abdomen and pelvis from October 22, 2021       HISTORY: Abd pain, periumbilical, recent pancreatitis       FINDINGS:       Lower thorax: Moderate right and small left pleural effusions. Bibasilar   airspace disease. Cardiomegaly with severe coronary artery calcifications. Abdomen and pelvis:   Liver: Normal morphology. Normal attenuation and enhancement. Curvilinear   hypodensity along the gallbladder fossa which is unchanged. Additional   subcentimeter hypodensity scattered throughout liver not well evaluated but   grossly unchanged. Gallbladder: Nondistended. Biliary system: Nondilated. Pancreas: Improved peripancreatic stranding. Again seen is focal prominence of   the main pancreatic duct with pronounced ductal dilatation at the tail segment   measuring up to 14 mm.  There are cystic dilation of the terminal ducts at the   tail with peripheral calcification. Spleen: No splenomegaly or focal splenic lesion. Adrenal glands: Normal.   Kidneys and ureters: Kidneys enhance symmetrically. No hydronephrosis. Scattered   cortically-based hypodensities are too small to characterize but most likely   renal cysts. Urinary bladder: Normal   Reproductive organs: Age-appropriate uterus. No large adnexal mass. Bowel: Stomach is mildly distended. There is pronounced thickening of the distal   gastric antrum. Small and large bowel are normal in caliber. Colonic   diverticulosis. Normal appendix. Peritoneum: No ascites. No pneumoperitoneum. Left ankle hernia containing fat. Lymph nodes: No abdominal/pelvic lymphadenopathy. Aorta and other vessels: Calcific intimal plaque throughout the aorta and pelvic   vasculature. Bones: Scoliosis. No acute bony abnormality. No aggressive osseous lesion. Superficial soft tissues: Supraumbilical ventral hernia containing small amount   of herniated omentum, unchanged. CXR Results  (Last 48 hours)               11/19/21 2245  XR CHEST PORT Final result    Impression:  Interstitial pulmonary edema. Trace right and small left pleural   effusion. Unchanged left retrocardiac consolidation. Narrative:  Exam: XR CHEST PORT         Indication:  SOB; Comparison: Radiograph from October 29, 2021       Findings:       Cardiac mediastinal silhouette is enlarged, unchanged. Calcifications of   thoracic aorta. Unchanged left retrocardiac consolidation. Streaky perihilar   densities with associated mild septal thickening. Trace right pleural effusion. Probable small left pleural effusion, unchanged. No pneumothorax. Assessment and Plan :     Acute pancreatitis: Seems it is becoming chronic at this time, I feel this is just continuation of the recent pancreatitis that she was admitted.   Still lipase levels are more than 3000. Diabetes mellitus type 2 uncontrolled: We will keep her on glipizide and increase the dose to twice a day. I kept her Accu-Cheks, but she refuses the insulin for sliding scale. Benign essential hypertension: Continue home medications    Admitted to medical floor, she is full CODE STATUS, cannot make decisions at this time with understanding, home medications reviewed with external Rx history. Has no advance medical directives. CC : Cynthia Francisco MD  Signed By: Mary Carmen Arshad MD     November 20, 2021      This dictation was done by dragon, computer voice recognition software. Often unanticipated grammatical, syntax, [place] phones and other interpretive errors are inadvertently transcribed. Please excuse errors that have escaped final proofreading.

## 2021-11-20 NOTE — PROGRESS NOTES
Reason for Admission:                       RUR Score:                     Plan for utilizing home health:          PCP: First and Last name:  Patti Barraza MD     Name of Practice:    Are you a current patient: Yes/No:    Approximate date of last visit:    Can you participate in a virtual visit with your PCP:                     Current Advanced Directive/Advance Care Plan: Full Code      Healthcare Decision Maker:   Click here to complete [id number] including selection of the Healthcare Decision Maker Relationship (ie \"Primary\")             Primary Decision Maker: tracie hobbs - Ahmet - [phone]                  Transition of Care Plan:                      CM met with patient at bedside. Patient was lethargic but was able to answer questions. Patient lives alone in a home. Patient stays at her house in the day but at night she stays with her son. Patient's family live all around her in a [place] so she has a strong support system. No hx of HH, or SNF. Patient did receive rehab from [facility] but could not recall when. Patient has 3 walkers at home. Patient saw her PCP last week Wednesday. Patients son will transport upon discharge. CM will continue to follow.

## 2021-11-20 NOTE — ROUTINE PROCESS
TRANSFER - OUT REPORT:    Verbal report given to bryant(name) on Haiml Needle  being transferred to (unit) for routine progression of care       Report consisted of patients Situation, Background, Assessment and   Recommendations(SBAR). Information from the following report(s) SBAR, ED Summary, [facility] and Recent Results was reviewed with the receiving nurse. Lines:   Peripheral IV 11/19/21 Right Antecubital (Active)   Site Assessment Clean, dry, & intact 11/19/21 2306   Dressing Status Clean, dry, & intact 11/19/21 2306   Dressing Type Transparent; Tape 11/19/21 2306   Hub Color/Line Status Pink; Flushed; Patent 11/19/21 2306   Action Taken Blood drawn 11/19/21 2306        Opportunity for questions and clarification was provided.       Patient transported with:   [place]

## 2021-11-20 NOTE — ED TRIAGE NOTES
Presents for sob and abd pain x 2 weeks. Was here 2 weeks ago for same and had cardiac stents placed, \"fluid on her lungs\" and also a dx of pancreatitis. Pt endorses she has been sob since.

## 2021-11-21 LAB
ALBUMIN SERPL-MCNC: 2.8 G/DL (ref 3.5–5)
ALBUMIN/GLOB SERPL: 0.8 {RATIO} (ref 1.1–2.2)
ALP SERPL-CCNC: 111 U/L (ref 45–117)
ALT SERPL-CCNC: 25 U/L (ref 12–78)
ANION GAP SERPL CALC-SCNC: 5 MMOL/L (ref 5–15)
AST SERPL W P-5'-P-CCNC: 16 U/L (ref 15–37)
BILIRUB SERPL-MCNC: 1.3 MG/DL (ref 0.2–1)
BUN SERPL-MCNC: 10 MG/DL (ref 6–20)
BUN/CREAT SERPL: 9 (ref 12–20)
CA-I BLD-MCNC: 8.7 MG/DL (ref 8.5–10.1)
CHLORIDE SERPL-SCNC: 105 MMOL/L (ref 97–108)
CHOLEST SERPL-MCNC: 147 MG/DL
CO2 SERPL-SCNC: 28 MMOL/L (ref 21–32)
CREAT SERPL-MCNC: 1.11 MG/DL (ref 0.55–1.02)
ERYTHROCYTE [DISTWIDTH] IN BLOOD BY AUTOMATED COUNT: 15.5 % (ref 11.5–14.5)
GLOBULIN SER CALC-MCNC: 3.6 G/DL (ref 2–4)
GLUCOSE BLD STRIP.AUTO-MCNC: 171 MG/DL (ref 65–117)
GLUCOSE BLD STRIP.AUTO-MCNC: 246 MG/DL (ref 65–117)
GLUCOSE BLD STRIP.AUTO-MCNC: 261 MG/DL (ref 65–117)
GLUCOSE BLD STRIP.AUTO-MCNC: 94 MG/DL (ref 65–117)
GLUCOSE SERPL-MCNC: 192 MG/DL (ref 65–100)
HCT VFR BLD AUTO: 36.3 % (ref 35–47)
HDLC SERPL-MCNC: 44 MG/DL
HDLC SERPL: 3.3 {RATIO} (ref 0–5)
HGB BLD-MCNC: 11.7 G/DL (ref 11.5–16)
LDLC SERPL CALC-MCNC: 92.4 MG/DL (ref 0–100)
LIPASE SERPL-CCNC: 370 U/L (ref 73–393)
LIPID PROFILE,FLP: NORMAL
MAGNESIUM SERPL-MCNC: 1.9 MG/DL (ref 1.6–2.4)
MCH RBC QN AUTO: 26.3 PG (ref 26–34)
MCHC RBC AUTO-ENTMCNC: 32.2 G/DL (ref 30–36.5)
MCV RBC AUTO: 81.6 FL (ref 80–99)
NRBC # BLD: 0 K/UL (ref 0–0.01)
NRBC BLD-RTO: 0 PER 100 WBC
PERFORMED BY, TECHID: ABNORMAL
PERFORMED BY, TECHID: NORMAL
PHOSPHATE SERPL-MCNC: 2.4 MG/DL (ref 2.6–4.7)
PLATELET # BLD AUTO: 117 K/UL (ref 150–400)
POTASSIUM SERPL-SCNC: 3.5 MMOL/L (ref 3.5–5.1)
PROT SERPL-MCNC: 6.4 G/DL (ref 6.4–8.2)
RBC # BLD AUTO: 4.45 M/UL (ref 3.8–5.2)
SODIUM SERPL-SCNC: 138 MMOL/L (ref 136–145)
TRIGL SERPL-MCNC: 53 MG/DL (ref ?–150)
VLDLC SERPL CALC-MCNC: 10.6 MG/DL
WBC # BLD AUTO: 4.9 K/UL (ref 3.6–11)

## 2021-11-21 PROCEDURE — 80053 COMPREHEN METABOLIC PANEL: CPT

## 2021-11-21 PROCEDURE — 74011250637 HC RX REV CODE- 250/637: Performed by: HOSPITALIST

## 2021-11-21 PROCEDURE — 85027 COMPLETE CBC AUTOMATED: CPT

## 2021-11-21 PROCEDURE — 65270000029 HC RM PRIVATE

## 2021-11-21 PROCEDURE — 82962 GLUCOSE BLOOD TEST: CPT

## 2021-11-21 PROCEDURE — 74011250636 HC RX REV CODE- 250/636: Performed by: HOSPITALIST

## 2021-11-21 PROCEDURE — 80061 LIPID PANEL: CPT

## 2021-11-21 PROCEDURE — 83735 ASSAY OF MAGNESIUM: CPT

## 2021-11-21 PROCEDURE — 83690 ASSAY OF LIPASE: CPT

## 2021-11-21 PROCEDURE — 84100 ASSAY OF PHOSPHORUS: CPT

## 2021-11-21 PROCEDURE — 36415 COLL VENOUS BLD VENIPUNCTURE: CPT

## 2021-11-21 PROCEDURE — 74011636637 HC RX REV CODE- 636/637: Performed by: HOSPITALIST

## 2021-11-21 PROCEDURE — 83036 HEMOGLOBIN GLYCOSYLATED A1C: CPT

## 2021-11-21 RX ORDER — AMLODIPINE BESYLATE 5 MG/1
10 TABLET ORAL DAILY
Status: DISCONTINUED | OUTPATIENT
Start: 2021-11-21 | End: 2021-11-24 | Stop reason: HOSPADM

## 2021-11-21 RX ORDER — CARVEDILOL 3.12 MG/1
6.25 TABLET ORAL 2 TIMES DAILY WITH MEALS
Status: DISCONTINUED | OUTPATIENT
Start: 2021-11-21 | End: 2021-11-24 | Stop reason: HOSPADM

## 2021-11-21 RX ORDER — VERAPAMIL HYDROCHLORIDE 120 MG/1
240 CAPSULE, EXTENDED RELEASE ORAL DAILY
Status: DISCONTINUED | OUTPATIENT
Start: 2021-11-21 | End: 2021-11-21

## 2021-11-21 RX ORDER — CLOPIDOGREL BISULFATE 75 MG/1
75 TABLET ORAL DAILY
Status: DISCONTINUED | OUTPATIENT
Start: 2021-11-21 | End: 2021-11-24 | Stop reason: HOSPADM

## 2021-11-21 RX ORDER — OXYCODONE HYDROCHLORIDE 5 MG/1
5 TABLET ORAL
Status: DISCONTINUED | OUTPATIENT
Start: 2021-11-21 | End: 2021-11-24 | Stop reason: HOSPADM

## 2021-11-21 RX ORDER — HYDROXYZINE PAMOATE 25 MG/1
25 CAPSULE ORAL
Status: DISCONTINUED | OUTPATIENT
Start: 2021-11-21 | End: 2021-11-24 | Stop reason: HOSPADM

## 2021-11-21 RX ORDER — ONDANSETRON 4 MG/1
8 TABLET, FILM COATED ORAL
Status: DISCONTINUED | OUTPATIENT
Start: 2021-11-21 | End: 2021-11-24 | Stop reason: HOSPADM

## 2021-11-21 RX ORDER — MAGNESIUM SULFATE 100 %
4 CRYSTALS MISCELLANEOUS AS NEEDED
Status: DISCONTINUED | OUTPATIENT
Start: 2021-11-21 | End: 2021-11-21 | Stop reason: SDUPTHER

## 2021-11-21 RX ORDER — DEXTROSE 50 % IN WATER (D50W) INTRAVENOUS SYRINGE
25-50 AS NEEDED
Status: DISCONTINUED | OUTPATIENT
Start: 2021-11-21 | End: 2021-11-21 | Stop reason: SDUPTHER

## 2021-11-21 RX ORDER — SODIUM CHLORIDE 9 MG/ML
75 INJECTION, SOLUTION INTRAVENOUS CONTINUOUS
Status: DISCONTINUED | OUTPATIENT
Start: 2021-11-21 | End: 2021-11-24 | Stop reason: HOSPADM

## 2021-11-21 RX ORDER — INSULIN LISPRO 100 [IU]/ML
INJECTION, SOLUTION INTRAVENOUS; SUBCUTANEOUS
Status: DISCONTINUED | OUTPATIENT
Start: 2021-11-21 | End: 2021-11-24 | Stop reason: HOSPADM

## 2021-11-21 RX ADMIN — Medication 10 ML: at 16:33

## 2021-11-21 RX ADMIN — ACETAMINOPHEN 650 MG: 325 TABLET ORAL at 12:00

## 2021-11-21 RX ADMIN — ASPIRIN 81 MG CHEWABLE TABLET 81 MG: 81 TABLET CHEWABLE at 08:30

## 2021-11-21 RX ADMIN — PROCHLORPERAZINE EDISYLATE 5 MG: 5 INJECTION INTRAMUSCULAR; INTRAVENOUS at 02:16

## 2021-11-21 RX ADMIN — HYDROXYZINE PAMOATE 25 MG: 25 CAPSULE ORAL at 20:37

## 2021-11-21 RX ADMIN — CLOPIDOGREL BISULFATE 75 MG: 75 TABLET ORAL at 12:00

## 2021-11-21 RX ADMIN — ONDANSETRON 4 MG: 2 INJECTION INTRAMUSCULAR; INTRAVENOUS at 08:30

## 2021-11-21 RX ADMIN — Medication 10 ML: at 05:38

## 2021-11-21 RX ADMIN — OXYCODONE 5 MG: 5 TABLET ORAL at 20:37

## 2021-11-21 RX ADMIN — AMLODIPINE BESYLATE 10 MG: 5 TABLET ORAL at 12:00

## 2021-11-21 RX ADMIN — ENOXAPARIN SODIUM 40 MG: 40 INJECTION SUBCUTANEOUS at 07:00

## 2021-11-21 RX ADMIN — SODIUM CHLORIDE 1000 ML: 9 INJECTION, SOLUTION INTRAVENOUS at 02:19

## 2021-11-21 RX ADMIN — CLOPIDOGREL BISULFATE 75 MG: 75 TABLET ORAL at 08:30

## 2021-11-21 RX ADMIN — ONDANSETRON HYDROCHLORIDE 8 MG: 4 TABLET, FILM COATED ORAL at 20:37

## 2021-11-21 RX ADMIN — HYDROXYZINE PAMOATE 25 MG: 25 CAPSULE ORAL at 12:33

## 2021-11-21 RX ADMIN — PRAVASTATIN SODIUM 10 MG: 10 TABLET ORAL at 20:37

## 2021-11-21 RX ADMIN — ONDANSETRON HYDROCHLORIDE 8 MG: 4 TABLET, FILM COATED ORAL at 12:33

## 2021-11-21 RX ADMIN — PROCHLORPERAZINE EDISYLATE 5 MG: 5 INJECTION INTRAMUSCULAR; INTRAVENOUS at 06:05

## 2021-11-21 RX ADMIN — PANTOPRAZOLE SODIUM 40 MG: 40 TABLET, DELAYED RELEASE ORAL at 08:30

## 2021-11-21 RX ADMIN — INSULIN LISPRO 7 UNITS: 100 INJECTION, SOLUTION INTRAVENOUS; SUBCUTANEOUS at 12:06

## 2021-11-21 RX ADMIN — FUROSEMIDE 20 MG: 40 TABLET ORAL at 08:30

## 2021-11-21 RX ADMIN — CARVEDILOL 6.25 MG: 3.12 TABLET, FILM COATED ORAL at 16:29

## 2021-11-21 RX ADMIN — Medication 10 ML: at 21:19

## 2021-11-21 NOTE — PROGRESS NOTES
11/21/21 1358   Midline Catheter 58/30/74 Left; Basilic   Placement Date/Time: 11/21/21 1357   Description (optional): 18G 10 cm powerglide  Inserted By: Oly Méndez RN  Number of Attempts: 1  Size: (c) Other(comment)  Length (cm): 10 centimeters  Location: Left; Basilic  Arm Circumference at Insertion . .. Criteria for Appropriate Use Limited/no vessel suitable for conventional peripheral access   Site Assessment Clean, dry, & intact   Phlebitis Assessment 0   Infiltration Assessment 0   Arm Circumference (cm) 30 cm   Date of Last Dressing Change 11/21/21   Dressing Status Clean, dry, & intact; New   External Catheter Length (cm) 0 centimeters   Dressing Type Disk with Chlorhexadine gluconate (CHG); Stabilization/securement device; Tape; Transparent   Hub Color/Line Status Patent;  Flushed; Green   Action Taken Blood drawn   Positive Blood Return (Site #1) Yes   Alcohol Cap Used Yes

## 2021-11-21 NOTE — ROUTINE PROCESS
Bedside and Verbal shift change report given to Ginny Quesada (oncoming nurse) by Franny Dumont (offgoing nurse). Report included the following information SBAR and MAR.

## 2021-11-21 NOTE — PROGRESS NOTES
Vistaril 25 mg oral given for anxiety,/ nervousness. Zofran 8 mg Oral given for complain of nausea. Up to bathroom voided. Tolerated soup only for lunch.

## 2021-11-21 NOTE — PROGRESS NOTES
Awaiting PICC  team to restart iv site, Dr Norma Chisholm served to see if pain and nausea medication can be given orally. Awaiting order.

## 2021-11-21 NOTE — PROGRESS NOTES
Progress Note    Patient: Jerson Cabral MRN: [id number]  SSN: [id number]    YOB: 1940  Age: [de-identified] y.o. 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 Lipid panel ordered. No meds or alcohol or gall stones to cause pancreatitis. GI following    She requested for full liquis diet,     Her AECHF may be due to stress from pancreatitis. Technical situation, given aute pancreatitis warrants fluids. Will be judicious    SUBJECTIVE  Will hold fluids today. Pain a little better. Await lipid panel and lipase levels today. Review of Systems:  Constitutional:  denies weight loss, no fever, no chills, no night sweats  Eye: No recent visual disturbances, no discharge, no double vision  Ear/nose/mouth/throat : No hearing disturbance, no ear pain, no nasal congestion, no sore throat, no trouble swallowing. Respiratory : +++ trouble breathing, no cough, no shortness of breath, no hemoptysis, no wheezing  Cardiovascular : No chest pain, no palpitation, no racing of heart, no orthopnea, no paroxysmal nocturnal dyspnea, no peripheral edema  Gastrointestinal : +++  nausea, ++++  vomiting, no diarrhea, constipation, +++ heartburn, ++++ abdominal pain  Genitourinary : No dysuria, no hematuria, no increased frequency, incontinence,  Lymphatics : No swollen glands -Neck, axillary, inguinal  Endocrine : No excessive thirst, no polyuria no cold intolerance, no heat intolerance. Immunologic : No hives, urticaria, no seasonal allergies,   Musculoskeletal : Left upper back pain.   No joint swelling, pain, effusion,  no neck pain,   Integumentary : No rash, no pruritus, no ecchymosis  Hematology : No petechiae, No easy bruising,  No tendency to bleed easy  Neurology : Denies change in mental status, no abnormal balance, no headache, no confusion, numbness, tingling,  Psychiatric : No mood swings, no anxiety, depression      Objective:     Vitals:    11/20/21 1517 11/20/21 2010 11/21/21 0844 11/21/21 1200   BP: (!) 154/95 (!) 153/97 (!) 162/93 (!) 169/91   Pulse: 87 93 97 97   Resp: 22 18 18 20   Temp: 98.7 °F (37.1 °C) 98.3 °F (36.8 °C) 97.6 °F (36.4 °C) 97.5 °F (36.4 °C)   SpO2: 96% 100% 97% 100%   Weight:       Height:            Intake and Output:  Current Shift: No intake/output data recorded. Last three shifts: No intake/output data recorded. Physical Exam:   General : Sleeping very comfortably. HEENT : atraumatic normocephalic, Normal ear and nose. Neck : Supple, no JVD, no masses noted, no carotid bruit. Lungs : Breath sounds with moderate air entry bilaterally, no wheezes or rales, no accessory muscle use. CVS : Rhythm rate regular, S1+, S2+, no murmur or gallop. Abdomen : Soft, nontender,  bowel sounds active. Extremities : No edema noted,  pedal pulses palpable. Musculoskeletal :, no joint swelling or effusion,   Skin : Dry, warm. No pathological rash. Lymphatic : No cervical lymphadenopathy. Neurological : She was completely oriented, no deficits neurologically on arrival to the emergency room per emergency room physician. I known this patient last admission. Psychiatric : Unable to evaluate as she is sleeping.       Lab/Data Review:  Recent Results (from the past 24 hour(s))   GLUCOSE, POC    Collection Time: 11/20/21  1:00 PM   Result Value Ref Range    Glucose (POC) 204 (H) 65 - 117 mg/dL    Performed by Grzegorz Harding, POC    Collection Time: 11/20/21  3:16 PM   Result Value Ref Range    Glucose (POC) 237 (H) 65 - 117 mg/dL    Performed by Grzegorz Ramirezs, POC    Collection Time: 11/21/21  8:40 AM   Result Value Ref Range    Glucose (POC) 246 (H) 65 - 117 mg/dL    Performed by Patrick Hartman    GLUCOSE, POC    Collection Time: 11/21/21 11:20 AM   Result Value Ref Range    Glucose (POC) 261 (H) 65 - 117 mg/dL    Performed by Patrick Hartman          Assessment and plan:      (1) Acute pancreatitis:Autoimmune pancreatitis ruled out. Lipid panel ordered. No meds or alcohol or gall stones to cause pancreatitis.  Hold fluids today (given CHF), await lipase and lipid panel    (2) AECHFrEF: 4L, wean as tolerated,     (3) JERAMY: continue IVF    (4) GERD: protonix    DVT ppx: lovenox     DISPO: home when pain free and on rrom air        Signed By: Sergei Luque MD     November 21, 2021

## 2021-11-21 NOTE — PROGRESS NOTES
Patient Ronny Reinoso, awaken for medication. Made ware of blood sugar level, encouraged to eat supper when it comes.

## 2021-11-21 NOTE — CONSULTS
Consult    Patient: Jerson Cabral MRN: [id number]  SSN: [id number]    YOB: 1940  Age: [de-identified] y.o. Sex: female      Subjective:      Jerson Cabral is a [de-identified] y.o. female who is being seen for abdominal pain,recurrent acute pancreatitis  history from medical records. Patient was admitted hospital  weeks ago with abdominal pain, has had the persistent pain after discharge from home, with some abdominal discomfort or poor appetite, patient back to the emergency roomthe emergency room with continued epigastric abdominal pain.    CT of the abdomen suggest pancreatitis but some resolution of peripancreatic inflammation, No signs of pseudocyst,abscess,    no history of GI bleeding, fever, chills,     Past Medical History:   Diagnosis Date    Colon polyps     Diabetes ([address]     Diverticulosis     Hypertension     Ill-defined condition      Past Surgical History:   Procedure Laterality Date    COLONOSCOPY N/A 4/29/2021    COLONOSCOPY performed by Letty Burk MD at [facility] ENDOSCOPY      Family History   Problem Relation Age of Onset    Diabetes Other      Social History     Tobacco Use    Smoking status: Never Smoker    Smokeless tobacco: Never Used   Substance Use Topics    Alcohol use: Not Currently      Current Facility-Administered Medications   Medication Dose Route Frequency Provider Last Rate Last Admin    [Held by provider] glipiZIDE (GLUCOTROL) tablet 5 mg  5 mg Oral ACB&D Tanya Massey MD        verapamil ER (CALAN-SR) tablet 240 mg  240 mg Oral DAILY Tanya Massey MD        clopidogreL (PLAVIX) tablet 75 mg  75 mg Oral DAILY Tanya Massey MD   75 mg at 11/20/21 0924    carvediloL (COREG) tablet 3.125 mg  3.125 mg Oral BID WITH MEALS Tanya Massey MD        aspirin chewable tablet 81 mg  81 mg Oral DAILY Tanya Massey MD   81 mg at 11/20/21 0924    furosemide (LASIX) tablet 20 mg  20 mg Oral DAILY Tanya Massey MD   20 mg at 11/20/21 0924    [Held by provider] potassium chloride SR (KLOR-CON 10) tablet 20 mEq  20 mEq Oral DAILY WITH Basilio Oliveros MD        pravastatin (PRAVACHOL) tablet 10 mg  10 mg Oral QHS Mariely James MD        glucose chewable tablet 16 g  4 Tablet Oral PRN Mariely James MD        dextrose (D50W) injection syrg 12.5-25 g  25-50 mL IntraVENous PRN Mariely James MD        glucagon (GLUCAGEN) injection 1 mg  1 mg IntraMUSCular PRN Mariely James MD        0.9% sodium chloride infusion 1,000 mL  1,000 mL IntraVENous CONTINUOUS Mariely James MD        sodium chloride (NS) flush 5-40 mL  5-40 mL IntraVENous Q8H Mariely James MD        sodium chloride (NS) flush 5-40 mL  5-40 mL IntraVENous PRN Mariely James MD        acetaminophen (TYLENOL) tablet 650 mg  650 mg Oral Q4H PRN Mariely James MD        ondansetron [facility]) injection 4 mg  4 mg IntraVENous Q4H PRN Mariely James MD        enoxaparin (LOVENOX) injection 40 mg  40 mg SubCUTAneous Q24H Mariely James MD   40 mg at 11/20/21 9247    HYDROmorphone (DILAUDID) syringe 0.5 mg  0.5 mg IntraVENous Q4H PRN Mariely James MD   0.5 mg at 11/20/21 1847    pantoprazole (PROTONIX) tablet 40 mg  40 mg Oral ACB Valeria Lambert MD   40 mg at 11/20/21 0293    prochlorperazine (COMPAZINE) with saline injection 5 mg  5 mg IntraVENous Q4H PRN Valeria Lambert MD   5 mg at 11/20/21 1406        Allergies   Allergen Reactions    Insulins Itching    Penicillins Other (comments)     Pt states it makes her pass out       Review of Systems:  Review of Systems   Unable to perform ROS: Medical condition        Objective:     Vitals:    11/20/21 0805 11/20/21 0956 11/20/21 1517 11/20/21 2010   BP: (!) 144/88  (!) 154/95 (!) 153/97   Pulse: 93  87 93   Resp: 25  22 18   Temp: 97.7 °F (36.5 °C)  98.7 °F (37.1 °C) 98.3 °F (36.8 °C)   SpO2: 99% 98% 96% 100% Weight:       Height:            Physical Exam:  Physical Exam  Constitutional:       Appearance: She is ill-appearing. HENT:      Head: Atraumatic. Mouth/Throat:      Mouth: Mucous membranes are dry. Eyes:      Extraocular Movements: Extraocular movements intact. Cardiovascular:      Rate and Rhythm: Normal rate and regular rhythm. Pulses: Normal pulses. Pulmonary:      Effort: Pulmonary effort is normal.   Abdominal:      Tenderness: There is abdominal tenderness. There is no rebound. Musculoskeletal:         General: No swelling. Cervical back: Normal range of motion. Skin:     Coloration: Skin is not jaundiced. Neurological:      General: No focal deficit present.    Psychiatric:         Mood and Affect: Mood normal.          Recent Results (from the past 24 hour(s))   EKG, 12 LEAD, INITIAL    Collection Time: 11/19/21 10:55 PM   Result Value Ref Range    Ventricular Rate 100 BPM    Atrial Rate 100 BPM    P-R Interval 138 ms    QRS Duration 78 ms    Q-T Interval 388 ms    QTC Calculation (Bezet) 500 ms    Calculated P Axis 24 degrees    Calculated R Axis -21 degrees    Calculated T Axis -74 degrees    Diagnosis       Normal sinus rhythm  Left atrial enlargement  Left ventricular hypertrophy  ST & T wave abnormality, consider lateral ischemia  Abnormal ECG  No previous ECGs available  Confirmed by MARIANNE SUAZO, Robbie Luna ([id number]) on 11/20/2021 12:53:41 PM     CBC WITH AUTOMATED DIFF    Collection Time: 11/19/21 11:04 PM   Result Value Ref Range    WBC 4.3 3.6 - 11.0 K/uL    RBC 4.97 3.80 - 5.20 M/uL    HGB 12.9 11.5 - 16.0 g/dL    HCT 40.5 35.0 - 47.0 %    MCV 81.5 80.0 - 99.0 FL    MCH 26.0 26.0 - 34.0 PG    MCHC 31.9 30.0 - 36.5 g/dL    RDW 15.5 (H) 11.5 - 14.5 %    PLATELET 598 (L) 528 - 400 K/uL    MPV 12.6 8.9 - 12.9 FL    NRBC 0.0 0.0  WBC    ABSOLUTE NRBC 0.00 0.00 - 0.01 K/uL    NEUTROPHILS 53 32 - 75 %    LYMPHOCYTES 29 12 - 49 %    MONOCYTES 13 5 - 13 %    EOSINOPHILS 4 0 - 7 %    BASOPHILS 1 0 - 1 %    IMMATURE GRANULOCYTES 0 0 - 0.5 %    ABS. NEUTROPHILS 2.3 1.8 - 8.0 K/UL    ABS. LYMPHOCYTES 1.3 0.8 - 3.5 K/UL    ABS. MONOCYTES 0.5 0.0 - 1.0 K/UL    ABS. EOSINOPHILS 0.2 0.0 - 0.4 K/UL    ABS. BASOPHILS 0.0 0.0 - 0.1 K/UL    ABS. IMM. GRANS. 0.0 0.00 - 0.04 K/UL    DF AUTOMATED     METABOLIC PANEL, COMPREHENSIVE    Collection Time: 11/19/21 11:04 PM   Result Value Ref Range    Sodium 134 (L) 136 - 145 mmol/L    Potassium 4.4 3.5 - 5.1 mmol/L    Chloride 101 97 - 108 mmol/L    CO2 26 21 - 32 mmol/L    Anion gap 7 5 - 15 mmol/L    Glucose 329 (H) 65 - 100 mg/dL    BUN 21 (H) 6 - 20 mg/dL    Creatinine 1.25 (H) 0.55 - 1.02 mg/dL    BUN/Creatinine ratio 17 12 - 20      GFR est AA 50 (L) >60 ml/min/1.73m2    GFR est non-AA 41 (L) >60 ml/min/1.73m2    Calcium 8.9 8.5 - 10.1 mg/dL    Bilirubin, total 0.5 0.2 - 1.0 mg/dL    AST (SGOT) 35 15 - 37 U/L    ALT (SGPT) 32 12 - 78 U/L    Alk.  phosphatase 144 (H) 45 - 117 U/L    Protein, total 6.4 6.4 - 8.2 g/dL    Albumin 3.1 (L) 3.5 - 5.0 g/dL    Globulin 3.3 2.0 - 4.0 g/dL    A-G Ratio 0.9 (L) 1.1 - 2.2     LIPASE    Collection Time: 11/19/21 11:04 PM   Result Value Ref Range    Lipase >3,000 (H) 73 - 393 U/L   TROPONIN-HIGH SENSITIVITY    Collection Time: 11/19/21 11:04 PM   Result Value Ref Range    Troponin-High Sensitivity 533 (HH) 0 - 51 ng/L   NT-PRO BNP    Collection Time: 11/19/21 11:04 PM   Result Value Ref Range    NT pro-BNP 7,717 (H) <450 pg/mL   TROPONIN-HIGH SENSITIVITY    Collection Time: 11/20/21  3:00 AM   Result Value Ref Range    Troponin-High Sensitivity 538 (HH) 0 - 51 ng/L   GLUCOSE, POC    Collection Time: 11/20/21  9:30 AM   Result Value Ref Range    Glucose (POC) 193 (H) 65 - 117 mg/dL    Performed by Daisy Upton, POC    Collection Time: 11/20/21  1:00 PM   Result Value Ref Range    Glucose (POC) 204 (H) 65 - 117 mg/dL    Performed by Daisy Upton, POC    Collection Time: 11/20/21  3:16 PM Result Value Ref Range    Glucose (POC) 237 (H) 65 - 117 mg/dL    Performed by Real Bolster         CT ABD PELV W CONT   Final Result   1. Pancreatitis with resolving peripancreatic inflammation. No pseudocyst or   organized fluid collection. Cystic dilatation of the main pancreatic duct at the   tail unchanged. 2. Mild thickening of the distal gastric antrum which could represent gastritis   in the appropriate clinical setting. No definite ulcer identified. 3. Small supraumbilical ventral hernia containing omental fat, unchanged. 4. Moderate right and small left pleural effusions. XR CHEST PORT   Final Result   Interstitial pulmonary edema. Trace right and small left pleural   effusion. Unchanged left retrocardiac consolidation.                 Assessment:     Hospital Problems  Date Reviewed: 11/20/2021          Codes Class Noted POA    Abdominal pain ICD-10-CM: R10.9  ICD-9-CM: 789.00  11/20/2021 Yes        Acute pancreatitis ICD-10-CM: K85.90  ICD-9-CM: 212.7  11/20/2021 Unknown        Pancreatitis ICD-10-CM: K85.90  ICD-9-CM: 366.1  10/22/2021 Yes          abdominal pain,unknown etiology,  recurrent pancreatitis,inflammatory change on CT, no abscess, no pseudocysts IgG4 was in normal range,antinuclear antibody was negative  CT showed possible gastritis well thickness with inflammation  CA-19-9 was negative, no signs of underlying malignancy  Plan:   Continue current treatment,  Pain control,  Check lipid profile ,  May delay upper endoscopy Monday to 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

## 2021-11-21 NOTE — PROGRESS NOTES
Patient awake, states went to bathroom. Tolerated 100% soup 50% ice cream. Remains alert and oriented, no distress noted.

## 2021-11-21 NOTE — PROGRESS NOTES
08:30 attempted to administer Zofran IV, site leaking. Iv removed. Patient complaining need something for the nausea attempted to place iv, un-successfull. PICC team called. Patient up in chair tolerated 30 % of meal served. Refused Plavix and verapamil. States my Cadiologist took me off of it. recently, unable to tell what new med he placed her on. blood sugar elevated. No insulin or scale ordered.

## 2021-11-22 ENCOUNTER — ANESTHESIA (OUTPATIENT)
Dept: ENDOSCOPY | Age: 81
DRG: 438 | End: 2021-11-22
Payer: MEDICARE

## 2021-11-22 ENCOUNTER — APPOINTMENT (OUTPATIENT)
Dept: ENDOSCOPY | Age: 81
DRG: 438 | End: 2021-11-22
Attending: INTERNAL MEDICINE
Payer: MEDICARE

## 2021-11-22 ENCOUNTER — ANESTHESIA EVENT (OUTPATIENT)
Dept: ENDOSCOPY | Age: 81
DRG: 438 | End: 2021-11-22
Payer: MEDICARE

## 2021-11-22 LAB
EST. AVERAGE GLUCOSE BLD GHB EST-MCNC: 203 MG/DL
GLUCOSE BLD STRIP.AUTO-MCNC: 139 MG/DL (ref 65–117)
GLUCOSE BLD STRIP.AUTO-MCNC: 146 MG/DL (ref 65–117)
GLUCOSE BLD STRIP.AUTO-MCNC: 157 MG/DL (ref 65–117)
GLUCOSE BLD STRIP.AUTO-MCNC: 177 MG/DL (ref 65–117)
HBA1C MFR BLD: 8.7 % (ref 4–5.6)
PERFORMED BY, TECHID: ABNORMAL
TROPONIN-HIGH SENSITIVITY: 461 NG/L (ref 0–51)

## 2021-11-22 PROCEDURE — 93005 ELECTROCARDIOGRAM TRACING: CPT

## 2021-11-22 PROCEDURE — 36415 COLL VENOUS BLD VENIPUNCTURE: CPT

## 2021-11-22 PROCEDURE — 74011250637 HC RX REV CODE- 250/637: Performed by: HOSPITALIST

## 2021-11-22 PROCEDURE — 94760 N-INVAS EAR/PLS OXIMETRY 1: CPT

## 2021-11-22 PROCEDURE — 74011250636 HC RX REV CODE- 250/636: Performed by: HOSPITALIST

## 2021-11-22 PROCEDURE — 65270000029 HC RM PRIVATE

## 2021-11-22 PROCEDURE — 76060000032 HC ANESTHESIA 0.5 TO 1 HR: Performed by: INTERNAL MEDICINE

## 2021-11-22 PROCEDURE — 84484 ASSAY OF TROPONIN QUANT: CPT

## 2021-11-22 PROCEDURE — 76040000007: Performed by: INTERNAL MEDICINE

## 2021-11-22 PROCEDURE — 74011636637 HC RX REV CODE- 636/637: Performed by: HOSPITALIST

## 2021-11-22 PROCEDURE — 77010033678 HC OXYGEN DAILY

## 2021-11-22 PROCEDURE — 82962 GLUCOSE BLOOD TEST: CPT

## 2021-11-22 RX ORDER — FUROSEMIDE 40 MG/1
40 TABLET ORAL DAILY
Status: DISCONTINUED | OUTPATIENT
Start: 2021-11-23 | End: 2021-11-24 | Stop reason: HOSPADM

## 2021-11-22 RX ADMIN — ASPIRIN 81 MG CHEWABLE TABLET 81 MG: 81 TABLET CHEWABLE at 17:44

## 2021-11-22 RX ADMIN — CARVEDILOL 6.25 MG: 3.12 TABLET, FILM COATED ORAL at 17:44

## 2021-11-22 RX ADMIN — FUROSEMIDE 20 MG: 40 TABLET ORAL at 10:54

## 2021-11-22 RX ADMIN — CLOPIDOGREL BISULFATE 75 MG: 75 TABLET ORAL at 17:44

## 2021-11-22 RX ADMIN — PANTOPRAZOLE SODIUM 40 MG: 40 TABLET, DELAYED RELEASE ORAL at 10:56

## 2021-11-22 RX ADMIN — Medication 10 ML: at 05:28

## 2021-11-22 RX ADMIN — CARVEDILOL 6.25 MG: 3.12 TABLET, FILM COATED ORAL at 10:54

## 2021-11-22 RX ADMIN — ENOXAPARIN SODIUM 40 MG: 40 INJECTION SUBCUTANEOUS at 16:21

## 2021-11-22 RX ADMIN — AMLODIPINE BESYLATE 10 MG: 5 TABLET ORAL at 10:53

## 2021-11-22 RX ADMIN — INSULIN LISPRO 3 UNITS: 100 INJECTION, SOLUTION INTRAVENOUS; SUBCUTANEOUS at 17:43

## 2021-11-22 RX ADMIN — HYDROXYZINE PAMOATE 25 MG: 25 CAPSULE ORAL at 20:45

## 2021-11-22 RX ADMIN — OXYCODONE 5 MG: 5 TABLET ORAL at 20:45

## 2021-11-22 RX ADMIN — Medication 10 ML: at 20:48

## 2021-11-22 RX ADMIN — Medication 10 ML: at 16:21

## 2021-11-22 RX ADMIN — PRAVASTATIN SODIUM 10 MG: 10 TABLET ORAL at 20:45

## 2021-11-22 NOTE — PROGRESS NOTES
Progress Note    Patient: Nina Silva MRN: [id number]  SSN: [id number]    YOB: 1940  Age: [de-identified] y.o.   Sex: female      Admit Date: 11/19/2021    LOS: 2 days     Subjective:   Still has perigastric  pain,more awake  Past Medical History:   Diagnosis Date    Colon polyps     Diabetes ([facility] [address]     Diverticulosis     Hypertension     Ill-defined condition         Current Facility-Administered Medications:     insulin lispro (HUMALOG) injection, , SubCUTAneous, AC&HS, Lyudmila Newell MD, 7 Units at 11/21/21 1206    carvediloL (COREG) tablet 6.25 mg, 6.25 mg, Oral, BID WITH MEALS, Lyudmila Newell MD, 6.25 mg at 11/22/21 1054    amLODIPine (NORVASC) tablet 10 mg, 10 mg, Oral, DAILY, Lyudmila Newell MD, 10 mg at 11/22/21 1053    clopidogreL (PLAVIX) tablet 75 mg, 75 mg, Oral, DAILY, Lyudmila Newell MD, 75 mg at 11/21/21 1200    ondansetron hcl (ZOFRAN) tablet 8 mg, 8 mg, Oral, Q4H PRN, Lyudmila Newell MD, 8 mg at 11/21/21 2037    hydrOXYzine pamoate (VISTARIL) capsule 25 mg, 25 mg, Oral, TID PRN, Lyudmila Newell MD, 25 mg at 11/21/21 2037    oxyCODONE IR (ROXICODONE) tablet 5 mg, 5 mg, Oral, Q4H PRN, Lyudmila Newell MD, 5 mg at 11/21/21 2037    [Held by provider] 0.9% sodium chloride infusion, 75 mL/hr, IntraVENous, CONTINUOUS, Lyudmila Newell MD    influenza vaccine 2021-22 (6 mos+)(PF) (FLUARIX/FLULAVAL/FLUZONE QUAD) injection 0.5 mL, 1 Each, IntraMUSCular, PRIOR TO DISCHARGE, Judith Macdonald MD    [Held by provider] glipiZIDE (GLUCOTROL) tablet 5 mg, 5 mg, Oral, ACB&D, Kaylyn Amor MD    aspirin chewable tablet 81 mg, 81 mg, Oral, DAILY, Kaylyn Amor MD, 81 mg at 11/21/21 0830    furosemide (LASIX) tablet 20 mg, 20 mg, Oral, DAILY, Kaylyn Amor MD, 20 mg at 11/22/21 1054    [Held by provider] potassium chloride SR (KLOR-CON 10) tablet 20 mEq, 20 mEq, Oral, DAILY WITH BREAKFAST, Kaylyn Amor MD    pravastatin (PRAVACHOL) tablet 10 mg, 10 mg, Oral, QHS, Kaylyn Amor MD, 10 mg at 11/21/21 2037    glucose chewable tablet 16 g, 4 Tablet, Oral, PRN, Kaylyn Amor MD    dextrose (D50W) injection syrg 12.5-25 g, 25-50 mL, IntraVENous, PRN, Kaylyn Amor MD    glucagon (GLUCAGEN) injection 1 mg, 1 mg, IntraMUSCular, PRN, Kaylyn Amor MD    sodium chloride (NS) flush 5-40 mL, 5-40 mL, IntraVENous, Q8H, Kaylyn Amor MD, 10 mL at 11/22/21 0528    sodium chloride (NS) flush 5-40 mL, 5-40 mL, IntraVENous, PRN, Kaylyn Amor MD    acetaminophen (TYLENOL) tablet 650 mg, 650 mg, Oral, Q4H PRN, Kaylyn Amor MD, 650 mg at 11/21/21 1200    ondansetron (ZOFRAN) injection 4 mg, 4 mg, IntraVENous, Q4H PRN, Kaylyn Amor MD, 4 mg at 11/21/21 0830    enoxaparin (LOVENOX) injection 40 mg, 40 mg, SubCUTAneous, Q24H, Kaylyn Amor MD, 40 mg at 11/21/21 0700    pantoprazole (PROTONIX) tablet 40 mg, 40 mg, Oral, ACB, Lyudmila Newell MD, 40 mg at 11/22/21 1056    prochlorperazine (COMPAZINE) with saline injection 5 mg, 5 mg, IntraVENous, Q4H PRN, Lyudmila Newell MD, 5 mg at 11/21/21 0605    Objective:     Vitals:    11/21/21 1557 11/21/21 1935 11/22/21 0756 11/22/21 0941   BP: (!) 148/83 130/81 (!) 148/90    Pulse: 89 84 93    Resp: 20 18 16    Temp: 97.7 °F (36.5 °C) 97.6 °F (36.4 °C) 98.8 °F (37.1 °C)    SpO2: 100% 98% 97% 100%   Weight:       Height:            Intake and Output:  Current Shift: No intake/output data recorded. Last three shifts: No intake/output data recorded. Physical Exam:   Physical Exam  Constitutional:       Appearance: She is ill-appearing. Eyes:      General: No scleral icterus. Cardiovascular:      Rate and Rhythm: Normal rate. Pulses: Normal pulses. Pulmonary:      Effort: Pulmonary effort is normal.   Abdominal:      Tenderness: There is abdominal tenderness.    Musculoskeletal:         General: Normal range of motion. Cervical back: Normal range of motion. Skin:     Coloration: Skin is not jaundiced. Findings: No bruising. Neurological:      Motor: No weakness. Psychiatric:         Mood and Affect: Mood normal.          Lab/Data Review:  Recent Results (from the past 24 hour(s))   GLUCOSE, POC    Collection Time: 11/21/21  3:57 PM   Result Value Ref Range    Glucose (POC) 94 65 - 117 mg/dL    Performed by Niranjan Lenz    GLUCOSE, POC    Collection Time: 11/21/21  7:40 PM   Result Value Ref Range    Glucose (POC) 171 (H) 65 - 117 mg/dL    Performed by [address] [place] [address] Road, POC    Collection Time: 11/22/21  7:54 AM   Result Value Ref Range    Glucose (POC) 146 (H) 65 - 117 mg/dL    Performed by Sunday Jimenez    TROPONIN-HIGH SENSITIVITY    Collection Time: 11/22/21 10:33 AM   Result Value Ref Range    Troponin-High Sensitivity 461 (HH) 0 - 51 ng/L   GLUCOSE, POC    Collection Time: 11/22/21 11:42 AM   Result Value Ref Range    Glucose (POC) 157 (H) 65 - 117 mg/dL    Performed by Clarence BEAR         CT ABD PELV W CONT   Final Result   1. Pancreatitis with resolving peripancreatic inflammation. No pseudocyst or   organized fluid collection. Cystic dilatation of the main pancreatic duct at the   tail unchanged. 2. Mild thickening of the distal gastric antrum which could represent gastritis   in the appropriate clinical setting. No definite ulcer identified. 3. Small supraumbilical ventral hernia containing omental fat, unchanged. 4. Moderate right and small left pleural effusions. XR CHEST PORT   Final Result   Interstitial pulmonary edema. Trace right and small left pleural   effusion. Unchanged left retrocardiac consolidation.                   Assessment:     Active Problems:    Pancreatitis (10/22/2021)      Abdominal pain (11/20/2021)      Acute pancreatitis (11/20/2021)    abdominal pain, etiology likely from recurrent pancreatitis,inflammatory change on CT, no abscess, no pseudocysts IgG4 was in normal range,antinuclear antibody was negative  CA-19-9 was negative, no signs of underlying malignancy  triglycerides were normal   Medication reviewed , no meds reported to cause pancreatitis      Chest pain, increasing Troponin , acute ischemia ?      CT showed possible gastritis well thickness with inflammation      Plan:   Continue current Pain control,  Cardiology evaluation   Follow the pancreatic enzymes level    Discussed with Dr Zoe Martini By: Oscar Lee MD     November 22, 2021        Thank you for allowing me to participate in this patients care  Cc Referring Physician   Clementine Patton MD

## 2021-11-22 NOTE — PROGRESS NOTES
Progress Note    Patient: Gela Melgoza MRN: [id number]  SSN: [id number]    YOB: 1940  Age: [de-identified] y.o.   Sex: female      Admit Date: 11/19/2021    LOS: 1 day     Subjective:   Less pain,more awake  Past Medical History:   Diagnosis Date    Colon polyps     Diabetes ([facility] [address]     Diverticulosis     Hypertension     Ill-defined condition         Current Facility-Administered Medications:     insulin lispro (HUMALOG) injection, , SubCUTAneous, AC&HS, Shannan Estrada MD, 7 Units at 11/21/21 1206    carvediloL (COREG) tablet 6.25 mg, 6.25 mg, Oral, BID WITH MEALS, Shannan Estrada MD, 6.25 mg at 11/21/21 1629    amLODIPine (NORVASC) tablet 10 mg, 10 mg, Oral, DAILY, Shannan Estrada MD, 10 mg at 11/21/21 1200    clopidogreL (PLAVIX) tablet 75 mg, 75 mg, Oral, DAILY, Shannan Estrada MD, 75 mg at 11/21/21 1200    ondansetron hcl (ZOFRAN) tablet 8 mg, 8 mg, Oral, Q4H PRN, Shannan Estrada MD, 8 mg at 11/21/21 2037    hydrOXYzine pamoate (VISTARIL) capsule 25 mg, 25 mg, Oral, TID PRN, Shannan Estrada MD, 25 mg at 11/21/21 2037    oxyCODONE IR (ROXICODONE) tablet 5 mg, 5 mg, Oral, Q4H PRN, Shannan Estrada MD, 5 mg at 11/21/21 2037    [Held by provider] 0.9% sodium chloride infusion, 75 mL/hr, IntraVENous, CONTINUOUS, Ren Macdonald MD    influenza vaccine 2021-22 (6 mos+)(PF) (FLUARIX/FLULAVAL/FLUZONE QUAD) injection 0.5 mL, 1 Each, IntraMUSCular, PRIOR TO DISCHARGE, Ren Macdonald MD    [Held by provider] glipiZIDE (GLUCOTROL) tablet 5 mg, 5 mg, Oral, ACB&D, Anil Traore MD    aspirin chewable tablet 81 mg, 81 mg, Oral, DAILY, Anil Traore MD, 81 mg at 11/21/21 0830    furosemide (LASIX) tablet 20 mg, 20 mg, Oral, DAILY, Anil Traore MD, 20 mg at 11/21/21 0830    [Held by provider] potassium chloride SR (KLOR-CON 10) tablet 20 mEq, 20 mEq, Oral, DAILY WITH BREAKFAST, Anil Traore MD    pravastatin (PRAVACHOL) tablet 10 mg, 10 mg, Oral, QHS, Kaylyn Amor MD, 10 mg at 11/21/21 2037    glucose chewable tablet 16 g, 4 Tablet, Oral, PRN, Kaylyn Amor MD    dextrose (D50W) injection syrg 12.5-25 g, 25-50 mL, IntraVENous, PRN, Kaylyn Amor MD    glucagon (GLUCAGEN) injection 1 mg, 1 mg, IntraMUSCular, PRN, Kaylyn Amor MD    sodium chloride (NS) flush 5-40 mL, 5-40 mL, IntraVENous, Q8H, Kaylyn Amor MD, 10 mL at 11/21/21 2119    sodium chloride (NS) flush 5-40 mL, 5-40 mL, IntraVENous, PRN, Kaylyn Amor MD    acetaminophen (TYLENOL) tablet 650 mg, 650 mg, Oral, Q4H PRN, Kaylyn Amor MD, 650 mg at 11/21/21 1200    ondansetron (ZOFRAN) injection 4 mg, 4 mg, IntraVENous, Q4H PRN, Kaylyn Amor MD, 4 mg at 11/21/21 0830    enoxaparin (LOVENOX) injection 40 mg, 40 mg, SubCUTAneous, Q24H, Kaylyn Amor MD, 40 mg at 11/21/21 0700    HYDROmorphone (DILAUDID) syringe 0.5 mg, 0.5 mg, IntraVENous, Q4H PRN, Kaylyn Amor MD, 0.5 mg at 11/20/21 1847    pantoprazole (PROTONIX) tablet 40 mg, 40 mg, Oral, ACB, Lyudmila Newell MD, 40 mg at 11/21/21 0830    prochlorperazine (COMPAZINE) with saline injection 5 mg, 5 mg, IntraVENous, Q4H PRN, Lyudmila Newell MD, 5 mg at 11/21/21 0605    Objective:     Vitals:    11/21/21 0844 11/21/21 1200 11/21/21 1557 11/21/21 1935   BP: (!) 162/93 (!) 169/91 (!) 148/83 130/81   Pulse: 97 97 89 84   Resp: 18 20 20 18   Temp: 97.6 °F (36.4 °C) 97.5 °F (36.4 °C) 97.7 °F (36.5 °C) 97.6 °F (36.4 °C)   SpO2: 97% 100% 100% 98%   Weight:       Height:            Intake and Output:  Current Shift: No intake/output data recorded. Last three shifts: No intake/output data recorded. Physical Exam:   Physical Exam  Constitutional:       Appearance: She is ill-appearing. Eyes:      General: No scleral icterus. Cardiovascular:      Rate and Rhythm: Normal rate. Pulses: Normal pulses.    Pulmonary:      Effort: Pulmonary effort is normal.   Abdominal:      Tenderness: There is abdominal tenderness. Musculoskeletal:         General: Normal range of motion. Cervical back: Normal range of motion. Skin:     Coloration: Skin is not jaundiced. Findings: No bruising. Neurological:      Motor: No weakness. Psychiatric:         Mood and Affect: Mood normal.          Lab/Data Review:  Recent Results (from the past 24 hour(s))   GLUCOSE, POC    Collection Time: 11/21/21  8:40 AM   Result Value Ref Range    Glucose (POC) 246 (H) 65 - 117 mg/dL    Performed by [facility]    GLUCOSE, POC    Collection Time: 11/21/21 11:20 AM   Result Value Ref Range    Glucose (POC) 261 (H) 65 - 117 mg/dL    Performed by [facility]    METABOLIC PANEL, COMPREHENSIVE    Collection Time: 11/21/21  1:39 PM   Result Value Ref Range    Sodium 138 136 - 145 mmol/L    Potassium 3.5 3.5 - 5.1 mmol/L    Chloride 105 97 - 108 mmol/L    CO2 28 21 - 32 mmol/L    Anion gap 5 5 - 15 mmol/L    Glucose 192 (H) 65 - 100 mg/dL    BUN 10 6 - 20 mg/dL    Creatinine 1.11 (H) 0.55 - 1.02 mg/dL    BUN/Creatinine ratio 9 (L) 12 - 20      GFR est AA 57 (L) >60 ml/min/1.73m2    GFR est non-AA 47 (L) >60 ml/min/1.73m2    Calcium 8.7 8.5 - 10.1 mg/dL    Bilirubin, total 1.3 (H) 0.2 - 1.0 mg/dL    AST (SGOT) 16 15 - 37 U/L    ALT (SGPT) 25 12 - 78 U/L    Alk.  phosphatase 111 45 - 117 U/L    Protein, total 6.4 6.4 - 8.2 g/dL    Albumin 2.8 (L) 3.5 - 5.0 g/dL    Globulin 3.6 2.0 - 4.0 g/dL    A-G Ratio 0.8 (L) 1.1 - 2.2     LIPASE    Collection Time: 11/21/21  1:39 PM   Result Value Ref Range    Lipase 370 73 - 393 U/L   MAGNESIUM    Collection Time: 11/21/21  1:39 PM   Result Value Ref Range    Magnesium 1.9 1.6 - 2.4 mg/dL   PHOSPHORUS    Collection Time: 11/21/21  1:39 PM   Result Value Ref Range    Phosphorus 2.4 (L) 2.6 - 4.7 mg/dL   CBC W/O DIFF    Collection Time: 11/21/21  1:39 PM   Result Value Ref Range    WBC 4.9 3.6 - 11.0 K/uL    RBC 4.45 3.80 - 5.20 M/uL HGB 11.7 11.5 - 16.0 g/dL    HCT 36.3 35.0 - 47.0 %    MCV 81.6 80.0 - 99.0 FL    MCH 26.3 26.0 - 34.0 PG    MCHC 32.2 30.0 - 36.5 g/dL    RDW 15.5 (H) 11.5 - 14.5 %    PLATELET 673 (L) 789 - 400 K/uL    NRBC 0.0 0.0  WBC    ABSOLUTE NRBC 0.00 0.00 - 0.01 K/uL   LIPID PANEL    Collection Time: 11/21/21  1:39 PM   Result Value Ref Range    LIPID PROFILE        Cholesterol, total 147 <200 mg/dL    Triglyceride 53 <150 mg/dL    HDL Cholesterol 44 mg/dL    LDL, calculated 92.4 0 - 100 mg/dL    VLDL, calculated 10.6 mg/dL    CHOL/HDL Ratio 3.3 0.0 - 5.0     GLUCOSE, POC    Collection Time: 11/21/21  3:57 PM   Result Value Ref Range    Glucose (POC) 94 65 - 117 mg/dL    Performed by [employer]    GLUCOSE, POC    Collection Time: 11/21/21  7:40 PM   Result Value Ref Range    Glucose (POC) 171 (H) 65 - 117 mg/dL    Performed by [employer]         CT ABD PELV W CONT   Final Result   1. Pancreatitis with resolving peripancreatic inflammation. No pseudocyst or   organized fluid collection. Cystic dilatation of the main pancreatic duct at the   tail unchanged. 2. Mild thickening of the distal gastric antrum which could represent gastritis   in the appropriate clinical setting. No definite ulcer identified. 3. Small supraumbilical ventral hernia containing omental fat, unchanged. 4. Moderate right and small left pleural effusions. XR CHEST PORT   Final Result   Interstitial pulmonary edema. Trace right and small left pleural   effusion. Unchanged left retrocardiac consolidation.                   Assessment:     Active Problems:    Pancreatitis (10/22/2021)      Abdominal pain (11/20/2021)      Acute pancreatitis (11/20/2021)    abdominal pain, etiology likely from recurrent pancreatitis,inflammatory change on CT, no abscess, no pseudocysts IgG4 was in normal range,antinuclear antibody was negative  CA-19-9 was negative, no signs of underlying malignancy  triglycerides were normal   Medication reviewed , no meds reported to cause pancreatitis        CT showed possible gastritis well thickness with inflammation      Plan:   Continue current treatment,  Pain control,  May do an egd in am to evaluate duodenum, antrum aspiration of duodenal fluids to analysis           Signed By: Angelica Chin MD     November 21, 2021        Thank you for allowing me to participate in this patients care  Cc Referring Physician   Rea Toribio MD

## 2021-11-22 NOTE — CONSULTS
Consult    Patient: Glenna Babin MRN: [id number]  SSN: [id number]    YOB: 1940  Age: [de-identified] y.o. Sex: female       Subjective:      Date of  Admission: 11/19/2021     Admission type: Emergency    Glenna Babin is a [de-identified] y.o. female with a history of dilated cardiomyopathy, recent PCI to RCA in the setting of type II NSTEMI, diabetes mellitus, hyperlipidemia. Recent admission was due to acute pancreatitis. She is now admitted with complaints of abdominal pain and is being managed for acute pancreatitis. She was also found to have elevated troponins hence we are asked to see her. She has mild abdominal discomfort when seen by me. She denies having had any chest pain. She admits to shortness of breath on moderate exertion but this has not worsened in the last couple of days. She has no history of orthopnea, PND, dizziness, lightheadedness or syncope.     Primary Care Provider: Gabriel Ochoa MD  Past Medical History:   Diagnosis Date    Colon polyps     Diabetes ([address]     Diverticulosis     Hypertension     Ill-defined condition       Past Surgical History:   Procedure Laterality Date    COLONOSCOPY N/A 4/29/2021    COLONOSCOPY performed by Keyonna Gayle MD at [facility]     Family History   Problem Relation Age of Onset    Diabetes Other       Social History     Tobacco Use    Smoking status: Never Smoker    Smokeless tobacco: Never Used   Substance Use Topics    Alcohol use: Not Currently      Current Facility-Administered Medications   Medication Dose Route Frequency    [START ON 11/23/2021] furosemide (LASIX) tablet 40 mg  40 mg Oral DAILY    insulin lispro (HUMALOG) injection   SubCUTAneous AC&HS    carvediloL (COREG) tablet 6.25 mg  6.25 mg Oral BID WITH MEALS    amLODIPine (NORVASC) tablet 10 mg  10 mg Oral DAILY    clopidogreL (PLAVIX) tablet 75 mg  75 mg Oral DAILY    ondansetron hcl (ZOFRAN) tablet 8 mg  8 mg Oral Q4H PRN    hydrOXYzine pamoate (VISTARIL) capsule 25 mg 25 mg Oral TID PRN    oxyCODONE IR (ROXICODONE) tablet 5 mg  5 mg Oral Q4H PRN    [Held by provider] 0.9% sodium chloride infusion  75 mL/hr IntraVENous CONTINUOUS    influenza vaccine 2021-22 (6 mos+)(PF) (FLUARIX/FLULAVAL/FLUZONE QUAD) injection 0.5 mL  1 Each IntraMUSCular PRIOR TO DISCHARGE    [Held by provider] glipiZIDE (GLUCOTROL) tablet 5 mg  5 mg Oral ACB&D    aspirin chewable tablet 81 mg  81 mg Oral DAILY    [Held by provider] potassium chloride SR (KLOR-CON 10) tablet 20 mEq  20 mEq Oral DAILY WITH BREAKFAST    pravastatin (PRAVACHOL) tablet 10 mg  10 mg Oral QHS    glucose chewable tablet 16 g  4 Tablet Oral PRN    dextrose (D50W) injection syrg 12.5-25 g  25-50 mL IntraVENous PRN    glucagon (GLUCAGEN) injection 1 mg  1 mg IntraMUSCular PRN    sodium chloride (NS) flush 5-40 mL  5-40 mL IntraVENous Q8H    sodium chloride (NS) flush 5-40 mL  5-40 mL IntraVENous PRN    acetaminophen (TYLENOL) tablet 650 mg  650 mg Oral Q4H PRN    ondansetron (ZOFRAN) injection 4 mg  4 mg IntraVENous Q4H PRN    enoxaparin (LOVENOX) injection 40 mg  40 mg SubCUTAneous Q24H    pantoprazole (PROTONIX) tablet 40 mg  40 mg Oral ACB    prochlorperazine (COMPAZINE) with saline injection 5 mg  5 mg IntraVENous Q4H PRN        Allergies   Allergen Reactions    Insulins Itching    Penicillins Other (comments)     Pt states it makes her pass out        Review of Systems:  A comprehensive review of systems was negative except for that written in the History of Present Illness.        Subjective:     Visit Vitals  BP (!) 154/97   Pulse 68   Temp 98 °F (36.7 °C)   Resp 16   Ht 5' 4\" (1.626 m)   Wt 62.6 kg (138 lb)   SpO2 98%   BMI 23.69 kg/m²        Physical Exam:  Visit Vitals  BP (!) 154/97   Pulse 68   Temp 98 °F (36.7 °C)   Resp 16   Ht 5' 4\" (1.626 m)   Wt 62.6 kg (138 lb)   SpO2 98%   BMI 23.69 kg/m²     General Appearance:  Well developed, well nourished,alert and oriented x 3, and individual in no acute distress. Ears/Nose/Mouth/Throat:   Hearing grossly normal.         Neck: Supple. Chest:   Lungs clear to auscultation bilaterally. Cardiovascular:  Regular rate and rhythm, S1, S2 normal, no murmur. Abdomen:   Soft, non-tender, bowel sounds are active. Extremities: No edema bilaterally. Skin: Warm and dry. Cardiographics:  Telemetry: normal sinus rhythm  ECG: Normal sinus rhythm    Data Reviewed: BMP: No results found for: NA, K, CL, CO2, AGAP, GLU, BUN, CREA, GFRAA, GFRNA  CMP: No results found for: NA, K, CL, CO2, AGAP, GLU, BUN, CREA, GFRAA, GFRNA, CA, MG, PHOS, ALB, TBIL, TP, ALB, GLOB, AGRAT, ALT  CBC: No results found for: WBC, HGB, HGBEXT, HCT, HCTEXT, PLT, PLTEXT, HGBEXT, HCTEXT, PLTEXT  All Cardiac Markers in the last 24 hours: No results found for: CPK, CK, CKMMB, CKMB, RCK3, CKMBT, CKNDX, CKND1, NAYANA, TROPT, TROIQ, GRAEME, TROPT, TNIPOC, BNP, BNPP  Recent Glucose Results: No results found for: GLU  ABG: No results found for: PH, PHI, PCO2, PCO2I, PO2, PO2I, HCO3, HCO3I, FIO2, FIO2I  COAGS: No results found for: APTT, PTP, INR, INREXT, INREXT     Assessment:   Acute pancreatitis  NSTEMI type II  Paroxysmal atrial fibrillation  Chronic HFrEF  Dilated cardiomyopathy  Diabetes mellitus  Hypertension  Hyperlipidemia     Plan:   -Patient with past medical history as above admitted for acute pancreatitis, found to have elevated troponins consistent with type II NSTEMI. She has had no anginal symptoms. She has known severely reduced LVEF and hence shortness of breath at baseline which has not worsened. She is on appropriate medical therapy for cardiomyopathy.  -Troponins appear to have peaked  -We will get a limited echocardiogram in the morning to rule out any new changes  -EKG on recent admission showed atrial fibrillation her [id number] at least 6 and she is high risk for stroke. In the setting of recent PCI, I would prefer that she be on Eliquis and Plavix.   Will need to make sure this is okay with GI prior to initiation due to recent pancreatitis and fear of hemorrhagic conversion  -Keep K above 4, mag above 2  -She will be at elevated risk during her EGD but benefits outweigh the risks and we may proceed with this. I think her elevated troponins are multifactorial in the setting of underlying severe cardiomyopathy.   I do not think she will benefit from ischemic work-up at this time due to lack of anginal symptoms  -We will continue to follow her closely during the course of her hospitalization    Thank you for allowing us to participate in the care of your patient

## 2021-11-22 NOTE — PROGRESS NOTES
CM called patients son/primary decision maker, Carmen Felix @ [phone] to discuss DC planning for patient. Son is unsure of her current mobility level and feels like she is very weak. He is not sure if HH is going to be enough for her and wants to have PT/OT work with patient to assess. CM informed GORDY Thorpe and orders received for PT/OT. CM will continue to follow for therapy recommendations.

## 2021-11-22 NOTE — PROGRESS NOTES
This anesthesiologist telephoned the on-call covering cardiologist, Dr. Timothy Thurman, for Dr. Jay Garcia who last month on 10/28 performed a cardiac cath on this patient for elevated high sensitivity troponins in the 500's and then subsequently performed PCI of the RCA and placed two AR. Patient was officially diagnosed as having had a NSTEMI. Patient was later discharged to home and subsequently returned to this hospital on 11/19 and was found to once again have elevated high sensitivity troponins in the \"500's\" with an elevated BNP of 7,717. Patient was not evaluated by Cardiology prior to arrival to the GI suite today for EGD under MAC with propofol for evaluation of upper abdominal pain. Patient's hgb is currently stable at 11.7. Patient does have a moderately decreased creatinine clearance; however, her previously elevated troponins should have since normalized over the past 3+ weeks. Significant concern for the possibility of an acute extension of the patients previous NSTEMI. Of important note at this time, the patient states that last month as well as currently she never had/nor currently has any c/o of chest pain. Dr. Timothy Thurman agreed with my concerns and personally requested that the patient's planned EGD be postponed pending thorough patient evaluation by himself later today.

## 2021-11-22 NOTE — PROGRESS NOTES
Hospitalist Progress Note    Subjective:   Daily Progress Note: 11/22/2021 3:24 PM    Mild abdominal pain with shortness of breath    Current Facility-Administered Medications   Medication Dose Route Frequency    insulin lispro (HUMALOG) injection   SubCUTAneous AC&HS    carvediloL (COREG) tablet 6.25 mg  6.25 mg Oral BID WITH MEALS    amLODIPine (NORVASC) tablet 10 mg  10 mg Oral DAILY    clopidogreL (PLAVIX) tablet 75 mg  75 mg Oral DAILY    ondansetron hcl (ZOFRAN) tablet 8 mg  8 mg Oral Q4H PRN    hydrOXYzine pamoate (VISTARIL) capsule 25 mg  25 mg Oral TID PRN    oxyCODONE IR (ROXICODONE) tablet 5 mg  5 mg Oral Q4H PRN    [Held by provider] 0.9% sodium chloride infusion  75 mL/hr IntraVENous CONTINUOUS    influenza vaccine 2021-22 (6 mos+)(PF) (FLUARIX/FLULAVAL/FLUZONE QUAD) injection 0.5 mL  1 Each IntraMUSCular PRIOR TO DISCHARGE    [Held by provider] glipiZIDE (GLUCOTROL) tablet 5 mg  5 mg Oral ACB&D    aspirin chewable tablet 81 mg  81 mg Oral DAILY    furosemide (LASIX) tablet 20 mg  20 mg Oral DAILY    [Held by provider] potassium chloride SR (KLOR-CON 10) tablet 20 mEq  20 mEq Oral DAILY WITH BREAKFAST    pravastatin (PRAVACHOL) tablet 10 mg  10 mg Oral QHS    glucose chewable tablet 16 g  4 Tablet Oral PRN    dextrose (D50W) injection syrg 12.5-25 g  25-50 mL IntraVENous PRN    glucagon (GLUCAGEN) injection 1 mg  1 mg IntraMUSCular PRN    sodium chloride (NS) flush 5-40 mL  5-40 mL IntraVENous Q8H    sodium chloride (NS) flush 5-40 mL  5-40 mL IntraVENous PRN    acetaminophen (TYLENOL) tablet 650 mg  650 mg Oral Q4H PRN    ondansetron (ZOFRAN) injection 4 mg  4 mg IntraVENous Q4H PRN    enoxaparin (LOVENOX) injection 40 mg  40 mg SubCUTAneous Q24H    pantoprazole (PROTONIX) tablet 40 mg  40 mg Oral ACB    prochlorperazine (COMPAZINE) with saline injection 5 mg  5 mg IntraVENous Q4H PRN        Review of Systems  Review of Systems   Constitutional: Negative for chills and fever. HENT: Negative. Eyes: Negative. Respiratory: Positive for shortness of breath. Negative for cough. Cardiovascular: Positive for chest pain. Negative for leg swelling. Gastrointestinal: Negative for abdominal pain, nausea and vomiting. Genitourinary: Negative. Musculoskeletal: Negative. Skin: Negative. Neurological: Negative. Psychiatric/Behavioral: Negative. Objective:     Visit Vitals  BP (!) 148/90   Pulse 93   Temp 98.8 °F (37.1 °C)   Resp 16   Ht 5' 4\" (1.626 m)   Wt 62.6 kg (138 lb)   SpO2 100%   BMI 23.69 kg/m²    O2 Flow Rate (L/min): 2 l/min O2 Device: Nasal cannula    Temp (24hrs), Av °F (36.7 °C), Min:97.6 °F (36.4 °C), Max:98.8 °F (37.1 °C)      No intake/output data recorded. No intake/output data recorded. Recent Results (from the past 24 hour(s))   GLUCOSE, POC    Collection Time: 21  3:57 PM   Result Value Ref Range    Glucose (POC) 94 65 - 117 mg/dL    Performed by [place] [id number], POC    Collection Time: 21  7:40 PM   Result Value Ref Range    Glucose (POC) 171 (H) 65 - 117 mg/dL    Performed by [address] [place] [id number], POC    Collection Time: 21  7:54 AM   Result Value Ref Range    Glucose (POC) 146 (H) 65 - 117 mg/dL    Performed by Rmaon Harman    TROPONIN-HIGH SENSITIVITY    Collection Time: 21 10:33 AM   Result Value Ref Range    Troponin-High Sensitivity 461 (HH) 0 - 51 ng/L   GLUCOSE, POC    Collection Time: 21 11:42 AM   Result Value Ref Range    Glucose (POC) 157 (H) 65 - 117 mg/dL    Performed by Bhupinder Simpson MARIANELA         CT ABD PELV W CONT   Final Result   1. Pancreatitis with resolving peripancreatic inflammation. No pseudocyst or   organized fluid collection. Cystic dilatation of the main pancreatic duct at the   tail unchanged. 2. Mild thickening of the distal gastric antrum which could represent gastritis   in the appropriate clinical setting. No definite ulcer identified.    3. Small supraumbilical ventral hernia containing omental fat, unchanged. 4. Moderate right and small left pleural effusions. XR CHEST PORT   Final Result   Interstitial pulmonary edema. Trace right and small left pleural   effusion. Unchanged left retrocardiac consolidation. PHYSICAL EXAM:    Physical Exam  Vitals reviewed. Constitutional:       General: She is not in acute distress. Appearance: She is not ill-appearing. HENT:      Head: Normocephalic and atraumatic. Mouth/Throat:      Mouth: Mucous membranes are moist.      Pharynx: Oropharynx is clear. Eyes:      Conjunctiva/sclera: Conjunctivae normal.   Cardiovascular:      Rate and Rhythm: Normal rate and regular rhythm. Heart sounds: Normal heart sounds. Pulmonary:      Comments: Diminished breath sounds in the right lower lobe, left lung clear to auscultation  Abdominal:      General: Abdomen is flat. Bowel sounds are normal.      Palpations: Abdomen is soft. Comments: Mild epigastric tenderness   Musculoskeletal:         General: Normal range of motion. Cervical back: Normal range of motion and neck supple. Skin:     General: Skin is warm and dry. Neurological:      General: No focal deficit present. Mental Status: She is alert and oriented to person, place, and time. Mental status is at baseline.    Psychiatric:         Mood and Affect: Mood normal.          Data Review    Recent Results (from the past 24 hour(s))   GLUCOSE, POC    Collection Time: 11/21/21  3:57 PM   Result Value Ref Range    Glucose (POC) 94 65 - 117 mg/dL    Performed by Juan Jose Ascencio    GLUCOSE, POC    Collection Time: 11/21/21  7:40 PM   Result Value Ref Range    Glucose (POC) 171 (H) 65 - 117 mg/dL    Performed by [address], POC    Collection Time: 11/22/21  7:54 AM   Result Value Ref Range    Glucose (POC) 146 (H) 65 - 117 mg/dL    Performed by Eduardo Gomez    TROPONIN-HIGH SENSITIVITY    Collection Time: 11/22/21 10:33 AM   Result Value Ref Range    Troponin-High Sensitivity 461 (HH) 0 - 51 ng/L   GLUCOSE, POC    Collection Time: 11/22/21 11:42 AM   Result Value Ref Range    Glucose (POC) 157 (H) 65 - 117 mg/dL    Performed by Angela Menendez         Assessment/Plan:     Active Problems:    Pancreatitis (10/22/2021)      Abdominal pain (11/20/2021)      Acute pancreatitis (11/20/2021)      Hospital course: This is an [de-identified] female admitted for acute pancreatitis on 11/19/2021. Lipase was greater than 3000 and has normalized. Patient's troponin was greater than 500 and is still remains elevated. Cardiac consultation pending. GI consultation EGD pending cardiac clearance. Recent echocardiogram revealed an EF of 20 to 25% with dilated left ventricle and reduced systolic function. .  Patient underwent recent cardiac catheterization with stent to the RCA. Impression:    1. Acute pancreatitis  2. Acute on chronic systolic heart failure  3. NSTEMI  4. JERAMY  5. Gastroesophageal reflux disease    Plan:    1. Acute pancreatitis  Lipase greater than 3000 down trended to normal  EGD pending  GI consultation  Mild abdominal pain  Clear liquid diet  Mild elevation in bilirubin, 1.3    2. Acute on chronic systolic heart failure  Previous echocardiogram with an EF of 2025%  Right pleural effusion on chest x-ray consistent with acute exacerbation of heart failure  Continue diuresis  Continue Coreg    3. NSTEMI  Troponin greater than 500  Remains 461 and not trending down appropriately  Cardiac consultation  Trend troponins  May need further cardiac work-up  Continue aspirin    4. JERAMY  Improving  Trend creatinine    5. Gastroesophageal reflux disease  Protonix    DVT prophylaxis: Lovenox  Ulcer prophylaxis: Protonix    Discharge barriers: Cardiac clearance, EGD and GI clearance    Care Plan discussed with: Patient/Family    Total time spent with patient: >35 minutes.

## 2021-11-22 NOTE — ANESTHESIA PREPROCEDURE EVALUATION
Relevant Problems   NEUROLOGY   (+) CVA (cerebral vascular accident) ([facility] [address]      CARDIOVASCULAR   (+) NSTEMI (non-ST elevated myocardial infarction) [facility])       Anesthetic History   No history of anesthetic complications            Review of Systems / Medical History  Patient summary reviewed, nursing notes reviewed and pertinent labs reviewed    Pulmonary          Shortness of breath         Neuro/Psych       CVA  TIA     Cardiovascular    Hypertension      CHF    Past MI, CAD, PAD, cardiac stents (10/28/2021) and hyperlipidemia      Comments: 10/2021    Interpretation Summary    · LV: Estimated LVEF is 20 - 25%. Mildly dilated left ventricle. Increased wall thickness. Severely reduced systolic function. · LA: Moderately dilated left atrium. · PV: Mild pulmonic valve regurgitation is present. · AV: Aortic valve leaflet calcification present without reduced excursion. · MV: Mitral valve non-specific thickening. Moderate to severe mitral valve regurgitation is present. · TV: Moderate tricuspid valve regurgitation is present. Right Ventricular Arterial Pressure (RVSP) is 41 mmHg. · Pericardium: Trivial pericardial effusion. GI/Hepatic/Renal         Renal disease: CRI      Comments: Pre-op diagnosis: Gastrointestinal hemorrhage, unspecified gastrointestinal hemorrhage type (K92.2) Endo/Other  Within defined limits  Diabetes: type 2, using insulin         Other Findings   Comments: This anesthesiologist telephoned the on-call covering cardiologist, Dr. Jillian Singh, for Dr. Luciana Junior who last month on 10/28 performed a cardiac cath on this patient for elevated high sensitivity troponins in the 500's and then subsequently performed PCI of the RCA and placed two AR. Patient was officially diagnosed as having had a NSTEMI.  Patient was later discharged to home and subsequently returned to this hospital on 11/19 and was found to once again have elevated high sensitivity troponins in the \"500's\" with an elevated BNP of 9,523. Patient was not evaluated by Cardiology prior to arrival to the GI suite today for EGD under MAC with propofol for evaluation of upper abdominal pain. Patient's hgb is currently stable at 11.7. Patient does have a moderately decreased creatinine clearance; however, her previously elevated troponins should have since normalized over the past 3+ weeks. Significant concern for the possibility of an acute extension of the patients previous NSTEMI. Of important note at this time, the patient states that last month as well as currently she never had/nor currently has any c/o of chest pain. Dr. Chuckie Lopez agreed with my concerns and personally requested that the patient's planned EGD be postponed pending thorough patient evaluation by himself later today. Physical Exam    Airway  Mallampati: II  TM Distance: > 6 cm  Neck ROM: normal range of motion   Mouth opening: Normal     Cardiovascular  Regular rate and rhythm,  S1 and S2 normal,  no murmur, click, rub, or gallop             Dental  No notable dental hx       Pulmonary  Breath sounds clear to auscultation               Abdominal  GI exam deferred       Other Findings   Comments: Results for Early Pea (MRN [id number]) as of 11/22/2021 12:30    11/22/2021 10:33  Troponin-High Sensitivity: 461 (HH)         Anesthetic Plan    ASA: 4, emergent  Anesthesia type: MAC and total IV anesthesia          Induction: Intravenous  Anesthetic plan and risks discussed with: Patient      This anesthesiologist telephoned the on-call covering cardiologist, Dr. Chuckie Lopez, for Dr. Shelley Garcia who last month on 10/28 performed a cardiac cath on this patient for elevated high sensitivity troponins in the 500's and then subsequently performed PCI of the RCA and placed two AR. Patient was officially diagnosed as having had a NSTEMI.  Patient was later discharged to home and subsequently returned to this hospital on 11/19 and was found to once again have elevated high sensitivity troponins in the \"500's\" with an elevated BNP of 7,717. Patient was not evaluated by Cardiology prior to arrival to the GI suite today for EGD under MAC with propofol for evaluation of upper abdominal pain. Patient's hgb is currently stable at 11.7. Patient does have a moderately decreased creatinine clearance; however, her previously elevated troponins should have since normalized over the past 3+ weeks. Significant concern for the possibility of an acute extension of the patients previous NSTEMI. Of important note at this time, the patient states that last month as well as currently she never had/nor currently has any c/o of chest pain. Dr. Salazar Ashford agreed with my concerns and personally requested that the patient's planned EGD be postponed pending thorough patient evaluation by himself later today.

## 2021-11-22 NOTE — PROGRESS NOTES
Pt in endo suite for upper endoscopy. troponins noted to be elevated at 461. Dr. AMBER ROLDAN AT [facility] anesthesiologist aware. Cardiology recently consulted but have not seen pt yet. Dr. AMBER ROLDAN AT [facility] spoke with cardiologist on the phone about pt condition. Both Dr. AMBER ROLDAN AT [facility] and cardiologist agreed to hold off on upper endoscopy until cardiology evaluates pt. Dr. Allison Lord made aware of this by Dr. AMBER ROLDAN AT [facility]. Pt taken back up to her room and primary nurse sanna HEMPHILL made aware.

## 2021-11-22 NOTE — ROUTINE PROCESS
Bedside and Verbal shift change report given to Abby Brown (oncoming nurse) by Solis Box (offgoing nurse). Report included the following information SBAR and MAR.

## 2021-11-23 LAB
ALBUMIN SERPL-MCNC: 2.5 G/DL (ref 3.5–5)
ALBUMIN/GLOB SERPL: 0.7 {RATIO} (ref 1.1–2.2)
ALP SERPL-CCNC: 106 U/L (ref 45–117)
ALT SERPL-CCNC: 23 U/L (ref 12–78)
ANION GAP SERPL CALC-SCNC: 13 MMOL/L (ref 5–15)
AST SERPL W P-5'-P-CCNC: 16 U/L (ref 15–37)
ATRIAL RATE: 89 BPM
BASOPHILS # BLD: 0 K/UL (ref 0–0.1)
BASOPHILS NFR BLD: 1 % (ref 0–1)
BILIRUB SERPL-MCNC: 1.3 MG/DL (ref 0.2–1)
BUN SERPL-MCNC: 10 MG/DL (ref 6–20)
BUN/CREAT SERPL: 10 (ref 12–20)
CA-I BLD-MCNC: 8.9 MG/DL (ref 8.5–10.1)
CALCULATED P AXIS, ECG09: 32 DEGREES
CALCULATED R AXIS, ECG10: -21 DEGREES
CALCULATED T AXIS, ECG11: -63 DEGREES
CHLORIDE SERPL-SCNC: 103 MMOL/L (ref 97–108)
CO2 SERPL-SCNC: 22 MMOL/L (ref 21–32)
CREAT SERPL-MCNC: 1.04 MG/DL (ref 0.55–1.02)
DIAGNOSIS, 93000: NORMAL
DIFFERENTIAL METHOD BLD: ABNORMAL
EOSINOPHIL # BLD: 0.2 K/UL (ref 0–0.4)
EOSINOPHIL NFR BLD: 6 % (ref 0–7)
ERYTHROCYTE [DISTWIDTH] IN BLOOD BY AUTOMATED COUNT: 15.4 % (ref 11.5–14.5)
GLOBULIN SER CALC-MCNC: 3.6 G/DL (ref 2–4)
GLUCOSE BLD STRIP.AUTO-MCNC: 113 MG/DL (ref 65–117)
GLUCOSE BLD STRIP.AUTO-MCNC: 130 MG/DL (ref 65–117)
GLUCOSE BLD STRIP.AUTO-MCNC: 168 MG/DL (ref 65–117)
GLUCOSE BLD STRIP.AUTO-MCNC: 190 MG/DL (ref 65–117)
GLUCOSE SERPL-MCNC: 111 MG/DL (ref 65–100)
HCT VFR BLD AUTO: 37.1 % (ref 35–47)
HGB BLD-MCNC: 11.9 G/DL (ref 11.5–16)
IMM GRANULOCYTES # BLD AUTO: 0 K/UL (ref 0–0.04)
IMM GRANULOCYTES NFR BLD AUTO: 0 % (ref 0–0.5)
LYMPHOCYTES # BLD: 1.1 K/UL (ref 0.8–3.5)
LYMPHOCYTES NFR BLD: 31 % (ref 12–49)
MCH RBC QN AUTO: 26.1 PG (ref 26–34)
MCHC RBC AUTO-ENTMCNC: 32.1 G/DL (ref 30–36.5)
MCV RBC AUTO: 81.4 FL (ref 80–99)
MONOCYTES # BLD: 0.6 K/UL (ref 0–1)
MONOCYTES NFR BLD: 18 % (ref 5–13)
NEUTS SEG # BLD: 1.5 K/UL (ref 1.8–8)
NEUTS SEG NFR BLD: 44 % (ref 32–75)
NRBC # BLD: 0 K/UL (ref 0–0.01)
NRBC BLD-RTO: 0 PER 100 WBC
P-R INTERVAL, ECG05: 142 MS
PERFORMED BY, TECHID: ABNORMAL
PERFORMED BY, TECHID: NORMAL
PLATELET # BLD AUTO: 124 K/UL (ref 150–400)
PMV BLD AUTO: 12.2 FL (ref 8.9–12.9)
POTASSIUM SERPL-SCNC: 3.6 MMOL/L (ref 3.5–5.1)
PROT SERPL-MCNC: 6.1 G/DL (ref 6.4–8.2)
Q-T INTERVAL, ECG07: 436 MS
QRS DURATION, ECG06: 86 MS
QTC CALCULATION (BEZET), ECG08: 530 MS
RBC # BLD AUTO: 4.56 M/UL (ref 3.8–5.2)
SODIUM SERPL-SCNC: 138 MMOL/L (ref 136–145)
VENTRICULAR RATE, ECG03: 89 BPM
WBC # BLD AUTO: 3.5 K/UL (ref 3.6–11)

## 2021-11-23 PROCEDURE — 80053 COMPREHEN METABOLIC PANEL: CPT

## 2021-11-23 PROCEDURE — 36415 COLL VENOUS BLD VENIPUNCTURE: CPT

## 2021-11-23 PROCEDURE — 74011250637 HC RX REV CODE- 250/637: Performed by: PHYSICIAN ASSISTANT

## 2021-11-23 PROCEDURE — 82962 GLUCOSE BLOOD TEST: CPT

## 2021-11-23 PROCEDURE — 74011250637 HC RX REV CODE- 250/637: Performed by: HOSPITALIST

## 2021-11-23 PROCEDURE — 77010033678 HC OXYGEN DAILY

## 2021-11-23 PROCEDURE — 65270000029 HC RM PRIVATE

## 2021-11-23 PROCEDURE — 85025 COMPLETE CBC W/AUTO DIFF WBC: CPT

## 2021-11-23 PROCEDURE — 94760 N-INVAS EAR/PLS OXIMETRY 1: CPT

## 2021-11-23 PROCEDURE — 97161 PT EVAL LOW COMPLEX 20 MIN: CPT

## 2021-11-23 PROCEDURE — 74011636637 HC RX REV CODE- 636/637: Performed by: HOSPITALIST

## 2021-11-23 PROCEDURE — 97530 THERAPEUTIC ACTIVITIES: CPT

## 2021-11-23 RX ADMIN — Medication 10 ML: at 14:30

## 2021-11-23 RX ADMIN — INSULIN LISPRO 3 UNITS: 100 INJECTION, SOLUTION INTRAVENOUS; SUBCUTANEOUS at 11:36

## 2021-11-23 RX ADMIN — CARVEDILOL 6.25 MG: 3.12 TABLET, FILM COATED ORAL at 16:15

## 2021-11-23 RX ADMIN — PRAVASTATIN SODIUM 10 MG: 10 TABLET ORAL at 21:00

## 2021-11-23 RX ADMIN — OXYCODONE 5 MG: 5 TABLET ORAL at 21:00

## 2021-11-23 RX ADMIN — CLOPIDOGREL BISULFATE 75 MG: 75 TABLET ORAL at 08:28

## 2021-11-23 RX ADMIN — Medication 10 ML: at 22:00

## 2021-11-23 RX ADMIN — AMLODIPINE BESYLATE 10 MG: 5 TABLET ORAL at 08:24

## 2021-11-23 RX ADMIN — INSULIN LISPRO 3 UNITS: 100 INJECTION, SOLUTION INTRAVENOUS; SUBCUTANEOUS at 16:30

## 2021-11-23 RX ADMIN — APIXABAN 2.5 MG: 2.5 TABLET, FILM COATED ORAL at 21:00

## 2021-11-23 RX ADMIN — FUROSEMIDE 40 MG: 40 TABLET ORAL at 08:24

## 2021-11-23 RX ADMIN — ASPIRIN 81 MG CHEWABLE TABLET 81 MG: 81 TABLET CHEWABLE at 08:23

## 2021-11-23 RX ADMIN — PANTOPRAZOLE SODIUM 40 MG: 40 TABLET, DELAYED RELEASE ORAL at 08:24

## 2021-11-23 RX ADMIN — Medication 10 ML: at 10:29

## 2021-11-23 RX ADMIN — CARVEDILOL 6.25 MG: 3.12 TABLET, FILM COATED ORAL at 08:23

## 2021-11-23 RX ADMIN — HYDROXYZINE PAMOATE 25 MG: 25 CAPSULE ORAL at 21:00

## 2021-11-23 NOTE — PROGRESS NOTES
Hospitalist Progress Note    Subjective:   Daily Progress Note: 11/23/2021 3:24 PM    Mild abdominal pain with shortness of breath    Current Facility-Administered Medications   Medication Dose Route Frequency    furosemide (LASIX) tablet 40 mg  40 mg Oral DAILY    insulin lispro (HUMALOG) injection   SubCUTAneous AC&HS    carvediloL (COREG) tablet 6.25 mg  6.25 mg Oral BID WITH MEALS    amLODIPine (NORVASC) tablet 10 mg  10 mg Oral DAILY    clopidogreL (PLAVIX) tablet 75 mg  75 mg Oral DAILY    ondansetron hcl (ZOFRAN) tablet 8 mg  8 mg Oral Q4H PRN    hydrOXYzine pamoate (VISTARIL) capsule 25 mg  25 mg Oral TID PRN    oxyCODONE IR (ROXICODONE) tablet 5 mg  5 mg Oral Q4H PRN    [Held by provider] 0.9% sodium chloride infusion  75 mL/hr IntraVENous CONTINUOUS    influenza vaccine 2021-22 (6 mos+)(PF) (FLUARIX/FLULAVAL/FLUZONE QUAD) injection 0.5 mL  1 Each IntraMUSCular PRIOR TO DISCHARGE    [Held by provider] glipiZIDE (GLUCOTROL) tablet 5 mg  5 mg Oral ACB&D    aspirin chewable tablet 81 mg  81 mg Oral DAILY    [Held by provider] potassium chloride SR (KLOR-CON 10) tablet 20 mEq  20 mEq Oral DAILY WITH BREAKFAST    pravastatin (PRAVACHOL) tablet 10 mg  10 mg Oral QHS    glucose chewable tablet 16 g  4 Tablet Oral PRN    dextrose (D50W) injection syrg 12.5-25 g  25-50 mL IntraVENous PRN    glucagon (GLUCAGEN) injection 1 mg  1 mg IntraMUSCular PRN    sodium chloride (NS) flush 5-40 mL  5-40 mL IntraVENous Q8H    sodium chloride (NS) flush 5-40 mL  5-40 mL IntraVENous PRN    acetaminophen (TYLENOL) tablet 650 mg  650 mg Oral Q4H PRN    ondansetron (ZOFRAN) injection 4 mg  4 mg IntraVENous Q4H PRN    enoxaparin (LOVENOX) injection 40 mg  40 mg SubCUTAneous Q24H    pantoprazole (PROTONIX) tablet 40 mg  40 mg Oral ACB    prochlorperazine (COMPAZINE) with saline injection 5 mg  5 mg IntraVENous Q4H PRN        Review of Systems  Review of Systems   Constitutional: Negative for chills and fever. HENT: Negative. Eyes: Negative. Respiratory: Positive for shortness of breath. Negative for cough. Cardiovascular: Positive for chest pain. Negative for leg swelling. Gastrointestinal: Negative for abdominal pain, nausea and vomiting. Genitourinary: Negative. Musculoskeletal: Negative. Skin: Negative. Neurological: Negative. Psychiatric/Behavioral: Negative. Objective:     Visit Vitals  /79 (BP 1 Location: Right upper arm, BP Patient Position: Sitting)   Pulse 77   Temp 98.4 °F (36.9 °C)   Resp 18   Ht 5' 4\" (1.626 m)   Wt 62.6 kg (138 lb)   SpO2 97%   BMI 23.69 kg/m²    O2 Flow Rate (L/min): 1 l/min O2 Device: Nasal cannula    Temp (24hrs), Av.4 °F (36.9 °C), Min:98 °F (36.7 °C), Max:98.7 °F (37.1 °C)      No intake/output data recorded.  1901 -  0700  In: 100 [P.O.:100]  Out: -     Recent Results (from the past 24 hour(s))   GLUCOSE, POC    Collection Time: 21  3:38 PM   Result Value Ref Range    Glucose (POC) 177 (H) 65 - 117 mg/dL    Performed by Tania Hartman    GLUCOSE, POC    Collection Time: 21  7:55 PM   Result Value Ref Range    Glucose (POC) 139 (H) 65 - 117 mg/dL    Performed by Lori Pickard    CBC WITH AUTOMATED DIFF    Collection Time: 21  7:15 AM   Result Value Ref Range    WBC 3.5 (L) 3.6 - 11.0 K/uL    RBC 4.56 3.80 - 5.20 M/uL    HGB 11.9 11.5 - 16.0 g/dL    HCT 37.1 35.0 - 47.0 %    MCV 81.4 80.0 - 99.0 FL    MCH 26.1 26.0 - 34.0 PG    MCHC 32.1 30.0 - 36.5 g/dL    RDW 15.4 (H) 11.5 - 14.5 %    PLATELET 016 (L) 079 - 400 K/uL    MPV 12.2 8.9 - 12.9 FL    NRBC 0.0 0.0  WBC    ABSOLUTE NRBC 0.00 0.00 - 0.01 K/uL    NEUTROPHILS 44 32 - 75 %    LYMPHOCYTES 31 12 - 49 %    MONOCYTES 18 (H) 5 - 13 %    EOSINOPHILS 6 0 - 7 %    BASOPHILS 1 0 - 1 %    IMMATURE GRANULOCYTES 0 0 - 0.5 %    ABS. NEUTROPHILS 1.5 (L) 1.8 - 8.0 K/UL    ABS. LYMPHOCYTES 1.1 0.8 - 3.5 K/UL    ABS. MONOCYTES 0.6 0.0 - 1.0 K/UL    ABS. EOSINOPHILS 0.2 0.0 - 0.4 K/UL    ABS. BASOPHILS 0.0 0.0 - 0.1 K/UL    ABS. IMM. GRANS. 0.0 0.00 - 0.04 K/UL    DF AUTOMATED     METABOLIC PANEL, COMPREHENSIVE    Collection Time: 11/23/21  7:15 AM   Result Value Ref Range    Sodium 138 136 - 145 mmol/L    Potassium 3.6 3.5 - 5.1 mmol/L    Chloride 103 97 - 108 mmol/L    CO2 22 21 - 32 mmol/L    Anion gap 13 5 - 15 mmol/L    Glucose 111 (H) 65 - 100 mg/dL    BUN 10 6 - 20 mg/dL    Creatinine 1.04 (H) 0.55 - 1.02 mg/dL    BUN/Creatinine ratio 10 (L) 12 - 20      GFR est AA >60 >60 ml/min/1.73m2    GFR est non-AA 51 (L) >60 ml/min/1.73m2    Calcium 8.9 8.5 - 10.1 mg/dL    Bilirubin, total 1.3 (H) 0.2 - 1.0 mg/dL    AST (SGOT) 16 15 - 37 U/L    ALT (SGPT) 23 12 - 78 U/L    Alk. phosphatase 106 45 - 117 U/L    Protein, total 6.1 (L) 6.4 - 8.2 g/dL    Albumin 2.5 (L) 3.5 - 5.0 g/dL    Globulin 3.6 2.0 - 4.0 g/dL    A-G Ratio 0.7 (L) 1.1 - 2.2     GLUCOSE, POC    Collection Time: 11/23/21  7:56 AM   Result Value Ref Range    Glucose (POC) 113 65 - 117 mg/dL    Performed by ALICE App    GLUCOSE, POC    Collection Time: 11/23/21 11:11 AM   Result Value Ref Range    Glucose (POC) 168 (H) 65 - 117 mg/dL    Performed by ALICE App         CT ABD PELV W CONT   Final Result   1. Pancreatitis with resolving peripancreatic inflammation. No pseudocyst or   organized fluid collection. Cystic dilatation of the main pancreatic duct at the   tail unchanged. 2. Mild thickening of the distal gastric antrum which could represent gastritis   in the appropriate clinical setting. No definite ulcer identified. 3. Small supraumbilical ventral hernia containing omental fat, unchanged. 4. Moderate right and small left pleural effusions. XR CHEST PORT   Final Result   Interstitial pulmonary edema. Trace right and small left pleural   effusion. Unchanged left retrocardiac consolidation. PHYSICAL EXAM:    Physical Exam  Vitals reviewed.    Constitutional: General: She is not in acute distress. Appearance: She is not ill-appearing. HENT:      Head: Normocephalic and atraumatic. Mouth/Throat:      Mouth: Mucous membranes are moist.      Pharynx: Oropharynx is clear. Eyes:      Conjunctiva/sclera: Conjunctivae normal.   Cardiovascular:      Rate and Rhythm: Normal rate and regular rhythm. Heart sounds: Normal heart sounds. Pulmonary:      Comments: Diminished breath sounds in the right lower lobe, left lung clear to auscultation  Abdominal:      General: Abdomen is flat. Bowel sounds are normal.      Palpations: Abdomen is soft. Comments: Mild epigastric tenderness   Musculoskeletal:         General: Normal range of motion. Cervical back: Normal range of motion and neck supple. Skin:     General: Skin is warm and dry. Neurological:      General: No focal deficit present. Mental Status: She is alert and oriented to person, place, and time. Mental status is at baseline.    Psychiatric:         Mood and Affect: Mood normal.          Data Review    Recent Results (from the past 24 hour(s))   GLUCOSE, POC    Collection Time: 11/22/21  3:38 PM   Result Value Ref Range    Glucose (POC) 177 (H) 65 - 117 mg/dL    Performed by Rebecca Villegas    GLUCOSE, POC    Collection Time: 11/22/21  7:55 PM   Result Value Ref Range    Glucose (POC) 139 (H) 65 - 117 mg/dL    Performed by Maximiliano Will    CBC WITH AUTOMATED DIFF    Collection Time: 11/23/21  7:15 AM   Result Value Ref Range    WBC 3.5 (L) 3.6 - 11.0 K/uL    RBC 4.56 3.80 - 5.20 M/uL    HGB 11.9 11.5 - 16.0 g/dL    HCT 37.1 35.0 - 47.0 %    MCV 81.4 80.0 - 99.0 FL    MCH 26.1 26.0 - 34.0 PG    MCHC 32.1 30.0 - 36.5 g/dL    RDW 15.4 (H) 11.5 - 14.5 %    PLATELET 633 (L) 807 - 400 K/uL    MPV 12.2 8.9 - 12.9 FL    NRBC 0.0 0.0  WBC    ABSOLUTE NRBC 0.00 0.00 - 0.01 K/uL    NEUTROPHILS 44 32 - 75 %    LYMPHOCYTES 31 12 - 49 %    MONOCYTES 18 (H) 5 - 13 %    EOSINOPHILS 6 0 - 7 % BASOPHILS 1 0 - 1 %    IMMATURE GRANULOCYTES 0 0 - 0.5 %    ABS. NEUTROPHILS 1.5 (L) 1.8 - 8.0 K/UL    ABS. LYMPHOCYTES 1.1 0.8 - 3.5 K/UL    ABS. MONOCYTES 0.6 0.0 - 1.0 K/UL    ABS. EOSINOPHILS 0.2 0.0 - 0.4 K/UL    ABS. BASOPHILS 0.0 0.0 - 0.1 K/UL    ABS. IMM. GRANS. 0.0 0.00 - 0.04 K/UL    DF AUTOMATED     METABOLIC PANEL, COMPREHENSIVE    Collection Time: 11/23/21  7:15 AM   Result Value Ref Range    Sodium 138 136 - 145 mmol/L    Potassium 3.6 3.5 - 5.1 mmol/L    Chloride 103 97 - 108 mmol/L    CO2 22 21 - 32 mmol/L    Anion gap 13 5 - 15 mmol/L    Glucose 111 (H) 65 - 100 mg/dL    BUN 10 6 - 20 mg/dL    Creatinine 1.04 (H) 0.55 - 1.02 mg/dL    BUN/Creatinine ratio 10 (L) 12 - 20      GFR est AA >60 >60 ml/min/1.73m2    GFR est non-AA 51 (L) >60 ml/min/1.73m2    Calcium 8.9 8.5 - 10.1 mg/dL    Bilirubin, total 1.3 (H) 0.2 - 1.0 mg/dL    AST (SGOT) 16 15 - 37 U/L    ALT (SGPT) 23 12 - 78 U/L    Alk. phosphatase 106 45 - 117 U/L    Protein, total 6.1 (L) 6.4 - 8.2 g/dL    Albumin 2.5 (L) 3.5 - 5.0 g/dL    Globulin 3.6 2.0 - 4.0 g/dL    A-G Ratio 0.7 (L) 1.1 - 2.2     GLUCOSE, POC    Collection Time: 11/23/21  7:56 AM   Result Value Ref Range    Glucose (POC) 113 65 - 117 mg/dL    Performed by Alyson Cooper, POC    Collection Time: 11/23/21 11:11 AM   Result Value Ref Range    Glucose (POC) 168 (H) 65 - 117 mg/dL    Performed by Edna Ledezma         Assessment/Plan:     Active Problems:    Pancreatitis (10/22/2021)      Abdominal pain (11/20/2021)      Acute pancreatitis (11/20/2021)      Hospital course: This is an 80-year-old female admitted for acute pancreatitis on 11/19/2021. Lipase was greater than 3000 and has normalized. Patient's troponin was greater than 500 and is still remains elevated  Recent echocardiogram revealed an EF of 20 to 25% with dilated left ventricle and reduced systolic function. .  Patient underwent recent cardiac catheterization with stent to the RCA.  Cardiac consultation recommending Eliquis with new onset atrial fibrillation and a chads vas score of 6. Will start renal dosing of Eliquis 2.5. GI consult with no pending EGD due to cardiac issues. Impression:    1. Acute pancreatitis  2. Acute on chronic systolic heart failure  3. NSTEMI  4. JERAMY  5. Gastroesophageal reflux disease    Plan:    1. Acute pancreatitis  Lipase greater than 3000 down trended to normal  EGD on hold   GI consultation  Mild abdominal pain  Soft diet  Mild elevation in bilirubin, 1.3    2. Acute on chronic systolic heart failure  Previous echocardiogram with an EF of 20-25%  Right pleural effusion on chest x-ray consistent with acute exacerbation of heart failure  Continue diuresis  Continue Coreg    3. NSTEMI  Troponin greater than 500  Remains 461 and not trending down appropriately  Cardiac consultation  Trend troponins  May need further cardiac work-up  Continue aspirin    4. JERAMY  Improving  Trend creatinine    5. Gastroesophageal reflux disease  Protonix    6. Atrial fibrillation  [id number] score 6  Risk of stroke  Eliquis  Coreg for rate control    DVT prophylaxis: Lovenox  Ulcer prophylaxis: Protonix    Discharge barriers: Cardiac clearance, EGD and GI clearance    Discussed case with Blue via phone. Patient updated on patient's guarded status and her severe heart failure which may hasten her overall health status. Care Plan discussed with: Patient/Family    Total time spent with patient: >35 minutes.

## 2021-11-23 NOTE — PROGRESS NOTES
Bedside and Verbal shift change report given to Roma Burrows LPN (oncoming nurse) by John Paul Luna RN (offgoing nurse). Report included the following information SBAR and MAR.

## 2021-11-23 NOTE — PROGRESS NOTES
PATIENT WISHES  [address]      Patient called primary nurse to state that she wishes to go home. She does not understand why she is \"being made to waste away in this hospital. Why I had to sign that paper and I was not given a copy or allowed to read it. I want a copy. I want to leave. I feel better\". Nurse explained to patient that a procedure was still pending but if she wished to leave without having the procedure done that was up to her, another issue is that she is still requiring oxygen at this time. We will need to either ween her off of the oxygen or get insurance or medicaide/medicare approval.  The approval process might take a day or two to get done, but we will get it done. Patient expressed understanding. Patient requested education on procedure and pancreatitis. Nurse agreed and has printed literature for patient. Nurse has left message with GI provider about advancing diet and about procedure, as well. Patient wishes for diet to be advanced, preferably to soft diet,  and TO HAVE ENSURE VANILLA .

## 2021-11-23 NOTE — PROGRESS NOTES
PHYSICAL THERAPY EVALUATION  Patient: Kinsey White (32 y.o. female)  Date: 11/23/2021  Primary Diagnosis: Acute pancreatitis [K85.90]  Procedure(s) (LRB):  ESOPHAGOGASTRODUODENOSCOPY (EGD) (N/A) 1 Day Post-Op   Precautions: falls    ASSESSMENT  This is a [de-identified] y/o female who came to  [facility]  ED with c/o shortness of breath, abdominal pain continued since discharge , admitted    on 11/19/21 for acute pancreatitis . Pt has PMH of diabetes, hypertension, and coronary artery disease . Pt received semi-supine in bed , agreeable for PT eval/tx . Pt is A& O x 4 ,  per pt report , she lives alone in a single story home with 4 steps to enter  , HR on L side when going up , was IND with ADL's and mod I for functional transfers/mobility with SPC as needed prior to admission . Other DME owned: RW, w/c , BSC and shower chair  . Pt  reports , her son lives next door , son and grandchildren keeps checking on her during the day and granddaughter stays with the pt during the night . Based on the objective data described below, the patient presents with   decreased strength , 3+/5 grossly in  b/l LE , balance deficits , generalized weakness ,  decreased activity tolerance and increased need for assist with functional mobility (  mod I for rolling from side to side  , mod I for supine <>sit transfers , intact static sitting balance ,  SBA/CGA for sit <>stand and SPT , good static  standing balance , is able to ambulate - [de-identified]' with RW, SBA/CGA with slow connie and  decreased step length  b/l Le ) . Pt would benefit from continued skilled PT services to address current impairments and improve overall IND and safety with  functional transfers/mobility. Recommend d/c to home with HHPT and family care when medically appropriate . Current Level of Function Impacting Discharge (mobility/balance): Requires SBA/CGA for transfers/mobility. Functional Outcome Measure:   The patient scored 20 on the AM [facility] basic mobility outcome measure which is indicative of medium complexity. Other factors to consider for discharge: close to baseline , PLOF , family support          PLAN :  Recommendations and Planned Interventions: transfer training, gait training, therapeutic exercises, neuromuscular re-education, patient and family training/education and therapeutic activities      Frequency/Duration: Patient will be followed by physical therapy:  2-3x/week to address goals. Recommendation for discharge: (in order for the patient to meet his/her long term goals)  Home with [address] [place] [address] and family care    This discharge recommendation:  Has been made in collaboration with the attending provider and/or case management    IF patient discharges home will need the following DME: none         SUBJECTIVE:   Patient stated I feel little stiff     OBJECTIVE DATA SUMMARY:   HISTORY:    Past Medical History:   Diagnosis Date    Colon polyps     Diabetes ([facility] [address]     Diverticulosis     Hypertension     Ill-defined condition      Past Surgical History:   Procedure Laterality Date    COLONOSCOPY N/A 4/29/2021    COLONOSCOPY performed by Elijah Head MD at [facility] ENDOSCOPY       Personal factors and/or comorbidities impacting plan of care:     Home Situation  Home Environment: Private residence  # Steps to Enter: 4  Rails to Enter: Yes  Hand Rails : Bilateral  Wheelchair Ramp: No  One/Two Story Residence: One story  Living Alone: Yes  Support Systems: Child(francisco), Other Family Member(s)  Current DME Used/Available at Home: Walker, rolling, Wheelchair, Commode, bedside, Cane, straight    PLOF: Pt IND for ADLS/IADLS, mod I for mobility with SPC as needed prior to admission.      EXAMINATION/PRESENTATION/DECISION MAKING:   Critical Behavior:  Neurologic State: Alert  Orientation Level: Oriented X4  Skin:  Intact where exposed    Range Of Motion:  AROM: Within functional limits  Strength:    Strength: Generally decreased, functional (3+/5 grossly for b/l LE)  Tone & Sensation:   Tone: Normal  Sensation: Intact       Functional Mobility:  Bed Mobility:  Rolling: Modified independent  Supine to Sit: Modified independent  Sit to Supine: Modified independent  Scooting: Modified independent  Transfers:  Sit to Stand: Stand-by assistance; Contact guard assistance  Stand to Sit: Stand-by assistance; Contact guard assistance  Stand Pivot Transfers: Stand-by assistance; Contact guard assistance     Balance:   Sitting: Intact; Without support  Standing: Impaired; Without support  Standing - Static: Good; Constant support  Standing - Dynamic : Fair; Constant support  Ambulation/Gait Training:  Distance (ft): 80 Feet (ft)  Assistive Device: Walker, rolling; Gait belt  Ambulation - Level of Assistance: Stand-by assistance; Contact guard assistance  Speed/Bhargavi: Slow  Step Length: Right shortened; Left shortened    Functional Measure:    [facility] AM-PAC 6 Clicks         Basic Mobility Inpatient Short Form  How much difficulty does the patient currently have. .. Unable A Lot A Little None   1. Turning over in bed (including adjusting bedclothes, sheets and blankets)? [] 1   [] 2   [] 3   [x] 4   2. Sitting down on and standing up from a chair with arms ( e.g., wheelchair, bedside commode, etc.)   [] 1   [] 2   [x] 3   [] 4   3. Moving from lying on back to sitting on the side of the bed? [] 1   [] 2   [] 3   [x] 4          How much help from another person does the patient currently need. .. Total A Lot A Little None   4. Moving to and from a bed to a chair (including a wheelchair)? [] 1   [] 2   [x] 3   [] 4   5. Need to walk in hospital room? [] 1   [] 2   [x] 3   [] 4   6. Climbing 3-5 steps with a railing? [] 1   [] 2   [x] 3   [] 4   © 2007, Trustees of [facility], under license to [employer].  All rights reserved     Score:  Initial:20 Most Recent: X (Date: -11/23/21- )   Interpretation of Tool:  Represents activities that are increasingly more difficult (i.e. Bed mobility, Transfers, Gait). Score 24 23 22-20 19-15 14-10 9-7 6   Modifier CH CI CJ CK CL CM CN          Physical Therapy Evaluation Charge Determination   History Examination Presentation Decision-Making   MEDIUM  Complexity : 1-2 comorbidities / personal factors will impact the outcome/ POC  LOW Complexity : 1-2 Standardized tests and measures addressing body structure, function, activity limitation and / or participation in recreation  LOW Complexity : Stable, uncomplicated  Other Functional Measure [facility] 6 low complexity      Based on the above components, the patient evaluation is determined to be of the following complexity level: LOW     Pain Ratin/10    Activity Tolerance:   fair  Please refer to the flowsheet for vital signs taken during this treatment. After treatment patient left in no apparent distress:   Supine in bed and Call bell within reach    COMMUNICATION/EDUCATION:   The patients plan of care was discussed with: Registered nurse. Fall prevention education was provided and the patient/caregiver indicated understanding. and Patient/family agree to work toward stated goals and plan of care. Problem: Mobility Impaired (Adult and Pediatric)  Goal: *Acute Goals and Plan of Care (Insert Text)  Description: Pt will be I with LE HEP in 7 days. Pt will perform transfers with mod I in 7 days. Pt will amb >250 feet with LRAD safely with mod I in 7 days.    Outcome: Not Met       Thank you for this referral.  Lux Lee   Time Calculation: 33 mins

## 2021-11-23 NOTE — PROGRESS NOTES
CM contacted patient's son, Bethanie Santiago [phone], to discuss DCP. At this time Mr. Lauryn Crespo and CM are awaiting PT/OT reccs. Mr. Lauryn Crespo asked about IRF vs SNF and stated that he would have to speak with the patient and his family prior to making that decision. CM to follow up with Mr. Lauryn Crespo once PT/OT reccs have been made. CM continues to follow.

## 2021-11-23 NOTE — PROGRESS NOTES
Cardiology Progress Note      11/23/2021 6:56 PM    Admit Date: 11/19/2021    Admit Diagnosis: Acute pancreatitis [K85.90]      Subjective:   Patient seen and examined. She has no chest pain, has mild abdominal discomfort.     Visit Vitals  /64   Pulse 79   Temp 98 °F (36.7 °C)   Resp 16   Ht 5' 4\" (1.626 m)   Wt 62.6 kg (138 lb)   SpO2 100%   BMI 23.69 kg/m²     Current Facility-Administered Medications   Medication Dose Route Frequency    apixaban (ELIQUIS) tablet 2.5 mg  2.5 mg Oral BID    furosemide (LASIX) tablet 40 mg  40 mg Oral DAILY    insulin lispro (HUMALOG) injection   SubCUTAneous AC&HS    carvediloL (COREG) tablet 6.25 mg  6.25 mg Oral BID WITH MEALS    amLODIPine (NORVASC) tablet 10 mg  10 mg Oral DAILY    clopidogreL (PLAVIX) tablet 75 mg  75 mg Oral DAILY    ondansetron hcl (ZOFRAN) tablet 8 mg  8 mg Oral Q4H PRN    hydrOXYzine pamoate (VISTARIL) capsule 25 mg  25 mg Oral TID PRN    oxyCODONE IR (ROXICODONE) tablet 5 mg  5 mg Oral Q4H PRN    [Held by provider] 0.9% sodium chloride infusion  75 mL/hr IntraVENous CONTINUOUS    influenza vaccine 2021-22 (6 mos+)(PF) (FLUARIX/FLULAVAL/FLUZONE QUAD) injection 0.5 mL  1 Each IntraMUSCular PRIOR TO DISCHARGE    [Held by provider] glipiZIDE (GLUCOTROL) tablet 5 mg  5 mg Oral ACB&D    [Held by provider] potassium chloride SR (KLOR-CON 10) tablet 20 mEq  20 mEq Oral DAILY WITH BREAKFAST    pravastatin (PRAVACHOL) tablet 10 mg  10 mg Oral QHS    glucose chewable tablet 16 g  4 Tablet Oral PRN    dextrose (D50W) injection syrg 12.5-25 g  25-50 mL IntraVENous PRN    glucagon (GLUCAGEN) injection 1 mg  1 mg IntraMUSCular PRN    sodium chloride (NS) flush 5-40 mL  5-40 mL IntraVENous Q8H    sodium chloride (NS) flush 5-40 mL  5-40 mL IntraVENous PRN    acetaminophen (TYLENOL) tablet 650 mg  650 mg Oral Q4H PRN    ondansetron (ZOFRAN) injection 4 mg  4 mg IntraVENous Q4H PRN    pantoprazole (PROTONIX) tablet 40 mg  40 mg Oral ACB    prochlorperazine (COMPAZINE) with saline injection 5 mg  5 mg IntraVENous Q4H PRN         Objective:      Physical Exam:  Visit Vitals  /64   Pulse 79   Temp 98 °F (36.7 °C)   Resp 16   Ht 5' 4\" (1.626 m)   Wt 62.6 kg (138 lb)   SpO2 100%   BMI 23.69 kg/m²     General Appearance:  Well developed, well nourished,alert and oriented x 3, and individual in no acute distress. Ears/Nose/Mouth/Throat:   Hearing grossly normal.         Neck: Supple. Chest:   Lungs clear to auscultation bilaterally. Cardiovascular:  Regular rate and rhythm, S1, S2 normal, no murmur. Abdomen:   Soft, non-tender, bowel sounds are active. Extremities: No edema bilaterally. Skin: Warm and dry. Data Review:   Labs:    Recent Results (from the past 24 hour(s))   GLUCOSE, POC    Collection Time: 11/22/21  7:55 PM   Result Value Ref Range    Glucose (POC) 139 (H) 65 - 117 mg/dL    Performed by Juan J Alexander    CBC WITH AUTOMATED DIFF    Collection Time: 11/23/21  7:15 AM   Result Value Ref Range    WBC 3.5 (L) 3.6 - 11.0 K/uL    RBC 4.56 3.80 - 5.20 M/uL    HGB 11.9 11.5 - 16.0 g/dL    HCT 37.1 35.0 - 47.0 %    MCV 81.4 80.0 - 99.0 FL    MCH 26.1 26.0 - 34.0 PG    MCHC 32.1 30.0 - 36.5 g/dL    RDW 15.4 (H) 11.5 - 14.5 %    PLATELET 649 (L) 906 - 400 K/uL    MPV 12.2 8.9 - 12.9 FL    NRBC 0.0 0.0  WBC    ABSOLUTE NRBC 0.00 0.00 - 0.01 K/uL    NEUTROPHILS 44 32 - 75 %    LYMPHOCYTES 31 12 - 49 %    MONOCYTES 18 (H) 5 - 13 %    EOSINOPHILS 6 0 - 7 %    BASOPHILS 1 0 - 1 %    IMMATURE GRANULOCYTES 0 0 - 0.5 %    ABS. NEUTROPHILS 1.5 (L) 1.8 - 8.0 K/UL    ABS. LYMPHOCYTES 1.1 0.8 - 3.5 K/UL    ABS. MONOCYTES 0.6 0.0 - 1.0 K/UL    ABS. EOSINOPHILS 0.2 0.0 - 0.4 K/UL    ABS. BASOPHILS 0.0 0.0 - 0.1 K/UL    ABS. IMM.  GRANS. 0.0 0.00 - 0.04 K/UL    DF AUTOMATED     METABOLIC PANEL, COMPREHENSIVE    Collection Time: 11/23/21  7:15 AM   Result Value Ref Range    Sodium 138 136 - 145 mmol/L    Potassium 3.6 3.5 - 5.1 mmol/L    Chloride 103 97 - 108 mmol/L    CO2 22 21 - 32 mmol/L    Anion gap 13 5 - 15 mmol/L    Glucose 111 (H) 65 - 100 mg/dL    BUN 10 6 - 20 mg/dL    Creatinine 1.04 (H) 0.55 - 1.02 mg/dL    BUN/Creatinine ratio 10 (L) 12 - 20      GFR est AA >60 >60 ml/min/1.73m2    GFR est non-AA 51 (L) >60 ml/min/1.73m2    Calcium 8.9 8.5 - 10.1 mg/dL    Bilirubin, total 1.3 (H) 0.2 - 1.0 mg/dL    AST (SGOT) 16 15 - 37 U/L    ALT (SGPT) 23 12 - 78 U/L    Alk. phosphatase 106 45 - 117 U/L    Protein, total 6.1 (L) 6.4 - 8.2 g/dL    Albumin 2.5 (L) 3.5 - 5.0 g/dL    Globulin 3.6 2.0 - 4.0 g/dL    A-G Ratio 0.7 (L) 1.1 - 2.2     GLUCOSE, POC    Collection Time: 11/23/21  7:56 AM   Result Value Ref Range    Glucose (POC) 113 65 - 117 mg/dL    Performed by Rick Yung    GLUCOSE, POC    Collection Time: 11/23/21 11:11 AM   Result Value Ref Range    Glucose (POC) 168 (H) 65 - 117 mg/dL    Performed by Alyson 119, POC    Collection Time: 11/23/21  3:27 PM   Result Value Ref Range    Glucose (POC) 190 (H) 65 - 117 mg/dL    Performed by Glenna Wilson        Telemetry: normal sinus rhythm      Assessment:   Acute pancreatitis  NSTEMI type II  Paroxysmal atrial fibrillation  Chronic HFrEF  Dilated cardiomyopathy  Diabetes mellitus  Hypertension  Hyperlipidemia    Plan:   -Patient is doing well from the cardiac standpoint. She has no chest pain. Elevated troponins with subsequent downtrend. This is likely type II in mechanism in the setting of underlying severe LV dysfunction. Await limited echo to rule out new echocardiographic changes  -She remains in sinus rhythm however previous EKGs have shown atrial fibrillation with RVR. She is high risk for stroke and should be on Eliquis and Plavix in the setting of recent PCI.   Will initiate this if okay with GI  -Keep K above 4, mag above 2  -She will be at elevated risk during her EGD but benefits outweigh the risks and we may proceed with this  -We will continue to follow her closely

## 2021-11-24 VITALS
SYSTOLIC BLOOD PRESSURE: 153 MMHG | HEIGHT: 64 IN | WEIGHT: 138 LBS | OXYGEN SATURATION: 99 % | TEMPERATURE: 97.5 F | DIASTOLIC BLOOD PRESSURE: 87 MMHG | RESPIRATION RATE: 20 BRPM | HEART RATE: 99 BPM | BODY MASS INDEX: 23.56 KG/M2

## 2021-11-24 PROBLEM — I50.43 ACUTE ON CHRONIC COMBINED SYSTOLIC AND DIASTOLIC ACC/AHA STAGE C CONGESTIVE HEART FAILURE (HCC): Status: ACTIVE | Noted: 2021-11-24

## 2021-11-24 PROBLEM — J90 PLEURAL EFFUSION, RIGHT: Status: ACTIVE | Noted: 2021-11-24

## 2021-11-24 LAB
ALBUMIN SERPL-MCNC: 2.6 G/DL (ref 3.5–5)
ALBUMIN/GLOB SERPL: 0.8 {RATIO} (ref 1.1–2.2)
ALP SERPL-CCNC: 104 U/L (ref 45–117)
ALT SERPL-CCNC: 19 U/L (ref 12–78)
ANION GAP SERPL CALC-SCNC: 10 MMOL/L (ref 5–15)
AST SERPL W P-5'-P-CCNC: 12 U/L (ref 15–37)
BASOPHILS # BLD: 0 K/UL (ref 0–0.1)
BASOPHILS NFR BLD: 1 % (ref 0–1)
BILIRUB SERPL-MCNC: 1.2 MG/DL (ref 0.2–1)
BUN SERPL-MCNC: 11 MG/DL (ref 6–20)
BUN/CREAT SERPL: 11 (ref 12–20)
CA-I BLD-MCNC: 8.9 MG/DL (ref 8.5–10.1)
CHLORIDE SERPL-SCNC: 102 MMOL/L (ref 97–108)
CO2 SERPL-SCNC: 26 MMOL/L (ref 21–32)
CREAT SERPL-MCNC: 1.02 MG/DL (ref 0.55–1.02)
DIFFERENTIAL METHOD BLD: ABNORMAL
EOSINOPHIL # BLD: 0.2 K/UL (ref 0–0.4)
EOSINOPHIL NFR BLD: 6 % (ref 0–7)
ERYTHROCYTE [DISTWIDTH] IN BLOOD BY AUTOMATED COUNT: 15.2 % (ref 11.5–14.5)
GLOBULIN SER CALC-MCNC: 3.2 G/DL (ref 2–4)
GLUCOSE BLD STRIP.AUTO-MCNC: 135 MG/DL (ref 65–117)
GLUCOSE BLD STRIP.AUTO-MCNC: 145 MG/DL (ref 65–117)
GLUCOSE SERPL-MCNC: 150 MG/DL (ref 65–100)
HCT VFR BLD AUTO: 36.9 % (ref 35–47)
HGB BLD-MCNC: 12 G/DL (ref 11.5–16)
IMM GRANULOCYTES # BLD AUTO: 0 K/UL (ref 0–0.04)
IMM GRANULOCYTES NFR BLD AUTO: 0 % (ref 0–0.5)
LYMPHOCYTES # BLD: 0.8 K/UL (ref 0.8–3.5)
LYMPHOCYTES NFR BLD: 24 % (ref 12–49)
MCH RBC QN AUTO: 26 PG (ref 26–34)
MCHC RBC AUTO-ENTMCNC: 32.5 G/DL (ref 30–36.5)
MCV RBC AUTO: 80 FL (ref 80–99)
MONOCYTES # BLD: 0.6 K/UL (ref 0–1)
MONOCYTES NFR BLD: 19 % (ref 5–13)
NEUTS SEG # BLD: 1.7 K/UL (ref 1.8–8)
NEUTS SEG NFR BLD: 50 % (ref 32–75)
NRBC # BLD: 0 K/UL (ref 0–0.01)
NRBC BLD-RTO: 0 PER 100 WBC
PERFORMED BY, TECHID: ABNORMAL
PERFORMED BY, TECHID: ABNORMAL
PLATELET # BLD AUTO: 126 K/UL (ref 150–400)
PMV BLD AUTO: 12.8 FL (ref 8.9–12.9)
POTASSIUM SERPL-SCNC: 3.6 MMOL/L (ref 3.5–5.1)
PROT SERPL-MCNC: 5.8 G/DL (ref 6.4–8.2)
RBC # BLD AUTO: 4.61 M/UL (ref 3.8–5.2)
SODIUM SERPL-SCNC: 138 MMOL/L (ref 136–145)
WBC # BLD AUTO: 3.4 K/UL (ref 3.6–11)

## 2021-11-24 PROCEDURE — 74011250637 HC RX REV CODE- 250/637: Performed by: HOSPITALIST

## 2021-11-24 PROCEDURE — 97165 OT EVAL LOW COMPLEX 30 MIN: CPT

## 2021-11-24 PROCEDURE — 97530 THERAPEUTIC ACTIVITIES: CPT

## 2021-11-24 PROCEDURE — 97110 THERAPEUTIC EXERCISES: CPT

## 2021-11-24 PROCEDURE — 74011250637 HC RX REV CODE- 250/637: Performed by: PHYSICIAN ASSISTANT

## 2021-11-24 PROCEDURE — 85025 COMPLETE CBC W/AUTO DIFF WBC: CPT

## 2021-11-24 PROCEDURE — 74011636637 HC RX REV CODE- 636/637: Performed by: HOSPITALIST

## 2021-11-24 PROCEDURE — 80053 COMPREHEN METABOLIC PANEL: CPT

## 2021-11-24 PROCEDURE — 82962 GLUCOSE BLOOD TEST: CPT

## 2021-11-24 PROCEDURE — 36415 COLL VENOUS BLD VENIPUNCTURE: CPT

## 2021-11-24 RX ORDER — PANTOPRAZOLE SODIUM 40 MG/1
40 TABLET, DELAYED RELEASE ORAL
Qty: 30 TABLET | Refills: 1 | Status: ON HOLD | OUTPATIENT
Start: 2021-11-25

## 2021-11-24 RX ORDER — FUROSEMIDE 40 MG/1
40 TABLET ORAL DAILY
Qty: 30 TABLET | Refills: 1 | Status: ON HOLD | OUTPATIENT
Start: 2021-11-24

## 2021-11-24 RX ADMIN — AMLODIPINE BESYLATE 10 MG: 5 TABLET ORAL at 08:06

## 2021-11-24 RX ADMIN — PANTOPRAZOLE SODIUM 40 MG: 40 TABLET, DELAYED RELEASE ORAL at 08:06

## 2021-11-24 RX ADMIN — Medication 10 ML: at 13:17

## 2021-11-24 RX ADMIN — Medication 10 ML: at 05:10

## 2021-11-24 RX ADMIN — APIXABAN 2.5 MG: 2.5 TABLET, FILM COATED ORAL at 08:06

## 2021-11-24 RX ADMIN — CLOPIDOGREL BISULFATE 75 MG: 75 TABLET ORAL at 08:06

## 2021-11-24 RX ADMIN — INSULIN LISPRO 3 UNITS: 100 INJECTION, SOLUTION INTRAVENOUS; SUBCUTANEOUS at 07:30

## 2021-11-24 RX ADMIN — FUROSEMIDE 40 MG: 40 TABLET ORAL at 08:06

## 2021-11-24 RX ADMIN — CARVEDILOL 6.25 MG: 3.12 TABLET, FILM COATED ORAL at 08:06

## 2021-11-24 NOTE — PROGRESS NOTES
Discharge plan of care/case management plan validated with provider discharge order. I have reviewed discharge instructions with the patient. The patient verbalized understanding. Nurse included in discharge instructions a summary of care doc for follow-up appointment with PCP. Discharge medications reviewed with patient and appropriate educational materials and side effects teaching were provided. Nursing informed patient that prescriptions were sent to preferred pharmacy. Discussed with the patient and all questioned fully answered. IV removed and bandaid applied. Midline removed and band aid applied. No c/o pain, n/v/d. No s/s of respiratory distress. Patient assisted with gathering personal belongings. Patient transported to front entrance via wheelchair.

## 2021-11-24 NOTE — PROGRESS NOTES
OCCUPATIONAL THERAPY EVALUATION  Patient: Tahmina Nickerson (33 y.o. female)  Date: 11/24/2021  Primary Diagnosis: Acute pancreatitis [K85.90]  Procedure(s) (LRB):  ESOPHAGOGASTRODUODENOSCOPY (EGD) (N/A) 2 Days Post-Op   Precautions: Fall risk       ASSESSMENT  Pt is an [de-identified] y/o F with PMH of colon polyps, diabetes, diverticulosis, and hypertension presenting to [facility] with c/o SOB, admitted 11/20/21  and being treated for acute pancreatitis. Pt had recent admission for NSTEMI on 10/28/21. Pt received semi-supine in bed upon arrival, AXO x4, and agreeable to OT evaluation at this time. Per pt report, pt lives alone in a one-story home with 4 CELIA and L HR. Pt was IND with ADLs and Mod I using a SPC for mobility at [facility] with assistance from son when needed who lives close by. Per PT note review, pt's granddaughter stays with her at night. Other DME owned includes: RILEY, [facility], shower chair. Based on current observations, pt presents with deficits in generalized strength/AROM, static/dynamic sitting balance, static/dynamic standing balance, functional activity tolerance, and increased pain impacting overall performance of ADLs and functional transfers/mobility. Pt is able to perform bed mobility including rolling, sup <> sit EOB and sit EOB <> sup, and scooting with mod I. Pt requires CGA to perform sit <> stand transfers including sit EOB <> stand with RW, stand with RW <> sit EOB, and toilet transfers. Pt able to doff/don socks with CGA while seated EOB and using leg cross method. Overall, pt tolerates session well. Pt would benefit from continued skilled OT services to address current impairments and improve IND and safety with self cares and functional transfers/mobility. Recommend discharge to [address] [place] [address] with family care once medically appropriate. Other factors to consider for discharge: PLOF, family support      Patient will benefit from skilled therapy intervention to address the above noted impairments.        PLAN :  Recommendations and Planned Interventions: self care training, functional mobility training, therapeutic exercise, endurance activities, and patient education    Frequency/Duration: Patient will be followed by occupational therapy:  2-3x/week to address goals. Recommendation for discharge: (in order for the patient to meet his/her long term goals)  Home with [address] [place] [address]    This discharge recommendation:  Has been made in collaboration with the attending provider and/or case management    IF patient discharges home will need the following DME: patient owns DME required for discharge       SUBJECTIVE:   Patient stated let me show you how I do this.     OBJECTIVE DATA SUMMARY:   HISTORY:   Past Medical History:   Diagnosis Date    Colon polyps     Diabetes ([facility] [address]     Diverticulosis     Hypertension     Ill-defined condition      Past Surgical History:   Procedure Laterality Date    COLONOSCOPY N/A 4/29/2021    COLONOSCOPY performed by Nika Liriano MD at [facility] ENDOSCOPY       Expanded or extensive additional review of patient history:     Home Situation  Home Environment: Private residence  # Steps to Enter: 4  Rails to Enter: Yes  Hand Rails : Left  Wheelchair Ramp: No  One/Two Story Residence: One story  Living Alone: Yes  Support Systems: Child(francisco) (Son lives close by)  Current DME Used/Available at Home: Kelvin Leventhal, straight, Walker, Wheelchair, Shower chair, Commode, bedside    Hand dominance: Right    EXAMINATION OF PERFORMANCE DEFICITS:  Cognitive/Behavioral Status:  Neurologic State: Alert  Orientation Level: Oriented X4                                    Range of Motion:    AROM: Generally decreased, functional                         Strength:  Strength: Generally decreased, functional                Coordination:     Fine Motor Skills-Upper: Left Intact; Right Intact    Gross Motor Skills-Upper: Left Intact;  Right Intact    Tone & Sensation:     Sensation: Intact Balance:  Sitting: Intact; With support  Standing: Impaired; Without support  Standing - Static: Good; Constant support  Standing - Dynamic : Constant support; Fair    Functional Mobility and Transfers for ADLs:  Bed Mobility:  Rolling: Modified independent  Supine to Sit: Modified independent  Sit to Supine: Modified independent  Scooting: Modified independent    Transfers:  Sit to Stand: Stand-by assistance; Contact guard assistance  Stand to Sit: Stand-by assistance; Contact guard assistance  Bathroom Mobility: Stand-by assistance; Contact guard assistance  Toilet Transfer : Contact guard assistance; Stand-by assistance         ADL Intervention and task modifications:                           Lower Body Dressing Assistance  Socks: Contact guard assistance  Leg Crossed Method Used: Yes  Position Performed: Seated edge of bed              Therapeutic Exercise:  Pt would benefit from UE HEP initiated at next session. Functional Measure:    [address] [place] [id number] AM-PAC \"6 Clicks\"                                                       Daily Activity Inpatient Short Form  How much help from another person does the patient currently need. .. Total; A Lot A Little None   1. Putting on and taking off regular lower body clothing? []  1 []  2 [x]  3 []  4   2. Bathing (including washing, rinsing, drying)? []  1 []  2 [x]  3 []  4   3. Toileting, which includes using toilet, bedpan or urinal? [] 1 []  2 [x]  3 []  4   4. Putting on and taking off regular upper body clothing? []  1 []  2 [x]  3 []  4   5. Taking care of personal grooming such as brushing teeth? []  1 []  2 [x]  3 []  4   6. Eating meals? []  1 []  2 []  3 [x]  4   © 2007, Trustees of [address] [place] [address] [address] [address], under license to [employer]. All rights reserved     Score: 19/24     Interpretation of Tool:  Represents clinically-significant functional categories (i.e. Activities of daily living).   Percentage of Impairment CH    0%   CI    1-19% CJ    20-39% CK    40-59% CL    60-79% CM    80-99% CN     100%   [facility]  Score 6-24 24 23 20-22 15-19 10-14 7-9 6         Occupational Therapy Evaluation Charge Determination   History Examination Decision-Making   LOW Complexity : Brief history review  LOW Complexity : 1-3 performance deficits relating to physical, cognitive , or psychosocial skils that result in activity limitations and / or participation restrictions  LOW Complexity : No comorbidities that affect functional and no verbal or physical assistance needed to complete eval tasks       Based on the above components, the patient evaluation is determined to be of the following complexity level: LOW   Pain Ratin/10    Activity Tolerance:   Fair  Please refer to the flowsheet for vital signs taken during this treatment. After treatment patient left in no apparent distress:    Supine in bed, Call bell within reach, and Side rails x 3    COMMUNICATION/EDUCATION:   The patients plan of care was discussed with: Registered nurse. Patient/family agree to work toward stated goals and plan of care. This patients plan of care is appropriate for delegation to [facility]. Thank you for this referral.  Bree Jansen, OT  Time Calculation: 20 mins    Problem: Self Care Deficits Care Plan (Adult)  Goal: *Acute Goals and Plan of Care (Insert Text)  Description: 1. Pt will be IND sup <> sit in prep for EOB ADLs  2. Pt will be IND grooming sitting EOB  3. Pt will be IND LE dressing sitting EOB/long sit  4. Pt will be IND sit <>  prep for toileting LRAD  5. Pt will be IND toileting/toilet transfer/cloth mgmt LRAD  6.  Pt will be IND following UE HEP in prep for self care tasks      Outcome: Not Met

## 2021-11-24 NOTE — PROGRESS NOTES
Progress Note      11/24/2021 2:00 PM  NAME: Prachi Wu   MRN:  [id number]   Admit Diagnosis: Acute pancreatitis [K85.90]      Problem List:   - acute pancreatitis  - NSTEMI Type II  - Paroxysmal atrial fibrillation  - chronic HFrEF  - dilated cardiomyopathy  - diabetes mellitus  - HTN  - Hyperlipidemia     Assessment/Plan:   11/24/21  - patient observed sitting in chair with eyes opened. Denies complaints. No acute distress noted  - vital signs stable  - Hgb 12. 0/Hct 36.9  - Bun 11/Creat 1.02  - elevated troponin likely type II in mechanism in the setting of underlying severe LV dysfunction. - patient is stable from a cardiovascular standpoint. - continue anticoagulation therapy as outpatient as patient is high risk for stroke in the setting of recent percutaneous coronary intervention. - patient scheduled to be discharged on today. Follow up with Dr. Jessica Thomson in 1 week. - Thank you for allowing us to care for Ms. Zo Noonan         []       High complexity decision making was performed in this patient at high risk for decompensation with multiple organ involvement. Subjective:     Prachi Wu denies chest pain, dyspnea. Discussed with RN events overnight. Review of Systems:    Symptom Y/N Comments  Symptom Y/N Comments   Fever/Chills N   Chest Pain N    Poor Appetite N   Edema N    Cough N   Abdominal Pain N    Sputum N   Joint Pain N    SOB/AKBAR N   Pruritis/Rash N    Nausea/vomit N   Tolerating PT/OT Y    Diarrhea N   Tolerating Diet Y    Constipation N   Other       Could NOT obtain due to:      Objective:      Physical Exam:    Last 24hrs VS reviewed since prior progress note.  Most recent are:    Visit Vitals  BP (!) 153/87   Pulse 99   Temp 97.5 °F (36.4 °C)   Resp 20   Ht 5' 4\" (1.626 m)   Wt 62.6 kg (138 lb)   SpO2 99%   BMI 23.69 kg/m²     No intake or output data in the 24 hours ending 11/24/21 1507     General Appearance: Well developed, well nourished, alert & oriented x 3, no acute distress. Ears/Nose/Mouth/Throat: Hearing grossly normal.  Neck: Supple. Chest: Lungs clear to auscultation bilaterally. Cardiovascular: Regular rate and rhythm, S1S2 normal, no murmur. Abdomen: Soft, non-tender, bowel sounds are active. Extremities: No edema bilaterally. Skin: Warm and dry. PMH/SH reviewed - no change compared to H&P    Data Review    Telemetry: not on telemetry    EKG:   []  No new EKG for review  CT ABD PELV W CONT   Final Result   1. Pancreatitis with resolving peripancreatic inflammation. No pseudocyst or   organized fluid collection. Cystic dilatation of the main pancreatic duct at the   tail unchanged. 2. Mild thickening of the distal gastric antrum which could represent gastritis   in the appropriate clinical setting. No definite ulcer identified. 3. Small supraumbilical ventral hernia containing omental fat, unchanged. 4. Moderate right and small left pleural effusions. XR CHEST PORT   Final Result   Interstitial pulmonary edema. Trace right and small left pleural   effusion. Unchanged left retrocardiac consolidation. Lab Data Personally Reviewed:    Recent Labs     11/24/21 0747 11/23/21 0715   WBC 3.4* 3.5*   HGB 12.0 11.9   HCT 36.9 37.1   * 124*     No results for input(s): INR, PTP, APTT, INREXT in the last 72 hours. Recent Labs     11/24/21 0747 11/23/21 0715    138   K 3.6 3.6    103   CO2 26 22   BUN 11 10   CREA 1.02 1.04*   * 111*   CA 8.9 8.9     No results for input(s): CPK, CKNDX, TROIQ in the last 72 hours.     No lab exists for component: CPKMB  Lab Results   Component Value Date/Time    Cholesterol, total 147 11/21/2021 01:39 PM    HDL Cholesterol 44 11/21/2021 01:39 PM    LDL, calculated 92.4 11/21/2021 01:39 PM    Triglyceride 53 11/21/2021 01:39 PM    CHOL/HDL Ratio 3.3 11/21/2021 01:39 PM       Recent Labs     11/24/21 0747 11/23/21 0715    106   TP 5.8* 6.1*   ALB 2.6* 2.5*   GLOB 3.2 3.6     No results for input(s): PH, PCO2, PO2 in the last 72 hours.     Medications Personally Reviewed:    Current Facility-Administered Medications   Medication Dose Route Frequency    apixaban (ELIQUIS) tablet 2.5 mg  2.5 mg Oral BID    furosemide (LASIX) tablet 40 mg  40 mg Oral DAILY    insulin lispro (HUMALOG) injection   SubCUTAneous AC&HS    carvediloL (COREG) tablet 6.25 mg  6.25 mg Oral BID WITH MEALS    amLODIPine (NORVASC) tablet 10 mg  10 mg Oral DAILY    clopidogreL (PLAVIX) tablet 75 mg  75 mg Oral DAILY    ondansetron hcl (ZOFRAN) tablet 8 mg  8 mg Oral Q4H PRN    hydrOXYzine pamoate (VISTARIL) capsule 25 mg  25 mg Oral TID PRN    oxyCODONE IR (ROXICODONE) tablet 5 mg  5 mg Oral Q4H PRN    [Held by provider] 0.9% sodium chloride infusion  75 mL/hr IntraVENous CONTINUOUS    influenza vaccine 2021-22 (6 mos+)(PF) (FLUARIX/FLULAVAL/FLUZONE QUAD) injection 0.5 mL  1 Each IntraMUSCular PRIOR TO DISCHARGE    [Held by provider] glipiZIDE (GLUCOTROL) tablet 5 mg  5 mg Oral ACB&D    [Held by provider] potassium chloride SR (KLOR-CON 10) tablet 20 mEq  20 mEq Oral DAILY WITH BREAKFAST    pravastatin (PRAVACHOL) tablet 10 mg  10 mg Oral QHS    glucose chewable tablet 16 g  4 Tablet Oral PRN    dextrose (D50W) injection syrg 12.5-25 g  25-50 mL IntraVENous PRN    glucagon (GLUCAGEN) injection 1 mg  1 mg IntraMUSCular PRN    sodium chloride (NS) flush 5-40 mL  5-40 mL IntraVENous Q8H    sodium chloride (NS) flush 5-40 mL  5-40 mL IntraVENous PRN    acetaminophen (TYLENOL) tablet 650 mg  650 mg Oral Q4H PRN    ondansetron (ZOFRAN) injection 4 mg  4 mg IntraVENous Q4H PRN    pantoprazole (PROTONIX) tablet 40 mg  40 mg Oral ACB    prochlorperazine (COMPAZINE) with saline injection 5 mg  5 mg IntraVENous Q4H PRN         Angelique Smith NP

## 2021-11-24 NOTE — DISCHARGE SUMMARY
Admit date: 11/19/2021   Admitting Provider: Esther Serrano MD    Discharge date: 11/24/2021  Discharging Provider: Fabio Nesbitt PA-C      * Admission Diagnoses: Acute pancreatitis [K85.90]    * Discharge Diagnoses:    Hospital Problems as of 11/24/2021 Date Reviewed: 11/22/2021          Codes Class Noted - Resolved POA    Acute on chronic combined systolic and diastolic ACC/AHA stage C congestive heart failure ([facility] [address] ICD-10-CM: I50.43  ICD-9-CM: 428.43, 428.0  11/24/2021 - Present Unknown        Pleural effusion, right ICD-10-CM: J90  ICD-9-CM: 511.9  11/24/2021 - Present Unknown        Abdominal pain ICD-10-CM: R10.9  ICD-9-CM: 789.00  11/20/2021 - Present Yes        Acute pancreatitis ICD-10-CM: K85.90  ICD-9-CM: 577.0  11/20/2021 - Present Unknown        Pancreatitis ICD-10-CM: K85.90  ICD-9-CM: 577.0  10/22/2021 - Present Yes              * Hospital Course: This is an 81-year-old female admitted for acute pancreatitis on 11/19/2021. Lipase was greater than 3000 and has normalized. Patient's troponin was greater than 500 and is still remains elevated  Recent echocardiogram revealed an EF of 20 to 25% with dilated left ventricle and reduced systolic function. .  Patient underwent recent cardiac catheterization with stent to the RCA. Cardiac consultation recommending Eliquis with new onset atrial fibrillation and a chads vas score of 6. Will start renal dosing of Eliquis 2.5. GI consult with no pending EGD due to cardiac issues. Patient tolerating p.o. diet with no abdominal pain. Patient would like to be discharged due to the Thanksgiving holiday. I discussed discharge instruction with the patient's son, Khadar Vallecillo via phone. Patient will have an increase in her Lasix to 40 mg daily. She will be started on Eliquis per recommendations from cardiology. Patient will follow up for an echocardiogram as an outpatient.   Recommendations from OT and PT as home health services for PT OT and skilled nursing. Functional status was too high for skilled nursing facility or rehab.     Impression:     1. Acute pancreatitis  2. Acute on chronic systolic heart failure  3. NSTEMI  4. JERAMY  5. Gastroesophageal reflux disease     Plan:     1. Acute pancreatitis  Lipase greater than 3000 down trended to normal  EGD not performed  GI consultation  Mild abdominal pain, resolved  Soft diet  Mild elevation in bilirubin, 1.2, stable, may be related to heart failure     2. Acute on chronic systolic heart failure  Previous echocardiogram with an EF of 20-25%  Right pleural effusion on chest x-ray consistent with acute exacerbation of heart failure  Continue diuresis, Lasix increased to 40mg  Continue Coreg  Repeat ECHO as an outpatient     3.    Elevated troponin, most likely type II  Troponin greater than 500 trending down. Eliquis and plavix as an outpatient  Cardiac consultation  May need further cardiac work-up  Discontinue aspirin     4. JERAMY  Improving  Trend creatinine     5. Gastroesophageal reflux disease  Protonix     6. Atrial fibrillation  [id number] score 6  Risk of stroke  Eliquis  Coreg for rate control       * Procedures:   Procedure(s):  ESOPHAGOGASTRODUODENOSCOPY (EGD)      Consults:   Gastroenterology, cardiology    Significant Diagnostic Studies: As discussed in hospital course    Discharge Exam:  Visit Vitals  BP (!) 153/87   Pulse 99   Temp 97.5 °F (36.4 °C)   Resp 20   Ht 5' 4\" (1.626 m)   Wt 62.6 kg (138 lb)   SpO2 99%   BMI 23.69 kg/m²     PHYSICAL EXAM:  Constitutional: Alert in no acute distress   HEENT: Sclerae anicteric, The neck is supple. Cardiovascular: Regular rate and rhythm. No murmurs, gallops, or rubs. [facility] Respiratory: Clear breath sounds with no wheezes, rales, or rhonchi. GI: Abdomen nondistended, soft, and nontender. Normal active bowel sounds. Rectal: Deferred   Musculoskeletal: No pitting edema of the lower legs. Extremities have good range of motion.     Neurological: Patient is alert and oriented. Cranial nerves II-XII intact  Psychiatric: Mood appears appropriate with judgement intact. Lymphatic: No cervical or supraclavicular adenopathy. Skin: No rashes or breakdown of the skin      * Discharge Condition: stable  * Disposition: Home with home health    Discharge Medications:  Current Discharge Medication List      START taking these medications    Details   apixaban (ELIQUIS) 2.5 mg tablet Take 1 Tablet by mouth two (2) times a day. Qty: 30 Tablet, Refills: 1  Start date: 11/24/2021      pantoprazole (PROTONIX) 40 mg tablet Take 1 Tablet by mouth Daily (before breakfast). Qty: 30 Tablet, Refills: 1  Start date: 11/25/2021         CONTINUE these medications which have CHANGED    Details   furosemide (LASIX) 40 mg tablet Take 1 Tablet by mouth daily. Qty: 30 Tablet, Refills: 1  Start date: 11/24/2021         CONTINUE these medications which have NOT CHANGED    Details   potassium chloride SA (MICRO-K) 10 mEq capsule Take 10 mEq by mouth daily. pravastatin (PRAVACHOL) 10 mg tablet Take 10 mg by mouth nightly. clopidogreL (PLAVIX) 75 mg tab Take 1 Tablet by mouth daily. Qty: 30 Tablet, Refills: 0      carvediloL (COREG) 3.125 mg tablet Take 1 Tablet by mouth two (2) times daily (with meals). Qty: 60 Tablet, Refills: 0      glipiZIDE (GLUCOTROL) 5 mg tablet Take 1 Tablet by mouth Daily (before breakfast). Qty: 30 Tablet, Refills: 0      verapamil ER (CALAN-SR) 240 mg CR tablet Take 240 mg by mouth daily. STOP taking these medications       aspirin 81 mg chewable tablet Comments:   Reason for Stopping:               * Follow-up Care/Patient Instructions:   Activity: Activity as tolerated  Diet: Diabetic Diet  Wound Care: None needed    Follow-up Information     Follow up With Specialties Details Why Contact Info    Daiana Mcgowan MD Cardiology In 1 week 1 week post hospitalization follow up [address] [facility]  [phone] Jada Ayala MD Internal Medicine In 1 week  Adelaida Gonzales 1997 [address] [facility] [place] Bennie Smoke, MD Gastroenterology, Internal Medicine In 1 week  [address] [facility]  [phone]            Discharge summary greater than 35 minutes spent with the patient performing discharge instructions, medication review and physical exam    Signed:  Alfonso Zaldivar PA-C  11/24/2021  2:12 PM

## 2021-11-24 NOTE — DISCHARGE INSTRUCTIONS
Abdominal Pain: Care Instructions  Your Care Instructions     Abdominal pain has many possible causes. Some aren't serious and get better on their own in a few days. Others need more testing and treatment. If your pain continues or gets worse, you need to be rechecked and may need more tests to find out what is wrong. You may need surgery to correct the problem. Don't ignore new symptoms, such as fever, nausea and vomiting, urination problems, pain that gets worse, and dizziness. These may be signs of a more serious problem. Your doctor may have recommended a follow-up visit in the next 8 to 12 hours. If you are not getting better, you may need more tests or treatment. The doctor has checked you carefully, but problems can develop later. If you notice any problems or new symptoms, get medical treatment right away. Follow-up care is a key part of your treatment and safety. Be sure to make and go to all appointments, and call your doctor if you are having problems. It's also a good idea to know your test results and keep a list of the medicines you take. How can you care for yourself at home? · Rest until you feel better. · To prevent dehydration, drink plenty of fluids. Choose water and other clear liquids until you feel better. If you have kidney, heart, or liver disease and have to limit fluids, talk with your doctor before you increase the amount of fluids you drink. · If your stomach is upset, eat mild foods, such as rice, dry toast or crackers, bananas, and applesauce. Try eating several small meals instead of two or three large ones. · Wait until 48 hours after all symptoms have gone away before you have spicy foods, alcohol, and drinks that contain caffeine. · Do not eat foods that are high in fat. · Avoid anti-inflammatory medicines such as aspirin, ibuprofen (Advil, Motrin), and naproxen (Aleve). These can cause stomach upset.  Talk to your doctor if you take daily aspirin for another health problem. When should you call for help? Call 911 anytime you think you may need emergency care. For example, call if:    · You passed out (lost consciousness).     · You pass maroon or very bloody stools.     · You vomit blood or what looks like coffee grounds.     · You have new, severe belly pain. Call your doctor now or seek immediate medical care if:    · Your pain gets worse, especially if it becomes focused in one area of your belly.     · You have a new or higher fever.     · Your stools are black and look like tar, or they have streaks of blood.     · You have unexpected vaginal bleeding.     · You have symptoms of a urinary tract infection. These may include:  ? Pain when you urinate. ? Urinating more often than usual.  ? Blood in your urine.     · You are dizzy or lightheaded, or you feel like you may faint. Watch closely for changes in your health, and be sure to contact your doctor if:    · You are not getting better after 1 day (24 hours). Where can you learn more? Go to http://www.gray.com/  Enter L247 in the search box to learn more about \"Abdominal Pain: Care Instructions. \"  Current as of: July 1, 2021               Content Version: 13.0  © 2006-2021 [employer]. Care instructions adapted under license by [employer] (which disclaims liability or warranty for this information). If you have questions about a medical condition or this instruction, always ask your healthcare professional. [place] [address] any warranty or liability for your use of this information. Patient Education        Learning About Acute Pancreatitis  What is acute pancreatitis? The pancreas is an organ behind the stomach. It makes hormones like insulin that help control how your body uses sugar. It also makes enzymes that help you digest food. Usually these enzymes flow from the pancreas to the intestines.  But if they leak into the pancreas, they can irritate it and cause pain and swelling. When this happens suddenly, it's called acute pancreatitis. What causes it? Most of the time, acute pancreatitis is caused by gallstones or by heavy alcohol use. But several other things can cause it, such as:  · High levels of fats in the blood. · An injury. · A problem after a surgery or a procedure. · Certain medicines. What are the symptoms? The main symptom is pain in the upper belly. The pain can be severe. You also may have a fever, nausea, or vomiting. Some people get so sick that they have problems breathing. They also may have low blood pressure. How is it diagnosed? Your doctor will diagnose pancreatitis with an exam and by looking at your lab tests. Your doctor may think that you have this problem based on your symptoms and where you have pain in your belly. You may have blood tests of enzymes called amylase and lipase. In pancreatitis, the level of these enzymes is usually much higher than normal.  You also may have imaging tests of the belly. These may include an ultrasound, a CT scan, or an MRI. A special MRI called MRCP can show images of the bile ducts. This test can be very helpful when gallstones are causing the problem. How is it treated? Treatment of acute pancreatitis usually takes place in the hospital. It focuses on taking care of pain and supporting your body while your pancreas heals. In severe cases, treatment may occur in an intensive care unit to support breathing, treat serious infections, or help raise very low blood pressure. If a gallstone is causing the problem, the gallstone may need to be removed. This is done during a procedure called ERCP. The doctor puts a scope in your mouth and moves it gently through the stomach and into the ducts from the pancreas and gallbladder. The doctor is then able to see a stone and remove it. Sometimes the gallbladder, which makes gallstones, needs to be removed by surgery.   People with pancreatitis often need a lot of fluid to help support their other organs and their blood pressure. They get fluids through a vein (intravenous, or IV). Instead of food by mouth, nutrition is sometimes given through a vein while the pancreas heals. Where can you learn more? Go to http://www.gray.com/  Enter S535 in the search box to learn more about \"Learning About Acute Pancreatitis. \"  Current as of: February 10, 2021               Content Version: 13.0  © 8422-9318 [employer]. Care instructions adapted under license by [employer] (which disclaims liability or warranty for this information). If you have questions about a medical condition or this instruction, always ask your healthcare professional. [address] any warranty or liability for your use of this information. Dilated Cardiomyopathy: Care Instructions  Your Care Instructions     Dilated cardiomyopathy is a condition that weakens your heart muscle and causes it to stretch, or dilate. When your heart muscle is weak, it can't pump out blood as well as it should. More blood stays in your heart after each heartbeat. As more blood fills and stays in the heart, the heart muscle stretches even more and gets even weaker. Many things can cause dilated cardiomyopathy. It can be caused by another disease or condition. Some people have a family history of dilated cardiomyopathy. For some people, the cause is not known. You may not have any symptoms at first. Or you may have symptoms, such as feeling very tired or weak. If your heart gets weaker, you may develop heart failure. Heart failure means that your heart muscle doesn't pump as much blood as your body needs. If this happens, you will feel other symptoms such as shortness of breath or trouble breathing when you lie down. The goal of treatment is to slow the disease and help you feel better.  You may also have treatment for the cause of the cardiomyopathy. You will probably take a few medicines. If your doctor thinks it will help your heart and prevent problems, you may get a device such as a pacemaker. Self-care is another important part of your treatment. It includes the things you can do every day to feel better and stay as healthy as possible. Follow-up care is a key part of your treatment and safety. Be sure to make and go to all appointments, and call your doctor if you are having problems. It's also a good idea to know your test results and keep a list of the medicines you take. How can you care for yourself at home? Medicines    · Be safe with medicines. Take your medicines exactly as prescribed. Call your doctor if you think you are having a problem with your medicine. You may be taking some of the following medicines:  ? Angiotensin-converting enzyme (ACE) inhibitors or angiotensin II receptor blockers (ARBs). These make it easier for blood to flow. ? Diuretics. These help remove excess fluid from the body. ? Beta-blockers. These slow the heart rate and can help the heart fill with blood more completely. Heart-healthy lifestyle    · Be active. Exercise regularly, but don't exercise too hard. If you aren't already active, your doctor may want you to start exercising. But don't start until you have talked with your doctor to make an exercise program that is safe for you.     · Do not smoke. Smoking can make a heart condition worse. If you need help quitting, talk to your doctor about stop-smoking programs and medicines. These can increase your chances of quitting for good.     · Eat a heart-healthy diet.     · Stay at a healthy weight. Lose weight if you need to.     · If your doctor recommends it, limit sodium. This helps keep fluid from building up in your body. It may help you feel better.     · Manage other health problems. These include diabetes, high blood pressure, and high cholesterol.  If you think you may have a problem with alcohol or drug use, talk to your doctor. Weight monitoring    · Weigh yourself without clothing at the same time each day. Record your weight. Call your doctor if you have a sudden weight gain, such as more than 2 to 3 pounds in a day or 5 pounds in a week. (Your doctor may suggest a different range of weight gain.) A sudden weight gain may mean that your condition is getting worse. When should you call for help? Call 911 anytime you think you may need emergency care. For example, call if:    · You have symptoms of sudden heart failure. These may include:  ? Severe trouble breathing. ? A fast or irregular heartbeat. ? Coughing up pink, foamy mucus. ? Passing out. Call your doctor now or seek immediate medical care if:    · You have new or changed symptoms of heart failure, such as:  ? New or increased shortness of breath. ? New or worse swelling in your legs, ankles, or feet. ? Sudden weight gain, such as more than 2 to 3 pounds in a day or 5 pounds in a week. (Your doctor may suggest a different range of weight gain.)  ? Feeling dizzy or lightheaded or like you may faint. ? Feeling so tired or weak that you cannot do your usual activities. ? Not sleeping well. Shortness of breath wakes you at night. You need extra pillows to prop yourself up to breathe easier. Watch closely for changes in your health, and be sure to contact your doctor if you have any problems. Where can you learn more? Go to http://www.gray.com/  Enter B164 in the search box to learn more about \"Dilated Cardiomyopathy: Care Instructions. \"  Current as of: April 29, 2021               Content Version: 13.0  © 4192-7578 [employer]. Care instructions adapted under license by [employer] (which disclaims liability or warranty for this information).  If you have questions about a medical condition or this instruction, always ask your healthcare professional. [employer] disclaims any warranty or liability for your use of this information.

## 2021-11-24 NOTE — PROGRESS NOTES
DC Plan: [facility] (resume)       SOC: 11/25/21    CM met with pt at the bedside to f/up on her plan of care. Cm informed pt of anticipated discharge for today. PT is recommending home health. Pt indicated she is current with home health, but could not recall the name of the home health agency. Cm informed pt Cm will call her son to see if he knows. Cm presented and discussed IMM. Pt indicated she wants to review the paperwork before signing. CM left the IMM in the room for pt to review. Cm attempted to contact pt's son - Demarcus Corporal [phone]. CM left a voice message. Cm spoke with attending. Attending spoke with pt's son Enmanuel Levy. Son is aware of discharge today with home health. The last time pt discharged from the hospital, she was set up with [facility]. CM called [facility] and received confirmation pt is current with their services. Cm attempted to contact pt's son Enmanuel Levy again. Cm received no answer. CM met with pt at the bedside. Cm received IMM. Pt signed choice letter for [facility] to resume services. Referral made via [facility] [address]. Discharge plan of care/case management plan validated with provider's discharge order.

## 2021-11-24 NOTE — PROGRESS NOTES
Problem: Mobility Impaired (Adult and Pediatric)  Goal: *Acute Goals and Plan of Care (Insert Text)  Description: Pt will be I with [facility] in 7 days. Pt will perform transfers with mod I in 7 days. Pt will amb >250 feet with LRAD safely with mod I in 7 days. Outcome: Progressing Towards Goal   PHYSICAL THERAPY TREATMENT  Patient: Glenna Babin (24 y.o. female)  Date: 11/24/2021  Diagnosis: Acute pancreatitis [K85.90]   <principal problem not specified>  Procedure(s) (LRB):  ESOPHAGOGASTRODUODENOSCOPY (EGD) (N/A) 2 Days Post-Op  Precautions:    Chart, physical therapy assessment, plan of care and goals were reviewed. ASSESSMENT  Patient continues with skilled PT services and is progressing towards goals. Pt received seated in chair, pleasant, but resistive to have Tx. However, with minimal encouragement, she agreed, although declining getting out of chair and amb. Pt completed seated ex (see chart below), no c/o. She was left seated in chair, call bell within reach, all needs addressed. Current Level of Function Impacting Discharge (mobility/balance): assist x 1    Other factors to consider for discharge: assist available at home, extent of deficits, safety         PLAN :  Patient continues to benefit from skilled intervention to address the above impairments. Continue treatment per established plan of care. to address goals. Recommendation for discharge: (in order for the patient to meet his/her long term goals)  Home with [address] [place] [address]    This discharge recommendation:  Has been made in collaboration with the attending provider and/or case management    IF patient discharges home will need the following DME: to be determined (TBD)       SUBJECTIVE:   Patient stated I already did my walking for today.     OBJECTIVE DATA SUMMARY:   Critical Behavior:  Neurologic State: Alert  Orientation Level: Oriented X4  Cognition: Follows commands     Functional Mobility Training:  Bed Mobility:  Rolling: Modified independent  Supine to Sit: Modified independent  Sit to Supine: Modified independent  Scooting: Modified independent    Transfers:  Sit to Stand: Stand-by assistance; Contact guard assistance  Stand to Sit: Stand-by assistance; Contact guard assistance        Balance:  Sitting: Intact; With support  Standing: Impaired; Without support  Standing - Static: Good; Constant support  Standing - Dynamic : Constant support; Fair    Therapeutic Exercises:       EXERCISE   Sets   Reps   Active Active Assist   Passive Self ROM   Comments   Ankle Pumps 1 20 [x] [] [] []    Glut Sets 1 10 [x] [] [] []    Hamstring Curls 1 10 [x] [] [] []    Hip abd/add 1 10 [x] [] [] []    Long Arc Quads 1 10 [x] [] [] []    Marching 1 10 [x] [] [] []    Hip IR/ER 1 10 [x] [] [] []       Pain Ratin/10    Activity Tolerance:   Fair  Please refer to the flowsheet for vital signs taken during this treatment. After treatment patient left in no apparent distress:   Sitting in chair and Call bell within reach    COMMUNICATION/COLLABORATION:   The patients plan of care was discussed with: Registered nurse.      Tova West PTA   Time Calculation: 15 mins

## 2022-01-01 ENCOUNTER — APPOINTMENT (OUTPATIENT)
Dept: GENERAL RADIOLOGY | Age: 82
DRG: 023 | End: 2022-01-01
Attending: NURSE PRACTITIONER
Payer: MEDICARE

## 2022-01-01 ENCOUNTER — APPOINTMENT (OUTPATIENT)
Dept: CT IMAGING | Age: 82
DRG: 023 | End: 2022-01-01
Attending: STUDENT IN AN ORGANIZED HEALTH CARE EDUCATION/TRAINING PROGRAM
Payer: MEDICARE

## 2022-01-01 ENCOUNTER — ANESTHESIA (OUTPATIENT)
Dept: INTERVENTIONAL RADIOLOGY/VASCULAR | Age: 82
DRG: 023 | End: 2022-01-01
Payer: MEDICARE

## 2022-01-01 ENCOUNTER — APPOINTMENT (OUTPATIENT)
Dept: CT IMAGING | Age: 82
DRG: 023 | End: 2022-01-01
Attending: NURSE PRACTITIONER
Payer: MEDICARE

## 2022-01-01 ENCOUNTER — APPOINTMENT (OUTPATIENT)
Dept: GENERAL RADIOLOGY | Age: 82
DRG: 023 | End: 2022-01-01
Attending: STUDENT IN AN ORGANIZED HEALTH CARE EDUCATION/TRAINING PROGRAM
Payer: MEDICARE

## 2022-01-01 ENCOUNTER — HOSPITAL ENCOUNTER (INPATIENT)
Age: 82
LOS: 1 days | DRG: 951 | End: 2022-11-23
Attending: INTERNAL MEDICINE | Admitting: INTERNAL MEDICINE
Payer: OTHER MISCELLANEOUS

## 2022-01-01 ENCOUNTER — APPOINTMENT (OUTPATIENT)
Dept: NON INVASIVE DIAGNOSTICS | Age: 82
DRG: 023 | End: 2022-01-01
Attending: NURSE PRACTITIONER
Payer: MEDICARE

## 2022-01-01 ENCOUNTER — APPOINTMENT (OUTPATIENT)
Dept: MRI IMAGING | Age: 82
DRG: 023 | End: 2022-01-01
Attending: NURSE PRACTITIONER
Payer: MEDICARE

## 2022-01-01 ENCOUNTER — APPOINTMENT (OUTPATIENT)
Dept: ULTRASOUND IMAGING | Age: 82
DRG: 023 | End: 2022-01-01
Attending: PHYSICIAN ASSISTANT
Payer: MEDICARE

## 2022-01-01 ENCOUNTER — APPOINTMENT (OUTPATIENT)
Dept: ULTRASOUND IMAGING | Age: 82
DRG: 023 | End: 2022-01-01
Payer: MEDICARE

## 2022-01-01 ENCOUNTER — HOSPITAL ENCOUNTER (EMERGENCY)
Age: 82
Discharge: ANOTHER HOSPITAL | End: 2022-10-31
Attending: EMERGENCY MEDICINE
Payer: MEDICARE

## 2022-01-01 ENCOUNTER — ANESTHESIA EVENT (OUTPATIENT)
Dept: INTERVENTIONAL RADIOLOGY/VASCULAR | Age: 82
DRG: 023 | End: 2022-01-01
Payer: MEDICARE

## 2022-01-01 ENCOUNTER — HOME CARE VISIT (OUTPATIENT)
Dept: HOSPICE | Facility: HOSPICE | Age: 82
End: 2022-01-01
Payer: MEDICARE

## 2022-01-01 ENCOUNTER — HOSPITAL ENCOUNTER (INPATIENT)
Age: 82
LOS: 22 days | Discharge: HOSPICE | DRG: 023 | End: 2022-11-22
Attending: EMERGENCY MEDICINE | Admitting: INTERNAL MEDICINE
Payer: MEDICARE

## 2022-01-01 ENCOUNTER — APPOINTMENT (OUTPATIENT)
Dept: GENERAL RADIOLOGY | Age: 82
DRG: 023 | End: 2022-01-01
Attending: PHYSICIAN ASSISTANT
Payer: MEDICARE

## 2022-01-01 ENCOUNTER — APPOINTMENT (OUTPATIENT)
Dept: CT IMAGING | Age: 82
End: 2022-01-01
Attending: EMERGENCY MEDICINE
Payer: MEDICARE

## 2022-01-01 ENCOUNTER — APPOINTMENT (OUTPATIENT)
Dept: GENERAL RADIOLOGY | Age: 82
End: 2022-01-01
Attending: EMERGENCY MEDICINE
Payer: MEDICARE

## 2022-01-01 ENCOUNTER — APPOINTMENT (OUTPATIENT)
Dept: INTERVENTIONAL RADIOLOGY/VASCULAR | Age: 82
DRG: 023 | End: 2022-01-01
Attending: RADIOLOGY
Payer: MEDICARE

## 2022-01-01 ENCOUNTER — HOSPICE ADMISSION (OUTPATIENT)
Dept: HOSPICE | Facility: HOSPICE | Age: 82
End: 2022-01-01
Payer: MEDICARE

## 2022-01-01 VITALS
OXYGEN SATURATION: 98 % | DIASTOLIC BLOOD PRESSURE: 95 MMHG | SYSTOLIC BLOOD PRESSURE: 138 MMHG | HEIGHT: 66 IN | TEMPERATURE: 95.1 F | HEART RATE: 75 BPM | WEIGHT: 93.5 LBS | RESPIRATION RATE: 20 BRPM | BODY MASS INDEX: 15.03 KG/M2

## 2022-01-01 VITALS
TEMPERATURE: 96.9 F | DIASTOLIC BLOOD PRESSURE: 55 MMHG | RESPIRATION RATE: 18 BRPM | OXYGEN SATURATION: 100 % | HEART RATE: 79 BPM | BODY MASS INDEX: 18.57 KG/M2 | WEIGHT: 115.52 LBS | SYSTOLIC BLOOD PRESSURE: 127 MMHG | HEIGHT: 66 IN

## 2022-01-01 VITALS
OXYGEN SATURATION: 100 % | WEIGHT: 115.52 LBS | BODY MASS INDEX: 18.57 KG/M2 | DIASTOLIC BLOOD PRESSURE: 53 MMHG | HEART RATE: 78 BPM | HEIGHT: 66 IN | SYSTOLIC BLOOD PRESSURE: 96 MMHG | RESPIRATION RATE: 20 BRPM | TEMPERATURE: 98.7 F

## 2022-01-01 DIAGNOSIS — I25.5 ISCHEMIC CARDIOMYOPATHY: ICD-10-CM

## 2022-01-01 DIAGNOSIS — R77.8 ELEVATED TROPONIN: ICD-10-CM

## 2022-01-01 DIAGNOSIS — I63.9 CEREBROVASCULAR ACCIDENT (CVA), UNSPECIFIED MECHANISM (HCC): Primary | ICD-10-CM

## 2022-01-01 DIAGNOSIS — Z71.89 COUNSELING REGARDING ADVANCE CARE PLANNING AND GOALS OF CARE: ICD-10-CM

## 2022-01-01 DIAGNOSIS — Z51.5 PALLIATIVE CARE BY SPECIALIST: ICD-10-CM

## 2022-01-01 DIAGNOSIS — I63.9 CEREBROVASCULAR ACCIDENT (CVA), UNSPECIFIED MECHANISM (HCC): ICD-10-CM

## 2022-01-01 DIAGNOSIS — I48.0 PAROXYSMAL ATRIAL FIBRILLATION (HCC): ICD-10-CM

## 2022-01-01 DIAGNOSIS — I63.10 CEREBROVASCULAR ACCIDENT (CVA) DUE TO EMBOLISM OF PRECEREBRAL ARTERY (HCC): ICD-10-CM

## 2022-01-01 DIAGNOSIS — E11.65 TYPE 2 DIABETES MELLITUS WITH HYPERGLYCEMIA, WITHOUT LONG-TERM CURRENT USE OF INSULIN (HCC): ICD-10-CM

## 2022-01-01 DIAGNOSIS — I63.9 CEREBELLAR STROKE (HCC): ICD-10-CM

## 2022-01-01 DIAGNOSIS — I63.9 ACUTE ISCHEMIC STROKE (HCC): Primary | ICD-10-CM

## 2022-01-01 DIAGNOSIS — Z51.5 END OF LIFE CARE: ICD-10-CM

## 2022-01-01 LAB
ALBUMIN SERPL-MCNC: 1.9 G/DL (ref 3.5–5)
ALBUMIN SERPL-MCNC: 1.9 G/DL (ref 3.5–5)
ALBUMIN SERPL-MCNC: 2 G/DL (ref 3.5–5)
ALBUMIN SERPL-MCNC: 2.1 G/DL (ref 3.5–5)
ALBUMIN SERPL-MCNC: 2.1 G/DL (ref 3.5–5)
ALBUMIN SERPL-MCNC: 2.4 G/DL (ref 3.5–5)
ALBUMIN SERPL-MCNC: 2.5 G/DL (ref 3.5–5)
ALBUMIN SERPL-MCNC: 2.6 G/DL (ref 3.5–5)
ALBUMIN SERPL-MCNC: 2.7 G/DL (ref 3.5–5)
ALBUMIN SERPL-MCNC: 2.8 G/DL (ref 3.5–5)
ALBUMIN SERPL-MCNC: 2.8 G/DL (ref 3.5–5)
ALBUMIN SERPL-MCNC: 3 G/DL (ref 3.5–5)
ALBUMIN SERPL-MCNC: 3.1 G/DL (ref 3.5–5)
ALBUMIN/GLOB SERPL: 0.5 {RATIO} (ref 1.1–2.2)
ALBUMIN/GLOB SERPL: 0.6 {RATIO} (ref 1.1–2.2)
ALBUMIN/GLOB SERPL: 0.7 {RATIO} (ref 1.1–2.2)
ALBUMIN/GLOB SERPL: 0.8 {RATIO} (ref 1.1–2.2)
ALBUMIN/GLOB SERPL: 0.9 {RATIO} (ref 1.1–2.2)
ALBUMIN/GLOB SERPL: 1 {RATIO} (ref 1.1–2.2)
ALBUMIN/GLOB SERPL: 1 {RATIO} (ref 1.1–2.2)
ALP SERPL-CCNC: 106 U/L (ref 45–117)
ALP SERPL-CCNC: 108 U/L (ref 45–117)
ALP SERPL-CCNC: 108 U/L (ref 45–117)
ALP SERPL-CCNC: 109 U/L (ref 45–117)
ALP SERPL-CCNC: 110 U/L (ref 45–117)
ALP SERPL-CCNC: 112 U/L (ref 45–117)
ALP SERPL-CCNC: 113 U/L (ref 45–117)
ALP SERPL-CCNC: 117 U/L (ref 45–117)
ALP SERPL-CCNC: 134 U/L (ref 45–117)
ALP SERPL-CCNC: 152 U/L (ref 45–117)
ALP SERPL-CCNC: 75 U/L (ref 45–117)
ALP SERPL-CCNC: 79 U/L (ref 45–117)
ALP SERPL-CCNC: 80 U/L (ref 45–117)
ALP SERPL-CCNC: 83 U/L (ref 45–117)
ALP SERPL-CCNC: 83 U/L (ref 45–117)
ALP SERPL-CCNC: 90 U/L (ref 45–117)
ALP SERPL-CCNC: 91 U/L (ref 45–117)
ALP SERPL-CCNC: 93 U/L (ref 45–117)
ALP SERPL-CCNC: 94 U/L (ref 45–117)
ALP SERPL-CCNC: 96 U/L (ref 45–117)
ALP SERPL-CCNC: 99 U/L (ref 45–117)
ALP SERPL-CCNC: 99 U/L (ref 45–117)
ALT SERPL-CCNC: 10 U/L (ref 12–78)
ALT SERPL-CCNC: 10 U/L (ref 12–78)
ALT SERPL-CCNC: 11 U/L (ref 12–78)
ALT SERPL-CCNC: 13 U/L (ref 12–78)
ALT SERPL-CCNC: 14 U/L (ref 12–78)
ALT SERPL-CCNC: 16 U/L (ref 12–78)
ALT SERPL-CCNC: 17 U/L (ref 12–78)
ALT SERPL-CCNC: 18 U/L (ref 12–78)
ALT SERPL-CCNC: 24 U/L (ref 12–78)
ALT SERPL-CCNC: 25 U/L (ref 12–78)
ALT SERPL-CCNC: 40 U/L (ref 12–78)
ALT SERPL-CCNC: 49 U/L (ref 12–78)
ALT SERPL-CCNC: 68 U/L (ref 12–78)
ALT SERPL-CCNC: 7 U/L (ref 12–78)
ALT SERPL-CCNC: 8 U/L (ref 12–78)
ALT SERPL-CCNC: 9 U/L (ref 12–78)
ANION GAP SERPL CALC-SCNC: 10 MMOL/L (ref 5–15)
ANION GAP SERPL CALC-SCNC: 10 MMOL/L (ref 5–15)
ANION GAP SERPL CALC-SCNC: 12 MMOL/L (ref 5–15)
ANION GAP SERPL CALC-SCNC: 12 MMOL/L (ref 5–15)
ANION GAP SERPL CALC-SCNC: 13 MMOL/L (ref 5–15)
ANION GAP SERPL CALC-SCNC: 3 MMOL/L (ref 5–15)
ANION GAP SERPL CALC-SCNC: 4 MMOL/L (ref 5–15)
ANION GAP SERPL CALC-SCNC: 5 MMOL/L (ref 5–15)
ANION GAP SERPL CALC-SCNC: 5 MMOL/L (ref 5–15)
ANION GAP SERPL CALC-SCNC: 6 MMOL/L (ref 5–15)
ANION GAP SERPL CALC-SCNC: 7 MMOL/L (ref 5–15)
ANION GAP SERPL CALC-SCNC: 8 MMOL/L (ref 5–15)
ANION GAP SERPL CALC-SCNC: 8 MMOL/L (ref 5–15)
APPEARANCE FLD: ABNORMAL
APPEARANCE UR: CLEAR
APPEARANCE UR: CLEAR
ARTERIAL PATENCY WRIST A: POSITIVE
AST SERPL W P-5'-P-CCNC: 7 U/L (ref 15–37)
AST SERPL-CCNC: 10 U/L (ref 15–37)
AST SERPL-CCNC: 11 U/L (ref 15–37)
AST SERPL-CCNC: 11 U/L (ref 15–37)
AST SERPL-CCNC: 12 U/L (ref 15–37)
AST SERPL-CCNC: 13 U/L (ref 15–37)
AST SERPL-CCNC: 13 U/L (ref 15–37)
AST SERPL-CCNC: 14 U/L (ref 15–37)
AST SERPL-CCNC: 14 U/L (ref 15–37)
AST SERPL-CCNC: 16 U/L (ref 15–37)
AST SERPL-CCNC: 16 U/L (ref 15–37)
AST SERPL-CCNC: 23 U/L (ref 15–37)
AST SERPL-CCNC: 29 U/L (ref 15–37)
AST SERPL-CCNC: 32 U/L (ref 15–37)
AST SERPL-CCNC: 53 U/L (ref 15–37)
AST SERPL-CCNC: 6 U/L (ref 15–37)
AST SERPL-CCNC: 62 U/L (ref 15–37)
AST SERPL-CCNC: 7 U/L (ref 15–37)
AST SERPL-CCNC: 7 U/L (ref 15–37)
AST SERPL-CCNC: 8 U/L (ref 15–37)
ATRIAL RATE: 106 BPM
ATRIAL RATE: 76 BPM
ATRIAL RATE: 83 BPM
ATRIAL RATE: 87 BPM
BACTERIA SPEC CULT: NORMAL
BACTERIA URNS QL MICRO: NEGATIVE /HPF
BACTERIA URNS QL MICRO: NEGATIVE /HPF
BASE DEFICIT BLD-SCNC: 0.6 MMOL/L
BASE EXCESS BLD CALC-SCNC: 0.5 MMOL/L
BASOPHILS # BLD: 0 K/UL (ref 0–0.1)
BASOPHILS # BLD: 0 K/UL (ref 0–0.1)
BASOPHILS NFR BLD: 0 % (ref 0–1)
BASOPHILS NFR BLD: 0 % (ref 0–1)
BDY SITE: ABNORMAL
BILIRUB SERPL-MCNC: 0.4 MG/DL (ref 0.2–1)
BILIRUB SERPL-MCNC: 0.5 MG/DL (ref 0.2–1)
BILIRUB SERPL-MCNC: 0.6 MG/DL (ref 0.2–1)
BILIRUB SERPL-MCNC: 0.7 MG/DL (ref 0.2–1)
BILIRUB SERPL-MCNC: 0.8 MG/DL (ref 0.2–1)
BILIRUB SERPL-MCNC: 0.9 MG/DL (ref 0.2–1)
BILIRUB SERPL-MCNC: 0.9 MG/DL (ref 0.2–1)
BILIRUB SERPL-MCNC: 1.1 MG/DL (ref 0.2–1)
BILIRUB SERPL-MCNC: 1.4 MG/DL (ref 0.2–1)
BILIRUB SERPL-MCNC: 1.5 MG/DL (ref 0.2–1)
BILIRUB UR QL: NEGATIVE
BILIRUB UR QL: NEGATIVE
BODY FLD TYPE: NORMAL
BUN SERPL-MCNC: 18 MG/DL (ref 6–20)
BUN SERPL-MCNC: 19 MG/DL (ref 6–20)
BUN SERPL-MCNC: 20 MG/DL (ref 6–20)
BUN SERPL-MCNC: 20 MG/DL (ref 6–20)
BUN SERPL-MCNC: 21 MG/DL (ref 6–20)
BUN SERPL-MCNC: 22 MG/DL (ref 6–20)
BUN SERPL-MCNC: 23 MG/DL (ref 6–20)
BUN SERPL-MCNC: 24 MG/DL (ref 6–20)
BUN SERPL-MCNC: 25 MG/DL (ref 6–20)
BUN SERPL-MCNC: 27 MG/DL (ref 6–20)
BUN SERPL-MCNC: 28 MG/DL (ref 6–20)
BUN SERPL-MCNC: 29 MG/DL (ref 6–20)
BUN SERPL-MCNC: 29 MG/DL (ref 6–20)
BUN SERPL-MCNC: 30 MG/DL (ref 6–20)
BUN SERPL-MCNC: 32 MG/DL (ref 6–20)
BUN SERPL-MCNC: 33 MG/DL (ref 6–20)
BUN SERPL-MCNC: 36 MG/DL (ref 6–20)
BUN SERPL-MCNC: 40 MG/DL (ref 6–20)
BUN SERPL-MCNC: 40 MG/DL (ref 6–20)
BUN SERPL-MCNC: 41 MG/DL (ref 6–20)
BUN SERPL-MCNC: 41 MG/DL (ref 6–20)
BUN/CREAT SERPL: 16 (ref 12–20)
BUN/CREAT SERPL: 19 (ref 12–20)
BUN/CREAT SERPL: 20 (ref 12–20)
BUN/CREAT SERPL: 21 (ref 12–20)
BUN/CREAT SERPL: 21 (ref 12–20)
BUN/CREAT SERPL: 24 (ref 12–20)
BUN/CREAT SERPL: 24 (ref 12–20)
BUN/CREAT SERPL: 25 (ref 12–20)
BUN/CREAT SERPL: 25 (ref 12–20)
BUN/CREAT SERPL: 26 (ref 12–20)
BUN/CREAT SERPL: 27 (ref 12–20)
BUN/CREAT SERPL: 28 (ref 12–20)
BUN/CREAT SERPL: 28 (ref 12–20)
BUN/CREAT SERPL: 29 (ref 12–20)
BUN/CREAT SERPL: 29 (ref 12–20)
BUN/CREAT SERPL: 30 (ref 12–20)
BUN/CREAT SERPL: 30 (ref 12–20)
BUN/CREAT SERPL: 31 (ref 12–20)
BUN/CREAT SERPL: 34 (ref 12–20)
BUN/CREAT SERPL: 37 (ref 12–20)
BUN/CREAT SERPL: 41 (ref 12–20)
CA-I BLD-MCNC: 1.2 MMOL/L (ref 1.12–1.32)
CA-I BLD-MCNC: 9.5 MG/DL (ref 8.5–10.1)
CALCIUM SERPL-MCNC: 8 MG/DL (ref 8.5–10.1)
CALCIUM SERPL-MCNC: 8.2 MG/DL (ref 8.5–10.1)
CALCIUM SERPL-MCNC: 8.3 MG/DL (ref 8.5–10.1)
CALCIUM SERPL-MCNC: 8.4 MG/DL (ref 8.5–10.1)
CALCIUM SERPL-MCNC: 8.4 MG/DL (ref 8.5–10.1)
CALCIUM SERPL-MCNC: 8.5 MG/DL (ref 8.5–10.1)
CALCIUM SERPL-MCNC: 8.6 MG/DL (ref 8.5–10.1)
CALCIUM SERPL-MCNC: 8.6 MG/DL (ref 8.5–10.1)
CALCIUM SERPL-MCNC: 8.7 MG/DL (ref 8.5–10.1)
CALCIUM SERPL-MCNC: 8.7 MG/DL (ref 8.5–10.1)
CALCIUM SERPL-MCNC: 8.8 MG/DL (ref 8.5–10.1)
CALCIUM SERPL-MCNC: 8.9 MG/DL (ref 8.5–10.1)
CALCIUM SERPL-MCNC: 9 MG/DL (ref 8.5–10.1)
CALCIUM SERPL-MCNC: 9 MG/DL (ref 8.5–10.1)
CALCULATED P AXIS, ECG09: 28 DEGREES
CALCULATED P AXIS, ECG09: 42 DEGREES
CALCULATED P AXIS, ECG09: 45 DEGREES
CALCULATED P AXIS, ECG09: 90 DEGREES
CALCULATED R AXIS, ECG10: -13 DEGREES
CALCULATED R AXIS, ECG10: -22 DEGREES
CALCULATED R AXIS, ECG10: 11 DEGREES
CALCULATED R AXIS, ECG10: 16 DEGREES
CALCULATED T AXIS, ECG11: -13 DEGREES
CALCULATED T AXIS, ECG11: -173 DEGREES
CALCULATED T AXIS, ECG11: 34 DEGREES
CALCULATED T AXIS, ECG11: 75 DEGREES
CHLORIDE BLD-SCNC: 103 MMOL/L (ref 100–108)
CHLORIDE SERPL-SCNC: 102 MMOL/L (ref 97–108)
CHLORIDE SERPL-SCNC: 103 MMOL/L (ref 97–108)
CHLORIDE SERPL-SCNC: 103 MMOL/L (ref 97–108)
CHLORIDE SERPL-SCNC: 104 MMOL/L (ref 97–108)
CHLORIDE SERPL-SCNC: 105 MMOL/L (ref 97–108)
CHLORIDE SERPL-SCNC: 105 MMOL/L (ref 97–108)
CHLORIDE SERPL-SCNC: 106 MMOL/L (ref 97–108)
CHLORIDE SERPL-SCNC: 107 MMOL/L (ref 97–108)
CHLORIDE SERPL-SCNC: 107 MMOL/L (ref 97–108)
CHLORIDE SERPL-SCNC: 108 MMOL/L (ref 97–108)
CHLORIDE SERPL-SCNC: 109 MMOL/L (ref 97–108)
CHLORIDE SERPL-SCNC: 110 MMOL/L (ref 97–108)
CHLORIDE SERPL-SCNC: 111 MMOL/L (ref 97–108)
CHLORIDE SERPL-SCNC: 113 MMOL/L (ref 97–108)
CHLORIDE SERPL-SCNC: 114 MMOL/L (ref 97–108)
CHLORIDE SERPL-SCNC: 98 MMOL/L (ref 97–108)
CHOLEST SERPL-MCNC: 146 MG/DL
CO2 BLD-SCNC: 23 MMOL/L (ref 19–24)
CO2 SERPL-SCNC: 19 MMOL/L (ref 21–32)
CO2 SERPL-SCNC: 20 MMOL/L (ref 21–32)
CO2 SERPL-SCNC: 22 MMOL/L (ref 21–32)
CO2 SERPL-SCNC: 23 MMOL/L (ref 21–32)
CO2 SERPL-SCNC: 24 MMOL/L (ref 21–32)
CO2 SERPL-SCNC: 25 MMOL/L (ref 21–32)
CO2 SERPL-SCNC: 26 MMOL/L (ref 21–32)
CO2 SERPL-SCNC: 27 MMOL/L (ref 21–32)
CO2 SERPL-SCNC: 28 MMOL/L (ref 21–32)
CO2 SERPL-SCNC: 29 MMOL/L (ref 21–32)
COLOR FLD: ABNORMAL
COLOR UR: ABNORMAL
COLOR UR: ABNORMAL
COVID-19 RAPID TEST, COVR: NOT DETECTED
CREAT SERPL-MCNC: 0.64 MG/DL (ref 0.55–1.02)
CREAT SERPL-MCNC: 0.74 MG/DL (ref 0.55–1.02)
CREAT SERPL-MCNC: 0.75 MG/DL (ref 0.55–1.02)
CREAT SERPL-MCNC: 0.79 MG/DL (ref 0.55–1.02)
CREAT SERPL-MCNC: 0.79 MG/DL (ref 0.55–1.02)
CREAT SERPL-MCNC: 0.87 MG/DL (ref 0.55–1.02)
CREAT SERPL-MCNC: 0.87 MG/DL (ref 0.55–1.02)
CREAT SERPL-MCNC: 0.88 MG/DL (ref 0.55–1.02)
CREAT SERPL-MCNC: 0.88 MG/DL (ref 0.55–1.02)
CREAT SERPL-MCNC: 0.94 MG/DL (ref 0.55–1.02)
CREAT SERPL-MCNC: 0.95 MG/DL (ref 0.55–1.02)
CREAT SERPL-MCNC: 0.97 MG/DL (ref 0.55–1.02)
CREAT SERPL-MCNC: 0.97 MG/DL (ref 0.55–1.02)
CREAT SERPL-MCNC: 0.99 MG/DL (ref 0.55–1.02)
CREAT SERPL-MCNC: 1.01 MG/DL (ref 0.55–1.02)
CREAT SERPL-MCNC: 1.05 MG/DL (ref 0.55–1.02)
CREAT SERPL-MCNC: 1.08 MG/DL (ref 0.55–1.02)
CREAT SERPL-MCNC: 1.09 MG/DL (ref 0.55–1.02)
CREAT SERPL-MCNC: 1.12 MG/DL (ref 0.55–1.02)
CREAT SERPL-MCNC: 1.18 MG/DL (ref 0.55–1.02)
CREAT SERPL-MCNC: 1.2 MG/DL (ref 0.55–1.02)
CREAT SERPL-MCNC: 1.28 MG/DL (ref 0.55–1.02)
CREAT SERPL-MCNC: 1.37 MG/DL (ref 0.55–1.02)
CREAT SERPL-MCNC: 1.45 MG/DL (ref 0.55–1.02)
CREAT SERPL-MCNC: 2.3 MG/DL (ref 0.55–1.02)
CREAT UR-MCNC: 1.1 MG/DL (ref 0.6–1.3)
CREAT UR-MCNC: 168 MG/DL
CREAT UR-MCNC: 198 MG/DL
DATE LAST DOSE: ABNORMAL
DIAGNOSIS, 93000: NORMAL
DIFFERENTIAL METHOD BLD: ABNORMAL
DIFFERENTIAL METHOD BLD: ABNORMAL
ECHO AO ROOT DIAM: 3 CM
ECHO AO ROOT INDEX: 1.94 CM/M2
ECHO AR MAX VEL PISA: 2.9 M/S
ECHO AV PEAK GRADIENT: 8 MMHG
ECHO AV PEAK VELOCITY: 1.4 M/S
ECHO AV REGURGITANT PHT: 950.7 MILLISECOND
ECHO AV VELOCITY RATIO: 0.64
ECHO LA DIAMETER INDEX: 2 CM/M2
ECHO LA DIAMETER: 3.1 CM
ECHO LA TO AORTIC ROOT RATIO: 1.03
ECHO LV E' LATERAL VELOCITY: 4 CM/S
ECHO LV E' SEPTAL VELOCITY: 3 CM/S
ECHO LV EDV A2C: 103 ML
ECHO LV EDV A4C: 129 ML
ECHO LV EDV BP: 116 ML (ref 56–104)
ECHO LV EDV INDEX A4C: 83 ML/M2
ECHO LV EDV INDEX BP: 75 ML/M2
ECHO LV EDV NDEX A2C: 66 ML/M2
ECHO LV EJECTION FRACTION A2C: 6 %
ECHO LV EJECTION FRACTION A4C: 39 %
ECHO LV EJECTION FRACTION BIPLANE: 15 % (ref 55–100)
ECHO LV ESV A2C: 109 ML
ECHO LV ESV A4C: 79 ML
ECHO LV ESV BP: 99 ML (ref 19–49)
ECHO LV ESV INDEX A2C: 70 ML/M2
ECHO LV ESV INDEX A4C: 51 ML/M2
ECHO LV ESV INDEX BP: 64 ML/M2
ECHO LV FRACTIONAL SHORTENING: 9 % (ref 28–44)
ECHO LV INTERNAL DIMENSION DIASTOLE INDEX: 3.48 CM/M2
ECHO LV INTERNAL DIMENSION DIASTOLIC: 5.4 CM (ref 3.9–5.3)
ECHO LV INTERNAL DIMENSION SYSTOLIC INDEX: 3.16 CM/M2
ECHO LV INTERNAL DIMENSION SYSTOLIC: 4.9 CM
ECHO LV IVSD: 1.5 CM (ref 0.6–0.9)
ECHO LV MASS 2D: 311.7 G (ref 67–162)
ECHO LV MASS INDEX 2D: 201.1 G/M2 (ref 43–95)
ECHO LV POSTERIOR WALL DIASTOLIC: 1.2 CM (ref 0.6–0.9)
ECHO LV RELATIVE WALL THICKNESS RATIO: 0.44
ECHO LVOT PEAK GRADIENT: 3 MMHG
ECHO LVOT PEAK VELOCITY: 0.9 M/S
ECHO MV A VELOCITY: 0.94 M/S
ECHO MV AREA PHT: 3 CM2
ECHO MV E DECELERATION TIME (DT): 251.9 MS
ECHO MV E VELOCITY: 0.55 M/S
ECHO MV E/A RATIO: 0.59
ECHO MV E/E' LATERAL: 13.75
ECHO MV E/E' RATIO (AVERAGED): 16.04
ECHO MV E/E' SEPTAL: 18.33
ECHO MV PRESSURE HALF TIME (PHT): 73.1 MS
ECHO MV REGURGITANT PEAK GRADIENT: 125 MMHG
ECHO MV REGURGITANT PEAK VELOCITY: 5.6 M/S
ECHO MV REGURGITANT VTIA: 177.9 CM
ECHO PULMONARY ARTERY END DIASTOLIC PRESSURE: 10 MMHG
ECHO PV MAX VELOCITY: 0.6 M/S
ECHO PV PEAK GRADIENT: 2 MMHG
ECHO RV TAPSE: 2 CM (ref 1.7–?)
ECHO TV REGURGITANT MAX VELOCITY: 2.22 M/S
ECHO TV REGURGITANT PEAK GRADIENT: 20 MMHG
EOSINOPHIL # BLD: 0 K/UL (ref 0–0.4)
EOSINOPHIL # BLD: 0.1 K/UL (ref 0–0.4)
EOSINOPHIL NFR BLD: 1 % (ref 0–7)
EOSINOPHIL NFR BLD: 2 % (ref 0–7)
EPITH CASTS URNS QL MICRO: ABNORMAL /LPF
ERYTHROCYTE [DISTWIDTH] IN BLOOD BY AUTOMATED COUNT: 12.8 % (ref 11.5–14.5)
ERYTHROCYTE [DISTWIDTH] IN BLOOD BY AUTOMATED COUNT: 12.8 % (ref 11.5–14.5)
ERYTHROCYTE [DISTWIDTH] IN BLOOD BY AUTOMATED COUNT: 13.1 % (ref 11.5–14.5)
ERYTHROCYTE [DISTWIDTH] IN BLOOD BY AUTOMATED COUNT: 13.2 % (ref 11.5–14.5)
ERYTHROCYTE [DISTWIDTH] IN BLOOD BY AUTOMATED COUNT: 13.3 % (ref 11.5–14.5)
ERYTHROCYTE [DISTWIDTH] IN BLOOD BY AUTOMATED COUNT: 13.5 % (ref 11.5–14.5)
ERYTHROCYTE [DISTWIDTH] IN BLOOD BY AUTOMATED COUNT: 13.7 % (ref 11.5–14.5)
ERYTHROCYTE [DISTWIDTH] IN BLOOD BY AUTOMATED COUNT: 14 % (ref 11.5–14.5)
ERYTHROCYTE [DISTWIDTH] IN BLOOD BY AUTOMATED COUNT: 14.2 % (ref 11.5–14.5)
ERYTHROCYTE [DISTWIDTH] IN BLOOD BY AUTOMATED COUNT: 14.6 % (ref 11.5–14.5)
ERYTHROCYTE [DISTWIDTH] IN BLOOD BY AUTOMATED COUNT: 15 % (ref 11.5–14.5)
ERYTHROCYTE [DISTWIDTH] IN BLOOD BY AUTOMATED COUNT: 15.1 % (ref 11.5–14.5)
ERYTHROCYTE [DISTWIDTH] IN BLOOD BY AUTOMATED COUNT: 15.2 % (ref 11.5–14.5)
ERYTHROCYTE [DISTWIDTH] IN BLOOD BY AUTOMATED COUNT: 15.3 % (ref 11.5–14.5)
ERYTHROCYTE [DISTWIDTH] IN BLOOD BY AUTOMATED COUNT: 15.3 % (ref 11.5–14.5)
ERYTHROCYTE [DISTWIDTH] IN BLOOD BY AUTOMATED COUNT: 15.4 % (ref 11.5–14.5)
ERYTHROCYTE [DISTWIDTH] IN BLOOD BY AUTOMATED COUNT: 16.2 % (ref 11.5–14.5)
ERYTHROCYTE [DISTWIDTH] IN BLOOD BY AUTOMATED COUNT: 16.8 % (ref 11.5–14.5)
EST. AVERAGE GLUCOSE BLD GHB EST-MCNC: 329 MG/DL
GAS FLOW.O2 O2 DELIVERY SYS: ABNORMAL L/MIN
GLOBULIN SER CALC-MCNC: 2.4 G/DL (ref 2–4)
GLOBULIN SER CALC-MCNC: 2.9 G/DL (ref 2–4)
GLOBULIN SER CALC-MCNC: 3 G/DL (ref 2–4)
GLOBULIN SER CALC-MCNC: 3 G/DL (ref 2–4)
GLOBULIN SER CALC-MCNC: 3.1 G/DL (ref 2–4)
GLOBULIN SER CALC-MCNC: 3.2 G/DL (ref 2–4)
GLOBULIN SER CALC-MCNC: 3.2 G/DL (ref 2–4)
GLOBULIN SER CALC-MCNC: 3.3 G/DL (ref 2–4)
GLOBULIN SER CALC-MCNC: 3.4 G/DL (ref 2–4)
GLOBULIN SER CALC-MCNC: 3.6 G/DL (ref 2–4)
GLOBULIN SER CALC-MCNC: 3.7 G/DL (ref 2–4)
GLOBULIN SER CALC-MCNC: 3.7 G/DL (ref 2–4)
GLOBULIN SER CALC-MCNC: 3.9 G/DL (ref 2–4)
GLUCOSE BLD STRIP.AUTO-MCNC: 100 MG/DL (ref 65–117)
GLUCOSE BLD STRIP.AUTO-MCNC: 102 MG/DL (ref 65–117)
GLUCOSE BLD STRIP.AUTO-MCNC: 104 MG/DL (ref 65–117)
GLUCOSE BLD STRIP.AUTO-MCNC: 107 MG/DL (ref 65–117)
GLUCOSE BLD STRIP.AUTO-MCNC: 108 MG/DL (ref 65–117)
GLUCOSE BLD STRIP.AUTO-MCNC: 108 MG/DL (ref 65–117)
GLUCOSE BLD STRIP.AUTO-MCNC: 109 MG/DL (ref 65–117)
GLUCOSE BLD STRIP.AUTO-MCNC: 110 MG/DL (ref 65–117)
GLUCOSE BLD STRIP.AUTO-MCNC: 111 MG/DL (ref 65–117)
GLUCOSE BLD STRIP.AUTO-MCNC: 120 MG/DL (ref 65–117)
GLUCOSE BLD STRIP.AUTO-MCNC: 121 MG/DL (ref 65–117)
GLUCOSE BLD STRIP.AUTO-MCNC: 124 MG/DL (ref 65–117)
GLUCOSE BLD STRIP.AUTO-MCNC: 125 MG/DL (ref 65–117)
GLUCOSE BLD STRIP.AUTO-MCNC: 127 MG/DL (ref 65–117)
GLUCOSE BLD STRIP.AUTO-MCNC: 128 MG/DL (ref 65–117)
GLUCOSE BLD STRIP.AUTO-MCNC: 130 MG/DL (ref 74–106)
GLUCOSE BLD STRIP.AUTO-MCNC: 132 MG/DL (ref 65–117)
GLUCOSE BLD STRIP.AUTO-MCNC: 134 MG/DL (ref 65–117)
GLUCOSE BLD STRIP.AUTO-MCNC: 138 MG/DL (ref 65–117)
GLUCOSE BLD STRIP.AUTO-MCNC: 146 MG/DL (ref 65–117)
GLUCOSE BLD STRIP.AUTO-MCNC: 148 MG/DL (ref 65–117)
GLUCOSE BLD STRIP.AUTO-MCNC: 149 MG/DL (ref 65–117)
GLUCOSE BLD STRIP.AUTO-MCNC: 149 MG/DL (ref 65–117)
GLUCOSE BLD STRIP.AUTO-MCNC: 150 MG/DL (ref 65–117)
GLUCOSE BLD STRIP.AUTO-MCNC: 151 MG/DL (ref 65–117)
GLUCOSE BLD STRIP.AUTO-MCNC: 152 MG/DL (ref 65–117)
GLUCOSE BLD STRIP.AUTO-MCNC: 153 MG/DL (ref 65–117)
GLUCOSE BLD STRIP.AUTO-MCNC: 156 MG/DL (ref 65–117)
GLUCOSE BLD STRIP.AUTO-MCNC: 167 MG/DL (ref 65–117)
GLUCOSE BLD STRIP.AUTO-MCNC: 169 MG/DL (ref 65–117)
GLUCOSE BLD STRIP.AUTO-MCNC: 169 MG/DL (ref 65–117)
GLUCOSE BLD STRIP.AUTO-MCNC: 171 MG/DL (ref 65–117)
GLUCOSE BLD STRIP.AUTO-MCNC: 174 MG/DL (ref 65–117)
GLUCOSE BLD STRIP.AUTO-MCNC: 177 MG/DL (ref 65–117)
GLUCOSE BLD STRIP.AUTO-MCNC: 180 MG/DL (ref 65–117)
GLUCOSE BLD STRIP.AUTO-MCNC: 184 MG/DL (ref 65–117)
GLUCOSE BLD STRIP.AUTO-MCNC: 189 MG/DL (ref 65–117)
GLUCOSE BLD STRIP.AUTO-MCNC: 191 MG/DL (ref 65–117)
GLUCOSE BLD STRIP.AUTO-MCNC: 193 MG/DL (ref 65–117)
GLUCOSE BLD STRIP.AUTO-MCNC: 193 MG/DL (ref 65–117)
GLUCOSE BLD STRIP.AUTO-MCNC: 194 MG/DL (ref 65–117)
GLUCOSE BLD STRIP.AUTO-MCNC: 195 MG/DL (ref 65–117)
GLUCOSE BLD STRIP.AUTO-MCNC: 196 MG/DL (ref 65–117)
GLUCOSE BLD STRIP.AUTO-MCNC: 197 MG/DL (ref 65–117)
GLUCOSE BLD STRIP.AUTO-MCNC: 202 MG/DL (ref 65–117)
GLUCOSE BLD STRIP.AUTO-MCNC: 203 MG/DL (ref 65–117)
GLUCOSE BLD STRIP.AUTO-MCNC: 204 MG/DL (ref 65–117)
GLUCOSE BLD STRIP.AUTO-MCNC: 205 MG/DL (ref 65–117)
GLUCOSE BLD STRIP.AUTO-MCNC: 206 MG/DL (ref 65–117)
GLUCOSE BLD STRIP.AUTO-MCNC: 207 MG/DL (ref 65–117)
GLUCOSE BLD STRIP.AUTO-MCNC: 210 MG/DL (ref 65–117)
GLUCOSE BLD STRIP.AUTO-MCNC: 211 MG/DL (ref 65–117)
GLUCOSE BLD STRIP.AUTO-MCNC: 215 MG/DL (ref 65–117)
GLUCOSE BLD STRIP.AUTO-MCNC: 216 MG/DL (ref 65–117)
GLUCOSE BLD STRIP.AUTO-MCNC: 224 MG/DL (ref 65–117)
GLUCOSE BLD STRIP.AUTO-MCNC: 226 MG/DL (ref 65–117)
GLUCOSE BLD STRIP.AUTO-MCNC: 232 MG/DL (ref 65–117)
GLUCOSE BLD STRIP.AUTO-MCNC: 239 MG/DL (ref 65–117)
GLUCOSE BLD STRIP.AUTO-MCNC: 249 MG/DL (ref 65–117)
GLUCOSE BLD STRIP.AUTO-MCNC: 253 MG/DL (ref 65–117)
GLUCOSE BLD STRIP.AUTO-MCNC: 259 MG/DL (ref 65–117)
GLUCOSE BLD STRIP.AUTO-MCNC: 260 MG/DL (ref 65–117)
GLUCOSE BLD STRIP.AUTO-MCNC: 263 MG/DL (ref 65–117)
GLUCOSE BLD STRIP.AUTO-MCNC: 263 MG/DL (ref 65–117)
GLUCOSE BLD STRIP.AUTO-MCNC: 264 MG/DL (ref 65–117)
GLUCOSE BLD STRIP.AUTO-MCNC: 272 MG/DL (ref 65–117)
GLUCOSE BLD STRIP.AUTO-MCNC: 275 MG/DL (ref 65–117)
GLUCOSE BLD STRIP.AUTO-MCNC: 286 MG/DL (ref 65–117)
GLUCOSE BLD STRIP.AUTO-MCNC: 287 MG/DL (ref 65–117)
GLUCOSE BLD STRIP.AUTO-MCNC: 289 MG/DL (ref 65–117)
GLUCOSE BLD STRIP.AUTO-MCNC: 292 MG/DL (ref 65–117)
GLUCOSE BLD STRIP.AUTO-MCNC: 298 MG/DL (ref 65–117)
GLUCOSE BLD STRIP.AUTO-MCNC: 311 MG/DL (ref 65–117)
GLUCOSE BLD STRIP.AUTO-MCNC: 313 MG/DL (ref 65–117)
GLUCOSE BLD STRIP.AUTO-MCNC: 326 MG/DL (ref 65–117)
GLUCOSE BLD STRIP.AUTO-MCNC: 343 MG/DL (ref 65–117)
GLUCOSE BLD STRIP.AUTO-MCNC: 372 MG/DL (ref 65–117)
GLUCOSE BLD STRIP.AUTO-MCNC: 398 MG/DL (ref 65–100)
GLUCOSE BLD STRIP.AUTO-MCNC: 50 MG/DL (ref 65–117)
GLUCOSE BLD STRIP.AUTO-MCNC: 62 MG/DL (ref 65–117)
GLUCOSE BLD STRIP.AUTO-MCNC: 69 MG/DL (ref 65–117)
GLUCOSE BLD STRIP.AUTO-MCNC: 72 MG/DL (ref 65–117)
GLUCOSE BLD STRIP.AUTO-MCNC: 73 MG/DL (ref 65–117)
GLUCOSE BLD STRIP.AUTO-MCNC: 81 MG/DL (ref 65–117)
GLUCOSE BLD STRIP.AUTO-MCNC: 84 MG/DL (ref 65–117)
GLUCOSE BLD STRIP.AUTO-MCNC: 84 MG/DL (ref 65–117)
GLUCOSE BLD STRIP.AUTO-MCNC: 88 MG/DL (ref 65–117)
GLUCOSE BLD STRIP.AUTO-MCNC: 88 MG/DL (ref 65–117)
GLUCOSE BLD STRIP.AUTO-MCNC: 89 MG/DL (ref 65–117)
GLUCOSE BLD STRIP.AUTO-MCNC: 89 MG/DL (ref 65–117)
GLUCOSE BLD STRIP.AUTO-MCNC: 95 MG/DL (ref 65–117)
GLUCOSE BLD STRIP.AUTO-MCNC: 96 MG/DL (ref 65–117)
GLUCOSE FLD-MCNC: 205 MG/DL
GLUCOSE SERPL-MCNC: 109 MG/DL (ref 65–100)
GLUCOSE SERPL-MCNC: 128 MG/DL (ref 65–100)
GLUCOSE SERPL-MCNC: 129 MG/DL (ref 65–100)
GLUCOSE SERPL-MCNC: 134 MG/DL (ref 65–100)
GLUCOSE SERPL-MCNC: 152 MG/DL (ref 65–100)
GLUCOSE SERPL-MCNC: 159 MG/DL (ref 65–100)
GLUCOSE SERPL-MCNC: 163 MG/DL (ref 65–100)
GLUCOSE SERPL-MCNC: 176 MG/DL (ref 65–100)
GLUCOSE SERPL-MCNC: 176 MG/DL (ref 65–100)
GLUCOSE SERPL-MCNC: 181 MG/DL (ref 65–100)
GLUCOSE SERPL-MCNC: 184 MG/DL (ref 65–100)
GLUCOSE SERPL-MCNC: 202 MG/DL (ref 65–100)
GLUCOSE SERPL-MCNC: 225 MG/DL (ref 65–100)
GLUCOSE SERPL-MCNC: 231 MG/DL (ref 65–100)
GLUCOSE SERPL-MCNC: 271 MG/DL (ref 65–100)
GLUCOSE SERPL-MCNC: 289 MG/DL (ref 65–100)
GLUCOSE SERPL-MCNC: 364 MG/DL (ref 65–100)
GLUCOSE SERPL-MCNC: 444 MG/DL (ref 65–100)
GLUCOSE SERPL-MCNC: 66 MG/DL (ref 65–100)
GLUCOSE SERPL-MCNC: 67 MG/DL (ref 65–100)
GLUCOSE SERPL-MCNC: 78 MG/DL (ref 65–100)
GLUCOSE SERPL-MCNC: 83 MG/DL (ref 65–100)
GLUCOSE SERPL-MCNC: 93 MG/DL (ref 65–100)
GLUCOSE SERPL-MCNC: 98 MG/DL (ref 65–100)
GLUCOSE SERPL-MCNC: 99 MG/DL (ref 65–100)
GLUCOSE UR STRIP.AUTO-MCNC: 100 MG/DL
GLUCOSE UR STRIP.AUTO-MCNC: >300 MG/DL
GRAM STN SPEC: NORMAL
GRAM STN SPEC: NORMAL
HBA1C MFR BLD: 13.1 % (ref 4–5.6)
HCO3 BLD-SCNC: 23.1 MMOL/L (ref 22–26)
HCO3 BLDA-SCNC: 24 MMOL/L
HCT VFR BLD AUTO: 14.4 % (ref 35–47)
HCT VFR BLD AUTO: 16.4 % (ref 35–47)
HCT VFR BLD AUTO: 18.9 % (ref 35–47)
HCT VFR BLD AUTO: 19 % (ref 35–47)
HCT VFR BLD AUTO: 19.8 % (ref 35–47)
HCT VFR BLD AUTO: 21.1 % (ref 35–47)
HCT VFR BLD AUTO: 22.3 % (ref 35–47)
HCT VFR BLD AUTO: 22.9 % (ref 35–47)
HCT VFR BLD AUTO: 23.3 % (ref 35–47)
HCT VFR BLD AUTO: 23.5 % (ref 35–47)
HCT VFR BLD AUTO: 23.6 % (ref 35–47)
HCT VFR BLD AUTO: 24.2 % (ref 35–47)
HCT VFR BLD AUTO: 24.3 % (ref 35–47)
HCT VFR BLD AUTO: 25.1 % (ref 35–47)
HCT VFR BLD AUTO: 25.7 % (ref 35–47)
HCT VFR BLD AUTO: 28 % (ref 35–47)
HCT VFR BLD AUTO: 32.9 % (ref 35–47)
HCT VFR BLD AUTO: 33.9 % (ref 35–47)
HCT VFR BLD AUTO: 34.5 % (ref 35–47)
HCT VFR BLD AUTO: 35.4 % (ref 35–47)
HCT VFR BLD AUTO: 35.6 % (ref 35–47)
HCT VFR BLD AUTO: 36.7 % (ref 35–47)
HCT VFR BLD AUTO: 37.1 % (ref 35–47)
HDLC SERPL-MCNC: 69 MG/DL
HDLC SERPL: 2.1 {RATIO} (ref 0–5)
HEMOCCULT STL QL: NEGATIVE
HGB BLD-MCNC: 10.8 G/DL (ref 11.5–16)
HGB BLD-MCNC: 11.1 G/DL (ref 11.5–16)
HGB BLD-MCNC: 11.2 G/DL (ref 11.5–16)
HGB BLD-MCNC: 11.7 G/DL (ref 11.5–16)
HGB BLD-MCNC: 11.8 G/DL (ref 11.5–16)
HGB BLD-MCNC: 12.1 G/DL (ref 11.5–16)
HGB BLD-MCNC: 12.2 G/DL (ref 11.5–16)
HGB BLD-MCNC: 4.6 G/DL (ref 11.5–16)
HGB BLD-MCNC: 5 G/DL (ref 11.5–16)
HGB BLD-MCNC: 5.9 G/DL (ref 11.5–16)
HGB BLD-MCNC: 6.1 G/DL (ref 11.5–16)
HGB BLD-MCNC: 6.3 G/DL (ref 11.5–16)
HGB BLD-MCNC: 6.7 G/DL (ref 11.5–16)
HGB BLD-MCNC: 7.3 G/DL (ref 11.5–16)
HGB BLD-MCNC: 7.4 G/DL (ref 11.5–16)
HGB BLD-MCNC: 7.5 G/DL (ref 11.5–16)
HGB BLD-MCNC: 7.5 G/DL (ref 11.5–16)
HGB BLD-MCNC: 7.6 G/DL (ref 11.5–16)
HGB BLD-MCNC: 7.6 G/DL (ref 11.5–16)
HGB BLD-MCNC: 7.8 G/DL (ref 11.5–16)
HGB BLD-MCNC: 7.9 G/DL (ref 11.5–16)
HGB BLD-MCNC: 8.2 G/DL (ref 11.5–16)
HGB BLD-MCNC: 9.1 G/DL (ref 11.5–16)
HGB UR QL STRIP: ABNORMAL
HGB UR QL STRIP: NEGATIVE
IMM GRANULOCYTES # BLD AUTO: 0 K/UL (ref 0–0.04)
IMM GRANULOCYTES # BLD AUTO: 0 K/UL (ref 0–0.04)
IMM GRANULOCYTES NFR BLD AUTO: 1 % (ref 0–0.5)
IMM GRANULOCYTES NFR BLD AUTO: 1 % (ref 0–0.5)
INR PPP: 1.1 (ref 0.9–1.1)
INR PPP: 1.3 (ref 0.9–1.1)
INR PPP: 1.5 (ref 0.9–1.1)
KETONES UR QL STRIP.AUTO: 5 MG/DL
KETONES UR QL STRIP.AUTO: ABNORMAL MG/DL
LACTATE BLD-SCNC: 1.44 MMOL/L (ref 0.4–2)
LACTATE SERPL-SCNC: 0.8 MMOL/L (ref 0.4–2)
LACTATE SERPL-SCNC: 1.6 MMOL/L (ref 0.4–2)
LACTATE SERPL-SCNC: 2.3 MMOL/L (ref 0.4–2)
LACTATE SERPL-SCNC: 3.8 MMOL/L (ref 0.4–2)
LDLC SERPL CALC-MCNC: 71 MG/DL (ref 0–100)
LEUKOCYTE ESTERASE UR QL STRIP.AUTO: ABNORMAL
LEUKOCYTE ESTERASE UR QL STRIP.AUTO: NEGATIVE
LYMPHOCYTES # BLD: 1.1 K/UL (ref 0.8–3.5)
LYMPHOCYTES # BLD: 1.6 K/UL (ref 0.8–3.5)
LYMPHOCYTES NFR BLD: 16 % (ref 12–49)
LYMPHOCYTES NFR BLD: 24 % (ref 12–49)
LYMPHOCYTES NFR FLD: 15 %
MAGNESIUM SERPL-MCNC: 1.7 MG/DL (ref 1.6–2.4)
MCH RBC QN AUTO: 27.7 PG (ref 26–34)
MCH RBC QN AUTO: 27.8 PG (ref 26–34)
MCH RBC QN AUTO: 27.8 PG (ref 26–34)
MCH RBC QN AUTO: 27.9 PG (ref 26–34)
MCH RBC QN AUTO: 28.1 PG (ref 26–34)
MCH RBC QN AUTO: 28.2 PG (ref 26–34)
MCH RBC QN AUTO: 28.2 PG (ref 26–34)
MCH RBC QN AUTO: 28.4 PG (ref 26–34)
MCH RBC QN AUTO: 28.5 PG (ref 26–34)
MCH RBC QN AUTO: 28.5 PG (ref 26–34)
MCH RBC QN AUTO: 28.6 PG (ref 26–34)
MCH RBC QN AUTO: 28.6 PG (ref 26–34)
MCH RBC QN AUTO: 28.7 PG (ref 26–34)
MCH RBC QN AUTO: 28.8 PG (ref 26–34)
MCH RBC QN AUTO: 29.4 PG (ref 26–34)
MCHC RBC AUTO-ENTMCNC: 30.5 G/DL (ref 30–36.5)
MCHC RBC AUTO-ENTMCNC: 31 G/DL (ref 30–36.5)
MCHC RBC AUTO-ENTMCNC: 31.1 G/DL (ref 30–36.5)
MCHC RBC AUTO-ENTMCNC: 31.2 G/DL (ref 30–36.5)
MCHC RBC AUTO-ENTMCNC: 31.8 G/DL (ref 30–36.5)
MCHC RBC AUTO-ENTMCNC: 31.8 G/DL (ref 30–36.5)
MCHC RBC AUTO-ENTMCNC: 31.9 G/DL (ref 30–36.5)
MCHC RBC AUTO-ENTMCNC: 32.2 G/DL (ref 30–36.5)
MCHC RBC AUTO-ENTMCNC: 32.3 G/DL (ref 30–36.5)
MCHC RBC AUTO-ENTMCNC: 32.5 G/DL (ref 30–36.5)
MCHC RBC AUTO-ENTMCNC: 32.6 G/DL (ref 30–36.5)
MCHC RBC AUTO-ENTMCNC: 32.7 G/DL (ref 30–36.5)
MCHC RBC AUTO-ENTMCNC: 32.7 G/DL (ref 30–36.5)
MCHC RBC AUTO-ENTMCNC: 32.8 G/DL (ref 30–36.5)
MCHC RBC AUTO-ENTMCNC: 32.9 G/DL (ref 30–36.5)
MCHC RBC AUTO-ENTMCNC: 33 G/DL (ref 30–36.5)
MCHC RBC AUTO-ENTMCNC: 33.1 G/DL (ref 30–36.5)
MCHC RBC AUTO-ENTMCNC: 33.1 G/DL (ref 30–36.5)
MCV RBC AUTO: 83.8 FL (ref 80–99)
MCV RBC AUTO: 84.5 FL (ref 80–99)
MCV RBC AUTO: 84.6 FL (ref 80–99)
MCV RBC AUTO: 85 FL (ref 80–99)
MCV RBC AUTO: 85.3 FL (ref 80–99)
MCV RBC AUTO: 85.5 FL (ref 80–99)
MCV RBC AUTO: 86.4 FL (ref 80–99)
MCV RBC AUTO: 86.5 FL (ref 80–99)
MCV RBC AUTO: 86.8 FL (ref 80–99)
MCV RBC AUTO: 87 FL (ref 80–99)
MCV RBC AUTO: 87.1 FL (ref 80–99)
MCV RBC AUTO: 87.6 FL (ref 80–99)
MCV RBC AUTO: 88 FL (ref 80–99)
MCV RBC AUTO: 88.3 FL (ref 80–99)
MCV RBC AUTO: 89.1 FL (ref 80–99)
MCV RBC AUTO: 89.4 FL (ref 80–99)
MCV RBC AUTO: 90 FL (ref 80–99)
MCV RBC AUTO: 90.2 FL (ref 80–99)
MCV RBC AUTO: 91.6 FL (ref 80–99)
MCV RBC AUTO: 91.7 FL (ref 80–99)
MCV RBC AUTO: 93.7 FL (ref 80–99)
MCV RBC AUTO: 94 FL (ref 80–99)
MESOTHL CELL NFR FLD: 2 %
MONOCYTES # BLD: 0.5 K/UL (ref 0–1)
MONOCYTES # BLD: 0.8 K/UL (ref 0–1)
MONOCYTES NFR BLD: 11 % (ref 5–13)
MONOCYTES NFR BLD: 7 % (ref 5–13)
MONOS+MACROS NFR FLD: 10 %
NEUTROPHILS NFR FLD: 73 %
NEUTS SEG # BLD: 4.3 K/UL (ref 1.8–8)
NEUTS SEG # BLD: 5 K/UL (ref 1.8–8)
NEUTS SEG NFR BLD: 67 % (ref 32–75)
NEUTS SEG NFR BLD: 70 % (ref 32–75)
NITRITE UR QL STRIP.AUTO: NEGATIVE
NITRITE UR QL STRIP.AUTO: NEGATIVE
NRBC # BLD: 0 K/UL (ref 0–0.01)
NRBC # BLD: 0.12 K/UL (ref 0–0.01)
NRBC # BLD: 0.12 K/UL (ref 0–0.01)
NRBC # BLD: 0.13 K/UL (ref 0–0.01)
NRBC BLD-RTO: 0 PER 100 WBC
NRBC BLD-RTO: 0.7 PER 100 WBC
NRBC BLD-RTO: 0.9 PER 100 WBC
NRBC BLD-RTO: 1.1 PER 100 WBC
NUC CELL # FLD: 3569 /CU MM
P-R INTERVAL, ECG05: 138 MS
P-R INTERVAL, ECG05: 140 MS
P-R INTERVAL, ECG05: 148 MS
P-R INTERVAL, ECG05: 174 MS
P2Y12 PLT RESPONSE,PPPR: 222 PRU (ref 194–418)
P2Y12 PLT RESPONSE,PPPR: 222 PRU (ref 194–418)
PCO2 BLD: 32.5 MMHG (ref 35–45)
PCO2 BLD: 33.9 MMHG (ref 35–45)
PERFORMED BY, TECHID: ABNORMAL
PH BLD: 7.46 [PH] (ref 7.35–7.45)
PH BLD: 7.46 [PH] (ref 7.35–7.45)
PH FLD: NORMAL [PH]
PH UR STRIP: 5 [PH] (ref 5–8)
PH UR STRIP: 5.5 [PH] (ref 5–8)
PHOSPHATE SERPL-MCNC: 3.2 MG/DL (ref 2.6–4.7)
PLATELET # BLD AUTO: 108 K/UL (ref 150–400)
PLATELET # BLD AUTO: 112 K/UL (ref 150–400)
PLATELET # BLD AUTO: 115 K/UL (ref 150–400)
PLATELET # BLD AUTO: 115 K/UL (ref 150–400)
PLATELET # BLD AUTO: 124 K/UL (ref 150–400)
PLATELET # BLD AUTO: 126 K/UL (ref 150–400)
PLATELET # BLD AUTO: 129 K/UL (ref 150–400)
PLATELET # BLD AUTO: 130 K/UL (ref 150–400)
PLATELET # BLD AUTO: 135 K/UL (ref 150–400)
PLATELET # BLD AUTO: 143 K/UL (ref 150–400)
PLATELET # BLD AUTO: 145 K/UL (ref 150–400)
PLATELET # BLD AUTO: 154 K/UL (ref 150–400)
PLATELET # BLD AUTO: 201 K/UL (ref 150–400)
PLATELET # BLD AUTO: 204 K/UL (ref 150–400)
PLATELET # BLD AUTO: 256 K/UL (ref 150–400)
PLATELET # BLD AUTO: 259 K/UL (ref 150–400)
PLATELET # BLD AUTO: 262 K/UL (ref 150–400)
PLATELET # BLD AUTO: 275 K/UL (ref 150–400)
PLATELET # BLD AUTO: 276 K/UL (ref 150–400)
PLATELET # BLD AUTO: 279 K/UL (ref 150–400)
PLATELET # BLD AUTO: 297 K/UL (ref 150–400)
PLATELET # BLD AUTO: 302 K/UL (ref 150–400)
PMV BLD AUTO: 10.1 FL (ref 8.9–12.9)
PMV BLD AUTO: 10.1 FL (ref 8.9–12.9)
PMV BLD AUTO: 10.2 FL (ref 8.9–12.9)
PMV BLD AUTO: 10.3 FL (ref 8.9–12.9)
PMV BLD AUTO: 10.6 FL (ref 8.9–12.9)
PMV BLD AUTO: 10.7 FL (ref 8.9–12.9)
PMV BLD AUTO: 10.8 FL (ref 8.9–12.9)
PMV BLD AUTO: 11.6 FL (ref 8.9–12.9)
PMV BLD AUTO: 11.8 FL (ref 8.9–12.9)
PMV BLD AUTO: 11.8 FL (ref 8.9–12.9)
PMV BLD AUTO: 11.9 FL (ref 8.9–12.9)
PMV BLD AUTO: 12.1 FL (ref 8.9–12.9)
PMV BLD AUTO: 12.3 FL (ref 8.9–12.9)
PMV BLD AUTO: 12.5 FL (ref 8.9–12.9)
PMV BLD AUTO: 12.6 FL (ref 8.9–12.9)
PMV BLD AUTO: 12.7 FL (ref 8.9–12.9)
PMV BLD AUTO: 12.7 FL (ref 8.9–12.9)
PMV BLD AUTO: 12.8 FL (ref 8.9–12.9)
PMV BLD AUTO: 12.9 FL (ref 8.9–12.9)
PMV BLD AUTO: 9.8 FL (ref 8.9–12.9)
PMV BLD AUTO: 9.9 FL (ref 8.9–12.9)
PMV BLD AUTO: 9.9 FL (ref 8.9–12.9)
PO2 BLD: 67 MMHG (ref 80–100)
PO2 BLD: 81 MMHG (ref 80–100)
POTASSIUM BLD-SCNC: 3.8 MMOL/L (ref 3.5–5.5)
POTASSIUM SERPL-SCNC: 3.5 MMOL/L (ref 3.5–5.1)
POTASSIUM SERPL-SCNC: 3.6 MMOL/L (ref 3.5–5.1)
POTASSIUM SERPL-SCNC: 3.7 MMOL/L (ref 3.5–5.1)
POTASSIUM SERPL-SCNC: 3.8 MMOL/L (ref 3.5–5.1)
POTASSIUM SERPL-SCNC: 3.8 MMOL/L (ref 3.5–5.1)
POTASSIUM SERPL-SCNC: 3.9 MMOL/L (ref 3.5–5.1)
POTASSIUM SERPL-SCNC: 3.9 MMOL/L (ref 3.5–5.1)
POTASSIUM SERPL-SCNC: 4 MMOL/L (ref 3.5–5.1)
POTASSIUM SERPL-SCNC: 4.1 MMOL/L (ref 3.5–5.1)
POTASSIUM SERPL-SCNC: 4.2 MMOL/L (ref 3.5–5.1)
POTASSIUM SERPL-SCNC: 4.3 MMOL/L (ref 3.5–5.1)
POTASSIUM SERPL-SCNC: 4.4 MMOL/L (ref 3.5–5.1)
POTASSIUM SERPL-SCNC: 4.4 MMOL/L (ref 3.5–5.1)
POTASSIUM SERPL-SCNC: 4.5 MMOL/L (ref 3.5–5.1)
POTASSIUM SERPL-SCNC: 4.6 MMOL/L (ref 3.5–5.1)
POTASSIUM SERPL-SCNC: 4.6 MMOL/L (ref 3.5–5.1)
POTASSIUM SERPL-SCNC: 4.7 MMOL/L (ref 3.5–5.1)
POTASSIUM SERPL-SCNC: 4.7 MMOL/L (ref 3.5–5.1)
POTASSIUM SERPL-SCNC: 4.8 MMOL/L (ref 3.5–5.1)
POTASSIUM SERPL-SCNC: 5.3 MMOL/L (ref 3.5–5.1)
PROCALCITONIN SERPL-MCNC: 0.06 NG/ML
PROT FLD-MCNC: 3.9 G/DL
PROT FLD-MCNC: 4 G/DL
PROT SERPL-MCNC: 4.9 G/DL (ref 6.4–8.2)
PROT SERPL-MCNC: 5.2 G/DL (ref 6.4–8.2)
PROT SERPL-MCNC: 5.3 G/DL (ref 6.4–8.2)
PROT SERPL-MCNC: 5.3 G/DL (ref 6.4–8.2)
PROT SERPL-MCNC: 5.4 G/DL (ref 6.4–8.2)
PROT SERPL-MCNC: 5.5 G/DL (ref 6.4–8.2)
PROT SERPL-MCNC: 5.6 G/DL (ref 6.4–8.2)
PROT SERPL-MCNC: 5.7 G/DL (ref 6.4–8.2)
PROT SERPL-MCNC: 5.8 G/DL (ref 6.4–8.2)
PROT SERPL-MCNC: 5.8 G/DL (ref 6.4–8.2)
PROT SERPL-MCNC: 5.9 G/DL (ref 6.4–8.2)
PROT SERPL-MCNC: 6 G/DL (ref 6.4–8.2)
PROT SERPL-MCNC: 6.2 G/DL (ref 6.4–8.2)
PROT SERPL-MCNC: 6.9 G/DL (ref 6.4–8.2)
PROT UR STRIP-MCNC: NEGATIVE MG/DL
PROT UR STRIP-MCNC: NEGATIVE MG/DL
PROTHROMBIN TIME: 11.5 SEC (ref 9–11.1)
PROTHROMBIN TIME: 14.9 SEC (ref 9–11.1)
PROTHROMBIN TIME: 16 SEC (ref 11.9–14.6)
Q-T INTERVAL, ECG07: 372 MS
Q-T INTERVAL, ECG07: 424 MS
Q-T INTERVAL, ECG07: 436 MS
Q-T INTERVAL, ECG07: 452 MS
QRS DURATION, ECG06: 80 MS
QRS DURATION, ECG06: 86 MS
QRS DURATION, ECG06: 96 MS
QRS DURATION, ECG06: 98 MS
QTC CALCULATION (BEZET), ECG08: 494 MS
QTC CALCULATION (BEZET), ECG08: 498 MS
QTC CALCULATION (BEZET), ECG08: 508 MS
QTC CALCULATION (BEZET), ECG08: 524 MS
RBC # BLD AUTO: 1.6 M/UL (ref 3.8–5.2)
RBC # BLD AUTO: 1.75 M/UL (ref 3.8–5.2)
RBC # BLD AUTO: 2.01 M/UL (ref 3.8–5.2)
RBC # BLD AUTO: 2.26 M/UL (ref 3.8–5.2)
RBC # BLD AUTO: 2.34 M/UL (ref 3.8–5.2)
RBC # BLD AUTO: 2.56 M/UL (ref 3.8–5.2)
RBC # BLD AUTO: 2.57 M/UL (ref 3.8–5.2)
RBC # BLD AUTO: 2.64 M/UL (ref 3.8–5.2)
RBC # BLD AUTO: 2.64 M/UL (ref 3.8–5.2)
RBC # BLD AUTO: 2.66 M/UL (ref 3.8–5.2)
RBC # BLD AUTO: 2.73 M/UL (ref 3.8–5.2)
RBC # BLD AUTO: 2.74 M/UL (ref 3.8–5.2)
RBC # BLD AUTO: 2.8 M/UL (ref 3.8–5.2)
RBC # BLD AUTO: 2.92 M/UL (ref 3.8–5.2)
RBC # BLD AUTO: 3.24 M/UL (ref 3.8–5.2)
RBC # BLD AUTO: 3.87 M/UL (ref 3.8–5.2)
RBC # BLD AUTO: 3.99 M/UL (ref 3.8–5.2)
RBC # BLD AUTO: 4.01 M/UL (ref 3.8–5.2)
RBC # BLD AUTO: 4.07 M/UL (ref 3.8–5.2)
RBC # BLD AUTO: 4.25 M/UL (ref 3.8–5.2)
RBC # BLD AUTO: 4.34 M/UL (ref 3.8–5.2)
RBC # BLD AUTO: 4.34 M/UL (ref 3.8–5.2)
RBC # FLD: >100 /CU MM
RBC #/AREA URNS HPF: ABNORMAL /HPF (ref 0–5)
RBC #/AREA URNS HPF: ABNORMAL /HPF (ref 0–5)
REPORTED DOSE,DOSE: ABNORMAL UNITS
REPORTED DOSE/TIME,TMG: ABNORMAL
SAO2 % BLD: 96.7 % (ref 92–97)
SERVICE CMNT-IMP: ABNORMAL
SERVICE CMNT-IMP: NORMAL
SODIUM BLD-SCNC: 139 MMOL/L (ref 136–145)
SODIUM SERPL-SCNC: 133 MMOL/L (ref 136–145)
SODIUM SERPL-SCNC: 133 MMOL/L (ref 136–145)
SODIUM SERPL-SCNC: 135 MMOL/L (ref 136–145)
SODIUM SERPL-SCNC: 135 MMOL/L (ref 136–145)
SODIUM SERPL-SCNC: 136 MMOL/L (ref 136–145)
SODIUM SERPL-SCNC: 137 MMOL/L (ref 136–145)
SODIUM SERPL-SCNC: 138 MMOL/L (ref 136–145)
SODIUM SERPL-SCNC: 139 MMOL/L (ref 136–145)
SODIUM SERPL-SCNC: 139 MMOL/L (ref 136–145)
SODIUM SERPL-SCNC: 140 MMOL/L (ref 136–145)
SODIUM SERPL-SCNC: 141 MMOL/L (ref 136–145)
SODIUM SERPL-SCNC: 142 MMOL/L (ref 136–145)
SODIUM SERPL-SCNC: 142 MMOL/L (ref 136–145)
SODIUM SERPL-SCNC: 145 MMOL/L (ref 136–145)
SODIUM SERPL-SCNC: 147 MMOL/L (ref 136–145)
SODIUM UR-SCNC: 52 MMOL/L
SOURCE, COVRS: NORMAL
SP GR UR REFRACTOMETRY: 1.02 (ref 1–1.03)
SP GR UR REFRACTOMETRY: 1.03 (ref 1–1.03)
SPECIAL REQUESTS,SREQ: NORMAL
SPECIMEN SITE: ABNORMAL
SPECIMEN SOURCE FLD: ABNORMAL
SPECIMEN SOURCE FLD: NORMAL
SPECIMEN TYPE: ABNORMAL
TRIGL SERPL-MCNC: 30 MG/DL (ref ?–150)
TROPONIN-HIGH SENSITIVITY: 230 NG/L (ref 0–51)
TROPONIN-HIGH SENSITIVITY: 267 NG/L (ref 0–51)
TROPONIN-HIGH SENSITIVITY: 299 NG/L (ref 0–51)
TROPONIN-HIGH SENSITIVITY: 406 NG/L (ref 0–51)
TROPONIN-HIGH SENSITIVITY: 474 NG/L (ref 0–51)
TSH SERPL DL<=0.05 MIU/L-ACNC: 2.32 UIU/ML (ref 0.36–3.74)
UA: UC IF INDICATED,UAUC: ABNORMAL
UROBILINOGEN UR QL STRIP.AUTO: 0.1 EU/DL (ref 0.1–1)
UROBILINOGEN UR QL STRIP.AUTO: 0.2 EU/DL (ref 0.2–1)
VANCOMYCIN SERPL-MCNC: 7.3 UG/ML
VANCOMYCIN SERPL-MCNC: 8.3 UG/ML
VANCOMYCIN TROUGH SERPL-MCNC: 15.3 UG/ML (ref 5–10)
VENTRICULAR RATE, ECG03: 106 BPM
VENTRICULAR RATE, ECG03: 76 BPM
VENTRICULAR RATE, ECG03: 83 BPM
VENTRICULAR RATE, ECG03: 87 BPM
VLDLC SERPL CALC-MCNC: 6 MG/DL
WBC # BLD AUTO: 10 K/UL (ref 3.6–11)
WBC # BLD AUTO: 10.1 K/UL (ref 3.6–11)
WBC # BLD AUTO: 10.9 K/UL (ref 3.6–11)
WBC # BLD AUTO: 11 K/UL (ref 3.6–11)
WBC # BLD AUTO: 12.8 K/UL (ref 3.6–11)
WBC # BLD AUTO: 18.1 K/UL (ref 3.6–11)
WBC # BLD AUTO: 5.6 K/UL (ref 3.6–11)
WBC # BLD AUTO: 5.7 K/UL (ref 3.6–11)
WBC # BLD AUTO: 5.8 K/UL (ref 3.6–11)
WBC # BLD AUTO: 6.2 K/UL (ref 3.6–11)
WBC # BLD AUTO: 6.4 K/UL (ref 3.6–11)
WBC # BLD AUTO: 7 K/UL (ref 3.6–11)
WBC # BLD AUTO: 7 K/UL (ref 3.6–11)
WBC # BLD AUTO: 7.1 K/UL (ref 3.6–11)
WBC # BLD AUTO: 7.1 K/UL (ref 3.6–11)
WBC # BLD AUTO: 7.2 K/UL (ref 3.6–11)
WBC # BLD AUTO: 7.3 K/UL (ref 3.6–11)
WBC # BLD AUTO: 7.6 K/UL (ref 3.6–11)
WBC # BLD AUTO: 7.6 K/UL (ref 3.6–11)
WBC # BLD AUTO: 8.9 K/UL (ref 3.6–11)
WBC # BLD AUTO: 8.9 K/UL (ref 3.6–11)
WBC # BLD AUTO: 9.4 K/UL (ref 3.6–11)
WBC URNS QL MICRO: ABNORMAL /HPF (ref 0–4)
WBC URNS QL MICRO: ABNORMAL /HPF (ref 0–4)
YEAST URNS QL MICRO: PRESENT

## 2022-01-01 PROCEDURE — 74011000250 HC RX REV CODE- 250: Performed by: NURSE PRACTITIONER

## 2022-01-01 PROCEDURE — 2709999900 HC NON-CHARGEABLE SUPPLY: Performed by: INTERNAL MEDICINE

## 2022-01-01 PROCEDURE — 82962 GLUCOSE BLOOD TEST: CPT

## 2022-01-01 PROCEDURE — 51798 US URINE CAPACITY MEASURE: CPT

## 2022-01-01 PROCEDURE — 80053 COMPREHEN METABOLIC PANEL: CPT

## 2022-01-01 PROCEDURE — 99152 MOD SED SAME PHYS/QHP 5/>YRS: CPT | Performed by: INTERNAL MEDICINE

## 2022-01-01 PROCEDURE — 74011250636 HC RX REV CODE- 250/636: Performed by: NURSE PRACTITIONER

## 2022-01-01 PROCEDURE — 74011250637 HC RX REV CODE- 250/637: Performed by: STUDENT IN AN ORGANIZED HEALTH CARE EDUCATION/TRAINING PROGRAM

## 2022-01-01 PROCEDURE — 97530 THERAPEUTIC ACTIVITIES: CPT

## 2022-01-01 PROCEDURE — 65270000046 HC RM TELEMETRY

## 2022-01-01 PROCEDURE — 71045 X-RAY EXAM CHEST 1 VIEW: CPT

## 2022-01-01 PROCEDURE — 74011250637 HC RX REV CODE- 250/637: Performed by: PSYCHIATRY & NEUROLOGY

## 2022-01-01 PROCEDURE — 74011250636 HC RX REV CODE- 250/636: Performed by: HOSPITALIST

## 2022-01-01 PROCEDURE — 65610000006 HC RM INTENSIVE CARE

## 2022-01-01 PROCEDURE — 36415 COLL VENOUS BLD VENIPUNCTURE: CPT

## 2022-01-01 PROCEDURE — 92610 EVALUATE SWALLOWING FUNCTION: CPT

## 2022-01-01 PROCEDURE — 87040 BLOOD CULTURE FOR BACTERIA: CPT

## 2022-01-01 PROCEDURE — 97164 PT RE-EVAL EST PLAN CARE: CPT

## 2022-01-01 PROCEDURE — 74011250637 HC RX REV CODE- 250/637: Performed by: NURSE PRACTITIONER

## 2022-01-01 PROCEDURE — 74011250636 HC RX REV CODE- 250/636: Performed by: INTERNAL MEDICINE

## 2022-01-01 PROCEDURE — 99233 SBSQ HOSP IP/OBS HIGH 50: CPT | Performed by: PSYCHIATRY & NEUROLOGY

## 2022-01-01 PROCEDURE — 74011000258 HC RX REV CODE- 258: Performed by: NURSE PRACTITIONER

## 2022-01-01 PROCEDURE — 85610 PROTHROMBIN TIME: CPT

## 2022-01-01 PROCEDURE — 74011636637 HC RX REV CODE- 636/637: Performed by: HOSPITALIST

## 2022-01-01 PROCEDURE — 99357 PR PROLONGED SVC I/P REQ UNIT/FLOOR TIME EA 30 MIN: CPT | Performed by: INTERNAL MEDICINE

## 2022-01-01 PROCEDURE — 74011250637 HC RX REV CODE- 250/637: Performed by: HOSPITALIST

## 2022-01-01 PROCEDURE — 83036 HEMOGLOBIN GLYCOSYLATED A1C: CPT

## 2022-01-01 PROCEDURE — 80202 ASSAY OF VANCOMYCIN: CPT

## 2022-01-01 PROCEDURE — 2709999900 HC NON-CHARGEABLE SUPPLY

## 2022-01-01 PROCEDURE — 70450 CT HEAD/BRAIN W/O DYE: CPT

## 2022-01-01 PROCEDURE — 84145 PROCALCITONIN (PCT): CPT

## 2022-01-01 PROCEDURE — 74011000636 HC RX REV CODE- 636: Performed by: INTERNAL MEDICINE

## 2022-01-01 PROCEDURE — 87205 SMEAR GRAM STAIN: CPT

## 2022-01-01 PROCEDURE — 97535 SELF CARE MNGMENT TRAINING: CPT

## 2022-01-01 PROCEDURE — P9047 ALBUMIN (HUMAN), 25%, 50ML: HCPCS | Performed by: STUDENT IN AN ORGANIZED HEALTH CARE EDUCATION/TRAINING PROGRAM

## 2022-01-01 PROCEDURE — C1769 GUIDE WIRE: HCPCS

## 2022-01-01 PROCEDURE — 85027 COMPLETE CBC AUTOMATED: CPT

## 2022-01-01 PROCEDURE — 74011250636 HC RX REV CODE- 250/636: Performed by: STUDENT IN AN ORGANIZED HEALTH CARE EDUCATION/TRAINING PROGRAM

## 2022-01-01 PROCEDURE — 97168 OT RE-EVAL EST PLAN CARE: CPT

## 2022-01-01 PROCEDURE — 76060000035 HC ANESTHESIA 2 TO 2.5 HR

## 2022-01-01 PROCEDURE — 97161 PT EVAL LOW COMPLEX 20 MIN: CPT

## 2022-01-01 PROCEDURE — 77030038269 HC DRN EXT URIN PURWCK BARD -A

## 2022-01-01 PROCEDURE — 74011000258 HC RX REV CODE- 258: Performed by: HOSPITALIST

## 2022-01-01 PROCEDURE — 92526 ORAL FUNCTION THERAPY: CPT

## 2022-01-01 PROCEDURE — 83605 ASSAY OF LACTIC ACID: CPT

## 2022-01-01 PROCEDURE — 74176 CT ABD & PELVIS W/O CONTRAST: CPT

## 2022-01-01 PROCEDURE — 77030041075 HC DRSG AG OPTIFRM MDII -B: Performed by: INTERNAL MEDICINE

## 2022-01-01 PROCEDURE — 36600 WITHDRAWAL OF ARTERIAL BLOOD: CPT

## 2022-01-01 PROCEDURE — 99233 SBSQ HOSP IP/OBS HIGH 50: CPT | Performed by: CLINICAL NURSE SPECIALIST

## 2022-01-01 PROCEDURE — 84484 ASSAY OF TROPONIN QUANT: CPT

## 2022-01-01 PROCEDURE — 99231 SBSQ HOSP IP/OBS SF/LOW 25: CPT | Performed by: CLINICAL NURSE SPECIALIST

## 2022-01-01 PROCEDURE — 99223 1ST HOSP IP/OBS HIGH 75: CPT | Performed by: INTERNAL MEDICINE

## 2022-01-01 PROCEDURE — 80048 BASIC METABOLIC PNL TOTAL CA: CPT

## 2022-01-01 PROCEDURE — 77030008684 HC TU ET CUF COVD -B: Performed by: ANESTHESIOLOGY

## 2022-01-01 PROCEDURE — 82272 OCCULT BLD FECES 1-3 TESTS: CPT

## 2022-01-01 PROCEDURE — 99232 SBSQ HOSP IP/OBS MODERATE 35: CPT | Performed by: INTERNAL MEDICINE

## 2022-01-01 PROCEDURE — 77030035304 HC CATH ANGI BENCHMARK PENU -G

## 2022-01-01 PROCEDURE — 85025 COMPLETE CBC W/AUTO DIFF WBC: CPT

## 2022-01-01 PROCEDURE — 0656 HSPC GENERAL INPATIENT

## 2022-01-01 PROCEDURE — 74011000250 HC RX REV CODE- 250: Performed by: ANESTHESIOLOGY

## 2022-01-01 PROCEDURE — APPSS60 APP SPLIT SHARED TIME 46-60 MINUTES: Performed by: NURSE PRACTITIONER

## 2022-01-01 PROCEDURE — 74011000636 HC RX REV CODE- 636: Performed by: RADIOLOGY

## 2022-01-01 PROCEDURE — 74011000258 HC RX REV CODE- 258: Performed by: STUDENT IN AN ORGANIZED HEALTH CARE EDUCATION/TRAINING PROGRAM

## 2022-01-01 PROCEDURE — 77030018547 HC SUT ETHBND1 J&J -B: Performed by: INTERNAL MEDICINE

## 2022-01-01 PROCEDURE — 94760 N-INVAS EAR/PLS OXIMETRY 1: CPT

## 2022-01-01 PROCEDURE — 84157 ASSAY OF PROTEIN OTHER: CPT

## 2022-01-01 PROCEDURE — 82803 BLOOD GASES ANY COMBINATION: CPT

## 2022-01-01 PROCEDURE — 97112 NEUROMUSCULAR REEDUCATION: CPT

## 2022-01-01 PROCEDURE — 74011000250 HC RX REV CODE- 250: Performed by: STUDENT IN AN ORGANIZED HEALTH CARE EDUCATION/TRAINING PROGRAM

## 2022-01-01 PROCEDURE — 77030018729 HC ELECTRD DEFIB PAD CARD -B: Performed by: INTERNAL MEDICINE

## 2022-01-01 PROCEDURE — 77030019905 HC CATH URETH INTMIT MDII -A

## 2022-01-01 PROCEDURE — 74011250636 HC RX REV CODE- 250/636: Performed by: ANESTHESIOLOGY

## 2022-01-01 PROCEDURE — 70496 CT ANGIOGRAPHY HEAD: CPT

## 2022-01-01 PROCEDURE — 74011636637 HC RX REV CODE- 636/637: Performed by: NURSE PRACTITIONER

## 2022-01-01 PROCEDURE — 74011250636 HC RX REV CODE- 250/636: Performed by: RADIOLOGY

## 2022-01-01 PROCEDURE — 77030040361 HC SLV COMPR DVT MDII -B

## 2022-01-01 PROCEDURE — 99221 1ST HOSP IP/OBS SF/LOW 40: CPT | Performed by: INTERNAL MEDICINE

## 2022-01-01 PROCEDURE — 74011000258 HC RX REV CODE- 258: Performed by: ANESTHESIOLOGY

## 2022-01-01 PROCEDURE — C1894 INTRO/SHEATH, NON-LASER: HCPCS

## 2022-01-01 PROCEDURE — 77030035236 HC SUT PDS STRATFX BARB J&J -B: Performed by: INTERNAL MEDICINE

## 2022-01-01 PROCEDURE — 99231 SBSQ HOSP IP/OBS SF/LOW 25: CPT | Performed by: PSYCHIATRY & NEUROLOGY

## 2022-01-01 PROCEDURE — 77030002934 HC SUT MCRYL J&J -B: Performed by: INTERNAL MEDICINE

## 2022-01-01 PROCEDURE — 99153 MOD SED SAME PHYS/QHP EA: CPT | Performed by: INTERNAL MEDICINE

## 2022-01-01 PROCEDURE — C1760 CLOSURE DEV, VASC: HCPCS

## 2022-01-01 PROCEDURE — 76700 US EXAM ABDOM COMPLETE: CPT

## 2022-01-01 PROCEDURE — 75810000275 HC EMERGENCY DEPT VISIT NO LEVEL OF CARE

## 2022-01-01 PROCEDURE — 88112 CYTOPATH CELL ENHANCE TECH: CPT

## 2022-01-01 PROCEDURE — 74011000250 HC RX REV CODE- 250: Performed by: HOSPITALIST

## 2022-01-01 PROCEDURE — 82570 ASSAY OF URINE CREATININE: CPT

## 2022-01-01 PROCEDURE — C9113 INJ PANTOPRAZOLE SODIUM, VIA: HCPCS | Performed by: NURSE PRACTITIONER

## 2022-01-01 PROCEDURE — 0JH63FZ INSERTION OF SUBCUTANEOUS DEFIBRILLATOR LEAD INTO CHEST SUBCUTANEOUS TISSUE AND FASCIA, PERCUTANEOUS APPROACH: ICD-10-PCS | Performed by: INTERNAL MEDICINE

## 2022-01-01 PROCEDURE — 97165 OT EVAL LOW COMPLEX 30 MIN: CPT

## 2022-01-01 PROCEDURE — 0W9B3ZZ DRAINAGE OF LEFT PLEURAL CAVITY, PERCUTANEOUS APPROACH: ICD-10-PCS | Performed by: RADIOLOGY

## 2022-01-01 PROCEDURE — 88305 TISSUE EXAM BY PATHOLOGIST: CPT

## 2022-01-01 PROCEDURE — 89050 BODY FLUID CELL COUNT: CPT

## 2022-01-01 PROCEDURE — 77030005513 HC CATH URETH FOL11 MDII -B

## 2022-01-01 PROCEDURE — 93005 ELECTROCARDIOGRAM TRACING: CPT

## 2022-01-01 PROCEDURE — 74011250636 HC RX REV CODE- 250/636: Performed by: EMERGENCY MEDICINE

## 2022-01-01 PROCEDURE — 92522 EVALUATE SPEECH PRODUCTION: CPT

## 2022-01-01 PROCEDURE — 85576 BLOOD PLATELET AGGREGATION: CPT

## 2022-01-01 PROCEDURE — 84443 ASSAY THYROID STIM HORMONE: CPT

## 2022-01-01 PROCEDURE — 65270000029 HC RM PRIVATE

## 2022-01-01 PROCEDURE — 84300 ASSAY OF URINE SODIUM: CPT

## 2022-01-01 PROCEDURE — 33249 INSJ/RPLCMT DEFIB W/LEAD(S): CPT | Performed by: INTERNAL MEDICINE

## 2022-01-01 PROCEDURE — 74011250637 HC RX REV CODE- 250/637: Performed by: ANESTHESIOLOGY

## 2022-01-01 PROCEDURE — C1887 CATHETER, GUIDING: HCPCS

## 2022-01-01 PROCEDURE — 74011250636 HC RX REV CODE- 250/636

## 2022-01-01 PROCEDURE — 76937 US GUIDE VASCULAR ACCESS: CPT | Performed by: INTERNAL MEDICINE

## 2022-01-01 PROCEDURE — 84100 ASSAY OF PHOSPHORUS: CPT

## 2022-01-01 PROCEDURE — 96374 THER/PROPH/DIAG INJ IV PUSH: CPT

## 2022-01-01 PROCEDURE — 85018 HEMOGLOBIN: CPT

## 2022-01-01 PROCEDURE — 74011000258 HC RX REV CODE- 258: Performed by: INTERNAL MEDICINE

## 2022-01-01 PROCEDURE — 36416 COLLJ CAPILLARY BLOOD SPEC: CPT

## 2022-01-01 PROCEDURE — 77030012468 HC VLV BLEEDBK CNTRL ABBT -B

## 2022-01-01 PROCEDURE — 99233 SBSQ HOSP IP/OBS HIGH 50: CPT | Performed by: INTERNAL MEDICINE

## 2022-01-01 PROCEDURE — 82945 GLUCOSE OTHER FLUID: CPT

## 2022-01-01 PROCEDURE — 87635 SARS-COV-2 COVID-19 AMP PRB: CPT

## 2022-01-01 PROCEDURE — 32555 ASPIRATE PLEURA W/ IMAGING: CPT

## 2022-01-01 PROCEDURE — 71275 CT ANGIOGRAPHY CHEST: CPT

## 2022-01-01 PROCEDURE — 61645 PERQ ART M-THROMBECT &/NFS: CPT

## 2022-01-01 PROCEDURE — 97116 GAIT TRAINING THERAPY: CPT

## 2022-01-01 PROCEDURE — 97110 THERAPEUTIC EXERCISES: CPT

## 2022-01-01 PROCEDURE — 74011636637 HC RX REV CODE- 636/637: Performed by: EMERGENCY MEDICINE

## 2022-01-01 PROCEDURE — 74018 RADEX ABDOMEN 1 VIEW: CPT

## 2022-01-01 PROCEDURE — 99285 EMERGENCY DEPT VISIT HI MDM: CPT

## 2022-01-01 PROCEDURE — 93306 TTE W/DOPPLER COMPLETE: CPT

## 2022-01-01 PROCEDURE — 74011000250 HC RX REV CODE- 250: Performed by: RADIOLOGY

## 2022-01-01 PROCEDURE — C1892 INTRO/SHEATH,FIXED,PEEL-AWAY: HCPCS | Performed by: INTERNAL MEDICINE

## 2022-01-01 PROCEDURE — 65620000000 HC RM CCU GENERAL

## 2022-01-01 PROCEDURE — 82947 ASSAY GLUCOSE BLOOD QUANT: CPT

## 2022-01-01 PROCEDURE — 77030026438 HC STYL ET INTUB CARD -A: Performed by: ANESTHESIOLOGY

## 2022-01-01 PROCEDURE — 77030008638 HC TU CONN COOK -A

## 2022-01-01 PROCEDURE — 87086 URINE CULTURE/COLONY COUNT: CPT

## 2022-01-01 PROCEDURE — 81001 URINALYSIS AUTO W/SCOPE: CPT

## 2022-01-01 PROCEDURE — 03CG3ZZ EXTIRPATION OF MATTER FROM INTRACRANIAL ARTERY, PERCUTANEOUS APPROACH: ICD-10-PCS | Performed by: RADIOLOGY

## 2022-01-01 PROCEDURE — C1722 AICD, SINGLE CHAMBER: HCPCS | Performed by: INTERNAL MEDICINE

## 2022-01-01 PROCEDURE — C1777 LEAD, AICD, ENDO SINGLE COIL: HCPCS | Performed by: INTERNAL MEDICINE

## 2022-01-01 PROCEDURE — 4A03X5D MEASUREMENT OF ARTERIAL FLOW, INTRACRANIAL, EXTERNAL APPROACH: ICD-10-PCS | Performed by: HOSPITALIST

## 2022-01-01 PROCEDURE — 77030008584 HC TOOL GDWRE DEV TERU -A

## 2022-01-01 PROCEDURE — 83735 ASSAY OF MAGNESIUM: CPT

## 2022-01-01 PROCEDURE — 83986 ASSAY PH BODY FLUID NOS: CPT

## 2022-01-01 PROCEDURE — 70551 MRI BRAIN STEM W/O DYE: CPT

## 2022-01-01 PROCEDURE — 74011000636 HC RX REV CODE- 636: Performed by: EMERGENCY MEDICINE

## 2022-01-01 PROCEDURE — C1893 INTRO/SHEATH, FIXED,NON-PEEL: HCPCS | Performed by: INTERNAL MEDICINE

## 2022-01-01 PROCEDURE — 80061 LIPID PANEL: CPT

## 2022-01-01 PROCEDURE — 96375 TX/PRO/DX INJ NEW DRUG ADDON: CPT

## 2022-01-01 PROCEDURE — 74011000250 HC RX REV CODE- 250: Performed by: INTERNAL MEDICINE

## 2022-01-01 DEVICE — LEAD 6935M62 QUATTRO SECURE S MRI US
Type: IMPLANTABLE DEVICE | Status: FUNCTIONAL
Brand: SPRINT QUATTRO SECURE S MRI™ SURESCAN™

## 2022-01-01 DEVICE — ICD-VR DVFB1D4 VISIA AF MRI US DF4
Type: IMPLANTABLE DEVICE | Status: FUNCTIONAL
Brand: VISIA AF MRI™ VR SURESCAN™

## 2022-01-01 RX ORDER — ONDANSETRON 2 MG/ML
4 INJECTION INTRAMUSCULAR; INTRAVENOUS AS NEEDED
Status: DISCONTINUED | OUTPATIENT
Start: 2022-01-01 | End: 2022-01-01 | Stop reason: SDUPTHER

## 2022-01-01 RX ORDER — OXYCODONE HYDROCHLORIDE 5 MG/1
5 TABLET ORAL ONCE
Status: COMPLETED | OUTPATIENT
Start: 2022-01-01 | End: 2022-01-01

## 2022-01-01 RX ORDER — PANTOPRAZOLE SODIUM 40 MG/1
40 TABLET, DELAYED RELEASE ORAL
Status: DISCONTINUED | OUTPATIENT
Start: 2022-01-01 | End: 2022-01-01 | Stop reason: HOSPADM

## 2022-01-01 RX ORDER — ALBUMIN HUMAN 250 G/1000ML
12.5 SOLUTION INTRAVENOUS ONCE
Status: COMPLETED | OUTPATIENT
Start: 2022-01-01 | End: 2022-01-01

## 2022-01-01 RX ORDER — MIDAZOLAM HYDROCHLORIDE 1 MG/ML
2 INJECTION, SOLUTION INTRAMUSCULAR; INTRAVENOUS
Status: DISCONTINUED | OUTPATIENT
Start: 2022-01-01 | End: 2022-11-24 | Stop reason: HOSPADM

## 2022-01-01 RX ORDER — ASPIRIN 325 MG
325 TABLET ORAL DAILY
Status: DISCONTINUED | OUTPATIENT
Start: 2022-01-01 | End: 2022-01-01

## 2022-01-01 RX ORDER — HYDROMORPHONE HYDROCHLORIDE 1 MG/ML
1 INJECTION, SOLUTION INTRAMUSCULAR; INTRAVENOUS; SUBCUTANEOUS
Status: DISCONTINUED | OUTPATIENT
Start: 2022-01-01 | End: 2022-01-01

## 2022-01-01 RX ORDER — DEXAMETHASONE SODIUM PHOSPHATE 4 MG/ML
INJECTION, SOLUTION INTRA-ARTICULAR; INTRALESIONAL; INTRAMUSCULAR; INTRAVENOUS; SOFT TISSUE AS NEEDED
Status: DISCONTINUED | OUTPATIENT
Start: 2022-01-01 | End: 2022-01-01 | Stop reason: HOSPADM

## 2022-01-01 RX ORDER — ONDANSETRON 2 MG/ML
INJECTION INTRAMUSCULAR; INTRAVENOUS
Status: COMPLETED
Start: 2022-01-01 | End: 2022-01-01

## 2022-01-01 RX ORDER — SODIUM CHLORIDE 0.9 % (FLUSH) 0.9 %
10 SYRINGE (ML) INJECTION EVERY 24 HOURS
Status: DISCONTINUED | OUTPATIENT
Start: 2022-01-01 | End: 2022-01-01

## 2022-01-01 RX ORDER — SODIUM CHLORIDE, SODIUM LACTATE, POTASSIUM CHLORIDE, CALCIUM CHLORIDE 600; 310; 30; 20 MG/100ML; MG/100ML; MG/100ML; MG/100ML
50 INJECTION, SOLUTION INTRAVENOUS CONTINUOUS
Status: DISCONTINUED | OUTPATIENT
Start: 2022-01-01 | End: 2022-01-01

## 2022-01-01 RX ORDER — HYDROMORPHONE HYDROCHLORIDE 2 MG/ML
2 INJECTION, SOLUTION INTRAMUSCULAR; INTRAVENOUS; SUBCUTANEOUS
Status: DISCONTINUED | OUTPATIENT
Start: 2022-01-01 | End: 2022-11-24 | Stop reason: HOSPADM

## 2022-01-01 RX ORDER — TRAMADOL HYDROCHLORIDE 50 MG/1
25 TABLET ORAL
Status: DISCONTINUED | OUTPATIENT
Start: 2022-01-01 | End: 2022-01-01

## 2022-01-01 RX ORDER — HYDRALAZINE HYDROCHLORIDE 20 MG/ML
10 INJECTION INTRAMUSCULAR; INTRAVENOUS
Status: DISCONTINUED | OUTPATIENT
Start: 2022-01-01 | End: 2022-01-01 | Stop reason: HOSPADM

## 2022-01-01 RX ORDER — INSULIN LISPRO 100 [IU]/ML
INJECTION, SOLUTION INTRAVENOUS; SUBCUTANEOUS EVERY 6 HOURS
Status: DISCONTINUED | OUTPATIENT
Start: 2022-01-01 | End: 2022-01-01

## 2022-01-01 RX ORDER — POLYETHYLENE GLYCOL 3350 17 G/17G
17 POWDER, FOR SOLUTION ORAL DAILY
Status: DISCONTINUED | OUTPATIENT
Start: 2022-01-01 | End: 2022-01-01

## 2022-01-01 RX ORDER — ROPIVACAINE HYDROCHLORIDE 5 MG/ML
30 INJECTION, SOLUTION EPIDURAL; INFILTRATION; PERINEURAL AS NEEDED
Status: DISCONTINUED | OUTPATIENT
Start: 2022-01-01 | End: 2022-01-01 | Stop reason: HOSPADM

## 2022-01-01 RX ORDER — DEXTROSE MONOHYDRATE AND SODIUM CHLORIDE 5; .45 G/100ML; G/100ML
50 INJECTION, SOLUTION INTRAVENOUS CONTINUOUS
Status: DISPENSED | OUTPATIENT
Start: 2022-01-01 | End: 2022-01-01

## 2022-01-01 RX ORDER — SODIUM CHLORIDE 0.9 % (FLUSH) 0.9 %
5 SYRINGE (ML) INJECTION AS NEEDED
Status: DISCONTINUED | OUTPATIENT
Start: 2022-01-01 | End: 2022-11-24 | Stop reason: HOSPADM

## 2022-01-01 RX ORDER — AMLODIPINE BESYLATE 5 MG/1
5 TABLET ORAL DAILY
Status: DISCONTINUED | OUTPATIENT
Start: 2022-01-01 | End: 2022-01-01

## 2022-01-01 RX ORDER — LORAZEPAM 2 MG/ML
1 CONCENTRATE ORAL
Status: DISCONTINUED | OUTPATIENT
Start: 2022-01-01 | End: 2022-01-01 | Stop reason: HOSPADM

## 2022-01-01 RX ORDER — MAGNESIUM SULFATE 100 %
4 CRYSTALS MISCELLANEOUS AS NEEDED
Status: CANCELLED | OUTPATIENT
Start: 2022-01-01

## 2022-01-01 RX ORDER — PHENYLEPHRINE HCL IN 0.9% NACL 0.4MG/10ML
SYRINGE (ML) INTRAVENOUS AS NEEDED
Status: DISCONTINUED | OUTPATIENT
Start: 2022-01-01 | End: 2022-01-01 | Stop reason: HOSPADM

## 2022-01-01 RX ORDER — LIDOCAINE 4 G/100G
2 PATCH TOPICAL EVERY 24 HOURS
Status: DISCONTINUED | OUTPATIENT
Start: 2022-01-01 | End: 2022-01-01 | Stop reason: HOSPADM

## 2022-01-01 RX ORDER — INSULIN LISPRO 100 [IU]/ML
3 INJECTION, SOLUTION INTRAVENOUS; SUBCUTANEOUS
Status: DISCONTINUED | OUTPATIENT
Start: 2022-01-01 | End: 2022-01-01

## 2022-01-01 RX ORDER — FLUCONAZOLE 100 MG/1
200 TABLET ORAL DAILY
Status: COMPLETED | OUTPATIENT
Start: 2022-01-01 | End: 2022-01-01

## 2022-01-01 RX ORDER — CLOPIDOGREL BISULFATE 75 MG/1
75 TABLET ORAL DAILY
Status: DISCONTINUED | OUTPATIENT
Start: 2022-01-01 | End: 2022-01-01

## 2022-01-01 RX ORDER — GLYCERIN ADULT
1 SUPPOSITORY, RECTAL RECTAL
Status: COMPLETED | OUTPATIENT
Start: 2022-01-01 | End: 2022-01-01

## 2022-01-01 RX ORDER — SODIUM CHLORIDE 0.9 % (FLUSH) 0.9 %
5-40 SYRINGE (ML) INJECTION AS NEEDED
Status: DISCONTINUED | OUTPATIENT
Start: 2022-01-01 | End: 2022-01-01 | Stop reason: HOSPADM

## 2022-01-01 RX ORDER — CARVEDILOL 12.5 MG/1
12.5 TABLET ORAL 2 TIMES DAILY WITH MEALS
Status: DISCONTINUED | OUTPATIENT
Start: 2022-01-01 | End: 2022-01-01 | Stop reason: HOSPADM

## 2022-01-01 RX ORDER — SODIUM CHLORIDE 0.9 % (FLUSH) 0.9 %
5-40 SYRINGE (ML) INJECTION EVERY 8 HOURS
Status: DISCONTINUED | OUTPATIENT
Start: 2022-01-01 | End: 2022-01-01 | Stop reason: HOSPADM

## 2022-01-01 RX ORDER — CARVEDILOL 3.12 MG/1
3.12 TABLET ORAL 2 TIMES DAILY WITH MEALS
Status: DISCONTINUED | OUTPATIENT
Start: 2022-01-01 | End: 2022-01-01

## 2022-01-01 RX ORDER — NALOXONE HYDROCHLORIDE 0.4 MG/ML
INJECTION, SOLUTION INTRAMUSCULAR; INTRAVENOUS; SUBCUTANEOUS
Status: COMPLETED
Start: 2022-01-01 | End: 2022-01-01

## 2022-01-01 RX ORDER — HYDROMORPHONE HYDROCHLORIDE 1 MG/ML
0.5 INJECTION, SOLUTION INTRAMUSCULAR; INTRAVENOUS; SUBCUTANEOUS
Status: DISCONTINUED | OUTPATIENT
Start: 2022-01-01 | End: 2022-01-01

## 2022-01-01 RX ORDER — AMOXICILLIN 250 MG
1 CAPSULE ORAL DAILY
Status: DISCONTINUED | OUTPATIENT
Start: 2022-01-01 | End: 2022-01-01 | Stop reason: HOSPADM

## 2022-01-01 RX ORDER — ONDANSETRON 2 MG/ML
4 INJECTION INTRAMUSCULAR; INTRAVENOUS
Status: COMPLETED | OUTPATIENT
Start: 2022-01-01 | End: 2022-01-01

## 2022-01-01 RX ORDER — ACETAMINOPHEN 325 MG/1
650 TABLET ORAL ONCE
Status: DISCONTINUED | OUTPATIENT
Start: 2022-01-01 | End: 2022-01-01

## 2022-01-01 RX ORDER — LIDOCAINE HYDROCHLORIDE AND EPINEPHRINE 10; 10 MG/ML; UG/ML
INJECTION, SOLUTION INFILTRATION; PERINEURAL AS NEEDED
Status: DISCONTINUED | OUTPATIENT
Start: 2022-01-01 | End: 2022-01-01 | Stop reason: HOSPADM

## 2022-01-01 RX ORDER — GLYCOPYRROLATE 0.2 MG/ML
INJECTION INTRAMUSCULAR; INTRAVENOUS AS NEEDED
Status: DISCONTINUED | OUTPATIENT
Start: 2022-01-01 | End: 2022-01-01 | Stop reason: HOSPADM

## 2022-01-01 RX ORDER — KETOROLAC TROMETHAMINE 30 MG/ML
15 INJECTION, SOLUTION INTRAMUSCULAR; INTRAVENOUS
Status: DISCONTINUED | OUTPATIENT
Start: 2022-01-01 | End: 2022-11-24 | Stop reason: HOSPADM

## 2022-01-01 RX ORDER — NOREPINEPHRINE BITARTRATE/D5W 8 MG/250ML
.5-2 PLASTIC BAG, INJECTION (ML) INTRAVENOUS
Status: DISPENSED | OUTPATIENT
Start: 2022-01-01 | End: 2022-01-01

## 2022-01-01 RX ORDER — CARVEDILOL 6.25 MG/1
6.25 TABLET ORAL 2 TIMES DAILY WITH MEALS
Status: DISCONTINUED | OUTPATIENT
Start: 2022-01-01 | End: 2022-01-01

## 2022-01-01 RX ORDER — DIPHENHYDRAMINE HYDROCHLORIDE 50 MG/ML
12.5 INJECTION, SOLUTION INTRAMUSCULAR; INTRAVENOUS AS NEEDED
Status: ACTIVE | OUTPATIENT
Start: 2022-01-01 | End: 2022-01-01

## 2022-01-01 RX ORDER — LIDOCAINE HYDROCHLORIDE 20 MG/ML
20 INJECTION, SOLUTION INFILTRATION; PERINEURAL
Status: COMPLETED | OUTPATIENT
Start: 2022-01-01 | End: 2022-01-01

## 2022-01-01 RX ORDER — HYDROMORPHONE HYDROCHLORIDE 1 MG/ML
0.5 INJECTION, SOLUTION INTRAMUSCULAR; INTRAVENOUS; SUBCUTANEOUS EVERY 4 HOURS
Status: DISCONTINUED | OUTPATIENT
Start: 2022-01-01 | End: 2022-01-01

## 2022-01-01 RX ORDER — CARVEDILOL 12.5 MG/1
12.5 TABLET ORAL 2 TIMES DAILY WITH MEALS
Status: DISCONTINUED | OUTPATIENT
Start: 2022-01-01 | End: 2022-01-01

## 2022-01-01 RX ORDER — LIDOCAINE HYDROCHLORIDE 20 MG/ML
INJECTION, SOLUTION EPIDURAL; INFILTRATION; INTRACAUDAL; PERINEURAL AS NEEDED
Status: DISCONTINUED | OUTPATIENT
Start: 2022-01-01 | End: 2022-01-01 | Stop reason: HOSPADM

## 2022-01-01 RX ORDER — ATORVASTATIN CALCIUM 40 MG/1
80 TABLET, FILM COATED ORAL DAILY
Status: DISCONTINUED | OUTPATIENT
Start: 2022-01-01 | End: 2022-01-01

## 2022-01-01 RX ORDER — INSULIN LISPRO 100 [IU]/ML
INJECTION, SOLUTION INTRAVENOUS; SUBCUTANEOUS
Status: DISCONTINUED | OUTPATIENT
Start: 2022-01-01 | End: 2022-01-01

## 2022-01-01 RX ORDER — SODIUM CHLORIDE, SODIUM LACTATE, POTASSIUM CHLORIDE, CALCIUM CHLORIDE 600; 310; 30; 20 MG/100ML; MG/100ML; MG/100ML; MG/100ML
125 INJECTION, SOLUTION INTRAVENOUS CONTINUOUS
Status: DISCONTINUED | OUTPATIENT
Start: 2022-01-01 | End: 2022-01-01

## 2022-01-01 RX ORDER — DIAZEPAM 10 MG/2ML
2.5 INJECTION INTRAMUSCULAR EVERY 6 HOURS
Status: DISCONTINUED | OUTPATIENT
Start: 2022-01-01 | End: 2022-01-01

## 2022-01-01 RX ORDER — NALOXONE HYDROCHLORIDE 0.4 MG/ML
0.2 INJECTION, SOLUTION INTRAMUSCULAR; INTRAVENOUS; SUBCUTANEOUS ONCE
Status: ACTIVE | OUTPATIENT
Start: 2022-01-01 | End: 2022-01-01

## 2022-01-01 RX ORDER — PANTOPRAZOLE SODIUM 40 MG/10ML
40 INJECTION, POWDER, LYOPHILIZED, FOR SOLUTION INTRAVENOUS EVERY 24 HOURS
Status: DISCONTINUED | OUTPATIENT
Start: 2022-01-01 | End: 2022-01-01

## 2022-01-01 RX ORDER — ALBUMIN HUMAN 250 G/1000ML
SOLUTION INTRAVENOUS
Status: DISCONTINUED
Start: 2022-01-01 | End: 2022-01-01

## 2022-01-01 RX ORDER — ATORVASTATIN CALCIUM 10 MG/1
10 TABLET, FILM COATED ORAL DAILY
Status: DISCONTINUED | OUTPATIENT
Start: 2022-01-01 | End: 2022-01-01

## 2022-01-01 RX ORDER — MIDAZOLAM HYDROCHLORIDE 1 MG/ML
1 INJECTION, SOLUTION INTRAMUSCULAR; INTRAVENOUS AS NEEDED
Status: DISCONTINUED | OUTPATIENT
Start: 2022-01-01 | End: 2022-01-01 | Stop reason: HOSPADM

## 2022-01-01 RX ORDER — DEXTROSE, SODIUM CHLORIDE, SODIUM LACTATE, POTASSIUM CHLORIDE, AND CALCIUM CHLORIDE 5; .6; .31; .03; .02 G/100ML; G/100ML; G/100ML; G/100ML; G/100ML
125 INJECTION, SOLUTION INTRAVENOUS CONTINUOUS
Status: DISCONTINUED | OUTPATIENT
Start: 2022-01-01 | End: 2022-01-01

## 2022-01-01 RX ORDER — POLYETHYLENE GLYCOL 3350 17 G/17G
17 POWDER, FOR SOLUTION ORAL 2 TIMES DAILY
Status: DISCONTINUED | OUTPATIENT
Start: 2022-01-01 | End: 2022-01-01 | Stop reason: HOSPADM

## 2022-01-01 RX ORDER — PANTOPRAZOLE SODIUM 40 MG/1
40 TABLET, DELAYED RELEASE ORAL
Status: DISCONTINUED | OUTPATIENT
Start: 2022-01-01 | End: 2022-01-01 | Stop reason: CLARIF

## 2022-01-01 RX ORDER — DIAZEPAM 10 MG/2ML
5 INJECTION INTRAMUSCULAR EVERY 6 HOURS
Status: DISCONTINUED | OUTPATIENT
Start: 2022-01-01 | End: 2022-11-24 | Stop reason: HOSPADM

## 2022-01-01 RX ORDER — GLYCOPYRROLATE 0.2 MG/ML
0.2 INJECTION INTRAMUSCULAR; INTRAVENOUS
Status: DISCONTINUED | OUTPATIENT
Start: 2022-01-01 | End: 2022-01-01 | Stop reason: HOSPADM

## 2022-01-01 RX ORDER — FACIAL-BODY WIPES
10 EACH TOPICAL DAILY PRN
Status: DISCONTINUED | OUTPATIENT
Start: 2022-01-01 | End: 2022-11-24 | Stop reason: HOSPADM

## 2022-01-01 RX ORDER — NALOXONE HYDROCHLORIDE 1 MG/ML
2 INJECTION INTRAMUSCULAR; INTRAVENOUS; SUBCUTANEOUS
Status: DISCONTINUED | OUTPATIENT
Start: 2022-01-01 | End: 2022-01-01 | Stop reason: HOSPADM

## 2022-01-01 RX ORDER — MIDAZOLAM HYDROCHLORIDE 1 MG/ML
INJECTION, SOLUTION INTRAMUSCULAR; INTRAVENOUS AS NEEDED
Status: DISCONTINUED | OUTPATIENT
Start: 2022-01-01 | End: 2022-01-01 | Stop reason: HOSPADM

## 2022-01-01 RX ORDER — DIPHENHYDRAMINE HYDROCHLORIDE 50 MG/ML
12.5 INJECTION, SOLUTION INTRAMUSCULAR; INTRAVENOUS
Status: COMPLETED | OUTPATIENT
Start: 2022-01-01 | End: 2022-01-01

## 2022-01-01 RX ORDER — ACETAMINOPHEN 650 MG/1
650 SUPPOSITORY RECTAL
Status: DISCONTINUED | OUTPATIENT
Start: 2022-01-01 | End: 2022-01-01 | Stop reason: HOSPADM

## 2022-01-01 RX ORDER — FENTANYL CITRATE 50 UG/ML
25 INJECTION, SOLUTION INTRAMUSCULAR; INTRAVENOUS
Status: DISCONTINUED | OUTPATIENT
Start: 2022-01-01 | End: 2022-01-01 | Stop reason: HOSPADM

## 2022-01-01 RX ORDER — LIDOCAINE HYDROCHLORIDE 10 MG/ML
0.5 INJECTION, SOLUTION EPIDURAL; INFILTRATION; INTRACAUDAL; PERINEURAL AS NEEDED
Status: DISCONTINUED | OUTPATIENT
Start: 2022-01-01 | End: 2022-01-01 | Stop reason: HOSPADM

## 2022-01-01 RX ORDER — SODIUM CHLORIDE 9 MG/ML
INJECTION, SOLUTION INTRAVENOUS
Status: COMPLETED | OUTPATIENT
Start: 2022-01-01 | End: 2022-01-01

## 2022-01-01 RX ORDER — INSULIN LISPRO 100 [IU]/ML
15 INJECTION, SOLUTION INTRAVENOUS; SUBCUTANEOUS ONCE
Status: COMPLETED | OUTPATIENT
Start: 2022-01-01 | End: 2022-01-01

## 2022-01-01 RX ORDER — SUCCINYLCHOLINE CHLORIDE 20 MG/ML
INJECTION INTRAMUSCULAR; INTRAVENOUS AS NEEDED
Status: DISCONTINUED | OUTPATIENT
Start: 2022-01-01 | End: 2022-01-01 | Stop reason: HOSPADM

## 2022-01-01 RX ORDER — GUAIFENESIN 100 MG/5ML
81 LIQUID (ML) ORAL DAILY
Status: DISCONTINUED | OUTPATIENT
Start: 2022-01-01 | End: 2022-01-01

## 2022-01-01 RX ORDER — MAGNESIUM SULFATE 100 %
4 CRYSTALS MISCELLANEOUS AS NEEDED
Status: DISCONTINUED | OUTPATIENT
Start: 2022-01-01 | End: 2022-01-01

## 2022-01-01 RX ORDER — HYDRALAZINE HYDROCHLORIDE 20 MG/ML
20 INJECTION INTRAMUSCULAR; INTRAVENOUS
Status: DISCONTINUED | OUTPATIENT
Start: 2022-01-01 | End: 2022-01-01

## 2022-01-01 RX ORDER — LIDOCAINE HYDROCHLORIDE 10 MG/ML
10 INJECTION INFILTRATION; PERINEURAL
Status: COMPLETED | OUTPATIENT
Start: 2022-01-01 | End: 2022-01-01

## 2022-01-01 RX ORDER — SODIUM CHLORIDE, SODIUM LACTATE, POTASSIUM CHLORIDE, CALCIUM CHLORIDE 600; 310; 30; 20 MG/100ML; MG/100ML; MG/100ML; MG/100ML
75 INJECTION, SOLUTION INTRAVENOUS CONTINUOUS
Status: DISPENSED | OUTPATIENT
Start: 2022-01-01 | End: 2022-01-01

## 2022-01-01 RX ORDER — MAGNESIUM SULFATE HEPTAHYDRATE 40 MG/ML
2 INJECTION, SOLUTION INTRAVENOUS ONCE
Status: COMPLETED | OUTPATIENT
Start: 2022-01-01 | End: 2022-01-01

## 2022-01-01 RX ORDER — HYDROCODONE BITARTRATE AND ACETAMINOPHEN 5; 325 MG/1; MG/1
1 TABLET ORAL
Status: DISCONTINUED | OUTPATIENT
Start: 2022-01-01 | End: 2022-01-01 | Stop reason: HOSPADM

## 2022-01-01 RX ORDER — GLYCOPYRROLATE 0.2 MG/ML
0.2 INJECTION INTRAMUSCULAR; INTRAVENOUS
Status: DISCONTINUED | OUTPATIENT
Start: 2022-01-01 | End: 2022-11-24 | Stop reason: HOSPADM

## 2022-01-01 RX ORDER — VANCOMYCIN/0.9 % SOD CHLORIDE 1.5G/250ML
1500 PLASTIC BAG, INJECTION (ML) INTRAVENOUS ONCE
Status: COMPLETED | OUTPATIENT
Start: 2022-01-01 | End: 2022-01-01

## 2022-01-01 RX ORDER — GUAIFENESIN 100 MG/5ML
81 LIQUID (ML) ORAL DAILY
Status: DISCONTINUED | OUTPATIENT
Start: 2022-01-01 | End: 2022-01-01 | Stop reason: HOSPADM

## 2022-01-01 RX ORDER — LANOLIN ALCOHOL/MO/W.PET/CERES
1000 CREAM (GRAM) TOPICAL DAILY
Status: DISCONTINUED | OUTPATIENT
Start: 2022-01-01 | End: 2022-01-01 | Stop reason: HOSPADM

## 2022-01-01 RX ORDER — SODIUM CHLORIDE, SODIUM LACTATE, POTASSIUM CHLORIDE, CALCIUM CHLORIDE 600; 310; 30; 20 MG/100ML; MG/100ML; MG/100ML; MG/100ML
50 INJECTION, SOLUTION INTRAVENOUS CONTINUOUS
Status: DISPENSED | OUTPATIENT
Start: 2022-01-01 | End: 2022-01-01

## 2022-01-01 RX ORDER — FOLIC ACID 1 MG/1
1 TABLET ORAL DAILY
Status: DISCONTINUED | OUTPATIENT
Start: 2022-01-01 | End: 2022-01-01 | Stop reason: HOSPADM

## 2022-01-01 RX ORDER — DIAZEPAM 10 MG/2ML
2.5 INJECTION INTRAMUSCULAR
Status: DISCONTINUED | OUTPATIENT
Start: 2022-01-01 | End: 2022-01-01

## 2022-01-01 RX ORDER — ATORVASTATIN CALCIUM 10 MG/1
10 TABLET, FILM COATED ORAL
Status: DISCONTINUED | OUTPATIENT
Start: 2022-01-01 | End: 2022-01-01

## 2022-01-01 RX ORDER — ACETAMINOPHEN 325 MG/1
650 TABLET ORAL
Status: DISCONTINUED | OUTPATIENT
Start: 2022-01-01 | End: 2022-01-01 | Stop reason: HOSPADM

## 2022-01-01 RX ORDER — HYDROMORPHONE HYDROCHLORIDE 1 MG/ML
0.5 INJECTION, SOLUTION INTRAMUSCULAR; INTRAVENOUS; SUBCUTANEOUS
Status: DISCONTINUED | OUTPATIENT
Start: 2022-01-01 | End: 2022-01-01 | Stop reason: HOSPADM

## 2022-01-01 RX ORDER — OXYCODONE HYDROCHLORIDE 5 MG/1
5 TABLET ORAL AS NEEDED
Status: DISCONTINUED | OUTPATIENT
Start: 2022-01-01 | End: 2022-01-01 | Stop reason: HOSPADM

## 2022-01-01 RX ORDER — INSULIN LISPRO 100 [IU]/ML
INJECTION, SOLUTION INTRAVENOUS; SUBCUTANEOUS
Status: CANCELLED | OUTPATIENT
Start: 2022-01-01

## 2022-01-01 RX ORDER — SODIUM CHLORIDE 9 MG/ML
INJECTION, SOLUTION INTRAVENOUS
Status: DISCONTINUED | OUTPATIENT
Start: 2022-01-01 | End: 2022-01-01 | Stop reason: HOSPADM

## 2022-01-01 RX ORDER — MORPHINE SULFATE 2 MG/ML
2 INJECTION, SOLUTION INTRAMUSCULAR; INTRAVENOUS
Status: DISCONTINUED | OUTPATIENT
Start: 2022-01-01 | End: 2022-01-01 | Stop reason: HOSPADM

## 2022-01-01 RX ORDER — FENTANYL CITRATE 50 UG/ML
INJECTION, SOLUTION INTRAMUSCULAR; INTRAVENOUS AS NEEDED
Status: DISCONTINUED | OUTPATIENT
Start: 2022-01-01 | End: 2022-01-01 | Stop reason: HOSPADM

## 2022-01-01 RX ORDER — FENTANYL CITRATE 50 UG/ML
50 INJECTION, SOLUTION INTRAMUSCULAR; INTRAVENOUS AS NEEDED
Status: DISCONTINUED | OUTPATIENT
Start: 2022-01-01 | End: 2022-01-01 | Stop reason: HOSPADM

## 2022-01-01 RX ORDER — SODIUM CHLORIDE 9 MG/ML
100 INJECTION, SOLUTION INTRAVENOUS CONTINUOUS
Status: DISCONTINUED | OUTPATIENT
Start: 2022-01-01 | End: 2022-01-01

## 2022-01-01 RX ORDER — MIDAZOLAM HYDROCHLORIDE 1 MG/ML
1 INJECTION, SOLUTION INTRAMUSCULAR; INTRAVENOUS
Status: DISCONTINUED | OUTPATIENT
Start: 2022-01-01 | End: 2022-01-01 | Stop reason: HOSPADM

## 2022-01-01 RX ORDER — HEPARIN SODIUM 1000 [USP'U]/ML
INJECTION, SOLUTION INTRAVENOUS; SUBCUTANEOUS
Status: DISPENSED
Start: 2022-01-01 | End: 2022-01-01

## 2022-01-01 RX ORDER — TRAMADOL HYDROCHLORIDE 50 MG/1
50 TABLET ORAL
Status: DISCONTINUED | OUTPATIENT
Start: 2022-01-01 | End: 2022-01-01 | Stop reason: HOSPADM

## 2022-01-01 RX ORDER — ONDANSETRON 2 MG/ML
INJECTION INTRAMUSCULAR; INTRAVENOUS AS NEEDED
Status: DISCONTINUED | OUTPATIENT
Start: 2022-01-01 | End: 2022-01-01 | Stop reason: HOSPADM

## 2022-01-01 RX ORDER — ONDANSETRON 2 MG/ML
4 INJECTION INTRAMUSCULAR; INTRAVENOUS
Status: DISCONTINUED | OUTPATIENT
Start: 2022-01-01 | End: 2022-01-01 | Stop reason: HOSPADM

## 2022-01-01 RX ORDER — MAGNESIUM SULFATE 100 %
4 CRYSTALS MISCELLANEOUS AS NEEDED
Status: DISCONTINUED | OUTPATIENT
Start: 2022-01-01 | End: 2022-01-01 | Stop reason: SDUPTHER

## 2022-01-01 RX ORDER — ROCURONIUM BROMIDE 10 MG/ML
INJECTION, SOLUTION INTRAVENOUS AS NEEDED
Status: DISCONTINUED | OUTPATIENT
Start: 2022-01-01 | End: 2022-01-01 | Stop reason: HOSPADM

## 2022-01-01 RX ORDER — PROPOFOL 10 MG/ML
INJECTION, EMULSION INTRAVENOUS AS NEEDED
Status: DISCONTINUED | OUTPATIENT
Start: 2022-01-01 | End: 2022-01-01 | Stop reason: HOSPADM

## 2022-01-01 RX ORDER — ONDANSETRON 2 MG/ML
4 INJECTION INTRAMUSCULAR; INTRAVENOUS
Status: DISCONTINUED | OUTPATIENT
Start: 2022-01-01 | End: 2022-11-24 | Stop reason: HOSPADM

## 2022-01-01 RX ADMIN — DIAZEPAM 5 MG: 5 INJECTION, SOLUTION INTRAMUSCULAR; INTRAVENOUS at 19:59

## 2022-01-01 RX ADMIN — ONDANSETRON HYDROCHLORIDE 4 MG: 2 SOLUTION INTRAMUSCULAR; INTRAVENOUS at 14:37

## 2022-01-01 RX ADMIN — ASPIRIN 81 MG 81 MG: 81 TABLET ORAL at 09:39

## 2022-01-01 RX ADMIN — Medication 2 UNITS: at 12:19

## 2022-01-01 RX ADMIN — SODIUM CHLORIDE, PRESERVATIVE FREE 10 ML: 5 INJECTION INTRAVENOUS at 21:28

## 2022-01-01 RX ADMIN — PANTOPRAZOLE SODIUM 40 MG: 40 TABLET, DELAYED RELEASE ORAL at 07:35

## 2022-01-01 RX ADMIN — ACETAMINOPHEN 650 MG: 325 TABLET ORAL at 21:42

## 2022-01-01 RX ADMIN — VANCOMYCIN HYDROCHLORIDE 500 MG: 500 INJECTION, POWDER, LYOPHILIZED, FOR SOLUTION INTRAVENOUS at 09:00

## 2022-01-01 RX ADMIN — PANTOPRAZOLE SODIUM 40 MG: 40 TABLET, DELAYED RELEASE ORAL at 06:57

## 2022-01-01 RX ADMIN — DEXTROSE AND SODIUM CHLORIDE 50 ML/HR: 5; 450 INJECTION, SOLUTION INTRAVENOUS at 04:43

## 2022-01-01 RX ADMIN — Medication 10 UNITS: at 12:37

## 2022-01-01 RX ADMIN — CEFEPIME 2 G: 2 INJECTION, POWDER, FOR SOLUTION INTRAVENOUS at 18:09

## 2022-01-01 RX ADMIN — SODIUM CHLORIDE, PRESERVATIVE FREE 10 ML: 5 INJECTION INTRAVENOUS at 06:00

## 2022-01-01 RX ADMIN — PIPERACILLIN AND TAZOBACTAM 3.38 G: 3; .375 INJECTION, POWDER, FOR SOLUTION INTRAVENOUS at 22:39

## 2022-01-01 RX ADMIN — Medication 10 UNITS: at 20:51

## 2022-01-01 RX ADMIN — SODIUM CHLORIDE, PRESERVATIVE FREE 10 ML: 5 INJECTION INTRAVENOUS at 22:39

## 2022-01-01 RX ADMIN — PIPERACILLIN AND TAZOBACTAM 3.38 G: 3; .375 INJECTION, POWDER, FOR SOLUTION INTRAVENOUS at 19:20

## 2022-01-01 RX ADMIN — ONDANSETRON HYDROCHLORIDE 4 MG: 2 INJECTION, SOLUTION INTRAMUSCULAR; INTRAVENOUS at 15:07

## 2022-01-01 RX ADMIN — SODIUM CHLORIDE, POTASSIUM CHLORIDE, SODIUM LACTATE AND CALCIUM CHLORIDE 500 ML: 600; 310; 30; 20 INJECTION, SOLUTION INTRAVENOUS at 01:31

## 2022-01-01 RX ADMIN — HYDROCODONE BITARTRATE AND ACETAMINOPHEN 1 TABLET: 5; 325 TABLET ORAL at 09:06

## 2022-01-01 RX ADMIN — SODIUM CHLORIDE, PRESERVATIVE FREE 10 ML: 5 INJECTION INTRAVENOUS at 07:32

## 2022-01-01 RX ADMIN — ONDANSETRON 4 MG: 2 INJECTION INTRAMUSCULAR; INTRAVENOUS at 04:31

## 2022-01-01 RX ADMIN — SODIUM CHLORIDE, PRESERVATIVE FREE 10 ML: 5 INJECTION INTRAVENOUS at 13:37

## 2022-01-01 RX ADMIN — Medication 7 UNITS: at 08:28

## 2022-01-01 RX ADMIN — OXYCODONE 5 MG: 5 TABLET ORAL at 09:48

## 2022-01-01 RX ADMIN — POLYETHYLENE GLYCOL 3350 17 G: 17 POWDER, FOR SOLUTION ORAL at 08:28

## 2022-01-01 RX ADMIN — ATORVASTATIN CALCIUM 10 MG: 20 TABLET, FILM COATED ORAL at 21:36

## 2022-01-01 RX ADMIN — Medication 3 UNITS: at 12:17

## 2022-01-01 RX ADMIN — DEXTROSE 5 MG/HR: 5 SOLUTION INTRAVENOUS at 15:44

## 2022-01-01 RX ADMIN — Medication 8 UNITS: at 08:27

## 2022-01-01 RX ADMIN — CARVEDILOL 6.25 MG: 6.25 TABLET, FILM COATED ORAL at 17:33

## 2022-01-01 RX ADMIN — HYDROMORPHONE HYDROCHLORIDE 2 MG: 2 INJECTION INTRAMUSCULAR; INTRAVENOUS; SUBCUTANEOUS at 16:00

## 2022-01-01 RX ADMIN — IRON SUCROSE 200 MG: 20 INJECTION, SOLUTION INTRAVENOUS at 18:35

## 2022-01-01 RX ADMIN — SODIUM CHLORIDE, PRESERVATIVE FREE 10 ML: 5 INJECTION INTRAVENOUS at 02:57

## 2022-01-01 RX ADMIN — SODIUM CHLORIDE, PRESERVATIVE FREE 10 ML: 5 INJECTION INTRAVENOUS at 14:36

## 2022-01-01 RX ADMIN — SENNOSIDES AND DOCUSATE SODIUM 1 TABLET: 50; 8.6 TABLET ORAL at 08:46

## 2022-01-01 RX ADMIN — Medication 7 UNITS: at 22:35

## 2022-01-01 RX ADMIN — NOREPINEPHRINE BITARTRATE 2 MCG/MIN: 1 INJECTION, SOLUTION, CONCENTRATE INTRAVENOUS at 06:53

## 2022-01-01 RX ADMIN — SODIUM CHLORIDE, POTASSIUM CHLORIDE, SODIUM LACTATE AND CALCIUM CHLORIDE 100 ML/HR: 600; 310; 30; 20 INJECTION, SOLUTION INTRAVENOUS at 16:58

## 2022-01-01 RX ADMIN — HYDROCODONE BITARTRATE AND ACETAMINOPHEN 1 TABLET: 5; 325 TABLET ORAL at 21:18

## 2022-01-01 RX ADMIN — EPOETIN ALFA-EPBX 14000 UNITS: 10000 INJECTION, SOLUTION INTRAVENOUS; SUBCUTANEOUS at 21:28

## 2022-01-01 RX ADMIN — CEFEPIME 2 G: 2 INJECTION, POWDER, FOR SOLUTION INTRAVENOUS at 06:49

## 2022-01-01 RX ADMIN — HYDROCODONE BITARTRATE AND ACETAMINOPHEN 1 TABLET: 5; 325 TABLET ORAL at 15:07

## 2022-01-01 RX ADMIN — SODIUM CHLORIDE, PRESERVATIVE FREE 10 ML: 5 INJECTION INTRAVENOUS at 03:42

## 2022-01-01 RX ADMIN — SENNOSIDES AND DOCUSATE SODIUM 1 TABLET: 50; 8.6 TABLET ORAL at 09:55

## 2022-01-01 RX ADMIN — HYDROMORPHONE HYDROCHLORIDE 0.5 MG: 1 INJECTION, SOLUTION INTRAMUSCULAR; INTRAVENOUS; SUBCUTANEOUS at 05:30

## 2022-01-01 RX ADMIN — HYDROCODONE BITARTRATE AND ACETAMINOPHEN 1 TABLET: 5; 325 TABLET ORAL at 08:50

## 2022-01-01 RX ADMIN — OXYCODONE 5 MG: 5 TABLET ORAL at 21:53

## 2022-01-01 RX ADMIN — SODIUM CHLORIDE, PRESERVATIVE FREE 10 ML: 5 INJECTION INTRAVENOUS at 21:37

## 2022-01-01 RX ADMIN — TRAMADOL HYDROCHLORIDE 50 MG: 50 TABLET, COATED ORAL at 03:16

## 2022-01-01 RX ADMIN — DIAZEPAM 5 MG: 5 INJECTION, SOLUTION INTRAMUSCULAR; INTRAVENOUS at 12:21

## 2022-01-01 RX ADMIN — CLOPIDOGREL BISULFATE 75 MG: 75 TABLET ORAL at 08:46

## 2022-01-01 RX ADMIN — SENNOSIDES AND DOCUSATE SODIUM 1 TABLET: 50; 8.6 TABLET ORAL at 11:19

## 2022-01-01 RX ADMIN — PANTOPRAZOLE SODIUM 40 MG: 40 TABLET, DELAYED RELEASE ORAL at 06:41

## 2022-01-01 RX ADMIN — LIDOCAINE HYDROCHLORIDE 4 ML: 20 INJECTION, SOLUTION INFILTRATION; PERINEURAL at 15:40

## 2022-01-01 RX ADMIN — Medication 7 UNITS: at 12:21

## 2022-01-01 RX ADMIN — TRAMADOL HYDROCHLORIDE 50 MG: 50 TABLET, COATED ORAL at 13:35

## 2022-01-01 RX ADMIN — SODIUM CHLORIDE, PRESERVATIVE FREE 10 ML: 5 INJECTION INTRAVENOUS at 13:30

## 2022-01-01 RX ADMIN — OXYCODONE 5 MG: 5 TABLET ORAL at 04:55

## 2022-01-01 RX ADMIN — SODIUM CHLORIDE, POTASSIUM CHLORIDE, SODIUM LACTATE AND CALCIUM CHLORIDE 250 ML: 600; 310; 30; 20 INJECTION, SOLUTION INTRAVENOUS at 18:45

## 2022-01-01 RX ADMIN — SODIUM CHLORIDE, PRESERVATIVE FREE 10 ML: 5 INJECTION INTRAVENOUS at 16:14

## 2022-01-01 RX ADMIN — HYDROCODONE BITARTRATE AND ACETAMINOPHEN 1 TABLET: 5; 325 TABLET ORAL at 12:19

## 2022-01-01 RX ADMIN — ASPIRIN 81 MG 81 MG: 81 TABLET ORAL at 08:27

## 2022-01-01 RX ADMIN — Medication 8 UNITS: at 09:06

## 2022-01-01 RX ADMIN — SODIUM CHLORIDE, PRESERVATIVE FREE 10 ML: 5 INJECTION INTRAVENOUS at 05:50

## 2022-01-01 RX ADMIN — SENNOSIDES AND DOCUSATE SODIUM 1 TABLET: 50; 8.6 TABLET ORAL at 09:39

## 2022-01-01 RX ADMIN — LIDOCAINE HYDROCHLORIDE 60 MG: 20 INJECTION, SOLUTION EPIDURAL; INFILTRATION; INTRACAUDAL; PERINEURAL at 14:55

## 2022-01-01 RX ADMIN — SODIUM CHLORIDE, PRESERVATIVE FREE 10 ML: 5 INJECTION INTRAVENOUS at 06:18

## 2022-01-01 RX ADMIN — HYDROCODONE BITARTRATE AND ACETAMINOPHEN 1 TABLET: 5; 325 TABLET ORAL at 15:22

## 2022-01-01 RX ADMIN — TRAMADOL HYDROCHLORIDE 50 MG: 50 TABLET, COATED ORAL at 13:37

## 2022-01-01 RX ADMIN — ONDANSETRON 4 MG: 2 INJECTION INTRAMUSCULAR; INTRAVENOUS at 22:07

## 2022-01-01 RX ADMIN — ASPIRIN 81 MG 81 MG: 81 TABLET ORAL at 09:32

## 2022-01-01 RX ADMIN — ATORVASTATIN CALCIUM 10 MG: 20 TABLET, FILM COATED ORAL at 00:33

## 2022-01-01 RX ADMIN — HYDROCODONE BITARTRATE AND ACETAMINOPHEN 1 TABLET: 5; 325 TABLET ORAL at 09:37

## 2022-01-01 RX ADMIN — Medication 4000 UNITS: at 15:10

## 2022-01-01 RX ADMIN — APIXABAN 2.5 MG: 2.5 TABLET, FILM COATED ORAL at 09:00

## 2022-01-01 RX ADMIN — Medication 4 UNITS: at 21:37

## 2022-01-01 RX ADMIN — HYDROCODONE BITARTRATE AND ACETAMINOPHEN 1 TABLET: 5; 325 TABLET ORAL at 13:58

## 2022-01-01 RX ADMIN — FLUCONAZOLE 200 MG: 100 TABLET ORAL at 08:49

## 2022-01-01 RX ADMIN — HYDROMORPHONE HYDROCHLORIDE 0.5 MG: 1 INJECTION, SOLUTION INTRAMUSCULAR; INTRAVENOUS; SUBCUTANEOUS at 00:12

## 2022-01-01 RX ADMIN — SODIUM CHLORIDE, PRESERVATIVE FREE 10 ML: 5 INJECTION INTRAVENOUS at 14:07

## 2022-01-01 RX ADMIN — Medication 4000 UNITS: at 15:08

## 2022-01-01 RX ADMIN — PANTOPRAZOLE SODIUM 40 MG: 40 TABLET, DELAYED RELEASE ORAL at 06:00

## 2022-01-01 RX ADMIN — PIPERACILLIN AND TAZOBACTAM 3.38 G: 3; .375 INJECTION, POWDER, FOR SOLUTION INTRAVENOUS at 06:44

## 2022-01-01 RX ADMIN — Medication 4 UNITS: at 21:27

## 2022-01-01 RX ADMIN — DILTIAZEM HYDROCHLORIDE 12.5 MG/HR: 5 INJECTION INTRAVENOUS at 00:53

## 2022-01-01 RX ADMIN — SODIUM CHLORIDE, PRESERVATIVE FREE 10 ML: 5 INJECTION INTRAVENOUS at 15:39

## 2022-01-01 RX ADMIN — IRON SUCROSE 200 MG: 20 INJECTION, SOLUTION INTRAVENOUS at 15:07

## 2022-01-01 RX ADMIN — SENNOSIDES AND DOCUSATE SODIUM 1 TABLET: 50; 8.6 TABLET ORAL at 09:00

## 2022-01-01 RX ADMIN — Medication 3 UNITS: at 17:58

## 2022-01-01 RX ADMIN — POLYETHYLENE GLYCOL 3350 17 G: 17 POWDER, FOR SOLUTION ORAL at 09:00

## 2022-01-01 RX ADMIN — SODIUM CHLORIDE, PRESERVATIVE FREE 10 ML: 5 INJECTION INTRAVENOUS at 22:21

## 2022-01-01 RX ADMIN — CLOPIDOGREL BISULFATE 75 MG: 75 TABLET ORAL at 10:25

## 2022-01-01 RX ADMIN — SODIUM CHLORIDE, PRESERVATIVE FREE 10 ML: 5 INJECTION INTRAVENOUS at 13:27

## 2022-01-01 RX ADMIN — CEFAZOLIN 2 G: 1 INJECTION, POWDER, FOR SOLUTION INTRAMUSCULAR; INTRAVENOUS at 10:24

## 2022-01-01 RX ADMIN — CEFEPIME 2 G: 2 INJECTION, POWDER, FOR SOLUTION INTRAVENOUS at 18:37

## 2022-01-01 RX ADMIN — HYDROCODONE BITARTRATE AND ACETAMINOPHEN 1 TABLET: 5; 325 TABLET ORAL at 18:51

## 2022-01-01 RX ADMIN — SODIUM CHLORIDE, PRESERVATIVE FREE 10 ML: 5 INJECTION INTRAVENOUS at 13:29

## 2022-01-01 RX ADMIN — ONDANSETRON 4 MG: 2 INJECTION INTRAMUSCULAR; INTRAVENOUS at 21:54

## 2022-01-01 RX ADMIN — Medication 6 UNITS: at 21:52

## 2022-01-01 RX ADMIN — SODIUM CHLORIDE, PRESERVATIVE FREE 10 ML: 5 INJECTION INTRAVENOUS at 21:55

## 2022-01-01 RX ADMIN — HYDROCODONE BITARTRATE AND ACETAMINOPHEN 1 TABLET: 5; 325 TABLET ORAL at 09:33

## 2022-01-01 RX ADMIN — ONDANSETRON HYDROCHLORIDE 4 MG: 2 INJECTION, SOLUTION INTRAMUSCULAR; INTRAVENOUS at 14:39

## 2022-01-01 RX ADMIN — HYDROMORPHONE HYDROCHLORIDE 0.5 MG: 1 INJECTION, SOLUTION INTRAMUSCULAR; INTRAVENOUS; SUBCUTANEOUS at 20:04

## 2022-01-01 RX ADMIN — Medication 3 UNITS: at 17:22

## 2022-01-01 RX ADMIN — SODIUM CHLORIDE, POTASSIUM CHLORIDE, SODIUM LACTATE AND CALCIUM CHLORIDE 50 ML/HR: 600; 310; 30; 20 INJECTION, SOLUTION INTRAVENOUS at 13:36

## 2022-01-01 RX ADMIN — POLYETHYLENE GLYCOL 3350 17 G: 17 POWDER, FOR SOLUTION ORAL at 09:32

## 2022-01-01 RX ADMIN — HYDRALAZINE HYDROCHLORIDE 20 MG: 20 INJECTION INTRAMUSCULAR; INTRAVENOUS at 16:23

## 2022-01-01 RX ADMIN — ATORVASTATIN CALCIUM 10 MG: 20 TABLET, FILM COATED ORAL at 21:53

## 2022-01-01 RX ADMIN — Medication 7 UNITS: at 00:33

## 2022-01-01 RX ADMIN — NALOXONE HYDROCHLORIDE: 0.4 INJECTION, SOLUTION INTRAMUSCULAR; INTRAVENOUS; SUBCUTANEOUS at 23:00

## 2022-01-01 RX ADMIN — SODIUM CHLORIDE, PRESERVATIVE FREE 10 ML: 5 INJECTION INTRAVENOUS at 13:42

## 2022-01-01 RX ADMIN — SENNOSIDES AND DOCUSATE SODIUM 1 TABLET: 50; 8.6 TABLET ORAL at 08:27

## 2022-01-01 RX ADMIN — ASPIRIN 81 MG 81 MG: 81 TABLET ORAL at 09:33

## 2022-01-01 RX ADMIN — Medication 8 UNITS: at 08:29

## 2022-01-01 RX ADMIN — Medication 2 UNITS: at 21:12

## 2022-01-01 RX ADMIN — SUCCINYLCHOLINE CHLORIDE 160 MG: 20 INJECTION, SOLUTION INTRAMUSCULAR; INTRAVENOUS at 14:55

## 2022-01-01 RX ADMIN — Medication 3 UNITS: at 08:27

## 2022-01-01 RX ADMIN — DILTIAZEM HYDROCHLORIDE 2.5 MG/HR: 5 INJECTION INTRAVENOUS at 14:57

## 2022-01-01 RX ADMIN — ONDANSETRON 4 MG: 2 INJECTION INTRAMUSCULAR; INTRAVENOUS at 04:24

## 2022-01-01 RX ADMIN — NOREPINEPHRINE BITARTRATE 4 MCG/MIN: 1 INJECTION, SOLUTION, CONCENTRATE INTRAVENOUS at 19:00

## 2022-01-01 RX ADMIN — Medication 7 UNITS: at 09:55

## 2022-01-01 RX ADMIN — Medication 3 UNITS: at 08:51

## 2022-01-01 RX ADMIN — ONDANSETRON 4 MG: 2 INJECTION INTRAMUSCULAR; INTRAVENOUS at 21:18

## 2022-01-01 RX ADMIN — CEFEPIME 2 G: 2 INJECTION, POWDER, FOR SOLUTION INTRAVENOUS at 06:03

## 2022-01-01 RX ADMIN — SODIUM CHLORIDE, PRESERVATIVE FREE 10 ML: 5 INJECTION INTRAVENOUS at 22:13

## 2022-01-01 RX ADMIN — PIPERACILLIN AND TAZOBACTAM 3.38 G: 3; .375 INJECTION, POWDER, FOR SOLUTION INTRAVENOUS at 15:31

## 2022-01-01 RX ADMIN — AMLODIPINE BESYLATE 5 MG: 5 TABLET ORAL at 08:45

## 2022-01-01 RX ADMIN — Medication 3 UNITS: at 00:57

## 2022-01-01 RX ADMIN — CEFEPIME 2 G: 2 INJECTION, POWDER, FOR SOLUTION INTRAVENOUS at 18:00

## 2022-01-01 RX ADMIN — POLYETHYLENE GLYCOL 3350 17 G: 17 POWDER, FOR SOLUTION ORAL at 08:59

## 2022-01-01 RX ADMIN — SODIUM CHLORIDE, PRESERVATIVE FREE 10 ML: 5 INJECTION INTRAVENOUS at 21:59

## 2022-01-01 RX ADMIN — CEFAZOLIN 2 G: 1 INJECTION, POWDER, FOR SOLUTION INTRAMUSCULAR; INTRAVENOUS at 14:08

## 2022-01-01 RX ADMIN — SODIUM CHLORIDE, PRESERVATIVE FREE 10 ML: 5 INJECTION INTRAVENOUS at 06:23

## 2022-01-01 RX ADMIN — Medication 8 UNITS: at 08:02

## 2022-01-01 RX ADMIN — SODIUM CHLORIDE, PRESERVATIVE FREE 10 ML: 5 INJECTION INTRAVENOUS at 05:55

## 2022-01-01 RX ADMIN — CARVEDILOL 6.25 MG: 6.25 TABLET, FILM COATED ORAL at 18:30

## 2022-01-01 RX ADMIN — Medication 4 UNITS: at 22:07

## 2022-01-01 RX ADMIN — Medication 2 UNITS: at 17:38

## 2022-01-01 RX ADMIN — CYANOCOBALAMIN TAB 500 MCG 1000 MCG: 500 TAB at 18:47

## 2022-01-01 RX ADMIN — Medication 7 UNITS: at 21:50

## 2022-01-01 RX ADMIN — IRON SUCROSE 200 MG: 20 INJECTION, SOLUTION INTRAVENOUS at 18:57

## 2022-01-01 RX ADMIN — ASPIRIN 81 MG 81 MG: 81 TABLET ORAL at 08:39

## 2022-01-01 RX ADMIN — AMLODIPINE BESYLATE 5 MG: 5 TABLET ORAL at 08:46

## 2022-01-01 RX ADMIN — Medication 3 UNITS: at 17:27

## 2022-01-01 RX ADMIN — SENNOSIDES AND DOCUSATE SODIUM 1 TABLET: 50; 8.6 TABLET ORAL at 08:02

## 2022-01-01 RX ADMIN — Medication 7 UNITS: at 09:56

## 2022-01-01 RX ADMIN — Medication 2 UNITS: at 12:33

## 2022-01-01 RX ADMIN — HYDROMORPHONE HYDROCHLORIDE 2 MG: 2 INJECTION INTRAMUSCULAR; INTRAVENOUS; SUBCUTANEOUS at 19:59

## 2022-01-01 RX ADMIN — ONDANSETRON 4 MG: 2 INJECTION INTRAMUSCULAR; INTRAVENOUS at 12:16

## 2022-01-01 RX ADMIN — APIXABAN 2.5 MG: 2.5 TABLET, FILM COATED ORAL at 09:39

## 2022-01-01 RX ADMIN — SODIUM CHLORIDE, POTASSIUM CHLORIDE, SODIUM LACTATE AND CALCIUM CHLORIDE 250 ML: 600; 310; 30; 20 INJECTION, SOLUTION INTRAVENOUS at 03:44

## 2022-01-01 RX ADMIN — SODIUM CHLORIDE 100 ML/HR: 9 INJECTION, SOLUTION INTRAVENOUS at 10:39

## 2022-01-01 RX ADMIN — TRAMADOL HYDROCHLORIDE 25 MG: 50 TABLET, COATED ORAL at 21:51

## 2022-01-01 RX ADMIN — SODIUM CHLORIDE, POTASSIUM CHLORIDE, SODIUM LACTATE AND CALCIUM CHLORIDE 75 ML/HR: 600; 310; 30; 20 INJECTION, SOLUTION INTRAVENOUS at 12:00

## 2022-01-01 RX ADMIN — CARVEDILOL 3.12 MG: 3.12 TABLET, FILM COATED ORAL at 18:54

## 2022-01-01 RX ADMIN — Medication 3 UNITS: at 12:21

## 2022-01-01 RX ADMIN — SODIUM CHLORIDE, PRESERVATIVE FREE 10 ML: 5 INJECTION INTRAVENOUS at 06:04

## 2022-01-01 RX ADMIN — Medication 8 UNITS: at 09:33

## 2022-01-01 RX ADMIN — APIXABAN 2.5 MG: 2.5 TABLET, FILM COATED ORAL at 09:34

## 2022-01-01 RX ADMIN — SODIUM CHLORIDE, PRESERVATIVE FREE 10 ML: 5 INJECTION INTRAVENOUS at 21:11

## 2022-01-01 RX ADMIN — SODIUM CHLORIDE, PRESERVATIVE FREE 10 ML: 5 INJECTION INTRAVENOUS at 06:41

## 2022-01-01 RX ADMIN — ATORVASTATIN CALCIUM 10 MG: 20 TABLET, FILM COATED ORAL at 22:10

## 2022-01-01 RX ADMIN — CARVEDILOL 6.25 MG: 6.25 TABLET, FILM COATED ORAL at 10:25

## 2022-01-01 RX ADMIN — Medication 15 UNITS: at 01:04

## 2022-01-01 RX ADMIN — SODIUM CHLORIDE, PRESERVATIVE FREE 10 ML: 5 INJECTION INTRAVENOUS at 06:42

## 2022-01-01 RX ADMIN — PIPERACILLIN AND TAZOBACTAM 4.5 G: 4; .5 INJECTION, POWDER, FOR SOLUTION INTRAVENOUS at 01:35

## 2022-01-01 RX ADMIN — ONDANSETRON 4 MG: 2 INJECTION INTRAMUSCULAR; INTRAVENOUS at 12:38

## 2022-01-01 RX ADMIN — VANCOMYCIN HYDROCHLORIDE 500 MG: 500 INJECTION, POWDER, LYOPHILIZED, FOR SOLUTION INTRAVENOUS at 02:21

## 2022-01-01 RX ADMIN — CEFEPIME 2 G: 2 INJECTION, POWDER, FOR SOLUTION INTRAVENOUS at 07:25

## 2022-01-01 RX ADMIN — PANTOPRAZOLE SODIUM 40 MG: 40 TABLET, DELAYED RELEASE ORAL at 06:49

## 2022-01-01 RX ADMIN — HYDROCODONE BITARTRATE AND ACETAMINOPHEN 1 TABLET: 5; 325 TABLET ORAL at 21:53

## 2022-01-01 RX ADMIN — SODIUM CHLORIDE, PRESERVATIVE FREE 10 ML: 5 INJECTION INTRAVENOUS at 07:25

## 2022-01-01 RX ADMIN — SENNOSIDES AND DOCUSATE SODIUM 1 TABLET: 50; 8.6 TABLET ORAL at 08:37

## 2022-01-01 RX ADMIN — SENNOSIDES AND DOCUSATE SODIUM 1 TABLET: 50; 8.6 TABLET ORAL at 08:28

## 2022-01-01 RX ADMIN — SODIUM CHLORIDE, PRESERVATIVE FREE 10 ML: 5 INJECTION INTRAVENOUS at 21:52

## 2022-01-01 RX ADMIN — PANTOPRAZOLE SODIUM 40 MG: 40 TABLET, DELAYED RELEASE ORAL at 07:29

## 2022-01-01 RX ADMIN — ONDANSETRON 4 MG: 2 INJECTION INTRAMUSCULAR; INTRAVENOUS at 18:01

## 2022-01-01 RX ADMIN — FLUCONAZOLE 200 MG: 100 TABLET ORAL at 09:39

## 2022-01-01 RX ADMIN — CARVEDILOL 6.25 MG: 6.25 TABLET, FILM COATED ORAL at 08:28

## 2022-01-01 RX ADMIN — ONDANSETRON 4 MG: 2 INJECTION INTRAMUSCULAR; INTRAVENOUS at 16:28

## 2022-01-01 RX ADMIN — Medication 3 UNITS: at 12:51

## 2022-01-01 RX ADMIN — FLUCONAZOLE 200 MG: 100 TABLET ORAL at 09:33

## 2022-01-01 RX ADMIN — HYDROMORPHONE HYDROCHLORIDE 0.5 MG: 1 INJECTION, SOLUTION INTRAMUSCULAR; INTRAVENOUS; SUBCUTANEOUS at 07:57

## 2022-01-01 RX ADMIN — HYDRALAZINE HYDROCHLORIDE 10 MG: 20 INJECTION INTRAMUSCULAR; INTRAVENOUS at 14:02

## 2022-01-01 RX ADMIN — HYDROCODONE BITARTRATE AND ACETAMINOPHEN 1 TABLET: 5; 325 TABLET ORAL at 22:07

## 2022-01-01 RX ADMIN — Medication 3 UNITS: at 09:39

## 2022-01-01 RX ADMIN — Medication 80 MCG: at 15:04

## 2022-01-01 RX ADMIN — PANTOPRAZOLE SODIUM 40 MG: 40 INJECTION, POWDER, FOR SOLUTION INTRAVENOUS at 08:46

## 2022-01-01 RX ADMIN — SODIUM CHLORIDE, PRESERVATIVE FREE 10 ML: 5 INJECTION INTRAVENOUS at 05:46

## 2022-01-01 RX ADMIN — CEFEPIME 2 G: 2 INJECTION, POWDER, FOR SOLUTION INTRAVENOUS at 18:39

## 2022-01-01 RX ADMIN — CYANOCOBALAMIN TAB 500 MCG 1000 MCG: 500 TAB at 08:46

## 2022-01-01 RX ADMIN — SODIUM CHLORIDE, PRESERVATIVE FREE 10 ML: 5 INJECTION INTRAVENOUS at 00:44

## 2022-01-01 RX ADMIN — CLOPIDOGREL BISULFATE 75 MG: 75 TABLET ORAL at 09:55

## 2022-01-01 RX ADMIN — VANCOMYCIN HYDROCHLORIDE 750 MG: 750 INJECTION, POWDER, LYOPHILIZED, FOR SOLUTION INTRAVENOUS at 10:36

## 2022-01-01 RX ADMIN — CEFEPIME 2 G: 2 INJECTION, POWDER, FOR SOLUTION INTRAVENOUS at 19:19

## 2022-01-01 RX ADMIN — IOPAMIDOL 100 ML: 755 INJECTION, SOLUTION INTRAVENOUS at 04:51

## 2022-01-01 RX ADMIN — ASPIRIN 325 MG ORAL TABLET 325 MG: 325 PILL ORAL at 13:36

## 2022-01-01 RX ADMIN — Medication 3 UNITS: at 12:10

## 2022-01-01 RX ADMIN — ATORVASTATIN CALCIUM 10 MG: 20 TABLET, FILM COATED ORAL at 21:52

## 2022-01-01 RX ADMIN — HYDROCODONE BITARTRATE AND ACETAMINOPHEN 1 TABLET: 5; 325 TABLET ORAL at 08:02

## 2022-01-01 RX ADMIN — HYDROCODONE BITARTRATE AND ACETAMINOPHEN 1 TABLET: 5; 325 TABLET ORAL at 17:06

## 2022-01-01 RX ADMIN — ATORVASTATIN CALCIUM 10 MG: 20 TABLET, FILM COATED ORAL at 22:11

## 2022-01-01 RX ADMIN — ONDANSETRON 4 MG: 2 INJECTION INTRAMUSCULAR; INTRAVENOUS at 22:45

## 2022-01-01 RX ADMIN — PANTOPRAZOLE SODIUM 40 MG: 40 TABLET, DELAYED RELEASE ORAL at 06:42

## 2022-01-01 RX ADMIN — CARVEDILOL 6.25 MG: 6.25 TABLET, FILM COATED ORAL at 09:55

## 2022-01-01 RX ADMIN — APIXABAN 2.5 MG: 2.5 TABLET, FILM COATED ORAL at 08:54

## 2022-01-01 RX ADMIN — SODIUM CHLORIDE, PRESERVATIVE FREE 10 ML: 5 INJECTION INTRAVENOUS at 14:00

## 2022-01-01 RX ADMIN — ACETAMINOPHEN 650 MG: 325 TABLET ORAL at 10:48

## 2022-01-01 RX ADMIN — Medication 4000 UNITS: at 15:09

## 2022-01-01 RX ADMIN — ATORVASTATIN CALCIUM 10 MG: 20 TABLET, FILM COATED ORAL at 21:07

## 2022-01-01 RX ADMIN — Medication 4 UNITS: at 22:35

## 2022-01-01 RX ADMIN — ONDANSETRON HYDROCHLORIDE 4 MG: 2 SOLUTION INTRAMUSCULAR; INTRAVENOUS at 14:39

## 2022-01-01 RX ADMIN — ASPIRIN 81 MG 81 MG: 81 TABLET ORAL at 09:00

## 2022-01-01 RX ADMIN — HYDRALAZINE HYDROCHLORIDE 20 MG: 20 INJECTION INTRAMUSCULAR; INTRAVENOUS at 15:50

## 2022-01-01 RX ADMIN — HYDROCODONE BITARTRATE AND ACETAMINOPHEN 1 TABLET: 5; 325 TABLET ORAL at 02:55

## 2022-01-01 RX ADMIN — ASPIRIN 81 MG 81 MG: 81 TABLET ORAL at 09:06

## 2022-01-01 RX ADMIN — SODIUM CHLORIDE, PRESERVATIVE FREE 10 ML: 5 INJECTION INTRAVENOUS at 02:14

## 2022-01-01 RX ADMIN — ATORVASTATIN CALCIUM 10 MG: 20 TABLET, FILM COATED ORAL at 22:35

## 2022-01-01 RX ADMIN — CARVEDILOL 12.5 MG: 12.5 TABLET, FILM COATED ORAL at 08:46

## 2022-01-01 RX ADMIN — CEFEPIME 2 G: 2 INJECTION, POWDER, FOR SOLUTION INTRAVENOUS at 06:08

## 2022-01-01 RX ADMIN — LIDOCAINE HYDROCHLORIDE 10 ML: 10 INJECTION, SOLUTION INFILTRATION; PERINEURAL at 17:17

## 2022-01-01 RX ADMIN — SODIUM CHLORIDE, PRESERVATIVE FREE 10 ML: 5 INJECTION INTRAVENOUS at 14:18

## 2022-01-01 RX ADMIN — INSULIN HUMAN 7 UNITS: 100 INJECTION, SOLUTION PARENTERAL at 13:22

## 2022-01-01 RX ADMIN — ATORVASTATIN CALCIUM 10 MG: 20 TABLET, FILM COATED ORAL at 22:00

## 2022-01-01 RX ADMIN — Medication 7 UNITS: at 21:28

## 2022-01-01 RX ADMIN — Medication 4 UNITS: at 07:02

## 2022-01-01 RX ADMIN — SODIUM CHLORIDE, PRESERVATIVE FREE 10 ML: 5 INJECTION INTRAVENOUS at 06:08

## 2022-01-01 RX ADMIN — Medication 16 G: at 17:10

## 2022-01-01 RX ADMIN — ASPIRIN 81 MG 81 MG: 81 TABLET ORAL at 08:54

## 2022-01-01 RX ADMIN — SODIUM CHLORIDE, POTASSIUM CHLORIDE, SODIUM LACTATE AND CALCIUM CHLORIDE 250 ML: 600; 310; 30; 20 INJECTION, SOLUTION INTRAVENOUS at 07:00

## 2022-01-01 RX ADMIN — SODIUM CHLORIDE, POTASSIUM CHLORIDE, SODIUM LACTATE AND CALCIUM CHLORIDE 250 ML: 600; 310; 30; 20 INJECTION, SOLUTION INTRAVENOUS at 19:41

## 2022-01-01 RX ADMIN — APIXABAN 2.5 MG: 2.5 TABLET, FILM COATED ORAL at 08:50

## 2022-01-01 RX ADMIN — CEFEPIME 2 G: 2 INJECTION, POWDER, FOR SOLUTION INTRAVENOUS at 18:01

## 2022-01-01 RX ADMIN — ONDANSETRON 4 MG: 2 INJECTION INTRAMUSCULAR; INTRAVENOUS at 00:47

## 2022-01-01 RX ADMIN — HYDRALAZINE HYDROCHLORIDE 10 MG: 20 INJECTION INTRAMUSCULAR; INTRAVENOUS at 07:00

## 2022-01-01 RX ADMIN — SODIUM CHLORIDE, PRESERVATIVE FREE 10 ML: 5 INJECTION INTRAVENOUS at 15:27

## 2022-01-01 RX ADMIN — ONDANSETRON 4 MG: 2 INJECTION INTRAMUSCULAR; INTRAVENOUS at 15:07

## 2022-01-01 RX ADMIN — PIPERACILLIN AND TAZOBACTAM 3.38 G: 3; .375 INJECTION, POWDER, FOR SOLUTION INTRAVENOUS at 06:36

## 2022-01-01 RX ADMIN — POLYETHYLENE GLYCOL 3350 17 G: 17 POWDER, FOR SOLUTION ORAL at 08:39

## 2022-01-01 RX ADMIN — CEFEPIME 2 G: 2 INJECTION, POWDER, FOR SOLUTION INTRAVENOUS at 06:19

## 2022-01-01 RX ADMIN — FOLIC ACID 1 MG: 1 TABLET ORAL at 18:47

## 2022-01-01 RX ADMIN — SODIUM CHLORIDE, PRESERVATIVE FREE 10 ML: 5 INJECTION INTRAVENOUS at 14:02

## 2022-01-01 RX ADMIN — PIPERACILLIN AND TAZOBACTAM 3.38 G: 3; .375 INJECTION, POWDER, FOR SOLUTION INTRAVENOUS at 07:24

## 2022-01-01 RX ADMIN — ASPIRIN 325 MG ORAL TABLET 325 MG: 325 PILL ORAL at 08:28

## 2022-01-01 RX ADMIN — Medication 4 UNITS: at 21:18

## 2022-01-01 RX ADMIN — Medication 4 UNITS: at 12:16

## 2022-01-01 RX ADMIN — MAGNESIUM SULFATE HEPTAHYDRATE 2 G: 40 INJECTION, SOLUTION INTRAVENOUS at 03:36

## 2022-01-01 RX ADMIN — Medication 2 UNITS: at 17:06

## 2022-01-01 RX ADMIN — PANTOPRAZOLE SODIUM 40 MG: 40 TABLET, DELAYED RELEASE ORAL at 06:35

## 2022-01-01 RX ADMIN — PIPERACILLIN AND TAZOBACTAM 3.38 G: 3; .375 INJECTION, POWDER, FOR SOLUTION INTRAVENOUS at 22:58

## 2022-01-01 RX ADMIN — Medication 3 UNITS: at 08:30

## 2022-01-01 RX ADMIN — CEFEPIME 2 G: 2 INJECTION, POWDER, FOR SOLUTION INTRAVENOUS at 06:35

## 2022-01-01 RX ADMIN — DIAZEPAM 2.5 MG: 5 INJECTION, SOLUTION INTRAMUSCULAR; INTRAVENOUS at 00:12

## 2022-01-01 RX ADMIN — CEFEPIME 2 G: 2 INJECTION, POWDER, FOR SOLUTION INTRAVENOUS at 19:01

## 2022-01-01 RX ADMIN — SODIUM CHLORIDE, PRESERVATIVE FREE 10 ML: 5 INJECTION INTRAVENOUS at 15:56

## 2022-01-01 RX ADMIN — Medication 3 UNITS: at 11:59

## 2022-01-01 RX ADMIN — TRAMADOL HYDROCHLORIDE 50 MG: 50 TABLET, COATED ORAL at 06:46

## 2022-01-01 RX ADMIN — PIPERACILLIN AND TAZOBACTAM 3.38 G: 3; .375 INJECTION, POWDER, FOR SOLUTION INTRAVENOUS at 13:42

## 2022-01-01 RX ADMIN — SODIUM CHLORIDE, PRESERVATIVE FREE 10 ML: 5 INJECTION INTRAVENOUS at 15:26

## 2022-01-01 RX ADMIN — ATORVASTATIN CALCIUM 10 MG: 20 TABLET, FILM COATED ORAL at 22:36

## 2022-01-01 RX ADMIN — HYDROCODONE BITARTRATE AND ACETAMINOPHEN 1 TABLET: 5; 325 TABLET ORAL at 13:25

## 2022-01-01 RX ADMIN — Medication 3 UNITS: at 12:44

## 2022-01-01 RX ADMIN — HYDROCODONE BITARTRATE AND ACETAMINOPHEN 1 TABLET: 5; 325 TABLET ORAL at 02:51

## 2022-01-01 RX ADMIN — HYDRALAZINE HYDROCHLORIDE 10 MG: 20 INJECTION INTRAMUSCULAR; INTRAVENOUS at 10:35

## 2022-01-01 RX ADMIN — ONDANSETRON HYDROCHLORIDE 4 MG: 2 INJECTION, SOLUTION INTRAMUSCULAR; INTRAVENOUS at 16:21

## 2022-01-01 RX ADMIN — HYDROMORPHONE HYDROCHLORIDE 0.5 MG: 1 INJECTION, SOLUTION INTRAMUSCULAR; INTRAVENOUS; SUBCUTANEOUS at 22:39

## 2022-01-01 RX ADMIN — ASPIRIN 81 MG 81 MG: 81 TABLET ORAL at 08:02

## 2022-01-01 RX ADMIN — HYDROCODONE BITARTRATE AND ACETAMINOPHEN 1 TABLET: 5; 325 TABLET ORAL at 15:54

## 2022-01-01 RX ADMIN — SODIUM CHLORIDE, PRESERVATIVE FREE 10 ML: 5 INJECTION INTRAVENOUS at 14:50

## 2022-01-01 RX ADMIN — SODIUM CHLORIDE, PRESERVATIVE FREE 10 ML: 5 INJECTION INTRAVENOUS at 14:28

## 2022-01-01 RX ADMIN — Medication 4 UNITS: at 21:50

## 2022-01-01 RX ADMIN — PANTOPRAZOLE SODIUM 40 MG: 40 INJECTION, POWDER, FOR SOLUTION INTRAVENOUS at 08:45

## 2022-01-01 RX ADMIN — ASPIRIN 81 MG 81 MG: 81 TABLET ORAL at 08:50

## 2022-01-01 RX ADMIN — Medication 3 UNITS: at 12:22

## 2022-01-01 RX ADMIN — Medication 3 UNITS: at 18:55

## 2022-01-01 RX ADMIN — Medication 2 UNITS: at 22:11

## 2022-01-01 RX ADMIN — PROPOFOL 120 MG: 10 INJECTION, EMULSION INTRAVENOUS at 14:55

## 2022-01-01 RX ADMIN — HYDROMORPHONE HYDROCHLORIDE 0.5 MG: 1 INJECTION, SOLUTION INTRAMUSCULAR; INTRAVENOUS; SUBCUTANEOUS at 03:26

## 2022-01-01 RX ADMIN — PIPERACILLIN AND TAZOBACTAM 3.38 G: 3; .375 INJECTION, POWDER, FOR SOLUTION INTRAVENOUS at 06:05

## 2022-01-01 RX ADMIN — Medication 3 UNITS: at 17:28

## 2022-01-01 RX ADMIN — HYDROMORPHONE HYDROCHLORIDE 0.5 MG: 1 INJECTION, SOLUTION INTRAMUSCULAR; INTRAVENOUS; SUBCUTANEOUS at 17:34

## 2022-01-01 RX ADMIN — Medication 2 UNITS: at 09:33

## 2022-01-01 RX ADMIN — SODIUM CHLORIDE, PRESERVATIVE FREE 10 ML: 5 INJECTION INTRAVENOUS at 07:01

## 2022-01-01 RX ADMIN — SODIUM CHLORIDE, PRESERVATIVE FREE 10 ML: 5 INJECTION INTRAVENOUS at 22:00

## 2022-01-01 RX ADMIN — Medication 7 UNITS: at 09:00

## 2022-01-01 RX ADMIN — SODIUM CHLORIDE, PRESERVATIVE FREE 10 ML: 5 INJECTION INTRAVENOUS at 16:58

## 2022-01-01 RX ADMIN — SODIUM CHLORIDE, PRESERVATIVE FREE 10 ML: 5 INJECTION INTRAVENOUS at 10:26

## 2022-01-01 RX ADMIN — ASPIRIN 81 MG 81 MG: 81 TABLET ORAL at 08:38

## 2022-01-01 RX ADMIN — SODIUM CHLORIDE, PRESERVATIVE FREE 10 ML: 5 INJECTION INTRAVENOUS at 06:07

## 2022-01-01 RX ADMIN — ACETAMINOPHEN 650 MG: 650 SUPPOSITORY RECTAL at 05:53

## 2022-01-01 RX ADMIN — PIPERACILLIN AND TAZOBACTAM 3.38 G: 3; .375 INJECTION, POWDER, FOR SOLUTION INTRAVENOUS at 15:19

## 2022-01-01 RX ADMIN — GLYCERIN 1 SUPPOSITORY: 2 SUPPOSITORY RECTAL at 12:32

## 2022-01-01 RX ADMIN — FLUCONAZOLE 200 MG: 100 TABLET ORAL at 12:43

## 2022-01-01 RX ADMIN — POLYETHYLENE GLYCOL 3350 17 G: 17 POWDER, FOR SOLUTION ORAL at 16:43

## 2022-01-01 RX ADMIN — SODIUM CHLORIDE, PRESERVATIVE FREE 10 ML: 5 INJECTION INTRAVENOUS at 06:58

## 2022-01-01 RX ADMIN — Medication 4000 UNITS: at 15:07

## 2022-01-01 RX ADMIN — FENTANYL CITRATE 25 MCG: 50 INJECTION, SOLUTION INTRAMUSCULAR; INTRAVENOUS at 06:10

## 2022-01-01 RX ADMIN — POLYETHYLENE GLYCOL 3350 17 G: 17 POWDER, FOR SOLUTION ORAL at 17:58

## 2022-01-01 RX ADMIN — Medication 8 UNITS: at 08:37

## 2022-01-01 RX ADMIN — SODIUM CHLORIDE, PRESERVATIVE FREE 10 ML: 5 INJECTION INTRAVENOUS at 22:08

## 2022-01-01 RX ADMIN — HYDRALAZINE HYDROCHLORIDE 10 MG: 20 INJECTION INTRAMUSCULAR; INTRAVENOUS at 17:58

## 2022-01-01 RX ADMIN — ACETAMINOPHEN 650 MG: 325 TABLET ORAL at 16:46

## 2022-01-01 RX ADMIN — ACETAMINOPHEN 650 MG: 325 TABLET ORAL at 14:01

## 2022-01-01 RX ADMIN — CARVEDILOL 12.5 MG: 12.5 TABLET, FILM COATED ORAL at 15:02

## 2022-01-01 RX ADMIN — PANTOPRAZOLE SODIUM 40 MG: 40 TABLET, DELAYED RELEASE ORAL at 06:10

## 2022-01-01 RX ADMIN — PANTOPRAZOLE SODIUM 40 MG: 40 TABLET, DELAYED RELEASE ORAL at 07:30

## 2022-01-01 RX ADMIN — SODIUM CHLORIDE 100 ML/HR: 9 INJECTION, SOLUTION INTRAVENOUS at 20:52

## 2022-01-01 RX ADMIN — ACETAMINOPHEN 650 MG: 325 TABLET ORAL at 00:24

## 2022-01-01 RX ADMIN — POLYETHYLENE GLYCOL 3350 17 G: 17 POWDER, FOR SOLUTION ORAL at 08:49

## 2022-01-01 RX ADMIN — Medication 3 UNITS: at 12:46

## 2022-01-01 RX ADMIN — APIXABAN 2.5 MG: 2.5 TABLET, FILM COATED ORAL at 18:08

## 2022-01-01 RX ADMIN — Medication 4 UNITS: at 18:35

## 2022-01-01 RX ADMIN — IOPAMIDOL 78 ML: 612 INJECTION, SOLUTION INTRAVENOUS at 15:39

## 2022-01-01 RX ADMIN — AMLODIPINE BESYLATE 5 MG: 5 TABLET ORAL at 18:35

## 2022-01-01 RX ADMIN — VANCOMYCIN HYDROCHLORIDE 750 MG: 750 INJECTION, POWDER, LYOPHILIZED, FOR SOLUTION INTRAVENOUS at 10:16

## 2022-01-01 RX ADMIN — CLOPIDOGREL BISULFATE 75 MG: 75 TABLET ORAL at 10:39

## 2022-01-01 RX ADMIN — Medication 7 UNITS: at 08:54

## 2022-01-01 RX ADMIN — Medication 4 UNITS: at 22:12

## 2022-01-01 RX ADMIN — ATORVASTATIN CALCIUM 10 MG: 20 TABLET, FILM COATED ORAL at 21:42

## 2022-01-01 RX ADMIN — IRON SUCROSE 200 MG: 20 INJECTION, SOLUTION INTRAVENOUS at 14:06

## 2022-01-01 RX ADMIN — DIAZEPAM 2.5 MG: 5 INJECTION, SOLUTION INTRAMUSCULAR; INTRAVENOUS at 18:28

## 2022-01-01 RX ADMIN — HYDROMORPHONE HYDROCHLORIDE 0.5 MG: 1 INJECTION, SOLUTION INTRAMUSCULAR; INTRAVENOUS; SUBCUTANEOUS at 06:40

## 2022-01-01 RX ADMIN — SODIUM CHLORIDE, PRESERVATIVE FREE 10 ML: 5 INJECTION INTRAVENOUS at 21:07

## 2022-01-01 RX ADMIN — HYDROCODONE BITARTRATE AND ACETAMINOPHEN 1 TABLET: 5; 325 TABLET ORAL at 04:57

## 2022-01-01 RX ADMIN — SENNOSIDES AND DOCUSATE SODIUM 1 TABLET: 50; 8.6 TABLET ORAL at 10:33

## 2022-01-01 RX ADMIN — VANCOMYCIN HYDROCHLORIDE 1500 MG: 10 INJECTION, POWDER, LYOPHILIZED, FOR SOLUTION INTRAVENOUS at 01:31

## 2022-01-01 RX ADMIN — ATORVASTATIN CALCIUM 10 MG: 20 TABLET, FILM COATED ORAL at 22:07

## 2022-01-01 RX ADMIN — SODIUM CHLORIDE, PRESERVATIVE FREE 2 G: 5 INJECTION INTRAVENOUS at 19:44

## 2022-01-01 RX ADMIN — SODIUM CHLORIDE, PRESERVATIVE FREE 10 ML: 5 INJECTION INTRAVENOUS at 07:33

## 2022-01-01 RX ADMIN — Medication 7 UNITS: at 20:30

## 2022-01-01 RX ADMIN — SODIUM CHLORIDE, PRESERVATIVE FREE 10 ML: 5 INJECTION INTRAVENOUS at 15:11

## 2022-01-01 RX ADMIN — Medication 8 UNITS: at 08:40

## 2022-01-01 RX ADMIN — SODIUM CHLORIDE, PRESERVATIVE FREE 10 ML: 5 INJECTION INTRAVENOUS at 21:53

## 2022-01-01 RX ADMIN — DEXAMETHASONE SODIUM PHOSPHATE 4 MG: 4 INJECTION, SOLUTION INTRAMUSCULAR; INTRAVENOUS at 15:07

## 2022-01-01 RX ADMIN — HYDROCODONE BITARTRATE AND ACETAMINOPHEN 1 TABLET: 5; 325 TABLET ORAL at 09:07

## 2022-01-01 RX ADMIN — SENNOSIDES AND DOCUSATE SODIUM 1 TABLET: 50; 8.6 TABLET ORAL at 08:50

## 2022-01-01 RX ADMIN — ONDANSETRON 4 MG: 2 INJECTION INTRAMUSCULAR; INTRAVENOUS at 13:40

## 2022-01-01 RX ADMIN — Medication 3 UNITS: at 17:05

## 2022-01-01 RX ADMIN — Medication 3 UNITS: at 16:30

## 2022-01-01 RX ADMIN — HYDROCODONE BITARTRATE AND ACETAMINOPHEN 1 TABLET: 5; 325 TABLET ORAL at 03:07

## 2022-01-01 RX ADMIN — PANTOPRAZOLE SODIUM 40 MG: 40 TABLET, DELAYED RELEASE ORAL at 06:33

## 2022-01-01 RX ADMIN — SODIUM CHLORIDE, PRESERVATIVE FREE 10 ML: 5 INJECTION INTRAVENOUS at 00:36

## 2022-01-01 RX ADMIN — Medication 2 UNITS: at 21:37

## 2022-01-01 RX ADMIN — Medication 3 UNITS: at 09:56

## 2022-01-01 RX ADMIN — SODIUM CHLORIDE, POTASSIUM CHLORIDE, SODIUM LACTATE AND CALCIUM CHLORIDE 250 ML: 600; 310; 30; 20 INJECTION, SOLUTION INTRAVENOUS at 06:30

## 2022-01-01 RX ADMIN — PANTOPRAZOLE SODIUM 40 MG: 40 TABLET, DELAYED RELEASE ORAL at 06:17

## 2022-01-01 RX ADMIN — PANTOPRAZOLE SODIUM 40 MG: 40 TABLET, DELAYED RELEASE ORAL at 06:23

## 2022-01-01 RX ADMIN — Medication 7 UNITS: at 18:11

## 2022-01-01 RX ADMIN — HYDROCODONE BITARTRATE AND ACETAMINOPHEN 1 TABLET: 5; 325 TABLET ORAL at 21:52

## 2022-01-01 RX ADMIN — SENNOSIDES AND DOCUSATE SODIUM 1 TABLET: 50; 8.6 TABLET ORAL at 09:34

## 2022-01-01 RX ADMIN — Medication 3 UNITS: at 18:08

## 2022-01-01 RX ADMIN — Medication 80 MCG: at 14:55

## 2022-01-01 RX ADMIN — FOLIC ACID 1 MG: 1 TABLET ORAL at 08:46

## 2022-01-01 RX ADMIN — Medication 4 UNITS: at 23:12

## 2022-01-01 RX ADMIN — POLYETHYLENE GLYCOL 3350 17 G: 17 POWDER, FOR SOLUTION ORAL at 17:10

## 2022-01-01 RX ADMIN — IOPAMIDOL 100 ML: 755 INJECTION, SOLUTION INTRAVENOUS at 12:08

## 2022-01-01 RX ADMIN — ATORVASTATIN CALCIUM 10 MG: 20 TABLET, FILM COATED ORAL at 21:18

## 2022-01-01 RX ADMIN — FLUCONAZOLE 200 MG: 100 TABLET ORAL at 08:02

## 2022-01-01 RX ADMIN — ONDANSETRON 4 MG: 2 INJECTION INTRAMUSCULAR; INTRAVENOUS at 16:47

## 2022-01-01 RX ADMIN — Medication 8 UNITS: at 08:52

## 2022-01-01 RX ADMIN — GLYCERIN 1 SUPPOSITORY: 2 SUPPOSITORY RECTAL at 14:02

## 2022-01-01 RX ADMIN — SODIUM CHLORIDE, POTASSIUM CHLORIDE, SODIUM LACTATE AND CALCIUM CHLORIDE 50 ML/HR: 600; 310; 30; 20 INJECTION, SOLUTION INTRAVENOUS at 19:08

## 2022-01-01 RX ADMIN — HYDROMORPHONE HYDROCHLORIDE 0.5 MG: 1 INJECTION, SOLUTION INTRAMUSCULAR; INTRAVENOUS; SUBCUTANEOUS at 22:09

## 2022-01-01 RX ADMIN — SODIUM CHLORIDE, PRESERVATIVE FREE 10 ML: 5 INJECTION INTRAVENOUS at 05:47

## 2022-01-01 RX ADMIN — SODIUM CHLORIDE, PRESERVATIVE FREE 10 ML: 5 INJECTION INTRAVENOUS at 15:12

## 2022-01-01 RX ADMIN — PANTOPRAZOLE SODIUM 40 MG: 40 INJECTION, POWDER, FOR SOLUTION INTRAVENOUS at 10:33

## 2022-01-01 RX ADMIN — ACETAMINOPHEN 650 MG: 325 TABLET ORAL at 10:04

## 2022-01-01 RX ADMIN — SODIUM CHLORIDE, PRESERVATIVE FREE 10 ML: 5 INJECTION INTRAVENOUS at 13:59

## 2022-01-01 RX ADMIN — HYDROCODONE BITARTRATE AND ACETAMINOPHEN 1 TABLET: 5; 325 TABLET ORAL at 17:56

## 2022-01-01 RX ADMIN — HYDROMORPHONE HYDROCHLORIDE 0.5 MG: 1 INJECTION, SOLUTION INTRAMUSCULAR; INTRAVENOUS; SUBCUTANEOUS at 16:06

## 2022-01-01 RX ADMIN — SODIUM CHLORIDE: 900 INJECTION, SOLUTION INTRAVENOUS at 14:51

## 2022-01-01 RX ADMIN — Medication 7 UNITS: at 11:17

## 2022-01-01 RX ADMIN — TRAMADOL HYDROCHLORIDE 25 MG: 50 TABLET, COATED ORAL at 23:01

## 2022-01-01 RX ADMIN — SODIUM CHLORIDE, PRESERVATIVE FREE 10 ML: 5 INJECTION INTRAVENOUS at 06:50

## 2022-01-01 RX ADMIN — POLYETHYLENE GLYCOL 3350 17 G: 17 POWDER, FOR SOLUTION ORAL at 09:39

## 2022-01-01 RX ADMIN — Medication 8 UNITS: at 09:37

## 2022-01-01 RX ADMIN — Medication 7 UNITS: at 11:27

## 2022-01-01 RX ADMIN — DIAZEPAM 2.5 MG: 5 INJECTION, SOLUTION INTRAMUSCULAR; INTRAVENOUS at 06:30

## 2022-01-01 RX ADMIN — ACETAMINOPHEN 650 MG: 325 TABLET ORAL at 19:17

## 2022-01-01 RX ADMIN — APIXABAN 2.5 MG: 2.5 TABLET, FILM COATED ORAL at 18:18

## 2022-01-01 RX ADMIN — ROCURONIUM BROMIDE 10 MG: 10 SOLUTION INTRAVENOUS at 14:55

## 2022-01-01 RX ADMIN — GLYCOPYRROLATE 0.2 MG: 0.2 INJECTION INTRAMUSCULAR; INTRAVENOUS at 15:16

## 2022-01-01 RX ADMIN — Medication 2 UNITS: at 17:56

## 2022-01-01 RX ADMIN — ATORVASTATIN CALCIUM 10 MG: 20 TABLET, FILM COATED ORAL at 21:28

## 2022-01-01 RX ADMIN — Medication 10 UNITS: at 12:50

## 2022-01-01 RX ADMIN — Medication 4 UNITS: at 22:13

## 2022-01-01 RX ADMIN — CEFEPIME 2 G: 2 INJECTION, POWDER, FOR SOLUTION INTRAVENOUS at 06:41

## 2022-01-01 RX ADMIN — SODIUM CHLORIDE, POTASSIUM CHLORIDE, SODIUM LACTATE AND CALCIUM CHLORIDE 500 ML: 600; 310; 30; 20 INJECTION, SOLUTION INTRAVENOUS at 08:34

## 2022-01-01 RX ADMIN — ALBUMIN (HUMAN) 12.5 G: 0.25 INJECTION, SOLUTION INTRAVENOUS at 17:44

## 2022-01-01 RX ADMIN — SODIUM CHLORIDE, POTASSIUM CHLORIDE, SODIUM LACTATE AND CALCIUM CHLORIDE 75 ML/HR: 600; 310; 30; 20 INJECTION, SOLUTION INTRAVENOUS at 22:12

## 2022-01-01 RX ADMIN — HYDROMORPHONE HYDROCHLORIDE 1 MG: 1 INJECTION, SOLUTION INTRAMUSCULAR; INTRAVENOUS; SUBCUTANEOUS at 12:21

## 2022-01-01 RX ADMIN — TRAMADOL HYDROCHLORIDE 50 MG: 50 TABLET, COATED ORAL at 22:35

## 2022-01-01 RX ADMIN — DIPHENHYDRAMINE HYDROCHLORIDE 12.5 MG: 50 INJECTION, SOLUTION INTRAMUSCULAR; INTRAVENOUS at 13:23

## 2022-01-01 RX ADMIN — SENNOSIDES AND DOCUSATE SODIUM 1 TABLET: 50; 8.6 TABLET ORAL at 09:06

## 2022-01-01 RX ADMIN — SENNOSIDES AND DOCUSATE SODIUM 1 TABLET: 50; 8.6 TABLET ORAL at 08:38

## 2022-01-01 RX ADMIN — SODIUM CHLORIDE 80 MCG/MIN: 9 INJECTION, SOLUTION INTRAVENOUS at 14:59

## 2022-01-01 RX ADMIN — VANCOMYCIN HYDROCHLORIDE 1250 MG: 1.25 INJECTION, POWDER, LYOPHILIZED, FOR SOLUTION INTRAVENOUS at 20:20

## 2022-01-01 RX ADMIN — SENNOSIDES AND DOCUSATE SODIUM 1 TABLET: 50; 8.6 TABLET ORAL at 08:54

## 2022-01-01 RX ADMIN — SODIUM CHLORIDE, PRESERVATIVE FREE 10 ML: 5 INJECTION INTRAVENOUS at 13:43

## 2022-01-01 RX ADMIN — SODIUM CHLORIDE, PRESERVATIVE FREE 10 ML: 5 INJECTION INTRAVENOUS at 21:19

## 2022-01-01 RX ADMIN — PIPERACILLIN AND TAZOBACTAM 3.38 G: 3; .375 INJECTION, POWDER, FOR SOLUTION INTRAVENOUS at 06:39

## 2022-01-01 RX ADMIN — SODIUM CHLORIDE, PRESERVATIVE FREE 10 ML: 5 INJECTION INTRAVENOUS at 16:59

## 2022-01-01 RX ADMIN — APIXABAN 2.5 MG: 2.5 TABLET, FILM COATED ORAL at 18:00

## 2022-01-01 RX ADMIN — HYDROCODONE BITARTRATE AND ACETAMINOPHEN 1 TABLET: 5; 325 TABLET ORAL at 16:12

## 2022-01-01 RX ADMIN — Medication 3 UNITS: at 00:43

## 2022-01-01 RX ADMIN — IRON SUCROSE 200 MG: 20 INJECTION, SOLUTION INTRAVENOUS at 13:47

## 2022-01-01 RX ADMIN — FLUCONAZOLE 200 MG: 100 TABLET ORAL at 08:27

## 2022-01-01 RX ADMIN — Medication 4000 UNITS: at 15:11

## 2022-01-01 RX ADMIN — SODIUM CHLORIDE, PRESERVATIVE FREE 10 ML: 5 INJECTION INTRAVENOUS at 22:35

## 2022-01-01 RX ADMIN — Medication 3 UNITS: at 09:00

## 2022-01-01 RX ADMIN — Medication 80 MCG: at 14:59

## 2022-01-01 RX ADMIN — PANTOPRAZOLE SODIUM 40 MG: 40 INJECTION, POWDER, FOR SOLUTION INTRAVENOUS at 08:52

## 2022-01-01 RX ADMIN — POLYETHYLENE GLYCOL 3350 17 G: 17 POWDER, FOR SOLUTION ORAL at 19:20

## 2022-01-01 RX ADMIN — FLUCONAZOLE 200 MG: 100 TABLET ORAL at 09:00

## 2022-01-01 RX ADMIN — SODIUM CHLORIDE, PRESERVATIVE FREE 10 ML: 5 INJECTION INTRAVENOUS at 21:36

## 2022-01-01 RX ADMIN — APIXABAN 2.5 MG: 2.5 TABLET, FILM COATED ORAL at 17:59

## 2022-01-01 RX ADMIN — HYDROMORPHONE HYDROCHLORIDE 0.5 MG: 1 INJECTION, SOLUTION INTRAMUSCULAR; INTRAVENOUS; SUBCUTANEOUS at 08:41

## 2022-01-01 RX ADMIN — PIPERACILLIN AND TAZOBACTAM 3.38 G: 3; .375 INJECTION, POWDER, FOR SOLUTION INTRAVENOUS at 22:14

## 2022-01-01 RX ADMIN — TRAMADOL HYDROCHLORIDE 25 MG: 50 TABLET, COATED ORAL at 10:24

## 2022-01-01 RX ADMIN — HYDROCODONE BITARTRATE AND ACETAMINOPHEN 1 TABLET: 5; 325 TABLET ORAL at 22:35

## 2022-01-01 RX ADMIN — Medication 8 UNITS: at 09:32

## 2022-01-01 RX ADMIN — HYDROMORPHONE HYDROCHLORIDE 0.5 MG: 1 INJECTION, SOLUTION INTRAMUSCULAR; INTRAVENOUS; SUBCUTANEOUS at 10:37

## 2022-01-01 RX ADMIN — HYDROMORPHONE HYDROCHLORIDE 0.5 MG: 1 INJECTION, SOLUTION INTRAMUSCULAR; INTRAVENOUS; SUBCUTANEOUS at 14:10

## 2022-01-01 RX ADMIN — Medication 4 UNITS: at 20:08

## 2022-01-01 RX ADMIN — SODIUM CHLORIDE, PRESERVATIVE FREE 10 ML: 5 INJECTION INTRAVENOUS at 22:40

## 2022-01-01 RX ADMIN — Medication 8 UNITS: at 07:52

## 2022-02-19 ENCOUNTER — APPOINTMENT (OUTPATIENT)
Dept: CT IMAGING | Age: 82
DRG: 438 | End: 2022-02-19
Attending: PHYSICIAN ASSISTANT
Payer: MEDICARE

## 2022-02-19 ENCOUNTER — HOSPITAL ENCOUNTER (INPATIENT)
Age: 82
LOS: 5 days | Discharge: HOME HEALTH | DRG: 438 | End: 2022-02-24
Attending: HOSPITALIST | Admitting: HOSPITALIST
Payer: MEDICARE

## 2022-02-19 DIAGNOSIS — K85.90 ACUTE PANCREATITIS, UNSPECIFIED COMPLICATION STATUS, UNSPECIFIED PANCREATITIS TYPE: Primary | ICD-10-CM

## 2022-02-19 DIAGNOSIS — R77.8 ELEVATED TROPONIN: ICD-10-CM

## 2022-02-19 DIAGNOSIS — E08.65 DIABETES MELLITUS DUE TO UNDERLYING CONDITION WITH HYPERGLYCEMIA, WITHOUT LONG-TERM CURRENT USE OF INSULIN (HCC): ICD-10-CM

## 2022-02-19 LAB
ALBUMIN SERPL-MCNC: 3.5 G/DL (ref 3.5–5)
ALBUMIN/GLOB SERPL: 0.8 {RATIO} (ref 1.1–2.2)
ALP SERPL-CCNC: 160 U/L (ref 45–117)
ALT SERPL-CCNC: 16 U/L (ref 12–78)
ANION GAP SERPL CALC-SCNC: 10 MMOL/L (ref 5–15)
APPEARANCE UR: ABNORMAL
AST SERPL W P-5'-P-CCNC: 12 U/L (ref 15–37)
BACTERIA URNS QL MICRO: NEGATIVE /HPF
BASE DEFICIT BLDV-SCNC: 1.6 MMOL/L (ref 0–2)
BASOPHILS # BLD: 0 K/UL (ref 0–0.1)
BASOPHILS NFR BLD: 0 % (ref 0–1)
BDY SITE: ABNORMAL
BILIRUB SERPL-MCNC: 0.7 MG/DL (ref 0.2–1)
BILIRUB UR QL: NEGATIVE
BUN SERPL-MCNC: 30 MG/DL (ref 6–20)
BUN/CREAT SERPL: 27 (ref 12–20)
CA-I BLD-MCNC: 9.5 MG/DL (ref 8.5–10.1)
CHLORIDE SERPL-SCNC: 102 MMOL/L (ref 97–108)
CO2 SERPL-SCNC: 23 MMOL/L (ref 21–32)
COLOR UR: ABNORMAL
CREAT SERPL-MCNC: 1.1 MG/DL (ref 0.55–1.02)
DIFFERENTIAL METHOD BLD: ABNORMAL
EOSINOPHIL # BLD: 0.1 K/UL (ref 0–0.4)
EOSINOPHIL NFR BLD: 2 % (ref 0–7)
ERYTHROCYTE [DISTWIDTH] IN BLOOD BY AUTOMATED COUNT: 15 % (ref 11.5–14.5)
FIO2 ON VENT: 21 %
GLOBULIN SER CALC-MCNC: 4.2 G/DL (ref 2–4)
GLUCOSE SERPL-MCNC: 358 MG/DL (ref 65–100)
GLUCOSE UR STRIP.AUTO-MCNC: >300 MG/DL
HCO3 BLDV-SCNC: 23 MMOL/L (ref 22–26)
HCT VFR BLD AUTO: 40.9 % (ref 35–47)
HGB BLD-MCNC: 13.4 G/DL (ref 11.5–16)
HGB UR QL STRIP: NEGATIVE
IMM GRANULOCYTES # BLD AUTO: 0 K/UL
IMM GRANULOCYTES NFR BLD AUTO: 0 %
KETONES UR QL STRIP.AUTO: 20 MG/DL
LACTATE SERPL-SCNC: 2 MMOL/L (ref 0.4–2)
LEUKOCYTE ESTERASE UR QL STRIP.AUTO: NEGATIVE
LIPASE SERPL-CCNC: >3000 U/L (ref 73–393)
LYMPHOCYTES # BLD: 0.7 K/UL (ref 0.8–3.5)
LYMPHOCYTES NFR BLD: 10 % (ref 12–49)
MCH RBC QN AUTO: 27.1 PG (ref 26–34)
MCHC RBC AUTO-ENTMCNC: 32.8 G/DL (ref 30–36.5)
MCV RBC AUTO: 82.6 FL (ref 80–99)
MONOCYTES # BLD: 0.4 K/UL (ref 0–1)
MONOCYTES NFR BLD: 6 % (ref 5–13)
MUCOUS THREADS URNS QL MICRO: ABNORMAL /LPF
NEUTS SEG # BLD: 6 K/UL (ref 1.8–8)
NEUTS SEG NFR BLD: 82 % (ref 32–75)
NITRITE UR QL STRIP.AUTO: POSITIVE
NRBC # BLD: 0 K/UL (ref 0–0.01)
NRBC BLD-RTO: 0 PER 100 WBC
PCO2 BLDV: 37.9 MMHG (ref 40–50)
PH BLDV: 7.39 [PH] (ref 7.31–7.41)
PH UR STRIP: 6 [PH] (ref 5–8)
PLATELET # BLD AUTO: 160 K/UL (ref 150–400)
PMV BLD AUTO: 12.5 FL (ref 8.9–12.9)
PO2 BLDV: 146 MMHG (ref 36–42)
POTASSIUM SERPL-SCNC: 4.2 MMOL/L (ref 3.5–5.1)
PROT SERPL-MCNC: 7.7 G/DL (ref 6.4–8.2)
PROT UR STRIP-MCNC: NEGATIVE MG/DL
RBC # BLD AUTO: 4.95 M/UL (ref 3.8–5.2)
RBC #/AREA URNS HPF: ABNORMAL /HPF (ref 0–5)
RBC MORPH BLD: ABNORMAL
RBC MORPH BLD: ABNORMAL
SAO2 % BLDV: 99 % (ref 60–80)
SAO2% DEVICE SAO2% SENSOR NAME: ABNORMAL
SODIUM SERPL-SCNC: 135 MMOL/L (ref 136–145)
SP GR UR REFRACTOMETRY: 1.03 (ref 1–1.03)
TROPONIN-HIGH SENSITIVITY: 580 NG/L (ref 0–51)
UA: UC IF INDICATED,UAUC: ABNORMAL
UROBILINOGEN UR QL STRIP.AUTO: 0.1 EU/DL (ref 0.1–1)
WBC # BLD AUTO: 7.2 K/UL (ref 3.6–11)
WBC URNS QL MICRO: ABNORMAL /HPF (ref 0–4)

## 2022-02-19 PROCEDURE — 74011250637 HC RX REV CODE- 250/637: Performed by: HOSPITALIST

## 2022-02-19 PROCEDURE — 74177 CT ABD & PELVIS W/CONTRAST: CPT

## 2022-02-19 PROCEDURE — 36415 COLL VENOUS BLD VENIPUNCTURE: CPT

## 2022-02-19 PROCEDURE — 96375 TX/PRO/DX INJ NEW DRUG ADDON: CPT

## 2022-02-19 PROCEDURE — 80053 COMPREHEN METABOLIC PANEL: CPT

## 2022-02-19 PROCEDURE — 93005 ELECTROCARDIOGRAM TRACING: CPT

## 2022-02-19 PROCEDURE — 85025 COMPLETE CBC W/AUTO DIFF WBC: CPT

## 2022-02-19 PROCEDURE — 83605 ASSAY OF LACTIC ACID: CPT

## 2022-02-19 PROCEDURE — 99285 EMERGENCY DEPT VISIT HI MDM: CPT

## 2022-02-19 PROCEDURE — 74011000636 HC RX REV CODE- 636: Performed by: PHYSICIAN ASSISTANT

## 2022-02-19 PROCEDURE — 74011250636 HC RX REV CODE- 250/636: Performed by: PHYSICIAN ASSISTANT

## 2022-02-19 PROCEDURE — 83690 ASSAY OF LIPASE: CPT

## 2022-02-19 PROCEDURE — 82803 BLOOD GASES ANY COMBINATION: CPT

## 2022-02-19 PROCEDURE — 96374 THER/PROPH/DIAG INJ IV PUSH: CPT

## 2022-02-19 PROCEDURE — 81001 URINALYSIS AUTO W/SCOPE: CPT

## 2022-02-19 PROCEDURE — 65270000029 HC RM PRIVATE

## 2022-02-19 PROCEDURE — 84484 ASSAY OF TROPONIN QUANT: CPT

## 2022-02-19 PROCEDURE — 74011000250 HC RX REV CODE- 250: Performed by: HOSPITALIST

## 2022-02-19 PROCEDURE — 74011250636 HC RX REV CODE- 250/636: Performed by: HOSPITALIST

## 2022-02-19 RX ORDER — SODIUM CHLORIDE 0.9 % (FLUSH) 0.9 %
5-40 SYRINGE (ML) INJECTION EVERY 8 HOURS
Status: DISCONTINUED | OUTPATIENT
Start: 2022-02-19 | End: 2022-02-24 | Stop reason: HOSPADM

## 2022-02-19 RX ORDER — CARVEDILOL 3.12 MG/1
3.12 TABLET ORAL 2 TIMES DAILY WITH MEALS
Status: DISCONTINUED | OUTPATIENT
Start: 2022-02-20 | End: 2022-02-22

## 2022-02-19 RX ORDER — ACETAMINOPHEN 325 MG/1
650 TABLET ORAL
Status: DISCONTINUED | OUTPATIENT
Start: 2022-02-19 | End: 2022-02-24 | Stop reason: HOSPADM

## 2022-02-19 RX ORDER — ONDANSETRON 2 MG/ML
4 INJECTION INTRAMUSCULAR; INTRAVENOUS
Status: DISCONTINUED | OUTPATIENT
Start: 2022-02-19 | End: 2022-02-24 | Stop reason: HOSPADM

## 2022-02-19 RX ORDER — MORPHINE SULFATE 4 MG/ML
4 INJECTION INTRAVENOUS ONCE
Status: COMPLETED | OUTPATIENT
Start: 2022-02-19 | End: 2022-02-19

## 2022-02-19 RX ORDER — HYDROMORPHONE HYDROCHLORIDE 1 MG/ML
0.5 INJECTION, SOLUTION INTRAMUSCULAR; INTRAVENOUS; SUBCUTANEOUS
Status: DISPENSED | OUTPATIENT
Start: 2022-02-19 | End: 2022-02-21

## 2022-02-19 RX ORDER — ONDANSETRON 2 MG/ML
4 INJECTION INTRAMUSCULAR; INTRAVENOUS
Status: COMPLETED | OUTPATIENT
Start: 2022-02-19 | End: 2022-02-19

## 2022-02-19 RX ORDER — PRAVASTATIN SODIUM 20 MG/1
10 TABLET ORAL
Status: DISCONTINUED | OUTPATIENT
Start: 2022-02-19 | End: 2022-02-24 | Stop reason: HOSPADM

## 2022-02-19 RX ORDER — CLOPIDOGREL BISULFATE 75 MG/1
75 TABLET ORAL DAILY
Status: DISCONTINUED | OUTPATIENT
Start: 2022-02-20 | End: 2022-02-24 | Stop reason: HOSPADM

## 2022-02-19 RX ORDER — SODIUM CHLORIDE 0.9 % (FLUSH) 0.9 %
5-40 SYRINGE (ML) INJECTION AS NEEDED
Status: DISCONTINUED | OUTPATIENT
Start: 2022-02-19 | End: 2022-02-24 | Stop reason: HOSPADM

## 2022-02-19 RX ORDER — MORPHINE SULFATE 2 MG/ML
2 INJECTION, SOLUTION INTRAMUSCULAR; INTRAVENOUS ONCE
Status: COMPLETED | OUTPATIENT
Start: 2022-02-19 | End: 2022-02-19

## 2022-02-19 RX ORDER — SODIUM CHLORIDE 9 MG/ML
100 INJECTION, SOLUTION INTRAVENOUS CONTINUOUS
Status: DISCONTINUED | OUTPATIENT
Start: 2022-02-19 | End: 2022-02-22

## 2022-02-19 RX ORDER — VERAPAMIL HYDROCHLORIDE 120 MG/1
240 CAPSULE, EXTENDED RELEASE ORAL DAILY
Status: DISCONTINUED | OUTPATIENT
Start: 2022-02-20 | End: 2022-02-22

## 2022-02-19 RX ORDER — PANTOPRAZOLE SODIUM 40 MG/1
40 TABLET, DELAYED RELEASE ORAL
Status: DISCONTINUED | OUTPATIENT
Start: 2022-02-20 | End: 2022-02-22

## 2022-02-19 RX ADMIN — WATER 1 G: 1 INJECTION INTRAMUSCULAR; INTRAVENOUS; SUBCUTANEOUS at 22:14

## 2022-02-19 RX ADMIN — ONDANSETRON 4 MG: 2 INJECTION INTRAMUSCULAR; INTRAVENOUS at 19:36

## 2022-02-19 RX ADMIN — ONDANSETRON 4 MG: 2 INJECTION INTRAMUSCULAR; INTRAVENOUS at 23:21

## 2022-02-19 RX ADMIN — MORPHINE SULFATE 2 MG: 2 INJECTION, SOLUTION INTRAMUSCULAR; INTRAVENOUS at 20:57

## 2022-02-19 RX ADMIN — MORPHINE SULFATE 4 MG: 4 INJECTION INTRAVENOUS at 19:39

## 2022-02-19 RX ADMIN — IOPAMIDOL 100 ML: 755 INJECTION, SOLUTION INTRAVENOUS at 20:30

## 2022-02-19 RX ADMIN — PRAVASTATIN SODIUM 10 MG: 20 TABLET ORAL at 23:09

## 2022-02-19 RX ADMIN — SODIUM CHLORIDE 1000 ML: 9 INJECTION, SOLUTION INTRAVENOUS at 19:41

## 2022-02-19 RX ADMIN — SODIUM CHLORIDE 100 ML/HR: 9 INJECTION, SOLUTION INTRAVENOUS at 22:14

## 2022-02-19 RX ADMIN — SODIUM CHLORIDE, PRESERVATIVE FREE 10 ML: 5 INJECTION INTRAVENOUS at 22:15

## 2022-02-19 NOTE — ED PROVIDER NOTES
EMERGENCY DEPARTMENT HISTORY AND PHYSICAL EXAM      Date: 2/19/2022  Patient Name: Marine Novak    History of Presenting Illness     Chief Complaint   Patient presents with    Abdominal Pain       History Provided By: Patient    HPI: Marine Novak, 81 y.o. female with a past medical history significant for diabetes, pancreatitis, hypertension who presents to the ED with cc of gradual onset, worsening, constant diffuse abdominal pain worse in the upper abdomen which started today. Associate symptoms include nausea with multiple episodes of emesis. Has taken Zofran, with no relief. Has history of chronic pancreatitis and states current pain feels similar. History is otherwise limited secondary to patient having excessive vomiting. Denies any chest pain, shortness of breath, diarrhea. There are no other complaints, changes, or physical findings at this time. PCP: Milly Martinez MD    No current facility-administered medications on file prior to encounter. Current Outpatient Medications on File Prior to Encounter   Medication Sig Dispense Refill    furosemide (LASIX) 40 mg tablet Take 1 Tablet by mouth daily. 30 Tablet 1    apixaban (ELIQUIS) 2.5 mg tablet Take 1 Tablet by mouth two (2) times a day. 30 Tablet 1    pantoprazole (PROTONIX) 40 mg tablet Take 1 Tablet by mouth Daily (before breakfast). 30 Tablet 1    potassium chloride SA (MICRO-K) 10 mEq capsule Take 10 mEq by mouth daily.  pravastatin (PRAVACHOL) 10 mg tablet Take 10 mg by mouth nightly.  clopidogreL (PLAVIX) 75 mg tab Take 1 Tablet by mouth daily. 30 Tablet 0    carvediloL (COREG) 3.125 mg tablet Take 1 Tablet by mouth two (2) times daily (with meals). 60 Tablet 0    glipiZIDE (GLUCOTROL) 5 mg tablet Take 1 Tablet by mouth Daily (before breakfast). 30 Tablet 0    verapamil ER (CALAN-SR) 240 mg CR tablet Take 240 mg by mouth daily.          Past History     Past Medical History:  Past Medical History:   Diagnosis Date    Colon polyps     Diabetes ([facility] [address]     Diverticulosis     Hypertension     Ill-defined condition        Past Surgical History:  Past Surgical History:   Procedure Laterality Date    COLONOSCOPY N/A 4/29/2021    COLONOSCOPY performed by Sriram Antoine MD at [facility] ENDOSCOPY       Family History:  Family History   Problem Relation Age of Onset    Diabetes Other        Social History:  Social History     Tobacco Use    Smoking status: Never Smoker    Smokeless tobacco: Never Used   Vaping Use    Vaping Use: Never used   Substance Use Topics    Alcohol use: Not Currently    Drug use: Never       Allergies: Allergies   Allergen Reactions    Insulins Itching    Penicillins Other (comments)     Pt states it makes her pass out         Review of Systems     Review of Systems   Constitutional: Negative for chills, fatigue and fever. HENT: Negative. Respiratory: Negative for cough, chest tightness, shortness of breath and wheezing. Cardiovascular: Negative for chest pain and palpitations. Gastrointestinal: Positive for abdominal pain, nausea and vomiting. Negative for diarrhea. Genitourinary: Negative for frequency and urgency. Musculoskeletal: Negative for back pain, neck pain and neck stiffness. Skin: Negative for rash. Neurological: Negative for dizziness, weakness, light-headedness and headaches. Psychiatric/Behavioral: Negative. All other systems reviewed and are negative. Physical Exam     Physical Exam  Vitals and nursing note reviewed. Constitutional:       General: She is in acute distress. Appearance: Normal appearance. She is well-developed. She is not ill-appearing, toxic-appearing or diaphoretic. Comments: Actively vomiting, severe distress secondary to pain and vomiting   HENT:      Head: Normocephalic and atraumatic. Nose: Nose normal. No congestion or rhinorrhea. Mouth/Throat:      Mouth: Mucous membranes are moist.      Pharynx: Oropharynx is clear.  No oropharyngeal exudate or posterior oropharyngeal erythema. Eyes:      General: No scleral icterus. Conjunctiva/sclera: Conjunctivae normal.      Pupils: Pupils are equal, round, and reactive to light. Cardiovascular:      Rate and Rhythm: Regular rhythm. Tachycardia present. Pulses:           Radial pulses are 2+ on the right side and 2+ on the left side. Dorsalis pedis pulses are 2+ on the right side and 2+ on the left side. Heart sounds: No murmur heard. No friction rub. No gallop. Pulmonary:      Effort: Pulmonary effort is normal. No tachypnea, accessory muscle usage, respiratory distress or retractions. Breath sounds: Normal breath sounds. No stridor. No decreased breath sounds, wheezing, rhonchi or rales. Chest:      Chest wall: No tenderness. Abdominal:      General: Bowel sounds are normal. There is no distension. Palpations: Abdomen is soft. There is no mass. Tenderness: There is generalized abdominal tenderness and tenderness in the right upper quadrant, epigastric area and left upper quadrant. There is no right CVA tenderness, left CVA tenderness, guarding or rebound. Musculoskeletal:         General: No deformity. Normal range of motion. Cervical back: Normal range of motion and neck supple. No rigidity. No muscular tenderness. Right lower leg: No edema. Left lower leg: No edema. Skin:     General: Skin is warm and dry. Capillary Refill: Capillary refill takes less than 2 seconds. Coloration: Skin is not jaundiced or pale. Findings: No bruising, erythema or rash. Neurological:      General: No focal deficit present. Mental Status: She is alert and oriented to person, place, and time. Mental status is at baseline. Sensory: Sensation is intact. Motor: Motor function is intact. Psychiatric:         Mood and Affect: Mood normal.         Behavior: Behavior normal. Behavior is cooperative.          Thought Content: Thought content normal.         Judgment: Judgment normal.         Lab and Diagnostic Study Results     Labs -  Recent Results (from the past 24 hour(s))   CBC WITH AUTOMATED DIFF    Collection Time: 02/19/22  7:14 PM   Result Value Ref Range    WBC 7.2 3.6 - 11.0 K/uL    RBC 4.95 3.80 - 5.20 M/uL    HGB 13.4 11.5 - 16.0 g/dL    HCT 40.9 35.0 - 47.0 %    MCV 82.6 80.0 - 99.0 FL    MCH 27.1 26.0 - 34.0 PG    MCHC 32.8 30.0 - 36.5 g/dL    RDW 15.0 (H) 11.5 - 14.5 %    PLATELET 847 078 - 312 K/uL    MPV 12.5 8.9 - 12.9 FL    NRBC 0.0 0.0  WBC    ABSOLUTE NRBC 0.00 0.00 - 0.01 K/uL    NEUTROPHILS PENDING %    LYMPHOCYTES PENDING %    MONOCYTES PENDING %    EOSINOPHILS PENDING %    BASOPHILS PENDING %    IMMATURE GRANULOCYTES PENDING %    ABS. NEUTROPHILS PENDING K/UL    ABS. LYMPHOCYTES PENDING K/UL    ABS. MONOCYTES PENDING K/UL    ABS. EOSINOPHILS PENDING K/UL    ABS. BASOPHILS PENDING K/UL    ABS. IMM. GRANS. PENDING K/UL    DF PENDING    METABOLIC PANEL, COMPREHENSIVE    Collection Time: 02/19/22  7:14 PM   Result Value Ref Range    Sodium 135 (L) 136 - 145 mmol/L    Potassium 4.2 3.5 - 5.1 mmol/L    Chloride 102 97 - 108 mmol/L    CO2 23 21 - 32 mmol/L    Anion gap 10 5 - 15 mmol/L    Glucose 358 (H) 65 - 100 mg/dL    BUN 30 (H) 6 - 20 mg/dL    Creatinine 1.10 (H) 0.55 - 1.02 mg/dL    BUN/Creatinine ratio 27 (H) 12 - 20      GFR est AA 58 (L) >60 ml/min/1.73m2    GFR est non-AA 48 (L) >60 ml/min/1.73m2    Calcium 9.5 8.5 - 10.1 mg/dL    Bilirubin, total 0.7 0.2 - 1.0 mg/dL    AST (SGOT) 12 (L) 15 - 37 U/L    ALT (SGPT) 16 12 - 78 U/L    Alk.  phosphatase 160 (H) 45 - 117 U/L    Protein, total 7.7 6.4 - 8.2 g/dL    Albumin 3.5 3.5 - 5.0 g/dL    Globulin 4.2 (H) 2.0 - 4.0 g/dL    A-G Ratio 0.8 (L) 1.1 - 2.2     TROPONIN-HIGH SENSITIVITY    Collection Time: 02/19/22  7:14 PM   Result Value Ref Range    Troponin-High Sensitivity 580 (HH) 0 - 51 ng/L   LIPASE    Collection Time: 02/19/22  7:14 PM   Result Value Ref Range    Lipase >3,000 (H) 73 - 393 U/L   LACTIC ACID    Collection Time: 02/19/22  7:14 PM   Result Value Ref Range    Lactic acid 2.0 0.4 - 2.0 mmol/L   VENOUS BLOOD GAS    Collection Time: 02/19/22  7:14 PM   Result Value Ref Range    VENOUS PH 7.39 7.31 - 7.41      VENOUS PCO2 37.9 (L) 40 - 50 mmHg    VENOUS PO2 146 (H) 36 - 42 mmHg    VENOUS O2 SATURATION 99 (H) 60 - 80 %    VENOUS BICARBONATE 23 22 - 26 mmol/L    VENOUS BASE DEFICIT 1.6 0 - 2 mmol/L    O2 METHOD Room air      FIO2 21.0 %    SITE Right Radial         Radiologic Studies -   CT Results  (Last 48 hours)               02/19/22 2030  CT ABD PELV W CONT Final result    Impression:  1. Unchanged appearance of cystic dilation of the main pancreatic duct in the   tail of the pancreas with calcifications and/or surgical clips again   demonstrated. There is some infiltration in the fat surrounding the pancreas and   extending into the root of the mesentery such that acute pancreatitis may be   present. Correlation with serum amylase recommended. 2. Hernias as described. 3. Sigmoid diverticulosis without CT evidence for acute diverticulitis. Narrative:  CT ABD PELV W CONT       Comparison: November 20, 2021. The study was performed after administration of IV contrast only. No oral   contrast was ordered. Coronal and sagittal reconstructions were obtained. 100cc of Isovue-370 used. Dose Reduction: All CT scans at this facility are performed using dose reduction   optimization techniques as appropriate to a performed exam including the   following: Automated exposure control, adjustments of the mA and/or kV according   to patient size, or use of iterative reconstruction technique. FINDINGS:   Mild bibasilar pulmonary parenchymal scarring and atelectasis. Previously seen   bilateral pleural effusions have resolved.        Moderate cystic dilation of the main pancreatic duct in the tail of the pancreas   is again demonstrated not significantly changed in size or appearance. Some   calcifications are also present. Dilation of the pancreatic duct in the body the   pancreas is unchanged. Head and uncinate process of the pancreas are   unremarkable. There is some infiltration in the fat around the pancreas as well   as extending into the root of the mesentery. Tiny cysts in the right lobe of the   liver. The liver is otherwise unremarkable. There are no calcified stones in the   gallbladder. There is no biliary ductal dilation. Spleen and adrenal glands are   unremarkable. Small bilateral renal cysts. No developing hydronephrosis. Abdominal aorta has normal course and caliber without aneurysmal dilation. There   is no periaortic or mesenteric mass or lymphadenopathy. Bowel loops are nondilated and there is no evidence for bowel obstruction. There   is no bowel wall thickening. Scattered small diverticuli in the colon again   noted. No CT evidence for acute diverticulitis. There is no free air or free   fluid in the abdomen or pelvis. Small ventral hernia superior to the umbilicus   containing fat only. Small left inguinal hernia without bowel. Urinary bladder   unremarkable. Uterus and adnexal regions are within normal limits for age. No   pelvic mass or lymphadenopathy. No acute bony abnormality. Multilevel   degenerative disc disease and facet joint arthropathy again demonstrated. Medical Decision Making   - I am the first provider for this patient. - I reviewed the vital signs, available nursing notes, past medical history, past surgical history, family history and social history. - Initial assessment performed. The patients presenting problems have been discussed, and they are in agreement with the care plan formulated and outlined with them. I have encouraged them to ask questions as they arise throughout their visit. Vital Signs-Reviewed the patient's vital signs.   Patient Vitals for the past 24 hrs:   Temp Pulse Resp BP SpO2   02/19/22 2103 -- 88 20 137/71 98 %   02/19/22 1946 -- 77 23 138/64 99 %   02/19/22 1838 98.6 °F (37 °C) 89 26 (!) 167/89 99 %       EKG interpretation: (Preliminary) 1834  Rhythm: normal sinus rhythm; and regular . Rate (approx.): 91; Axis: normal; P wave: normal; QRS interval: normal ; ST/T wave: T waveinversions lateral leads similar to previous, , QTc 536      Records Reviewed: Nursing Notes and Old Medical Records    The patient presents with n/v, abd pain with a differential diagnosis of pancreatitis, ACS, DKA, hyperglycemia, dehydration, electrolyte imbalance      ED Course:     ED Course as of 02/19/22 2123   Sat Feb 19, 2022 2122 CONSULT NOTE:  Consultant: Dr. Javon Orta  Specialty: Hospitalist  Discussed pt's history, disposition, and available diagnostic and imaging results. Reviewed care plans. Patient will be admitted to the hospital for further management. Severiano Bickers, PA     [NO]   2123 Troponin-High Sensitivity(!!): 580  Patient has had multiple ED visits with similar troponin. Has seen cardiology during hospitalization-presumed NSTEMI type II.  EKG without evidence of ischemia. [NO]      ED Course User Index  [NO] Uriel-Shaye Zambrano PA         Provider Notes (Medical Decision Making):     MDM  Number of Diagnoses or Management Options  Acute pancreatitis, unspecified complication status, unspecified pancreatitis type  Diabetes mellitus due to underlying condition with hyperglycemia, without long-term current use of insulin (HCC)  Elevated troponin  Diagnosis management comments:     29-year-old female with abdominal pain and nausea/vomiting. Has history of pancreatitis. CBC without any remarkable findings without leukocytosis. CMP with hyperglycemia 358, creatinine slightly elevated 1.1 compared to baseline. Lipase over 3000. Troponin elevated 580-patient not having chest pain, EKG without any evidence of ischemia.   CT scan abdomen with findings suspicious for pancreatitis, otherwise no other acute process. Pain controlled in the ED, started IV fluids. Will admit for acute pancreatitis. Amount and/or Complexity of Data Reviewed  Clinical lab tests: reviewed and ordered  Tests in the radiology section of CPT®: ordered and reviewed  Review and summarize past medical records: yes  Independent visualization of images, tracings, or specimens: yes    Patient Progress  Patient progress: stable           Disposition   Disposition: Admit to hospital    Admitted      Diagnosis     Clinical Impression:   1. Acute pancreatitis, unspecified complication status, unspecified pancreatitis type    2. Diabetes mellitus due to underlying condition with hyperglycemia, without long-term current use of insulin (HCC)    3. Elevated troponin        Attestations:    GORDY Infante    Please note that this dictation was completed with Wi3, the computer voice recognition software. Quite often unanticipated grammatical, syntax, homophones, and other interpretive errors are inadvertently transcribed by the computer software. Please disregard these errors. Please excuse any errors that have escaped final proofreading. Thank you.

## 2022-02-20 LAB
ALBUMIN SERPL-MCNC: 2.9 G/DL (ref 3.5–5)
ALBUMIN/GLOB SERPL: 0.9 {RATIO} (ref 1.1–2.2)
ALP SERPL-CCNC: 120 U/L (ref 45–117)
ALT SERPL-CCNC: 13 U/L (ref 12–78)
ANION GAP SERPL CALC-SCNC: 6 MMOL/L (ref 5–15)
AST SERPL W P-5'-P-CCNC: 10 U/L (ref 15–37)
ATRIAL RATE: 91 BPM
BILIRUB SERPL-MCNC: 0.6 MG/DL (ref 0.2–1)
BUN SERPL-MCNC: 22 MG/DL (ref 6–20)
BUN/CREAT SERPL: 22 (ref 12–20)
CA-I BLD-MCNC: 8.8 MG/DL (ref 8.5–10.1)
CALCULATED P AXIS, ECG09: 53 DEGREES
CALCULATED R AXIS, ECG10: 15 DEGREES
CALCULATED T AXIS, ECG11: 15 DEGREES
CHLORIDE SERPL-SCNC: 106 MMOL/L (ref 97–108)
CO2 SERPL-SCNC: 23 MMOL/L (ref 21–32)
CREAT SERPL-MCNC: 0.98 MG/DL (ref 0.55–1.02)
DIAGNOSIS, 93000: NORMAL
ERYTHROCYTE [DISTWIDTH] IN BLOOD BY AUTOMATED COUNT: 15.3 % (ref 11.5–14.5)
GLOBULIN SER CALC-MCNC: 3.4 G/DL (ref 2–4)
GLUCOSE SERPL-MCNC: 322 MG/DL (ref 65–100)
HCT VFR BLD AUTO: 35.3 % (ref 35–47)
HGB BLD-MCNC: 11.4 G/DL (ref 11.5–16)
LIPASE SERPL-CCNC: >3000 U/L (ref 73–393)
MAGNESIUM SERPL-MCNC: 1.7 MG/DL (ref 1.6–2.4)
MCH RBC QN AUTO: 27.1 PG (ref 26–34)
MCHC RBC AUTO-ENTMCNC: 32.3 G/DL (ref 30–36.5)
MCV RBC AUTO: 83.8 FL (ref 80–99)
NRBC # BLD: 0 K/UL (ref 0–0.01)
NRBC BLD-RTO: 0 PER 100 WBC
P-R INTERVAL, ECG05: 150 MS
PHOSPHATE SERPL-MCNC: 3.3 MG/DL (ref 2.6–4.7)
PLATELET # BLD AUTO: 138 K/UL (ref 150–400)
PMV BLD AUTO: 12.1 FL (ref 8.9–12.9)
POTASSIUM SERPL-SCNC: 4.1 MMOL/L (ref 3.5–5.1)
PROT SERPL-MCNC: 6.3 G/DL (ref 6.4–8.2)
Q-T INTERVAL, ECG07: 436 MS
QRS DURATION, ECG06: 84 MS
QTC CALCULATION (BEZET), ECG08: 536 MS
RBC # BLD AUTO: 4.21 M/UL (ref 3.8–5.2)
SODIUM SERPL-SCNC: 135 MMOL/L (ref 136–145)
VENTRICULAR RATE, ECG03: 91 BPM
WBC # BLD AUTO: 6.2 K/UL (ref 3.6–11)

## 2022-02-20 PROCEDURE — 74011250636 HC RX REV CODE- 250/636: Performed by: HOSPITALIST

## 2022-02-20 PROCEDURE — 84100 ASSAY OF PHOSPHORUS: CPT

## 2022-02-20 PROCEDURE — 83690 ASSAY OF LIPASE: CPT

## 2022-02-20 PROCEDURE — 74011000250 HC RX REV CODE- 250: Performed by: HOSPITALIST

## 2022-02-20 PROCEDURE — 74011250637 HC RX REV CODE- 250/637: Performed by: INTERNAL MEDICINE

## 2022-02-20 PROCEDURE — 83735 ASSAY OF MAGNESIUM: CPT

## 2022-02-20 PROCEDURE — 74011250636 HC RX REV CODE- 250/636: Performed by: INTERNAL MEDICINE

## 2022-02-20 PROCEDURE — 80053 COMPREHEN METABOLIC PANEL: CPT

## 2022-02-20 PROCEDURE — 65270000029 HC RM PRIVATE

## 2022-02-20 PROCEDURE — 85027 COMPLETE CBC AUTOMATED: CPT

## 2022-02-20 PROCEDURE — 36415 COLL VENOUS BLD VENIPUNCTURE: CPT

## 2022-02-20 PROCEDURE — 74011250637 HC RX REV CODE- 250/637: Performed by: HOSPITALIST

## 2022-02-20 RX ORDER — SPIRONOLACTONE 25 MG/1
25 TABLET ORAL DAILY
Status: ON HOLD | COMMUNITY

## 2022-02-20 RX ORDER — ONDANSETRON 4 MG/1
4 TABLET, FILM COATED ORAL
Status: ON HOLD | COMMUNITY
End: 2022-03-23 | Stop reason: SDUPTHER

## 2022-02-20 RX ORDER — DEXTROMETHORPHAN HYDROBROMIDE, GUAIFENESIN 5; 100 MG/5ML; MG/5ML
650 LIQUID ORAL
Status: ON HOLD | COMMUNITY

## 2022-02-20 RX ORDER — LANOLIN ALCOHOL/MO/W.PET/CERES
3 CREAM (GRAM) TOPICAL
Status: DISCONTINUED | OUTPATIENT
Start: 2022-02-20 | End: 2022-02-24 | Stop reason: HOSPADM

## 2022-02-20 RX ORDER — ENOXAPARIN SODIUM 100 MG/ML
40 INJECTION SUBCUTANEOUS DAILY
Status: DISCONTINUED | OUTPATIENT
Start: 2022-02-20 | End: 2022-02-21

## 2022-02-20 RX ORDER — ENOXAPARIN SODIUM 100 MG/ML
30 INJECTION SUBCUTANEOUS EVERY 24 HOURS
Status: DISCONTINUED | OUTPATIENT
Start: 2022-02-20 | End: 2022-02-20 | Stop reason: DRUGHIGH

## 2022-02-20 RX ORDER — HYDROXYZINE PAMOATE 25 MG/1
25 CAPSULE ORAL
Status: DISCONTINUED | OUTPATIENT
Start: 2022-02-20 | End: 2022-02-24 | Stop reason: HOSPADM

## 2022-02-20 RX ADMIN — PANTOPRAZOLE SODIUM 40 MG: 40 TABLET, DELAYED RELEASE ORAL at 09:02

## 2022-02-20 RX ADMIN — HYDROMORPHONE HYDROCHLORIDE 0.5 MG: 1 INJECTION, SOLUTION INTRAMUSCULAR; INTRAVENOUS; SUBCUTANEOUS at 17:19

## 2022-02-20 RX ADMIN — CARVEDILOL 3.12 MG: 3.12 TABLET, FILM COATED ORAL at 17:19

## 2022-02-20 RX ADMIN — SODIUM CHLORIDE, PRESERVATIVE FREE 10 ML: 5 INJECTION INTRAVENOUS at 14:00

## 2022-02-20 RX ADMIN — SODIUM CHLORIDE, PRESERVATIVE FREE 10 ML: 5 INJECTION INTRAVENOUS at 05:19

## 2022-02-20 RX ADMIN — ENOXAPARIN SODIUM 40 MG: 100 INJECTION SUBCUTANEOUS at 12:16

## 2022-02-20 RX ADMIN — CARVEDILOL 3.12 MG: 3.12 TABLET, FILM COATED ORAL at 09:02

## 2022-02-20 RX ADMIN — HYDROXYZINE PAMOATE 25 MG: 25 CAPSULE ORAL at 23:59

## 2022-02-20 RX ADMIN — SODIUM CHLORIDE, PRESERVATIVE FREE 10 ML: 5 INJECTION INTRAVENOUS at 23:14

## 2022-02-20 RX ADMIN — CLOPIDOGREL BISULFATE 75 MG: 75 TABLET, FILM COATED ORAL at 09:02

## 2022-02-20 RX ADMIN — HYDROMORPHONE HYDROCHLORIDE 0.5 MG: 1 INJECTION, SOLUTION INTRAMUSCULAR; INTRAVENOUS; SUBCUTANEOUS at 04:12

## 2022-02-20 RX ADMIN — SODIUM CHLORIDE 100 ML/HR: 9 INJECTION, SOLUTION INTRAVENOUS at 09:02

## 2022-02-20 RX ADMIN — HYDROMORPHONE HYDROCHLORIDE 0.5 MG: 1 INJECTION, SOLUTION INTRAMUSCULAR; INTRAVENOUS; SUBCUTANEOUS at 21:58

## 2022-02-20 RX ADMIN — WATER 1 G: 1 INJECTION INTRAMUSCULAR; INTRAVENOUS; SUBCUTANEOUS at 21:58

## 2022-02-20 RX ADMIN — VERAPAMIL HYDROCHLORIDE 240 MG: 120 CAPSULE, DELAYED RELEASE PELLETS ORAL at 09:02

## 2022-02-20 NOTE — PROGRESS NOTES
Hospitalist Progress Note               Daily Progress Note: 2/20/2022  Per HPI:82 y.o. female history of diabetes, does not take insulin as she feels allergy, hypertension, coronary artery disease, heart failure with reduced ejection fraction presents to the emergency room complaining of epigastric and upper abdominal pain started last night getting worse in intensity, rating it 10 on scale of 10. Associated with nausea and vomiting. Has a history of pancreatitis, think that is worsening. No fever or chills, trouble breathing. Patient is given pain medication resting comfortably in the bed when I seen her. She is not much communicative, nodding head for answers even though she can communicate and talk. Found with significantly elevated lipase and signs of pancreatitis admitted for further management. Cardiology consulted secondary to elevated troponin and gi consulted secondary to recurrent pancreatitis. She was admitted here 11/2021 with acute pancreatitis, nstemi, decompensated chf/ HFrEF, and in 10/2021 [facility] with AR to RCA.       Subjective: The patient is seen for follow  up. Admit earlier this morning secondary to recurrent pancreatitis, vomiting started w/in last 24 hrs, no hematemesis, no diarrhea/dark or bloody stools. Abd pain persists, no vomiting, nausea better and asking for food. Denies chest pain or sob.     Problem List:  Problem List as of 2/20/2022 Date Reviewed: 2/19/2022          Codes Class Noted - Resolved    Acute on chronic combined systolic and diastolic ACC/AHA stage C congestive heart failure (HCC) ICD-10-CM: I50.43  ICD-9-CM: 428.43, 428.0  11/24/2021 - Present        Pleural effusion, right ICD-10-CM: J90  ICD-9-CM: 511.9  11/24/2021 - Present        Abdominal pain ICD-10-CM: R10.9  ICD-9-CM: 789.00  11/20/2021 - Present        Acute pancreatitis ICD-10-CM: K85.90  ICD-9-CM: 669.5  11/20/2021 - Present        NSTEMI (non-ST elevated myocardial infarction) ([facility] [address] ICD-10-CM: I21.4  ICD-9-CM: 410.70  10/30/2021 - Present        Bursitis of multiple sites ICD-10-CM: M71.9  ICD-9-CM: 726.8  10/30/2021 - Present        Pancreatitis ICD-10-CM: K85.90  ICD-9-CM: 732.7  10/22/2021 - Present        Elevated troponin ICD-10-CM: R77.8  ICD-9-CM: 790.6  10/22/2021 - Present        Vasculitis ([address] ICD-10-CM: I77.6  ICD-9-CM: 447.6  7/8/2021 - Present        Pancreatic mass ICD-10-CM: K86.89  ICD-9-CM: 577.8  7/8/2021 - Present        Epigastric pain ICD-10-CM: R10.13  ICD-9-CM: 789.06  7/4/2021 - Present        CVA (cerebral vascular accident) [facility]) ICD-10-CM: I63.9  ICD-9-CM: 434.91  5/6/2021 - Present        Right sided weakness ICD-10-CM: R53.1  ICD-9-CM: 728.87  5/4/2021 - Present        GI bleed ICD-10-CM: K92.2  ICD-9-CM: 578.9  4/28/2021 - Present              Medications reviewed  Current Facility-Administered Medications   Medication Dose Route Frequency    verapamil ER (VERELAN) capsule 240 mg  240 mg Oral DAILY    clopidogreL (PLAVIX) tablet 75 mg  75 mg Oral DAILY    carvediloL (COREG) tablet 3.125 mg  3.125 mg Oral BID WITH MEALS    pravastatin (PRAVACHOL) tablet 10 mg  10 mg Oral QHS    pantoprazole (PROTONIX) tablet 40 mg  40 mg Oral ACB    0.9% sodium chloride infusion  100 mL/hr IntraVENous CONTINUOUS    sodium chloride (NS) flush 5-40 mL  5-40 mL IntraVENous Q8H    sodium chloride (NS) flush 5-40 mL  5-40 mL IntraVENous PRN    acetaminophen (TYLENOL) tablet 650 mg  650 mg Oral Q4H PRN    HYDROmorphone (DILAUDID) syringe 0.5 mg  0.5 mg IntraVENous Q4H PRN    ondansetron (ZOFRAN) injection 4 mg  4 mg IntraVENous Q4H PRN    cefTRIAXone (ROCEPHIN) 1 g in sterile water (preservative free) 10 mL IV syringe  1 g IntraVENous Q24H     Current Outpatient Medications   Medication Sig    furosemide (LASIX) 40 mg tablet Take 1 Tablet by mouth daily.  apixaban (ELIQUIS) 2.5 mg tablet Take 1 Tablet by mouth two (2) times a day.     pantoprazole (PROTONIX) 40 mg tablet Take 1 Tablet by mouth Daily (before breakfast).  potassium chloride SA (MICRO-K) 10 mEq capsule Take 10 mEq by mouth daily.  pravastatin (PRAVACHOL) 10 mg tablet Take 10 mg by mouth nightly.  clopidogreL (PLAVIX) 75 mg tab Take 1 Tablet by mouth daily.  carvediloL (COREG) 3.125 mg tablet Take 1 Tablet by mouth two (2) times daily (with meals).  glipiZIDE (GLUCOTROL) 5 mg tablet Take 1 Tablet by mouth Daily (before breakfast).  verapamil ER (CALAN-SR) 240 mg CR tablet Take 240 mg by mouth daily. Review of Systems:   Review of Systems   Constitutional: Positive for malaise/fatigue. Negative for chills and fever. HENT: Negative. Eyes: Negative. Respiratory: Negative for cough, hemoptysis, sputum production, shortness of breath and wheezing. Cardiovascular: Negative for chest pain, palpitations, orthopnea, claudication and leg swelling. Gastrointestinal: Positive for abdominal pain and nausea. Negative for blood in stool, constipation, diarrhea, melena and vomiting. Genitourinary: Negative. Musculoskeletal: Negative. Skin: Negative. Neurological: Negative. Endo/Heme/Allergies: Negative. Psychiatric/Behavioral: Negative. Objective:   Physical Exam:     Visit Vitals  /73   Pulse 78   Temp 98.6 °F (37 °C)   Resp 16   Ht 5' 5\" (1.651 m)   Wt 56.7 kg (125 lb)   SpO2 97%   BMI 20.80 kg/m²      O2 Device: None    Temp (24hrs), Av.6 °F (37 °C), Min:98.6 °F (37 °C), Max:98.6 °F (37 °C)    No intake/output data recorded.  1901 -  0700  In: 1000 [I.V.:1000]  Out: -     General:  Alert, cooperative, no distress, appears stated age. Head:  Normocephalic, without obvious abnormality, atraumatic. Eyes:  Conjunctivae/corneas clear. PERRL, EOMs intact. Throat: Dry oral mucosa   Neck: Supple, symmetrical, trachea midline, no adenopathy, no JVD. Lungs:   Clear to auscultation bilaterally. Chest wall:  No tenderness or deformity.    Heart:  Regular rate and rhythm, S1, S2 normal, no murmur, click, rub or gallop. Abdomen:   Soft, diffusely tender some guarding no rebound, not distended. Bowel sounds present. Extremities: Extremities normal, atraumatic, no cyanosis. No edema. Muscle wasting. Pulses: 2+ and symmetric all extremities. Skin: Warm,dry   Neurologic: CNII-XII intact. No motor or sensory deficits. Data Review:       Recent Days:  Recent Labs     02/20/22  0359 02/19/22 1914   WBC 6.2 7.2   HGB 11.4* 13.4   HCT 35.3 40.9   * 160     Recent Labs     02/20/22  0359 02/19/22 1914   * 135*   K 4.1 4.2    102   CO2 23 23   * 358*   BUN 22* 30*   CREA 0.98 1.10*   CA 8.8 9.5   MG 1.7  --    PHOS 3.3  --    ALB 2.9* 3.5   TBILI 0.6 0.7   ALT 13 16     Recent Labs     02/19/22 1914   FIO2 21.0       24 Hour Results:  Recent Results (from the past 24 hour(s))   CBC WITH AUTOMATED DIFF    Collection Time: 02/19/22  7:14 PM   Result Value Ref Range    WBC 7.2 3.6 - 11.0 K/uL    RBC 4.95 3.80 - 5.20 M/uL    HGB 13.4 11.5 - 16.0 g/dL    HCT 40.9 35.0 - 47.0 %    MCV 82.6 80.0 - 99.0 FL    MCH 27.1 26.0 - 34.0 PG    MCHC 32.8 30.0 - 36.5 g/dL    RDW 15.0 (H) 11.5 - 14.5 %    PLATELET 368 468 - 716 K/uL    MPV 12.5 8.9 - 12.9 FL    NRBC 0.0 0.0  WBC    ABSOLUTE NRBC 0.00 0.00 - 0.01 K/uL    NEUTROPHILS 82 (H) 32 - 75 %    LYMPHOCYTES 10 (L) 12 - 49 %    MONOCYTES 6 5 - 13 %    EOSINOPHILS 2 0 - 7 %    BASOPHILS 0 0 - 1 %    IMMATURE GRANULOCYTES 0 %    ABS. NEUTROPHILS 6.0 1.8 - 8.0 K/UL    ABS. LYMPHOCYTES 0.7 (L) 0.8 - 3.5 K/UL    ABS. MONOCYTES 0.4 0.0 - 1.0 K/UL    ABS. EOSINOPHILS 0.1 0.0 - 0.4 K/UL    ABS. BASOPHILS 0.0 0.0 - 0.1 K/UL    ABS. IMM.  GRANS. 0.0 K/UL    DF Manual      RBC COMMENTS Sanjay cells  2+        RBC COMMENTS Ovalocytes  1+       METABOLIC PANEL, COMPREHENSIVE    Collection Time: 02/19/22  7:14 PM   Result Value Ref Range    Sodium 135 (L) 136 - 145 mmol/L    Potassium 4.2 3.5 - 5.1 mmol/L Chloride 102 97 - 108 mmol/L    CO2 23 21 - 32 mmol/L    Anion gap 10 5 - 15 mmol/L    Glucose 358 (H) 65 - 100 mg/dL    BUN 30 (H) 6 - 20 mg/dL    Creatinine 1.10 (H) 0.55 - 1.02 mg/dL    BUN/Creatinine ratio 27 (H) 12 - 20      GFR est AA 58 (L) >60 ml/min/1.73m2    GFR est non-AA 48 (L) >60 ml/min/1.73m2    Calcium 9.5 8.5 - 10.1 mg/dL    Bilirubin, total 0.7 0.2 - 1.0 mg/dL    AST (SGOT) 12 (L) 15 - 37 U/L    ALT (SGPT) 16 12 - 78 U/L    Alk.  phosphatase 160 (H) 45 - 117 U/L    Protein, total 7.7 6.4 - 8.2 g/dL    Albumin 3.5 3.5 - 5.0 g/dL    Globulin 4.2 (H) 2.0 - 4.0 g/dL    A-G Ratio 0.8 (L) 1.1 - 2.2     TROPONIN-HIGH SENSITIVITY    Collection Time: 02/19/22  7:14 PM   Result Value Ref Range    Troponin-High Sensitivity 580 (HH) 0 - 51 ng/L   LIPASE    Collection Time: 02/19/22  7:14 PM   Result Value Ref Range    Lipase >3,000 (H) 73 - 393 U/L   LACTIC ACID    Collection Time: 02/19/22  7:14 PM   Result Value Ref Range    Lactic acid 2.0 0.4 - 2.0 mmol/L   VENOUS BLOOD GAS    Collection Time: 02/19/22  7:14 PM   Result Value Ref Range    VENOUS PH 7.39 7.31 - 7.41      VENOUS PCO2 37.9 (L) 40 - 50 mmHg    VENOUS PO2 146 (H) 36 - 42 mmHg    VENOUS O2 SATURATION 99 (H) 60 - 80 %    VENOUS BICARBONATE 23 22 - 26 mmol/L    VENOUS BASE DEFICIT 1.6 0 - 2 mmol/L    O2 METHOD Room air      FIO2 21.0 %    SITE Right Radial     URINALYSIS W/ REFLEX CULTURE    Collection Time: 02/19/22  8:44 PM    Specimen: Urine   Result Value Ref Range    Color Yellow/Straw      Appearance Turbid (A) Clear      Specific gravity 1.029 1.003 - 1.030      pH (UA) 6.0 5.0 - 8.0      Protein Negative Negative mg/dL    Glucose >300 (A) Negative mg/dL    Ketone 20 (A) Negative mg/dL    Bilirubin Negative Negative      Blood Negative Negative      Urobilinogen 0.1 0.1 - 1.0 EU/dL    Nitrites Positive (A) Negative      Leukocyte Esterase Negative Negative      UA:UC IF INDICATED Culture not indicated by UA result Culture not indicated by UA result      WBC 0-4 0 - 4 /hpf    RBC 0-5 0 - 5 /hpf    Bacteria Negative Negative /hpf    Mucus Trace /lpf   METABOLIC PANEL, COMPREHENSIVE    Collection Time: 02/20/22  3:59 AM   Result Value Ref Range    Sodium 135 (L) 136 - 145 mmol/L    Potassium 4.1 3.5 - 5.1 mmol/L    Chloride 106 97 - 108 mmol/L    CO2 23 21 - 32 mmol/L    Anion gap 6 5 - 15 mmol/L    Glucose 322 (H) 65 - 100 mg/dL    BUN 22 (H) 6 - 20 mg/dL    Creatinine 0.98 0.55 - 1.02 mg/dL    BUN/Creatinine ratio 22 (H) 12 - 20      GFR est AA >60 >60 ml/min/1.73m2    GFR est non-AA 54 (L) >60 ml/min/1.73m2    Calcium 8.8 8.5 - 10.1 mg/dL    Bilirubin, total 0.6 0.2 - 1.0 mg/dL    AST (SGOT) 10 (L) 15 - 37 U/L    ALT (SGPT) 13 12 - 78 U/L    Alk. phosphatase 120 (H) 45 - 117 U/L    Protein, total 6.3 (L) 6.4 - 8.2 g/dL    Albumin 2.9 (L) 3.5 - 5.0 g/dL    Globulin 3.4 2.0 - 4.0 g/dL    A-G Ratio 0.9 (L) 1.1 - 2.2     LIPASE    Collection Time: 02/20/22  3:59 AM   Result Value Ref Range    Lipase >3,000 (H) 73 - 393 U/L   MAGNESIUM    Collection Time: 02/20/22  3:59 AM   Result Value Ref Range    Magnesium 1.7 1.6 - 2.4 mg/dL   PHOSPHORUS    Collection Time: 02/20/22  3:59 AM   Result Value Ref Range    Phosphorus 3.3 2.6 - 4.7 mg/dL   CBC W/O DIFF    Collection Time: 02/20/22  3:59 AM   Result Value Ref Range    WBC 6.2 3.6 - 11.0 K/uL    RBC 4.21 3.80 - 5.20 M/uL    HGB 11.4 (L) 11.5 - 16.0 g/dL    HCT 35.3 35.0 - 47.0 %    MCV 83.8 80.0 - 99.0 FL    MCH 27.1 26.0 - 34.0 PG    MCHC 32.3 30.0 - 36.5 g/dL    RDW 15.3 (H) 11.5 - 14.5 %    PLATELET 903 (L) 928 - 400 K/uL    MPV 12.1 8.9 - 12.9 FL    NRBC 0.0 0.0  WBC    ABSOLUTE NRBC 0.00 0.00 - 0.01 K/uL       chest X-ray  CXR Results  (Last 48 hours)    None        CT Results  (Last 48 hours)               02/19/22 2030  CT ABD PELV W CONT Final result    Impression:  1.  Unchanged appearance of cystic dilation of the main pancreatic duct in the   tail of the pancreas with calcifications and/or surgical clips again   demonstrated. There is some infiltration in the fat surrounding the pancreas and   extending into the root of the mesentery such that acute pancreatitis may be   present. Correlation with serum amylase recommended. 2. Hernias as described. 3. Sigmoid diverticulosis without CT evidence for acute diverticulitis. Narrative:  CT ABD PELV W CONT       Comparison: November 20, 2021. The study was performed after administration of IV contrast only. No oral   contrast was ordered. Coronal and sagittal reconstructions were obtained. 100cc of Isovue-370 used. Dose Reduction: All CT scans at this facility are performed using dose reduction   optimization techniques as appropriate to a performed exam including the   following: Automated exposure control, adjustments of the mA and/or kV according   to patient size, or use of iterative reconstruction technique. FINDINGS:   Mild bibasilar pulmonary parenchymal scarring and atelectasis. Previously seen   bilateral pleural effusions have resolved. Moderate cystic dilation of the main pancreatic duct in the tail of the pancreas   is again demonstrated not significantly changed in size or appearance. Some   calcifications are also present. Dilation of the pancreatic duct in the body the   pancreas is unchanged. Head and uncinate process of the pancreas are   unremarkable. There is some infiltration in the fat around the pancreas as well   as extending into the root of the mesentery. Tiny cysts in the right lobe of the   liver. The liver is otherwise unremarkable. There are no calcified stones in the   gallbladder. There is no biliary ductal dilation. Spleen and adrenal glands are   unremarkable. Small bilateral renal cysts. No developing hydronephrosis. Abdominal aorta has normal course and caliber without aneurysmal dilation. There   is no periaortic or mesenteric mass or lymphadenopathy.        Bowel loops are nondilated and there is no evidence for bowel obstruction. There   is no bowel wall thickening. Scattered small diverticuli in the colon again   noted. No CT evidence for acute diverticulitis. There is no free air or free   fluid in the abdomen or pelvis. Small ventral hernia superior to the umbilicus   containing fat only. Small left inguinal hernia without bowel. Urinary bladder   unremarkable. Uterus and adnexal regions are within normal limits for age. No   pelvic mass or lymphadenopathy. No acute bony abnormality. Multilevel   degenerative disc disease and facet joint arthropathy again demonstrated. Assessment/     Patient Active Problem List   Diagnosis Code    GI bleed K92.2    Right sided weakness R53.1    CVA (cerebral vascular accident) ([facility] [address] I63.9    Epigastric pain R10.13    Vasculitis ([facility]) I77.6    Pancreatic mass K86.89    Pancreatitis K85.90    Elevated troponin R77.8    NSTEMI (non-ST elevated myocardial infarction) ([facility]) I21.4    Bursitis of multiple sites M71.9    Abdominal pain R10.9    Acute pancreatitis K85.90    Acute on chronic combined systolic and diastolic ACC/AHA stage C congestive heart failure ([facility]) I50.43    Pleural effusion, right J90         Severe acute pancreatitis/recurrent:  Similar sx 11/2021 admission. Ct abd noted, unchanged appearance of cystic dilatation of the main pancreatic duct in the tail of the pancreas with calcifications and/or surgical clips again demonstrated. There is some peripancreatic stranding. Will give her sips of water/ice chips, continued oral meds. Gi cx'd, judicious hydration, follow amylase/lipase. Ceftriaxone and as needed Dilaudid 0.5 mg every 6 continued.      Elevated troponin:   History of CAD status post AR to RCA 10/2021   will request cardiology consultation.   Follow serial trop, and follow with recommendations from cardiology  Plavix, pravastatin, coreg, resumed.     Systolic heart failure reduced ejection fraction:   No evidence of decompensation at this time, monitor/follow closely with judicious hydration.     Benign essential hypertension:   On  carvedilol and verapamil which is  Continued, remains adequate, following. Uti:  Ceftriaxone initiated, follow ucs     Vtep: pta on Eliquis, held. Continue lovenox for dvt prophylaxis  Gip: protonix  Full code  Dispo: pending course. Plan:      Care Plan discussed with: Patient/Family and Nurse    Total time spent with patient: 30 minutes. This dictation was done by dragon, computer voice recognition software. Often unanticipated grammatical, syntax, phones and other interpretive errors are inadvertently transcribed. Please excuse errors that have escaped final proofreading.     Dick Beasley MD

## 2022-02-20 NOTE — CONSULTS
Consult    Patient: Alisa Ambrosio MRN: [id number]  SSN: [id number]    YOB: 1940  Age: 81 y.o. Sex: female       Subjective:      Date of  Admission: 2/19/2022     Admission type: Emergency    Alisa Ambrosio is a 81 y.o. female with a history of dilated cardiomyopathy, coronary artery disease status post PCI, diabetes mellitus, hyperlipidemia. I have known her from her previous hospital admissions. She is now admitted again for acute pancreatitis. She complains of vomiting, epigastric discomfort which prompted her to come to the hospital. She had been unable to eat and is reporting that she has been losing weight because of that. She has had no chest pain. She has had no worsening dyspnea on exertion, orthopnea, paroxysmal nocturnal dyspnea, dizziness, lightheadedness or syncope.     Primary Care Provider: Berny Stewart MD  Past Medical History:   Diagnosis Date    Colon polyps     Diabetes ([address]     Diverticulosis     Hypertension     Ill-defined condition       Past Surgical History:   Procedure Laterality Date    COLONOSCOPY N/A 4/29/2021    COLONOSCOPY performed by Ari Venegas MD at [facility] ENDOSCOPY     Family History   Problem Relation Age of Onset    Diabetes Other       Social History     Tobacco Use    Smoking status: Never Smoker    Smokeless tobacco: Never Used   Substance Use Topics    Alcohol use: Not Currently      Current Facility-Administered Medications   Medication Dose Route Frequency    hydrOXYzine pamoate (VISTARIL) capsule 25 mg  25 mg Oral QID PRN    melatonin tablet 3 mg  3 mg Oral QHS PRN    enoxaparin (LOVENOX) injection 40 mg  40 mg SubCUTAneous DAILY    verapamil ER (VERELAN) capsule 240 mg  240 mg Oral DAILY    clopidogreL (PLAVIX) tablet 75 mg  75 mg Oral DAILY    carvediloL (COREG) tablet 3.125 mg  3.125 mg Oral BID WITH MEALS    pravastatin (PRAVACHOL) tablet 10 mg  10 mg Oral QHS    pantoprazole (PROTONIX) tablet 40 mg  40 mg Oral ACB    0.9% sodium chloride infusion  100 mL/hr IntraVENous CONTINUOUS    sodium chloride (NS) flush 5-40 mL  5-40 mL IntraVENous Q8H    sodium chloride (NS) flush 5-40 mL  5-40 mL IntraVENous PRN    acetaminophen (TYLENOL) tablet 650 mg  650 mg Oral Q4H PRN    HYDROmorphone (DILAUDID) syringe 0.5 mg  0.5 mg IntraVENous Q4H PRN    ondansetron (ZOFRAN) injection 4 mg  4 mg IntraVENous Q4H PRN    cefTRIAXone (ROCEPHIN) 1 g in sterile water (preservative free) 10 mL IV syringe  1 g IntraVENous Q24H     Current Outpatient Medications   Medication Sig    furosemide (LASIX) 40 mg tablet Take 1 Tablet by mouth daily.  apixaban (ELIQUIS) 2.5 mg tablet Take 1 Tablet by mouth two (2) times a day.  pantoprazole (PROTONIX) 40 mg tablet Take 1 Tablet by mouth Daily (before breakfast).  potassium chloride SA (MICRO-K) 10 mEq capsule Take 10 mEq by mouth daily.  pravastatin (PRAVACHOL) 10 mg tablet Take 10 mg by mouth nightly.  clopidogreL (PLAVIX) 75 mg tab Take 1 Tablet by mouth daily.  carvediloL (COREG) 3.125 mg tablet Take 1 Tablet by mouth two (2) times daily (with meals).  glipiZIDE (GLUCOTROL) 5 mg tablet Take 1 Tablet by mouth Daily (before breakfast).  verapamil ER (CALAN-SR) 240 mg CR tablet Take 240 mg by mouth daily. Allergies   Allergen Reactions    Insulins Itching    Penicillins Other (comments)     Pt states it makes her pass out        Review of Systems:  A comprehensive review of systems was negative except for that written in the History of Present Illness.        Subjective:     Visit Vitals  /75 (BP Patient Position: At rest)   Pulse 78   Temp 98.6 °F (37 °C)   Resp 16   Ht 5' 5\" (1.651 m)   Wt 56.7 kg (125 lb)   SpO2 98%   BMI 20.80 kg/m²        Physical Exam:  Visit Vitals  /75 (BP Patient Position: At rest)   Pulse 78   Temp 98.6 °F (37 °C)   Resp 16   Ht 5' 5\" (1.651 m)   Wt 56.7 kg (125 lb)   SpO2 98%   BMI 20.80 kg/m²     General Appearance:  Well developed, well nourished,alert and oriented x 3, and individual in no acute distress. Ears/Nose/Mouth/Throat:   Hearing grossly normal.         Neck: Supple. Chest:   Lungs clear to auscultation bilaterally. Cardiovascular:  Regular rate and rhythm, S1, S2 normal, no murmur. Abdomen:   Soft, non-tender, bowel sounds are active. Extremities: No edema bilaterally. Skin: Warm and dry.                Cardiographics:  Telemetry: normal sinus rhythm    Data Reviewed:   BMP:   Lab Results   Component Value Date/Time     (L) 02/20/2022 03:59 AM    K 4.1 02/20/2022 03:59 AM     02/20/2022 03:59 AM    CO2 23 02/20/2022 03:59 AM    AGAP 6 02/20/2022 03:59 AM     (H) 02/20/2022 03:59 AM    BUN 22 (H) 02/20/2022 03:59 AM    CREA 0.98 02/20/2022 03:59 AM    GFRAA >60 02/20/2022 03:59 AM    GFRNA 54 (L) 02/20/2022 03:59 AM     CMP:   Lab Results   Component Value Date/Time     (L) 02/20/2022 03:59 AM    K 4.1 02/20/2022 03:59 AM     02/20/2022 03:59 AM    CO2 23 02/20/2022 03:59 AM    AGAP 6 02/20/2022 03:59 AM     (H) 02/20/2022 03:59 AM    BUN 22 (H) 02/20/2022 03:59 AM    CREA 0.98 02/20/2022 03:59 AM    GFRAA >60 02/20/2022 03:59 AM    GFRNA 54 (L) 02/20/2022 03:59 AM    CA 8.8 02/20/2022 03:59 AM    MG 1.7 02/20/2022 03:59 AM    PHOS 3.3 02/20/2022 03:59 AM    ALB 2.9 (L) 02/20/2022 03:59 AM    TP 6.3 (L) 02/20/2022 03:59 AM    GLOB 3.4 02/20/2022 03:59 AM    AGRAT 0.9 (L) 02/20/2022 03:59 AM    ALT 13 02/20/2022 03:59 AM     CBC:   Lab Results   Component Value Date/Time    WBC 6.2 02/20/2022 03:59 AM    HGB 11.4 (L) 02/20/2022 03:59 AM    HCT 35.3 02/20/2022 03:59 AM     (L) 02/20/2022 03:59 AM     All Cardiac Markers in the last 24 hours: No results found for: CPK, CK, CKMMB, CKMB, RCK3, CKMBT, CKNDX, CKND1, NAYANA, TROPT, TROIQ, GRAEME, TROPT, TNIPOC, BNP, BNPP  Recent Glucose Results:   Lab Results   Component Value Date/Time     (H) 02/20/2022 03:59 AM     (H) 02/19/2022 07:14 PM     ABG:   Lab Results   Component Value Date/Time    FIO2 21.0 02/19/2022 07:14 PM     COAGS: No results found for: APTT, PTP, INR, INREXT, INREXT  Liver Panel:   Lab Results   Component Value Date/Time    ALB 2.9 (L) 02/20/2022 03:59 AM    TP 6.3 (L) 02/20/2022 03:59 AM    GLOB 3.4 02/20/2022 03:59 AM    AGRAT 0.9 (L) 02/20/2022 03:59 AM    ALT 13 02/20/2022 03:59 AM     (H) 02/20/2022 03:59 AM        Assessment:   Recurrent acute pancreatitis  NSTEMI type II  Paroxysmal atrial fibrillation  Chronic HFrEF  Dilated cardiomyopathy  Diabetes mellitus  Hypertension  Hyperlipidemia     Plan:   28-year-old female with a past medical history as above who was admitted for abdominal pain, incidentally found to have elevated troponins and we are asked to see her for the same  -Continue to trend troponins to peak  -Elevated troponins type II in mechanism, she has had no anginal symptoms  -He remains in sinus rhythm. She is on anticoagulation with Eliquis and Plavix in the setting of PCI done for NSTEMI type II. [id number] is 6.  -Keep potassium above 4, magnesium above 2  -Echo done in October 2021 showed severe LV dysfunction. Since she is clinically euvolemic and has no symptoms from the cardiac standpoint at this time, will not repeat echocardiogram. This will need to be repeated in 2 months to assess candidacy for AICD placement. This can be done as an outpatient.     Thank you for allowing us to participate in the care of your patient

## 2022-02-20 NOTE — H&P
History and Physical              Subjective :   Chief Complaint : Abdominal pain    Source of information : Patient not a great historian, ED provider, previous records    History of present illness:   81 y.o. female history of diabetes, does not take insulin as she feels allergy, hypertension, coronary artery disease, heart failure with reduced ejection fraction presents to the emergency room complaining of epigastric and upper abdominal pain started last night getting worse in intensity, rating it 10 on scale of 10. Associated with nausea and vomiting. Has a history of pancreatitis, think that is worsening. No fever or chills, trouble breathing. Patient is given pain medication resting comfortably in the bed when I seen her. She is not much communicative, nodding head for answers even though she can communicate and talk. Found with significantly elevated lipase and signs of pancreatitis admitted for further management. Past Medical History:   Diagnosis Date    Colon polyps     Diabetes ([address]     Diverticulosis     Hypertension     Ill-defined condition      Past Surgical History:   Procedure Laterality Date    COLONOSCOPY N/A 4/29/2021    COLONOSCOPY performed by Francisco J Salazar MD at [facility] ENDOSCOPY     Family History   Problem Relation Age of Onset    Diabetes Other       Social History     Tobacco Use    Smoking status: Never Smoker    Smokeless tobacco: Never Used   Substance Use Topics    Alcohol use: Not Currently       Prior to Admission medications    Medication Sig Start Date End Date Taking? Authorizing Provider   furosemide (LASIX) 40 mg tablet Take 1 Tablet by mouth daily. 11/24/21   Laya Gaviria PA-C   apixaban (ELIQUIS) 2.5 mg tablet Take 1 Tablet by mouth two (2) times a day. 11/24/21   Laya Gaviria PA-C   pantoprazole (PROTONIX) 40 mg tablet Take 1 Tablet by mouth Daily (before breakfast).  11/25/21   Laya Gaviria PA-C   potassium chloride SA (MICRO-K) 10 mEq capsule Take 10 mEq by mouth daily. 11/16/21   Provider, Historical   pravastatin (PRAVACHOL) 10 mg tablet Take 10 mg by mouth nightly. 11/16/21   Provider, Historical   clopidogreL (PLAVIX) 75 mg tab Take 1 Tablet by mouth daily. 10/30/21   Virginia Munoz MD   carvediloL (COREG) 3.125 mg tablet Take 1 Tablet by mouth two (2) times daily (with meals). 10/29/21   Virginia Munoz MD   glipiZIDE (GLUCOTROL) 5 mg tablet Take 1 Tablet by mouth Daily (before breakfast). 10/30/21   Virginia Munoz MD   verapamil ER (CALAN-SR) 240 mg CR tablet Take 240 mg by mouth daily. 9/21/21   Provider, Historical     Allergies   Allergen Reactions    Insulins Itching    Penicillins Other (comments)     Pt states it makes her pass out             Review of Systems:  Constitutional: Appetite is fair, denies weight loss. Complains of fatigue. .  Eye: No recent visual disturbances, no discharge, no double vision. Ear/nose/mouth/throat : No hearing disturbance, no ear pain, no nasal congestion, no sore throat, no trouble swallowing. Respiratory : No trouble breathing, no cough, no shortness of breath, no wheezing. Cardiovascular : No chest pain, no palpitation,  no orthopnea, no peripheral edema. Gastrointestinal : +++ nausea, +++ vomiting, no diarrhea,++o heartburn, ++ abdominal pain. Genitourinary : No dysuria, no hematuria,   Lymphatics : Did not answer  Endocrine : Did not answer about the blood sugar control. Immunologic : Did not answer  Musculoskeletal : Denies joint pain. Denies joint swelling. Integumentary : Denies itching. Hematology : Did not answer. Neurology : Denies change in mental status, no dizziness or loss of consciousness. Psychiatric : She did not answer.     Vitals:     Patient Vitals for the past 12 hrs:   Temp Pulse Resp BP SpO2   02/19/22 2103 -- 88 20 137/71 98 %   02/19/22 1946 -- 77 23 138/64 99 %   02/19/22 1838 98.6 °F (37 °C) 89 26 (!) 167/89 99 %       Physical Exam:   General : Laying in the bed looks tired, no acute respiratory distress noted. HEENT : PERRLA, normal oral mucosa, atraumatic normocephalic, Normal ear and nose. Neck : Supple, no JVD, no masses noted, no carotid bruit. Lungs : Breath sounds with moderate air entry bilaterally, no wheezes or rales, no accessory muscle use. CVS : Rhythm rate regular, S1+, S2+, no murmur or gallop. Abdomen : Slightly tight abdomen, tender on palpation, she is guarding some on palpation, no rigidity. Bowel sounds sluggish. .  Extremities : No edema noted,  pedal pulses palpable. Musculoskeletal : Fair range of motion, no joint swelling or effusion, muscle tone and power appears decreased  Skin : Dry, warm. No pathological rash. Lymphatic : No cervical lymphadenopathy. Neurological : Awake, alert, oriented to  place person. No neurological deficits. Psychiatric : Unable to evaluate. .         Data Review:   Recent Results (from the past 24 hour(s))   CBC WITH AUTOMATED DIFF    Collection Time: 02/19/22  7:14 PM   Result Value Ref Range    WBC 7.2 3.6 - 11.0 K/uL    RBC 4.95 3.80 - 5.20 M/uL    HGB 13.4 11.5 - 16.0 g/dL    HCT 40.9 35.0 - 47.0 %    MCV 82.6 80.0 - 99.0 FL    MCH 27.1 26.0 - 34.0 PG    MCHC 32.8 30.0 - 36.5 g/dL    RDW 15.0 (H) 11.5 - 14.5 %    PLATELET 290 745 - 812 K/uL    MPV 12.5 8.9 - 12.9 FL    NRBC 0.0 0.0  WBC    ABSOLUTE NRBC 0.00 0.00 - 0.01 K/uL    NEUTROPHILS PENDING %    LYMPHOCYTES PENDING %    MONOCYTES PENDING %    EOSINOPHILS PENDING %    BASOPHILS PENDING %    IMMATURE GRANULOCYTES PENDING %    ABS. NEUTROPHILS PENDING K/UL    ABS. LYMPHOCYTES PENDING K/UL    ABS. MONOCYTES PENDING K/UL    ABS. EOSINOPHILS PENDING K/UL    ABS. BASOPHILS PENDING K/UL    ABS. IMM. GRANS.  PENDING K/UL    DF PENDING    METABOLIC PANEL, COMPREHENSIVE    Collection Time: 02/19/22  7:14 PM   Result Value Ref Range    Sodium 135 (L) 136 - 145 mmol/L    Potassium 4.2 3.5 - 5.1 mmol/L    Chloride 102 97 - 108 mmol/L    CO2 23 21 - 32 mmol/L    Anion gap 10 5 - 15 mmol/L    Glucose 358 (H) 65 - 100 mg/dL    BUN 30 (H) 6 - 20 mg/dL    Creatinine 1.10 (H) 0.55 - 1.02 mg/dL    BUN/Creatinine ratio 27 (H) 12 - 20      GFR est AA 58 (L) >60 ml/min/1.73m2    GFR est non-AA 48 (L) >60 ml/min/1.73m2    Calcium 9.5 8.5 - 10.1 mg/dL    Bilirubin, total 0.7 0.2 - 1.0 mg/dL    AST (SGOT) 12 (L) 15 - 37 U/L    ALT (SGPT) 16 12 - 78 U/L    Alk. phosphatase 160 (H) 45 - 117 U/L    Protein, total 7.7 6.4 - 8.2 g/dL    Albumin 3.5 3.5 - 5.0 g/dL    Globulin 4.2 (H) 2.0 - 4.0 g/dL    A-G Ratio 0.8 (L) 1.1 - 2.2     TROPONIN-HIGH SENSITIVITY    Collection Time: 02/19/22  7:14 PM   Result Value Ref Range    Troponin-High Sensitivity 580 (HH) 0 - 51 ng/L   LIPASE    Collection Time: 02/19/22  7:14 PM   Result Value Ref Range    Lipase >3,000 (H) 73 - 393 U/L   LACTIC ACID    Collection Time: 02/19/22  7:14 PM   Result Value Ref Range    Lactic acid 2.0 0.4 - 2.0 mmol/L   VENOUS BLOOD GAS    Collection Time: 02/19/22  7:14 PM   Result Value Ref Range    VENOUS PH 7.39 7.31 - 7.41      VENOUS PCO2 37.9 (L) 40 - 50 mmHg    VENOUS PO2 146 (H) 36 - 42 mmHg    VENOUS O2 SATURATION 99 (H) 60 - 80 %    VENOUS BICARBONATE 23 22 - 26 mmol/L    VENOUS BASE DEFICIT 1.6 0 - 2 mmol/L    O2 METHOD Room air      FIO2 21.0 %    SITE Right Radial         Radiologic Studies :   CT Results  (Last 48 hours)               02/19/22 2030  CT ABD PELV W CONT Final result    Impression:  1. Unchanged appearance of cystic dilation of the main pancreatic duct in the   tail of the pancreas with calcifications and/or surgical clips again   demonstrated. There is some infiltration in the fat surrounding the pancreas and   extending into the root of the mesentery such that acute pancreatitis may be   present. Correlation with serum amylase recommended. 2. Hernias as described. 3. Sigmoid diverticulosis without CT evidence for acute diverticulitis.        Narrative:  CT ABD PELV W CONT Comparison: November 20, 2021. The study was performed after administration of IV contrast only. No oral   contrast was ordered. Coronal and sagittal reconstructions were obtained. 100cc of Isovue-370 used. Dose Reduction: All CT scans at this facility are performed using dose reduction   optimization techniques as appropriate to a performed exam including the   following: Automated exposure control, adjustments of the mA and/or kV according   to patient size, or use of iterative reconstruction technique. FINDINGS:   Mild bibasilar pulmonary parenchymal scarring and atelectasis. Previously seen   bilateral pleural effusions have resolved. Moderate cystic dilation of the main pancreatic duct in the tail of the pancreas   is again demonstrated not significantly changed in size or appearance. Some   calcifications are also present. Dilation of the pancreatic duct in the body the   pancreas is unchanged. Head and uncinate process of the pancreas are   unremarkable. There is some infiltration in the fat around the pancreas as well   as extending into the root of the mesentery. Tiny cysts in the right lobe of the   liver. The liver is otherwise unremarkable. There are no calcified stones in the   gallbladder. There is no biliary ductal dilation. Spleen and adrenal glands are   unremarkable. Small bilateral renal cysts. No developing hydronephrosis. Abdominal aorta has normal course and caliber without aneurysmal dilation. There   is no periaortic or mesenteric mass or lymphadenopathy. Bowel loops are nondilated and there is no evidence for bowel obstruction. There   is no bowel wall thickening. Scattered small diverticuli in the colon again   noted. No CT evidence for acute diverticulitis. There is no free air or free   fluid in the abdomen or pelvis. Small ventral hernia superior to the umbilicus   containing fat only. Small left inguinal hernia without bowel.  Urinary bladder unremarkable. Uterus and adnexal regions are within normal limits for age. No   pelvic mass or lymphadenopathy. No acute bony abnormality. Multilevel   degenerative disc disease and facet joint arthropathy again demonstrated. Assessment and Plan :     Severe acute pancreatitis: We will keep patient n.p.o., request gastroenterology consultation. Started on pain medications as needed and IV fluids. Started on antibiotics ceftriaxone. Elevated troponin: History of coronary artery disease with stent placement right coronary, will request cardiology consultation. We will repeat the troponin, and follow with recommendations from cardiology    Systolic heart failure reduced ejection fraction: No evidence of decompensation at this time, monitor closely    Benign essential hypertension: On small dose of carvedilol and verapamil which we will continue    Admitted to cardiac telemetry, full CODE STATUS, home medications reviewed and verified. Has no advance medical directives. CC : Idania Bolanos MD  Signed By: Donald Arriaga MD     February 19, 2022      This dictation was done by dragon, computer voice recognition software. Often unanticipated grammatical, syntax, [place] phones and other interpretive errors are inadvertently transcribed. Please excuse errors that have escaped final proofreading.

## 2022-02-20 NOTE — PROGRESS NOTES
Problem: Falls - Risk of  Goal: *Absence of Falls  Description: Document Andreina Lanier Fall Risk and appropriate interventions in the flowsheet.   Outcome: Progressing Towards Goal  Note: Fall Risk Interventions:  Mobility Interventions: Utilize walker, cane, or other assistive device         Medication Interventions: Bed/chair exit alarm    Elimination Interventions: Bed/chair exit alarm              Problem: Patient Education: Go to Patient Education Activity  Goal: Patient/Family Education  Outcome: Progressing Towards Goal

## 2022-02-21 ENCOUNTER — APPOINTMENT (OUTPATIENT)
Dept: GENERAL RADIOLOGY | Age: 82
DRG: 438 | End: 2022-02-21
Attending: INTERNAL MEDICINE
Payer: MEDICARE

## 2022-02-21 LAB
AMYLASE SERPL-CCNC: 205 U/L (ref 25–115)
ANION GAP SERPL CALC-SCNC: 8 MMOL/L (ref 5–15)
BASOPHILS # BLD: 0 K/UL (ref 0–0.1)
BASOPHILS NFR BLD: 0 % (ref 0–1)
BUN SERPL-MCNC: 16 MG/DL (ref 6–20)
BUN/CREAT SERPL: 20 (ref 12–20)
CA-I BLD-MCNC: 8.2 MG/DL (ref 8.5–10.1)
CHLORIDE SERPL-SCNC: 110 MMOL/L (ref 97–108)
CO2 SERPL-SCNC: 22 MMOL/L (ref 21–32)
CREAT SERPL-MCNC: 0.82 MG/DL (ref 0.55–1.02)
DIFFERENTIAL METHOD BLD: ABNORMAL
EOSINOPHIL # BLD: 0.1 K/UL (ref 0–0.4)
EOSINOPHIL NFR BLD: 3 % (ref 0–7)
ERYTHROCYTE [DISTWIDTH] IN BLOOD BY AUTOMATED COUNT: 15.1 % (ref 11.5–14.5)
GLUCOSE SERPL-MCNC: 166 MG/DL (ref 65–100)
HCT VFR BLD AUTO: 31.4 % (ref 35–47)
HGB BLD-MCNC: 10.3 G/DL (ref 11.5–16)
IMM GRANULOCYTES # BLD AUTO: 0 K/UL (ref 0–0.04)
IMM GRANULOCYTES NFR BLD AUTO: 0 % (ref 0–0.5)
LIPASE SERPL-CCNC: 1106 U/L (ref 73–393)
LYMPHOCYTES # BLD: 0.8 K/UL (ref 0.8–3.5)
LYMPHOCYTES NFR BLD: 17 % (ref 12–49)
MAGNESIUM SERPL-MCNC: 1.7 MG/DL (ref 1.6–2.4)
MCH RBC QN AUTO: 27.4 PG (ref 26–34)
MCHC RBC AUTO-ENTMCNC: 32.8 G/DL (ref 30–36.5)
MCV RBC AUTO: 83.5 FL (ref 80–99)
MONOCYTES # BLD: 0.6 K/UL (ref 0–1)
MONOCYTES NFR BLD: 12 % (ref 5–13)
NEUTS SEG # BLD: 3.4 K/UL (ref 1.8–8)
NEUTS SEG NFR BLD: 68 % (ref 32–75)
NRBC # BLD: 0 K/UL (ref 0–0.01)
NRBC BLD-RTO: 0 PER 100 WBC
PHOSPHATE SERPL-MCNC: 2.6 MG/DL (ref 2.6–4.7)
PLATELET # BLD AUTO: 114 K/UL (ref 150–400)
PMV BLD AUTO: 12.3 FL (ref 8.9–12.9)
POTASSIUM SERPL-SCNC: 3.5 MMOL/L (ref 3.5–5.1)
RBC # BLD AUTO: 3.76 M/UL (ref 3.8–5.2)
SODIUM SERPL-SCNC: 140 MMOL/L (ref 136–145)
TROPONIN-HIGH SENSITIVITY: 506 NG/L (ref 0–51)
WBC # BLD AUTO: 4.9 K/UL (ref 3.6–11)

## 2022-02-21 PROCEDURE — 84484 ASSAY OF TROPONIN QUANT: CPT

## 2022-02-21 PROCEDURE — 74011250636 HC RX REV CODE- 250/636: Performed by: INTERNAL MEDICINE

## 2022-02-21 PROCEDURE — 82150 ASSAY OF AMYLASE: CPT

## 2022-02-21 PROCEDURE — 36415 COLL VENOUS BLD VENIPUNCTURE: CPT

## 2022-02-21 PROCEDURE — 65270000029 HC RM PRIVATE

## 2022-02-21 PROCEDURE — 74011250637 HC RX REV CODE- 250/637: Performed by: HOSPITALIST

## 2022-02-21 PROCEDURE — 83690 ASSAY OF LIPASE: CPT

## 2022-02-21 PROCEDURE — 85025 COMPLETE CBC W/AUTO DIFF WBC: CPT

## 2022-02-21 PROCEDURE — 74011250636 HC RX REV CODE- 250/636: Performed by: HOSPITALIST

## 2022-02-21 PROCEDURE — 74011250637 HC RX REV CODE- 250/637: Performed by: INTERNAL MEDICINE

## 2022-02-21 PROCEDURE — 74011000250 HC RX REV CODE- 250: Performed by: HOSPITALIST

## 2022-02-21 PROCEDURE — 73630 X-RAY EXAM OF FOOT: CPT

## 2022-02-21 PROCEDURE — 83735 ASSAY OF MAGNESIUM: CPT

## 2022-02-21 PROCEDURE — 84100 ASSAY OF PHOSPHORUS: CPT

## 2022-02-21 PROCEDURE — 80048 BASIC METABOLIC PNL TOTAL CA: CPT

## 2022-02-21 RX ORDER — FUROSEMIDE 40 MG/1
20 TABLET ORAL DAILY
Status: DISCONTINUED | OUTPATIENT
Start: 2022-02-22 | End: 2022-02-24 | Stop reason: HOSPADM

## 2022-02-21 RX ORDER — HYDROMORPHONE HYDROCHLORIDE 1 MG/ML
0.5 INJECTION, SOLUTION INTRAMUSCULAR; INTRAVENOUS; SUBCUTANEOUS
Status: DISPENSED | OUTPATIENT
Start: 2022-02-21 | End: 2022-02-24

## 2022-02-21 RX ADMIN — SODIUM CHLORIDE, PRESERVATIVE FREE 10 ML: 5 INJECTION INTRAVENOUS at 14:27

## 2022-02-21 RX ADMIN — ENOXAPARIN SODIUM 40 MG: 100 INJECTION SUBCUTANEOUS at 08:20

## 2022-02-21 RX ADMIN — CLOPIDOGREL BISULFATE 75 MG: 75 TABLET, FILM COATED ORAL at 08:19

## 2022-02-21 RX ADMIN — APIXABAN 2.5 MG: 2.5 TABLET, FILM COATED ORAL at 21:21

## 2022-02-21 RX ADMIN — SODIUM CHLORIDE 100 ML/HR: 9 INJECTION, SOLUTION INTRAVENOUS at 10:28

## 2022-02-21 RX ADMIN — HYDROMORPHONE HYDROCHLORIDE 0.5 MG: 1 INJECTION, SOLUTION INTRAMUSCULAR; INTRAVENOUS; SUBCUTANEOUS at 21:20

## 2022-02-21 RX ADMIN — CARVEDILOL 3.12 MG: 3.12 TABLET, FILM COATED ORAL at 16:58

## 2022-02-21 RX ADMIN — PANTOPRAZOLE SODIUM 40 MG: 40 TABLET, DELAYED RELEASE ORAL at 08:19

## 2022-02-21 RX ADMIN — HYDROMORPHONE HYDROCHLORIDE 0.5 MG: 1 INJECTION, SOLUTION INTRAMUSCULAR; INTRAVENOUS; SUBCUTANEOUS at 05:31

## 2022-02-21 RX ADMIN — SODIUM CHLORIDE 100 ML/HR: 9 INJECTION, SOLUTION INTRAVENOUS at 00:00

## 2022-02-21 RX ADMIN — VERAPAMIL HYDROCHLORIDE 240 MG: 120 CAPSULE, DELAYED RELEASE PELLETS ORAL at 08:19

## 2022-02-21 RX ADMIN — CARVEDILOL 3.12 MG: 3.12 TABLET, FILM COATED ORAL at 08:19

## 2022-02-21 RX ADMIN — WATER 1 G: 1 INJECTION INTRAMUSCULAR; INTRAVENOUS; SUBCUTANEOUS at 21:20

## 2022-02-21 RX ADMIN — SODIUM CHLORIDE, PRESERVATIVE FREE 10 ML: 5 INJECTION INTRAVENOUS at 05:31

## 2022-02-21 RX ADMIN — PRAVASTATIN SODIUM 10 MG: 20 TABLET ORAL at 21:21

## 2022-02-21 RX ADMIN — SODIUM CHLORIDE, PRESERVATIVE FREE 10 ML: 5 INJECTION INTRAVENOUS at 21:21

## 2022-02-21 RX ADMIN — HYDROMORPHONE HYDROCHLORIDE 0.5 MG: 1 INJECTION, SOLUTION INTRAMUSCULAR; INTRAVENOUS; SUBCUTANEOUS at 14:03

## 2022-02-21 NOTE — CONSULTS
Consult    Patient: Hermelinda Silva MRN: [id number]  SSN: [id number]    YOB: 1940  Age: 81 y.o. Sex: female      Subjective:      Hermelinda Silva is a 81 y.o. female who is being seen for abdominal pain, pancreatitis, patient was here multiple times,  Present hospital with recurrent abdominal pain,worse in intensity, rating it 10 on scale of 10. Associated with nausea and vomiting. Has a history of pancreatitis, iN ER labs found with significantly elevated lipase and signs of pancreatitis admitted for further management.     Past Medical History:   Diagnosis Date    Colon polyps     Diabetes ([facility] [address]     Diverticulosis     Hypertension     Ill-defined condition      Past Surgical History:   Procedure Laterality Date    COLONOSCOPY N/A 4/29/2021    COLONOSCOPY performed by Sriram Antoine MD at [facility] ENDOSCOPY      Family History   Problem Relation Age of Onset    Diabetes Other      Social History     Tobacco Use    Smoking status: Never Smoker    Smokeless tobacco: Never Used   Substance Use Topics    Alcohol use: Not Currently      Current Facility-Administered Medications   Medication Dose Route Frequency Provider Last Rate Last Admin    hydrOXYzine pamoate (VISTARIL) capsule 25 mg  25 mg Oral QID PRN Yadi SALAZAR MD        melatonin tablet 3 mg  3 mg Oral QHS PRN Matt Meza MD        enoxaparin (LOVENOX) injection 40 mg  40 mg SubCUTAneous DAILY Matt Meza MD   40 mg at 02/20/22 1216    verapamil ER (VERELAN) capsule 240 mg  240 mg Oral DAILY Napoleon Pal MD   240 mg at 02/20/22 0902    clopidogreL (PLAVIX) tablet 75 mg  75 mg Oral DAILY Napoleon Pal MD   75 mg at 02/20/22 0902    carvediloL (COREG) tablet 3.125 mg  3.125 mg Oral BID WITH MEALS Napoleon Pal MD   3.125 mg at 02/20/22 1719    pravastatin (PRAVACHOL) tablet 10 mg  10 mg Oral QHS Napoleon Pal MD   10 mg at 02/19/22 2309    pantoprazole (PROTONIX) tablet 40 mg  40 mg Oral ACB Thee Cloud MD   40 mg at 02/20/22 0902    0.9% sodium chloride infusion  100 mL/hr IntraVENous CONTINUOUS Thee Cloud  mL/hr at 02/20/22 0902 100 mL/hr at 02/20/22 0902    sodium chloride (NS) flush 5-40 mL  5-40 mL IntraVENous Q8H Thee Cloud MD   10 mL at 02/20/22 1400    sodium chloride (NS) flush 5-40 mL  5-40 mL IntraVENous PRN Thee Cloud MD        acetaminophen (TYLENOL) tablet 650 mg  650 mg Oral Q4H PRN Thee Cloud MD        HYDROmorphone (DILAUDID) syringe 0.5 mg  0.5 mg IntraVENous Q4H PRN Thee Cloud MD   0.5 mg at 02/20/22 1719    ondansetron (ZOFRAN) injection 4 mg  4 mg IntraVENous Q4H PRN Thee Cloud MD   4 mg at 02/19/22 2321    cefTRIAXone (ROCEPHIN) 1 g in sterile water (preservative free) 10 mL IV syringe  1 g IntraVENous Q24H Thee Cloud MD   1 g at 02/19/22 2214        Allergies   Allergen Reactions    Insulins Itching    Penicillins Other (comments)     Pt states it makes her pass out       Review of Systems:  Review of Systems   Unable to perform ROS: Medical condition        Objective:     Vitals:    02/20/22 0619 02/20/22 0902 02/20/22 1119 02/20/22 1717   BP: 124/75 133/73 132/75 132/70   Pulse: 84 78 78 83   Resp: 16   16   Temp:    98.6 °F (37 °C)   SpO2: 97%  98% 100%   Weight:       Height:            Physical Exam:  Physical Exam  Constitutional:       Appearance: She is ill-appearing. HENT:      Head: Atraumatic. Eyes:      General: No scleral icterus. Cardiovascular:      Rate and Rhythm: Regular rhythm. Pulses: Normal pulses. Abdominal:      General: Abdomen is flat. Palpations: Abdomen is soft. Tenderness: There is abdominal tenderness. Musculoskeletal:      Cervical back: Neck supple. Skin:     General: Skin is warm. Neurological:      Mental Status: Mental status is at baseline. Psychiatric:         Thought Content:  Thought content normal.          Recent Results (from the past 24 hour(s))   URINALYSIS W/ REFLEX CULTURE    Collection Time: 02/19/22  8:44 PM    Specimen: Urine   Result Value Ref Range    Color Yellow/Straw      Appearance Turbid (A) Clear      Specific gravity 1.029 1.003 - 1.030      pH (UA) 6.0 5.0 - 8.0      Protein Negative Negative mg/dL    Glucose >300 (A) Negative mg/dL    Ketone 20 (A) Negative mg/dL    Bilirubin Negative Negative      Blood Negative Negative      Urobilinogen 0.1 0.1 - 1.0 EU/dL    Nitrites Positive (A) Negative      Leukocyte Esterase Negative Negative      UA:UC IF INDICATED Culture not indicated by UA result Culture not indicated by UA result      WBC 0-4 0 - 4 /hpf    RBC 0-5 0 - 5 /hpf    Bacteria Negative Negative /hpf    Mucus Trace /lpf   METABOLIC PANEL, COMPREHENSIVE    Collection Time: 02/20/22  3:59 AM   Result Value Ref Range    Sodium 135 (L) 136 - 145 mmol/L    Potassium 4.1 3.5 - 5.1 mmol/L    Chloride 106 97 - 108 mmol/L    CO2 23 21 - 32 mmol/L    Anion gap 6 5 - 15 mmol/L    Glucose 322 (H) 65 - 100 mg/dL    BUN 22 (H) 6 - 20 mg/dL    Creatinine 0.98 0.55 - 1.02 mg/dL    BUN/Creatinine ratio 22 (H) 12 - 20      GFR est AA >60 >60 ml/min/1.73m2    GFR est non-AA 54 (L) >60 ml/min/1.73m2    Calcium 8.8 8.5 - 10.1 mg/dL    Bilirubin, total 0.6 0.2 - 1.0 mg/dL    AST (SGOT) 10 (L) 15 - 37 U/L    ALT (SGPT) 13 12 - 78 U/L    Alk.  phosphatase 120 (H) 45 - 117 U/L    Protein, total 6.3 (L) 6.4 - 8.2 g/dL    Albumin 2.9 (L) 3.5 - 5.0 g/dL    Globulin 3.4 2.0 - 4.0 g/dL    A-G Ratio 0.9 (L) 1.1 - 2.2     LIPASE    Collection Time: 02/20/22  3:59 AM   Result Value Ref Range    Lipase >3,000 (H) 73 - 393 U/L   MAGNESIUM    Collection Time: 02/20/22  3:59 AM   Result Value Ref Range    Magnesium 1.7 1.6 - 2.4 mg/dL   PHOSPHORUS    Collection Time: 02/20/22  3:59 AM   Result Value Ref Range    Phosphorus 3.3 2.6 - 4.7 mg/dL   CBC W/O DIFF    Collection Time: 02/20/22  3:59 AM   Result Value Ref Range    WBC 6.2 3.6 - 11.0 K/uL    RBC 4.21 3.80 - 5.20 M/uL    HGB 11.4 (L) 11.5 - 16.0 g/dL    HCT 35.3 35.0 - 47.0 %    MCV 83.8 80.0 - 99.0 FL    MCH 27.1 26.0 - 34.0 PG    MCHC 32.3 30.0 - 36.5 g/dL    RDW 15.3 (H) 11.5 - 14.5 %    PLATELET 033 (L) 522 - 400 K/uL    MPV 12.1 8.9 - 12.9 FL    NRBC 0.0 0.0  WBC    ABSOLUTE NRBC 0.00 0.00 - 0.01 K/uL        CT ABD PELV W CONT   Final Result   1. Unchanged appearance of cystic dilation of the main pancreatic duct in the   tail of the pancreas with calcifications and/or surgical clips again   demonstrated. There is some infiltration in the fat surrounding the pancreas and   extending into the root of the mesentery such that acute pancreatitis may be   present. Correlation with serum amylase recommended. 2. Hernias as described. 3. Sigmoid diverticulosis without CT evidence for acute diverticulitis.          Assessment:     Hospital Problems  Date Reviewed: 2/19/2022          Codes Class Noted POA    Acute pancreatitis ICD-10-CM: K85.90  ICD-9-CM: 010.7  11/20/2021 Yes        Elevated troponin ICD-10-CM: R77.8  ICD-9-CM: 790.6  10/22/2021 Yes             abdominal pain,unknown etiology,  recurrent pancreatitis,inflammatory change on CT, no abscess, no pseudocysts IgG4 was in normal range,IG G 4 antinuclear antibody was negative  CT showed possible gastritis well thickness with inflammation  CA-19-9 was negative before , no signs of underlying malignancy    Troponin elevation, congestive heart failure,  Plan:   Continue current treatment,  Pain control,  Check lipid profile ,  Congenital heart failure treatment, cardiac to follow,           Signed By: Isai Velasco MD     February 20, 2022         Thank you for allowing me to participate in this patients care  Cc Referring Physician   River Reeder MD

## 2022-02-21 NOTE — PROGRESS NOTES
Problem: Falls - Risk of  Goal: *Absence of Falls  Description: Document Marika Marking Fall Risk and appropriate interventions in the flowsheet. Outcome: Progressing Towards Goal  Note: Fall Risk Interventions:  Mobility Interventions: Utilize walker, cane, or other assistive device         Medication Interventions: Bed/chair exit alarm    Elimination Interventions: Bed/chair exit alarm              Problem: Pressure Injury - Risk of  Goal: *Prevention of pressure injury  Description: Document Jon Scale and appropriate interventions in the flowsheet.   Outcome: Progressing Towards Goal  Note: Pressure Injury Interventions:  Sensory Interventions: Discuss PT/OT consult with provider         Activity Interventions: Increase time out of bed,PT/OT evaluation    Mobility Interventions: PT/OT evaluation    Nutrition Interventions: Document food/fluid/supplement intake

## 2022-02-21 NOTE — PROGRESS NOTES
Patient declines tylenol, request nurse call and request something stronger for reece foot pain lateral small toe side

## 2022-02-21 NOTE — PROGRESS NOTES
Progress Note    Patient: Yulisa Guadalupe MRN: [id number]  SSN: [id number]    YOB: 1940  Age: 81 y.o. Sex: female      Admit Date: 2/19/2022    LOS: 2 days     Subjective:   Patient complained the leg pain, no nausea vomiting or abdominal pain.   Past Medical History:   Diagnosis Date    Colon polyps     Diabetes ([address]     Diverticulosis     Hypertension     Ill-defined condition         Current Facility-Administered Medications:     hydrOXYzine pamoate (VISTARIL) capsule 25 mg, 25 mg, Oral, QID PRN, Matt Land MD, 25 mg at 02/20/22 2359    melatonin tablet 3 mg, 3 mg, Oral, QHS PRN, Chapo Redding MD    enoxaparin (LOVENOX) injection 40 mg, 40 mg, SubCUTAneous, DAILY, Matt Land MD, 40 mg at 02/21/22 0820    verapamil ER (VERELAN) capsule 240 mg, 240 mg, Oral, DAILY, Yessica Driver MD, 240 mg at 02/21/22 0819    clopidogreL (PLAVIX) tablet 75 mg, 75 mg, Oral, DAILY, Yessica Driver MD, 75 mg at 02/21/22 0819    carvediloL (COREG) tablet 3.125 mg, 3.125 mg, Oral, BID WITH MEALS, Yessica Driver MD, 3.125 mg at 02/21/22 8229    pravastatin (PRAVACHOL) tablet 10 mg, 10 mg, Oral, QHS, Yessica Driver MD, 10 mg at 02/19/22 2309    pantoprazole (PROTONIX) tablet 40 mg, 40 mg, Oral, ACB, Yessica Drvier MD, 40 mg at 02/21/22 0819    0.9% sodium chloride infusion, 100 mL/hr, IntraVENous, CONTINUOUS, Yessica Driver MD, Last Rate: 100 mL/hr at 02/21/22 1028, 100 mL/hr at 02/21/22 1028    sodium chloride (NS) flush 5-40 mL, 5-40 mL, IntraVENous, Q8H, Yessica Driver MD, 10 mL at 02/21/22 0531    sodium chloride (NS) flush 5-40 mL, 5-40 mL, IntraVENous, PRN, Yessica Driver MD    acetaminophen (TYLENOL) tablet 650 mg, 650 mg, Oral, Q4H PRN, Yessica Driver MD    HYDROmorphone (DILAUDID) syringe 0.5 mg, 0.5 mg, IntraVENous, Q4H PRN, Yessica Driver MD, 0.5 mg at 02/21/22 0531    ondansetron [facility]) injection 4 mg, 4 mg, IntraVENous, Q4H PRN, William Jack MD, 4 mg at 02/19/22 2321    cefTRIAXone (ROCEPHIN) 1 g in sterile water (preservative free) 10 mL IV syringe, 1 g, IntraVENous, Q24H, William Jack MD, 1 g at 02/20/22 2158    Objective:     Vitals:    02/21/22 0400 02/21/22 0504 02/21/22 0701 02/21/22 0800   BP:  (!) 144/93 128/72    Pulse: 96 96 80 80   Resp:  20 18    Temp:  98 °F (36.7 °C) 98.3 °F (36.8 °C)    SpO2:  97% 99%    Weight:  56 kg (123 lb 7.3 oz)     Height:            Intake and Output:  Current Shift: No intake/output data recorded. Last three shifts: 02/19 1901 - 02/21 0700  In: 1000 [I.V.:1000]  Out: -     Physical Exam:   Physical Exam  Constitutional:       Appearance: She is ill-appearing. HENT:      Head: Atraumatic. Mouth/Throat:      Mouth: Mucous membranes are dry. Eyes:      General: No scleral icterus. Cardiovascular:      Pulses: Normal pulses. Pulmonary:      Breath sounds: Normal breath sounds. Abdominal:      Palpations: Abdomen is soft. Musculoskeletal:         General: Normal range of motion. Cervical back: Neck supple. Skin:     General: Skin is warm.    Psychiatric:         Mood and Affect: Mood normal.          Lab/Data Review:  Recent Results (from the past 24 hour(s))   TROPONIN-HIGH SENSITIVITY    Collection Time: 02/21/22  5:53 AM   Result Value Ref Range    Troponin-High Sensitivity 506 (HH) 0 - 51 ng/L   LIPASE    Collection Time: 02/21/22  5:53 AM   Result Value Ref Range    Lipase 1,106 (H) 73 - 393 U/L   AMYLASE    Collection Time: 02/21/22  5:53 AM   Result Value Ref Range    Amylase 205 (H) 25 - 421 U/L   METABOLIC PANEL, BASIC    Collection Time: 02/21/22  5:53 AM   Result Value Ref Range    Sodium 140 136 - 145 mmol/L    Potassium 3.5 3.5 - 5.1 mmol/L    Chloride 110 (H) 97 - 108 mmol/L    CO2 22 21 - 32 mmol/L    Anion gap 8 5 - 15 mmol/L    Glucose 166 (H) 65 - 100 mg/dL    BUN 16 6 - 20 mg/dL Creatinine 0.82 0.55 - 1.02 mg/dL    BUN/Creatinine ratio 20 12 - 20      GFR est AA >60 >60 ml/min/1.73m2    GFR est non-AA >60 >60 ml/min/1.73m2    Calcium 8.2 (L) 8.5 - 10.1 mg/dL   CBC WITH AUTOMATED DIFF    Collection Time: 02/21/22  5:53 AM   Result Value Ref Range    WBC 4.9 3.6 - 11.0 K/uL    RBC 3.76 (L) 3.80 - 5.20 M/uL    HGB 10.3 (L) 11.5 - 16.0 g/dL    HCT 31.4 (L) 35.0 - 47.0 %    MCV 83.5 80.0 - 99.0 FL    MCH 27.4 26.0 - 34.0 PG    MCHC 32.8 30.0 - 36.5 g/dL    RDW 15.1 (H) 11.5 - 14.5 %    PLATELET 338 (L) 868 - 400 K/uL    MPV 12.3 8.9 - 12.9 FL    NRBC 0.0 0.0  WBC    ABSOLUTE NRBC 0.00 0.00 - 0.01 K/uL    NEUTROPHILS 68 32 - 75 %    LYMPHOCYTES 17 12 - 49 %    MONOCYTES 12 5 - 13 %    EOSINOPHILS 3 0 - 7 %    BASOPHILS 0 0 - 1 %    IMMATURE GRANULOCYTES 0 0 - 0.5 %    ABS. NEUTROPHILS 3.4 1.8 - 8.0 K/UL    ABS. LYMPHOCYTES 0.8 0.8 - 3.5 K/UL    ABS. MONOCYTES 0.6 0.0 - 1.0 K/UL    ABS. EOSINOPHILS 0.1 0.0 - 0.4 K/UL    ABS. BASOPHILS 0.0 0.0 - 0.1 K/UL    ABS. IMM. GRANS. 0.0 0.00 - 0.04 K/UL    DF AUTOMATED     MAGNESIUM    Collection Time: 02/21/22  5:53 AM   Result Value Ref Range    Magnesium 1.7 1.6 - 2.4 mg/dL   PHOSPHORUS    Collection Time: 02/21/22  5:53 AM   Result Value Ref Range    Phosphorus 2.6 2.6 - 4.7 mg/dL        CT ABD PELV W CONT   Final Result   1. Unchanged appearance of cystic dilation of the main pancreatic duct in the   tail of the pancreas with calcifications and/or surgical clips again   demonstrated. There is some infiltration in the fat surrounding the pancreas and   extending into the root of the mesentery such that acute pancreatitis may be   present. Correlation with serum amylase recommended. 2. Hernias as described. 3. Sigmoid diverticulosis without CT evidence for acute diverticulitis.            Assessment:     Active Problems:    Elevated troponin (10/22/2021)      Acute pancreatitis (11/20/2021)      abdominal pain,unknown etiology,  recurrent pancreatitis,inflammatory change on CT, no abscess, no pseudocysts IgG4 was in normal range,IG G 4 antinuclear antibody was negative  CT showed possible gastritis well thickness with inflammation  CA-19-9 was negative before , no signs of underlying malignancy     Troponin elevation, congestive heart failure,  Plan:   Continue current treatment,  Pain control,              Cardiology follow, evaluate for the leg pain      Plan:       Signed By: Ivan Skinner MD     February 21, 2022        Thank you for allowing me to participate in this patients care  Cc Referring Physician   Anirudh Allen MD

## 2022-02-21 NOTE — PROGRESS NOTES
Reason for Admission:   Acute Pancreatitis                    RUR Score:     19%             PCP: First and Last name:   Laith Lindsay MD     Name of Practice:    Are you a current patient: Yes/No: yes   Approximate date of last visit: \"last Friday\" per patient   Can you participate in a virtual visit if needed: with assistance    Do you (patient/family) have any concerns for transition/discharge? None reported              Plan for utilizing home health:   Patient agreeable    Current Advanced Directive/Advance Care Plan:  Full Code      Healthcare Decision Maker:   Click here to complete [address] including selection of the Healthcare Decision Maker Relationship (ie \"Primary\")            Primary Decision Maker: tracie hobbs - Ahmet - [phone]    Transition of Care Plan:      Home with [facility]      CM met with patient in room to complete DCP assessment. Patient reports that she lives alone in a one story house, address on chart confirmed. Patient's son, Henrique Oconnor, lives next door and she states that she often spends time there and often stays overnight. Prior to admission patient reports that she was able to complete her ADL's independently and occasionally used a cane when her hip was \"acting up\". Patient does not drive but patient states she has a large family support group and many family members are willing to transport to appointments, reports no issues with obtaining her meds. Patient was at [facility] last year, has also had HH, patient agreeable to [facility] on DC, gave no preference, referrals sent via julio c, awaiting acceptance.

## 2022-02-21 NOTE — PROGRESS NOTES
Hospitalist Progress Note               Daily Progress Note: 2/21/2022  Per HPI:82 y.o. female history of diabetes, does not take insulin as she feels allergy, hypertension, coronary artery disease, heart failure with reduced ejection fraction presents to the emergency room complaining of epigastric and upper abdominal pain started last night getting worse in intensity, rating it 10 on scale of 10. Associated with nausea and vomiting. Has a history of pancreatitis, think that is worsening. No fever or chills, trouble breathing. Patient is given pain medication resting comfortably in the bed when I seen her. She is not much communicative, nodding head for answers even though she can communicate and talk. Found with significantly elevated lipase and signs of pancreatitis admitted for further management. Cardiology consulted secondary to elevated troponin and gi consulted secondary to recurrent pancreatitis. She was admitted here 11/2021 with acute pancreatitis, nstemi, decompensated chf/ HFrEF, and in 10/2021 [facility] with AR to RCA.       Subjective: The patient is seen for follow  up. States abd pein persists but is improved,  Tolerating sips clears, advancing to clear liquid diet. Afebrile, stable bp. Lipase descending. Mag 1.7  Lipase >3000-->1106, amylase 205  Ca 19-9 <35 in 10/2021  Gi following    Denies chest pain or sob. Trop 580-->506  Cardiology following, type 2 nstemi    Today complains also of bilateral lateral foot pain , bases of 5th toe.      Problem List:  Problem List as of 2/21/2022 Date Reviewed: 2/19/2022          Codes Class Noted - Resolved    Acute on chronic combined systolic and diastolic ACC/AHA stage C congestive heart failure ([facility] ICD-10-CM: I50.43  ICD-9-CM: 428.43, 428.0  11/24/2021 - Present        Pleural effusion, right ICD-10-CM: J90  ICD-9-CM: 511.9  11/24/2021 - Present        Abdominal pain ICD-10-CM: R10.9  ICD-9-CM: 789.00  11/20/2021 - Present        Acute pancreatitis ICD-10-CM: K85.90  ICD-9-CM: 074.8  11/20/2021 - Present        NSTEMI (non-ST elevated myocardial infarction) [facility]) ICD-10-CM: I21.4  ICD-9-CM: 410.70  10/30/2021 - Present        Bursitis of multiple sites ICD-10-CM: M71.9  ICD-9-CM: 726.8  10/30/2021 - Present        Pancreatitis ICD-10-CM: K85.90  ICD-9-CM: 577.0  10/22/2021 - Present        Elevated troponin ICD-10-CM: R77.8  ICD-9-CM: 790.6  10/22/2021 - Present        Vasculitis ([address] ICD-10-CM: I77.6  ICD-9-CM: 447.6  7/8/2021 - Present        Pancreatic mass ICD-10-CM: K86.89  ICD-9-CM: 577.8  7/8/2021 - Present        Epigastric pain ICD-10-CM: R10.13  ICD-9-CM: 789.06  7/4/2021 - Present        CVA (cerebral vascular accident) [facility]) ICD-10-CM: I63.9  ICD-9-CM: 434.91  5/6/2021 - Present        Right sided weakness ICD-10-CM: R53.1  ICD-9-CM: 728.87  5/4/2021 - Present        GI bleed ICD-10-CM: K92.2  ICD-9-CM: 578.9  4/28/2021 - Present              Medications reviewed  Current Facility-Administered Medications   Medication Dose Route Frequency    HYDROmorphone (DILAUDID) syringe 0.5 mg  0.5 mg IntraVENous Q4H PRN    hydrOXYzine pamoate (VISTARIL) capsule 25 mg  25 mg Oral QID PRN    melatonin tablet 3 mg  3 mg Oral QHS PRN    enoxaparin (LOVENOX) injection 40 mg  40 mg SubCUTAneous DAILY    verapamil ER (VERELAN) capsule 240 mg  240 mg Oral DAILY    clopidogreL (PLAVIX) tablet 75 mg  75 mg Oral DAILY    carvediloL (COREG) tablet 3.125 mg  3.125 mg Oral BID WITH MEALS    pravastatin (PRAVACHOL) tablet 10 mg  10 mg Oral QHS    pantoprazole (PROTONIX) tablet 40 mg  40 mg Oral ACB    0.9% sodium chloride infusion  100 mL/hr IntraVENous CONTINUOUS    sodium chloride (NS) flush 5-40 mL  5-40 mL IntraVENous Q8H    sodium chloride (NS) flush 5-40 mL  5-40 mL IntraVENous PRN    acetaminophen (TYLENOL) tablet 650 mg  650 mg Oral Q4H PRN    ondansetron (ZOFRAN) injection 4 mg  4 mg IntraVENous Q4H PRN    cefTRIAXone (ROCEPHIN) 1 g in sterile water (preservative free) 10 mL IV syringe  1 g IntraVENous Q24H       Review of Systems:   Review of Systems   Constitutional: Positive for malaise/fatigue. Negative for chills and fever. HENT: Negative. Eyes: Negative. Respiratory: Negative for cough, hemoptysis, sputum production, shortness of breath and wheezing. Cardiovascular: Negative for chest pain, palpitations, orthopnea, claudication and leg swelling. Gastrointestinal: Positive for abdominal pain and nausea. Negative for blood in stool, constipation, diarrhea, melena and vomiting. Genitourinary: Negative. Musculoskeletal: Positive for joint pain (b/l foot pain, base 5th toes started overnight -.). Skin: Negative. Neurological: Negative. Endo/Heme/Allergies: Negative. Psychiatric/Behavioral: Negative. Objective:   Physical Exam:     Visit Vitals  /67 (BP 1 Location: Left upper arm, BP Patient Position: At rest;Supine)   Pulse 82   Temp 98.4 °F (36.9 °C)   Resp 18   Ht 5' 5\" (1.651 m)   Wt 56 kg (123 lb 7.3 oz)   SpO2 97%   BMI 20.54 kg/m²      O2 Device: None (Room air)    Temp (24hrs), Av.2 °F (36.8 °C), Min:97.9 °F (36.6 °C), Max:98.6 °F (37 °C)    No intake/output data recorded.  1901 -  0700  In: 1000 [I.V.:1000]  Out: -     General:  Alert, cooperative, no distress, appears stated age. Frail. Head:  Normocephalic, without obvious abnormality, atraumatic. Eyes:  Conjunctivae/corneas clear. EOMs intact. Throat: moist oral mucosa   Neck: Supple, symmetrical, trachea midline, no adenopathy, no JVD. Lungs:   Clear to auscultation bilaterally. Chest wall:  No tenderness or deformity. Heart:  Regular rate and rhythm, S1, S2 normal, no murmur, click, rub or gallop. Abdomen:   Soft, diffusely tender some guarding no rebound, not distended. Bowel sounds present. Extremities: Extremities normal, atraumatic, no cyanosis. No edema. Muscle wasting.  Palpable pain lateral both feet at base of 5th toes, no lesions, no edema, no erythema. Pulses: 2+ and symmetric all extremities. Skin: Warm,dry   Neurologic: CNII-XII intact. No motor or sensory deficits.       Data Review:       Recent Days:  Recent Labs     02/21/22  0553 02/20/22  0359 02/19/22 1914   WBC 4.9 6.2 7.2   HGB 10.3* 11.4* 13.4   HCT 31.4* 35.3 40.9   * 138* 160     Recent Labs     02/21/22  0553 02/20/22  0359 02/19/22 1914    135* 135*   K 3.5 4.1 4.2   * 106 102   CO2 22 23 23   * 322* 358*   BUN 16 22* 30*   CREA 0.82 0.98 1.10*   CA 8.2* 8.8 9.5   MG 1.7 1.7  --    PHOS 2.6 3.3  --    ALB  --  2.9* 3.5   TBILI  --  0.6 0.7   ALT  --  13 16     Recent Labs     02/19/22 1914   FIO2 21.0       24 Hour Results:  Recent Results (from the past 24 hour(s))   TROPONIN-HIGH SENSITIVITY    Collection Time: 02/21/22  5:53 AM   Result Value Ref Range    Troponin-High Sensitivity 506 (HH) 0 - 51 ng/L   LIPASE    Collection Time: 02/21/22  5:53 AM   Result Value Ref Range    Lipase 1,106 (H) 73 - 393 U/L   AMYLASE    Collection Time: 02/21/22  5:53 AM   Result Value Ref Range    Amylase 205 (H) 25 - 564 U/L   METABOLIC PANEL, BASIC    Collection Time: 02/21/22  5:53 AM   Result Value Ref Range    Sodium 140 136 - 145 mmol/L    Potassium 3.5 3.5 - 5.1 mmol/L    Chloride 110 (H) 97 - 108 mmol/L    CO2 22 21 - 32 mmol/L    Anion gap 8 5 - 15 mmol/L    Glucose 166 (H) 65 - 100 mg/dL    BUN 16 6 - 20 mg/dL    Creatinine 0.82 0.55 - 1.02 mg/dL    BUN/Creatinine ratio 20 12 - 20      GFR est AA >60 >60 ml/min/1.73m2    GFR est non-AA >60 >60 ml/min/1.73m2    Calcium 8.2 (L) 8.5 - 10.1 mg/dL   CBC WITH AUTOMATED DIFF    Collection Time: 02/21/22  5:53 AM   Result Value Ref Range    WBC 4.9 3.6 - 11.0 K/uL    RBC 3.76 (L) 3.80 - 5.20 M/uL    HGB 10.3 (L) 11.5 - 16.0 g/dL    HCT 31.4 (L) 35.0 - 47.0 %    MCV 83.5 80.0 - 99.0 FL    MCH 27.4 26.0 - 34.0 PG    MCHC 32.8 30.0 - 36.5 g/dL    RDW 15.1 (H) 11.5 - 14.5 % PLATELET 355 (L) 770 - 400 K/uL    MPV 12.3 8.9 - 12.9 FL    NRBC 0.0 0.0  WBC    ABSOLUTE NRBC 0.00 0.00 - 0.01 K/uL    NEUTROPHILS 68 32 - 75 %    LYMPHOCYTES 17 12 - 49 %    MONOCYTES 12 5 - 13 %    EOSINOPHILS 3 0 - 7 %    BASOPHILS 0 0 - 1 %    IMMATURE GRANULOCYTES 0 0 - 0.5 %    ABS. NEUTROPHILS 3.4 1.8 - 8.0 K/UL    ABS. LYMPHOCYTES 0.8 0.8 - 3.5 K/UL    ABS. MONOCYTES 0.6 0.0 - 1.0 K/UL    ABS. EOSINOPHILS 0.1 0.0 - 0.4 K/UL    ABS. BASOPHILS 0.0 0.0 - 0.1 K/UL    ABS. IMM. GRANS. 0.0 0.00 - 0.04 K/UL    DF AUTOMATED     MAGNESIUM    Collection Time: 02/21/22  5:53 AM   Result Value Ref Range    Magnesium 1.7 1.6 - 2.4 mg/dL   PHOSPHORUS    Collection Time: 02/21/22  5:53 AM   Result Value Ref Range    Phosphorus 2.6 2.6 - 4.7 mg/dL       chest X-ray  CXR Results  (Last 48 hours)    None        CT Results  (Last 48 hours)               02/19/22 2030  CT ABD PELV W CONT Final result    Impression:  1. Unchanged appearance of cystic dilation of the main pancreatic duct in the   tail of the pancreas with calcifications and/or surgical clips again   demonstrated. There is some infiltration in the fat surrounding the pancreas and   extending into the root of the mesentery such that acute pancreatitis may be   present. Correlation with serum amylase recommended. 2. Hernias as described. 3. Sigmoid diverticulosis without CT evidence for acute diverticulitis. Narrative:  CT ABD PELV W CONT       Comparison: November 20, 2021. The study was performed after administration of IV contrast only. No oral   contrast was ordered. Coronal and sagittal reconstructions were obtained. 100cc of Isovue-370 used.        Dose Reduction: All CT scans at this facility are performed using dose reduction   optimization techniques as appropriate to a performed exam including the   following: Automated exposure control, adjustments of the mA and/or kV according   to patient size, or use of iterative reconstruction technique. FINDINGS:   Mild bibasilar pulmonary parenchymal scarring and atelectasis. Previously seen   bilateral pleural effusions have resolved. Moderate cystic dilation of the main pancreatic duct in the tail of the pancreas   is again demonstrated not significantly changed in size or appearance. Some   calcifications are also present. Dilation of the pancreatic duct in the body the   pancreas is unchanged. Head and uncinate process of the pancreas are   unremarkable. There is some infiltration in the fat around the pancreas as well   as extending into the root of the mesentery. Tiny cysts in the right lobe of the   liver. The liver is otherwise unremarkable. There are no calcified stones in the   gallbladder. There is no biliary ductal dilation. Spleen and adrenal glands are   unremarkable. Small bilateral renal cysts. No developing hydronephrosis. Abdominal aorta has normal course and caliber without aneurysmal dilation. There   is no periaortic or mesenteric mass or lymphadenopathy. Bowel loops are nondilated and there is no evidence for bowel obstruction. There   is no bowel wall thickening. Scattered small diverticuli in the colon again   noted. No CT evidence for acute diverticulitis. There is no free air or free   fluid in the abdomen or pelvis. Small ventral hernia superior to the umbilicus   containing fat only. Small left inguinal hernia without bowel. Urinary bladder   unremarkable. Uterus and adnexal regions are within normal limits for age. No   pelvic mass or lymphadenopathy. No acute bony abnormality. Multilevel   degenerative disc disease and facet joint arthropathy again demonstrated.                  Assessment/     Patient Active Problem List   Diagnosis Code    GI bleed K92.2    Right sided weakness R53.1    CVA (cerebral vascular accident) ([address] I63.9    Epigastric pain R10.13    Vasculitis (HCC) I77.6    Pancreatic mass K86.89    Pancreatitis K85.90  Elevated troponin R77.8    NSTEMI (non-ST elevated myocardial infarction) (HCC) I21.4    Bursitis of multiple sites M71.9    Abdominal pain R10.9    Acute pancreatitis K85.90    Acute on chronic combined systolic and diastolic ACC/AHA stage C congestive heart failure (HCC) I50.43    Pleural effusion, right J90         Severe acute pancreatitis/recurrent:  Similar sx 11/2021 admission. Ct abd noted, unchanged appearance of cystic dilatation of the main pancreatic duct in the tail of the pancreas with calcifications and/or surgical clips again demonstrated. There is some peripancreatic stranding. Will give her sips of water/ice chips, continued oral meds. Gi cx'd, judicious hydration, follow amylase/lipase. Ceftriaxone and as needed Dilaudid 0.5 mg every 6 continued. Lipase descending, clear liquids initiated.     Elevated troponin: Nstemi type 2 mechanism. History of CAD status post AR to RCA 10/2021   Cardio appreciated, trop peaked 580 and descending. No chest pain. Plavix, pravastatin, coreg, resumed. Hx paroxysmal atrial fib  eliquis pta, held for possible procedure, resumed now. She remains in SR  Chachad/vasc 6.     Systolic heart failure reduced ejection fraction:   No evidence of decompensation at this time, monitor/follow closely with judicious hydration. Echo 10/2021 reported lvef 20-25%  Furosemide held initially will resume with judicious hydration. No echo now, will follow cardio 2 months for repeat echo to see if icd needed. Benign essential hypertension:   On  carvedilol and verapamil which is  Continued, remains adequate, following. Uti:  Ceftriaxone initiated, waiting on ucs. Continue ceftriaxone empirically    Bilateral foot pain:  Developed over last night,  No hx gout. Not edematous. No appreciable lesions. Follow uric acid, esr, crp and foot xrays. cx podiatry.        Vtep: pta on Eliquis, resuming. Gip: protonix  Full code  Dispo: pending course.  Pending resolution of abd pain/pancreatitis, gi clearance. Cardio clearance, ~ 2days likely        Care Plan discussed with: Patient/Family and Nurse, consultants. Total time spent with patient: 30 minutes. This dictation was done by dragon, computer voice recognition software. Often unanticipated grammatical, syntax, phones and other interpretive errors are inadvertently transcribed. Please excuse errors that have escaped final proofreading.     Trace Cazares MD

## 2022-02-22 LAB
ANION GAP SERPL CALC-SCNC: 11 MMOL/L (ref 5–15)
BASOPHILS # BLD: 0 K/UL (ref 0–0.1)
BASOPHILS NFR BLD: 1 % (ref 0–1)
BUN SERPL-MCNC: 12 MG/DL (ref 6–20)
BUN/CREAT SERPL: 16 (ref 12–20)
CA-I BLD-MCNC: 8.2 MG/DL (ref 8.5–10.1)
CHLORIDE SERPL-SCNC: 108 MMOL/L (ref 97–108)
CO2 SERPL-SCNC: 19 MMOL/L (ref 21–32)
CREAT SERPL-MCNC: 0.73 MG/DL (ref 0.55–1.02)
CRP SERPL-MCNC: 13.1 MG/DL (ref 0–0.6)
DIFFERENTIAL METHOD BLD: ABNORMAL
EOSINOPHIL # BLD: 0.2 K/UL (ref 0–0.4)
EOSINOPHIL NFR BLD: 3 % (ref 0–7)
ERYTHROCYTE [DISTWIDTH] IN BLOOD BY AUTOMATED COUNT: 15 % (ref 11.5–14.5)
ERYTHROCYTE [SEDIMENTATION RATE] IN BLOOD: 19 MM/HR
GLUCOSE SERPL-MCNC: 147 MG/DL (ref 65–100)
HCT VFR BLD AUTO: 31.1 % (ref 35–47)
HGB BLD-MCNC: 10.4 G/DL (ref 11.5–16)
IMM GRANULOCYTES # BLD AUTO: 0 K/UL (ref 0–0.04)
IMM GRANULOCYTES NFR BLD AUTO: 0 % (ref 0–0.5)
LIPASE SERPL-CCNC: 192 U/L (ref 73–393)
LYMPHOCYTES # BLD: 0.9 K/UL (ref 0.8–3.5)
LYMPHOCYTES NFR BLD: 17 % (ref 12–49)
MCH RBC QN AUTO: 27.6 PG (ref 26–34)
MCHC RBC AUTO-ENTMCNC: 33.4 G/DL (ref 30–36.5)
MCV RBC AUTO: 82.5 FL (ref 80–99)
MONOCYTES # BLD: 0.7 K/UL (ref 0–1)
MONOCYTES NFR BLD: 12 % (ref 5–13)
NEUTS SEG # BLD: 3.6 K/UL (ref 1.8–8)
NEUTS SEG NFR BLD: 67 % (ref 32–75)
NRBC # BLD: 0 K/UL (ref 0–0.01)
NRBC BLD-RTO: 0 PER 100 WBC
PLATELET # BLD AUTO: 113 K/UL (ref 150–400)
PMV BLD AUTO: 13 FL (ref 8.9–12.9)
POTASSIUM SERPL-SCNC: 3.3 MMOL/L (ref 3.5–5.1)
RBC # BLD AUTO: 3.77 M/UL (ref 3.8–5.2)
SODIUM SERPL-SCNC: 138 MMOL/L (ref 136–145)
URATE SERPL-MCNC: 3.8 MG/DL (ref 2.6–6)
WBC # BLD AUTO: 5.4 K/UL (ref 3.6–11)

## 2022-02-22 PROCEDURE — 84550 ASSAY OF BLOOD/URIC ACID: CPT

## 2022-02-22 PROCEDURE — 83690 ASSAY OF LIPASE: CPT

## 2022-02-22 PROCEDURE — 85652 RBC SED RATE AUTOMATED: CPT

## 2022-02-22 PROCEDURE — 74011250636 HC RX REV CODE- 250/636: Performed by: HOSPITALIST

## 2022-02-22 PROCEDURE — 74011250637 HC RX REV CODE- 250/637: Performed by: HOSPITALIST

## 2022-02-22 PROCEDURE — 74011250636 HC RX REV CODE- 250/636: Performed by: INTERNAL MEDICINE

## 2022-02-22 PROCEDURE — 74011250637 HC RX REV CODE- 250/637: Performed by: INTERNAL MEDICINE

## 2022-02-22 PROCEDURE — 80048 BASIC METABOLIC PNL TOTAL CA: CPT

## 2022-02-22 PROCEDURE — 80061 LIPID PANEL: CPT

## 2022-02-22 PROCEDURE — 97530 THERAPEUTIC ACTIVITIES: CPT

## 2022-02-22 PROCEDURE — 86140 C-REACTIVE PROTEIN: CPT

## 2022-02-22 PROCEDURE — 85025 COMPLETE CBC W/AUTO DIFF WBC: CPT

## 2022-02-22 PROCEDURE — 65270000029 HC RM PRIVATE

## 2022-02-22 PROCEDURE — 36415 COLL VENOUS BLD VENIPUNCTURE: CPT

## 2022-02-22 PROCEDURE — 74011000250 HC RX REV CODE- 250: Performed by: HOSPITALIST

## 2022-02-22 PROCEDURE — 97161 PT EVAL LOW COMPLEX 20 MIN: CPT

## 2022-02-22 RX ORDER — PANTOPRAZOLE SODIUM 40 MG/1
40 TABLET, DELAYED RELEASE ORAL 2 TIMES DAILY
Status: DISCONTINUED | OUTPATIENT
Start: 2022-02-22 | End: 2022-02-24 | Stop reason: HOSPADM

## 2022-02-22 RX ORDER — LIDOCAINE 4 G/100G
1 PATCH TOPICAL EVERY 24 HOURS
Status: DISCONTINUED | OUTPATIENT
Start: 2022-02-22 | End: 2022-02-24 | Stop reason: HOSPADM

## 2022-02-22 RX ORDER — CARVEDILOL 3.12 MG/1
6.25 TABLET ORAL 2 TIMES DAILY WITH MEALS
Status: DISCONTINUED | OUTPATIENT
Start: 2022-02-22 | End: 2022-02-24 | Stop reason: HOSPADM

## 2022-02-22 RX ADMIN — WATER 1 G: 1 INJECTION INTRAMUSCULAR; INTRAVENOUS; SUBCUTANEOUS at 20:22

## 2022-02-22 RX ADMIN — PANTOPRAZOLE SODIUM 40 MG: 40 TABLET, DELAYED RELEASE ORAL at 08:27

## 2022-02-22 RX ADMIN — HYDROMORPHONE HYDROCHLORIDE 0.5 MG: 1 INJECTION, SOLUTION INTRAMUSCULAR; INTRAVENOUS; SUBCUTANEOUS at 11:10

## 2022-02-22 RX ADMIN — ONDANSETRON 4 MG: 2 INJECTION INTRAMUSCULAR; INTRAVENOUS at 08:27

## 2022-02-22 RX ADMIN — CARVEDILOL 3.12 MG: 3.12 TABLET, FILM COATED ORAL at 08:27

## 2022-02-22 RX ADMIN — CARVEDILOL 6.25 MG: 3.12 TABLET, FILM COATED ORAL at 17:22

## 2022-02-22 RX ADMIN — PRAVASTATIN SODIUM 10 MG: 20 TABLET ORAL at 20:22

## 2022-02-22 RX ADMIN — APIXABAN 2.5 MG: 2.5 TABLET, FILM COATED ORAL at 08:27

## 2022-02-22 RX ADMIN — APIXABAN 2.5 MG: 2.5 TABLET, FILM COATED ORAL at 20:22

## 2022-02-22 RX ADMIN — FUROSEMIDE 20 MG: 40 TABLET ORAL at 08:27

## 2022-02-22 RX ADMIN — HYDROMORPHONE HYDROCHLORIDE 0.5 MG: 1 INJECTION, SOLUTION INTRAMUSCULAR; INTRAVENOUS; SUBCUTANEOUS at 20:22

## 2022-02-22 RX ADMIN — VERAPAMIL HYDROCHLORIDE 240 MG: 120 CAPSULE, DELAYED RELEASE PELLETS ORAL at 08:28

## 2022-02-22 RX ADMIN — PANTOPRAZOLE SODIUM 40 MG: 40 TABLET, DELAYED RELEASE ORAL at 20:22

## 2022-02-22 RX ADMIN — CLOPIDOGREL BISULFATE 75 MG: 75 TABLET, FILM COATED ORAL at 08:27

## 2022-02-22 RX ADMIN — SODIUM CHLORIDE, PRESERVATIVE FREE 10 ML: 5 INJECTION INTRAVENOUS at 20:22

## 2022-02-22 RX ADMIN — SODIUM CHLORIDE, PRESERVATIVE FREE 10 ML: 5 INJECTION INTRAVENOUS at 13:57

## 2022-02-22 NOTE — PROGRESS NOTES
Progress Note    Patient: Anna Marie Villela MRN: [id number]  SSN: [id number]    YOB: 1940  Age: 81 y.o. Sex: female      Admit Date: 2/19/2022    LOS: 3 days     Subjective:   Patient still complained the leg pain, no nausea vomiting or abdominal pain.     Past Medical History:   Diagnosis Date    Colon polyps     Diabetes ([facility] [address]     Diverticulosis     Hypertension     Ill-defined condition         Current Facility-Administered Medications:     maalox/viscous lidocaine/diphenhydramine (GI COCKTAIL) oral solution 30 mL, 30 mL, Oral, ONCE, Nicky Macdonald MD    pantoprazole (PROTONIX) tablet 40 mg, 40 mg, Oral, BID, Saqib Dawson MD, 40 mg at 02/22/22 0827    lidocaine 4 % patch 1 Patch, 1 Patch, TransDERmal, Q24H, Richie Macdonald MD    HYDROmorphone (DILAUDID) syringe 0.5 mg, 0.5 mg, IntraVENous, Q4H PRN, Matt Strange MD, 0.5 mg at 02/22/22 1110    apixaban (ELIQUIS) tablet 2.5 mg, 2.5 mg, Oral, BID, Matt Strange MD, 2.5 mg at 02/22/22 4110    furosemide (LASIX) tablet 20 mg, 20 mg, Oral, DAILY, Matt Strange MD, 20 mg at 02/22/22 2783    hydrOXYzine pamoate (VISTARIL) capsule 25 mg, 25 mg, Oral, QID PRN, Bianka SALAZAR MD, 25 mg at 02/20/22 2789    melatonin tablet 3 mg, 3 mg, Oral, QHS PRN, Matt Strange MD    verapamil ER (VERELAN) capsule 240 mg, 240 mg, Oral, DAILY, Michelle Grady MD, 240 mg at 02/22/22 6525    clopidogreL (PLAVIX) tablet 75 mg, 75 mg, Oral, DAILY, Michelle Grady MD, 75 mg at 02/22/22 0827    carvediloL (COREG) tablet 3.125 mg, 3.125 mg, Oral, BID WITH MEALS, Michelle Grady MD, 3.125 mg at 02/22/22 0827    pravastatin (PRAVACHOL) tablet 10 mg, 10 mg, Oral, QHS, Michelle Grady MD, 10 mg at 02/21/22 2121    sodium chloride (NS) flush 5-40 mL, 5-40 mL, IntraVENous, Q8H, Delmar Cummings MD, 10 mL at 02/21/22 2121    sodium chloride (NS) flush 5-40 mL, 5-40 mL, IntraVENous, PRN, Janell Gongora MD    acetaminophen (TYLENOL) tablet 650 mg, 650 mg, Oral, Q4H PRN, Janell Gongora MD    ondansetron [facility]) injection 4 mg, 4 mg, IntraVENous, Q4H PRN, Janell Gongora MD, 4 mg at 02/22/22 0827    cefTRIAXone (ROCEPHIN) 1 g in sterile water (preservative free) 10 mL IV syringe, 1 g, IntraVENous, Q24H, Janell Gongora MD, 1 g at 02/21/22 2120    Objective:     Vitals:    02/21/22 2007 02/22/22 0346 02/22/22 0701 02/22/22 0810   BP: 114/62 117/78  139/75   Pulse: 78 79  77   Resp: 18 22 18   Temp: 97.7 °F (36.5 °C) 97.7 °F (36.5 °C)  98.3 °F (36.8 °C)   SpO2: 100% 98%  98%   Weight:   58.8 kg (129 lb 10.1 oz)    Height:            Intake and Output:  Current Shift: No intake/output data recorded. Last three shifts: 02/20 1901 - 02/22 0700  In: -   Out: 800 [Urine:800]    Physical Exam:   Physical Exam  Constitutional:       Appearance: She is ill-appearing. HENT:      Head: Atraumatic. Mouth/Throat:      Mouth: Mucous membranes are dry. Eyes:      General: No scleral icterus. Cardiovascular:      Pulses: Normal pulses. Pulmonary:      Breath sounds: Normal breath sounds. Abdominal:      Palpations: Abdomen is soft. Musculoskeletal:         General: Normal range of motion. Cervical back: Neck supple. Skin:     General: Skin is warm.    Psychiatric:         Mood and Affect: Mood normal.          Lab/Data Review:  Recent Results (from the past 24 hour(s))   URIC ACID    Collection Time: 02/22/22  6:09 AM   Result Value Ref Range    Uric acid 3.8 2.6 - 6.0 mg/dL   C REACTIVE PROTEIN, QT    Collection Time: 02/22/22  6:09 AM   Result Value Ref Range    C-Reactive protein 13.10 (H) 0.00 - 1.34 mg/dL   METABOLIC PANEL, BASIC    Collection Time: 02/22/22  6:09 AM   Result Value Ref Range    Sodium 138 136 - 145 mmol/L    Potassium 3.3 (L) 3.5 - 5.1 mmol/L    Chloride 108 97 - 108 mmol/L    CO2 19 (L) 21 - 32 mmol/L    Anion gap 11 5 - 15 mmol/L Glucose 147 (H) 65 - 100 mg/dL    BUN 12 6 - 20 mg/dL    Creatinine 0.73 0.55 - 1.02 mg/dL    BUN/Creatinine ratio 16 12 - 20      GFR est AA >60 >60 ml/min/1.73m2    GFR est non-AA >60 >60 ml/min/1.73m2    Calcium 8.2 (L) 8.5 - 10.1 mg/dL   CBC WITH AUTOMATED DIFF    Collection Time: 02/22/22  6:09 AM   Result Value Ref Range    WBC 5.4 3.6 - 11.0 K/uL    RBC 3.77 (L) 3.80 - 5.20 M/uL    HGB 10.4 (L) 11.5 - 16.0 g/dL    HCT 31.1 (L) 35.0 - 47.0 %    MCV 82.5 80.0 - 99.0 FL    MCH 27.6 26.0 - 34.0 PG    MCHC 33.4 30.0 - 36.5 g/dL    RDW 15.0 (H) 11.5 - 14.5 %    PLATELET 234 (L) 917 - 400 K/uL    MPV 13.0 (H) 8.9 - 12.9 FL    NRBC 0.0 0.0  WBC    ABSOLUTE NRBC 0.00 0.00 - 0.01 K/uL    NEUTROPHILS 67 32 - 75 %    LYMPHOCYTES 17 12 - 49 %    MONOCYTES 12 5 - 13 %    EOSINOPHILS 3 0 - 7 %    BASOPHILS 1 0 - 1 %    IMMATURE GRANULOCYTES 0 0 - 0.5 %    ABS. NEUTROPHILS 3.6 1.8 - 8.0 K/UL    ABS. LYMPHOCYTES 0.9 0.8 - 3.5 K/UL    ABS. MONOCYTES 0.7 0.0 - 1.0 K/UL    ABS. EOSINOPHILS 0.2 0.0 - 0.4 K/UL    ABS. BASOPHILS 0.0 0.0 - 0.1 K/UL    ABS. IMM. GRANS. 0.0 0.00 - 0.04 K/UL    DF AUTOMATED     LIPASE    Collection Time: 02/22/22  6:09 AM   Result Value Ref Range    Lipase 192 73 - 393 U/L        XR FOOT RT MIN 3 V   Final Result   1. Degenerative changes. No acute fracture. XR FOOT LT MIN 3 V   Final Result   1. Degenerative changes. No acute fracture. CT ABD PELV W CONT   Final Result   1. Unchanged appearance of cystic dilation of the main pancreatic duct in the   tail of the pancreas with calcifications and/or surgical clips again   demonstrated. There is some infiltration in the fat surrounding the pancreas and   extending into the root of the mesentery such that acute pancreatitis may be   present. Correlation with serum amylase recommended. 2. Hernias as described. 3. Sigmoid diverticulosis without CT evidence for acute diverticulitis.            Assessment:     Active Problems:    Elevated troponin (10/22/2021)      Acute pancreatitis (11/20/2021)      abdominal pain,unknown etiology,  recurrent pancreatitis,inflammatory change on CT, no abscess, no pseudocysts IgG4 was in normal range,IG G 4 antinuclear antibody was negative  CT showed possible gastritis well thickness with inflammation  CA-19-9 was negative before , no signs of underlying malignancy  Lipase down     Troponin elevation, followed by cardiologist, on anticoagulation antiplatelet drugs treatment,  Plan:   Continue current treatment,  Pain control,             May do a EUS to rule out the IPMN, if patient cardiovascular stable, waiting the cardiology to give clearnce           Signed By: Rachael Kaur MD     February 22, 2022        Thank you for allowing me to participate in this patients care  Cc Referring Physician   Surekha Dey MD

## 2022-02-22 NOTE — PROGRESS NOTES
Progress Note    Patient: Christiano Valdes MRN: [id number]  SSN: [id number]    YOB: 1940  Age: 81 y.o. Sex: female      Admit Date: 2/19/2022    LOS: 3 days     Subjective:     82F, h/o recurrent pancreatitis, CHFrEF/pAFIB on eliquis and CAD s/p stent with abdominal s/t Acute pancreatitis    HOSPITAL COURSE:  CT abdomen shows chronically dilated pancreatic ductus, lipid profile pending. Was treated symptomatically with IVF and IV dilaudid. Lipase improved. Plan for possible EUS tomorrow, and then discharge to Health system  Stop ceftriaxone tomorrow for UTI. Review of Systems:  Review of Systems   Constitutional: Positive for malaise/fatigue. Negative for chills and fever. HENT: Negative. Eyes: Negative. Respiratory: Negative for cough, hemoptysis, sputum production, shortness of breath and wheezing. Cardiovascular: Negative for chest pain, palpitations, orthopnea, claudication and leg swelling. Gastrointestinal: Positive for abdominal pain and nausea. Negative for blood in stool, constipation, diarrhea, melena and vomiting. Genitourinary: Negative. Musculoskeletal: Positive for joint pain (b/l foot pain, base 5th toes started overnight 2/21-2/22.). Skin: Negative. Neurological: Negative. Endo/Heme/Allergies: Negative. Psychiatric/Behavioral: Negative. Objective:     Vitals:    02/22/22 0346 02/22/22 0701 02/22/22 0810 02/22/22 1127   BP: 117/78  139/75 106/61   Pulse: 79  77 66   Resp: 22 18 22   Temp: 97.7 °F (36.5 °C)  98.3 °F (36.8 °C) 97.5 °F (36.4 °C)   SpO2: 98%  98% 97%   Weight:  58.8 kg (129 lb 10.1 oz)     Height:            Intake and Output:  Current Shift: No intake/output data recorded. Last three shifts: 02/20 1901 - 02/22 0700  In: -   Out: 800 [Urine:800]      Physical Exam:     General:  Alert, cooperative, no distress, appears stated age. Frail. Head:  Normocephalic, without obvious abnormality, atraumatic. Eyes:  Conjunctivae/corneas clear. EOMs intact. Throat: moist oral mucosa   Neck: Supple, symmetrical, trachea midline, no adenopathy, no JVD. Lungs:   Clear to auscultation bilaterally. Chest wall:  No tenderness or deformity. Heart:  Regular rate and rhythm, S1, S2 normal, no murmur, click, rub or gallop. Abdomen:   Soft, diffusely tender some guarding no rebound, not distended. Bowel sounds present. Extremities: Extremities normal, atraumatic, no cyanosis. No edema. Muscle wasting. Palpable pain lateral both feet at base of 5th toes, no lesions, no edema, no erythema. Pulses: 2+ and symmetric all extremities. Skin: Warm,dry   Neurologic: CNII-XII intact. No motor or sensory deficits.             Lab/Data Review:  Recent Results (from the past 24 hour(s))   SED RATE (ESR)    Collection Time: 02/22/22  6:09 AM   Result Value Ref Range    Sed rate, automated 19 mm/hr   URIC ACID    Collection Time: 02/22/22  6:09 AM   Result Value Ref Range    Uric acid 3.8 2.6 - 6.0 mg/dL   C REACTIVE PROTEIN, QT    Collection Time: 02/22/22  6:09 AM   Result Value Ref Range    C-Reactive protein 13.10 (H) 0.00 - 6.30 mg/dL   METABOLIC PANEL, BASIC    Collection Time: 02/22/22  6:09 AM   Result Value Ref Range    Sodium 138 136 - 145 mmol/L    Potassium 3.3 (L) 3.5 - 5.1 mmol/L    Chloride 108 97 - 108 mmol/L    CO2 19 (L) 21 - 32 mmol/L    Anion gap 11 5 - 15 mmol/L    Glucose 147 (H) 65 - 100 mg/dL    BUN 12 6 - 20 mg/dL    Creatinine 0.73 0.55 - 1.02 mg/dL    BUN/Creatinine ratio 16 12 - 20      GFR est AA >60 >60 ml/min/1.73m2    GFR est non-AA >60 >60 ml/min/1.73m2    Calcium 8.2 (L) 8.5 - 10.1 mg/dL   CBC WITH AUTOMATED DIFF    Collection Time: 02/22/22  6:09 AM   Result Value Ref Range    WBC 5.4 3.6 - 11.0 K/uL    RBC 3.77 (L) 3.80 - 5.20 M/uL    HGB 10.4 (L) 11.5 - 16.0 g/dL    HCT 31.1 (L) 35.0 - 47.0 %    MCV 82.5 80.0 - 99.0 FL    MCH 27.6 26.0 - 34.0 PG    MCHC 33.4 30.0 - 36.5 g/dL    RDW 15.0 (H) 11.5 - 14.5 %    PLATELET 423 (L) 625 - 400 K/uL    MPV 13.0 (H) 8.9 - 12.9 FL    NRBC 0.0 0.0  WBC    ABSOLUTE NRBC 0.00 0.00 - 0.01 K/uL    NEUTROPHILS 67 32 - 75 %    LYMPHOCYTES 17 12 - 49 %    MONOCYTES 12 5 - 13 %    EOSINOPHILS 3 0 - 7 %    BASOPHILS 1 0 - 1 %    IMMATURE GRANULOCYTES 0 0 - 0.5 %    ABS. NEUTROPHILS 3.6 1.8 - 8.0 K/UL    ABS. LYMPHOCYTES 0.9 0.8 - 3.5 K/UL    ABS. MONOCYTES 0.7 0.0 - 1.0 K/UL    ABS. EOSINOPHILS 0.2 0.0 - 0.4 K/UL    ABS. BASOPHILS 0.0 0.0 - 0.1 K/UL    ABS. IMM. GRANS. 0.0 0.00 - 0.04 K/UL    DF AUTOMATED     LIPASE    Collection Time: 02/22/22  6:09 AM   Result Value Ref Range    Lipase 192 73 - 393 U/L         Assessment and plan:      (1) Acute pancreatitis: no etoh, no CBD dilation, lipase resolving. GI for possible Intraductal Papillary Mucinous Neoplasm. Pain control with dilaudid. Increase diet. PRN zofran     (2) CHFrEF with AICD: hold IVF, tolerating orally, resume lasix 20    (3) NSTEMI type II: s/t #1. (4) pAFIB: continue eliquis, coreg. And verapimil    (5) HTN: verapamil    (6) UTI: nitrite positive, will continue Abx for 1 more days.  Ucx not availble    DVT ppx: eliquis    DISPO: DC home with [facility] tomorrow after EUS by GI   Signed By: Yan Puckett MD     February 22, 2022

## 2022-02-22 NOTE — PROGRESS NOTES
Progress Note    Patient: Elisha Fritz MRN: [id number]  SSN: [id number]    YOB: 1940  Age: 81 y.o. Sex: female      Admit Date: 2/19/2022    LOS: 3 days     Subjective:     No acute events overnight    Objective:     Vitals:    02/22/22 0346 02/22/22 0701 02/22/22 0810 02/22/22 1127   BP: 117/78  139/75 106/61   Pulse: 79  77 66   Resp: 22 18 22   Temp: 97.7 °F (36.5 °C)  98.3 °F (36.8 °C) 97.5 °F (36.4 °C)   SpO2: 98%  98% 97%   Weight:  58.8 kg (129 lb 10.1 oz)     Height:            Intake and Output:  Current Shift: No intake/output data recorded. Last three shifts: 02/20 1901 - 02/22 0700  In: -   Out: 800 [Urine:800]    Physical Exam:   General:  Alert, cooperative, no distress, appears stated age. Eyes:  Conjunctivae/corneas clear. PERRL, EOMs intact. Fundi benign   Ears:  Normal TMs and external ear canals both ears. Nose: Nares normal. Septum midline. Mucosa normal. No drainage or sinus tenderness. Mouth/Throat: Lips, mucosa, and tongue normal. Teeth and gums normal.   Neck: Supple, symmetrical, trachea midline, no adenopathy, thyroid: no enlargment/tenderness/nodules, no carotid bruit and no JVD. Back:   Symmetric, no curvature. ROM normal. No CVA tenderness. Lungs:   Clear to auscultation bilaterally. Heart:  Regular rate and rhythm, S1, S2 normal, no murmur, click, rub or gallop. Abdomen:   Soft, non-tender. Bowel sounds normal. No masses,  No organomegaly. Extremities: Extremities normal, atraumatic, no cyanosis or edema. Pulses: 2+ and symmetric all extremities. Skin: Skin color, texture, turgor normal. No rashes or lesions   Lymph nodes: Cervical, supraclavicular, and axillary nodes normal.   Neurologic: CNII-XII intact. Normal strength, sensation and reflexes throughout. Lab/Data Review: All lab results for the last 24 hours reviewed.          Assessment:     Active Problems:    Elevated troponin (10/22/2021)      Acute pancreatitis (11/20/2021)    Recurrent acute pancreatitis  NSTEMI type II  Paroxysmal atrial fibrillation  Chronic HFrEF  Dilated cardiomyopathy  Diabetes mellitus  Hypertension  Hyperlipidemia       Plan:     Patient denied having any chest pain. She appears to be euvolemic. Rhythm was stable overnight. She had short run of nonsustained VT. Okay to proceed with upper endoscopy from cardiac standpoint. No further cardiac testing planned at this time. She is on apixaban and carvedilol and Plavix and oral Lasix and pravastatin and verapamil. I stopped verapamil due to systolic heart failure.     Signed By: Chris Reese MD     February 22, 2022

## 2022-02-22 NOTE — PROGRESS NOTES
Patient states she is nauseous and has hip pain. Patient given zofran, refused Maalox as ordered and Tylenol. Patient states she will wait for zofran to help with nausea and take Dilaudid at that point.

## 2022-02-22 NOTE — PROGRESS NOTES
Attempted to see patient for PT evaluation. Patient decline therapy due to increased pain in spite of PT encouragement. We will try to see patient later.

## 2022-02-23 LAB
CHOLEST SERPL-MCNC: 120 MG/DL
GLUCOSE BLD STRIP.AUTO-MCNC: 342 MG/DL (ref 65–117)
HDLC SERPL-MCNC: 44 MG/DL
HDLC SERPL: 2.7 {RATIO} (ref 0–5)
LDLC SERPL CALC-MCNC: 65.4 MG/DL (ref 0–100)
LIPID PROFILE,FLP: NORMAL
PERFORMED BY, TECHID: ABNORMAL
TRIGL SERPL-MCNC: 53 MG/DL (ref ?–150)
VLDLC SERPL CALC-MCNC: 10.6 MG/DL

## 2022-02-23 PROCEDURE — 74011250637 HC RX REV CODE- 250/637: Performed by: INTERNAL MEDICINE

## 2022-02-23 PROCEDURE — 65270000029 HC RM PRIVATE

## 2022-02-23 PROCEDURE — 82962 GLUCOSE BLOOD TEST: CPT

## 2022-02-23 PROCEDURE — 97530 THERAPEUTIC ACTIVITIES: CPT

## 2022-02-23 PROCEDURE — 74011250637 HC RX REV CODE- 250/637: Performed by: HOSPITALIST

## 2022-02-23 PROCEDURE — 74011000250 HC RX REV CODE- 250: Performed by: HOSPITALIST

## 2022-02-23 PROCEDURE — 74011250636 HC RX REV CODE- 250/636: Performed by: INTERNAL MEDICINE

## 2022-02-23 PROCEDURE — 97165 OT EVAL LOW COMPLEX 30 MIN: CPT

## 2022-02-23 PROCEDURE — 74011250636 HC RX REV CODE- 250/636: Performed by: HOSPITALIST

## 2022-02-23 PROCEDURE — 97161 PT EVAL LOW COMPLEX 20 MIN: CPT

## 2022-02-23 RX ORDER — CLOPIDOGREL BISULFATE 75 MG/1
75 TABLET ORAL DAILY
Status: CANCELLED | OUTPATIENT
Start: 2022-02-24

## 2022-02-23 RX ADMIN — HYDROMORPHONE HYDROCHLORIDE 0.5 MG: 1 INJECTION, SOLUTION INTRAMUSCULAR; INTRAVENOUS; SUBCUTANEOUS at 09:22

## 2022-02-23 RX ADMIN — PANTOPRAZOLE SODIUM 40 MG: 40 TABLET, DELAYED RELEASE ORAL at 08:21

## 2022-02-23 RX ADMIN — CARVEDILOL 6.25 MG: 3.12 TABLET, FILM COATED ORAL at 08:21

## 2022-02-23 RX ADMIN — HYDROMORPHONE HYDROCHLORIDE 0.5 MG: 1 INJECTION, SOLUTION INTRAMUSCULAR; INTRAVENOUS; SUBCUTANEOUS at 20:35

## 2022-02-23 RX ADMIN — CLOPIDOGREL BISULFATE 75 MG: 75 TABLET, FILM COATED ORAL at 08:21

## 2022-02-23 RX ADMIN — ACETAMINOPHEN 650 MG: 325 TABLET ORAL at 04:03

## 2022-02-23 RX ADMIN — PANTOPRAZOLE SODIUM 40 MG: 40 TABLET, DELAYED RELEASE ORAL at 20:25

## 2022-02-23 RX ADMIN — APIXABAN 2.5 MG: 2.5 TABLET, FILM COATED ORAL at 08:21

## 2022-02-23 RX ADMIN — SODIUM CHLORIDE, PRESERVATIVE FREE 10 ML: 5 INJECTION INTRAVENOUS at 05:37

## 2022-02-23 RX ADMIN — ONDANSETRON 4 MG: 2 INJECTION INTRAMUSCULAR; INTRAVENOUS at 19:08

## 2022-02-23 RX ADMIN — CARVEDILOL 6.25 MG: 3.12 TABLET, FILM COATED ORAL at 16:44

## 2022-02-23 RX ADMIN — PRAVASTATIN SODIUM 10 MG: 20 TABLET ORAL at 20:25

## 2022-02-23 RX ADMIN — SODIUM CHLORIDE, PRESERVATIVE FREE 10 ML: 5 INJECTION INTRAVENOUS at 20:27

## 2022-02-23 RX ADMIN — FUROSEMIDE 20 MG: 40 TABLET ORAL at 08:20

## 2022-02-23 RX ADMIN — SODIUM CHLORIDE, PRESERVATIVE FREE 10 ML: 5 INJECTION INTRAVENOUS at 14:21

## 2022-02-23 NOTE — PROGRESS NOTES
PHYSICAL THERAPY EVALUATION  Patient: Black Lomas (50 y.o. female)  Date: 2/23/2022  Primary Diagnosis: Acute pancreatitis [K85.90]  Elevated troponin [R77.8]        Precautions: fall       ASSESSMENT  81 y.o. female history of diabetes, does not take insulin as she feels allergy, hypertension, coronary artery disease, heart failure with reduced ejection fraction presents to the emergency room complaining of epigastric and upper abdominal pain started last night getting worse in intensity, rating it 10 on scale of 10. Associated with nausea and vomiting. Has a history of pancreatitis, think that is worsening. No fever or chills, trouble breathing. Patient is given pain medication resting comfortably in the bed when I seen her. She is not much communicative, nodding head for answers even though she can communicate and talk. Found with significantly elevated lipase and signs of pancreatitis admitted for further management. Patient lives with family and was indep at home with some assist at home. Please refer to flow sheet for PLOF. Patie found in bed and required lot of encouragement to work with therapy. Based on the objective data described below, the patient presents with  decreased gen strength and endurance to activities. Current Level of Function Impacting Discharge (mobility/balance): may need a little more assist at home due to gen weakness. Other factors to consider for discharge: HHPT   Patient will benefit from skilled therapy intervention to address the above noted impairments. PLAN :  Recommendations and Planned Interventions: bed mobility training, transfer training, gait training, therapeutic exercises, patient and family training/education, and therapeutic activities      Frequency/Duration: Patient will be followed by physical therapy:  2-3x/week to address goals.     Recommendation for discharge: (in order for the patient to meet his/her long term goals)  Home with [address] [place] [address] [facility]    This discharge recommendation:  Has been made in collaboration with the attending provider and/or case management    IF patient discharges home will need the following DME: rolling walker         SUBJECTIVE:   Patient stated I am ok.     OBJECTIVE DATA SUMMARY:   HISTORY:    Past Medical History:   Diagnosis Date    Colon polyps     Diabetes ([address]     Diverticulosis     Hypertension     Ill-defined condition      Past Surgical History:   Procedure Laterality Date    COLONOSCOPY N/A 4/29/2021    COLONOSCOPY performed by Nadir Llanes MD at [facility] ENDOSCOPY       Personal factors and/or comorbidities impacting plan of care:   Home Situation  Home Environment: Private residence  # Steps to Enter: 4  Rails to Enter: Yes  Hand Rails : Left  One/Two Story Residence: One story  Living Alone: No  Support Systems: Child(francisco)  Patient Expects to be Discharged to[de-identified] Home  Current DME Used/Available at Home: Lexine Catrina, straight,Walker, rolling,Wheelchair,Shower chair    EXAMINATION/PRESENTATION/DECISION MAKING:   Critical Behavior:  Neurologic State: Alert  Orientation Level: Oriented X4  Cognition: Appropriate decision making     Hearing: Auditory  Auditory Impairment: None  Range Of Motion:  AROM: Generally decreased, functional                       Strength:    Strength: Generally decreased, functional                    Tone & Sensation:   Tone: Normal                              Coordination:  Coordination: Generally decreased, functional       Functional Mobility:  Bed Mobility:  Rolling: Supervision;Modified independent  Supine to Sit: Supervision;Modified independent     Scooting: Supervision;Modified independent  Transfers:  Sit to Stand: Modified independent  Stand to Sit: Modified independent        Bed to Chair: Supervision              Balance:   Sitting: Intact; Without support  Standing: Intact; With support  Ambulation/Gait Training:  Distance (ft): 25 Feet (ft)  Assistive Device: Gait belt  Ambulation - Level of Assistance: Contact guard assistance     Gait Description (WDL): Exceptions to WDL           Base of Support: Narrowed     Speed/Bhargavi: Slow  Step Length: Left shortened;Right shortened                  Functional Measure:  [facility] [address] [address] AM-PAC 6 Clicks         Basic Mobility Inpatient Short Form  How much difficulty does the patient currently have. .. Unable A Lot A Little None   1. Turning over in bed (including adjusting bedclothes, sheets and blankets)? [] 1   [] 2   [x] 3   [] 4   2. Sitting down on and standing up from a chair with arms ( e.g., wheelchair, bedside commode, etc.)   [] 1   [] 2   [x] 3   [] 4   3. Moving from lying on back to sitting on the side of the bed? [] 1   [] 2   [x] 3   [] 4          How much help from another person does the patient currently need. .. Total A Lot A Little None   4. Moving to and from a bed to a chair (including a wheelchair)? [] 1   [] 2   [x] 3   [] 4   5. Need to walk in hospital room? [] 1   [] 2   [x] 3   [] 4   6. Climbing 3-5 steps with a railing? [] 1   [] 2   [x] 3   [] 4   © 2007, Trustees of [address] [place] [address] [address] [address], under license to [employer]. All rights reserved     Score:  Initial: 18/24 Most Recent: X (Date:02/23/22 )   Interpretation of Tool:  Represents activities that are increasingly more difficult (i.e. Bed mobility, Transfers, Gait).   Score 24 23 22-20 19-15 14-10 9-7 6   Modifier CH CI CJ CK CL CM CN            Physical Therapy Evaluation Charge Determination   History Examination Presentation Decision-Making   LOW Complexity : Zero comorbidities / personal factors that will impact the outcome / POC LOW Complexity : 1-2 Standardized tests and measures addressing body structure, function, activity limitation and / or participation in recreation  LOW Complexity : Stable, uncomplicated  LOW Complexity : FOTO score of       Based on the above components, the patient evaluation is determined to be of the following complexity level: LOW     Pain Ratin/10    Activity Tolerance:   Good  Please refer to the flowsheet for vital signs taken during this treatment. After treatment patient left in no apparent distress:   Sitting in chair and Call bell within reach    COMMUNICATION/EDUCATION:   The patients plan of care was discussed with: Occupational therapist, Registered nurse and Case management. Fall prevention education was provided and the patient/caregiver indicated understanding., Patient/family have participated as able in goal setting and plan of care. and Patient/family agree to work toward stated goals and plan of care.     Thank you for this referral.  Boone Olivas PT  Time calculated 24 mins

## 2022-02-23 NOTE — PROGRESS NOTES
Progress Note    Patient: Suha Lemon MRN: [id number]  SSN: [id number]    YOB: 1940  Age: 81 y.o. Sex: female      Admit Date: 2/19/2022    LOS: 4 days     Subjective:     82F, h/o recurrent pancreatitis, CHFrEF/pAFIB on eliquis and CAD s/p stent with abdominal s/t Acute pancreatitis    HOSPITAL COURSE:  CT abdomen shows chronically dilated pancreatic ductus, lipid profile pending. Was treated symptomatically with IVF and IV dilaudid. Lipase improved. Plan for possible EUS tomorrow, and then discharge to [facility]  Stop ceftriaxone tomorrow for UTI. Review of Systems:  Review of Systems   Constitutional: Positive for malaise/fatigue. Negative for chills and fever. HENT: Negative. Eyes: Negative. Respiratory: Negative for cough, hemoptysis, sputum production, shortness of breath and wheezing. Cardiovascular: Negative for chest pain, palpitations, orthopnea, claudication and leg swelling. Gastrointestinal: Positive for abdominal pain and nausea. Negative for blood in stool, constipation, diarrhea, melena and vomiting. Genitourinary: Negative. Musculoskeletal: Positive for joint pain (b/l foot pain, base 5th toes started overnight 2/21-2/22.). Skin: Negative. Neurological: Negative. Endo/Heme/Allergies: Negative. Psychiatric/Behavioral: Negative. Objective:     Vitals:    02/22/22 1635 02/22/22 2012 02/23/22 0500 02/23/22 0901   BP: 116/72 112/68 115/77 130/74   Pulse: 76 77 86 84   Resp: 23 20 16 18   Temp: 97.7 °F (36.5 °C) 99.3 °F (37.4 °C) 98.1 °F (36.7 °C) 98.6 °F (37 °C)   SpO2: 97% 99% 99% 98%   Weight:       Height:            Intake and Output:  Current Shift: No intake/output data recorded. Last three shifts: 02/21 1901 - 02/23 0700  In: -   Out: 36 [Urine:925]      Physical Exam:     General:  Alert, cooperative, no distress, appears stated age. Frail. Head:  Normocephalic, without obvious abnormality, atraumatic.    Eyes:  Conjunctivae/corneas clear. EOMs intact. Throat: moist oral mucosa   Neck: Supple, symmetrical, trachea midline, no adenopathy, no JVD. Lungs:   Clear to auscultation bilaterally. Chest wall:  No tenderness or deformity. Heart:  Regular rate and rhythm, S1, S2 normal, no murmur, click, rub or gallop. Abdomen:   Soft, diffusely tender some guarding no rebound, not distended. Bowel sounds present. Extremities: Extremities normal, atraumatic, no cyanosis. No edema. Muscle wasting. Palpable pain lateral both feet at base of 5th toes, no lesions, no edema, no erythema. Pulses: 2+ and symmetric all extremities. Skin: Warm,dry   Neurologic: CNII-XII intact. No motor or sensory deficits. Lab/Data Review:  Recent Results (from the past 24 hour(s))   LIPID PANEL    Collection Time: 02/22/22  1:15 PM   Result Value Ref Range    LIPID PROFILE        Cholesterol, total 120 <200 mg/dL    Triglyceride 53 <150 mg/dL    HDL Cholesterol 44 mg/dL    LDL, calculated 65.4 0 - 100 mg/dL    VLDL, calculated 10.6 mg/dL    CHOL/HDL Ratio 2.7 0.0 - 5.0           Assessment and plan:      (1) Acute pancreatitis: no etoh, no CBD dilation, lipase resolving. GI for possible Intraductal Papillary Mucinous Neoplasm. Pain control with dilaudid. Increase diet. PRN zofran     (2) CHFrEF with AICD: hold IVF, tolerating orally, resume lasix 20    (3) NSTEMI type II: s/t #1. (4) pAFIB: continue eliquis, coreg. And verapimil    (5) HTN: verapamil    (6) UTI: nitrite positive, will continue Abx for 1 more days.  Ucx not availble    DVT ppx: eliquis    DISPO: DC home with [facility] tomorrow after EUS by GI   Signed By: Sanjuana Green MD     February 23, 2022

## 2022-02-23 NOTE — PROGRESS NOTES
Patient has been accepted with [facility]. CM continues to follow and monitor for needs.

## 2022-02-23 NOTE — PROGRESS NOTES
Progress Note    Patient: Fidel Gunn MRN: [id number]  SSN: [id number]    YOB: 1940  Age: 81 y.o. Sex: female      Admit Date: 2/19/2022    LOS: 4 days     Subjective:     No acute events overnight    Objective:     Vitals:    02/22/22 1127 02/22/22 1635 02/22/22 2012 02/23/22 0500   BP: 106/61 116/72 112/68 115/77   Pulse: 66 76 77 86   Resp: 22 23 20 16   Temp: 97.5 °F (36.4 °C) 97.7 °F (36.5 °C) 99.3 °F (37.4 °C) 98.1 °F (36.7 °C)   SpO2: 97% 97% 99% 99%   Weight:       Height:            Intake and Output:  Current Shift: No intake/output data recorded. Last three shifts: 02/21 1901 - 02/23 0700  In: -   Out: 36 [Urine:925]    Physical Exam:   General:  Alert, cooperative, no distress, appears stated age. Eyes:  Conjunctivae/corneas clear. PERRL, EOMs intact. Fundi benign   Ears:  Normal TMs and external ear canals both ears. Nose: Nares normal. Septum midline. Mucosa normal. No drainage or sinus tenderness. Mouth/Throat: Lips, mucosa, and tongue normal. Teeth and gums normal.   Neck: Supple, symmetrical, trachea midline, no adenopathy, thyroid: no enlargment/tenderness/nodules, no carotid bruit and no JVD. Back:   Symmetric, no curvature. ROM normal. No CVA tenderness. Lungs:   Clear to auscultation bilaterally. Heart:  Regular rate and rhythm, S1, S2 normal, no murmur, click, rub or gallop. Abdomen:   Soft, non-tender. Bowel sounds normal. No masses,  No organomegaly. Extremities: Extremities normal, atraumatic, no cyanosis or edema. Pulses: 2+ and symmetric all extremities. Skin: Skin color, texture, turgor normal. No rashes or lesions   Lymph nodes: Cervical, supraclavicular, and axillary nodes normal.   Neurologic: CNII-XII intact. Normal strength, sensation and reflexes throughout. Lab/Data Review: All lab results for the last 24 hours reviewed.          Assessment:     Active Problems:    Elevated troponin (10/22/2021)      Acute pancreatitis (11/20/2021)    Recurrent acute pancreatitis  NSTEMI type II  Paroxysmal atrial fibrillation  Chronic HFrEF  Dilated cardiomyopathy  Diabetes mellitus  Hypertension  Hyperlipidemia       Plan:     Currently in sinus rhythm. Denied having any chest pain. No further cardiac testing planned at this time. Kidney function is pending this morning. Currently on apixaban, carvedilol, Plavix, Lasix and pravastatin. I will see patient in 2 weeks after discharge or sooner if needed.   Signed By: Coleman Barber MD     February 23, 2022

## 2022-02-23 NOTE — PROGRESS NOTES
OCCUPATIONAL THERAPY EVALUATION  Patient: Trae Jorgensen (31 y.o. female)  Date: 2/23/2022  Primary Diagnosis: Acute pancreatitis [K85.90]  Elevated troponin [R77.8]        Precautions: fall risk       ASSESSMENT  Pt is an 82 y/o F with PMH of CHFrEF on eliquis and CAD s/p stent, DM, HTN presenting to [facility] with c/o epigastric and upper abdominal pain with associated N/V, admitted 2/19/22 and being treated for acute pancreatitis. Of note, pt admitted to [facility] 11/2021 with acute pancreatitis, nstemi, decompensated chf/ HFrEF, and in 10/2021 [facility] with AR to RCA. Pt received semi-supine in bed upon arrival, AXO x4, and agreeable to OT/PT evaluations at this time. Per pt report, pt lives with son in a one-story home with 4 CELIA and L HR, was IND with ADLs, ambulatory without AD at [facility]. DME: RW, SPC, w/c, shower chair. Based on current observations, pt presents with deficits in generalized strength/AROM, static/dynamic standing balance, functional activity tolerance, and c/o R hip pain impacting overall performance of ADLs and functional transfers/mobility. Pt currently requires SPV-Mod I bed mobility, SBA-Mod I STS to/from EOB with gait belt donned and SBA toilet transfer/supervision pericare. Pt completed hand washing routine, washing face and oral care SBA standing at sink before returning to chair for breakfast at end of evaluation. Overall, pt tolerates session fair with c/o 7/10 R hip pain from \"laying in the bed for so long. \" Pt appears close to functional baseline, however would benefit from continued skilled OT services to address current impairments and improve IND and safety with self cares and functional transfers/mobility. At this time, OT recommending d/c to home with [facility] once medically appropriate. Other factors to consider for discharge: family support, DME, time since onset, severity of deficits      Patient will benefit from skilled therapy intervention to address the above noted impairments. PLAN :  Recommendations and Planned Interventions: self care training, functional mobility training, therapeutic exercise, balance training, therapeutic activities, endurance activities, patient education, home safety training and family training/education    Frequency/Duration: Patient will be followed by occupational therapy:  2-3x/week to address goals. Recommendation for discharge: (in order for the patient to meet his/her long term goals)  Home with [address] [place] [address]    This discharge recommendation:  Has been made in collaboration with the attending provider and/or case management    IF patient discharges home will need the following DME: patient owns DME required for discharge       SUBJECTIVE:   Patient stated Is that all for therapy today? Rohini Luis    OBJECTIVE DATA SUMMARY:   HISTORY:   Past Medical History:   Diagnosis Date    Colon polyps     Diabetes ([address]     Diverticulosis     Hypertension     Ill-defined condition      Past Surgical History:   Procedure Laterality Date    COLONOSCOPY N/A 4/29/2021    COLONOSCOPY performed by Jo-Ann Shen MD at [facility] ENDOSCOPY       Expanded or extensive additional review of patient history:     Home Situation  Home Environment: Private residence  # Steps to Enter: 4  Rails to Enter: Yes  Hand Rails : Left  One/Two Story Residence: One story  Living Alone: No  Support Systems: Child(francisco)  Patient Expects to be Discharged to[de-identified] Home  Current DME Used/Available at Home: Skippy Jest, straight,Walker, rolling,Wheelchair,Shower chair      EXAMINATION OF PERFORMANCE DEFICITS:  Cognitive/Behavioral Status:  Neurologic State: Alert  Orientation Level: Oriented X4  Cognition: Appropriate decision making    Hearing:   Auditory  Auditory Impairment: None    Range of Motion:  AROM: Generally decreased, functional       Strength:  Strength: Generally decreased, functional                Coordination:  Coordination: Generally decreased, functional  Fine Motor Skills-Upper: Left Intact; Right Intact    Gross Motor Skills-Upper: Left Intact; Right Intact    Tone & Sensation:  Tone: Normal                         Balance:  Sitting: Intact; Without support  Standing: Intact; With support    Functional Mobility and Transfers for ADLs:  Bed Mobility:  Rolling: Supervision;Modified independent  Supine to Sit: Supervision;Modified independent  Scooting: Supervision;Modified independent    Transfers:  Sit to Stand: Modified independent  Stand to Sit: Modified independent  Bed to Chair: Supervision  Bathroom Mobility: Stand-by assistance  Toilet Transfer : Supervision      ADL Intervention and task modifications:       Grooming  Grooming Assistance: Supervision  Position Performed: Standing;Seated in chair  Washing Face: Set-up; Supervision  Washing Hands: Supervision  Brushing Teeth: Set-up; Supervision              Upper [address]: Set-up; Stand-by assistance    Lower Body Dressing Assistance  Socks: Stand-by assistance  Position Performed: Long sitting on bed    Toileting  Toileting Assistance: Set-up; Supervision  Bowel Hygiene: Set-up; Supervision         Therapeutic Exercise:  Pt would benefit from UE HEP to improve overall UE AROM/strength and can be further educated in next treatment session. Functional Measure:    [address] [facility] [address] [address] AM-PAC \"6 Clicks\"                                                       Daily Activity Inpatient Short Form  How much help from another person does the patient currently need. .. Total; A Lot A Little None   1. Putting on and taking off regular lower body clothing? []  1 []  2 [x]  3 []  4   2. Bathing (including washing, rinsing, drying)? []  1 []  2 [x]  3 []  4   3. Toileting, which includes using toilet, bedpan or urinal? [] 1 []  2 [x]  3 []  4   4. Putting on and taking off regular upper body clothing? []  1 []  2 [x]  3 []  4   5. Taking care of personal grooming such as brushing teeth? []  1 []  2 [x]  3 []  4   6.   Eating meals? []  1 []  2 []  3 [x]  4   2007, Trustees of [address] [place] [address] [address] [address], under license to [employer]. All rights reserved     Score: 24     Interpretation of Tool:  Represents clinically-significant functional categories (i.e. Activities of daily living). Percentage of Impairment CH    0%   CI    1-19% CJ    20-39% CK    40-59% CL    60-79% CM    80-99% CN     100%   AMPAC  Score 6-24 24 23 20-22 15-19 10-14 7-9 6         Occupational Therapy Evaluation Charge Determination   History Examination Decision-Making   LOW Complexity : Brief history review  LOW Complexity : 1-3 performance deficits relating to physical, cognitive , or psychosocial skils that result in activity limitations and / or participation restrictions  MEDIUM Complexity : Patient may present with comorbidities that affect occupational performnce. Miniml to moderate modification of tasks or assistance (eg, physical or verbal ) with assesment(s) is necessary to enable patient to complete evaluation       Based on the above components, the patient evaluation is determined to be of the following complexity level: LOW   Pain Ratin/10 R hip    Activity Tolerance:   Fair    After treatment patient left in no apparent distress:    Sitting in chair and Call bell within reach    COMMUNICATION/EDUCATION:   The patients plan of care was discussed with: Physical therapist and Registered nurse. Patient/family have participated as able in goal setting and plan of care. and Patient/family agree to work toward stated goals and plan of care. This patients plan of care is appropriate for delegation to [facility] [place]. OT/PT sessions occurred together for increased patient and clinician safety.      Thank you for this referral.  Kassie Quan  Time Calculation: 24 mins   Problem: Self Care Deficits Care Plan (Adult)  Goal: *Acute Goals and Plan of Care (Insert Text)  Description: Pt will be Mod I sup <> sit in prep for EOB ADLs  Pt will be Mod I grooming standing sink side LRAD  Pt will be Mod I LE dressing sitting EOB/long sit  Pt will be Mod I sit <>  prep for toileting LRAD  Pt will be Mod I toileting/toilet transfer/cloth mgmt LRAD  Pt will be IND following UE HEP in prep for self care tasks    Outcome: Not Met

## 2022-02-24 ENCOUNTER — APPOINTMENT (OUTPATIENT)
Dept: ENDOSCOPY | Age: 82
DRG: 438 | End: 2022-02-24
Attending: INTERNAL MEDICINE
Payer: MEDICARE

## 2022-02-24 ENCOUNTER — ANESTHESIA EVENT (OUTPATIENT)
Dept: ENDOSCOPY | Age: 82
DRG: 438 | End: 2022-02-24
Payer: MEDICARE

## 2022-02-24 ENCOUNTER — ANESTHESIA (OUTPATIENT)
Dept: ENDOSCOPY | Age: 82
DRG: 438 | End: 2022-02-24
Payer: MEDICARE

## 2022-02-24 VITALS
OXYGEN SATURATION: 99 % | HEIGHT: 65 IN | HEART RATE: 100 BPM | WEIGHT: 131.61 LBS | DIASTOLIC BLOOD PRESSURE: 74 MMHG | SYSTOLIC BLOOD PRESSURE: 158 MMHG | TEMPERATURE: 98.2 F | BODY MASS INDEX: 21.93 KG/M2 | RESPIRATION RATE: 20 BRPM

## 2022-02-24 LAB
COVID-19 RAPID TEST, COVR: NOT DETECTED
GLUCOSE BLD STRIP.AUTO-MCNC: 241 MG/DL (ref 65–117)
PERFORMED BY, TECHID: ABNORMAL
SPECIMEN SOURCE: NORMAL

## 2022-02-24 PROCEDURE — 74011250636 HC RX REV CODE- 250/636: Performed by: HOSPITALIST

## 2022-02-24 PROCEDURE — 82962 GLUCOSE BLOOD TEST: CPT

## 2022-02-24 PROCEDURE — 2709999900 HC NON-CHARGEABLE SUPPLY: Performed by: INTERNAL MEDICINE

## 2022-02-24 PROCEDURE — 74011250636 HC RX REV CODE- 250/636: Performed by: NURSE ANESTHETIST, CERTIFIED REGISTERED

## 2022-02-24 PROCEDURE — 76060000033 HC ANESTHESIA 1 TO 1.5 HR: Performed by: INTERNAL MEDICINE

## 2022-02-24 PROCEDURE — 74011000250 HC RX REV CODE- 250: Performed by: HOSPITALIST

## 2022-02-24 PROCEDURE — 87635 SARS-COV-2 COVID-19 AMP PRB: CPT

## 2022-02-24 PROCEDURE — 76040000008: Performed by: INTERNAL MEDICINE

## 2022-02-24 RX ORDER — SODIUM CHLORIDE, SODIUM LACTATE, POTASSIUM CHLORIDE, CALCIUM CHLORIDE 600; 310; 30; 20 MG/100ML; MG/100ML; MG/100ML; MG/100ML
INJECTION, SOLUTION INTRAVENOUS
Status: DISCONTINUED | OUTPATIENT
Start: 2022-02-24 | End: 2022-02-24 | Stop reason: HOSPADM

## 2022-02-24 RX ORDER — OXYCODONE AND ACETAMINOPHEN 5; 325 MG/1; MG/1
1 TABLET ORAL
Qty: 7 TABLET | Refills: 0 | Status: SHIPPED | OUTPATIENT
Start: 2022-02-24 | End: 2022-02-27

## 2022-02-24 RX ORDER — PROPOFOL 10 MG/ML
INJECTION, EMULSION INTRAVENOUS AS NEEDED
Status: DISCONTINUED | OUTPATIENT
Start: 2022-02-24 | End: 2022-02-24 | Stop reason: HOSPADM

## 2022-02-24 RX ADMIN — PROPOFOL 25 MG: 10 INJECTION, EMULSION INTRAVENOUS at 11:53

## 2022-02-24 RX ADMIN — SODIUM CHLORIDE, POTASSIUM CHLORIDE, SODIUM LACTATE AND CALCIUM CHLORIDE: 600; 310; 30; 20 INJECTION, SOLUTION INTRAVENOUS at 11:12

## 2022-02-24 RX ADMIN — PROPOFOL 25 MG: 10 INJECTION, EMULSION INTRAVENOUS at 11:37

## 2022-02-24 RX ADMIN — PROPOFOL 25 MG: 10 INJECTION, EMULSION INTRAVENOUS at 11:42

## 2022-02-24 RX ADMIN — SODIUM CHLORIDE, PRESERVATIVE FREE 10 ML: 5 INJECTION INTRAVENOUS at 06:03

## 2022-02-24 RX ADMIN — ONDANSETRON 4 MG: 2 INJECTION INTRAMUSCULAR; INTRAVENOUS at 13:53

## 2022-02-24 NOTE — ANESTHESIA PREPROCEDURE EVALUATION
Relevant Problems   NEUROLOGY   (+) CVA (cerebral vascular accident) ([facility] [address]      CARDIOVASCULAR   (+) NSTEMI (non-ST elevated myocardial infarction) [facility])       Anesthetic History   No history of anesthetic complications            Review of Systems / Medical History  Patient summary reviewed, nursing notes reviewed and pertinent labs reviewed    Pulmonary          Shortness of breath         Neuro/Psych       CVA  TIA     Cardiovascular    Hypertension      CHF  Dysrhythmias : atrial fibrillation  Past MI, CAD, PAD, cardiac stents (10/28/2021) and hyperlipidemia      Comments: 10/2021    Interpretation Summary    · LV: Estimated LVEF is 20 - 25%. Mildly dilated left ventricle. Increased wall thickness. Severely reduced systolic function. · LA: Moderately dilated left atrium. · PV: Mild pulmonic valve regurgitation is present. · AV: Aortic valve leaflet calcification present without reduced excursion. · MV: Mitral valve non-specific thickening. Moderate to severe mitral valve regurgitation is present. · TV: Moderate tricuspid valve regurgitation is present. Right Ventricular Arterial Pressure (RVSP) is 41 mmHg. · Pericardium: Trivial pericardial effusion.    GI/Hepatic/Renal         Renal disease: CRI       Endo/Other    Diabetes: type 2, using insulin    Anemia     Other Findings   Comments: Low PLTs         Past Medical History:   Diagnosis Date    Colon polyps     Diabetes ([facility]     Diverticulosis     Hypertension     Ill-defined condition        Past Surgical History:   Procedure Laterality Date    COLONOSCOPY N/A 4/29/2021    COLONOSCOPY performed by Andres Palma MD at [facility] ENDOSCOPY       Current Outpatient Medications   Medication Instructions    acetaminophen (TYLENOL ARTHRITIS PAIN) 650 mg, Oral, EVERY 8 HOURS AS NEEDED    apixaban (ELIQUIS) 2.5 mg, Oral, 2 TIMES DAILY    carvediloL (COREG) 3.125 mg, Oral, 2 TIMES DAILY WITH MEALS    clopidogreL (PLAVIX) 75 mg, Oral, DAILY    furosemide (LASIX) 40 mg, Oral, DAILY    glipiZIDE (GLUCOTROL) 5 mg, Oral, DAILY BEFORE BREAKFAST    ondansetron hcl (ZOFRAN) 4 mg, Oral, EVERY 6 HOURS AS NEEDED    oxyCODONE-acetaminophen (Percocet) 5-325 mg per tablet 1 Tablet, Oral, EVERY 8 HOURS AS NEEDED    pantoprazole (PROTONIX) 40 mg, Oral, DAILY BEFORE BREAKFAST    potassium chloride SA (MICRO-K) 10 mEq capsule 10 mEq, DAILY    pravastatin (PRAVACHOL) 10 mg, EVERY BEDTIME    spironolactone (ALDACTONE) 25 mg, Oral, DAILY    verapamil ER (CALAN-SR) 240 mg, DAILY       Current Facility-Administered Medications   Medication Dose Route Frequency    pantoprazole (PROTONIX) tablet 40 mg  40 mg Oral BID    lidocaine 4 % patch 1 Patch  1 Patch TransDERmal Q24H    carvediloL (COREG) tablet 6.25 mg  6.25 mg Oral BID WITH MEALS    HYDROmorphone (DILAUDID) syringe 0.5 mg  0.5 mg IntraVENous Q4H PRN    [Held by provider] apixaban (ELIQUIS) tablet 2.5 mg  2.5 mg Oral BID    furosemide (LASIX) tablet 20 mg  20 mg Oral DAILY    hydrOXYzine pamoate (VISTARIL) capsule 25 mg  25 mg Oral QID PRN    melatonin tablet 3 mg  3 mg Oral QHS PRN    clopidogreL (PLAVIX) tablet 75 mg  75 mg Oral DAILY    pravastatin (PRAVACHOL) tablet 10 mg  10 mg Oral QHS    sodium chloride (NS) flush 5-40 mL  5-40 mL IntraVENous Q8H    sodium chloride (NS) flush 5-40 mL  5-40 mL IntraVENous PRN    acetaminophen (TYLENOL) tablet 650 mg  650 mg Oral Q4H PRN    ondansetron (ZOFRAN) injection 4 mg  4 mg IntraVENous Q4H PRN       Patient Vitals for the past 24 hrs:   Temp Pulse Resp BP SpO2   02/24/22 0721 36.7 °C (98 °F) 87 16 131/76 99 %   02/24/22 0316 36.7 °C (98 °F) 85 18 120/74 97 %   02/23/22 2021 36.6 °C (97.9 °F) 84 18 108/64 98 %   02/23/22 1426 36.8 °C (98.2 °F) 81 20 100/61 99 %   02/23/22 1235 36.9 °C (98.5 °F) 82 16 117/74 98 %       Lab Results   Component Value Date/Time    WBC 5.4 02/22/2022 06:09 AM    HGB 10.4 (L) 02/22/2022 06:09 AM    HCT 31.1 (L) 02/22/2022 06:09 AM PLATELET 998 (L) 66/98/1633 06:09 AM    MCV 82.5 02/22/2022 06:09 AM     Lab Results   Component Value Date/Time    Sodium 138 02/22/2022 06:09 AM    Potassium 3.3 (L) 02/22/2022 06:09 AM    Chloride 108 02/22/2022 06:09 AM    CO2 19 (L) 02/22/2022 06:09 AM    Anion gap 11 02/22/2022 06:09 AM    Glucose 147 (H) 02/22/2022 06:09 AM    BUN 12 02/22/2022 06:09 AM    Creatinine 0.73 02/22/2022 06:09 AM    BUN/Creatinine ratio 16 02/22/2022 06:09 AM    GFR est AA >60 02/22/2022 06:09 AM    GFR est non-AA >60 02/22/2022 06:09 AM    Calcium 8.2 (L) 02/22/2022 06:09 AM     No results found for: APTT, PTP, INR, INREXT  Lab Results   Component Value Date/Time    Glucose 147 (H) 02/22/2022 06:09 AM    Glucose (POC) 241 (H) 02/24/2022 07:48 AM     Physical Exam    Airway  Mallampati: II  TM Distance: > 6 cm  Neck ROM: normal range of motion   Mouth opening: Normal     Cardiovascular  Regular rate and rhythm,  S1 and S2 normal,  no murmur, click, rub, or gallop             Dental  No notable dental hx       Pulmonary  Breath sounds clear to auscultation               Abdominal  GI exam deferred       Other Findings            Anesthetic Plan    ASA: 4  Anesthesia type: total IV anesthesia and MAC          Induction: Intravenous  Anesthetic plan and risks discussed with: Patient and Family

## 2022-02-24 NOTE — PROGRESS NOTES
CM attempted to meet with patient to deliver IMM, patient off the floor at this time. CM to follow up at a later time. Patient will d/c to home with [facility], accepted with [facility] 2/25/2022.      1:09 PM - Medicare pt has received, reviewed, and signed 2nd IM letter informing them of their right to appeal the discharge. Signed copied has been placed on pt bedside chart.

## 2022-02-24 NOTE — PROGRESS NOTES
Progress Note    Patient: Anna Marie Villela MRN: [id number]  SSN: [id number]    YOB: 1940  Age: 81 y.o. Sex: female      Admit Date: 2/19/2022    LOS: 4 days     Subjective:   Patient  Has some pain, no nausea vomiting or abdominal pain.     Past Medical History:   Diagnosis Date    Colon polyps     Diabetes ([facility] [address]     Diverticulosis     Hypertension     Ill-defined condition         Current Facility-Administered Medications:     pantoprazole (PROTONIX) tablet 40 mg, 40 mg, Oral, BID, Saqib Dawson MD, 40 mg at 02/23/22 2025    lidocaine 4 % patch 1 Patch, 1 Patch, TransDERmal, Q24H, Richie Macdonald MD    carvediloL (COREG) tablet 6.25 mg, 6.25 mg, Oral, BID WITH MEALS, Kamryn Smart MD, 6.25 mg at 02/23/22 1644    HYDROmorphone (DILAUDID) syringe 0.5 mg, 0.5 mg, IntraVENous, Q4H PRN, Bianka SALAZAR MD, 0.5 mg at 02/23/22 2035    [Held by provider] apixaban Clarine Aurelio) tablet 2.5 mg, 2.5 mg, Oral, BID, Matt Strange MD, 2.5 mg at 02/23/22 0887    furosemide (LASIX) tablet 20 mg, 20 mg, Oral, DAILY, Matt Strange MD, 20 mg at 02/23/22 0820    hydrOXYzine pamoate (VISTARIL) capsule 25 mg, 25 mg, Oral, QID PRN, Bianka SALAZAR MD, 25 mg at 02/20/22 9619    melatonin tablet 3 mg, 3 mg, Oral, QHS PRN, Avis Fernandez MD    clopidogreL (PLAVIX) tablet 75 mg, 75 mg, Oral, DAILY, Michelle Grady MD, 75 mg at 02/23/22 9841    pravastatin (PRAVACHOL) tablet 10 mg, 10 mg, Oral, QHS, Michelle Grady MD, 10 mg at 02/23/22 2025    sodium chloride (NS) flush 5-40 mL, 5-40 mL, IntraVENous, Q8H, Michelle Grady MD, 10 mL at 02/23/22 2027    sodium chloride (NS) flush 5-40 mL, 5-40 mL, IntraVENous, PRN, Michelle Grady MD    acetaminophen (TYLENOL) tablet 650 mg, 650 mg, Oral, Q4H PRN, Michelle Grady MD, 650 mg at 02/23/22 0403    ondansetron (ZOFRAN) injection 4 mg, 4 mg, IntraVENous, Q4H PRN, Michelle Grady MD, 4 mg at 02/23/22 1908    Objective:     Vitals:    02/23/22 0901 02/23/22 1235 02/23/22 1426 02/23/22 2021   BP: 130/74 117/74 100/61 108/64   Pulse: 84 82 81 84   Resp: 18 16 20 18   Temp: 98.6 °F (37 °C) 98.5 °F (36.9 °C) 98.2 °F (36.8 °C) 97.9 °F (36.6 °C)   SpO2: 98% 98% 99% 98%   Weight:       Height:            Intake and Output:  Current Shift: No intake/output data recorded. Last three shifts: 02/22 0701 - 02/23 1900  In: -   Out: 525 [Urine:525]    Physical Exam:   Physical Exam  Constitutional:       Appearance: She is ill-appearing. HENT:      Head: Atraumatic. Mouth/Throat:      Mouth: Mucous membranes are dry. Eyes:      General: No scleral icterus. Cardiovascular:      Pulses: Normal pulses. Pulmonary:      Breath sounds: Normal breath sounds. Abdominal:      Palpations: Abdomen is soft. Musculoskeletal:         General: Normal range of motion. Cervical back: Neck supple. Skin:     General: Skin is warm. Psychiatric:         Mood and Affect: Mood normal.          Lab/Data Review:  Recent Results (from the past 24 hour(s))   GLUCOSE, POC    Collection Time: 02/23/22  8:24 PM   Result Value Ref Range    Glucose (POC) 342 (H) 65 - 117 mg/dL    Performed by Artem Cooper         XR FOOT RT MIN 3 V   Final Result   1. Degenerative changes. No acute fracture. XR FOOT LT MIN 3 V   Final Result   1. Degenerative changes. No acute fracture. CT ABD PELV W CONT   Final Result   1. Unchanged appearance of cystic dilation of the main pancreatic duct in the   tail of the pancreas with calcifications and/or surgical clips again   demonstrated. There is some infiltration in the fat surrounding the pancreas and   extending into the root of the mesentery such that acute pancreatitis may be   present. Correlation with serum amylase recommended. 2. Hernias as described. 3. Sigmoid diverticulosis without CT evidence for acute diverticulitis.            Assessment:     Active Problems:    Elevated troponin (10/22/2021)      Acute pancreatitis (11/20/2021)      abdominal pain,unknown etiology,  recurrent pancreatitis,inflammatory change on CT, no abscess, no pseudocysts IgG4 was in normal range,IG G 4 antinuclear antibody was negative  CT showed possible gastritis well thickness with inflammation  CA-19-9 was negative before , no signs of underlying malignancy  Lipase down     Troponin elevation, followed by cardiologist, on anticoagulation antiplatelet drugs treatment,  Plan:   Continue current treatment,  Will hold Plavix               Will do an EUS in am  rule out the IPMN, patient is  cardiovascular stable, cardiology note notes appreciated,    Discussed with patient again today regarding the indication options,   patient understands that this is a diagnostic test, not the curative treatment, aftter the test she may still have pain, she may still have the recurrent episode of pancreatitis         Signed By: Doug Cloud MD     February 23, 2022        Thank you for allowing me to participate in this patients care  Cc Referring Physician   Milly Martinez MD

## 2022-02-24 NOTE — PROGRESS NOTES
Tele d/c'd. IV removed. Discharge instructions given and  Stated understanding. Discharged home with family.

## 2022-02-24 NOTE — ANESTHESIA POSTPROCEDURE EVALUATION
Procedure(s):  ENDOSCOPIC ULTRASOUND (EUS)  FINE NEEDLE ASPIRATION  ESOPHAGOGASTRODUODENOSCOPY (EGD). total IV anesthesia, MAC    Anesthesia Post Evaluation      Multimodal analgesia: multimodal analgesia not used between 6 hours prior to anesthesia start to PACU discharge  Patient location during evaluation: bedside (Endoscopy suite)  Patient participation: complete - patient cannot participate  Level of consciousness: responsive to light touch  Pain score: 0  Pain management: adequate  Airway patency: patent  Anesthetic complications: no  Cardiovascular status: acceptable  Respiratory status: acceptable and nasal cannula  Hydration status: acceptable  Comments: This patient remained on the stretcher. The patient was handed off to the endoscopy nursing team.  All questions regarding pre-, intra-, and postoperative care were answered.   Post anesthesia nausea and vomiting:  none  Final Post Anesthesia Temperature Assessment:  Normothermia (36.0-37.5 degrees C)      INITIAL Post-op Vital signs:   Vitals Value Taken Time   /62 02/24/22 1158   Temp     Pulse 82 02/24/22 1158   Resp 20 02/24/22 1158   SpO2 100 % 02/24/22 1158

## 2022-02-24 NOTE — PROGRESS NOTES
Progress Note    Patient: Daisy Briseno MRN: [id number]  SSN: [id number]    YOB: 1940  Age: 81 y.o. Sex: female      Admit Date: 2/19/2022    LOS: 5 days     Subjective:     No acute events overnight    Objective:     Vitals:    02/23/22 2021 02/23/22 2345 02/24/22 0316 02/24/22 0721   BP: 108/64  120/74 131/76   Pulse: 84  85 87   Resp: 18  18 16   Temp: 97.9 °F (36.6 °C)  98 °F (36.7 °C) 98 °F (36.7 °C)   SpO2: 98%  97% 99%   Weight:  59.7 kg (131 lb 9.8 oz)     Height:            Intake and Output:  Current Shift: No intake/output data recorded. Last three shifts: 02/22 1901 - 02/24 0700  In: -   Out: 780 [Urine:775]    Physical Exam:   General:  Alert, cooperative, no distress, appears stated age. Eyes:  Conjunctivae/corneas clear. PERRL, EOMs intact. Fundi benign   Ears:  Normal TMs and external ear canals both ears. Nose: Nares normal. Septum midline. Mucosa normal. No drainage or sinus tenderness. Mouth/Throat: Lips, mucosa, and tongue normal. Teeth and gums normal.   Neck: Supple, symmetrical, trachea midline, no adenopathy, thyroid: no enlargment/tenderness/nodules, no carotid bruit and no JVD. Back:   Symmetric, no curvature. ROM normal. No CVA tenderness. Lungs:   Clear to auscultation bilaterally. Heart:  Regular rate and rhythm, S1, S2 normal, no murmur, click, rub or gallop. Abdomen:   Soft, non-tender. Bowel sounds normal. No masses,  No organomegaly. Extremities: Extremities normal, atraumatic, no cyanosis or edema. Pulses: 2+ and symmetric all extremities. Skin: Skin color, texture, turgor normal. No rashes or lesions   Lymph nodes: Cervical, supraclavicular, and axillary nodes normal.   Neurologic: CNII-XII intact. Normal strength, sensation and reflexes throughout. Lab/Data Review: All lab results for the last 24 hours reviewed.          Assessment:     Active Problems:    Elevated troponin (10/22/2021)      Acute pancreatitis (11/20/2021)    Recurrent acute pancreatitis  NSTEMI type II  Paroxysmal atrial fibrillation  Chronic HFrEF  Dilated cardiomyopathy  Diabetes mellitus  Hypertension  Hyperlipidemia       Plan:     Patient currently in sinus rhythm. She appears to be euvolemic. Plans noted for possible EUS today. No further cardiac testing planned at this time. I will see her back in 2 to 4 weeks after discharge or sooner if needed. Apixaban was placed on hold. Currently on pravastatin.   Plavix is currently on that we will continue due to recent history of PCI  Signed By: Chris Reese MD     February 24, 2022

## 2022-02-24 NOTE — DISCHARGE SUMMARY
Physician Discharge Summary     Patient ID:    Fiorella Jefferson  [id number]  62 y.o.  1940    Admit date: 2/19/2022    Discharge date : 2/24/2022    Chronic Diagnoses:    Problem List as of 2/24/2022 Date Reviewed: 2/19/2022          Codes Class Noted - Resolved    Acute on chronic combined systolic and diastolic ACC/AHA stage C congestive heart failure (HCC) ICD-10-CM: I50.43  ICD-9-CM: 428.43, 428.0  11/24/2021 - Present        Pleural effusion, right ICD-10-CM: J90  ICD-9-CM: 511.9  11/24/2021 - Present        Abdominal pain ICD-10-CM: R10.9  ICD-9-CM: 789.00  11/20/2021 - Present        Acute pancreatitis ICD-10-CM: K85.90  ICD-9-CM: 577.0  11/20/2021 - Present        NSTEMI (non-ST elevated myocardial infarction) ([facility] [place] [facility] ICD-10-CM: I21.4  ICD-9-CM: 410.70  10/30/2021 - Present        Bursitis of multiple sites ICD-10-CM: M71.9  ICD-9-CM: 726.8  10/30/2021 - Present        Pancreatitis ICD-10-CM: K85.90  ICD-9-CM: 577.0  10/22/2021 - Present        Elevated troponin ICD-10-CM: R77.8  ICD-9-CM: 790.6  10/22/2021 - Present        Vasculitis ([facility] [place] [facility] ICD-10-CM: I77.6  ICD-9-CM: 447.6  7/8/2021 - Present        Pancreatic mass ICD-10-CM: K86.89  ICD-9-CM: 577.8  7/8/2021 - Present        Epigastric pain ICD-10-CM: R10.13  ICD-9-CM: 789.06  7/4/2021 - Present        CVA (cerebral vascular accident) [facility]) ICD-10-CM: I63.9  ICD-9-CM: 434.91  5/6/2021 - Present        Right sided weakness ICD-10-CM: R53.1  ICD-9-CM: 728.87  5/4/2021 - Present        GI bleed ICD-10-CM: K92.2  ICD-9-CM: 578.9  4/28/2021 - Present          22    Final Diagnoses:   Acute pancreatitis [K85.90]  Elevated troponin [R77.8]    Reason for Hospitalization:     (1) Acute pancreatitis: no etoh, no CBD dilation, lipase resolving. GI for possible Intraductal Papillary Mucinous Neoplasm. Pain control with dilaudid. Increase diet.  PRN zofran      (2) CHFrEF with AICD: hold IVF, tolerating orally, resume lasix 20     (3) NSTEMI type II: s/t #1.     (4) pAFIB: continue eliquis, coreg. And verapimil     (5) HTN: verapamil     (6) UTI: nitrite positive, finished Abx      Hospital Course:   82F, h/o recurrent pancreatitis, CHFrEF/pAFIB on eliquis and CAD s/p stent with abdominal s/t Acute pancreatitis     HOSPITAL COURSE:  CT abdomen shows chronically dilated pancreatic ductus, lipid profile pending. Was treated symptomatically with IVF and IV dilaudid. Lipase improved. Plan for possible EUS tomorrow- resultes to be discussed as OP with gastroenterology. INSTRUCTIONS ON DISCHARGE    (1) please continue to take all other medications,     (2) a short supply of pain medication has been provided to you    (3) please f/u with PCP, gastroenterology and cardiology in 1 week      Discharge Medications:   Current Discharge Medication List      CONTINUE these medications which have NOT CHANGED    Details   spironolactone (ALDACTONE) 25 mg tablet Take 25 mg by mouth daily. ondansetron hcl (Zofran) 4 mg tablet Take 4 mg by mouth every six (6) hours as needed for Nausea or Nausea or Vomiting. acetaminophen (Tylenol Arthritis Pain) 650 mg TbER Take 650 mg by mouth every eight (8) hours as needed for Pain. Indications: pain      furosemide (LASIX) 40 mg tablet Take 1 Tablet by mouth daily. Qty: 30 Tablet, Refills: 1      apixaban (ELIQUIS) 2.5 mg tablet Take 1 Tablet by mouth two (2) times a day. Qty: 30 Tablet, Refills: 1      pantoprazole (PROTONIX) 40 mg tablet Take 1 Tablet by mouth Daily (before breakfast). Qty: 30 Tablet, Refills: 1      potassium chloride SA (MICRO-K) 10 mEq capsule Take 10 mEq by mouth daily. pravastatin (PRAVACHOL) 10 mg tablet Take 10 mg by mouth nightly. clopidogreL (PLAVIX) 75 mg tab Take 1 Tablet by mouth daily. Qty: 30 Tablet, Refills: 0      carvediloL (COREG) 3.125 mg tablet Take 1 Tablet by mouth two (2) times daily (with meals).   Qty: 60 Tablet, Refills: 0      glipiZIDE (GLUCOTROL) 5 mg tablet Take 1 Tablet by mouth Daily (before breakfast). Qty: 30 Tablet, Refills: 0      verapamil ER (CALAN-SR) 240 mg CR tablet Take 240 mg by mouth daily. Follow up Care:    1. Michael Saldana MD in 1-2 weeks. Please call to set up an appointment shortly after discharge. Diet:  {low fat diet    Disposition:  Home HH    Advanced Directive:   FULL x   DNR      Discharge Exam:  General:  Alert, cooperative, no distress, appears stated age. Frail. Head:  Normocephalic, without obvious abnormality, atraumatic. Eyes:  Conjunctivae/corneas clear. EOMs intact. Throat: moist oral mucosa   Neck: Supple, symmetrical, trachea midline, no adenopathy, no JVD. Lungs:   Clear to auscultation bilaterally. Chest wall:  No tenderness or deformity. Heart:  Regular rate and rhythm, S1, S2 normal, no murmur, click, rub or gallop. Abdomen:   Soft, diffusely tender some guarding no rebound, not distended. Bowel sounds present. Extremities: Extremities normal, atraumatic, no cyanosis. No edema. Muscle wasting. Palpable pain lateral both feet at base of 5th toes, no lesions, no edema, no erythema. Pulses: 2+ and symmetric all extremities. Skin: Warm,dry   Neurologic: CNII-XII intact. No motor or sensory deficits. CONSULTATIONS: cards, GI    Significant Diagnostic Studies:     Radiologic Studies -   Results from Hospital Encounter encounter on 02/19/22    XR FOOT LT MIN 3 V    Narrative  EXAMINATION: XR FOOT LT MIN 3 V    HISTORY: Left foot pain    COMPARISON: None available. FINDINGS: 3 view(s) of the left foot are submitted. There is no acute fracture  or dislocation. There is enthesopathy at the insertion of the Achilles tendon. There is a moderate-sized heel spur. Linear density dorsal to the cuneiforms may  be related to vascular calcification. There is mild first metatarsophalangeal  joint osteoarthritis. Impression  1. Degenerative changes. No acute fracture.      CT Results  (Last 48 hours) None              Discharge time spent 35 minutes    Signed:  Trista Le MD  2/24/2022  8:17 AM

## 2022-02-24 NOTE — PROGRESS NOTES
Problem: Falls - Risk of  Goal: *Absence of Falls  Description: Document Anival Blight Fall Risk and appropriate interventions in the flowsheet. Outcome: Progressing Towards Goal  Note: Fall Risk Interventions:  Mobility Interventions: Bed/chair exit alarm,Patient to call before getting OOB         Medication Interventions: Bed/chair exit alarm,Teach patient to arise slowly,Patient to call before getting OOB    Elimination Interventions: Bed/chair exit alarm,Call light in reach,Patient to call for help with toileting needs              Problem: Patient Education: Go to Patient Education Activity  Goal: Patient/Family Education  Outcome: Progressing Towards Goal     Problem: Pressure Injury - Risk of  Goal: *Prevention of pressure injury  Description: Document Jon Scale and appropriate interventions in the flowsheet.   Outcome: Progressing Towards Goal  Note: Pressure Injury Interventions:  Sensory Interventions: Assess changes in LOC,Maintain/enhance activity level,Minimize linen layers    Moisture Interventions: Absorbent underpads,Internal/External urinary devices    Activity Interventions: Increase time out of bed,PT/OT evaluation    Mobility Interventions: PT/OT evaluation    Nutrition Interventions: Document food/fluid/supplement intake    Friction and Shear Interventions: Apply protective barrier, creams and emollients,Minimize layers                Problem: Patient Education: Go to Patient Education Activity  Goal: Patient/Family Education  Outcome: Progressing Towards Goal

## 2022-02-24 NOTE — DISCHARGE INSTRUCTIONS
INSTRUCTIONS ON DISCHARGE    (1) please continue to take all other medications,     (2) a short supply of pain medication has been provided to you    (3) please f/u with PCP, gastroenterology and cardiology in 1 week

## 2022-03-17 ENCOUNTER — APPOINTMENT (OUTPATIENT)
Dept: CT IMAGING | Age: 82
DRG: 439 | End: 2022-03-17
Attending: STUDENT IN AN ORGANIZED HEALTH CARE EDUCATION/TRAINING PROGRAM
Payer: MEDICARE

## 2022-03-17 ENCOUNTER — HOSPITAL ENCOUNTER (INPATIENT)
Age: 82
LOS: 5 days | Discharge: HOME HEALTH | DRG: 439 | End: 2022-03-23
Attending: STUDENT IN AN ORGANIZED HEALTH CARE EDUCATION/TRAINING PROGRAM | Admitting: HOSPITALIST
Payer: MEDICARE

## 2022-03-17 DIAGNOSIS — K85.90 ACUTE PANCREATITIS, UNSPECIFIED COMPLICATION STATUS, UNSPECIFIED PANCREATITIS TYPE: Primary | ICD-10-CM

## 2022-03-17 LAB
ALBUMIN SERPL-MCNC: 3.7 G/DL (ref 3.5–5)
ALBUMIN/GLOB SERPL: 1 {RATIO} (ref 1.1–2.2)
ALP SERPL-CCNC: 152 U/L (ref 45–117)
ALT SERPL-CCNC: 29 U/L (ref 12–78)
ANION GAP SERPL CALC-SCNC: 7 MMOL/L (ref 5–15)
APPEARANCE UR: CLEAR
AST SERPL W P-5'-P-CCNC: ABNORMAL U/L (ref 15–37)
BACTERIA URNS QL MICRO: NEGATIVE /HPF
BASOPHILS # BLD: 0 K/UL (ref 0–0.1)
BASOPHILS NFR BLD: 0 % (ref 0–1)
BILIRUB SERPL-MCNC: 0.8 MG/DL (ref 0.2–1)
BILIRUB UR QL: NEGATIVE
BUN SERPL-MCNC: 34 MG/DL (ref 6–20)
BUN/CREAT SERPL: 30 (ref 12–20)
CA-I BLD-MCNC: 9.4 MG/DL (ref 8.5–10.1)
CHLORIDE SERPL-SCNC: 101 MMOL/L (ref 97–108)
CO2 SERPL-SCNC: 26 MMOL/L (ref 21–32)
COLOR UR: ABNORMAL
CREAT SERPL-MCNC: 1.13 MG/DL (ref 0.55–1.02)
DIFFERENTIAL METHOD BLD: ABNORMAL
EOSINOPHIL # BLD: 0.1 K/UL (ref 0–0.4)
EOSINOPHIL NFR BLD: 3 % (ref 0–7)
ERYTHROCYTE [DISTWIDTH] IN BLOOD BY AUTOMATED COUNT: 14 % (ref 11.5–14.5)
GLOBULIN SER CALC-MCNC: 3.8 G/DL (ref 2–4)
GLUCOSE SERPL-MCNC: 391 MG/DL (ref 65–100)
GLUCOSE UR STRIP.AUTO-MCNC: >300 MG/DL
HCT VFR BLD AUTO: 40.8 % (ref 35–47)
HGB BLD-MCNC: 12.9 G/DL (ref 11.5–16)
HGB UR QL STRIP: NEGATIVE
IMM GRANULOCYTES # BLD AUTO: 0 K/UL (ref 0–0.04)
IMM GRANULOCYTES NFR BLD AUTO: 0 % (ref 0–0.5)
KETONES UR QL STRIP.AUTO: 20 MG/DL
LEUKOCYTE ESTERASE UR QL STRIP.AUTO: NEGATIVE
LYMPHOCYTES # BLD: 1.2 K/UL (ref 0.8–3.5)
LYMPHOCYTES NFR BLD: 23 % (ref 12–49)
MCH RBC QN AUTO: 26.7 PG (ref 26–34)
MCHC RBC AUTO-ENTMCNC: 31.6 G/DL (ref 30–36.5)
MCV RBC AUTO: 84.5 FL (ref 80–99)
MONOCYTES # BLD: 0.4 K/UL (ref 0–1)
MONOCYTES NFR BLD: 7 % (ref 5–13)
NEUTS SEG # BLD: 3.4 K/UL (ref 1.8–8)
NEUTS SEG NFR BLD: 67 % (ref 32–75)
NITRITE UR QL STRIP.AUTO: NEGATIVE
NRBC # BLD: 0 K/UL (ref 0–0.01)
NRBC BLD-RTO: 0 PER 100 WBC
PH UR STRIP: 6 [PH] (ref 5–8)
PLATELET # BLD AUTO: 166 K/UL (ref 150–400)
PMV BLD AUTO: 13.3 FL (ref 8.9–12.9)
POTASSIUM SERPL-SCNC: ABNORMAL MMOL/L (ref 3.5–5.1)
PROT SERPL-MCNC: 7.5 G/DL (ref 6.4–8.2)
PROT UR STRIP-MCNC: NEGATIVE MG/DL
RBC # BLD AUTO: 4.83 M/UL (ref 3.8–5.2)
RBC #/AREA URNS HPF: ABNORMAL /HPF (ref 0–5)
SODIUM SERPL-SCNC: 134 MMOL/L (ref 136–145)
SP GR UR REFRACTOMETRY: 1.02 (ref 1–1.03)
UA: UC IF INDICATED,UAUC: ABNORMAL
UROBILINOGEN UR QL STRIP.AUTO: 0.1 EU/DL (ref 0.1–1)
WBC # BLD AUTO: 5.1 K/UL (ref 3.6–11)
WBC URNS QL MICRO: ABNORMAL /HPF (ref 0–4)

## 2022-03-17 PROCEDURE — 93005 ELECTROCARDIOGRAM TRACING: CPT

## 2022-03-17 PROCEDURE — 74011000636 HC RX REV CODE- 636: Performed by: STUDENT IN AN ORGANIZED HEALTH CARE EDUCATION/TRAINING PROGRAM

## 2022-03-17 PROCEDURE — 85025 COMPLETE CBC W/AUTO DIFF WBC: CPT

## 2022-03-17 PROCEDURE — 81001 URINALYSIS AUTO W/SCOPE: CPT

## 2022-03-17 PROCEDURE — 80053 COMPREHEN METABOLIC PANEL: CPT

## 2022-03-17 PROCEDURE — 74011250636 HC RX REV CODE- 250/636: Performed by: STUDENT IN AN ORGANIZED HEALTH CARE EDUCATION/TRAINING PROGRAM

## 2022-03-17 PROCEDURE — 74177 CT ABD & PELVIS W/CONTRAST: CPT

## 2022-03-17 PROCEDURE — 96374 THER/PROPH/DIAG INJ IV PUSH: CPT

## 2022-03-17 PROCEDURE — 99285 EMERGENCY DEPT VISIT HI MDM: CPT

## 2022-03-17 PROCEDURE — 36415 COLL VENOUS BLD VENIPUNCTURE: CPT

## 2022-03-17 PROCEDURE — 87086 URINE CULTURE/COLONY COUNT: CPT

## 2022-03-17 PROCEDURE — 96375 TX/PRO/DX INJ NEW DRUG ADDON: CPT

## 2022-03-17 RX ORDER — MORPHINE SULFATE 4 MG/ML
4 INJECTION INTRAVENOUS ONCE
Status: COMPLETED | OUTPATIENT
Start: 2022-03-17 | End: 2022-03-17

## 2022-03-17 RX ORDER — ONDANSETRON 2 MG/ML
4 INJECTION INTRAMUSCULAR; INTRAVENOUS
Status: COMPLETED | OUTPATIENT
Start: 2022-03-17 | End: 2022-03-17

## 2022-03-17 RX ADMIN — IOPAMIDOL 100 ML: 755 INJECTION, SOLUTION INTRAVENOUS at 22:23

## 2022-03-17 RX ADMIN — SODIUM CHLORIDE 1000 ML: 9 INJECTION, SOLUTION INTRAVENOUS at 23:23

## 2022-03-17 RX ADMIN — ONDANSETRON 4 MG: 2 INJECTION INTRAMUSCULAR; INTRAVENOUS at 21:28

## 2022-03-17 RX ADMIN — MORPHINE SULFATE 4 MG: 4 INJECTION INTRAVENOUS at 21:28

## 2022-03-18 ENCOUNTER — APPOINTMENT (OUTPATIENT)
Dept: MRI IMAGING | Age: 82
DRG: 439 | End: 2022-03-18
Attending: PHYSICIAN ASSISTANT
Payer: MEDICARE

## 2022-03-18 LAB
ALBUMIN SERPL-MCNC: 3.2 G/DL (ref 3.5–5)
ANION GAP SERPL CALC-SCNC: 6 MMOL/L (ref 5–15)
ATRIAL RATE: 94 BPM
BUN SERPL-MCNC: 24 MG/DL (ref 6–20)
BUN/CREAT SERPL: 28 (ref 12–20)
CA-I BLD-MCNC: 8.9 MG/DL (ref 8.5–10.1)
CALCULATED P AXIS, ECG09: 40 DEGREES
CALCULATED R AXIS, ECG10: -7 DEGREES
CALCULATED T AXIS, ECG11: -33 DEGREES
CEA SERPL-MCNC: 17.9 NG/ML
CHLORIDE SERPL-SCNC: 103 MMOL/L (ref 97–108)
CO2 SERPL-SCNC: 25 MMOL/L (ref 21–32)
COVID-19 RAPID TEST, COVR: NOT DETECTED
CREAT SERPL-MCNC: 0.85 MG/DL (ref 0.55–1.02)
DIAGNOSIS, 93000: NORMAL
GLUCOSE BLD STRIP.AUTO-MCNC: 117 MG/DL (ref 65–117)
GLUCOSE BLD STRIP.AUTO-MCNC: 121 MG/DL (ref 65–117)
GLUCOSE BLD STRIP.AUTO-MCNC: 253 MG/DL (ref 65–117)
GLUCOSE BLD STRIP.AUTO-MCNC: 257 MG/DL (ref 65–117)
GLUCOSE SERPL-MCNC: 296 MG/DL (ref 65–100)
LIPASE SERPL-CCNC: >3000 U/L (ref 73–393)
MRSA DNA SPEC QL NAA+PROBE: NOT DETECTED
P-R INTERVAL, ECG05: 146 MS
PERFORMED BY, TECHID: ABNORMAL
PERFORMED BY, TECHID: NORMAL
PHOSPHATE SERPL-MCNC: 2.6 MG/DL (ref 2.6–4.7)
POTASSIUM SERPL-SCNC: 5 MMOL/L (ref 3.5–5.1)
Q-T INTERVAL, ECG07: 366 MS
QRS DURATION, ECG06: 82 MS
QTC CALCULATION (BEZET), ECG08: 457 MS
SODIUM SERPL-SCNC: 134 MMOL/L (ref 136–145)
VENTRICULAR RATE, ECG03: 94 BPM

## 2022-03-18 PROCEDURE — 74011250637 HC RX REV CODE- 250/637: Performed by: PHYSICIAN ASSISTANT

## 2022-03-18 PROCEDURE — 74011000250 HC RX REV CODE- 250: Performed by: PHYSICIAN ASSISTANT

## 2022-03-18 PROCEDURE — 74011250636 HC RX REV CODE- 250/636: Performed by: PHYSICIAN ASSISTANT

## 2022-03-18 PROCEDURE — 87641 MR-STAPH DNA AMP PROBE: CPT

## 2022-03-18 PROCEDURE — 74011250636 HC RX REV CODE- 250/636: Performed by: STUDENT IN AN ORGANIZED HEALTH CARE EDUCATION/TRAINING PROGRAM

## 2022-03-18 PROCEDURE — 86301 IMMUNOASSAY TUMOR CA 19-9: CPT

## 2022-03-18 PROCEDURE — 36415 COLL VENOUS BLD VENIPUNCTURE: CPT

## 2022-03-18 PROCEDURE — 74011250636 HC RX REV CODE- 250/636: Performed by: HOSPITALIST

## 2022-03-18 PROCEDURE — 87635 SARS-COV-2 COVID-19 AMP PRB: CPT

## 2022-03-18 PROCEDURE — 74011636637 HC RX REV CODE- 636/637: Performed by: HOSPITALIST

## 2022-03-18 PROCEDURE — 65270000029 HC RM PRIVATE

## 2022-03-18 PROCEDURE — 82962 GLUCOSE BLOOD TEST: CPT

## 2022-03-18 PROCEDURE — 74011000250 HC RX REV CODE- 250: Performed by: HOSPITALIST

## 2022-03-18 PROCEDURE — 74011250637 HC RX REV CODE- 250/637: Performed by: HOSPITALIST

## 2022-03-18 PROCEDURE — 82378 CARCINOEMBRYONIC ANTIGEN: CPT

## 2022-03-18 PROCEDURE — 83690 ASSAY OF LIPASE: CPT

## 2022-03-18 PROCEDURE — 74181 MRI ABDOMEN W/O CONTRAST: CPT

## 2022-03-18 PROCEDURE — 80069 RENAL FUNCTION PANEL: CPT

## 2022-03-18 PROCEDURE — 96376 TX/PRO/DX INJ SAME DRUG ADON: CPT

## 2022-03-18 RX ORDER — SPIRONOLACTONE 25 MG/1
25 TABLET ORAL DAILY
Status: DISCONTINUED | OUTPATIENT
Start: 2022-03-18 | End: 2022-03-23 | Stop reason: HOSPADM

## 2022-03-18 RX ORDER — HYDROMORPHONE HYDROCHLORIDE 1 MG/ML
0.5 INJECTION, SOLUTION INTRAMUSCULAR; INTRAVENOUS; SUBCUTANEOUS
Status: DISPENSED | OUTPATIENT
Start: 2022-03-18 | End: 2022-03-21

## 2022-03-18 RX ORDER — MAGNESIUM SULFATE 100 %
4 CRYSTALS MISCELLANEOUS AS NEEDED
Status: DISCONTINUED | OUTPATIENT
Start: 2022-03-18 | End: 2022-03-23 | Stop reason: HOSPADM

## 2022-03-18 RX ORDER — DEXTROSE MONOHYDRATE 100 MG/ML
0-250 INJECTION, SOLUTION INTRAVENOUS AS NEEDED
Status: DISCONTINUED | OUTPATIENT
Start: 2022-03-18 | End: 2022-03-23 | Stop reason: HOSPADM

## 2022-03-18 RX ORDER — PANTOPRAZOLE SODIUM 40 MG/1
40 TABLET, DELAYED RELEASE ORAL
Status: DISCONTINUED | OUTPATIENT
Start: 2022-03-18 | End: 2022-03-23 | Stop reason: HOSPADM

## 2022-03-18 RX ORDER — ONDANSETRON 2 MG/ML
4 INJECTION INTRAMUSCULAR; INTRAVENOUS
Status: DISCONTINUED | OUTPATIENT
Start: 2022-03-18 | End: 2022-03-23 | Stop reason: HOSPADM

## 2022-03-18 RX ORDER — SODIUM CHLORIDE 0.9 % (FLUSH) 0.9 %
5-40 SYRINGE (ML) INJECTION AS NEEDED
Status: DISCONTINUED | OUTPATIENT
Start: 2022-03-18 | End: 2022-03-22

## 2022-03-18 RX ORDER — INSULIN LISPRO 100 [IU]/ML
INJECTION, SOLUTION INTRAVENOUS; SUBCUTANEOUS
Status: DISCONTINUED | OUTPATIENT
Start: 2022-03-18 | End: 2022-03-23 | Stop reason: HOSPADM

## 2022-03-18 RX ORDER — DEXTROMETHORPHAN HYDROBROMIDE, GUAIFENESIN 5; 100 MG/5ML; MG/5ML
650 LIQUID ORAL
Status: DISCONTINUED | OUTPATIENT
Start: 2022-03-18 | End: 2022-03-23 | Stop reason: HOSPADM

## 2022-03-18 RX ORDER — MORPHINE SULFATE 4 MG/ML
4 INJECTION INTRAVENOUS ONCE
Status: COMPLETED | OUTPATIENT
Start: 2022-03-18 | End: 2022-03-18

## 2022-03-18 RX ORDER — CARVEDILOL 3.12 MG/1
3.12 TABLET ORAL 2 TIMES DAILY WITH MEALS
Status: DISCONTINUED | OUTPATIENT
Start: 2022-03-18 | End: 2022-03-23 | Stop reason: HOSPADM

## 2022-03-18 RX ORDER — LORAZEPAM 1 MG/1
1 TABLET ORAL ONCE
Status: COMPLETED | OUTPATIENT
Start: 2022-03-18 | End: 2022-03-18

## 2022-03-18 RX ORDER — CLOPIDOGREL BISULFATE 75 MG/1
75 TABLET ORAL DAILY
Status: DISCONTINUED | OUTPATIENT
Start: 2022-03-18 | End: 2022-03-23 | Stop reason: HOSPADM

## 2022-03-18 RX ORDER — SODIUM CHLORIDE 0.9 % (FLUSH) 0.9 %
5-40 SYRINGE (ML) INJECTION EVERY 8 HOURS
Status: DISCONTINUED | OUTPATIENT
Start: 2022-03-18 | End: 2022-03-22

## 2022-03-18 RX ORDER — SODIUM CHLORIDE 9 MG/ML
1000 INJECTION, SOLUTION INTRAVENOUS CONTINUOUS
Status: DISCONTINUED | OUTPATIENT
Start: 2022-03-18 | End: 2022-03-20

## 2022-03-18 RX ADMIN — CLOPIDOGREL BISULFATE 75 MG: 75 TABLET ORAL at 08:54

## 2022-03-18 RX ADMIN — CARVEDILOL 3.12 MG: 3.12 TABLET, FILM COATED ORAL at 16:44

## 2022-03-18 RX ADMIN — APIXABAN 2.5 MG: 2.5 TABLET, FILM COATED ORAL at 08:54

## 2022-03-18 RX ADMIN — SODIUM CHLORIDE, PRESERVATIVE FREE 10 ML: 5 INJECTION INTRAVENOUS at 05:36

## 2022-03-18 RX ADMIN — HYDROMORPHONE HYDROCHLORIDE 0.5 MG: 1 INJECTION, SOLUTION INTRAMUSCULAR; INTRAVENOUS; SUBCUTANEOUS at 17:30

## 2022-03-18 RX ADMIN — LORAZEPAM 1 MG: 1 TABLET ORAL at 11:37

## 2022-03-18 RX ADMIN — SODIUM CHLORIDE, PRESERVATIVE FREE 1 G: 5 INJECTION INTRAVENOUS at 13:27

## 2022-03-18 RX ADMIN — MORPHINE SULFATE 4 MG: 4 INJECTION, SOLUTION INTRAMUSCULAR; INTRAVENOUS at 02:40

## 2022-03-18 RX ADMIN — CARVEDILOL 3.12 MG: 3.12 TABLET, FILM COATED ORAL at 08:54

## 2022-03-18 RX ADMIN — SPIRONOLACTONE 25 MG: 25 TABLET ORAL at 08:54

## 2022-03-18 RX ADMIN — INSULIN LISPRO 7 UNITS: 100 INJECTION, SOLUTION INTRAVENOUS; SUBCUTANEOUS at 13:26

## 2022-03-18 RX ADMIN — SODIUM CHLORIDE, PRESERVATIVE FREE 10 ML: 5 INJECTION INTRAVENOUS at 22:07

## 2022-03-18 RX ADMIN — SODIUM CHLORIDE 1000 ML: 9 INJECTION, SOLUTION INTRAVENOUS at 04:20

## 2022-03-18 RX ADMIN — PANTOPRAZOLE SODIUM 40 MG: 40 TABLET, DELAYED RELEASE ORAL at 08:54

## 2022-03-18 RX ADMIN — SODIUM CHLORIDE 1000 ML: 9 INJECTION, SOLUTION INTRAVENOUS at 15:03

## 2022-03-18 RX ADMIN — ONDANSETRON 4 MG: 2 INJECTION INTRAMUSCULAR; INTRAVENOUS at 17:30

## 2022-03-18 NOTE — ROUTINE PROCESS
No Gonzalez TRANSFER - OUT REPORT:    Verbal report given to [facility] (name) on Fiorella Jefferson  being transferred to 2E(unit) for routine progression of care       Report consisted of patients Situation, Background, Assessment and   Recommendations(SBAR). Information from the following report(s) SBAR, ED Summary and MAR was reviewed with the receiving nurse. Lines:   Peripheral IV 03/17/22 Anterior;Right Forearm (Active)        Opportunity for questions and clarification was provided.       Patient transported with:   Registered Nurse

## 2022-03-18 NOTE — ED TRIAGE NOTES
Pt given b12 shot at primary doctor today. Started with N&V this afternoon with ABD pain worse with palpation in [address] Took zofran at home, as well in ambulance.

## 2022-03-18 NOTE — ED NOTES
Assumed care of pt. Pt resting on hospital bed. NAD noted at this time. Pt presents with purwick in place under her depends. 22G on right forearm. Pt refusing to get in gown at this time states she is fine in her cloths. Will continue to monitor pt.

## 2022-03-18 NOTE — PROGRESS NOTES
Problem: Falls - Risk of  Goal: *Absence of Falls  Description: Document Anival Blight Fall Risk and appropriate interventions in the flowsheet. Outcome: Not Progressing Towards Goal  Note: Fall Risk Interventions:                                Problem: Patient Education: Go to Patient Education Activity  Goal: Patient/Family Education  Outcome: Not Progressing Towards Goal     Problem: Pressure Injury - Risk of  Goal: *Prevention of pressure injury  Description: Document Jon Scale and appropriate interventions in the flowsheet.   Outcome: Not Progressing Towards Goal  Note: Pressure Injury Interventions:  Sensory Interventions: Assess changes in LOC,Minimize linen layers    Moisture Interventions: Minimize layers    Activity Interventions: Increase time out of bed    Mobility Interventions: HOB 30 degrees or less    Nutrition Interventions: Document food/fluid/supplement intake                     Problem: Patient Education: Go to Patient Education Activity  Goal: Patient/Family Education  Outcome: Not Progressing Towards Goal     Problem: Pain  Goal: *Control of Pain  Outcome: Not Progressing Towards Goal  Goal: *PALLIATIVE CARE:  Alleviation of Pain  Outcome: Not Progressing Towards Goal     Problem: Patient Education: Go to Patient Education Activity  Goal: Patient/Family Education  Outcome: Not Progressing Towards Goal     Problem: Nausea/Vomiting (Adult)  Goal: *Absence of nausea/vomiting  Outcome: Not Progressing Towards Goal  Goal: *Palliation of nausea/vomiting (Palliative Care)  Outcome: Not Progressing Towards Goal     Problem: Patient Education: Go to Patient Education Activity  Goal: Patient/Family Education  Outcome: Not Progressing Towards Goal

## 2022-03-18 NOTE — PROGRESS NOTES
Problem: Falls - Risk of  Goal: *Absence of Falls  Description: Document [address] Agustin Fall Risk and appropriate interventions in the flowsheet. 3/18/2022 1943 by Jaswant Newberry RN  Outcome: Progressing Towards Goal  Note: Fall Risk Interventions:  Mobility Interventions: Bed/chair exit alarm         Medication Interventions: Bed/chair exit alarm    Elimination Interventions: Call light in reach           3/18/2022 0549 by Jaswant Newberry RN  Outcome: Not Progressing Towards Goal  Note: Fall Risk Interventions:                                Problem: Patient Education: Go to Patient Education Activity  Goal: Patient/Family Education  3/18/2022 1943 by Jaswant Newberry RN  Outcome: Progressing Towards Goal  3/18/2022 0549 by Jaswant Newberry RN  Outcome: Not Progressing Towards Goal     Problem: Pressure Injury - Risk of  Goal: *Prevention of pressure injury  Description: Document Jon Scale and appropriate interventions in the flowsheet.   3/18/2022 1943 by Jaswant Newberry RN  Outcome: Progressing Towards Goal  Note: Pressure Injury Interventions:  Sensory Interventions: Assess changes in LOC    Moisture Interventions: Absorbent underpads    Activity Interventions: Increase time out of bed    Mobility Interventions: HOB 30 degrees or less    Nutrition Interventions: Document food/fluid/supplement intake                  3/18/2022 0549 by Jaswant Newberry RN  Outcome: Not Progressing Towards Goal  Note: Pressure Injury Interventions:  Sensory Interventions: Assess changes in LOC,Minimize linen layers    Moisture Interventions: Minimize layers    Activity Interventions: Increase time out of bed    Mobility Interventions: HOB 30 degrees or less    Nutrition Interventions: Document food/fluid/supplement intake                     Problem: Patient Education: Go to Patient Education Activity  Goal: Patient/Family Education  3/18/2022 1943 by Jaswant Newberry RN  Outcome: Progressing Towards Goal  3/18/2022 0549 by Christiano York, RN  Outcome: Not Progressing Towards Goal     Problem: Pain  Goal: *Control of Pain  3/18/2022 1943 by Christiano York, RN  Outcome: Progressing Towards Goal  3/18/2022 0549 by Christiano York RN  Outcome: Not Progressing Towards Goal  Goal: *PALLIATIVE CARE:  Alleviation of Pain  3/18/2022 1943 by Christiano York RN  Outcome: Progressing Towards Goal  3/18/2022 0549 by Christiano York RN  Outcome: Not Progressing Towards Goal     Problem: Patient Education: Go to Patient Education Activity  Goal: Patient/Family Education  3/18/2022 1943 by Christiano York, RN  Outcome: Progressing Towards Goal  3/18/2022 0549 by Christiano York RN  Outcome: Not Progressing Towards Goal     Problem: Nausea/Vomiting (Adult)  Goal: *Absence of nausea/vomiting  3/18/2022 1943 by Christiano York, RN  Outcome: Progressing Towards Goal  3/18/2022 0549 by Christiano York RN  Outcome: Not Progressing Towards Goal  Goal: *Palliation of nausea/vomiting (Palliative Care)  3/18/2022 1943 by Christiano York, RN  Outcome: Progressing Towards Goal  3/18/2022 0549 by Christiano York RN  Outcome: Not Progressing Towards Goal     Problem: Patient Education: Go to Patient Education Activity  Goal: Patient/Family Education  3/18/2022 1943 by Christiano York RN  Outcome: Progressing Towards Goal  3/18/2022 0549 by Christiano York RN  Outcome: Not Progressing Towards Goal

## 2022-03-18 NOTE — H&P
History and Physical              Subjective :   Chief Complaint : Abdominal pain associated with nausea and vomiting. Source of information : Mostly from the previous records and from ED provider. Patient able to give only simple information. History of present illness:   81 y.o. female with a history of diabetes, hypertension, recurrent pancreatitis presents to the emergency room complaining of abdominal pain, she is not able to give a good description of the pain at location. It is diffuse all over the abdomen associated with nausea and vomiting. No fever or chills, denies any chest pain. But she has palpitations, denies any shortness of breath, cough or trouble breathing. Today she went to her PCPs office for B12 shot before this happened, when she was returning it suddenly happened. Found with a lipase levels more than 3000 admitted. 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 EGD did not show any significant strictures are problems except a small gastritis. She has chronic dilatated pancreatic ducts. Past Medical History:   Diagnosis Date    Colon polyps     Diabetes ([address]     Diverticulosis     Hypertension     Ill-defined condition     Pancreatitis      Past Surgical History:   Procedure Laterality Date    COLONOSCOPY N/A 4/29/2021    COLONOSCOPY performed by Jonathan Garvey MD at [facility] ENDOSCOPY     Family History   Problem Relation Age of Onset    Diabetes Other       Social History     Tobacco Use    Smoking status: Never Smoker    Smokeless tobacco: Never Used   Substance Use Topics    Alcohol use: Not Currently       Prior to Admission medications    Medication Sig Start Date End Date Taking? Authorizing Provider   apixaban (Eliquis) 2.5 mg tablet Take 2.5 mg by mouth daily. Yes Provider, Historical   spironolactone (ALDACTONE) 25 mg tablet Take 25 mg by mouth daily.     Provider, Historical   ondansetron hcl (Zofran) 4 mg tablet Take 4 mg by mouth every six (6) hours as needed for Nausea or Nausea or Vomiting. Provider, Historical   acetaminophen (Tylenol Arthritis Pain) 650 mg TbER Take 650 mg by mouth every eight (8) hours as needed for Pain. Indications: pain    Provider, Historical   furosemide (LASIX) 40 mg tablet Take 1 Tablet by mouth daily. 11/24/21   Rekha Gaviria PA-C   pantoprazole (PROTONIX) 40 mg tablet Take 1 Tablet by mouth Daily (before breakfast). 11/25/21   Rekha Gaviria PA-C   clopidogreL (PLAVIX) 75 mg tab Take 1 Tablet by mouth daily. 10/30/21   Reyna Munoz MD   carvediloL (COREG) 3.125 mg tablet Take 1 Tablet by mouth two (2) times daily (with meals). 10/29/21   Reyna Munoz MD   glipiZIDE (GLUCOTROL) 5 mg tablet Take 1 Tablet by mouth Daily (before breakfast). 10/30/21   Reyna Munoz MD     Allergies   Allergen Reactions    Insulins Itching    Penicillins Other (comments)     Pt states it makes her pass out             Review of Systems: Unable to obtain much information as she is sleepy also not good historian. Vitals:     Patient Vitals for the past 12 hrs:   Temp Pulse Resp BP SpO2   03/18/22 0249 -- 86 18 (!) 154/95 99 %   03/18/22 0034 -- 89 18 (!) 168/96 99 %   03/17/22 2020 98.1 °F (36.7 °C) 93 20 (!) 169/98 99 %       Physical Exam:   General : Looks comfortable as she had pain medication. No respiratory distress noted. HEENT : PERRLA, dry oral mucosa, atraumatic normocephalic, Normal ear and nose. Neck : Supple, no JVD, no masses noted, no carotid bruit. Lungs : Breath sounds with moderate air entry bilaterally, no wheezes or rales, no accessory muscle use. CVS : Rhythm rate regular, S1+, S2+, no murmur or gallop. Abdomen : Soft, nontender, , bowel sounds active. Extremities : No edema noted,  pedal pulses palpable.   Musculoskeletal :  no joint swelling or effusion, muscle tone and power appears normal.   Skin : Dry and warm. No pathological rash. Lymphatic : No cervical lymphadenopathy. Neurological : Awake, alert, oriented to time place person. Psychiatric : She is very sleepy at this time. Data Review:   Recent Results (from the past 24 hour(s))   CBC WITH AUTOMATED DIFF    Collection Time: 03/17/22  8:41 PM   Result Value Ref Range    WBC 5.1 3.6 - 11.0 K/uL    RBC 4.83 3.80 - 5.20 M/uL    HGB 12.9 11.5 - 16.0 g/dL    HCT 40.8 35.0 - 47.0 %    MCV 84.5 80.0 - 99.0 FL    MCH 26.7 26.0 - 34.0 PG    MCHC 31.6 30.0 - 36.5 g/dL    RDW 14.0 11.5 - 14.5 %    PLATELET 658 361 - 590 K/uL    MPV 13.3 (H) 8.9 - 12.9 FL    NRBC 0.0 0.0  WBC    ABSOLUTE NRBC 0.00 0.00 - 0.01 K/uL    NEUTROPHILS 67 32 - 75 %    LYMPHOCYTES 23 12 - 49 %    MONOCYTES 7 5 - 13 %    EOSINOPHILS 3 0 - 7 %    BASOPHILS 0 0 - 1 %    IMMATURE GRANULOCYTES 0 0 - 0.5 %    ABS. NEUTROPHILS 3.4 1.8 - 8.0 K/UL    ABS. LYMPHOCYTES 1.2 0.8 - 3.5 K/UL    ABS. MONOCYTES 0.4 0.0 - 1.0 K/UL    ABS. EOSINOPHILS 0.1 0.0 - 0.4 K/UL    ABS. BASOPHILS 0.0 0.0 - 0.1 K/UL    ABS. IMM. GRANS. 0.0 0.00 - 0.04 K/UL    DF AUTOMATED     METABOLIC PANEL, COMPREHENSIVE    Collection Time: 03/17/22  8:41 PM   Result Value Ref Range    Sodium 134 (L) 136 - 145 mmol/L    Potassium Hemolyzed, recollect requested 3.5 - 5.1 mmol/L    Chloride 101 97 - 108 mmol/L    CO2 26 21 - 32 mmol/L    Anion gap 7 5 - 15 mmol/L    Glucose 391 (H) 65 - 100 mg/dL    BUN 34 (H) 6 - 20 mg/dL    Creatinine 1.13 (H) 0.55 - 1.02 mg/dL    BUN/Creatinine ratio 30 (H) 12 - 20      GFR est AA 56 (L) >60 ml/min/1.73m2    GFR est non-AA 46 (L) >60 ml/min/1.73m2    Calcium 9.4 8.5 - 10.1 mg/dL    Bilirubin, total 0.8 0.2 - 1.0 mg/dL    AST (SGOT) Hemolyzed, recollect requested 15 - 37 U/L    ALT (SGPT) 29 12 - 78 U/L    Alk.  phosphatase 152 (H) 45 - 117 U/L    Protein, total 7.5 6.4 - 8.2 g/dL    Albumin 3.7 3.5 - 5.0 g/dL    Globulin 3.8 2.0 - 4.0 g/dL    A-G Ratio 1.0 (L) 1.1 - 2.2 URINALYSIS W/ REFLEX CULTURE    Collection Time: 03/17/22  9:25 PM    Specimen: Urine   Result Value Ref Range    Color Yellow/Straw      Appearance Clear Clear      Specific gravity 1.017 1.003 - 1.030      pH (UA) 6.0 5.0 - 8.0      Protein Negative Negative mg/dL    Glucose >300 (A) Negative mg/dL    Ketone 20 (A) Negative mg/dL    Bilirubin Negative Negative      Blood Negative Negative      Urobilinogen 0.1 0.1 - 1.0 EU/dL    Nitrites Negative Negative      Leukocyte Esterase Negative Negative      UA:UC IF INDICATED Urine Culture Ordered (A) Culture not indicated by UA result      WBC 0-5 0 - 4 /hpf    RBC 0-5 0 - 5 /hpf    Bacteria Negative Negative /hpf   LIPASE    Collection Time: 03/18/22 12:25 AM   Result Value Ref Range    Lipase >3,000 (H) 73 - 393 U/L       Radiologic Studies :   CT Results  (Last 48 hours)               03/17/22 2223  CT ABD PELV W CONT Final result    Impression:  Chronic changes in the pancreas which appear stable. There is   peripancreatic fluid present which has been seen in the past. Correlate with   laboratory data to exclude acute pancreatitis. No necrosis or other complication   of acute pancreatitis is seen. There is chronic dilatation of the distal main   pancreatic duct suggestive of previous pancreatitis and necrosis. Mild diffuse wall thickening distal colon and rectosigmoid. This may be artifact   of collapse. A mild element of colitis could also give this appearance. While   there is extensive diverticulosis pattern does not suggest diverticulitis this   time.    Chronic changes elsewhere which appear stable                   Narrative:  PROCEDURE: CT ABD PELV W CONT       HISTORY:Left lower quadrant pain       COMPARISON:Previous exam 19 February 2022       Department policy stipulates all CT scans at this facility are performed using   dose reduction optimization techniques as appropriate to the performed exam,   including the following: Automated exposure control, adjustments of the mA   and/or KVP according to the patient size, and the use of iterative   reconstruction technique. LIMITATIONS: None       TECHNIQUE: Axial images with multiplanar reconstruction following intravenous   contrast.100 mL [id number]       CHEST: Heart remains dilated. There is persistent atelectasis in the left base. No pleural effusion or pneumothorax. LIVER: Hypodense structures in the liver too small to characterize, unchanged   GALLBLADDER: Normal   BILIARY TREE: Normal   PANCREAS: Dilatation of the distal main pancreatic duct in the tail of the   pancreas is unchanged. This is associated with some calcifications and what   appear to be surgical clips in the area. Fluid density is seen around the   pancreas infiltrating into the mesenteric fat. SPLEEN: Normal   ADRENAL GLANDS: Normal   KIDNEYS/URETERS/BLADDER: Left kidney again shows an altered axis. Small cortical   cyst unchanged from the left kidney. No hydronephrosis or stone disease on   either side. The bladder is distended but otherwise unremarkable   RETROPERITONEUM/AORTA: Extensive atherosclerotic changes are again seen in the   aorta and its branches. No aneurysm or dissection. No periaortic adenopathy. BOWEL/MESENTERY: There is no pathologic distention of the stomach or small   bowel. The colon shows diverticulosis without evidence of diverticulitis. Beginning in the mid descending colon extending through the rectum there is mild   colonic wall thickening. However, this area of the colon is not well distended. No pericolonic fat stranding appreciated. APPENDIX: Identified and normal   PERITONEAL CAVITY: No free intraperitoneal air or fluid   REPRODUCTIVE ORGANS: Normal   BONE/TISSUES: Bones show diffuse spondylosis but no acute process. There is a   small midline epigastric ventral hernia containing only fat. No complications   apparent.        OTHER: None                      Assessment and Plan : Acute exacerbation of chronic recurrent pancreatitis: Etiology unclear, she was never an alcoholic no other etiology is noted. Need further evaluation. We will keep her n.p.o. with only sips of water at this time once she is improved we will follow up closely. Heart failure with reduced ejection fraction: No evidence of decompensation at this time, continue home medications    Benign essential hypertension: Continue home medications    Diabetes mellitus uncontrolled insulin-dependent: But patient refuses to take insulin as it causes itching, so mostly it is uncontrolled. On glipizide, as she is not eating at this time due to n.p.o. status I will hold it until she can start eating. We will keep her on Accu-Cheks with a sliding scale insulin again, may need to talk to her to try something different. Anemia mild: Secondary to chronic disease, no further interventions    Admitted to medical floor, full CODE STATUS, home medications reviewed and verified. CC : Pedro Mckay MD  Signed By: Liang Maravilla MD     March 18, 2022      This dictation was done by dragon, computer voice recognition software. Often unanticipated grammatical, syntax, [place] phones and other interpretive errors are inadvertently transcribed. Please excuse errors that have escaped final proofreading.

## 2022-03-18 NOTE — ED PROVIDER NOTES
EMERGENCY DEPARTMENT HISTORY AND PHYSICAL EXAM      Date: 3/17/2022  Patient Name: Marine Novak      History of Presenting Illness     Chief Complaint   Patient presents with    Abdominal Pain    Vomiting       History Provided By: Patient    HPI: Marine Novak, 81 y.o. female with MH of DM, HTN, CAD and diverticulosis presenting with acute onset of left lower quadrant abdominal pain is described as sharp, nothing specific makes it better or worse with associated nausea and vomiting earlier today. Patient is denying any fever, chills or sweats. Reports that she was feeling fatigued earlier today went to her doctor to give her vitamin B12 shots. Pain started tonight while she was eating dinner. She is denying any urinary symptoms as well. There are no other complaints, changes, or physical findings at this time.     PCP: Milly Martinez MD    Current Facility-Administered Medications   Medication Dose Route Frequency Provider Last Rate Last Admin    clopidogreL (PLAVIX) tablet 75 mg  75 mg Oral DAILY Salinas Hunt MD        carvediloL (COREG) tablet 3.125 mg  3.125 mg Oral BID WITH MEALS Salinas Hunt MD        pantoprazole (PROTONIX) tablet 40 mg  40 mg Oral ACB Salinas Hunt MD        spironolactone (ALDACTONE) tablet 25 mg  25 mg Oral DAILY Salinas Hunt MD        acetaminophen (TYLENOL) SR tablet 650 mg  650 mg Oral Q8H PRN Salinas Hunt MD        apixaban (ELIQUIS) tablet 2.5 mg  2.5 mg Oral DAILY Salinas Hunt MD       Lewis insulin lispro (HUMALOG) injection   SubCUTAneous AC&HS Salinas Hunt MD        glucose chewable tablet 16 g  4 Tablet Oral PRN Salinas Hunt MD        glucagon (GLUCAGEN) injection 1 mg  1 mg IntraMUSCular PRN Salinas Hunt MD        0.9% sodium chloride infusion 1,000 mL  1,000 mL IntraVENous CONTINUOUS Salinas Hunt  mL/hr at 03/18/22 0420 1,000 mL at 03/18/22 0420    sodium chloride (NS) flush 5-40 mL  5-40 mL IntraVENous Q8H Syd Mari MD        sodium chloride (NS) flush 5-40 mL  5-40 mL IntraVENous PRN Syd Mari MD        HYDROmorphone (DILAUDID) syringe 0.5 mg  0.5 mg IntraVENous Q4H PRN Syd Mari MD        ondansetron [facility]) injection 4 mg  4 mg IntraVENous Q4H PRN Syd Mari MD        dextrose 10% infusion 0-250 mL  0-250 mL IntraVENous PRN Syd Mari MD         Current Outpatient Medications   Medication Sig Dispense Refill    apixaban (Eliquis) 2.5 mg tablet Take 2.5 mg by mouth daily.  spironolactone (ALDACTONE) 25 mg tablet Take 25 mg by mouth daily.  ondansetron hcl (Zofran) 4 mg tablet Take 4 mg by mouth every six (6) hours as needed for Nausea or Nausea or Vomiting.  acetaminophen (Tylenol Arthritis Pain) 650 mg TbER Take 650 mg by mouth every eight (8) hours as needed for Pain. Indications: pain      furosemide (LASIX) 40 mg tablet Take 1 Tablet by mouth daily. 30 Tablet 1    pantoprazole (PROTONIX) 40 mg tablet Take 1 Tablet by mouth Daily (before breakfast). 30 Tablet 1    clopidogreL (PLAVIX) 75 mg tab Take 1 Tablet by mouth daily. 30 Tablet 0    carvediloL (COREG) 3.125 mg tablet Take 1 Tablet by mouth two (2) times daily (with meals). 60 Tablet 0    glipiZIDE (GLUCOTROL) 5 mg tablet Take 1 Tablet by mouth Daily (before breakfast).  30 Tablet 0       Past History     Past Medical History:  Past Medical History:   Diagnosis Date    Colon polyps     Diabetes ([address]     Diverticulosis     Hypertension     Ill-defined condition     Pancreatitis      Past Surgical History:  Past Surgical History:   Procedure Laterality Date    COLONOSCOPY N/A 4/29/2021    COLONOSCOPY performed by Ari Venegas MD at [facility] END[facility]COPY       Family History:  Family History   Problem Relation Age of Onset    Diabetes Other        Social History:  Social History     Tobacco Use    Smoking status: Never Smoker    Smokeless tobacco: Never Used   Vaping Use    Vaping Use: Never used   Substance Use Topics    Alcohol use: Not Currently    Drug use: Never       Allergies: Allergies   Allergen Reactions    Insulins Itching    Penicillins Other (comments)     Pt states it makes her pass out         Review of Systems     Review of Systems    A 10 point review of system was performed and was negative except as noted above in HPI    Physical Exam     Physical Exam  Vitals and nursing note reviewed. Constitutional:       General: She is not in acute distress. Appearance: She is well-developed. She is not diaphoretic. HENT:      Head: Normocephalic and atraumatic. Eyes:      Extraocular Movements: Extraocular movements intact. Conjunctiva/sclera: Conjunctivae normal.   Cardiovascular:      Rate and Rhythm: Normal rate and regular rhythm. Heart sounds: Normal heart sounds. Pulmonary:      Effort: Pulmonary effort is normal.      Breath sounds: Normal breath sounds. Abdominal:      Palpations: Abdomen is soft. Tenderness: There is abdominal tenderness in the left lower quadrant. Musculoskeletal:      Cervical back: Neck supple. Right lower leg: No tenderness. No edema. Left lower leg: No tenderness. No edema. Neurological:      General: No focal deficit present. Mental Status: She is alert and oriented to person, place, and time.          Lab and Diagnostic Study Results     Labs -     Recent Results (from the past 12 hour(s))   CBC WITH AUTOMATED DIFF    Collection Time: 03/17/22  8:41 PM   Result Value Ref Range    WBC 5.1 3.6 - 11.0 K/uL    RBC 4.83 3.80 - 5.20 M/uL    HGB 12.9 11.5 - 16.0 g/dL    HCT 40.8 35.0 - 47.0 %    MCV 84.5 80.0 - 99.0 FL    MCH 26.7 26.0 - 34.0 PG    MCHC 31.6 30.0 - 36.5 g/dL    RDW 14.0 11.5 - 14.5 %    PLATELET 762 749 - 812 K/uL    MPV 13.3 (H) 8.9 - 12.9 FL    NRBC 0.0 0.0  WBC    ABSOLUTE NRBC 0.00 0.00 - 0.01 K/uL    NEUTROPHILS 67 32 - 75 %    LYMPHOCYTES 23 12 - 49 %    MONOCYTES 7 5 - 13 %    EOSINOPHILS 3 0 - 7 %    BASOPHILS 0 0 - 1 %    IMMATURE GRANULOCYTES 0 0 - 0.5 %    ABS. NEUTROPHILS 3.4 1.8 - 8.0 K/UL    ABS. LYMPHOCYTES 1.2 0.8 - 3.5 K/UL    ABS. MONOCYTES 0.4 0.0 - 1.0 K/UL    ABS. EOSINOPHILS 0.1 0.0 - 0.4 K/UL    ABS. BASOPHILS 0.0 0.0 - 0.1 K/UL    ABS. IMM. GRANS. 0.0 0.00 - 0.04 K/UL    DF AUTOMATED     METABOLIC PANEL, COMPREHENSIVE    Collection Time: 03/17/22  8:41 PM   Result Value Ref Range    Sodium 134 (L) 136 - 145 mmol/L    Potassium Hemolyzed, recollect requested 3.5 - 5.1 mmol/L    Chloride 101 97 - 108 mmol/L    CO2 26 21 - 32 mmol/L    Anion gap 7 5 - 15 mmol/L    Glucose 391 (H) 65 - 100 mg/dL    BUN 34 (H) 6 - 20 mg/dL    Creatinine 1.13 (H) 0.55 - 1.02 mg/dL    BUN/Creatinine ratio 30 (H) 12 - 20      GFR est AA 56 (L) >60 ml/min/1.73m2    GFR est non-AA 46 (L) >60 ml/min/1.73m2    Calcium 9.4 8.5 - 10.1 mg/dL    Bilirubin, total 0.8 0.2 - 1.0 mg/dL    AST (SGOT) Hemolyzed, recollect requested 15 - 37 U/L    ALT (SGPT) 29 12 - 78 U/L    Alk.  phosphatase 152 (H) 45 - 117 U/L    Protein, total 7.5 6.4 - 8.2 g/dL    Albumin 3.7 3.5 - 5.0 g/dL    Globulin 3.8 2.0 - 4.0 g/dL    A-G Ratio 1.0 (L) 1.1 - 2.2     URINALYSIS W/ REFLEX CULTURE    Collection Time: 03/17/22  9:25 PM    Specimen: Urine   Result Value Ref Range    Color Yellow/Straw      Appearance Clear Clear      Specific gravity 1.017 1.003 - 1.030      pH (UA) 6.0 5.0 - 8.0      Protein Negative Negative mg/dL    Glucose >300 (A) Negative mg/dL    Ketone 20 (A) Negative mg/dL    Bilirubin Negative Negative      Blood Negative Negative      Urobilinogen 0.1 0.1 - 1.0 EU/dL    Nitrites Negative Negative      Leukocyte Esterase Negative Negative      UA:UC IF INDICATED Urine Culture Ordered (A) Culture not indicated by UA result      WBC 0-5 0 - 4 /hpf    RBC 0-5 0 - 5 /hpf    Bacteria Negative Negative /hpf   LIPASE    Collection Time: 03/18/22 12:25 AM Result Value Ref Range    Lipase >3,000 (H) 73 - 393 U/L   COVID-19 RAPID TEST    Collection Time: 03/18/22  3:59 AM   Result Value Ref Range    COVID-19 rapid test Not Detected Not Detected         Radiologic Studies -   [unfilled]  CT Results  (Last 48 hours)               03/17/22 2223  CT ABD PELV W CONT Final result    Impression:  Chronic changes in the pancreas which appear stable. There is   peripancreatic fluid present which has been seen in the past. Correlate with   laboratory data to exclude acute pancreatitis. No necrosis or other complication   of acute pancreatitis is seen. There is chronic dilatation of the distal main   pancreatic duct suggestive of previous pancreatitis and necrosis. Mild diffuse wall thickening distal colon and rectosigmoid. This may be artifact   of collapse. A mild element of colitis could also give this appearance. While   there is extensive diverticulosis pattern does not suggest diverticulitis this   time. Chronic changes elsewhere which appear stable                   Narrative:  PROCEDURE: CT ABD PELV W CONT       HISTORY:Left lower quadrant pain       COMPARISON:Previous exam 19 February 2022       Department policy stipulates all CT scans at this facility are performed using   dose reduction optimization techniques as appropriate to the performed exam,   including the following: Automated exposure control, adjustments of the mA   and/or KVP according to the patient size, and the use of iterative   reconstruction technique. LIMITATIONS: None       TECHNIQUE: Axial images with multiplanar reconstruction following intravenous   contrast.100 mL [id number]       CHEST: Heart remains dilated. There is persistent atelectasis in the left base. No pleural effusion or pneumothorax.        LIVER: Hypodense structures in the liver too small to characterize, unchanged   GALLBLADDER: Normal   BILIARY TREE: Normal   PANCREAS: Dilatation of the distal main pancreatic duct in the tail of the   pancreas is unchanged. This is associated with some calcifications and what   appear to be surgical clips in the area. Fluid density is seen around the   pancreas infiltrating into the mesenteric fat. SPLEEN: Normal   ADRENAL GLANDS: Normal   KIDNEYS/URETERS/BLADDER: Left kidney again shows an altered axis. Small cortical   cyst unchanged from the left kidney. No hydronephrosis or stone disease on   either side. The bladder is distended but otherwise unremarkable   RETROPERITONEUM/AORTA: Extensive atherosclerotic changes are again seen in the   aorta and its branches. No aneurysm or dissection. No periaortic adenopathy. BOWEL/MESENTERY: There is no pathologic distention of the stomach or small   bowel. The colon shows diverticulosis without evidence of diverticulitis. Beginning in the mid descending colon extending through the rectum there is mild   colonic wall thickening. However, this area of the colon is not well distended. No pericolonic fat stranding appreciated. APPENDIX: Identified and normal   PERITONEAL CAVITY: No free intraperitoneal air or fluid   REPRODUCTIVE ORGANS: Normal   BONE/TISSUES: Bones show diffuse spondylosis but no acute process. There is a   small midline epigastric ventral hernia containing only fat. No complications   apparent. OTHER: None                CXR Results  (Last 48 hours)    None          Medical Decision Making and ED Course   - I am the first and primary provider for this patient AND AM THE PRIMARY PROVIDER OF RECORD. - I reviewed the vital signs, available nursing notes, past medical history, past surgical history, family history and social history. - Initial assessment performed. The patients presenting problems have been discussed, and the staff are in agreement with the care plan formulated and outlined with them. I have encouraged them to ask questions as they arise throughout their visit.     Vital Signs-Reviewed the patient's vital signs. Patient Vitals for the past 24 hrs:   Temp Pulse Resp BP SpO2   03/18/22 0407 -- 76 16 (!) 143/90 100 %   03/18/22 0249 -- 86 18 (!) 154/95 99 %   03/18/22 0034 -- 89 18 (!) 168/96 99 %   03/17/22 2020 98.1 °F (36.7 °C) 93 20 (!) 169/98 99 %       Records Reviewed: Nursing Notes and Old Medical Records    Provider Notes (Medical Decision Making):         ED Course:       ED Course as of 03/18/22 0429   Thu Mar 17, 2022   2110 Patient is presenting with acute onset of left lower quadrant abdominal pain associated nausea and vomiting. This happened while she was eating dinner. She does have history of diverticulosis and the question right now that the patient is having diverticulitis or if there is any concerns for obstruction. She did have some remote history of abdominal surgeries. Plan is to obtain CBC, chemistry, urinalysis, CT abdomen pelvis with IV contrast. [AA]   Fri Mar 18, 2022   0131 Patient work-up in the ED revealed that she has acute pancreatitis. Given the patient age and multiple comorbidities, patient will benefit from being admitted to the hospital for further management of acute pancreatitis. [AA]      ED Course User Index  [AA] Vikram Santamaria MD             Disposition     Disposition: Condition stable  Admitted to Floor Medical Floor the case was discussed with the admitting physician Dr. Lebron Juarez    Admitted      Diagnosis     Clinical Impression:   1. Acute pancreatitis, unspecified complication status, unspecified pancreatitis type        Attestations: Geena Nguyen MD    Please note that this dictation was completed with [email or web address], the [employer] voice recognition software. Quite often unanticipated grammatical, syntax, homophones, and other interpretive errors are inadvertently transcribed by the computer software. Please disregard these errors. Please excuse any errors that have escaped final proofreading. Thank you.

## 2022-03-18 NOTE — PROGRESS NOTES
Reason for Admission:  CHRONIC PANCREATITIS                  RUR Score:   21%        PCP: First and Last name:  Laith Lindsay MD     Name of Practice:   Are you a current patient: Yes/No: YES   Approximate date of last visit:  3/18/22   Can you do a virtual visit with your PCP:              Resources/supports as identified by patient/family:                   Top Challenges facing patient (as identified by patient/family and CM): Finances/Medication cost?     NO ISSUES               Transportation? FAMILY TRANSPORTS TO APPTS              Support system or lack thereof? FAMILY LIVES NEXT DOOR                     Living arrangements? ALONE AND WITH SON SOMETIMES           Self-care/ADLs/Cognition? INDEPENDENT          Current Advanced Directive/Advance Care Plan:  Full Code      Healthcare Decision Maker:   Click here to complete HealthCare Decision Makers including selection of the Healthcare Decision Maker Relationship (ie \"Primary\")      Primary Decision Maker: tracie hobbs - [phone]    Payor Source Payor: [place] / Plan: Primo Round / Product Type: Medicare /                             Plan for utilizing home health:    YES                 Transition of Care Plan:                    CM met with patient at the bedside to discuss dc planning. Patient verified demos. Patient currently lives in a 1 story house with 5 steps to enter from the outside. Patient lives alone and is next door to her son. Prior to admission, patient was independent with the adl's, iadls and her family transported her to all of her appts. She does not drive and she does not have a car. One of her sons will transport her home at dc. Patient obtains her scripts from [facility] on [address] At home, patient has access to a large and sm. w/c, a walker and a shower chair. She only uses the cane when she goes out. Prior to admission, patient was open to [facility] with [facility]. Choice letter signed and placed on the patient's chart. Referral sent to Mohan Hoyos via [facility].      Discharge dispo: home with home health    [address], [address]

## 2022-03-18 NOTE — PROGRESS NOTES
1143  CM attempted to meet with patient to discuss dc planning, Patient is not in her room. She is off of the unit having an MRI. Writer will attempt again this afternoon.     [address], [address] Chief Complaint   Patient presents with    Follow-up    Hypertension    Irregular Heart Beat     1. Have you been to the ER, urgent care clinic since your last visit? Hospitalized since your last visit? No    2. Have you seen or consulted any other health care providers outside of the [address] [place] [address] since your last visit? Include any pap smears or colon screening.  No

## 2022-03-18 NOTE — CONSULTS
Consult    Patient: Susan Mcmanus MRN: [id number]  SSN: [id number]    YOB: 1940  Age: 81 y.o. Sex: female      Subjective:      Susan Mcmanus is a 81 y.o. female who is being seen for chronic pancreatitis, abdominal pain, patient able to give some history, patient was here last month with similar episode, patient back to emergency room with  abdominal pain, gastric and right upper quadrant pain, It is diffuse all over the abdomen associated with nausea and vomiting. Emergency room patient had blood tests showed a lipase levels more than 3000 she was admitted using, had nausea overnight, requires pain medication.   Had MRCP done today, result pending  Past Medical History:   Diagnosis Date    Colon polyps     Diabetes ([address]     Diverticulosis     Hypertension     Ill-defined condition     Pancreatitis      Past Surgical History:   Procedure Laterality Date    COLONOSCOPY N/A 4/29/2021    COLONOSCOPY performed by Nadir Llanes MD at [facility] ENDOSCOPY      Family History   Problem Relation Age of Onset    Diabetes Other      Social History     Tobacco Use    Smoking status: Never Smoker    Smokeless tobacco: Never Used   Substance Use Topics    Alcohol use: Not Currently      Current Facility-Administered Medications   Medication Dose Route Frequency Provider Last Rate Last Admin    clopidogreL (PLAVIX) tablet 75 mg  75 mg Oral DAILY Diogenes Conti MD   75 mg at 03/18/22 0854    carvediloL (COREG) tablet 3.125 mg  3.125 mg Oral BID WITH MEALS Diogenes Conti MD   3.125 mg at 03/18/22 0854    pantoprazole (PROTONIX) tablet 40 mg  40 mg Oral ACB Diogenes Conti MD   40 mg at 03/18/22 0854    spironolactone (ALDACTONE) tablet 25 mg  25 mg Oral DAILY Diogenes Conti MD   25 mg at 03/18/22 0854    acetaminophen (TYLENOL) SR tablet 650 mg  650 mg Oral Q8H PRN Diogenes Conti MD        apixaban (ELIQUIS) tablet 2.5 mg  2.5 mg Oral DAILY Sam Staley Domenica Koehler MD   2.5 mg at 03/18/22 0854    insulin lispro (HUMALOG) injection   SubCUTAneous AC&HS Pierre Ojeda MD   7 Units at 03/18/22 1326    glucose chewable tablet 16 g  4 Tablet Oral PRN Pierre Ojeda MD        glucagon (GLUCAGEN) injection 1 mg  1 mg IntraMUSCular PRN Pierre Ojeda MD        0.9% sodium chloride infusion 1,000 mL  1,000 mL IntraVENous CONTINUOUS Pierre Ojeda  mL/hr at 03/18/22 1503 1,000 mL at 03/18/22 1503    sodium chloride (NS) flush 5-40 mL  5-40 mL IntraVENous Q8H Pierre Ojeda MD   10 mL at 03/18/22 0536    sodium chloride (NS) flush 5-40 mL  5-40 mL IntraVENous PRN Pierre Ojeda MD        HYDROmorphone (DILAUDID) syringe 0.5 mg  0.5 mg IntraVENous Q4H PRN Pierre Ojeda MD        ondansetron [facility]) injection 4 mg  4 mg IntraVENous Q4H PRN Pierre Ojeda MD        dextrose 10% infusion 0-250 mL  0-250 mL IntraVENous PRN Pierre Ojeda MD        meropenem (MERREM) 1 g in 0.9% sodium chloride 20 mL IV syringe  1 g IntraVENous Q12H Michel Gaviria PA-C   1 g at 03/18/22 1327        Allergies   Allergen Reactions    Insulins Itching    Penicillins Other (comments)     Pt states it makes her pass out       Review of Systems:  Review of Systems   Constitutional: Positive for malaise/fatigue. HENT: Negative. Eyes: Negative. Respiratory: Negative. Cardiovascular: Negative. Gastrointestinal: Positive for abdominal pain. Genitourinary: Negative. Musculoskeletal: Negative. Neurological: Negative. Psychiatric/Behavioral: Positive for depression.         Objective:     Vitals:    03/18/22 0407 03/18/22 0527 03/18/22 0742 03/18/22 1434   BP: (!) 143/90 (!) 146/85 105/78 121/71   Pulse: 76 71 80 77   Resp: 16 16 19 21   Temp:   97.8 °F (36.6 °C) 97.9 °F (36.6 °C)   SpO2: 100% 98% 92% 93%   Weight:       Height:            Physical Exam:  Physical Exam  Constitutional: Appearance: She is ill-appearing. HENT:      Head: Atraumatic. Mouth/Throat:      Mouth: Mucous membranes are dry. Eyes:      General: No scleral icterus. Cardiovascular:      Rate and Rhythm: Normal rate. Pulses: Normal pulses. Abdominal:      General: Bowel sounds are normal.   Musculoskeletal:         General: Normal range of motion. Cervical back: Neck supple. Skin:     General: Skin is warm. Neurological:      Mental Status: Mental status is at baseline. Recent Results (from the past 24 hour(s))   EKG, 12 LEAD, INITIAL    Collection Time: 03/17/22  8:34 PM   Result Value Ref Range    Ventricular Rate 94 BPM    Atrial Rate 94 BPM    P-R Interval 146 ms    QRS Duration 82 ms    Q-T Interval 366 ms    QTC Calculation (Bezet) 457 ms    Calculated P Axis 40 degrees    Calculated R Axis -7 degrees    Calculated T Axis -33 degrees    Diagnosis       Normal sinus rhythm  Left atrial enlargement  Left ventricular hypertrophy  ST & T wave abnormality, consider lateral ischemia  Abnormal ECG  When compared with ECG of 19-FEB-2022 18:34,  Nonspecific T wave abnormality no longer evident in Anterior leads  QT has shortened  Confirmed by Amber De Leon MD, Derek Bennett ([id number]) on 3/18/2022 7:24:37 AM     CBC WITH AUTOMATED DIFF    Collection Time: 03/17/22  8:41 PM   Result Value Ref Range    WBC 5.1 3.6 - 11.0 K/uL    RBC 4.83 3.80 - 5.20 M/uL    HGB 12.9 11.5 - 16.0 g/dL    HCT 40.8 35.0 - 47.0 %    MCV 84.5 80.0 - 99.0 FL    MCH 26.7 26.0 - 34.0 PG    MCHC 31.6 30.0 - 36.5 g/dL    RDW 14.0 11.5 - 14.5 %    PLATELET 726 951 - 435 K/uL    MPV 13.3 (H) 8.9 - 12.9 FL    NRBC 0.0 0.0  WBC    ABSOLUTE NRBC 0.00 0.00 - 0.01 K/uL    NEUTROPHILS 67 32 - 75 %    LYMPHOCYTES 23 12 - 49 %    MONOCYTES 7 5 - 13 %    EOSINOPHILS 3 0 - 7 %    BASOPHILS 0 0 - 1 %    IMMATURE GRANULOCYTES 0 0 - 0.5 %    ABS. NEUTROPHILS 3.4 1.8 - 8.0 K/UL    ABS. LYMPHOCYTES 1.2 0.8 - 3.5 K/UL    ABS.  MONOCYTES 0.4 0.0 - 1.0 K/UL    ABS. EOSINOPHILS 0.1 0.0 - 0.4 K/UL    ABS. BASOPHILS 0.0 0.0 - 0.1 K/UL    ABS. IMM. GRANS. 0.0 0.00 - 0.04 K/UL    DF AUTOMATED     METABOLIC PANEL, COMPREHENSIVE    Collection Time: 03/17/22  8:41 PM   Result Value Ref Range    Sodium 134 (L) 136 - 145 mmol/L    Potassium Hemolyzed, recollect requested 3.5 - 5.1 mmol/L    Chloride 101 97 - 108 mmol/L    CO2 26 21 - 32 mmol/L    Anion gap 7 5 - 15 mmol/L    Glucose 391 (H) 65 - 100 mg/dL    BUN 34 (H) 6 - 20 mg/dL    Creatinine 1.13 (H) 0.55 - 1.02 mg/dL    BUN/Creatinine ratio 30 (H) 12 - 20      GFR est AA 56 (L) >60 ml/min/1.73m2    GFR est non-AA 46 (L) >60 ml/min/1.73m2    Calcium 9.4 8.5 - 10.1 mg/dL    Bilirubin, total 0.8 0.2 - 1.0 mg/dL    AST (SGOT) Hemolyzed, recollect requested 15 - 37 U/L    ALT (SGPT) 29 12 - 78 U/L    Alk.  phosphatase 152 (H) 45 - 117 U/L    Protein, total 7.5 6.4 - 8.2 g/dL    Albumin 3.7 3.5 - 5.0 g/dL    Globulin 3.8 2.0 - 4.0 g/dL    A-G Ratio 1.0 (L) 1.1 - 2.2     URINALYSIS W/ REFLEX CULTURE    Collection Time: 03/17/22  9:25 PM    Specimen: Urine   Result Value Ref Range    Color Yellow/Straw      Appearance Clear Clear      Specific gravity 1.017 1.003 - 1.030      pH (UA) 6.0 5.0 - 8.0      Protein Negative Negative mg/dL    Glucose >300 (A) Negative mg/dL    Ketone 20 (A) Negative mg/dL    Bilirubin Negative Negative      Blood Negative Negative      Urobilinogen 0.1 0.1 - 1.0 EU/dL    Nitrites Negative Negative      Leukocyte Esterase Negative Negative      UA:UC IF INDICATED Urine Culture Ordered (A) Culture not indicated by UA result      WBC 0-5 0 - 4 /hpf    RBC 0-5 0 - 5 /hpf    Bacteria Negative Negative /hpf   LIPASE    Collection Time: 03/18/22 12:25 AM   Result Value Ref Range    Lipase >3,000 (H) 73 - 393 U/L   COVID-19 RAPID TEST    Collection Time: 03/18/22  3:59 AM   Result Value Ref Range    COVID-19 rapid test Not Detected Not Detected     RENAL FUNCTION PANEL    Collection Time: 03/18/22  4:14 AM Result Value Ref Range    Sodium 134 (L) 136 - 145 mmol/L    Potassium 5.0 3.5 - 5.1 mmol/L    Chloride 103 97 - 108 mmol/L    CO2 25 21 - 32 mmol/L    Anion gap 6 5 - 15 mmol/L    Glucose 296 (H) 65 - 100 mg/dL    BUN 24 (H) 6 - 20 mg/dL    Creatinine 0.85 0.55 - 1.02 mg/dL    BUN/Creatinine ratio 28 (H) 12 - 20      GFR est AA >60 >60 ml/min/1.73m2    GFR est non-AA >60 >60 ml/min/1.73m2    Calcium 8.9 8.5 - 10.1 mg/dL    Phosphorus 2.6 2.6 - 4.7 mg/dL    Albumin 3.2 (L) 3.5 - 5.0 g/dL   MRSA SCREEN - PCR (NASAL)    Collection Time: 03/18/22  5:26 AM   Result Value Ref Range    MRSA by PCR, Nasal Not Detected Not Detected     GLUCOSE, POC    Collection Time: 03/18/22  7:41 AM   Result Value Ref Range    Glucose (POC) 253 (H) 65 - 117 mg/dL    Performed by HUNTER MUSA    GLUCOSE, POC    Collection Time: 03/18/22 10:55 AM   Result Value Ref Range    Glucose (POC) 257 (H) 65 - 117 mg/dL    Performed by Irena Dewey         MRI ABD W MRCP WO CONT   Final Result   The biliary tree is not dilated. The distal pancreatic duct remains   severely dilated. Surrounding free fluid is attributed to recurrent pancreatitis      CT ABD PELV W CONT   Final Result   Chronic changes in the pancreas which appear stable. There is   peripancreatic fluid present which has been seen in the past. Correlate with   laboratory data to exclude acute pancreatitis. No necrosis or other complication   of acute pancreatitis is seen. There is chronic dilatation of the distal main   pancreatic duct suggestive of previous pancreatitis and necrosis. Mild diffuse wall thickening distal colon and rectosigmoid. This may be artifact   of collapse. A mild element of colitis could also give this appearance. While   there is extensive diverticulosis pattern does not suggest diverticulitis this   time.    Chronic changes elsewhere which appear stable                  Assessment:     Hospital Problems  Date Reviewed: 3/18/2022          Codes Class Noted POA    Abdominal pain ICD-10-CM: R10.9  ICD-9-CM: 789.00  11/20/2021 Yes        Acute pancreatitis ICD-10-CM: K85.90  ICD-9-CM: 876.4  11/20/2021 Yes            Current pancreatitis, abdominal pain, nausea vomiting,  Increased lipase,  Dilated pancreatic duct at tail of pancreas, consistent with IPMN, due to the patient's age, patient is nonsurgical, no FNA was performed last time.   Patient returned with similar episode, MRCP performed, showed the distal pancreatic duct dilation, with pancreatitis, no mention about the IPMN,    Plan:   Continue current symptomatic treatment  Restart on liquid diet  Pain management    We will follow to see further eus with FNA is needed  Signed By: Amelia Marquez MD     March 18, 2022         Thank you for allowing me to participate in this patients care  Cc Referring Physician   Yolanda Valdez MD

## 2022-03-18 NOTE — PROGRESS NOTES
Hospitalist Progress Note    Subjective:   Daily Progress Note: 3/18/2022 12:37 PM    The right upper quadrant abdominal pain with nausea vomiting    Current Facility-Administered Medications   Medication Dose Route Frequency    clopidogreL (PLAVIX) tablet 75 mg  75 mg Oral DAILY    carvediloL (COREG) tablet 3.125 mg  3.125 mg Oral BID WITH MEALS    pantoprazole (PROTONIX) tablet 40 mg  40 mg Oral ACB    spironolactone (ALDACTONE) tablet 25 mg  25 mg Oral DAILY    acetaminophen (TYLENOL) SR tablet 650 mg  650 mg Oral Q8H PRN    apixaban (ELIQUIS) tablet 2.5 mg  2.5 mg Oral DAILY    insulin lispro (HUMALOG) injection   SubCUTAneous AC&HS    glucose chewable tablet 16 g  4 Tablet Oral PRN    glucagon (GLUCAGEN) injection 1 mg  1 mg IntraMUSCular PRN    0.9% sodium chloride infusion 1,000 mL  1,000 mL IntraVENous CONTINUOUS    sodium chloride (NS) flush 5-40 mL  5-40 mL IntraVENous Q8H    sodium chloride (NS) flush 5-40 mL  5-40 mL IntraVENous PRN    HYDROmorphone (DILAUDID) syringe 0.5 mg  0.5 mg IntraVENous Q4H PRN    ondansetron (ZOFRAN) injection 4 mg  4 mg IntraVENous Q4H PRN    dextrose 10% infusion 0-250 mL  0-250 mL IntraVENous PRN        Review of Systems  Review of Systems   Constitutional: Positive for malaise/fatigue. Negative for chills and fever. HENT: Negative. Respiratory: Negative for cough and shortness of breath. Cardiovascular: Negative for chest pain and leg swelling. Gastrointestinal: Positive for abdominal pain, nausea and vomiting. Genitourinary: Negative. Musculoskeletal: Negative. Skin: Negative. Neurological: Negative. Psychiatric/Behavioral: Negative.              Objective:     Visit Vitals  /78   Pulse 80   Temp 97.8 °F (36.6 °C)   Resp 19   Ht 5' 6\" (1.676 m)   Wt 53.5 kg (118 lb)   SpO2 92%   BMI 19.05 kg/m²      O2 Device: None    Temp (24hrs), Av °F (36.7 °C), Min:97.8 °F (36.6 °C), Max:98.1 °F (36.7 °C)       07 -  1900  In: -   Out: 150 [Urine:150]  03/16 1901 - 03/18 0700  In: 1000 [I.V.:1000]  Out: -     Recent Results (from the past 24 hour(s))   EKG, 12 LEAD, INITIAL    Collection Time: 03/17/22  8:34 PM   Result Value Ref Range    Ventricular Rate 94 BPM    Atrial Rate 94 BPM    P-R Interval 146 ms    QRS Duration 82 ms    Q-T Interval 366 ms    QTC Calculation (Bezet) 457 ms    Calculated P Axis 40 degrees    Calculated R Axis -7 degrees    Calculated T Axis -33 degrees    Diagnosis       Normal sinus rhythm  Left atrial enlargement  Left ventricular hypertrophy  ST & T wave abnormality, consider lateral ischemia  Abnormal ECG  When compared with ECG of 19-FEB-2022 18:34,  Nonspecific T wave abnormality no longer evident in Anterior leads  QT has shortened  Confirmed by Ramon Flores MD, John Ortega ([id number]) on 3/18/2022 7:24:37 AM     CBC WITH AUTOMATED DIFF    Collection Time: 03/17/22  8:41 PM   Result Value Ref Range    WBC 5.1 3.6 - 11.0 K/uL    RBC 4.83 3.80 - 5.20 M/uL    HGB 12.9 11.5 - 16.0 g/dL    HCT 40.8 35.0 - 47.0 %    MCV 84.5 80.0 - 99.0 FL    MCH 26.7 26.0 - 34.0 PG    MCHC 31.6 30.0 - 36.5 g/dL    RDW 14.0 11.5 - 14.5 %    PLATELET 471 871 - 234 K/uL    MPV 13.3 (H) 8.9 - 12.9 FL    NRBC 0.0 0.0  WBC    ABSOLUTE NRBC 0.00 0.00 - 0.01 K/uL    NEUTROPHILS 67 32 - 75 %    LYMPHOCYTES 23 12 - 49 %    MONOCYTES 7 5 - 13 %    EOSINOPHILS 3 0 - 7 %    BASOPHILS 0 0 - 1 %    IMMATURE GRANULOCYTES 0 0 - 0.5 %    ABS. NEUTROPHILS 3.4 1.8 - 8.0 K/UL    ABS. LYMPHOCYTES 1.2 0.8 - 3.5 K/UL    ABS. MONOCYTES 0.4 0.0 - 1.0 K/UL    ABS. EOSINOPHILS 0.1 0.0 - 0.4 K/UL    ABS. BASOPHILS 0.0 0.0 - 0.1 K/UL    ABS. IMM.  GRANS. 0.0 0.00 - 0.04 K/UL    DF AUTOMATED     METABOLIC PANEL, COMPREHENSIVE    Collection Time: 03/17/22  8:41 PM   Result Value Ref Range    Sodium 134 (L) 136 - 145 mmol/L    Potassium Hemolyzed, recollect requested 3.5 - 5.1 mmol/L    Chloride 101 97 - 108 mmol/L    CO2 26 21 - 32 mmol/L    Anion gap 7 5 - 15 mmol/L Glucose 391 (H) 65 - 100 mg/dL    BUN 34 (H) 6 - 20 mg/dL    Creatinine 1.13 (H) 0.55 - 1.02 mg/dL    BUN/Creatinine ratio 30 (H) 12 - 20      GFR est AA 56 (L) >60 ml/min/1.73m2    GFR est non-AA 46 (L) >60 ml/min/1.73m2    Calcium 9.4 8.5 - 10.1 mg/dL    Bilirubin, total 0.8 0.2 - 1.0 mg/dL    AST (SGOT) Hemolyzed, recollect requested 15 - 37 U/L    ALT (SGPT) 29 12 - 78 U/L    Alk.  phosphatase 152 (H) 45 - 117 U/L    Protein, total 7.5 6.4 - 8.2 g/dL    Albumin 3.7 3.5 - 5.0 g/dL    Globulin 3.8 2.0 - 4.0 g/dL    A-G Ratio 1.0 (L) 1.1 - 2.2     URINALYSIS W/ REFLEX CULTURE    Collection Time: 03/17/22  9:25 PM    Specimen: Urine   Result Value Ref Range    Color Yellow/Straw      Appearance Clear Clear      Specific gravity 1.017 1.003 - 1.030      pH (UA) 6.0 5.0 - 8.0      Protein Negative Negative mg/dL    Glucose >300 (A) Negative mg/dL    Ketone 20 (A) Negative mg/dL    Bilirubin Negative Negative      Blood Negative Negative      Urobilinogen 0.1 0.1 - 1.0 EU/dL    Nitrites Negative Negative      Leukocyte Esterase Negative Negative      UA:UC IF INDICATED Urine Culture Ordered (A) Culture not indicated by UA result      WBC 0-5 0 - 4 /hpf    RBC 0-5 0 - 5 /hpf    Bacteria Negative Negative /hpf   LIPASE    Collection Time: 03/18/22 12:25 AM   Result Value Ref Range    Lipase >3,000 (H) 73 - 393 U/L   COVID-19 RAPID TEST    Collection Time: 03/18/22  3:59 AM   Result Value Ref Range    COVID-19 rapid test Not Detected Not Detected     RENAL FUNCTION PANEL    Collection Time: 03/18/22  4:14 AM   Result Value Ref Range    Sodium 134 (L) 136 - 145 mmol/L    Potassium 5.0 3.5 - 5.1 mmol/L    Chloride 103 97 - 108 mmol/L    CO2 25 21 - 32 mmol/L    Anion gap 6 5 - 15 mmol/L    Glucose 296 (H) 65 - 100 mg/dL    BUN 24 (H) 6 - 20 mg/dL    Creatinine 0.85 0.55 - 1.02 mg/dL    BUN/Creatinine ratio 28 (H) 12 - 20      GFR est AA >60 >60 ml/min/1.73m2    GFR est non-AA >60 >60 ml/min/1.73m2    Calcium 8.9 8.5 - 10.1 mg/dL    Phosphorus 2.6 2.6 - 4.7 mg/dL    Albumin 3.2 (L) 3.5 - 5.0 g/dL   MRSA SCREEN - PCR (NASAL)    Collection Time: 03/18/22  5:26 AM   Result Value Ref Range    MRSA by PCR, Nasal Not Detected Not Detected     GLUCOSE, POC    Collection Time: 03/18/22  7:41 AM   Result Value Ref Range    Glucose (POC) 253 (H) 65 - 117 mg/dL    Performed by HUNTER MUSA    GLUCOSE, POC    Collection Time: 03/18/22 10:55 AM   Result Value Ref Range    Glucose (POC) 257 (H) 65 - 117 mg/dL    Performed by [employer] [facility]         CT ABD PELV W CONT   Final Result   Chronic changes in the pancreas which appear stable. There is   peripancreatic fluid present which has been seen in the past. Correlate with   laboratory data to exclude acute pancreatitis. No necrosis or other complication   of acute pancreatitis is seen. There is chronic dilatation of the distal main   pancreatic duct suggestive of previous pancreatitis and necrosis. Mild diffuse wall thickening distal colon and rectosigmoid. This may be artifact   of collapse. A mild element of colitis could also give this appearance. While   there is extensive diverticulosis pattern does not suggest diverticulitis this   time. Chronic changes elsewhere which appear stable               MRI ABD W MRCP WO CONT    (Results Pending)        PHYSICAL EXAM:    Physical Exam  Vitals reviewed. Constitutional:       Appearance: She is not ill-appearing. HENT:      Head: Normocephalic and atraumatic. Mouth/Throat:      Mouth: Mucous membranes are dry. Eyes:      Conjunctiva/sclera: Conjunctivae normal.   Cardiovascular:      Rate and Rhythm: Normal rate and regular rhythm. Heart sounds: Normal heart sounds. Pulmonary:      Effort: Pulmonary effort is normal.      Breath sounds: Normal breath sounds. Abdominal:      General: Abdomen is flat. Palpations: Abdomen is soft.       Comments: Hypoactive bowel sounds with severe right upper quadrant abdominal pain without obvious mass   Musculoskeletal:         General: Normal range of motion. Cervical back: Normal range of motion and neck supple. Skin:     General: Skin is warm. Neurological:      General: No focal deficit present. Mental Status: She is alert and oriented to person, place, and time. Mental status is at baseline. Psychiatric:         Mood and Affect: Mood normal.          Data Review    Recent Results (from the past 24 hour(s))   EKG, 12 LEAD, INITIAL    Collection Time: 03/17/22  8:34 PM   Result Value Ref Range    Ventricular Rate 94 BPM    Atrial Rate 94 BPM    P-R Interval 146 ms    QRS Duration 82 ms    Q-T Interval 366 ms    QTC Calculation (Bezet) 457 ms    Calculated P Axis 40 degrees    Calculated R Axis -7 degrees    Calculated T Axis -33 degrees    Diagnosis       Normal sinus rhythm  Left atrial enlargement  Left ventricular hypertrophy  ST & T wave abnormality, consider lateral ischemia  Abnormal ECG  When compared with ECG of 19-FEB-2022 18:34,  Nonspecific T wave abnormality no longer evident in Anterior leads  QT has shortened  Confirmed by Alicia Hicks MD, Dario Phillip ([id number]) on 3/18/2022 7:24:37 AM     CBC WITH AUTOMATED DIFF    Collection Time: 03/17/22  8:41 PM   Result Value Ref Range    WBC 5.1 3.6 - 11.0 K/uL    RBC 4.83 3.80 - 5.20 M/uL    HGB 12.9 11.5 - 16.0 g/dL    HCT 40.8 35.0 - 47.0 %    MCV 84.5 80.0 - 99.0 FL    MCH 26.7 26.0 - 34.0 PG    MCHC 31.6 30.0 - 36.5 g/dL    RDW 14.0 11.5 - 14.5 %    PLATELET 524 238 - 335 K/uL    MPV 13.3 (H) 8.9 - 12.9 FL    NRBC 0.0 0.0  WBC    ABSOLUTE NRBC 0.00 0.00 - 0.01 K/uL    NEUTROPHILS 67 32 - 75 %    LYMPHOCYTES 23 12 - 49 %    MONOCYTES 7 5 - 13 %    EOSINOPHILS 3 0 - 7 %    BASOPHILS 0 0 - 1 %    IMMATURE GRANULOCYTES 0 0 - 0.5 %    ABS. NEUTROPHILS 3.4 1.8 - 8.0 K/UL    ABS. LYMPHOCYTES 1.2 0.8 - 3.5 K/UL    ABS. MONOCYTES 0.4 0.0 - 1.0 K/UL    ABS. EOSINOPHILS 0.1 0.0 - 0.4 K/UL    ABS. BASOPHILS 0.0 0.0 - 0.1 K/UL    ABS. IMM. [place] Neither. 0.0 0.00 - 0.04 K/UL    DF AUTOMATED     METABOLIC PANEL, COMPREHENSIVE    Collection Time: 03/17/22  8:41 PM   Result Value Ref Range    Sodium 134 (L) 136 - 145 mmol/L    Potassium Hemolyzed, recollect requested 3.5 - 5.1 mmol/L    Chloride 101 97 - 108 mmol/L    CO2 26 21 - 32 mmol/L    Anion gap 7 5 - 15 mmol/L    Glucose 391 (H) 65 - 100 mg/dL    BUN 34 (H) 6 - 20 mg/dL    Creatinine 1.13 (H) 0.55 - 1.02 mg/dL    BUN/Creatinine ratio 30 (H) 12 - 20      GFR est AA 56 (L) >60 ml/min/1.73m2    GFR est non-AA 46 (L) >60 ml/min/1.73m2    Calcium 9.4 8.5 - 10.1 mg/dL    Bilirubin, total 0.8 0.2 - 1.0 mg/dL    AST (SGOT) Hemolyzed, recollect requested 15 - 37 U/L    ALT (SGPT) 29 12 - 78 U/L    Alk.  phosphatase 152 (H) 45 - 117 U/L    Protein, total 7.5 6.4 - 8.2 g/dL    Albumin 3.7 3.5 - 5.0 g/dL    Globulin 3.8 2.0 - 4.0 g/dL    A-G Ratio 1.0 (L) 1.1 - 2.2     URINALYSIS W/ REFLEX CULTURE    Collection Time: 03/17/22  9:25 PM    Specimen: Urine   Result Value Ref Range    Color Yellow/Straw      Appearance Clear Clear      Specific gravity 1.017 1.003 - 1.030      pH (UA) 6.0 5.0 - 8.0      Protein Negative Negative mg/dL    Glucose >300 (A) Negative mg/dL    Ketone 20 (A) Negative mg/dL    Bilirubin Negative Negative      Blood Negative Negative      Urobilinogen 0.1 0.1 - 1.0 EU/dL    Nitrites Negative Negative      Leukocyte Esterase Negative Negative      UA:UC IF INDICATED Urine Culture Ordered (A) Culture not indicated by UA result      WBC 0-5 0 - 4 /hpf    RBC 0-5 0 - 5 /hpf    Bacteria Negative Negative /hpf   LIPASE    Collection Time: 03/18/22 12:25 AM   Result Value Ref Range    Lipase >3,000 (H) 73 - 393 U/L   COVID-19 RAPID TEST    Collection Time: 03/18/22  3:59 AM   Result Value Ref Range    COVID-19 rapid test Not Detected Not Detected     RENAL FUNCTION PANEL    Collection Time: 03/18/22  4:14 AM   Result Value Ref Range    Sodium 134 (L) 136 - 145 mmol/L    Potassium 5.0 3.5 - 5.1 mmol/L    Chloride 103 97 - 108 mmol/L    CO2 25 21 - 32 mmol/L    Anion gap 6 5 - 15 mmol/L    Glucose 296 (H) 65 - 100 mg/dL    BUN 24 (H) 6 - 20 mg/dL    Creatinine 0.85 0.55 - 1.02 mg/dL    BUN/Creatinine ratio 28 (H) 12 - 20      GFR est AA >60 >60 ml/min/1.73m2    GFR est non-AA >60 >60 ml/min/1.73m2    Calcium 8.9 8.5 - 10.1 mg/dL    Phosphorus 2.6 2.6 - 4.7 mg/dL    Albumin 3.2 (L) 3.5 - 5.0 g/dL   MRSA SCREEN - PCR (NASAL)    Collection Time: 03/18/22  5:26 AM   Result Value Ref Range    MRSA by PCR, Nasal Not Detected Not Detected     GLUCOSE, POC    Collection Time: 03/18/22  7:41 AM   Result Value Ref Range    Glucose (POC) 253 (H) 65 - 117 mg/dL    Performed by HUNTER MUSA    GLUCOSE, POC    Collection Time: 03/18/22 10:55 AM   Result Value Ref Range    Glucose (POC) 257 (H) 65 - 117 mg/dL    Performed by Alexis Alejandra         Assessment/Plan:     Active Problems:    Abdominal pain (11/20/2021)      Acute pancreatitis (11/20/2021)      Hospital course: This 60-year-old female with a history of numerous episodes of pancreatitis for unclear etiology, systolic heart failure with an ejection fracture 20-25% with associated severe mitral valve regurgitation who presents with severe right upper quadrant abdominal pain and a lipase level greater than 3000 and a CT showing chronic changes of pancreatitis with possible colitis of the distal rectosigmoid colon. Impression\plan:    1. Acute pancreatitis  Previous triglyceride levels normal  No elevation in bilirubin to consider as an obstructive process  MRCP pending  GI consultation  Previous work-up included EGD revealing coffee-ground material in the fundus as well as a hiatal hernia. Mild gastritis was noted. EUS was also completed findings consistent with chronic pancreatitis and a cyst within the pancreas positive representing intraductal papillary mucinous neoplasm.     2.  Chronic systolic heart failure with an EF of 20-25%  Continue Coreg  Hold Lasix for 24 hours  Continue spironolactone    3. Diabetes mellitus  Hold glipizide  Sliding scale insulin    4. Coronary artery disease  Continue Eliquis  Continue Coreg    5. Sigmoid colitis on CT  Meropenem    CODE STATUS: Full code    DVT prophylaxis: SCDs  Ulcer prophylaxis: Protonix    Discharge barriers improvement in overall pancreatitis and pain and tolerating p.o. diet    Care Plan discussed with: Patient/Family    Total time spent with patient: >35 minutes.

## 2022-03-19 LAB
ALBUMIN SERPL-MCNC: 2.6 G/DL (ref 3.5–5)
ALBUMIN/GLOB SERPL: 0.8 {RATIO} (ref 1.1–2.2)
ALP SERPL-CCNC: 90 U/L (ref 45–117)
ALT SERPL-CCNC: 19 U/L (ref 12–78)
ANION GAP SERPL CALC-SCNC: 7 MMOL/L (ref 5–15)
AST SERPL W P-5'-P-CCNC: 14 U/L (ref 15–37)
BACTERIA SPEC CULT: NORMAL
BILIRUB SERPL-MCNC: 1.4 MG/DL (ref 0.2–1)
BUN SERPL-MCNC: 16 MG/DL (ref 6–20)
BUN/CREAT SERPL: 20 (ref 12–20)
CA-I BLD-MCNC: 8.5 MG/DL (ref 8.5–10.1)
CANCER AG19-9 SERPL-ACNC: 59 U/ML (ref 0–35)
CHLORIDE SERPL-SCNC: 108 MMOL/L (ref 97–108)
CO2 SERPL-SCNC: 21 MMOL/L (ref 21–32)
CREAT SERPL-MCNC: 0.82 MG/DL (ref 0.55–1.02)
EST. AVERAGE GLUCOSE BLD GHB EST-MCNC: 246 MG/DL
GLOBULIN SER CALC-MCNC: 3.4 G/DL (ref 2–4)
GLUCOSE BLD STRIP.AUTO-MCNC: 111 MG/DL (ref 65–117)
GLUCOSE BLD STRIP.AUTO-MCNC: 112 MG/DL (ref 65–117)
GLUCOSE BLD STRIP.AUTO-MCNC: 114 MG/DL (ref 65–117)
GLUCOSE BLD STRIP.AUTO-MCNC: 129 MG/DL (ref 65–117)
GLUCOSE SERPL-MCNC: 137 MG/DL (ref 65–100)
HBA1C MFR BLD: 10.2 % (ref 4–5.6)
LIPASE SERPL-CCNC: >3000 U/L (ref 73–393)
MAGNESIUM SERPL-MCNC: 1.6 MG/DL (ref 1.6–2.4)
PERFORMED BY, TECHID: ABNORMAL
PERFORMED BY, TECHID: NORMAL
PHOSPHATE SERPL-MCNC: 2.7 MG/DL (ref 2.6–4.7)
POTASSIUM SERPL-SCNC: 4 MMOL/L (ref 3.5–5.1)
PROT SERPL-MCNC: 6 G/DL (ref 6.4–8.2)
SODIUM SERPL-SCNC: 136 MMOL/L (ref 136–145)
SPECIAL REQUESTS,SREQ: NORMAL

## 2022-03-19 PROCEDURE — 82962 GLUCOSE BLOOD TEST: CPT

## 2022-03-19 PROCEDURE — 74011250636 HC RX REV CODE- 250/636: Performed by: PHYSICIAN ASSISTANT

## 2022-03-19 PROCEDURE — 83036 HEMOGLOBIN GLYCOSYLATED A1C: CPT

## 2022-03-19 PROCEDURE — 84100 ASSAY OF PHOSPHORUS: CPT

## 2022-03-19 PROCEDURE — 74011250637 HC RX REV CODE- 250/637: Performed by: HOSPITALIST

## 2022-03-19 PROCEDURE — 80053 COMPREHEN METABOLIC PANEL: CPT

## 2022-03-19 PROCEDURE — 36415 COLL VENOUS BLD VENIPUNCTURE: CPT

## 2022-03-19 PROCEDURE — 83690 ASSAY OF LIPASE: CPT

## 2022-03-19 PROCEDURE — 74011000250 HC RX REV CODE- 250: Performed by: PHYSICIAN ASSISTANT

## 2022-03-19 PROCEDURE — 74011000250 HC RX REV CODE- 250: Performed by: HOSPITALIST

## 2022-03-19 PROCEDURE — 74011250636 HC RX REV CODE- 250/636: Performed by: HOSPITALIST

## 2022-03-19 PROCEDURE — 51798 US URINE CAPACITY MEASURE: CPT

## 2022-03-19 PROCEDURE — 65270000029 HC RM PRIVATE

## 2022-03-19 PROCEDURE — 83735 ASSAY OF MAGNESIUM: CPT

## 2022-03-19 RX ADMIN — CARVEDILOL 3.12 MG: 3.12 TABLET, FILM COATED ORAL at 08:18

## 2022-03-19 RX ADMIN — ONDANSETRON 4 MG: 2 INJECTION INTRAMUSCULAR; INTRAVENOUS at 15:17

## 2022-03-19 RX ADMIN — CLOPIDOGREL BISULFATE 75 MG: 75 TABLET ORAL at 08:18

## 2022-03-19 RX ADMIN — HYDROMORPHONE HYDROCHLORIDE 0.5 MG: 1 INJECTION, SOLUTION INTRAMUSCULAR; INTRAVENOUS; SUBCUTANEOUS at 08:21

## 2022-03-19 RX ADMIN — SODIUM CHLORIDE, PRESERVATIVE FREE 1 G: 5 INJECTION INTRAVENOUS at 12:54

## 2022-03-19 RX ADMIN — SODIUM CHLORIDE, PRESERVATIVE FREE 10 ML: 5 INJECTION INTRAVENOUS at 21:00

## 2022-03-19 RX ADMIN — APIXABAN 2.5 MG: 2.5 TABLET, FILM COATED ORAL at 08:19

## 2022-03-19 RX ADMIN — HYDROMORPHONE HYDROCHLORIDE 0.5 MG: 1 INJECTION, SOLUTION INTRAMUSCULAR; INTRAVENOUS; SUBCUTANEOUS at 22:21

## 2022-03-19 RX ADMIN — SODIUM CHLORIDE 1000 ML: 9 INJECTION, SOLUTION INTRAVENOUS at 16:26

## 2022-03-19 RX ADMIN — SODIUM CHLORIDE, PRESERVATIVE FREE 10 ML: 5 INJECTION INTRAVENOUS at 05:09

## 2022-03-19 RX ADMIN — SODIUM CHLORIDE 1000 ML: 9 INJECTION, SOLUTION INTRAVENOUS at 08:18

## 2022-03-19 RX ADMIN — ONDANSETRON 4 MG: 2 INJECTION INTRAMUSCULAR; INTRAVENOUS at 08:18

## 2022-03-19 RX ADMIN — SPIRONOLACTONE 25 MG: 25 TABLET ORAL at 08:19

## 2022-03-19 RX ADMIN — HYDROMORPHONE HYDROCHLORIDE 0.5 MG: 1 INJECTION, SOLUTION INTRAMUSCULAR; INTRAVENOUS; SUBCUTANEOUS at 15:17

## 2022-03-19 RX ADMIN — ONDANSETRON 4 MG: 2 INJECTION INTRAMUSCULAR; INTRAVENOUS at 22:21

## 2022-03-19 RX ADMIN — SODIUM CHLORIDE, PRESERVATIVE FREE 1 G: 5 INJECTION INTRAVENOUS at 00:18

## 2022-03-19 RX ADMIN — CARVEDILOL 3.12 MG: 3.12 TABLET, FILM COATED ORAL at 15:17

## 2022-03-19 RX ADMIN — PANTOPRAZOLE SODIUM 40 MG: 40 TABLET, DELAYED RELEASE ORAL at 08:19

## 2022-03-19 RX ADMIN — HYDROMORPHONE HYDROCHLORIDE 0.5 MG: 1 INJECTION, SOLUTION INTRAMUSCULAR; INTRAVENOUS; SUBCUTANEOUS at 01:04

## 2022-03-19 NOTE — PROGRESS NOTES
Pt with no urine output this shift and continuous IVF infusing. Bladder scan performed with 422ml noted on scan.  called to contact Dr. Allen Nicole. No call from provider or new orders at this time. Update: pt has voided 300ml.no s/sx of discomfort reported by patient.

## 2022-03-19 NOTE — PROGRESS NOTES
Hospitalist Progress Note    Subjective:   Daily Progress Note: 3/19/2022 12:37 PM    The right upper quadrant abdominal pain with nausea vomiting    Current Facility-Administered Medications   Medication Dose Route Frequency    clopidogreL (PLAVIX) tablet 75 mg  75 mg Oral DAILY    carvediloL (COREG) tablet 3.125 mg  3.125 mg Oral BID WITH MEALS    pantoprazole (PROTONIX) tablet 40 mg  40 mg Oral ACB    spironolactone (ALDACTONE) tablet 25 mg  25 mg Oral DAILY    acetaminophen (TYLENOL) SR tablet 650 mg  650 mg Oral Q8H PRN    apixaban (ELIQUIS) tablet 2.5 mg  2.5 mg Oral DAILY    insulin lispro (HUMALOG) injection   SubCUTAneous AC&HS    glucose chewable tablet 16 g  4 Tablet Oral PRN    glucagon (GLUCAGEN) injection 1 mg  1 mg IntraMUSCular PRN    0.9% sodium chloride infusion 1,000 mL  1,000 mL IntraVENous CONTINUOUS    sodium chloride (NS) flush 5-40 mL  5-40 mL IntraVENous Q8H    sodium chloride (NS) flush 5-40 mL  5-40 mL IntraVENous PRN    HYDROmorphone (DILAUDID) syringe 0.5 mg  0.5 mg IntraVENous Q4H PRN    ondansetron (ZOFRAN) injection 4 mg  4 mg IntraVENous Q4H PRN    dextrose 10% infusion 0-250 mL  0-250 mL IntraVENous PRN    meropenem (MERREM) 1 g in 0.9% sodium chloride 20 mL IV syringe  1 g IntraVENous Q12H        Review of Systems  Review of Systems   Constitutional: Positive for malaise/fatigue. Negative for chills and fever. HENT: Negative. Respiratory: Negative for cough and shortness of breath. Cardiovascular: Negative for chest pain and leg swelling. Gastrointestinal: Positive for abdominal pain, nausea and vomiting. Genitourinary: Negative. Musculoskeletal: Negative. Skin: Negative. Neurological: Negative. Psychiatric/Behavioral: Negative.              Objective:     Visit Vitals  /78 (BP 1 Location: Left upper arm, BP Patient Position: At rest)   Pulse 92   Temp 98.4 °F (36.9 °C)   Resp 16   Ht 5' 6\" (1.676 m)   Wt 53.5 kg (118 lb)   SpO2 98% BMI 19.05 kg/m²      O2 Device: None    Temp (24hrs), Av °F (36.7 °C), Min:97.4 °F (36.3 °C), Max:98.4 °F (36.9 °C)      No intake/output data recorded.  190 -  0700  In: 1000 [I.V.:1000]  Out: 450 [Urine:450]    Recent Results (from the past 24 hour(s))   GLUCOSE, POC    Collection Time: 22  3:51 PM   Result Value Ref Range    Glucose (POC) 121 (H) 65 - 117 mg/dL    Performed by Isabella MUSA    GLUCOSE, POC    Collection Time: 22  8:07 PM   Result Value Ref Range    Glucose (POC) 117 65 - 117 mg/dL    Performed by [address] [place] [address]    Collection Time: 22  6:40 AM   Result Value Ref Range    Sodium 136 136 - 145 mmol/L    Potassium 4.0 3.5 - 5.1 mmol/L    Chloride 108 97 - 108 mmol/L    CO2 21 21 - 32 mmol/L    Anion gap 7 5 - 15 mmol/L    Glucose 137 (H) 65 - 100 mg/dL    BUN 16 6 - 20 mg/dL    Creatinine 0.82 0.55 - 1.02 mg/dL    BUN/Creatinine ratio 20 12 - 20      GFR est AA >60 >60 ml/min/1.73m2    GFR est non-AA >60 >60 ml/min/1.73m2    Calcium 8.5 8.5 - 10.1 mg/dL    Bilirubin, total 1.4 (H) 0.2 - 1.0 mg/dL    AST (SGOT) 14 (L) 15 - 37 U/L    ALT (SGPT) 19 12 - 78 U/L    Alk. phosphatase 90 45 - 117 U/L    Protein, total 6.0 (L) 6.4 - 8.2 g/dL    Albumin 2.6 (L) 3.5 - 5.0 g/dL    Globulin 3.4 2.0 - 4.0 g/dL    A-G Ratio 0.8 (L) 1.1 - 2.2     LIPASE    Collection Time: 22  6:40 AM   Result Value Ref Range    Lipase >3,000 (H) 73 - 393 U/L   MAGNESIUM    Collection Time: 22  6:40 AM   Result Value Ref Range    Magnesium 1.6 1.6 - 2.4 mg/dL   PHOSPHORUS    Collection Time: 22  6:40 AM   Result Value Ref Range    Phosphorus 2.7 2.6 - 4.7 mg/dL   GLUCOSE, POC    Collection Time: 22  6:54 AM   Result Value Ref Range    Glucose (POC) 129 (H) 65 - 117 mg/dL    Performed by Amairani Quevedo         MRI ABD W MRCP WO CONT   Final Result   The biliary tree is not dilated. The distal pancreatic duct remains   severely dilated. Surrounding free fluid is attributed to recurrent pancreatitis      CT ABD PELV W CONT   Final Result   Chronic changes in the pancreas which appear stable. There is   peripancreatic fluid present which has been seen in the past. Correlate with   laboratory data to exclude acute pancreatitis. No necrosis or other complication   of acute pancreatitis is seen. There is chronic dilatation of the distal main   pancreatic duct suggestive of previous pancreatitis and necrosis. Mild diffuse wall thickening distal colon and rectosigmoid. This may be artifact   of collapse. A mild element of colitis could also give this appearance. While   there is extensive diverticulosis pattern does not suggest diverticulitis this   time. Chronic changes elsewhere which appear stable                    PHYSICAL EXAM:    Physical Exam  Vitals reviewed. Constitutional:       Appearance: She is not ill-appearing. HENT:      Head: Normocephalic and atraumatic. Mouth/Throat:      Mouth: Mucous membranes are dry. Eyes:      Conjunctiva/sclera: Conjunctivae normal.   Cardiovascular:      Rate and Rhythm: Normal rate and regular rhythm. Heart sounds: Normal heart sounds. Pulmonary:      Effort: Pulmonary effort is normal.      Breath sounds: Normal breath sounds. Abdominal:      General: Abdomen is flat. Palpations: Abdomen is soft. Comments: Hypoactive bowel sounds with severe right upper quadrant abdominal pain without obvious mass   Musculoskeletal:         General: Normal range of motion. Cervical back: Normal range of motion and neck supple. Skin:     General: Skin is warm. Neurological:      General: No focal deficit present. Mental Status: She is alert and oriented to person, place, and time. Mental status is at baseline.    Psychiatric:         Mood and Affect: Mood normal.          Data Review    Recent Results (from the past 24 hour(s))   GLUCOSE, POC    Collection Time: 03/18/22  3:51 PM Result Value Ref Range    Glucose (POC) 121 (H) 65 - 117 mg/dL    Performed by Faith MUSA    GLUCOSE, POC    Collection Time: 03/18/22  8:07 PM   Result Value Ref Range    Glucose (POC) 117 65 - 117 mg/dL    Performed by [address] [place] [address]    Collection Time: 03/19/22  6:40 AM   Result Value Ref Range    Sodium 136 136 - 145 mmol/L    Potassium 4.0 3.5 - 5.1 mmol/L    Chloride 108 97 - 108 mmol/L    CO2 21 21 - 32 mmol/L    Anion gap 7 5 - 15 mmol/L    Glucose 137 (H) 65 - 100 mg/dL    BUN 16 6 - 20 mg/dL    Creatinine 0.82 0.55 - 1.02 mg/dL    BUN/Creatinine ratio 20 12 - 20      GFR est AA >60 >60 ml/min/1.73m2    GFR est non-AA >60 >60 ml/min/1.73m2    Calcium 8.5 8.5 - 10.1 mg/dL    Bilirubin, total 1.4 (H) 0.2 - 1.0 mg/dL    AST (SGOT) 14 (L) 15 - 37 U/L    ALT (SGPT) 19 12 - 78 U/L    Alk. phosphatase 90 45 - 117 U/L    Protein, total 6.0 (L) 6.4 - 8.2 g/dL    Albumin 2.6 (L) 3.5 - 5.0 g/dL    Globulin 3.4 2.0 - 4.0 g/dL    A-G Ratio 0.8 (L) 1.1 - 2.2     LIPASE    Collection Time: 03/19/22  6:40 AM   Result Value Ref Range    Lipase >3,000 (H) 73 - 393 U/L   MAGNESIUM    Collection Time: 03/19/22  6:40 AM   Result Value Ref Range    Magnesium 1.6 1.6 - 2.4 mg/dL   PHOSPHORUS    Collection Time: 03/19/22  6:40 AM   Result Value Ref Range    Phosphorus 2.7 2.6 - 4.7 mg/dL   GLUCOSE, POC    Collection Time: 03/19/22  6:54 AM   Result Value Ref Range    Glucose (POC) 129 (H) 65 - 117 mg/dL    Performed by Gilbert Li         Assessment/Plan:     Active Problems:    Abdominal pain (11/20/2021)      Acute pancreatitis (11/20/2021)      Hospital course:     This 81-year-old female with a history of numerous episodes of pancreatitis for unclear etiology, systolic heart failure with an ejection fracture 20-25% with associated severe mitral valve regurgitation who presents with severe right upper quadrant abdominal pain and a lipase level greater than 3000 and a CT showing chronic changes of pancreatitis with possible colitis of the distal rectosigmoid colon. Impression\plan:    1. Acute pancreatitis  Previous triglyceride levels normal  Lipase remains greater than 3000  No elevation in bilirubin to consider as an obstructive process  MRCP no dilatation of the biliary tree. Distal pancreatic duct remains severely dilated with surrounding pancreatic fluid collections. GI consultation no planned procedures  Previous work-up included EGD revealing coffee-ground material in the fundus as well as a hiatal hernia. Mild gastritis was noted. EUS was also completed findings consistent with chronic pancreatitis and a cyst within the pancreas positive representing intraductal papillary mucinous neoplasm. 2.  Chronic systolic heart failure with an EF of 20-25%  Continue Coreg  Hold Lasix for 24 hours  Continue spironolactone    3. Diabetes mellitus  Hold glipizide  Sliding scale insulin    4. Coronary artery disease  Continue Eliquis  Continue Coreg    5. Sigmoid colitis on CT  Meropenem    CODE STATUS: Full code    DVT prophylaxis: SCDs  Ulcer prophylaxis: Protonix    Discussed case with son Peter Gandhi [phone], discussed guarded status and need for possible distal tail pancreas removal although with an EF of 20% she probably would not survive the surgery. Son understands this and I would like conservative treatment at this point but realizes that his mother's functional status continues to decline due to all the pancreatitis admissions. Discharge barriers improvement in overall pancreatitis and pain and tolerating p.o. diet    Care Plan discussed with: Patient/Family    Total time spent with patient: >35 minutes.

## 2022-03-19 NOTE — PROGRESS NOTES
Progress Note    Patient: Abdiel Richmond MRN: [id number]  SSN: [id number]    YOB: 1940  Age: 81 y.o. Sex: female      Admit Date: 3/17/2022    LOS: 1 day     Subjective:    Had to get history, patient still have some abdominal pain, nausea but  no vomiting, no fever,  Past Medical History:   Diagnosis Date    Colon polyps     Diabetes ([facility] [address]     Diverticulosis     Hypertension     Ill-defined condition     Pancreatitis         Current Facility-Administered Medications:     clopidogreL (PLAVIX) tablet 75 mg, 75 mg, Oral, DAILY, Stefano De La Rosa MD, 75 mg at 03/19/22 0818    carvediloL (COREG) tablet 3.125 mg, 3.125 mg, Oral, BID WITH MEALS, Stefano De La Rosa MD, 3.125 mg at 03/19/22 0818    pantoprazole (PROTONIX) tablet 40 mg, 40 mg, Oral, ACB, Stefano De La Rosa MD, 40 mg at 03/19/22 0819    spironolactone (ALDACTONE) tablet 25 mg, 25 mg, Oral, DAILY, Stefano De La Rosa MD, 25 mg at 03/19/22 0819    acetaminophen (TYLENOL) SR tablet 650 mg, 650 mg, Oral, Q8H PRN, Stefano De La Rosa MD    apixaban (ELIQUIS) tablet 2.5 mg, 2.5 mg, Oral, DAILY, Stefano De La Rosa MD, 2.5 mg at 03/19/22 0819    insulin lispro (HUMALOG) injection, , SubCUTAneous, AC&HS, Stefano De La Rosa MD, 7 Units at 03/18/22 1326    glucose chewable tablet 16 g, 4 Tablet, Oral, PRN, Stefano De La Rosa MD    glucagon (GLUCAGEN) injection 1 mg, 1 mg, IntraMUSCular, PRN, Stefano De La Rsoa MD    0.9% sodium chloride infusion 1,000 mL, 1,000 mL, IntraVENous, CONTINUOUS, Stefano De La Rosa MD, Last Rate: 125 mL/hr at 03/19/22 0818, 1,000 mL at 03/19/22 0818    sodium chloride (NS) flush 5-40 mL, 5-40 mL, IntraVENous, Q8H, Stefano De La Rosa MD, 10 mL at 03/19/22 0509    sodium chloride (NS) flush 5-40 mL, 5-40 mL, IntraVENous, PRN, Stefano De La Rosa MD    HYDROmorphone (DILAUDID) syringe 0.5 mg, 0.5 mg, IntraVENous, Q4H PRN, Alejandro Gaviria PA-C, 0.5 mg at 03/19/22 0821    ondansetron (ZOFRAN) injection 4 mg, 4 mg, IntraVENous, Q4H PRN, Alexey Lawrence MD, 4 mg at 03/19/22 0818    dextrose 10% infusion 0-250 mL, 0-250 mL, IntraVENous, PRN, Alexey Lawrence MD    meropenem (MERREM) 1 g in 0.9% sodium chloride 20 mL IV syringe, 1 g, IntraVENous, Q12H, Alejandro Gaviria PA-C, 1 g at 03/19/22 0018    Objective:     Vitals:    03/18/22 1434 03/18/22 2137 03/19/22 0324 03/19/22 0705   BP: 121/71 127/74 (!) 146/91 125/78   Pulse: 77 81 89 92   Resp: 21 16 16 16   Temp: 97.9 °F (36.6 °C) 97.4 °F (36.3 °C) 98.3 °F (36.8 °C) 98.4 °F (36.9 °C)   SpO2: 93% 99% 100% 98%   Weight:       Height:            Intake and Output:  Current Shift: No intake/output data recorded. Last three shifts: 03/17 1901 - 03/19 0700  In: 1000 [I.V.:1000]  Out: 450 [Urine:450]    Physical Exam:   Physical Exam  Constitutional:       Appearance: She is ill-appearing. HENT:      Head: Atraumatic. Mouth/Throat:      Mouth: Mucous membranes are dry. Eyes:      General: No scleral icterus. Cardiovascular:      Rate and Rhythm: Rhythm irregular. Pulmonary:      Breath sounds: Normal breath sounds. Abdominal:      Palpations: Abdomen is soft. Tenderness: There is abdominal tenderness. Musculoskeletal:         General: Normal range of motion. Cervical back: Normal range of motion. Skin:     General: Skin is warm. Neurological:      General: No focal deficit present. Mental Status: Mental status is at baseline.    Psychiatric:         Mood and Affect: Mood normal.          Lab/Data Review:  Recent Results (from the past 24 hour(s))   GLUCOSE, POC    Collection Time: 03/18/22  3:51 PM   Result Value Ref Range    Glucose (POC) 121 (H) 65 - 117 mg/dL    Performed by [employer], POC    Collection Time: 03/18/22  8:07 PM   Result Value Ref Range    Glucose (POC) 117 65 - 117 mg/dL    Performed by Amos Martines [id number] A1C WITH EAG Collection Time: 03/19/22  6:40 AM   Result Value Ref Range    Hemoglobin A1c 10.2 (H) 4.0 - 5.6 %    Est. average glucose 261 mg/dL   METABOLIC PANEL, COMPREHENSIVE    Collection Time: 03/19/22  6:40 AM   Result Value Ref Range    Sodium 136 136 - 145 mmol/L    Potassium 4.0 3.5 - 5.1 mmol/L    Chloride 108 97 - 108 mmol/L    CO2 21 21 - 32 mmol/L    Anion gap 7 5 - 15 mmol/L    Glucose 137 (H) 65 - 100 mg/dL    BUN 16 6 - 20 mg/dL    Creatinine 0.82 0.55 - 1.02 mg/dL    BUN/Creatinine ratio 20 12 - 20      GFR est AA >60 >60 ml/min/1.73m2    GFR est non-AA >60 >60 ml/min/1.73m2    Calcium 8.5 8.5 - 10.1 mg/dL    Bilirubin, total 1.4 (H) 0.2 - 1.0 mg/dL    AST (SGOT) 14 (L) 15 - 37 U/L    ALT (SGPT) 19 12 - 78 U/L    Alk. phosphatase 90 45 - 117 U/L    Protein, total 6.0 (L) 6.4 - 8.2 g/dL    Albumin 2.6 (L) 3.5 - 5.0 g/dL    Globulin 3.4 2.0 - 4.0 g/dL    A-G Ratio 0.8 (L) 1.1 - 2.2     LIPASE    Collection Time: 03/19/22  6:40 AM   Result Value Ref Range    Lipase >3,000 (H) 73 - 393 U/L   MAGNESIUM    Collection Time: 03/19/22  6:40 AM   Result Value Ref Range    Magnesium 1.6 1.6 - 2.4 mg/dL   PHOSPHORUS    Collection Time: 03/19/22  6:40 AM   Result Value Ref Range    Phosphorus 2.7 2.6 - 4.7 mg/dL   GLUCOSE, POC    Collection Time: 03/19/22  6:54 AM   Result Value Ref Range    Glucose (POC) 129 (H) 65 - 117 mg/dL    Performed by HUNTER MUSA    GLUCOSE, POC    Collection Time: 03/19/22 11:33 AM   Result Value Ref Range    Glucose (POC) 112 65 - 117 mg/dL    Performed by Alexis Alejandra         MRI ABD W MRCP WO CONT   Final Result   The biliary tree is not dilated. The distal pancreatic duct remains   severely dilated. Surrounding free fluid is attributed to recurrent pancreatitis      CT ABD PELV W CONT   Final Result   Chronic changes in the pancreas which appear stable. There is   peripancreatic fluid present which has been seen in the past. Correlate with   laboratory data to exclude acute pancreatitis. No necrosis or other complication   of acute pancreatitis is seen. There is chronic dilatation of the distal main   pancreatic duct suggestive of previous pancreatitis and necrosis. Mild diffuse wall thickening distal colon and rectosigmoid. This may be artifact   of collapse. A mild element of colitis could also give this appearance. While   there is extensive diverticulosis pattern does not suggest diverticulitis this   time. Chronic changes elsewhere which appear stable                    Assessment:     Active Problems:    Abdominal pain (11/20/2021)      Acute pancreatitis (11/20/2021)      recurrent pancreatitis, abdominal pain, nausea vomiting,  Increased lipase, still above 3000  Dilated pancreatic duct at tail of pancreas, consistent with IPMN, due to the patient's age, patient is nonsurgical candidate, no FNA was performed last time.   Patient returned with similar episode, MRCP performed, showed the distal pancreatic duct dilation, with pancreatitis, no mention about the IPMN, however no signs of abscess, major fluid collection,     Plan:   Continue current symptomatic treatment  Advanced the diet lower fat  Pain management   follow as needed          Signed By: Cora Zelaya MD     March 19, 2022        Thank you for allowing me to participate in this patients care  Cc Referring Physician   Omar Ziegler MD

## 2022-03-20 LAB
ALBUMIN SERPL-MCNC: 2.7 G/DL (ref 3.5–5)
ALBUMIN/GLOB SERPL: 0.8 {RATIO} (ref 1.1–2.2)
ALP SERPL-CCNC: 91 U/L (ref 45–117)
ALT SERPL-CCNC: 15 U/L (ref 12–78)
ANION GAP SERPL CALC-SCNC: 8 MMOL/L (ref 5–15)
AST SERPL W P-5'-P-CCNC: 14 U/L (ref 15–37)
BASOPHILS # BLD: 0 K/UL (ref 0–0.1)
BASOPHILS NFR BLD: 1 % (ref 0–1)
BILIRUB SERPL-MCNC: 1.5 MG/DL (ref 0.2–1)
BUN SERPL-MCNC: 10 MG/DL (ref 6–20)
BUN/CREAT SERPL: 12 (ref 12–20)
CA-I BLD-MCNC: 8.9 MG/DL (ref 8.5–10.1)
CHLORIDE SERPL-SCNC: 106 MMOL/L (ref 97–108)
CO2 SERPL-SCNC: 24 MMOL/L (ref 21–32)
CREAT SERPL-MCNC: 0.82 MG/DL (ref 0.55–1.02)
DIFFERENTIAL METHOD BLD: ABNORMAL
EOSINOPHIL # BLD: 0.3 K/UL (ref 0–0.4)
EOSINOPHIL NFR BLD: 6 % (ref 0–7)
ERYTHROCYTE [DISTWIDTH] IN BLOOD BY AUTOMATED COUNT: 13.6 % (ref 11.5–14.5)
GLOBULIN SER CALC-MCNC: 3.5 G/DL (ref 2–4)
GLUCOSE BLD STRIP.AUTO-MCNC: 90 MG/DL (ref 65–117)
GLUCOSE BLD STRIP.AUTO-MCNC: 95 MG/DL (ref 65–117)
GLUCOSE BLD STRIP.AUTO-MCNC: 95 MG/DL (ref 65–117)
GLUCOSE BLD STRIP.AUTO-MCNC: 98 MG/DL (ref 65–117)
GLUCOSE SERPL-MCNC: 100 MG/DL (ref 65–100)
HCT VFR BLD AUTO: 34.6 % (ref 35–47)
HGB BLD-MCNC: 11.3 G/DL (ref 11.5–16)
IMM GRANULOCYTES # BLD AUTO: 0 K/UL (ref 0–0.04)
IMM GRANULOCYTES NFR BLD AUTO: 0 % (ref 0–0.5)
LIPASE SERPL-CCNC: 526 U/L (ref 73–393)
LYMPHOCYTES # BLD: 1.1 K/UL (ref 0.8–3.5)
LYMPHOCYTES NFR BLD: 24 % (ref 12–49)
MCH RBC QN AUTO: 27.3 PG (ref 26–34)
MCHC RBC AUTO-ENTMCNC: 32.7 G/DL (ref 30–36.5)
MCV RBC AUTO: 83.6 FL (ref 80–99)
MONOCYTES # BLD: 0.6 K/UL (ref 0–1)
MONOCYTES NFR BLD: 13 % (ref 5–13)
NEUTS SEG # BLD: 2.6 K/UL (ref 1.8–8)
NEUTS SEG NFR BLD: 56 % (ref 32–75)
NRBC # BLD: 0 K/UL (ref 0–0.01)
NRBC BLD-RTO: 0 PER 100 WBC
PERFORMED BY, TECHID: NORMAL
PLATELET # BLD AUTO: 123 K/UL (ref 150–400)
PMV BLD AUTO: 13.7 FL (ref 8.9–12.9)
POTASSIUM SERPL-SCNC: 4 MMOL/L (ref 3.5–5.1)
PROT SERPL-MCNC: 6.2 G/DL (ref 6.4–8.2)
RBC # BLD AUTO: 4.14 M/UL (ref 3.8–5.2)
SODIUM SERPL-SCNC: 138 MMOL/L (ref 136–145)
WBC # BLD AUTO: 4.7 K/UL (ref 3.6–11)

## 2022-03-20 PROCEDURE — 85025 COMPLETE CBC W/AUTO DIFF WBC: CPT

## 2022-03-20 PROCEDURE — 74011250636 HC RX REV CODE- 250/636: Performed by: PHYSICIAN ASSISTANT

## 2022-03-20 PROCEDURE — 36415 COLL VENOUS BLD VENIPUNCTURE: CPT

## 2022-03-20 PROCEDURE — 83690 ASSAY OF LIPASE: CPT

## 2022-03-20 PROCEDURE — 80053 COMPREHEN METABOLIC PANEL: CPT

## 2022-03-20 PROCEDURE — 65270000029 HC RM PRIVATE

## 2022-03-20 PROCEDURE — 74011000250 HC RX REV CODE- 250: Performed by: PHYSICIAN ASSISTANT

## 2022-03-20 PROCEDURE — 74011250637 HC RX REV CODE- 250/637: Performed by: HOSPITALIST

## 2022-03-20 PROCEDURE — 74011000250 HC RX REV CODE- 250: Performed by: HOSPITALIST

## 2022-03-20 PROCEDURE — 82962 GLUCOSE BLOOD TEST: CPT

## 2022-03-20 PROCEDURE — 74011250636 HC RX REV CODE- 250/636: Performed by: HOSPITALIST

## 2022-03-20 RX ORDER — FUROSEMIDE 40 MG/1
40 TABLET ORAL DAILY
Status: DISCONTINUED | OUTPATIENT
Start: 2022-03-21 | End: 2022-03-23 | Stop reason: HOSPADM

## 2022-03-20 RX ORDER — SODIUM CHLORIDE 450 MG/100ML
50 INJECTION, SOLUTION INTRAVENOUS CONTINUOUS
Status: DISCONTINUED | OUTPATIENT
Start: 2022-03-20 | End: 2022-03-23 | Stop reason: HOSPADM

## 2022-03-20 RX ADMIN — SPIRONOLACTONE 25 MG: 25 TABLET ORAL at 08:19

## 2022-03-20 RX ADMIN — SODIUM CHLORIDE, PRESERVATIVE FREE 1 G: 5 INJECTION INTRAVENOUS at 00:29

## 2022-03-20 RX ADMIN — SODIUM CHLORIDE 75 ML/HR: 4.5 INJECTION, SOLUTION INTRAVENOUS at 12:20

## 2022-03-20 RX ADMIN — SODIUM CHLORIDE 1000 ML: 9 INJECTION, SOLUTION INTRAVENOUS at 07:57

## 2022-03-20 RX ADMIN — CLOPIDOGREL BISULFATE 75 MG: 75 TABLET ORAL at 08:18

## 2022-03-20 RX ADMIN — CARVEDILOL 3.12 MG: 3.12 TABLET, FILM COATED ORAL at 08:19

## 2022-03-20 RX ADMIN — ONDANSETRON 4 MG: 2 INJECTION INTRAMUSCULAR; INTRAVENOUS at 06:25

## 2022-03-20 RX ADMIN — CARVEDILOL 3.12 MG: 3.12 TABLET, FILM COATED ORAL at 16:46

## 2022-03-20 RX ADMIN — HYDROMORPHONE HYDROCHLORIDE 0.5 MG: 1 INJECTION, SOLUTION INTRAMUSCULAR; INTRAVENOUS; SUBCUTANEOUS at 12:20

## 2022-03-20 RX ADMIN — SODIUM CHLORIDE 1000 ML: 9 INJECTION, SOLUTION INTRAVENOUS at 00:31

## 2022-03-20 RX ADMIN — HYDROMORPHONE HYDROCHLORIDE 0.5 MG: 1 INJECTION, SOLUTION INTRAMUSCULAR; INTRAVENOUS; SUBCUTANEOUS at 16:46

## 2022-03-20 RX ADMIN — SODIUM CHLORIDE, PRESERVATIVE FREE 1 G: 5 INJECTION INTRAVENOUS at 12:21

## 2022-03-20 RX ADMIN — SODIUM CHLORIDE, PRESERVATIVE FREE 10 ML: 5 INJECTION INTRAVENOUS at 05:23

## 2022-03-20 RX ADMIN — SODIUM CHLORIDE, PRESERVATIVE FREE 10 ML: 5 INJECTION INTRAVENOUS at 21:39

## 2022-03-20 RX ADMIN — APIXABAN 2.5 MG: 2.5 TABLET, FILM COATED ORAL at 08:19

## 2022-03-20 RX ADMIN — HYDROMORPHONE HYDROCHLORIDE 0.5 MG: 1 INJECTION, SOLUTION INTRAMUSCULAR; INTRAVENOUS; SUBCUTANEOUS at 07:57

## 2022-03-20 NOTE — PROGRESS NOTES
Problem: Falls - Risk of  Goal: *Absence of Falls  Description: Document Sofia Fothergill Fall Risk and appropriate interventions in the flowsheet. Outcome: Progressing Towards Goal  Note: Fall Risk Interventions:  Mobility Interventions: Bed/chair exit alarm,Patient to call before getting OOB         Medication Interventions: Bed/chair exit alarm,Patient to call before getting OOB,Teach patient to arise slowly    Elimination Interventions: Bed/chair exit alarm,Call light in reach,Patient to call for help with toileting needs              Problem: Patient Education: Go to Patient Education Activity  Goal: Patient/Family Education  Outcome: Progressing Towards Goal     Problem: Pressure Injury - Risk of  Goal: *Prevention of pressure injury  Description: Document Jon Scale and appropriate interventions in the flowsheet.   Outcome: Progressing Towards Goal  Note: Pressure Injury Interventions:  Sensory Interventions: Float heels,Keep linens dry and wrinkle-free,Maintain/enhance activity level,Minimize linen layers    Moisture Interventions: Absorbent underpads,Internal/External urinary devices,Minimize layers    Activity Interventions: Increase time out of bed    Mobility Interventions: Assess need for specialty bed,Float heels,HOB 30 degrees or less    Nutrition Interventions: Document food/fluid/supplement intake    Friction and Shear Interventions: Minimize layers,HOB 30 degrees or less                Problem: Patient Education: Go to Patient Education Activity  Goal: Patient/Family Education  Outcome: Progressing Towards Goal     Problem: Pain  Goal: *Control of Pain  Outcome: Progressing Towards Goal  Goal: *PALLIATIVE CARE:  Alleviation of Pain  Outcome: Progressing Towards Goal     Problem: Patient Education: Go to Patient Education Activity  Goal: Patient/Family Education  Outcome: Progressing Towards Goal     Problem: Nausea/Vomiting (Adult)  Goal: *Absence of nausea/vomiting  Outcome: Progressing Towards Goal  Goal: *Palliation of nausea/vomiting (Palliative Care)  Outcome: Progressing Towards Goal     Problem: Patient Education: Go to Patient Education Activity  Goal: Patient/Family Education  Outcome: Progressing Towards Goal     Problem: Diabetes Self-Management  Goal: *Disease process and treatment process  Description: Define diabetes and identify own type of diabetes; list 3 options for treating diabetes. Outcome: Progressing Towards Goal  Goal: *Incorporating nutritional management into lifestyle  Description: Describe effect of type, amount and timing of food on blood glucose; list 3 methods for planning meals. Outcome: Progressing Towards Goal  Goal: *Incorporating physical activity into lifestyle  Description: State effect of exercise on blood glucose levels. Outcome: Progressing Towards Goal  Goal: *Developing strategies to promote health/change behavior  Description: Define the ABC's of diabetes; identify appropriate screenings, schedule and personal plan for screenings. Outcome: Progressing Towards Goal  Goal: *Using medications safely  Description: State effect of diabetes medications on diabetes; name diabetes medication taking, action and side effects. Outcome: Progressing Towards Goal  Goal: *Monitoring blood glucose, interpreting and using results  Description: Identify recommended blood glucose targets  and personal targets. Outcome: Progressing Towards Goal  Goal: *Prevention, detection, treatment of acute complications  Description: List symptoms of hyper- and hypoglycemia; describe how to treat low blood sugar and actions for lowering  high blood glucose level. Outcome: Progressing Towards Goal  Goal: *Prevention, detection and treatment of chronic complications  Description: Define the natural course of diabetes and describe the relationship of blood glucose levels to long term complications of diabetes.   Outcome: Progressing Towards Goal  Goal: *Developing strategies to address psychosocial issues  Description: Describe feelings about living with diabetes; identify support needed and support network  Outcome: Progressing Towards Goal  Goal: *Insulin pump training  Outcome: Progressing Towards Goal  Goal: *Sick day guidelines  Outcome: Progressing Towards Goal  Goal: *Patient Specific Goal (EDIT GOAL, INSERT TEXT)  Outcome: Progressing Towards Goal     Problem: Patient Education: Go to Patient Education Activity  Goal: Patient/Family Education  Outcome: Progressing Towards Goal     Problem: Patient Education: Go to Patient Education Activity  Goal: Patient/Family Education  Outcome: Progressing Towards Goal     Problem: Pressure Injury - Risk of  Goal: *Prevention of pressure injury  Description: Document Jon Scale and appropriate interventions in the flowsheet.   Outcome: Progressing Towards Goal  Note: Pressure Injury Interventions:  Sensory Interventions: Float heels,Keep linens dry and wrinkle-free,Maintain/enhance activity level,Minimize linen layers    Moisture Interventions: Absorbent underpads,Internal/External urinary devices,Minimize layers    Activity Interventions: Increase time out of bed    Mobility Interventions: Assess need for specialty bed,Float heels,HOB 30 degrees or less    Nutrition Interventions: Document food/fluid/supplement intake    Friction and Shear Interventions: Minimize layers,HOB 30 degrees or less                Problem: Patient Education: Go to Patient Education Activity  Goal: Patient/Family Education  Outcome: Progressing Towards Goal     Problem: Pain  Goal: *Control of Pain  Outcome: Progressing Towards Goal     Problem: Pain  Goal: *PALLIATIVE CARE:  Alleviation of Pain  Outcome: Progressing Towards Goal     Problem: Patient Education: Go to Patient Education Activity  Goal: Patient/Family Education  Outcome: Progressing Towards Goal     Problem: Nausea/Vomiting (Adult)  Goal: *Absence of nausea/vomiting  Outcome: Progressing Towards Goal Problem: Nausea/Vomiting (Adult)  Goal: *Palliation of nausea/vomiting (Palliative Care)  Outcome: Progressing Towards Goal

## 2022-03-20 NOTE — PROGRESS NOTES
Problem: Falls - Risk of  Goal: *Absence of Falls  Description: Document Thu Jett Fall Risk and appropriate interventions in the flowsheet. Outcome: Progressing Towards Goal  Note: Fall Risk Interventions:  Mobility Interventions: Bed/chair exit alarm,Patient to call before getting OOB         Medication Interventions: Bed/chair exit alarm,Patient to call before getting OOB,Teach patient to arise slowly    Elimination Interventions: Bed/chair exit alarm,Call light in reach,Patient to call for help with toileting needs              Problem: Patient Education: Go to Patient Education Activity  Goal: Patient/Family Education  Outcome: Progressing Towards Goal     Problem: Pressure Injury - Risk of  Goal: *Prevention of pressure injury  Description: Document Jon Scale and appropriate interventions in the flowsheet.   Outcome: Progressing Towards Goal  Note: Pressure Injury Interventions:  Sensory Interventions: Float heels,Keep linens dry and wrinkle-free,Maintain/enhance activity level,Minimize linen layers    Moisture Interventions: Absorbent underpads,Internal/External urinary devices,Minimize layers    Activity Interventions: Assess need for specialty bed    Mobility Interventions: Assess need for specialty bed,Float heels,HOB 30 degrees or less    Nutrition Interventions: Offer support with meals,snacks and hydration    Friction and Shear Interventions: Apply protective barrier, creams and emollients,Minimize layers,HOB 30 degrees or less                Problem: Patient Education: Go to Patient Education Activity  Goal: Patient/Family Education  Outcome: Progressing Towards Goal     Problem: Pain  Goal: *Control of Pain  Outcome: Progressing Towards Goal  Goal: *PALLIATIVE CARE:  Alleviation of Pain  Outcome: Progressing Towards Goal     Problem: Patient Education: Go to Patient Education Activity  Goal: Patient/Family Education  Outcome: Progressing Towards Goal     Problem: Nausea/Vomiting (Adult)  Goal: *Absence of nausea/vomiting  Outcome: Progressing Towards Goal  Goal: *Palliation of nausea/vomiting (Palliative Care)  Outcome: Progressing Towards Goal     Problem: Patient Education: Go to Patient Education Activity  Goal: Patient/Family Education  Outcome: Progressing Towards Goal     Problem: Diabetes Self-Management  Goal: *Disease process and treatment process  Description: Define diabetes and identify own type of diabetes; list 3 options for treating diabetes. Outcome: Progressing Towards Goal  Goal: *Incorporating nutritional management into lifestyle  Description: Describe effect of type, amount and timing of food on blood glucose; list 3 methods for planning meals. Outcome: Progressing Towards Goal  Goal: *Incorporating physical activity into lifestyle  Description: State effect of exercise on blood glucose levels. Outcome: Progressing Towards Goal  Goal: *Developing strategies to promote health/change behavior  Description: Define the ABC's of diabetes; identify appropriate screenings, schedule and personal plan for screenings. Outcome: Progressing Towards Goal  Goal: *Using medications safely  Description: State effect of diabetes medications on diabetes; name diabetes medication taking, action and side effects. Outcome: Progressing Towards Goal  Goal: *Monitoring blood glucose, interpreting and using results  Description: Identify recommended blood glucose targets  and personal targets. Outcome: Progressing Towards Goal  Goal: *Prevention, detection, treatment of acute complications  Description: List symptoms of hyper- and hypoglycemia; describe how to treat low blood sugar and actions for lowering  high blood glucose level. Outcome: Progressing Towards Goal  Goal: *Prevention, detection and treatment of chronic complications  Description: Define the natural course of diabetes and describe the relationship of blood glucose levels to long term complications of diabetes.   Outcome: Progressing Towards Goal  Goal: *Developing strategies to address psychosocial issues  Description: Describe feelings about living with diabetes; identify support needed and support network  Outcome: Progressing Towards Goal  Goal: *Insulin pump training  Outcome: Progressing Towards Goal  Goal: *Sick day guidelines  Outcome: Progressing Towards Goal  Goal: *Patient Specific Goal (EDIT GOAL, INSERT TEXT)  Outcome: Progressing Towards Goal     Problem: Patient Education: Go to Patient Education Activity  Goal: Patient/Family Education  Outcome: Progressing Towards Goal     Problem: Patient Education: Go to Patient Education Activity  Goal: Patient/Family Education  Outcome: Progressing Towards Goal     Problem: Pressure Injury - Risk of  Goal: *Prevention of pressure injury  Description: Document Jon Scale and appropriate interventions in the flowsheet.   Outcome: Progressing Towards Goal  Note: Pressure Injury Interventions:  Sensory Interventions: Float heels,Keep linens dry and wrinkle-free,Maintain/enhance activity level,Minimize linen layers    Moisture Interventions: Absorbent underpads,Internal/External urinary devices,Minimize layers    Activity Interventions: Assess need for specialty bed    Mobility Interventions: Assess need for specialty bed,Float heels,HOB 30 degrees or less    Nutrition Interventions: Offer support with meals,snacks and hydration    Friction and Shear Interventions: Apply protective barrier, creams and emollients,Minimize layers,HOB 30 degrees or less                Problem: Patient Education: Go to Patient Education Activity  Goal: Patient/Family Education  Outcome: Progressing Towards Goal     Problem: Pain  Goal: *Control of Pain  Outcome: Progressing Towards Goal     Problem: Pain  Goal: *PALLIATIVE CARE:  Alleviation of Pain  Outcome: Progressing Towards Goal

## 2022-03-20 NOTE — PROGRESS NOTES
Hospitalist Progress Note    Subjective:   Daily Progress Note: 3/20/2022 12:37 PM    The right upper quadrant abdominal pain with nausea vomiting    Current Facility-Administered Medications   Medication Dose Route Frequency    clopidogreL (PLAVIX) tablet 75 mg  75 mg Oral DAILY    carvediloL (COREG) tablet 3.125 mg  3.125 mg Oral BID WITH MEALS    pantoprazole (PROTONIX) tablet 40 mg  40 mg Oral ACB    spironolactone (ALDACTONE) tablet 25 mg  25 mg Oral DAILY    acetaminophen (TYLENOL) SR tablet 650 mg  650 mg Oral Q8H PRN    apixaban (ELIQUIS) tablet 2.5 mg  2.5 mg Oral DAILY    insulin lispro (HUMALOG) injection   SubCUTAneous AC&HS    glucose chewable tablet 16 g  4 Tablet Oral PRN    glucagon (GLUCAGEN) injection 1 mg  1 mg IntraMUSCular PRN    0.9% sodium chloride infusion 1,000 mL  1,000 mL IntraVENous CONTINUOUS    sodium chloride (NS) flush 5-40 mL  5-40 mL IntraVENous Q8H    sodium chloride (NS) flush 5-40 mL  5-40 mL IntraVENous PRN    HYDROmorphone (DILAUDID) syringe 0.5 mg  0.5 mg IntraVENous Q4H PRN    ondansetron (ZOFRAN) injection 4 mg  4 mg IntraVENous Q4H PRN    dextrose 10% infusion 0-250 mL  0-250 mL IntraVENous PRN    meropenem (MERREM) 1 g in 0.9% sodium chloride 20 mL IV syringe  1 g IntraVENous Q12H        Review of Systems  Review of Systems   Constitutional: Positive for malaise/fatigue. Negative for chills and fever. HENT: Negative. Respiratory: Negative for cough and shortness of breath. Cardiovascular: Negative for chest pain and leg swelling. Gastrointestinal: Positive for abdominal pain, nausea and vomiting. Genitourinary: Negative. Musculoskeletal: Negative. Skin: Negative. Neurological: Negative. Psychiatric/Behavioral: Negative.              Objective:     Visit Vitals  BP (!) 147/84 (BP 1 Location: Left upper arm, BP Patient Position: At rest)   Pulse 73   Temp 98.1 °F (36.7 °C)   Resp 16   Ht 5' 6\" (1.676 m)   Wt 53.5 kg (118 lb)   SpO2 99% BMI 19.05 kg/m²      O2 Device: None (Room air)    Temp (24hrs), Av.3 °F (36.8 °C), Min:98.1 °F (36.7 °C), Max:98.5 °F (36.9 °C)      No intake/output data recorded.  1901 -  0700  In: 1280 [I.V.:1280]  Out: 2100 [Urine:2100]    Recent Results (from the past 24 hour(s))   GLUCOSE, POC    Collection Time: 22 11:33 AM   Result Value Ref Range    Glucose (POC) 112 65 - 117 mg/dL    Performed by Isabella MUSA    GLUCOSE, POC    Collection Time: 22  4:28 PM   Result Value Ref Range    Glucose (POC) 111 65 - 117 mg/dL    Performed by Isabella MUSA    GLUCOSE, POC    Collection Time: 22  8:53 PM   Result Value Ref Range    Glucose (POC) 114 65 - 117 mg/dL    Performed by Tobi Patton    GLUCOSE, POC    Collection Time: 22  6:29 AM   Result Value Ref Range    Glucose (POC) 95 65 - 117 mg/dL    Performed by Jami Amezcua    CBC WITH AUTOMATED DIFF    Collection Time: 22  6:39 AM   Result Value Ref Range    WBC 4.7 3.6 - 11.0 K/uL    RBC 4.14 3.80 - 5.20 M/uL    HGB 11.3 (L) 11.5 - 16.0 g/dL    HCT 34.6 (L) 35.0 - 47.0 %    MCV 83.6 80.0 - 99.0 FL    MCH 27.3 26.0 - 34.0 PG    MCHC 32.7 30.0 - 36.5 g/dL    RDW 13.6 11.5 - 14.5 %    PLATELET 062 (L) 166 - 400 K/uL    MPV 13.7 (H) 8.9 - 12.9 FL    NRBC 0.0 0.0  WBC    ABSOLUTE NRBC 0.00 0.00 - 0.01 K/uL    NEUTROPHILS 56 32 - 75 %    LYMPHOCYTES 24 12 - 49 %    MONOCYTES 13 5 - 13 %    EOSINOPHILS 6 0 - 7 %    BASOPHILS 1 0 - 1 %    IMMATURE GRANULOCYTES 0 0 - 0.5 %    ABS. NEUTROPHILS 2.6 1.8 - 8.0 K/UL    ABS. LYMPHOCYTES 1.1 0.8 - 3.5 K/UL    ABS. MONOCYTES 0.6 0.0 - 1.0 K/UL    ABS. EOSINOPHILS 0.3 0.0 - 0.4 K/UL    ABS. BASOPHILS 0.0 0.0 - 0.1 K/UL    ABS. IMM.  GRANS. 0.0 0.00 - 0.04 K/UL    DF AUTOMATED     METABOLIC PANEL, COMPREHENSIVE    Collection Time: 22  6:39 AM   Result Value Ref Range    Sodium 138 136 - 145 mmol/L    Potassium 4.0 3.5 - 5.1 mmol/L    Chloride 106 97 - 108 mmol/L    CO2 24 21 - 32 mmol/L    Anion gap 8 5 - 15 mmol/L    Glucose 100 65 - 100 mg/dL    BUN 10 6 - 20 mg/dL    Creatinine 0.82 0.55 - 1.02 mg/dL    BUN/Creatinine ratio 12 12 - 20      GFR est AA >60 >60 ml/min/1.73m2    GFR est non-AA >60 >60 ml/min/1.73m2    Calcium 8.9 8.5 - 10.1 mg/dL    Bilirubin, total 1.5 (H) 0.2 - 1.0 mg/dL    AST (SGOT) 14 (L) 15 - 37 U/L    ALT (SGPT) 15 12 - 78 U/L    Alk. phosphatase 91 45 - 117 U/L    Protein, total 6.2 (L) 6.4 - 8.2 g/dL    Albumin 2.7 (L) 3.5 - 5.0 g/dL    Globulin 3.5 2.0 - 4.0 g/dL    A-G Ratio 0.8 (L) 1.1 - 2.2     LIPASE    Collection Time: 03/20/22  6:39 AM   Result Value Ref Range    Lipase 526 (H) 73 - 393 U/L   GLUCOSE, POC    Collection Time: 03/20/22 10:44 AM   Result Value Ref Range    Glucose (POC) 98 65 - 117 mg/dL    Performed by Annalisa Walsh         MRI ABD W MRCP WO CONT   Final Result   The biliary tree is not dilated. The distal pancreatic duct remains   severely dilated. Surrounding free fluid is attributed to recurrent pancreatitis      CT ABD PELV W CONT   Final Result   Chronic changes in the pancreas which appear stable. There is   peripancreatic fluid present which has been seen in the past. Correlate with   laboratory data to exclude acute pancreatitis. No necrosis or other complication   of acute pancreatitis is seen. There is chronic dilatation of the distal main   pancreatic duct suggestive of previous pancreatitis and necrosis. Mild diffuse wall thickening distal colon and rectosigmoid. This may be artifact   of collapse. A mild element of colitis could also give this appearance. While   there is extensive diverticulosis pattern does not suggest diverticulitis this   time. Chronic changes elsewhere which appear stable                    PHYSICAL EXAM:    Physical Exam  Vitals reviewed. Constitutional:       Appearance: She is not ill-appearing. HENT:      Head: Normocephalic and atraumatic. Mouth/Throat:      Mouth: Mucous membranes are dry. Eyes:      Conjunctiva/sclera: Conjunctivae normal.   Cardiovascular:      Rate and Rhythm: Normal rate and regular rhythm. Heart sounds: Normal heart sounds. Pulmonary:      Effort: Pulmonary effort is normal.      Breath sounds: Normal breath sounds. Abdominal:      General: Abdomen is flat. Palpations: Abdomen is soft. Comments: Hypoactive bowel sounds with severe right upper quadrant abdominal pain without obvious mass   Musculoskeletal:         General: Normal range of motion. Cervical back: Normal range of motion and neck supple. Skin:     General: Skin is warm. Neurological:      General: No focal deficit present. Mental Status: She is alert and oriented to person, place, and time. Mental status is at baseline.    Psychiatric:         Mood and Affect: Mood normal.          Data Review    Recent Results (from the past 24 hour(s))   GLUCOSE, POC    Collection Time: 03/19/22 11:33 AM   Result Value Ref Range    Glucose (POC) 112 65 - 117 mg/dL    Performed by Denis MUSA    GLUCOSE, POC    Collection Time: 03/19/22  4:28 PM   Result Value Ref Range    Glucose (POC) 111 65 - 117 mg/dL    Performed by Denis MUSA    GLUCOSE, POC    Collection Time: 03/19/22  8:53 PM   Result Value Ref Range    Glucose (POC) 114 65 - 117 mg/dL    Performed by Nessa Meadows    GLUCOSE, POC    Collection Time: 03/20/22  6:29 AM   Result Value Ref Range    Glucose (POC) 95 65 - 117 mg/dL    Performed by Kimmy Harkins    CBC WITH AUTOMATED DIFF    Collection Time: 03/20/22  6:39 AM   Result Value Ref Range    WBC 4.7 3.6 - 11.0 K/uL    RBC 4.14 3.80 - 5.20 M/uL    HGB 11.3 (L) 11.5 - 16.0 g/dL    HCT 34.6 (L) 35.0 - 47.0 %    MCV 83.6 80.0 - 99.0 FL    MCH 27.3 26.0 - 34.0 PG    MCHC 32.7 30.0 - 36.5 g/dL    RDW 13.6 11.5 - 14.5 %    PLATELET 121 (L) 448 - 400 K/uL    MPV 13.7 (H) 8.9 - 12.9 FL    NRBC 0.0 0.0  WBC    ABSOLUTE NRBC 0.00 0.00 - 0.01 K/uL    NEUTROPHILS 56 32 - 75 % LYMPHOCYTES 24 12 - 49 %    MONOCYTES 13 5 - 13 %    EOSINOPHILS 6 0 - 7 %    BASOPHILS 1 0 - 1 %    IMMATURE GRANULOCYTES 0 0 - 0.5 %    ABS. NEUTROPHILS 2.6 1.8 - 8.0 K/UL    ABS. LYMPHOCYTES 1.1 0.8 - 3.5 K/UL    ABS. MONOCYTES 0.6 0.0 - 1.0 K/UL    ABS. EOSINOPHILS 0.3 0.0 - 0.4 K/UL    ABS. BASOPHILS 0.0 0.0 - 0.1 K/UL    ABS. IMM. GRANS. 0.0 0.00 - 0.04 K/UL    DF AUTOMATED     METABOLIC PANEL, COMPREHENSIVE    Collection Time: 03/20/22  6:39 AM   Result Value Ref Range    Sodium 138 136 - 145 mmol/L    Potassium 4.0 3.5 - 5.1 mmol/L    Chloride 106 97 - 108 mmol/L    CO2 24 21 - 32 mmol/L    Anion gap 8 5 - 15 mmol/L    Glucose 100 65 - 100 mg/dL    BUN 10 6 - 20 mg/dL    Creatinine 0.82 0.55 - 1.02 mg/dL    BUN/Creatinine ratio 12 12 - 20      GFR est AA >60 >60 ml/min/1.73m2    GFR est non-AA >60 >60 ml/min/1.73m2    Calcium 8.9 8.5 - 10.1 mg/dL    Bilirubin, total 1.5 (H) 0.2 - 1.0 mg/dL    AST (SGOT) 14 (L) 15 - 37 U/L    ALT (SGPT) 15 12 - 78 U/L    Alk. phosphatase 91 45 - 117 U/L    Protein, total 6.2 (L) 6.4 - 8.2 g/dL    Albumin 2.7 (L) 3.5 - 5.0 g/dL    Globulin 3.5 2.0 - 4.0 g/dL    A-G Ratio 0.8 (L) 1.1 - 2.2     LIPASE    Collection Time: 03/20/22  6:39 AM   Result Value Ref Range    Lipase 526 (H) 73 - 393 U/L   GLUCOSE, POC    Collection Time: 03/20/22 10:44 AM   Result Value Ref Range    Glucose (POC) 98 65 - 117 mg/dL    Performed by Lisa Acevedo         Assessment/Plan:     Active Problems:    Abdominal pain (11/20/2021)      Acute pancreatitis (11/20/2021)      Hospital course: This 71-year-old female with a history of numerous episodes of pancreatitis for unclear etiology, systolic heart failure with an ejection fracture 20-25% with associated severe mitral valve regurgitation who presents with severe right upper quadrant abdominal pain and a lipase level greater than 3000 and a CT showing chronic changes of pancreatitis with possible colitis of the distal rectosigmoid colon.   Lipase trending down. MRCP revealed nondilated biliary tree. The distal pancreatic duct remains severely dilated as previously shown. Surrounding free fluid noted. Impression\plan:    1. Acute pancreatitis  Previous triglyceride levels normal  Lipase 3000-->526  No elevation in bilirubin to consider as an obstructive process  MRCP no dilatation of the biliary tree. Distal pancreatic duct remains severely dilated with surrounding pancreatic fluid collections. GI consultation no planned procedures  Previous work-up included EGD revealing coffee-ground material in the fundus as well as a hiatal hernia. Mild gastritis was noted. EUS was also completed findings consistent with chronic pancreatitis and a cyst within the pancreas positive representing intraductal papillary mucinous neoplasm. Possibly restart diet in 24 hours    2. Chronic systolic heart failure with an EF of 20-25%  Continue Coreg  Restart lasix  Continue spironolactone    3. Diabetes mellitus  Hold glipizide  Sliding scale insulin    4. Coronary artery disease  Continue Eliquis  Continue Coreg    5. Sigmoid colitis on CT  Meropenem    CODE STATUS: Full code    DVT prophylaxis: SCDs  Ulcer prophylaxis: Protonix    Discussed case with son Elsy Angry [phone], discussed guarded status and need for possible distal tail pancreas removal although with an EF of 20% she probably would not survive the surgery. Son understands this and would like conservative treatment at this point but realizes that his mother's functional status continues to decline due to all the pancreatitis admissions. Discharge barriers improvement in overall pancreatitis and pain and tolerating p.o. diet    Care Plan discussed with: Patient/Family    Total time spent with patient: >35 minutes.

## 2022-03-21 LAB
ALBUMIN SERPL-MCNC: 2.6 G/DL (ref 3.5–5)
ALBUMIN/GLOB SERPL: 0.7 {RATIO} (ref 1.1–2.2)
ALP SERPL-CCNC: 90 U/L (ref 45–117)
ALT SERPL-CCNC: 15 U/L (ref 12–78)
ANION GAP SERPL CALC-SCNC: 9 MMOL/L (ref 5–15)
AST SERPL W P-5'-P-CCNC: 12 U/L (ref 15–37)
BASOPHILS # BLD: 0 K/UL (ref 0–0.1)
BASOPHILS NFR BLD: 1 % (ref 0–1)
BILIRUB SERPL-MCNC: 1.3 MG/DL (ref 0.2–1)
BUN SERPL-MCNC: 8 MG/DL (ref 6–20)
BUN/CREAT SERPL: 12 (ref 12–20)
CA-I BLD-MCNC: 8.8 MG/DL (ref 8.5–10.1)
CHLORIDE SERPL-SCNC: 102 MMOL/L (ref 97–108)
CO2 SERPL-SCNC: 23 MMOL/L (ref 21–32)
CREAT SERPL-MCNC: 0.68 MG/DL (ref 0.55–1.02)
DIFFERENTIAL METHOD BLD: ABNORMAL
EOSINOPHIL # BLD: 0.2 K/UL (ref 0–0.4)
EOSINOPHIL NFR BLD: 5 % (ref 0–7)
ERYTHROCYTE [DISTWIDTH] IN BLOOD BY AUTOMATED COUNT: 13.3 % (ref 11.5–14.5)
GLOBULIN SER CALC-MCNC: 3.6 G/DL (ref 2–4)
GLUCOSE BLD STRIP.AUTO-MCNC: 120 MG/DL (ref 65–117)
GLUCOSE BLD STRIP.AUTO-MCNC: 138 MG/DL (ref 65–117)
GLUCOSE BLD STRIP.AUTO-MCNC: 217 MG/DL (ref 65–117)
GLUCOSE BLD STRIP.AUTO-MCNC: 79 MG/DL (ref 65–117)
GLUCOSE SERPL-MCNC: 88 MG/DL (ref 65–100)
HCT VFR BLD AUTO: 34.8 % (ref 35–47)
HGB BLD-MCNC: 11.5 G/DL (ref 11.5–16)
IMM GRANULOCYTES # BLD AUTO: 0 K/UL (ref 0–0.04)
IMM GRANULOCYTES NFR BLD AUTO: 0 % (ref 0–0.5)
LIPASE SERPL-CCNC: 150 U/L (ref 73–393)
LYMPHOCYTES # BLD: 1.1 K/UL (ref 0.8–3.5)
LYMPHOCYTES NFR BLD: 23 % (ref 12–49)
MCH RBC QN AUTO: 27.3 PG (ref 26–34)
MCHC RBC AUTO-ENTMCNC: 33 G/DL (ref 30–36.5)
MCV RBC AUTO: 82.7 FL (ref 80–99)
MONOCYTES # BLD: 0.7 K/UL (ref 0–1)
MONOCYTES NFR BLD: 14 % (ref 5–13)
NEUTS SEG # BLD: 2.8 K/UL (ref 1.8–8)
NEUTS SEG NFR BLD: 57 % (ref 32–75)
NRBC # BLD: 0 K/UL (ref 0–0.01)
NRBC BLD-RTO: 0 PER 100 WBC
PERFORMED BY, TECHID: ABNORMAL
PERFORMED BY, TECHID: NORMAL
PLATELET # BLD AUTO: 123 K/UL (ref 150–400)
POTASSIUM SERPL-SCNC: 3.7 MMOL/L (ref 3.5–5.1)
PROT SERPL-MCNC: 6.2 G/DL (ref 6.4–8.2)
RBC # BLD AUTO: 4.21 M/UL (ref 3.8–5.2)
SODIUM SERPL-SCNC: 134 MMOL/L (ref 136–145)
WBC # BLD AUTO: 4.8 K/UL (ref 3.6–11)

## 2022-03-21 PROCEDURE — 74011000250 HC RX REV CODE- 250: Performed by: HOSPITALIST

## 2022-03-21 PROCEDURE — 82962 GLUCOSE BLOOD TEST: CPT

## 2022-03-21 PROCEDURE — 74011250636 HC RX REV CODE- 250/636: Performed by: PHYSICIAN ASSISTANT

## 2022-03-21 PROCEDURE — 65270000029 HC RM PRIVATE

## 2022-03-21 PROCEDURE — 85025 COMPLETE CBC W/AUTO DIFF WBC: CPT

## 2022-03-21 PROCEDURE — 74011250637 HC RX REV CODE- 250/637: Performed by: PHYSICIAN ASSISTANT

## 2022-03-21 PROCEDURE — 74011250636 HC RX REV CODE- 250/636: Performed by: HOSPITALIST

## 2022-03-21 PROCEDURE — 36415 COLL VENOUS BLD VENIPUNCTURE: CPT

## 2022-03-21 PROCEDURE — 80053 COMPREHEN METABOLIC PANEL: CPT

## 2022-03-21 PROCEDURE — 74011636637 HC RX REV CODE- 636/637: Performed by: HOSPITALIST

## 2022-03-21 PROCEDURE — 74011000250 HC RX REV CODE- 250: Performed by: PHYSICIAN ASSISTANT

## 2022-03-21 PROCEDURE — 74011250637 HC RX REV CODE- 250/637: Performed by: HOSPITALIST

## 2022-03-21 PROCEDURE — 83690 ASSAY OF LIPASE: CPT

## 2022-03-21 RX ADMIN — ONDANSETRON 4 MG: 2 INJECTION INTRAMUSCULAR; INTRAVENOUS at 00:33

## 2022-03-21 RX ADMIN — HYDROMORPHONE HYDROCHLORIDE 0.5 MG: 1 INJECTION, SOLUTION INTRAMUSCULAR; INTRAVENOUS; SUBCUTANEOUS at 04:41

## 2022-03-21 RX ADMIN — SODIUM CHLORIDE, PRESERVATIVE FREE 10 ML: 5 INJECTION INTRAVENOUS at 21:38

## 2022-03-21 RX ADMIN — FUROSEMIDE 40 MG: 40 TABLET ORAL at 08:26

## 2022-03-21 RX ADMIN — SODIUM CHLORIDE 75 ML/HR: 4.5 INJECTION, SOLUTION INTRAVENOUS at 00:24

## 2022-03-21 RX ADMIN — CARVEDILOL 3.12 MG: 3.12 TABLET, FILM COATED ORAL at 16:30

## 2022-03-21 RX ADMIN — APIXABAN 2.5 MG: 2.5 TABLET, FILM COATED ORAL at 08:26

## 2022-03-21 RX ADMIN — SODIUM CHLORIDE, PRESERVATIVE FREE 1 G: 5 INJECTION INTRAVENOUS at 00:24

## 2022-03-21 RX ADMIN — SODIUM CHLORIDE, PRESERVATIVE FREE 10 ML: 5 INJECTION INTRAVENOUS at 05:21

## 2022-03-21 RX ADMIN — HYDROMORPHONE HYDROCHLORIDE 0.5 MG: 1 INJECTION, SOLUTION INTRAMUSCULAR; INTRAVENOUS; SUBCUTANEOUS at 18:30

## 2022-03-21 RX ADMIN — CARVEDILOL 3.12 MG: 3.12 TABLET, FILM COATED ORAL at 08:26

## 2022-03-21 RX ADMIN — CLOPIDOGREL BISULFATE 75 MG: 75 TABLET ORAL at 08:27

## 2022-03-21 RX ADMIN — INSULIN LISPRO 4 UNITS: 100 INJECTION, SOLUTION INTRAVENOUS; SUBCUTANEOUS at 16:30

## 2022-03-21 RX ADMIN — SODIUM CHLORIDE, PRESERVATIVE FREE 1 G: 5 INJECTION INTRAVENOUS at 13:00

## 2022-03-21 RX ADMIN — SPIRONOLACTONE 25 MG: 25 TABLET ORAL at 08:26

## 2022-03-21 NOTE — PROGRESS NOTES
Hospitalist Progress Note    Subjective:   Daily Progress Note: 3/21/2022 12:37 PM    The right upper quadrant abdominal pain with nausea vomiting    Current Facility-Administered Medications   Medication Dose Route Frequency    furosemide (LASIX) tablet 40 mg  40 mg Oral DAILY    0.45% sodium chloride infusion  75 mL/hr IntraVENous CONTINUOUS    clopidogreL (PLAVIX) tablet 75 mg  75 mg Oral DAILY    carvediloL (COREG) tablet 3.125 mg  3.125 mg Oral BID WITH MEALS    pantoprazole (PROTONIX) tablet 40 mg  40 mg Oral ACB    spironolactone (ALDACTONE) tablet 25 mg  25 mg Oral DAILY    acetaminophen (TYLENOL) SR tablet 650 mg  650 mg Oral Q8H PRN    apixaban (ELIQUIS) tablet 2.5 mg  2.5 mg Oral DAILY    insulin lispro (HUMALOG) injection   SubCUTAneous AC&HS    glucose chewable tablet 16 g  4 Tablet Oral PRN    glucagon (GLUCAGEN) injection 1 mg  1 mg IntraMUSCular PRN    sodium chloride (NS) flush 5-40 mL  5-40 mL IntraVENous Q8H    sodium chloride (NS) flush 5-40 mL  5-40 mL IntraVENous PRN    HYDROmorphone (DILAUDID) syringe 0.5 mg  0.5 mg IntraVENous Q4H PRN    ondansetron (ZOFRAN) injection 4 mg  4 mg IntraVENous Q4H PRN    dextrose 10% infusion 0-250 mL  0-250 mL IntraVENous PRN    meropenem (MERREM) 1 g in 0.9% sodium chloride 20 mL IV syringe  1 g IntraVENous Q12H        Review of Systems  Review of Systems   Constitutional: Positive for malaise/fatigue. Negative for chills and fever. HENT: Negative. Respiratory: Negative for cough and shortness of breath. Cardiovascular: Negative for chest pain and leg swelling. Gastrointestinal: Positive for abdominal pain, nausea and vomiting. Genitourinary: Negative. Musculoskeletal: Negative. Skin: Negative. Neurological: Negative. Psychiatric/Behavioral: Negative.              Objective:     Visit Vitals  /78   Pulse 67   Temp 97.3 °F (36.3 °C)   Resp 20   Ht 5' 6\" (1.676 m)   Wt 56.7 kg (125 lb)   SpO2 99%   BMI 20.18 kg/m² O2 Device: None (Room air)    Temp (24hrs), Av.6 °F (36.4 °C), Min:97.3 °F (36.3 °C), Max:97.7 °F (36.5 °C)      No intake/output data recorded.  1901 -  0700  In: 2482.5 [I.V.:2482.5]  Out: 4000 [Urine:4000]    Recent Results (from the past 24 hour(s))   GLUCOSE, POC    Collection Time: 22 10:44 AM   Result Value Ref Range    Glucose (POC) 98 65 - 117 mg/dL    Performed by Diallojanett Dao    GLUCOSE, POC    Collection Time: 22  3:18 PM   Result Value Ref Range    Glucose (POC) 90 65 - 117 mg/dL    Performed by Canelo MUSA    GLUCOSE, POC    Collection Time: 22  7:42 PM   Result Value Ref Range    Glucose (POC) 95 65 - 117 mg/dL    Performed by REYES SANDRA    CBC WITH AUTOMATED DIFF    Collection Time: 22  6:50 AM   Result Value Ref Range    WBC 4.8 3.6 - 11.0 K/uL    RBC 4.21 3.80 - 5.20 M/uL    HGB 11.5 11.5 - 16.0 g/dL    HCT 34.8 (L) 35.0 - 47.0 %    MCV 82.7 80.0 - 99.0 FL    MCH 27.3 26.0 - 34.0 PG    MCHC 33.0 30.0 - 36.5 g/dL    RDW 13.3 11.5 - 14.5 %    PLATELET 275 (L) 822 - 400 K/uL    NRBC 0.0 0.0  WBC    ABSOLUTE NRBC 0.00 0.00 - 0.01 K/uL    NEUTROPHILS 57 32 - 75 %    LYMPHOCYTES 23 12 - 49 %    MONOCYTES 14 (H) 5 - 13 %    EOSINOPHILS 5 0 - 7 %    BASOPHILS 1 0 - 1 %    IMMATURE GRANULOCYTES 0 0 - 0.5 %    ABS. NEUTROPHILS 2.8 1.8 - 8.0 K/UL    ABS. LYMPHOCYTES 1.1 0.8 - 3.5 K/UL    ABS. MONOCYTES 0.7 0.0 - 1.0 K/UL    ABS. EOSINOPHILS 0.2 0.0 - 0.4 K/UL    ABS. BASOPHILS 0.0 0.0 - 0.1 K/UL    ABS. IMM. GRANS. 0.0 0.00 - 0.04 K/UL    DF AUTOMATED     GLUCOSE, POC    Collection Time: 22  7:41 AM   Result Value Ref Range    Glucose (POC) 79 65 - 117 mg/dL    Performed by Kat Choi         MRI ABD W MRCP WO CONT   Final Result   The biliary tree is not dilated. The distal pancreatic duct remains   severely dilated.  Surrounding free fluid is attributed to recurrent pancreatitis      CT ABD PELV W CONT   Final Result   Chronic changes in the pancreas which appear stable. There is   peripancreatic fluid present which has been seen in the past. Correlate with   laboratory data to exclude acute pancreatitis. No necrosis or other complication   of acute pancreatitis is seen. There is chronic dilatation of the distal main   pancreatic duct suggestive of previous pancreatitis and necrosis. Mild diffuse wall thickening distal colon and rectosigmoid. This may be artifact   of collapse. A mild element of colitis could also give this appearance. While   there is extensive diverticulosis pattern does not suggest diverticulitis this   time. Chronic changes elsewhere which appear stable                    PHYSICAL EXAM:    Physical Exam  Vitals reviewed. Constitutional:       Appearance: She is not ill-appearing. HENT:      Head: Normocephalic and atraumatic. Mouth/Throat:      Mouth: Mucous membranes are dry. Eyes:      Conjunctiva/sclera: Conjunctivae normal.   Cardiovascular:      Rate and Rhythm: Normal rate and regular rhythm. Heart sounds: Normal heart sounds. Pulmonary:      Effort: Pulmonary effort is normal.      Breath sounds: Normal breath sounds. Abdominal:      General: Abdomen is flat. Palpations: Abdomen is soft. Comments: Hypoactive bowel sounds with severe right upper quadrant abdominal pain without obvious mass   Musculoskeletal:         General: Normal range of motion. Cervical back: Normal range of motion and neck supple. Skin:     General: Skin is warm. Neurological:      General: No focal deficit present. Mental Status: She is alert and oriented to person, place, and time. Mental status is at baseline.    Psychiatric:         Mood and Affect: Mood normal.          Data Review    Recent Results (from the past 24 hour(s))   GLUCOSE, POC    Collection Time: 03/20/22 10:44 AM   Result Value Ref Range    Glucose (POC) 98 65 - 117 mg/dL    Performed by ARNOLD Lemus    Collection Time: 03/20/22  3:18 PM   Result Value Ref Range    Glucose (POC) 90 65 - 117 mg/dL    Performed by Ashli MUSA    GLUCOSE, POC    Collection Time: 03/20/22  7:42 PM   Result Value Ref Range    Glucose (POC) 95 65 - 117 mg/dL    Performed by REYES SANDRA    CBC WITH AUTOMATED DIFF    Collection Time: 03/21/22  6:50 AM   Result Value Ref Range    WBC 4.8 3.6 - 11.0 K/uL    RBC 4.21 3.80 - 5.20 M/uL    HGB 11.5 11.5 - 16.0 g/dL    HCT 34.8 (L) 35.0 - 47.0 %    MCV 82.7 80.0 - 99.0 FL    MCH 27.3 26.0 - 34.0 PG    MCHC 33.0 30.0 - 36.5 g/dL    RDW 13.3 11.5 - 14.5 %    PLATELET 455 (L) 887 - 400 K/uL    NRBC 0.0 0.0  WBC    ABSOLUTE NRBC 0.00 0.00 - 0.01 K/uL    NEUTROPHILS 57 32 - 75 %    LYMPHOCYTES 23 12 - 49 %    MONOCYTES 14 (H) 5 - 13 %    EOSINOPHILS 5 0 - 7 %    BASOPHILS 1 0 - 1 %    IMMATURE GRANULOCYTES 0 0 - 0.5 %    ABS. NEUTROPHILS 2.8 1.8 - 8.0 K/UL    ABS. LYMPHOCYTES 1.1 0.8 - 3.5 K/UL    ABS. MONOCYTES 0.7 0.0 - 1.0 K/UL    ABS. EOSINOPHILS 0.2 0.0 - 0.4 K/UL    ABS. BASOPHILS 0.0 0.0 - 0.1 K/UL    ABS. IMM. GRANS. 0.0 0.00 - 0.04 K/UL    DF AUTOMATED     GLUCOSE, POC    Collection Time: 03/21/22  7:41 AM   Result Value Ref Range    Glucose (POC) 79 65 - 117 mg/dL    Performed by Amando Hope         Assessment/Plan:     Active Problems:    Abdominal pain (11/20/2021)      Acute pancreatitis (11/20/2021)      Hospital course: This 73-year-old female with a history of numerous episodes of pancreatitis for unclear etiology, systolic heart failure with an ejection fracture 20-25% with associated severe mitral valve regurgitation who presents with severe right upper quadrant abdominal pain and a lipase level greater than 3000 and a CT showing chronic changes of pancreatitis with possible colitis of the distal rectosigmoid colon. Lipase trending down. MRCP revealed nondilated biliary tree. The distal pancreatic duct remains severely dilated as previously shown.   Surrounding free fluid noted.    Impression\plan:    1. Acute pancreatitis  Previous triglyceride levels normal  Lipase 3000-->526  No elevation in bilirubin to consider as an obstructive process  MRCP no dilatation of the biliary tree. Distal pancreatic duct remains severely dilated with surrounding pancreatic fluid collections. GI consultation no planned procedures  Previous work-up included EGD revealing coffee-ground material in the fundus as well as a hiatal hernia. Mild gastritis was noted. EUS was also completed findings consistent with chronic pancreatitis and a cyst within the pancreas positive representing intraductal papillary mucinous neoplasm. Start clear liquids    2. Chronic systolic heart failure with an EF of 20-25%  Continue Coreg  Restart lasix  Continue spironolactone    3. Diabetes mellitus  Hold glipizide  Sliding scale insulin    4. Coronary artery disease  Continue Eliquis  Continue Coreg    5. Sigmoid colitis on CT  Meropenem    CODE STATUS: Full code    DVT prophylaxis: SCDs  Ulcer prophylaxis: Protonix    Discussed case with donald Chin Swapnil [phone], discussed guarded status and need for possible distal tail pancreas removal although with an EF of 20% she probably would not survive the surgery. Son understands this and would like conservative treatment at this point but realizes that his mother's functional status continues to decline due to all the pancreatitis admissions. Discharge barriers improvement in overall pancreatitis and pain and tolerating p.o. diet    Care Plan discussed with: Patient/Family    Total time spent with patient: >35 minutes.

## 2022-03-21 NOTE — PROGRESS NOTES
CM reviewed clinical record. No dc order noted yet. Currently dc plan includes patient going home with hh. [facility] has accepted patient for [facility] [place].      [address], [address]

## 2022-03-21 NOTE — PROGRESS NOTES
Problem: Falls - Risk of  Goal: *Absence of Falls  Description: Document Jessi Litter Fall Risk and appropriate interventions in the flowsheet. Outcome: Progressing Towards Goal  Note: Fall Risk Interventions:  Mobility Interventions: Bed/chair exit alarm,Patient to call before getting OOB         Medication Interventions: Bed/chair exit alarm,Patient to call before getting OOB,Teach patient to arise slowly    Elimination Interventions: Bed/chair exit alarm,Call light in reach,Patient to call for help with toileting needs              Problem: Patient Education: Go to Patient Education Activity  Goal: Patient/Family Education  Outcome: Progressing Towards Goal     Problem: Pressure Injury - Risk of  Goal: *Prevention of pressure injury  Description: Document Jon Scale and appropriate interventions in the flowsheet.   Outcome: Progressing Towards Goal  Note: Pressure Injury Interventions:  Sensory Interventions: Float heels,Keep linens dry and wrinkle-free,Maintain/enhance activity level,Minimize linen layers    Moisture Interventions: Absorbent underpads,Internal/External urinary devices,Minimize layers    Activity Interventions: Increase time out of bed    Mobility Interventions: HOB 30 degrees or less,Float heels    Nutrition Interventions: Document food/fluid/supplement intake    Friction and Shear Interventions: Apply protective barrier, creams and emollients,Minimize layers                Problem: Patient Education: Go to Patient Education Activity  Goal: Patient/Family Education  Outcome: Progressing Towards Goal     Problem: Pain  Goal: *Control of Pain  Outcome: Progressing Towards Goal  Goal: *PALLIATIVE CARE:  Alleviation of Pain  Outcome: Progressing Towards Goal     Problem: Patient Education: Go to Patient Education Activity  Goal: Patient/Family Education  Outcome: Progressing Towards Goal     Problem: Nausea/Vomiting (Adult)  Goal: *Absence of nausea/vomiting  Outcome: Progressing Towards Goal  Goal: *Palliation of nausea/vomiting (Palliative Care)  Outcome: Progressing Towards Goal     Problem: Patient Education: Go to Patient Education Activity  Goal: Patient/Family Education  Outcome: Progressing Towards Goal     Problem: Diabetes Self-Management  Goal: *Disease process and treatment process  Description: Define diabetes and identify own type of diabetes; list 3 options for treating diabetes. Outcome: Progressing Towards Goal  Goal: *Incorporating nutritional management into lifestyle  Description: Describe effect of type, amount and timing of food on blood glucose; list 3 methods for planning meals. Outcome: Progressing Towards Goal  Goal: *Incorporating physical activity into lifestyle  Description: State effect of exercise on blood glucose levels. Outcome: Progressing Towards Goal  Goal: *Developing strategies to promote health/change behavior  Description: Define the ABC's of diabetes; identify appropriate screenings, schedule and personal plan for screenings. Outcome: Progressing Towards Goal  Goal: *Using medications safely  Description: State effect of diabetes medications on diabetes; name diabetes medication taking, action and side effects. Outcome: Progressing Towards Goal  Goal: *Monitoring blood glucose, interpreting and using results  Description: Identify recommended blood glucose targets  and personal targets. Outcome: Progressing Towards Goal  Goal: *Prevention, detection, treatment of acute complications  Description: List symptoms of hyper- and hypoglycemia; describe how to treat low blood sugar and actions for lowering  high blood glucose level. Outcome: Progressing Towards Goal  Goal: *Prevention, detection and treatment of chronic complications  Description: Define the natural course of diabetes and describe the relationship of blood glucose levels to long term complications of diabetes.   Outcome: Progressing Towards Goal  Goal: *Developing strategies to address psychosocial issues  Description: Describe feelings about living with diabetes; identify support needed and support network  Outcome: Progressing Towards Goal  Goal: *Insulin pump training  Outcome: Progressing Towards Goal  Goal: *Sick day guidelines  Outcome: Progressing Towards Goal  Goal: *Patient Specific Goal (EDIT GOAL, INSERT TEXT)  Outcome: Progressing Towards Goal     Problem: Patient Education: Go to Patient Education Activity  Goal: Patient/Family Education  Outcome: Progressing Towards Goal     Problem: Patient Education: Go to Patient Education Activity  Goal: Patient/Family Education  Outcome: Progressing Towards Goal     Problem: Pressure Injury - Risk of  Goal: *Prevention of pressure injury  Description: Document Jon Scale and appropriate interventions in the flowsheet.   Outcome: Progressing Towards Goal  Note: Pressure Injury Interventions:  Sensory Interventions: Float heels,Keep linens dry and wrinkle-free,Maintain/enhance activity level,Minimize linen layers    Moisture Interventions: Absorbent underpads,Internal/External urinary devices,Minimize layers    Activity Interventions: Increase time out of bed    Mobility Interventions: HOB 30 degrees or less,Float heels    Nutrition Interventions: Document food/fluid/supplement intake    Friction and Shear Interventions: Apply protective barrier, creams and emollients,Minimize layers                Problem: Patient Education: Go to Patient Education Activity  Goal: Patient/Family Education  Outcome: Progressing Towards Goal

## 2022-03-22 LAB
ALBUMIN SERPL-MCNC: 2.8 G/DL (ref 3.5–5)
ALBUMIN/GLOB SERPL: 0.8 {RATIO} (ref 1.1–2.2)
ALP SERPL-CCNC: 94 U/L (ref 45–117)
ALT SERPL-CCNC: 17 U/L (ref 12–78)
ANION GAP SERPL CALC-SCNC: 8 MMOL/L (ref 5–15)
AST SERPL W P-5'-P-CCNC: 11 U/L (ref 15–37)
BASOPHILS # BLD: 0 K/UL (ref 0–0.1)
BASOPHILS NFR BLD: 0 % (ref 0–1)
BILIRUB SERPL-MCNC: 1.2 MG/DL (ref 0.2–1)
BUN SERPL-MCNC: 8 MG/DL (ref 6–20)
BUN/CREAT SERPL: 8 (ref 12–20)
CA-I BLD-MCNC: 9 MG/DL (ref 8.5–10.1)
CHLORIDE SERPL-SCNC: 100 MMOL/L (ref 97–108)
CO2 SERPL-SCNC: 27 MMOL/L (ref 21–32)
CREAT SERPL-MCNC: 0.97 MG/DL (ref 0.55–1.02)
DIFFERENTIAL METHOD BLD: ABNORMAL
EOSINOPHIL # BLD: 0.2 K/UL (ref 0–0.4)
EOSINOPHIL NFR BLD: 5 % (ref 0–7)
ERYTHROCYTE [DISTWIDTH] IN BLOOD BY AUTOMATED COUNT: 13.4 % (ref 11.5–14.5)
GLOBULIN SER CALC-MCNC: 3.3 G/DL (ref 2–4)
GLUCOSE BLD STRIP.AUTO-MCNC: 129 MG/DL (ref 65–117)
GLUCOSE BLD STRIP.AUTO-MCNC: 154 MG/DL (ref 65–117)
GLUCOSE BLD STRIP.AUTO-MCNC: 197 MG/DL (ref 65–117)
GLUCOSE BLD STRIP.AUTO-MCNC: 206 MG/DL (ref 65–117)
GLUCOSE SERPL-MCNC: 159 MG/DL (ref 65–100)
HCT VFR BLD AUTO: 36.6 % (ref 35–47)
HGB BLD-MCNC: 11.9 G/DL (ref 11.5–16)
IMM GRANULOCYTES # BLD AUTO: 0 K/UL (ref 0–0.04)
IMM GRANULOCYTES NFR BLD AUTO: 0 % (ref 0–0.5)
LIPASE SERPL-CCNC: 152 U/L (ref 73–393)
LYMPHOCYTES # BLD: 1.3 K/UL (ref 0.8–3.5)
LYMPHOCYTES NFR BLD: 32 % (ref 12–49)
MCH RBC QN AUTO: 27.3 PG (ref 26–34)
MCHC RBC AUTO-ENTMCNC: 32.5 G/DL (ref 30–36.5)
MCV RBC AUTO: 83.9 FL (ref 80–99)
MONOCYTES # BLD: 0.6 K/UL (ref 0–1)
MONOCYTES NFR BLD: 15 % (ref 5–13)
NEUTS SEG # BLD: 1.9 K/UL (ref 1.8–8)
NEUTS SEG NFR BLD: 48 % (ref 32–75)
NRBC # BLD: 0 K/UL (ref 0–0.01)
NRBC BLD-RTO: 0 PER 100 WBC
PERFORMED BY, TECHID: ABNORMAL
PLATELET # BLD AUTO: 138 K/UL (ref 150–400)
PMV BLD AUTO: 12.3 FL (ref 8.9–12.9)
POTASSIUM SERPL-SCNC: 3.7 MMOL/L (ref 3.5–5.1)
PROT SERPL-MCNC: 6.1 G/DL (ref 6.4–8.2)
RBC # BLD AUTO: 4.36 M/UL (ref 3.8–5.2)
SODIUM SERPL-SCNC: 135 MMOL/L (ref 136–145)
WBC # BLD AUTO: 4 K/UL (ref 3.6–11)

## 2022-03-22 PROCEDURE — 74011250637 HC RX REV CODE- 250/637: Performed by: PHYSICIAN ASSISTANT

## 2022-03-22 PROCEDURE — 65270000029 HC RM PRIVATE

## 2022-03-22 PROCEDURE — 74011636637 HC RX REV CODE- 636/637: Performed by: HOSPITALIST

## 2022-03-22 PROCEDURE — 74011250636 HC RX REV CODE- 250/636: Performed by: HOSPITALIST

## 2022-03-22 PROCEDURE — 74011250636 HC RX REV CODE- 250/636: Performed by: PHYSICIAN ASSISTANT

## 2022-03-22 PROCEDURE — 80053 COMPREHEN METABOLIC PANEL: CPT

## 2022-03-22 PROCEDURE — 82962 GLUCOSE BLOOD TEST: CPT

## 2022-03-22 PROCEDURE — 85025 COMPLETE CBC W/AUTO DIFF WBC: CPT

## 2022-03-22 PROCEDURE — 83690 ASSAY OF LIPASE: CPT

## 2022-03-22 PROCEDURE — 74011250637 HC RX REV CODE- 250/637: Performed by: HOSPITALIST

## 2022-03-22 PROCEDURE — 74011000250 HC RX REV CODE- 250: Performed by: HOSPITALIST

## 2022-03-22 PROCEDURE — 36415 COLL VENOUS BLD VENIPUNCTURE: CPT

## 2022-03-22 PROCEDURE — 74011000250 HC RX REV CODE- 250: Performed by: PHYSICIAN ASSISTANT

## 2022-03-22 RX ORDER — GLIPIZIDE 5 MG/1
10 TABLET ORAL
Qty: 30 TABLET | Refills: 0 | Status: ON HOLD | OUTPATIENT
Start: 2022-03-22

## 2022-03-22 RX ORDER — HYDROMORPHONE HYDROCHLORIDE 1 MG/ML
0.5 INJECTION, SOLUTION INTRAMUSCULAR; INTRAVENOUS; SUBCUTANEOUS
Status: DISPENSED | OUTPATIENT
Start: 2022-03-22 | End: 2022-03-23

## 2022-03-22 RX ADMIN — CLOPIDOGREL BISULFATE 75 MG: 75 TABLET ORAL at 09:10

## 2022-03-22 RX ADMIN — SODIUM CHLORIDE, PRESERVATIVE FREE 1 G: 5 INJECTION INTRAVENOUS at 00:09

## 2022-03-22 RX ADMIN — FUROSEMIDE 40 MG: 40 TABLET ORAL at 09:10

## 2022-03-22 RX ADMIN — SODIUM CHLORIDE, PRESERVATIVE FREE 1 G: 5 INJECTION INTRAVENOUS at 13:55

## 2022-03-22 RX ADMIN — APIXABAN 2.5 MG: 2.5 TABLET, FILM COATED ORAL at 09:10

## 2022-03-22 RX ADMIN — INSULIN LISPRO 3 UNITS: 100 INJECTION, SOLUTION INTRAVENOUS; SUBCUTANEOUS at 17:02

## 2022-03-22 RX ADMIN — HYDROMORPHONE HYDROCHLORIDE 0.5 MG: 1 INJECTION, SOLUTION INTRAMUSCULAR; INTRAVENOUS; SUBCUTANEOUS at 09:10

## 2022-03-22 RX ADMIN — CARVEDILOL 3.12 MG: 3.12 TABLET, FILM COATED ORAL at 09:10

## 2022-03-22 RX ADMIN — HYDROMORPHONE HYDROCHLORIDE 0.5 MG: 1 INJECTION, SOLUTION INTRAMUSCULAR; INTRAVENOUS; SUBCUTANEOUS at 21:47

## 2022-03-22 RX ADMIN — HYDROMORPHONE HYDROCHLORIDE 0.5 MG: 1 INJECTION, SOLUTION INTRAMUSCULAR; INTRAVENOUS; SUBCUTANEOUS at 01:49

## 2022-03-22 RX ADMIN — SPIRONOLACTONE 25 MG: 25 TABLET ORAL at 09:10

## 2022-03-22 RX ADMIN — CARVEDILOL 3.12 MG: 3.12 TABLET, FILM COATED ORAL at 17:02

## 2022-03-22 RX ADMIN — INSULIN LISPRO 4 UNITS: 100 INJECTION, SOLUTION INTRAVENOUS; SUBCUTANEOUS at 11:30

## 2022-03-22 RX ADMIN — SODIUM CHLORIDE, PRESERVATIVE FREE 10 ML: 5 INJECTION INTRAVENOUS at 05:23

## 2022-03-22 NOTE — PROGRESS NOTES
Problem: Falls - Risk of  Goal: *Absence of Falls  Description: Document Anne Nap Fall Risk and appropriate interventions in the flowsheet. Outcome: Progressing Towards Goal  Note: Fall Risk Interventions:  Mobility Interventions: Bed/chair exit alarm,Patient to call before getting OOB         Medication Interventions: Bed/chair exit alarm,Patient to call before getting OOB,Teach patient to arise slowly    Elimination Interventions: Bed/chair exit alarm,Call light in reach,Patient to call for help with toileting needs              Problem: Patient Education: Go to Patient Education Activity  Goal: Patient/Family Education  Outcome: Progressing Towards Goal     Problem: Pressure Injury - Risk of  Goal: *Prevention of pressure injury  Description: Document Jon Scale and appropriate interventions in the flowsheet.   Outcome: Progressing Towards Goal  Note: Pressure Injury Interventions:  Sensory Interventions: Float heels,Maintain/enhance activity level,Keep linens dry and wrinkle-free,Minimize linen layers    Moisture Interventions: Absorbent underpads,Internal/External urinary devices,Minimize layers    Activity Interventions: Assess need for specialty bed,Increase time out of bed    Mobility Interventions: Assess need for specialty bed,Float heels,HOB 30 degrees or less    Nutrition Interventions: Document food/fluid/supplement intake    Friction and Shear Interventions: Apply protective barrier, creams and emollients,Minimize layers                Problem: Patient Education: Go to Patient Education Activity  Goal: Patient/Family Education  Outcome: Progressing Towards Goal     Problem: Pain  Goal: *Control of Pain  Outcome: Progressing Towards Goal  Goal: *PALLIATIVE CARE:  Alleviation of Pain  Outcome: Progressing Towards Goal     Problem: Patient Education: Go to Patient Education Activity  Goal: Patient/Family Education  Outcome: Progressing Towards Goal     Problem: Nausea/Vomiting (Adult)  Goal: *Absence of nausea/vomiting  Outcome: Progressing Towards Goal  Goal: *Palliation of nausea/vomiting (Palliative Care)  Outcome: Progressing Towards Goal     Problem: Patient Education: Go to Patient Education Activity  Goal: Patient/Family Education  Outcome: Progressing Towards Goal     Problem: Diabetes Self-Management  Goal: *Disease process and treatment process  Description: Define diabetes and identify own type of diabetes; list 3 options for treating diabetes. Outcome: Progressing Towards Goal  Goal: *Incorporating nutritional management into lifestyle  Description: Describe effect of type, amount and timing of food on blood glucose; list 3 methods for planning meals. Outcome: Progressing Towards Goal  Goal: *Incorporating physical activity into lifestyle  Description: State effect of exercise on blood glucose levels. Outcome: Progressing Towards Goal  Goal: *Developing strategies to promote health/change behavior  Description: Define the ABC's of diabetes; identify appropriate screenings, schedule and personal plan for screenings. Outcome: Progressing Towards Goal  Goal: *Using medications safely  Description: State effect of diabetes medications on diabetes; name diabetes medication taking, action and side effects. Outcome: Progressing Towards Goal  Goal: *Monitoring blood glucose, interpreting and using results  Description: Identify recommended blood glucose targets  and personal targets. Outcome: Progressing Towards Goal  Goal: *Prevention, detection, treatment of acute complications  Description: List symptoms of hyper- and hypoglycemia; describe how to treat low blood sugar and actions for lowering  high blood glucose level. Outcome: Progressing Towards Goal  Goal: *Prevention, detection and treatment of chronic complications  Description: Define the natural course of diabetes and describe the relationship of blood glucose levels to long term complications of diabetes.   Outcome: Progressing Towards Goal  Goal: *Developing strategies to address psychosocial issues  Description: Describe feelings about living with diabetes; identify support needed and support network  Outcome: Progressing Towards Goal  Goal: *Insulin pump training  Outcome: Progressing Towards Goal  Goal: *Sick day guidelines  Outcome: Progressing Towards Goal  Goal: *Patient Specific Goal (EDIT GOAL, INSERT TEXT)  Outcome: Progressing Towards Goal     Problem: Patient Education: Go to Patient Education Activity  Goal: Patient/Family Education  Outcome: Progressing Towards Goal     Problem: Patient Education: Go to Patient Education Activity  Goal: Patient/Family Education  Outcome: Progressing Towards Goal     Problem: Pressure Injury - Risk of  Goal: *Prevention of pressure injury  Description: Document Jon Scale and appropriate interventions in the flowsheet.   Outcome: Progressing Towards Goal  Note: Pressure Injury Interventions:  Sensory Interventions: Float heels,Maintain/enhance activity level,Keep linens dry and wrinkle-free,Minimize linen layers    Moisture Interventions: Absorbent underpads,Internal/External urinary devices,Minimize layers    Activity Interventions: Assess need for specialty bed,Increase time out of bed    Mobility Interventions: Assess need for specialty bed,Float heels,HOB 30 degrees or less    Nutrition Interventions: Document food/fluid/supplement intake    Friction and Shear Interventions: Apply protective barrier, creams and emollients,Minimize layers                Problem: Patient Education: Go to Patient Education Activity  Goal: Patient/Family Education  Outcome: Progressing Towards Goal     Problem: Pain  Goal: *Control of Pain  Outcome: Progressing Towards Goal

## 2022-03-22 NOTE — PROGRESS NOTES
Spiritual Care Assessment/Progress Note  [facility]      NAME: Rusty Cuenca      MRN: [id number]  AGE: 81 y.o. SEX: female  Advent Affiliation: Jehovah witness   Language: English     3/22/2022     Total Time (in minutes): 10     Spiritual Assessment begun in [address] through conversation with:         [x]Patient        [] Family    [] Friend(s)        Reason for Consult: Initial visit     Spiritual beliefs: (Please include comment if needed)     [] Identifies with a hernandez tradition:         [] Supported by a hernandez community:            [] Claims no spiritual orientation:           [] Seeking spiritual identity:                [] Adheres to an individual form of spirituality:           [x] Not able to assess:                           Identified resources for coping:      [] Prayer                               [] Music                  [] Guided Imagery     [] Family/friends                 [] Pet visits     [] Devotional reading                         [x] Unknown     [] Other:                                               Interventions offered during this visit: (See comments for more details)    Patient Interventions: Initial visit,Other (comment) (Silent support)           Plan of Care:     [] Support spiritual and/or cultural needs    [] Support AMD and/or advance care planning process      [] Support grieving process   [] Coordinate Rites and/or Rituals    [] Coordination with community clergy   [] No spiritual needs identified at this time   [] Detailed Plan of Care below (See Comments)  [] Make referral to Music Therapy  [] Make referral to Pet Therapy     [] Make referral to Addiction services  [] Make referral to [facility]  [] Make referral to Spiritual Care Partner  [] No future visits requested        [x] Contact Spiritual Care for further referrals     Comments:  Visited patient in [address] [place] [address] for initial assessment.   Patient seemed to be resting and did not respond to greeting. No visitors were present. Provided silent support. Contact chaplains for further referrals. Chaplain Kallie Nogueira M.Div.    can be reached by calling the  at [facility]  [phone]

## 2022-03-22 NOTE — PROGRESS NOTES
Hospitalist Progress Note    Subjective:   Daily Progress Note: 3/22/2022 10:25 AM    Hospital Course:  Thomas Cox is an 45-year-old female with a history of numerous episodes of pancreatitis for unclear etiology, systolic heart failure with an EF 20-25% with associated severe mitral valve regurgitation who presents with severe right upper quadrant abdominal pain and a lipase level > 3000. CT abdomen showing chronic changes of pancreatitis with possible colitis of the distal rectosigmoid colon. Lipase trending down. MRCP revealed nondilated biliary tree. The distal pancreatic duct remains severely dilated as previously shown. Surrounding free fluid noted. Subjective:    Patient seen and examined at bedside. Tolerating clear liquid diet. Abdominal pain improving.      Current Facility-Administered Medications   Medication Dose Route Frequency    HYDROmorphone (DILAUDID) syringe 0.5 mg  0.5 mg IntraVENous Q4H PRN    furosemide (LASIX) tablet 40 mg  40 mg Oral DAILY    0.45% sodium chloride infusion  50 mL/hr IntraVENous CONTINUOUS    clopidogreL (PLAVIX) tablet 75 mg  75 mg Oral DAILY    carvediloL (COREG) tablet 3.125 mg  3.125 mg Oral BID WITH MEALS    pantoprazole (PROTONIX) tablet 40 mg  40 mg Oral ACB    spironolactone (ALDACTONE) tablet 25 mg  25 mg Oral DAILY    acetaminophen (TYLENOL) SR tablet 650 mg  650 mg Oral Q8H PRN    apixaban (ELIQUIS) tablet 2.5 mg  2.5 mg Oral DAILY    insulin lispro (HUMALOG) injection   SubCUTAneous AC&HS    glucose chewable tablet 16 g  4 Tablet Oral PRN    glucagon (GLUCAGEN) injection 1 mg  1 mg IntraMUSCular PRN    sodium chloride (NS) flush 5-40 mL  5-40 mL IntraVENous Q8H    sodium chloride (NS) flush 5-40 mL  5-40 mL IntraVENous PRN    ondansetron (ZOFRAN) injection 4 mg  4 mg IntraVENous Q4H PRN    dextrose 10% infusion 0-250 mL  0-250 mL IntraVENous PRN    meropenem (MERREM) 1 g in 0.9% sodium chloride 20 mL IV syringe  1 g IntraVENous Q12H Review of Systems  Constitutional: Positive for malaise/fatigue. Negative for chills and fever. HENT: Negative. Respiratory: Negative for cough and shortness of breath. Cardiovascular: Negative for chest pain and leg swelling. Gastrointestinal: Positive for abdominal pain, nausea and vomiting. Genitourinary: Negative. Musculoskeletal: Negative. Skin: Negative. Neurological: Negative. Psychiatric/Behavioral: Negative. Objective:     Visit Vitals  /78 (BP 1 Location: Left upper arm, BP Patient Position: At rest)   Pulse 69   Temp 97.9 °F (36.6 °C)   Resp 18   Ht 5' 6\" (1.676 m)   Wt 56.7 kg (125 lb)   SpO2 100%   BMI 20.18 kg/m²      O2 Device: None (Room air)    Temp (24hrs), Av.8 °F (36.6 °C), Min:97.5 °F (36.4 °C), Max:98.1 °F (36.7 °C)      No intake/output data recorded.  1901 -  0700  In: 3902.5 [P.O.:1440; I.V.:2462.5]  Out: 3800 [Urine:3800]    PHYSICAL EXAM:  Constitutional: No acute distress  Skin: Extremities and face reveal no rashes. HEENT: Sclerae anicteric. Extra-occular muscles are intact. No oral ulcers. The neck is supple and no masses. Cardiovascular: Regular rate and rhythm. Respiratory:  Clear breath sounds bilaterally with no wheezes, rales, or rhonchi. GI: Abdomen nondistended. Hypoactive bowel sounds with mild right upper quadrant abdominal pain. Rectal: Deferred   Musculoskeletal: No pitting edema of the lower legs. Able to move all ext  Neurological:  Patient is alert and oriented.  Cranial nerves II-XII grossly intact  Psychiatric: Mood appears appropriate       Data Review    Recent Results (from the past 24 hour(s))   GLUCOSE, POC    Collection Time: 22 11:22 AM   Result Value Ref Range    Glucose (POC) 138 (H) 65 - 117 mg/dL    Performed by Gwendolyn Christina    GLUCOSE, POC    Collection Time: 22  3:41 PM   Result Value Ref Range    Glucose (POC) 217 (H) 65 - 117 mg/dL    Performed by Hellen Acevedo POC Collection Time: 03/21/22  9:02 PM   Result Value Ref Range    Glucose (POC) 120 (H) 65 - 117 mg/dL    Performed by CARMELA GRAY    CBC WITH AUTOMATED DIFF    Collection Time: 03/22/22  6:40 AM   Result Value Ref Range    WBC 4.0 3.6 - 11.0 K/uL    RBC 4.36 3.80 - 5.20 M/uL    HGB 11.9 11.5 - 16.0 g/dL    HCT 36.6 35.0 - 47.0 %    MCV 83.9 80.0 - 99.0 FL    MCH 27.3 26.0 - 34.0 PG    MCHC 32.5 30.0 - 36.5 g/dL    RDW 13.4 11.5 - 14.5 %    PLATELET 533 (L) 994 - 400 K/uL    MPV 12.3 8.9 - 12.9 FL    NRBC 0.0 0.0  WBC    ABSOLUTE NRBC 0.00 0.00 - 0.01 K/uL    NEUTROPHILS 48 32 - 75 %    LYMPHOCYTES 32 12 - 49 %    MONOCYTES 15 (H) 5 - 13 %    EOSINOPHILS 5 0 - 7 %    BASOPHILS 0 0 - 1 %    IMMATURE GRANULOCYTES 0 0 - 0.5 %    ABS. NEUTROPHILS 1.9 1.8 - 8.0 K/UL    ABS. LYMPHOCYTES 1.3 0.8 - 3.5 K/UL    ABS. MONOCYTES 0.6 0.0 - 1.0 K/UL    ABS. EOSINOPHILS 0.2 0.0 - 0.4 K/UL    ABS. BASOPHILS 0.0 0.0 - 0.1 K/UL    ABS. IMM. GRANS. 0.0 0.00 - 0.04 K/UL    DF AUTOMATED     METABOLIC PANEL, COMPREHENSIVE    Collection Time: 03/22/22  6:40 AM   Result Value Ref Range    Sodium 135 (L) 136 - 145 mmol/L    Potassium 3.7 3.5 - 5.1 mmol/L    Chloride 100 97 - 108 mmol/L    CO2 27 21 - 32 mmol/L    Anion gap 8 5 - 15 mmol/L    Glucose 159 (H) 65 - 100 mg/dL    BUN 8 6 - 20 mg/dL    Creatinine 0.97 0.55 - 1.02 mg/dL    BUN/Creatinine ratio 8 (L) 12 - 20      GFR est AA >60 >60 ml/min/1.73m2    GFR est non-AA 55 (L) >60 ml/min/1.73m2    Calcium 9.0 8.5 - 10.1 mg/dL    Bilirubin, total 1.2 (H) 0.2 - 1.0 mg/dL    AST (SGOT) 11 (L) 15 - 37 U/L    ALT (SGPT) 17 12 - 78 U/L    Alk.  phosphatase 94 45 - 117 U/L    Protein, total 6.1 (L) 6.4 - 8.2 g/dL    Albumin 2.8 (L) 3.5 - 5.0 g/dL    Globulin 3.3 2.0 - 4.0 g/dL    A-G Ratio 0.8 (L) 1.1 - 2.2     LIPASE    Collection Time: 03/22/22  6:40 AM   Result Value Ref Range    Lipase 152 73 - 393 U/L   GLUCOSE, POC    Collection Time: 03/22/22  7:22 AM   Result Value Ref Range Glucose (POC) 154 (H) 65 - 117 mg/dL    [facility]        MRI ABD W MRCP WO CONT   Final Result   The biliary tree is not dilated. The distal pancreatic duct remains   severely dilated. Surrounding free fluid is attributed to recurrent pancreatitis      CT ABD PELV W CONT   Final Result   Chronic changes in the pancreas which appear stable. There is   peripancreatic fluid present which has been seen in the past. Correlate with   laboratory data to exclude acute pancreatitis. No necrosis or other complication   of acute pancreatitis is seen. There is chronic dilatation of the distal main   pancreatic duct suggestive of previous pancreatitis and necrosis. Mild diffuse wall thickening distal colon and rectosigmoid. This may be artifact   of collapse. A mild element of colitis could also give this appearance. While   there is extensive diverticulosis pattern does not suggest diverticulitis this   time. Chronic changes elsewhere which appear stable                   Active Problems:    Abdominal pain (11/20/2021)      Acute pancreatitis (11/20/2021)        Assessment/Plan:     1. Acute pancreatitis  - Previous triglyceride levels normal  - Lipase 3000-->152  - No elevation in bilirubin to consider as an obstructive process  - MRCP no dilatation of the biliary tree. Distal pancreatic duct remains severely dilated with surrounding pancreatic fluid collections.  - GI consultation no planned procedures  - Previous work-up included EGD revealing coffee-ground material in the fundus as well as a hiatal hernia. Mild gastritis was noted. - EUS was also completed findings consistent with chronic pancreatitis and a cyst within the pancreas positive representing intraductal papillary mucinous neoplasm.  - Advance to GI soft diet     2. Chronic systolic heart failure with an EF of 20-25%  - Continue Coreg  - Restart lasix  - Continue spironolactone     3.   Diabetes mellitus  - Hold glipizide  - Sliding scale insulin     4. Coronary artery disease  - Continue Eliquis  - Continue Coreg     5. Sigmoid colitis on CT  - Meropenem      DVT Prophylaxis: Eliquis  GI Prophylaxis: Protonix  Code Status: Full  POA:    Discharge Barriers:    - Pending tolerance of advanced diet    Care Plan discussed with: patient and nursing     Total time spent with patient: >35 minutes.

## 2022-03-23 VITALS
BODY MASS INDEX: 20.09 KG/M2 | RESPIRATION RATE: 16 BRPM | HEIGHT: 66 IN | OXYGEN SATURATION: 99 % | WEIGHT: 125 LBS | TEMPERATURE: 97.6 F | SYSTOLIC BLOOD PRESSURE: 130 MMHG | DIASTOLIC BLOOD PRESSURE: 74 MMHG | HEART RATE: 93 BPM

## 2022-03-23 PROBLEM — K86.2 CYST OF PANCREAS: Status: ACTIVE | Noted: 2022-01-01

## 2022-03-23 PROBLEM — D49.0 INTRADUCTAL PAPILLARY MUCINOUS NEOPLASM OF PANCREAS: Status: ACTIVE | Noted: 2022-03-23

## 2022-03-23 LAB
ALBUMIN SERPL-MCNC: 2.8 G/DL (ref 3.5–5)
ALBUMIN/GLOB SERPL: 1 {RATIO} (ref 1.1–2.2)
ALP SERPL-CCNC: 95 U/L (ref 45–117)
ALT SERPL-CCNC: 12 U/L (ref 12–78)
ANION GAP SERPL CALC-SCNC: 7 MMOL/L (ref 5–15)
AST SERPL W P-5'-P-CCNC: 11 U/L (ref 15–37)
BASOPHILS # BLD: 0 K/UL (ref 0–0.1)
BASOPHILS NFR BLD: 0 % (ref 0–1)
BILIRUB SERPL-MCNC: 1.1 MG/DL (ref 0.2–1)
BUN SERPL-MCNC: 10 MG/DL (ref 6–20)
BUN/CREAT SERPL: 11 (ref 12–20)
CA-I BLD-MCNC: 9 MG/DL (ref 8.5–10.1)
CHLORIDE SERPL-SCNC: 99 MMOL/L (ref 97–108)
CO2 SERPL-SCNC: 27 MMOL/L (ref 21–32)
CREAT SERPL-MCNC: 0.9 MG/DL (ref 0.55–1.02)
DIFFERENTIAL METHOD BLD: ABNORMAL
EOSINOPHIL # BLD: 0.2 K/UL (ref 0–0.4)
EOSINOPHIL NFR BLD: 5 % (ref 0–7)
ERYTHROCYTE [DISTWIDTH] IN BLOOD BY AUTOMATED COUNT: 13.4 % (ref 11.5–14.5)
GLOBULIN SER CALC-MCNC: 2.9 G/DL (ref 2–4)
GLUCOSE BLD STRIP.AUTO-MCNC: 139 MG/DL (ref 65–117)
GLUCOSE BLD STRIP.AUTO-MCNC: 205 MG/DL (ref 65–117)
GLUCOSE SERPL-MCNC: 152 MG/DL (ref 65–100)
HCT VFR BLD AUTO: 34.3 % (ref 35–47)
HGB BLD-MCNC: 11.5 G/DL (ref 11.5–16)
IMM GRANULOCYTES # BLD AUTO: 0 K/UL (ref 0–0.04)
IMM GRANULOCYTES NFR BLD AUTO: 0 % (ref 0–0.5)
LIPASE SERPL-CCNC: 127 U/L (ref 73–393)
LYMPHOCYTES # BLD: 1.5 K/UL (ref 0.8–3.5)
LYMPHOCYTES NFR BLD: 33 % (ref 12–49)
MCH RBC QN AUTO: 27.4 PG (ref 26–34)
MCHC RBC AUTO-ENTMCNC: 33.5 G/DL (ref 30–36.5)
MCV RBC AUTO: 81.7 FL (ref 80–99)
MONOCYTES # BLD: 0.6 K/UL (ref 0–1)
MONOCYTES NFR BLD: 14 % (ref 5–13)
NEUTS SEG # BLD: 2.2 K/UL (ref 1.8–8)
NEUTS SEG NFR BLD: 48 % (ref 32–75)
NRBC # BLD: 0 K/UL (ref 0–0.01)
NRBC BLD-RTO: 0 PER 100 WBC
PERFORMED BY, TECHID: ABNORMAL
PERFORMED BY, TECHID: ABNORMAL
PLATELET # BLD AUTO: 155 K/UL (ref 150–400)
PMV BLD AUTO: 12.7 FL (ref 8.9–12.9)
POTASSIUM SERPL-SCNC: 3.8 MMOL/L (ref 3.5–5.1)
PROT SERPL-MCNC: 5.7 G/DL (ref 6.4–8.2)
RBC # BLD AUTO: 4.2 M/UL (ref 3.8–5.2)
SODIUM SERPL-SCNC: 133 MMOL/L (ref 136–145)
WBC # BLD AUTO: 4.6 K/UL (ref 3.6–11)

## 2022-03-23 PROCEDURE — 74011250636 HC RX REV CODE- 250/636: Performed by: PHYSICIAN ASSISTANT

## 2022-03-23 PROCEDURE — 85025 COMPLETE CBC W/AUTO DIFF WBC: CPT

## 2022-03-23 PROCEDURE — 74011000250 HC RX REV CODE- 250: Performed by: PHYSICIAN ASSISTANT

## 2022-03-23 PROCEDURE — 74011250636 HC RX REV CODE- 250/636: Performed by: HOSPITALIST

## 2022-03-23 PROCEDURE — 83690 ASSAY OF LIPASE: CPT

## 2022-03-23 PROCEDURE — 36415 COLL VENOUS BLD VENIPUNCTURE: CPT

## 2022-03-23 PROCEDURE — 74011636637 HC RX REV CODE- 636/637: Performed by: HOSPITALIST

## 2022-03-23 PROCEDURE — 74011250637 HC RX REV CODE- 250/637: Performed by: PHYSICIAN ASSISTANT

## 2022-03-23 PROCEDURE — 80053 COMPREHEN METABOLIC PANEL: CPT

## 2022-03-23 PROCEDURE — 74011250637 HC RX REV CODE- 250/637: Performed by: HOSPITALIST

## 2022-03-23 PROCEDURE — 82962 GLUCOSE BLOOD TEST: CPT

## 2022-03-23 RX ORDER — ONDANSETRON 4 MG/1
4 TABLET, FILM COATED ORAL
Qty: 28 TABLET | Refills: 0 | Status: SHIPPED | OUTPATIENT
Start: 2022-03-23 | End: 2022-03-30

## 2022-03-23 RX ADMIN — PANTOPRAZOLE SODIUM 40 MG: 40 TABLET, DELAYED RELEASE ORAL at 08:19

## 2022-03-23 RX ADMIN — CLOPIDOGREL BISULFATE 75 MG: 75 TABLET ORAL at 08:19

## 2022-03-23 RX ADMIN — CARVEDILOL 3.12 MG: 3.12 TABLET, FILM COATED ORAL at 08:19

## 2022-03-23 RX ADMIN — APIXABAN 2.5 MG: 2.5 TABLET, FILM COATED ORAL at 08:19

## 2022-03-23 RX ADMIN — SODIUM CHLORIDE, PRESERVATIVE FREE 1 G: 5 INJECTION INTRAVENOUS at 13:15

## 2022-03-23 RX ADMIN — FUROSEMIDE 40 MG: 40 TABLET ORAL at 08:19

## 2022-03-23 RX ADMIN — INSULIN LISPRO 4 UNITS: 100 INJECTION, SOLUTION INTRAVENOUS; SUBCUTANEOUS at 09:06

## 2022-03-23 RX ADMIN — SODIUM CHLORIDE, PRESERVATIVE FREE 1 G: 5 INJECTION INTRAVENOUS at 01:07

## 2022-03-23 RX ADMIN — SODIUM CHLORIDE 50 ML/HR: 4.5 INJECTION, SOLUTION INTRAVENOUS at 05:11

## 2022-03-23 RX ADMIN — SPIRONOLACTONE 25 MG: 25 TABLET ORAL at 08:19

## 2022-03-23 RX ADMIN — HYDROMORPHONE HYDROCHLORIDE 0.5 MG: 1 INJECTION, SOLUTION INTRAMUSCULAR; INTRAVENOUS; SUBCUTANEOUS at 08:24

## 2022-03-23 NOTE — PROGRESS NOTES
Problem: Falls - Risk of  Goal: *Absence of Falls  Description: Document Jacob Rafael Fall Risk and appropriate interventions in the flowsheet.   Outcome: Progressing Towards Goal  Note: Fall Risk Interventions:  Mobility Interventions: Patient to call before getting OOB         Medication Interventions: Patient to call before getting OOB    Elimination Interventions: Call light in reach

## 2022-03-23 NOTE — PROGRESS NOTES
1110  CM met with patient at the bedside to discuss the right to appeal letter from medicare. Patient is ok with being dc'd. Medicare pt has received, reviewed, and signed 2nd IM letter informing them of their right to appeal the discharge. Signed copied has been placed on pt bedside chart.     Patient will have [facility] provided by Sandro Gutierrez by [facility]   Address:   [address] [place] [address], [address] [place] [address]  Phone:   [phone]  Fax:  [phone]     Anticipated Start of Care 03-       [address], [address]

## 2022-03-23 NOTE — DISCHARGE SUMMARY
Hospitalist Discharge Summary     Patient ID:    Toan Gomez  [id number]  10 y.o.  1940    Admit date: 3/17/2022    Discharge date : 3/23/2022    Chronic Diagnoses:    Problem List as of 3/23/2022 Date Reviewed: 3/18/2022          Codes Class Noted - Resolved    Intraductal papillary mucinous neoplasm of pancreas ICD-10-CM: D37.8  ICD-9-CM: 239.0  3/23/2022 - Present        Cyst of pancreas ICD-10-CM: K86.2  ICD-9-CM: 577.2  3/23/2022 - Present        Acute on chronic combined systolic and diastolic ACC/AHA stage C congestive heart failure ([facility] ICD-10-CM: I50.43  ICD-9-CM: 428.43, 428.0  11/24/2021 - Present        Pleural effusion, right ICD-10-CM: J90  ICD-9-CM: 511.9  11/24/2021 - Present        Abdominal pain ICD-10-CM: R10.9  ICD-9-CM: 789.00  11/20/2021 - Present        * (Principal) Acute pancreatitis ICD-10-CM: K85.90  ICD-9-CM: 524.9  11/20/2021 - Present        NSTEMI (non-ST elevated myocardial infarction) ([facility] ICD-10-CM: I21.4  ICD-9-CM: 410.70  10/30/2021 - Present        Bursitis of multiple sites ICD-10-CM: M71.9  ICD-9-CM: 726.8  10/30/2021 - Present        Pancreatitis ICD-10-CM: K85.90  ICD-9-CM: 473.3  10/22/2021 - Present        Elevated troponin ICD-10-CM: R77.8  ICD-9-CM: 790.6  10/22/2021 - Present        Vasculitis ([facility] ICD-10-CM: I77.6  ICD-9-CM: 447.6  7/8/2021 - Present        Pancreatic mass ICD-10-CM: K86.89  ICD-9-CM: 577.8  7/8/2021 - Present        Epigastric pain ICD-10-CM: R10.13  ICD-9-CM: 789.06  7/4/2021 - Present        CVA (cerebral vascular accident) [facility]) ICD-10-CM: I63.9  ICD-9-CM: 434.91  5/6/2021 - Present        Right sided weakness ICD-10-CM: R53.1  ICD-9-CM: 728.87  5/4/2021 - Present        GI bleed ICD-10-CM: K92.2  ICD-9-CM: 578.9  4/28/2021 - Present          22    Final Diagnoses:   Principal Problem:    Acute pancreatitis (11/20/2021)    Active Problems:    Abdominal pain (11/20/2021)      Intraductal papillary mucinous neoplasm of pancreas (3/23/2022)      Cyst of pancreas (3/23/2022)      Hospital Course:   Christiano Valdes is an 21-year-old female with a history of numerous episodes of pancreatitis for unclear etiology, systolic heart failure with an EF 20-25% with associated severe mitral valve regurgitation who presents with severe right upper quadrant abdominal pain and a lipase level > 3000. CT abdomen showing chronic changes of pancreatitis with possible colitis of the distal rectosigmoid colon.  Lipase trending down.  EUS on 02/24/2022 revealed chronic pancreatitis, cyst of pancreas, as well as findings consistent with intraductal papillary mucinous neoplasm. MRCP revealed nondilated biliary tree.  The distal pancreatic duct remains severely dilated as previously shown.  Surrounding free fluid noted. Given patient's age and co-morbidities, she is considered a poor surgical candidate. Repeat lipase within normal range. Tolerating soft diet. Home health will be set up at discharge. Vitals and labs stable. Discharge Medications:   Current Discharge Medication List      CONTINUE these medications which have CHANGED    Details   ondansetron hcl (Zofran) 4 mg tablet Take 1 Tablet by mouth every six (6) hours as needed for Nausea or Nausea or Vomiting for up to 7 days. Qty: 28 Tablet, Refills: 0  Start date: 3/23/2022, End date: 3/30/2022      glipiZIDE (GLUCOTROL) 5 mg tablet Take 2 Tablets by mouth Daily (before breakfast). Qty: 30 Tablet, Refills: 0  Start date: 3/22/2022         CONTINUE these medications which have NOT CHANGED    Details   apixaban (Eliquis) 2.5 mg tablet Take 2.5 mg by mouth daily. spironolactone (ALDACTONE) 25 mg tablet Take 25 mg by mouth daily. acetaminophen (Tylenol Arthritis Pain) 650 mg TbER Take 650 mg by mouth every eight (8) hours as needed for Pain. Indications: pain      furosemide (LASIX) 40 mg tablet Take 1 Tablet by mouth daily.   Qty: 30 Tablet, Refills: 1      pantoprazole (PROTONIX) 40 mg tablet Take 1 Tablet by mouth Daily (before breakfast). Qty: 30 Tablet, Refills: 1      clopidogreL (PLAVIX) 75 mg tab Take 1 Tablet by mouth daily. Qty: 30 Tablet, Refills: 0      carvediloL (COREG) 3.125 mg tablet Take 1 Tablet by mouth two (2) times daily (with meals). Qty: 60 Tablet, Refills: 0               Follow up Care:    1. Clemente Bower MD in 1-2 weeks. Follow-up Information     Follow up With Specialties Details Why Contact Info    Clemente Bower MD Internal Medicine Schedule an appointment as soon as possible for a visit in 1 week  Adelaida Gonzales 1997 [address] [place], MD Gastroenterology, Internal Medicine Schedule an appointment as soon as possible for a visit As needed, If symptoms worsen follow-up recurrent pancreatitis  [address]  [phone]              Patient Follow Up Instructions: Activity: PT/OT per Home Health  Diet:  Low fat, Low cholesterol  Wound care: None    Condition at Discharge:  Stable  __________________________________________________________________    [place]position  [place] [facility]  [place]____________________________________    Code Status:  Full Code  ___________________________________________________________________    Discharge Exam:  Patient seen and examined by me on discharge day. Pertinent Findings:    Gen:    Not in distress  Chest: Clear lungs without wheezing, rhonchi, or rales. On room air. CVS:   Regular rate and rhythm. +S1/S2. No murmur or gallop. No edema  Abd:  Soft, not distended, not tender. Active bowel sounds. No abdominal mass. Neuro:  Alert and oriented x3. CN II-XII grossly intact.   Psych: Mood appropriate    CONSULTATIONS: GI    Significant Diagnostic Studies:   Recent Results (from the past 24 hour(s))   GLUCOSE, POC    Collection Time: 03/22/22 11:33 AM   Result Value Ref Range    Glucose (POC) 206 (H) 65 - 117 mg/dL    Performed by Ddaa Lubin DARLIN    GLUCOSE, POC    Collection Time: 03/22/22  3:49 PM   Result Value Ref Range    Glucose (POC) 197 (H) 65 - 117 mg/dL    Performed by Miguel Regan, POC    Collection Time: 03/22/22  8:49 PM   Result Value Ref Range    Glucose (POC) 129 (H) 65 - 117 mg/dL    Performed by REYES SANDRA    CBC WITH AUTOMATED DIFF    Collection Time: 03/23/22  4:11 AM   Result Value Ref Range    WBC 4.6 3.6 - 11.0 K/uL    RBC 4.20 3.80 - 5.20 M/uL    HGB 11.5 11.5 - 16.0 g/dL    HCT 34.3 (L) 35.0 - 47.0 %    MCV 81.7 80.0 - 99.0 FL    MCH 27.4 26.0 - 34.0 PG    MCHC 33.5 30.0 - 36.5 g/dL    RDW 13.4 11.5 - 14.5 %    PLATELET 613 904 - 564 K/uL    MPV 12.7 8.9 - 12.9 FL    NRBC 0.0 0.0  WBC    ABSOLUTE NRBC 0.00 0.00 - 0.01 K/uL    NEUTROPHILS 48 32 - 75 %    LYMPHOCYTES 33 12 - 49 %    MONOCYTES 14 (H) 5 - 13 %    EOSINOPHILS 5 0 - 7 %    BASOPHILS 0 0 - 1 %    IMMATURE GRANULOCYTES 0 0 - 0.5 %    ABS. NEUTROPHILS 2.2 1.8 - 8.0 K/UL    ABS. LYMPHOCYTES 1.5 0.8 - 3.5 K/UL    ABS. MONOCYTES 0.6 0.0 - 1.0 K/UL    ABS. EOSINOPHILS 0.2 0.0 - 0.4 K/UL    ABS. BASOPHILS 0.0 0.0 - 0.1 K/UL    ABS. IMM. GRANS. 0.0 0.00 - 0.04 K/UL    DF AUTOMATED     METABOLIC PANEL, COMPREHENSIVE    Collection Time: 03/23/22  4:11 AM   Result Value Ref Range    Sodium 133 (L) 136 - 145 mmol/L    Potassium 3.8 3.5 - 5.1 mmol/L    Chloride 99 97 - 108 mmol/L    CO2 27 21 - 32 mmol/L    Anion gap 7 5 - 15 mmol/L    Glucose 152 (H) 65 - 100 mg/dL    BUN 10 6 - 20 mg/dL    Creatinine 0.90 0.55 - 1.02 mg/dL    BUN/Creatinine ratio 11 (L) 12 - 20      GFR est AA >60 >60 ml/min/1.73m2    GFR est non-AA >60 >60 ml/min/1.73m2    Calcium 9.0 8.5 - 10.1 mg/dL    Bilirubin, total 1.1 (H) 0.2 - 1.0 mg/dL    AST (SGOT) 11 (L) 15 - 37 U/L    ALT (SGPT) 12 12 - 78 U/L    Alk.  phosphatase 95 45 - 117 U/L    Protein, total 5.7 (L) 6.4 - 8.2 g/dL    Albumin 2.8 (L) 3.5 - 5.0 g/dL    Globulin 2.9 2.0 - 4.0 g/dL    A-G Ratio 1.0 (L) 1.1 - 2.2     LIPASE Collection Time: 03/23/22  4:11 AM   Result Value Ref Range    Lipase 127 73 - 393 U/L   GLUCOSE, POC    Collection Time: 03/23/22  8:37 AM   Result Value Ref Range    Glucose (POC) 205 (H) 65 - 117 mg/dL    Performed by Deny Casey      MRI ABD W MRCP WO CONT   Final Result   The biliary tree is not dilated. The distal pancreatic duct remains   severely dilated. Surrounding free fluid is attributed to recurrent pancreatitis      CT ABD PELV W CONT   Final Result   Chronic changes in the pancreas which appear stable. There is   peripancreatic fluid present which has been seen in the past. Correlate with   laboratory data to exclude acute pancreatitis. No necrosis or other complication   of acute pancreatitis is seen. There is chronic dilatation of the distal main   pancreatic duct suggestive of previous pancreatitis and necrosis. Mild diffuse wall thickening distal colon and rectosigmoid. This may be artifact   of collapse. A mild element of colitis could also give this appearance. While   there is extensive diverticulosis pattern does not suggest diverticulitis this   time.    Chronic changes elsewhere which appear stable                       Signed:  Dallas Mckinley PA-C  3/23/2022  9:06 AM

## 2022-03-23 NOTE — ROUTINE PROCESS
Discharge summary reviewed with patient. Patient confirmed name and . Follow up appointments were reviewed with patient. Medications were e-prescribed and reviewed with patient. All questions answered at this time. IV was taken out of arm. Discharging provider notified of discharge. Discharge plan of care/case management plan validated with provider discharge order. Patient is being discharged to home/self-care and waiting on her son to  her.

## 2022-10-31 PROBLEM — I63.9 STROKE (HCC): Status: ACTIVE | Noted: 2022-01-01

## 2022-10-31 NOTE — Clinical Note
A Bovie was used. Blend setting: low. Mode: bipolar. Coagulation Settin. Cut Settin. Site (pad location): chest.  Laterality: left.

## 2022-10-31 NOTE — H&P
History and Physical    Patient: Elisha Fritz MRN: [id number]  SSN: [id number]    YOB: 1940  Age: 81 y.o. Sex: female      Subjective:      Elisha Fritz is a 81 y.o. female who has a PMH of HTN and prior stroke who presented to the ED with AMS and facial droop. She presented to the ED at [facility] with LKW 3 hours and 10 min prior to presentation. She is on plavix and eliquis as an outpatient. 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 She was not a TNKase candidate due to being on anticoagulation. She was transferred to [address] [place] [address] for thrombectomy. NIHSS 8 upon arrival. The patient was taken to angiogram for mechanical thrombectomy. The patient underwent general anesthesia for the procedure. She was extubated after the procedure and was taken to the ICU for further management post-procedure. Past Medical History:   Diagnosis Date    Colon polyps     Diabetes ([facility]     Diverticulosis     Hypertension     Ill-defined condition     Pancreatitis      Past Surgical History:   Procedure Laterality Date    COLONOSCOPY N/A 4/29/2021    COLONOSCOPY performed by Meaghan Crowe MD at [facility]  2021    x2      Family History   Problem Relation Age of Onset    Diabetes Other      Social History     Tobacco Use    Smoking status: Never    Smokeless tobacco: Never   Substance Use Topics    Alcohol use: Not Currently      Prior to Admission medications    Medication Sig Start Date End Date Taking? Authorizing Provider   sacubitril/valsartan (ENTRESTO PO) Take  by mouth. Provider, Historical   glipiZIDE (GLUCOTROL) 5 mg tablet Take 2 Tablets by mouth Daily (before breakfast). 3/22/22   Dulce Maria Martinez PA-C   apixaban (Eliquis) 2.5 mg tablet Take 2.5 mg by mouth daily. Provider, Historical   spironolactone (ALDACTONE) 25 mg tablet Take 25 mg by mouth daily.     Provider, Historical   acetaminophen (Tylenol Arthritis Pain) 650 mg TbER Take 650 mg by mouth every eight (8) hours as needed for Pain. Indications: pain    Provider, Historical   furosemide (LASIX) 40 mg tablet Take 1 Tablet by mouth daily. 11/24/21   Tyler Gaviria PA-C   pantoprazole (PROTONIX) 40 mg tablet Take 1 Tablet by mouth Daily (before breakfast). 11/25/21   Tyler Gaviria PA-C   clopidogreL (PLAVIX) 75 mg tab Take 1 Tablet by mouth daily. 10/30/21   Juanito Munoz MD   carvediloL (COREG) 3.125 mg tablet Take 1 Tablet by mouth two (2) times daily (with meals). 10/29/21   Juanito Munoz MD        Allergies   Allergen Reactions    Insulins Itching    Penicillins Other (comments)     Pt states it makes her pass out       Review of Systems:  As per HPI    Objective:     Vitals:    10/31/22 1433 10/31/22 1440   BP: (!) 189/109    Pulse: 72    Resp: 20    SpO2: 100%    Weight:  42.4 kg (93 lb 7.6 oz)   Height:  5' 6\" (1.676 m)        Physical Exam:  GENERAL: Awake, alert  EYE: PERRLA  THROAT & NECK: Supple, no JVD  LUNG: CTAB  HEART: RRR, 2+ pulses, no edema  ABDOMEN: Soft, nontender, nondistended  EXTREMITIES:  2+ pulses, no edema  SKIN: c/d/i  NEUROLOGIC: Oriented x3, no deficits, equal strength in all extremities  PSYCHIATRIC: Alert    Assessment:     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   I have discussed the case and the plan and management of the patient's care with the consulting services, the bedside nurses and the respiratory therapist.  I have discussed the case with NIS    NOTE OF PERSONAL INVOLVEMENT IN CARE   This patient has a high probability of imminent, clinically significant deterioration, which requires the highest level of preparedness to intervene urgently. I participated in the decision-making and personally managed or directed the management of the following life and organ supporting interventions that required my frequent assessment to treat or prevent imminent deterioration. I personally spent 45 minutes of critical care time. This is time spent at this critically ill patient's bedside actively involved in patient care as well as the coordination of care and discussions with the patient's family. This does not include any procedural time which has been billed separately.     Gricelda Preston NP  [facility]  10/31/2022    att      Signed By: Gricelda Preston NP     October 31, 2022

## 2022-10-31 NOTE — PROGRESS NOTES
Date/Time Last Known Well Time: 0830    Date/Time Discovery of Stroke Symptoms:     NIHSS upon arrival: 8     Time to IR Suite: 1452     Time of Arterial Puncture: 1507    Start of IA Infusion of tPA or  1st pass of recanalization device in   Target vessel: 1528    Time of Revascularization: 1530    Pretreatment TICI: 1    Post treatment TICI: 3    Procedure Stop Time(When sterile drape is off): 1540    Time of first set of VS, neuro cks, groin, and pulse checks: 1545    Time transfer to ICU: 1740    Time of last VS, neuro, groin, and pulse checks documentation before ICU caring for pt: 1800        TRANSFER - OUT REPORT:    Verbal report given to Alexis Lizarraga RN(name) on  being transferred to ICU(unit) for routine progression of care       Report consisted of patients Situation, Background, Assessment and   Recommendations(SBAR). Information from the following report(s) SBAR, Procedure Summary, and MAR was reviewed with the receiving nurse. Lines:       Opportunity for questions and clarification was provided.       Patient transported with:   Monitor  Registered Nurse

## 2022-10-31 NOTE — ED TRIAGE NOTES
Pt arrives by EMS from home. Pt was called as a stroke alert for AMS and L-sided facial droop and weakness that started at approximately 0800 today. Hx of CVAs. Pt altered at arrival to ED.  It was noted Pt takes plavix and eliquis

## 2022-10-31 NOTE — ED PROVIDER NOTES
Ms. Sharol Koyanagi is an 80yo female who presents to the ER with a stroke. Apparently, she was seen at an outside ER and was found to have an acute stroke. The physicians there had spoken to neuro interventional who recommended transfer to [facility] for possible thrombectomy. The patient has remained hypertensive with a systolic in the 438L. She has been answering some questions but otherwise seems confused. Blood sugars have been elevated in the 400s. No other complaints reported.        Past Medical History:   Diagnosis Date    Colon polyps     Diabetes ([address]     Diverticulosis     Hypertension     Ill-defined condition     Pancreatitis        Past Surgical History:   Procedure Laterality Date    COLONOSCOPY N/A 4/29/2021    COLONOSCOPY performed by Kuldeep Garcia MD at [place] [address]  2021    x2         Family History:   Problem Relation Age of Onset    Diabetes Other        Social History     Socioeconomic History    Marital status:      Spouse name: Not on file    Number of children: Not on file    Years of education: Not on file    Highest education level: Not on file   Occupational History    Not on file   Tobacco Use    Smoking status: Never    Smokeless tobacco: Never   Vaping Use    Vaping Use: Never used   Substance and Sexual Activity    Alcohol use: Not Currently    Drug use: Never    Sexual activity: Not on file   Other Topics Concern     Service Not Asked    Blood Transfusions Not Asked    Caffeine Concern Not Asked    Occupational Exposure Not Asked    Hobby Hazards Not Asked    Sleep Concern Not Asked    Stress Concern Not Asked    Weight Concern Not Asked    Special Diet Not Asked    Back Care Not Asked    Exercise Not Asked    Bike Helmet Not Asked    Seat Belt Not Asked    Self-Exams Not Asked   Social History Narrative    Lives at home with daughter, independent with activity     Social Determinants of Health     Financial Resource Strain: Not on file   Food Insecurity: Not on file   Transportation Needs: Not on file   Physical Activity: Not on file   Stress: Not on file   Social Connections: Not on file   Intimate Partner Violence: Not on file   Housing Stability: Not on file         ALLERGIES: Insulins and Penicillins    Review of Systems   Unable to perform ROS: Mental status change   Neurological:  Positive for weakness. Vitals:    10/31/22 1433 10/31/22 1440   Pulse: 72    Resp: 20    SpO2: 100%    Weight:  42.4 kg (93 lb 7.6 oz)   Height:  5' 6\" (1.676 m)            Physical Exam       Vital signs reviewed. Nursing notes reviewed. Const:  No acute distress, well developed, well nourished  Head:  Atraumatic, normocephalic  Eyes:  PERRL, conjunctiva normal, no scleral icterus  Neck:  Supple, trachea midline  Cardiovascular: Normal rate  Resp:  No resp distress, no increased work of breathing  Abd:  Soft, non-tender, non-distended  MSK:  No pedal edema, normal ROM  Neuro: Awake, answers some questions appropriately, not oriented to place or year, global weakness but weakness seems more pronounced on the right arm and right leg, slurred speech  Skin:  Warm, dry, intact  Psych: normal mood and affect, behavior is normal, judgement and thought content is normal        MDM     Amount and/or Complexity of Data Reviewed  Review and summarize past medical records: yes    Patient Progress  Patient progress: stable         Ms. Seng Elias is an 80yo female who presents to the ER as a transfer from an outside hospital for possible thrombectomy. Pt. Evaluated in the ER by neuro-inteventional and to be brought to IR.         Procedures

## 2022-10-31 NOTE — ANESTHESIA POSTPROCEDURE EVALUATION
* No procedures listed *.    general    Anesthesia Post Evaluation        Patient location during evaluation: PACU  Patient participation: complete - patient participated  Level of consciousness: awake and alert  Pain management: adequate  Airway patency: patent  Anesthetic complications: no  Cardiovascular status: acceptable  Respiratory status: acceptable  Hydration status: acceptable  Comments: I have seen and evaluated the patient and is ready for discharge. Mai Phillips MD    Post anesthesia nausea and vomiting:  none      INITIAL Post-op Vital signs: No vitals data found for the desired time range.

## 2022-10-31 NOTE — ED NOTES
TRANSFER - OUT REPORT:    Verbal report given to Leeanne Munoz RN(name) on Fiorella Jefferson  being transferred to [facility] ED(unit) for urgent transfer       Report consisted of patients Situation, Background, Assessment and   Recommendations(SBAR). Information from the following report(s) SBAR, ED Summary, [facility] and Recent Results was reviewed with the receiving nurse. Lines:   Peripheral IV 10/31/22 Right Antecubital (Active)        Opportunity for questions and clarification was provided.       Patient transported with:  [facility]

## 2022-10-31 NOTE — Clinical Note
Left chest prepped with ChloraPrep and draped. Wet prep solution applied at: 715. Wet prep solution dried at: 718. Wet prep elapsed drying time: 3 mins.

## 2022-10-31 NOTE — PROGRESS NOTES
Pt arrives via ER stretcher to angio department accompanied by ER RN for cerebral angiogram with mechanical thrombectomy procedure. All assessments completed and consent was reviewed. Education given was regarding procedure, general sedation, post-procedure care and  management/follow-up. Opportunity for questions was provided and all questions and concerns were addressed. NP reports unable to reach family. Emergent consent to be used. Patient placed on angio table with assistance. IR Staff and anesthesia present at bedside. Anesthesia to remain at bedside to manage pt airway, medications, VS and pt status. 1504: Timeout performed. Kade Solorio MD aware pulses unable to be found prior to start of procedure to RLL. 1655: Transfer of care from anesthesia to this RN at this time.

## 2022-10-31 NOTE — CONSULTS
Neurointerventional Surgery Consult  Bhupinder Salinas, [facility]  Neurocritical Care NP    Patient: Raven Nayak MRN: [id number]  SSN: [id number]    YOB: 1940  Age: 81 y.o. Sex: female        Chief Complaint: right-sided weakness, dysarthria, confusion    Subjective:      Raven Nayak is a 81 y.o. female with a past medical history significant for colon polyps, diabetes, diverticulosis, hypertension, pancreatitis, and remote stroke with no residual deficits presented to the ED at [facility] today with altered mental status and left-sided weakness per review of notes. Initially, I was not able to reach the son, but I was finally able to reach him to gather further history. Her son reported that he spoke with her around 6 AM today and she was totally fine. The patient was supposed to go shopping with her sister and nephew and when they went to her house they found her with confusion. EMS was called and she was sent to the ED as a stroke alert. She does take Plavix and Eliquis at home but son is unsure of why she is exactly on the medication. He does report that she does have cardiac stents and history of a previous stroke, it is presumed it is related to this. There is no reported history of atrial fibrillation. CT of head showed no acute process. CTA of the head and neck showed an occlusion of the left posterior cerebral artery in the proximal P2 segment with areas of distal reconstitution. Unchanged occlusion of the nondominant diminutive right vertebral artery at the V3 V4 junction and additional severe stenosis of the right vertebral origin. Mild stenosis of the proximal bilateral internal carotid arteries. Atherosclerotic disease noted. Neurointerventional surgery was consulted and she was emergently transferred to [facility] for endovascular intervention. Upon arrival to [facility], the patient was slightly drowsy, but arouses to voice and is confused.   She is following some commands. Noted right-sided weakness on exam when patient arrived to [facility]. Past Medical History:   Diagnosis Date    Colon polyps     Diabetes ([address]     Diverticulosis     Hypertension     Ill-defined condition     Pancreatitis    Remote stroke with no residual deficits    Past Surgical History:   Procedure Laterality Date    COLONOSCOPY N/A 4/29/2021    COLONOSCOPY performed by Danis Still MD at [facility] ENDOSCOPY    [address]  2021    x2      Family History   Problem Relation Age of Onset    Diabetes Other    Son is not sure of any family history of stroke   Social History     Tobacco Use    Smoking status: Never    Smokeless tobacco: Never   Substance Use Topics    Alcohol use: Son denies any alcohol use       Current Facility-Administered Medications   Medication Dose Route Frequency Provider Last Rate Last Admin    lidocaine (XYLOCAINE) 20 mg/mL (2 %) injection 400 mg  20 mL SubCUTAneous RAD ONCE Nii Birch MD        iopamidoL (ISOVUE 300) 61 % contrast injection 100 mL  100 mL IntraCATHeter RAD Marcos Florence MD        heparin 4000 units/1000 mL (4 units/mL) in NS infusion **FOR ANGIO USE ONLY**   IntraVENous RAD PRN Arely Campbell MD   4,000 Units at 10/31/22 1511    heparin (porcine) 1,000 unit/mL injection              Current Outpatient Medications   Medication Sig Dispense Refill    sacubitril/valsartan (ENTRESTO PO) Take  by mouth. glipiZIDE (GLUCOTROL) 5 mg tablet Take 2 Tablets by mouth Daily (before breakfast). 30 Tablet 0    apixaban (Eliquis) 2.5 mg tablet Take 2.5 mg by mouth daily. spironolactone (ALDACTONE) 25 mg tablet Take 25 mg by mouth daily. acetaminophen (Tylenol Arthritis Pain) 650 mg TbER Take 650 mg by mouth every eight (8) hours as needed for Pain. Indications: pain      furosemide (LASIX) 40 mg tablet Take 1 Tablet by mouth daily.  30 Tablet 1    pantoprazole (PROTONIX) 40 mg tablet Take 1 Tablet by mouth Daily (before breakfast). 30 Tablet 1    clopidogreL (PLAVIX) 75 mg tab Take 1 Tablet by mouth daily. 30 Tablet 0    carvediloL (COREG) 3.125 mg tablet Take 1 Tablet by mouth two (2) times daily (with meals). 60 Tablet 0     Facility-Administered Medications Ordered in Other Encounters   Medication Dose Route Frequency Provider Last Rate Last Admin    lidocaine (PF) (XYLOCAINE) 20 mg/mL (2 %) injection   IntraVENous PRN JumpIsabelle CRNA   60 mg at 10/31/22 1455    propofoL (DIPRIVAN) 10 mg/mL injection   IntraVENous PRN Jump, Stiles [place], CRNA   120 mg at 10/31/22 1455    rocuronium injection   IntraVENous PRN Jump, Stiles [place], CRNA   10 mg at 10/31/22 1455    succinylcholine (ANECTINE) injection   IntraVENous PRN Jump, Stiles [place], CRNA   160 mg at 10/31/22 1455    dexamethasone (DECADRON) 4 mg/mL injection   IntraVENous PRN Jump, Stiles [place], CRNA   4 mg at 10/31/22 1507    ondansetron (ZOFRAN) injection   IntraVENous PRN Jump, Stiles [place], CRNA   4 mg at 10/31/22 1507    PHENYLephrine (NEOSYNEPHRINE) in NS syringe   IntraVENous PRN Jump, Stiles [place], CRNA   80 mcg at 10/31/22 1504    PHENYLephrine (DONTE-SYNEPHRINE) 10 mg in 0.9% sodium chloride 250 mL infusion   IntraVENous CONTINUOUS Isabelle Headley CRNA 150 mL/hr at 10/31/22 1504 100 mcg/min at 10/31/22 1504    0.9% sodium chloride infusion   IntraVENous CONTINUOUS Jump, Stiles [place], CRNA   New Bag at 10/31/22 1451        Allergies   Allergen Reactions    Insulins Itching    Penicillins Other (comments)     Pt states it makes her pass out       Review of Systems:  Review of systems not obtained due to patient factors. Unable to obtain a review of systems due to patient's altered mental status. Objective:     Vitals:    10/31/22 1433 10/31/22 1440   BP: (!) 189/109    Pulse: 72    Resp: 20    SpO2: 100%    Weight:  93 lb 7.6 oz (42.4 kg)   Height:  5' 6\" (1.676 m)        Physical Exam:  GENERAL: drowsy, eyes open to voice, appears stated age, confused  EYE: conjunctivae/corneas clear. PERRL, EOM's intact. LUNG: clear to auscultation bilaterally  HEART: regular rate and rhythm, S1, S2 normal, no murmur, click, rub or gallop  EXTREMITIES:  extremities normal, atraumatic, no cyanosis or edema  SKIN: Warm to touch. Appropriate for ethnicity. Neurologic Exam:  Mental Status:  Drowsy, eyes open to voice. Confused. Oriented to name. Stated age. Disoriented to time, place and situation. Appropriate affect, mood and behavior. Language:    No aphasia. Able to name objects and repeat sentences. Comprehension intact. Cranial Nerves:        Pupils equal, round and reactive to light. Blinks to threat bilaterally. Extraocular movements intact. Mild right facial droop. Severe dysarthria. Tongue protrudes to midline, palate elevates symmetrically. Motor:    Right arm pronator drift. Some effort against gravity in right lower extremity. No drift in LUE and LLE. Difficult to fully assess strength in extremities. Right  appeared stronger than left hand . No involuntary movements. Sensation:    Sensation appears to be intact. Withdraws to pain in all extremities. Coordination & Gait: Difficult to test finger-to-nose and heel-to-shin bilaterally as patient would not follow task. Gait deferred.     (See CODE stroke documentation for NIHSS)    Recent Results (from the past 24 hour(s))   CBC WITH AUTOMATED DIFF    Collection Time: 10/31/22 12:42 PM   Result Value Ref Range    WBC 6.4 3.6 - 11.0 K/uL    RBC 4.34 3.80 - 5.20 M/uL    HGB 12.2 11.5 - 16.0 g/dL    HCT 37.1 35.0 - 47.0 %    MCV 85.5 80.0 - 99.0 FL    MCH 28.1 26.0 - 34.0 PG    MCHC 32.9 30.0 - 36.5 g/dL    RDW 12.8 11.5 - 14.5 %    PLATELET 988 (L) 902 - 400 K/uL    MPV 12.1 8.9 - 12.9 FL    NRBC 0.0 0.0  WBC    ABSOLUTE NRBC 0.00 0.00 - 0.01 K/uL    NEUTROPHILS 67 32 - 75 %    LYMPHOCYTES 24 12 - 49 %    MONOCYTES 7 5 - 13 %    EOSINOPHILS 1 0 - 7 %    BASOPHILS 0 0 - 1 % IMMATURE GRANULOCYTES 1 (H) 0 - 0.5 %    ABS. NEUTROPHILS 4.3 1.8 - 8.0 K/UL    ABS. LYMPHOCYTES 1.6 0.8 - 3.5 K/UL    ABS. MONOCYTES 0.5 0.0 - 1.0 K/UL    ABS. EOSINOPHILS 0.0 0.0 - 0.4 K/UL    ABS. BASOPHILS 0.0 0.0 - 0.1 K/UL    ABS. IMM. GRANS. 0.0 0.00 - 0.04 K/UL    DF AUTOMATED     METABOLIC PANEL, COMPREHENSIVE    Collection Time: 10/31/22 12:42 PM   Result Value Ref Range    Sodium 133 (L) 136 - 145 mmol/L    Potassium 3.6 3.5 - 5.1 mmol/L    Chloride 98 97 - 108 mmol/L    CO2 23 21 - 32 mmol/L    Anion gap 12 5 - 15 mmol/L    Glucose 444 (H) 65 - 100 mg/dL    BUN 40 (H) 6 - 20 mg/dL    Creatinine 1.28 (H) 0.55 - 1.02 mg/dL    BUN/Creatinine ratio 31 (H) 12 - 20      eGFR 42 (L) >60 ml/min/1.73m2    Calcium 9.5 8.5 - 10.1 mg/dL    Bilirubin, total 0.8 0.2 - 1.0 mg/dL    AST (SGOT) 7 (L) 15 - 37 U/L    ALT (SGPT) 14 12 - 78 U/L    Alk.  phosphatase 152 (H) 45 - 117 U/L    Protein, total 6.2 (L) 6.4 - 8.2 g/dL    Albumin 3.1 (L) 3.5 - 5.0 g/dL    Globulin 3.1 2.0 - 4.0 g/dL    A-G Ratio 1.0 (L) 1.1 - 2.2     TROPONIN-HIGH SENSITIVITY    Collection Time: 10/31/22 12:42 PM   Result Value Ref Range    Troponin-High Sensitivity 230 (HH) 0 - 51 ng/L   PROTHROMBIN TIME + INR    Collection Time: 10/31/22 12:42 PM   Result Value Ref Range    Prothrombin time 16.0 (H) 11.9 - 14.6 sec    INR 1.3 (H) 0.9 - 1.1     LACTIC ACID    Collection Time: 10/31/22 12:42 PM   Result Value Ref Range    Lactic acid 3.8 (HH) 0.4 - 2.0 mmol/L   GLUCOSE, POC    Collection Time: 10/31/22 12:49 PM   Result Value Ref Range    Glucose (POC) 398 (H) 65 - 100 mg/dL    Performed by Becky Vaca    URINALYSIS W/ REFLEX CULTURE    Collection Time: 10/31/22 12:54 PM    Specimen: Urine   Result Value Ref Range    Color Yellow/Straw      Appearance Clear Clear      Specific gravity 1.029 1.003 - 1.030      pH (UA) 5.0 5.0 - 8.0      Protein Negative Negative mg/dL    Glucose >300 (A) Negative mg/dL    Ketone 5 (A) Negative mg/dL    Bilirubin Negative Negative      Blood Negative Negative      Urobilinogen 0.1 0.1 - 1.0 EU/dL    Nitrites Negative Negative      Leukocyte Esterase Negative Negative      UA:UC IF INDICATED Culture not indicated by UA result Culture not indicated by UA result      WBC 0-4 0 - 4 /hpf    RBC 0-5 0 - 5 /hpf    Bacteria Negative Negative /hpf       Imaging:  CT Results  (Last 48 hours)                 10/31/22 1211  CTA CODE NEURO HEAD AND NECK W CONT Final result    Impression:      1. 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 2. Unchanged occlusion of the nondominant diminutive right vertebral artery at   the V3-V4 junction, and additional severe stenosis at the right vertebral artery   origin. 3. Mild stenosis (less than 50% by NASCET criteria) of the proximal bilateral   internal carotid arteries. 4. Additional atherosclerotic disease as above. Narrative:  EXAM:  CTA CODE NEURO HEAD AND NECK W CONT       INDICATION:   stroke       COMPARISON:  CT head 10/31/2022 MRI brain 5/5/2021 CTA neck 5/6/2021. CONTRAST:  100 mL of Isovue-370. TECHNIQUE:  Unenhanced  images were obtained to localize the volume for   acquisition. Multislice helical axial CT angiography was performed from the   aortic arch to the top of the head during uneventful rapid bolus intravenous   contrast administration. Coronal and sagittal reformations and 3D post   processing was performed. CT dose reduction was achieved through use of a   standardized protocol tailored for this examination and automatic exposure   control for dose modulation. This study was analyzed by the [address]  algorithm. FINDINGS:       CTA Head:   There is no evidence of large vessel occlusion or flow-limiting stenosis of the   intracranial internal carotid, anterior cerebral, and middle cerebral arteries. Multifocal moderate stenoses of the bilateral A2 segments. Calcification of the   bilateral carotid siphons with mild stenoses. The anterior communicating artery   is patent, with small right A1 segment. See below regarding right vertebral artery occlusion. There is occlusion of the   left posterior cerebral artery in the proximal P2 segment with areas of distal   reconstitution. No other flow-limiting stenosis in the posterior circulation. The right posterior communicating artery is patent, the left is not well seen. There is no evidence of aneurysm or vascular malformation. The dural venous   sinuses and deep cerebral venous system are patent. No evidence of abnormal   enhancement on delayed phase images. Chronic microvascular ischemic disease and   multiple chronic infarcts again noted. CTA NECK:   NASCET method was utilized for calculating stenosis. Mild calcific atherosclerosis of the thoracic aorta. The common carotid arteries   demonstrate no significant stenosis. Calcific atherosclerosis of the proximal   right internal carotid artery with mild (less than 50%) stenosis, unchanged. Calcific atherosclerosis of the proximal left internal carotid artery with mild   (less than 50%) stenosis, unchanged. There is a left dominant vertebrobasilar arterial system. Severe stenosis at the   origin of the diminutive right vertebral artery, with discontinuous   opacification in the right V2 segment, and occlusion of the right V3-V4   junction, unchanged. No significant stenosis of the dominant left vertebral   artery. Visualized soft tissues of the neck are unremarkable. Visualized lung apices are   clear. No acute fracture or aggressive osseous lesion. Severe degenerative disc   disease in the mid and lower cervical spine, and scattered areas of severe facet   arthropathy in the cervical spine.            10/31/22 1207  CT CODE NEURO HEAD WO CONTRAST Final result    Impression:  No acute process or change compared to the prior exam. Narrative:  EXAM: CT CODE NEURO HEAD WO CONTRAST       INDICATION: Left-sided facial droop and weakness today       COMPARISON: 5/4/2021. CONTRAST: None. TECHNIQUE: Unenhanced CT of the head was performed using 5 mm images. Brain and   bone windows were generated. Coronal and sagittal reformats. CT dose reduction   was achieved through use of a standardized protocol tailored for this   examination and automatic exposure control for dose modulation. FINDINGS:   The ventricles and sulci are normal in size, shape and configuration. .   Significant small vessel ischemic changes again noted in the periventricular   white matter. Right occipital encephalomalacia is unchanged. There is no   intracranial hemorrhage, extra-axial collection, or mass effect. The basilar   cisterns are open. No CT evidence of acute infarct. The bone windows demonstrate no abnormalities. The visualized portions of the   paranasal sinuses and mastoid air cells are clear. Vascular calcification is   evident. Assessment:     Hospital Problems  Date Reviewed: 3/18/2022            Codes Class Noted POA    Stroke [facility]) ICD-10-CM: I63.9  ICD-9-CM: 434.91  10/31/2022 Unknown           Plan:   Acute Ischemic CVA due to a posterior circulation occlusion  - CTA showed occlusion of the left posterior cerebral artery in the proximal P2 segment with areas of distal reconstitution. Unchanged occlusion of the nondominant diminutive right vertebral artery at the V3/V4 junction and additional severe stenosis of the right vertebral origin. Mild stenosis of the proximal bilateral internal carotid arteries. Atherosclerotic disease noted. - patient emergently to angio for cerebral angiogram and mechanical thrombectomy. Will have to obtain emergent consent as I was unable to reach patient's son.  During procedure, I was able to reach the patient's son and explained the risks, benefits, and alternatives of the procedure. - allow permissive HTN pre-thrombectomy in the setting of acute CVA with LVO, SBP goal 140-200  - admit to ICU post procedure  - obtain MRI of Brain to assess extent of ischemia  - check troponin  - check ECHO to assess for any heart abnormality  - obtain lipid panel and Hgb A1C  - check P2Y12 level for therapeutic response  - PT/OT/SLP evals  - Neurology consult    Plan discussed with Dr. Lisa Whitaker, and patient's son. Thank you for this consult and participating in the care of this patient. 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 Plan for mechanical thrombectomy. I personally reviewed imaging, saw and evaluated the patient and agree with the NP assessment and plan as documented in the note above.   This is a late entry for service dated 10/31/22

## 2022-10-31 NOTE — ED NOTES
Dr. Maisha Zavala with [facility] came on screen to assess Pt at this time.

## 2022-10-31 NOTE — ED PROVIDER NOTES
EMERGENCY DEPARTMENT HISTORY AND PHYSICAL EXAM      Date: 10/31/2022  Patient Name: Fiorella Jefferson    History of Presenting Illness     Chief Complaint   Patient presents with    Altered mental status    Facial Droop       History Provided By: Patient and EMS    HPI: Fiorella Jefferson, 81 y.o. female with a past medical history significant hypertension and stroke presents to the ED with chief complaint of Altered mental status and Facial Droop  . 4year-old female prior history of a stroke but no deficits. Patient was at her home cooking talking to family on the phone. Found later to be altered confused left-sided weakness with a facial droop. EMS was called for concerning for stroke. Level 1 stroke presents with 3 hours 10 minutes of symptoms. Patient is on Plavix and Eliquis. Patient is unable provide any history. No reported falls or trauma. There are no other complaints, changes, or physical findings at this time. PCP: River Reeder MD    Current Facility-Administered Medications   Medication Dose Route Frequency Provider Last Rate Last Admin    naloxone [facility]) injection 2 mg  2 mg IntraVENous NOW Poli REES MD         Current Outpatient Medications   Medication Sig Dispense Refill    sacubitril/valsartan (ENTRESTO PO) Take  by mouth. glipiZIDE (GLUCOTROL) 5 mg tablet Take 2 Tablets by mouth Daily (before breakfast). 30 Tablet 0    apixaban (Eliquis) 2.5 mg tablet Take 2.5 mg by mouth daily. spironolactone (ALDACTONE) 25 mg tablet Take 25 mg by mouth daily. acetaminophen (Tylenol Arthritis Pain) 650 mg TbER Take 650 mg by mouth every eight (8) hours as needed for Pain. Indications: pain      furosemide (LASIX) 40 mg tablet Take 1 Tablet by mouth daily. 30 Tablet 1    pantoprazole (PROTONIX) 40 mg tablet Take 1 Tablet by mouth Daily (before breakfast). 30 Tablet 1    clopidogreL (PLAVIX) 75 mg tab Take 1 Tablet by mouth daily.  30 Tablet 0    carvediloL (COREG) 3.125 mg tablet Take 1 Tablet by mouth two (2) times daily (with meals). 60 Tablet 0       Past History     Past Medical History:  Past Medical History:   Diagnosis Date    Colon polyps     Diabetes ([address]     Diverticulosis     Hypertension     Ill-defined condition     Pancreatitis        Past Surgical History:  Past Surgical History:   Procedure Laterality Date    COLONOSCOPY N/A 4/29/2021    COLONOSCOPY performed by Cari Haas MD at [facility]  2021    x2       Family History:  Family History   Problem Relation Age of Onset    Diabetes Other        Social History:  Social History     Tobacco Use    Smoking status: Never    Smokeless tobacco: Never   Vaping Use    Vaping Use: Never used   Substance Use Topics    Alcohol use: Not Currently    Drug use: Never       Allergies: Allergies   Allergen Reactions    Insulins Itching    Penicillins Other (comments)     Pt states it makes her pass out         Review of Systems   Review of Systems   Unable to perform ROS: Acuity of condition     Physical Exam   Physical Exam  Vitals and nursing note reviewed. Exam conducted with a chaperone present. Constitutional:       Appearance: Normal appearance. She is normal weight. HENT:      Head: Normocephalic and atraumatic. Nose: Nose normal.      Mouth/Throat:      Mouth: Mucous membranes are moist.      Pharynx: Oropharynx is clear. Eyes:      Extraocular Movements: Extraocular movements intact. Conjunctiva/sclera: Conjunctivae normal.      Pupils: Pupils are equal, round, and reactive to light. Cardiovascular:      Rate and Rhythm: Normal rate and regular rhythm. Pulses: Normal pulses. Heart sounds: Normal heart sounds. Pulmonary:      Effort: Pulmonary effort is normal. No respiratory distress. Breath sounds: Normal breath sounds. Abdominal:      General: Abdomen is flat. Bowel sounds are normal. There is no distension.       Palpations: Abdomen is soft.      Tenderness: There is no abdominal tenderness. There is no guarding. Musculoskeletal:         General: No swelling, tenderness, deformity or signs of injury. Normal range of motion. Cervical back: Normal range of motion and neck supple. Right lower leg: No edema. Left lower leg: No edema. Skin:     General: Skin is warm and dry. Capillary Refill: Capillary refill takes less than 2 seconds. Findings: No lesion or rash. Neurological:      General: No focal deficit present. Mental Status: She is alert and oriented to person, place, and time. Mental status is at baseline. She is confused. GCS: GCS eye subscore is 4. GCS verbal subscore is 5. GCS motor subscore is 6. Cranial Nerves: No cranial nerve deficit. Comments: L sided weaness, facial droop. Nonverb. NIH 22   Psychiatric:         Mood and Affect: Mood normal.         Behavior: Behavior normal.         Thought Content: Thought content normal.         Judgment: Judgment normal.       Diagnostic Study Results     Labs -     Recent Results (from the past 12 hour(s))   CBC WITH AUTOMATED DIFF    Collection Time: 10/31/22 12:42 PM   Result Value Ref Range    WBC 6.4 3.6 - 11.0 K/uL    RBC 4.34 3.80 - 5.20 M/uL    HGB 12.2 11.5 - 16.0 g/dL    HCT 37.1 35.0 - 47.0 %    MCV 85.5 80.0 - 99.0 FL    MCH 28.1 26.0 - 34.0 PG    MCHC 32.9 30.0 - 36.5 g/dL    RDW 12.8 11.5 - 14.5 %    PLATELET 703 (L) 163 - 400 K/uL    MPV 12.1 8.9 - 12.9 FL    NRBC 0.0 0.0  WBC    ABSOLUTE NRBC 0.00 0.00 - 0.01 K/uL    NEUTROPHILS 67 32 - 75 %    LYMPHOCYTES 24 12 - 49 %    MONOCYTES 7 5 - 13 %    EOSINOPHILS 1 0 - 7 %    BASOPHILS 0 0 - 1 %    IMMATURE GRANULOCYTES 1 (H) 0 - 0.5 %    ABS. NEUTROPHILS 4.3 1.8 - 8.0 K/UL    ABS. LYMPHOCYTES 1.6 0.8 - 3.5 K/UL    ABS. MONOCYTES 0.5 0.0 - 1.0 K/UL    ABS. EOSINOPHILS 0.0 0.0 - 0.4 K/UL    ABS. BASOPHILS 0.0 0.0 - 0.1 K/UL    ABS. IMM.  GRANS. 0.0 0.00 - 0.04 K/UL    DF AUTOMATED METABOLIC PANEL, COMPREHENSIVE    Collection Time: 10/31/22 12:42 PM   Result Value Ref Range    Sodium 133 (L) 136 - 145 mmol/L    Potassium 3.6 3.5 - 5.1 mmol/L    Chloride 98 97 - 108 mmol/L    CO2 23 21 - 32 mmol/L    Anion gap 12 5 - 15 mmol/L    Glucose 444 (H) 65 - 100 mg/dL    BUN 40 (H) 6 - 20 mg/dL    Creatinine 1.28 (H) 0.55 - 1.02 mg/dL    BUN/Creatinine ratio 31 (H) 12 - 20      eGFR 42 (L) >60 ml/min/1.73m2    Calcium 9.5 8.5 - 10.1 mg/dL    Bilirubin, total 0.8 0.2 - 1.0 mg/dL    AST (SGOT) 7 (L) 15 - 37 U/L    ALT (SGPT) 14 12 - 78 U/L    Alk.  phosphatase 152 (H) 45 - 117 U/L    Protein, total 6.2 (L) 6.4 - 8.2 g/dL    Albumin 3.1 (L) 3.5 - 5.0 g/dL    Globulin 3.1 2.0 - 4.0 g/dL    A-G Ratio 1.0 (L) 1.1 - 2.2     TROPONIN-HIGH SENSITIVITY    Collection Time: 10/31/22 12:42 PM   Result Value Ref Range    Troponin-High Sensitivity 230 (HH) 0 - 51 ng/L   PROTHROMBIN TIME + INR    Collection Time: 10/31/22 12:42 PM   Result Value Ref Range    Prothrombin time 16.0 (H) 11.9 - 14.6 sec    INR 1.3 (H) 0.9 - 1.1     LACTIC ACID    Collection Time: 10/31/22 12:42 PM   Result Value Ref Range    Lactic acid 3.8 (HH) 0.4 - 2.0 mmol/L   GLUCOSE, POC    Collection Time: 10/31/22 12:49 PM   Result Value Ref Range    Glucose (POC) 398 (H) 65 - 100 mg/dL    Performed by Tootie Au    URINALYSIS W/ REFLEX CULTURE    Collection Time: 10/31/22 12:54 PM    Specimen: Urine   Result Value Ref Range    Color Yellow/Straw      Appearance Clear Clear      Specific gravity 1.029 1.003 - 1.030      pH (UA) 5.0 5.0 - 8.0      Protein Negative Negative mg/dL    Glucose >300 (A) Negative mg/dL    Ketone 5 (A) Negative mg/dL    Bilirubin Negative Negative      Blood Negative Negative      Urobilinogen 0.1 0.1 - 1.0 EU/dL    Nitrites Negative Negative      Leukocyte Esterase Negative Negative      UA:UC IF INDICATED Culture not indicated by UA result Culture not indicated by UA result      WBC 0-4 0 - 4 /hpf    RBC 0-5 0 - 5 /hpf    Bacteria Negative Negative /hpf         Radiologic Studies -   XR CHEST PORT   Final Result   1. No evidence of an acute cardiopulmonary process. CTA CODE NEURO HEAD AND NECK W CONT   Final Result      1. 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 2. Unchanged occlusion of the nondominant diminutive right vertebral artery at   the V3-V4 junction, and additional severe stenosis at the right vertebral artery   origin. 3. Mild stenosis (less than 50% by NASCET criteria) of the proximal bilateral   internal carotid arteries. 4. Additional atherosclerotic disease as above. CT CODE NEURO HEAD WO CONTRAST   Final Result   No acute process or change compared to the prior exam.              CT Results  (Last 48 hours)                 10/31/22 1211  CTA CODE NEURO HEAD AND NECK W CONT Final result    Impression:      1. 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 2. Unchanged occlusion of the nondominant diminutive right vertebral artery at   the V3-V4 junction, and additional severe stenosis at the right vertebral artery   origin. 3. Mild stenosis (less than 50% by NASCET criteria) of the proximal bilateral   internal carotid arteries. 4. Additional atherosclerotic disease as above. Narrative:  EXAM:  CTA CODE NEURO HEAD AND NECK W CONT       INDICATION:   stroke       COMPARISON:  CT head 10/31/2022 MRI brain 5/5/2021 CTA neck 5/6/2021. CONTRAST:  100 mL of Isovue-370. TECHNIQUE:  Unenhanced  images were obtained to localize the volume for   acquisition. Multislice helical axial CT angiography was performed from the   aortic arch to the top of the head during uneventful rapid bolus intravenous   contrast administration. Coronal and sagittal reformations and 3D post   processing was performed.   CT dose reduction was achieved through use of a   standardized protocol tailored for this examination and automatic exposure   control for dose modulation. This study was analyzed by the [address]  algorithm. FINDINGS:       CTA Head:   There is no evidence of large vessel occlusion or flow-limiting stenosis of the   intracranial internal carotid, anterior cerebral, and middle cerebral arteries. Multifocal moderate stenoses of the bilateral A2 segments. Calcification of the   bilateral carotid siphons with mild stenoses. The anterior communicating artery   is patent, with small right A1 segment. See below regarding right vertebral artery occlusion. There is occlusion of the   left posterior cerebral artery in the proximal P2 segment with areas of distal   reconstitution. No other flow-limiting stenosis in the posterior circulation. The right posterior communicating artery is patent, the left is not well seen. There is no evidence of aneurysm or vascular malformation. The dural venous   sinuses and deep cerebral venous system are patent. No evidence of abnormal   enhancement on delayed phase images. Chronic microvascular ischemic disease and   multiple chronic infarcts again noted. CTA NECK:   NASCET method was utilized for calculating stenosis. Mild calcific atherosclerosis of the thoracic aorta. The common carotid arteries   demonstrate no significant stenosis. Calcific atherosclerosis of the proximal   right internal carotid artery with mild (less than 50%) stenosis, unchanged. Calcific atherosclerosis of the proximal left internal carotid artery with mild   (less than 50%) stenosis, unchanged. There is a left dominant vertebrobasilar arterial system. Severe stenosis at the   origin of the diminutive right vertebral artery, with discontinuous   opacification in the right V2 segment, and occlusion of the right V3-V4   junction, unchanged.  No significant stenosis of the dominant left vertebral   artery. Visualized soft tissues of the neck are unremarkable. Visualized lung apices are   clear. No acute fracture or aggressive osseous lesion. Severe degenerative disc   disease in the mid and lower cervical spine, and scattered areas of severe facet   arthropathy in the cervical spine. 10/31/22 1207  CT CODE NEURO HEAD WO CONTRAST Final result    Impression:  No acute process or change compared to the prior exam.               Narrative:  EXAM: CT CODE NEURO HEAD WO CONTRAST       INDICATION: Left-sided facial droop and weakness today       COMPARISON: 5/4/2021. CONTRAST: None. TECHNIQUE: Unenhanced CT of the head was performed using 5 mm images. Brain and   bone windows were generated. Coronal and sagittal reformats. CT dose reduction   was achieved through use of a standardized protocol tailored for this   examination and automatic exposure control for dose modulation. FINDINGS:   The ventricles and sulci are normal in size, shape and configuration. .   Significant small vessel ischemic changes again noted in the periventricular   white matter. Right occipital encephalomalacia is unchanged. There is no   intracranial hemorrhage, extra-axial collection, or mass effect. The basilar   cisterns are open. No CT evidence of acute infarct. The bone windows demonstrate no abnormalities. The visualized portions of the   paranasal sinuses and mastoid air cells are clear. Vascular calcification is   evident. CXR Results  (Last 48 hours)                 10/31/22 1235  XR CHEST PORT Final result    Impression:  1. No evidence of an acute cardiopulmonary process. Narrative:  EXAM:  XR CHEST PORT       INDICATION: weak       COMPARISON: Chest x-ray 11/19/2021. TECHNIQUE: Single frontal view of the chest.       FINDINGS: No lobar consolidation, pleural effusion, or pneumothorax. Unchanged   cardiomegaly.  Atherosclerotic calcifications of the aorta No acute or aggressive   osseous lesion. Medical Decision Making and ED Course   I am the first provider for this patient. I reviewed the vital signs, available nursing notes, past medical history, past surgical history, family history and social history. Vital Signs-Reviewed the patient's vital signs. Patient Vitals for the past 12 hrs:   Temp Pulse Resp BP SpO2   10/31/22 1305 -- 69 20 (!) 170/89 98 %   10/31/22 1221 (!) 95.1 °F (35.1 °C) 71 22 (!) 178/86 99 %       EKG interpretation:         Records Reviewed: Previous Hospital chart. EMS run report      ED Course:   Initial assessment performed. The patients presenting problems have been discussed, and they are in agreement with the care plan formulated and outlined with them. I have encouraged them to ask questions as they arise throughout their visit. Orders Placed This Encounter    CULTURE, BLOOD, PAIRED     Standing Status:   Standing     Number of Occurrences:   1    CULTURE, BLOOD, PAIRED     Standing Status:   Standing     Number of Occurrences:   1    CULTURE, BLOOD     Standing Status:   Standing     Number of Occurrences:   1    CT CODE NEURO HEAD WO CONTRAST     Standing Status:   Standing     Number of Occurrences:   1     Order Specific Question:   Transport     Answer:   Stretcher [5]     Order Specific Question:   Reason for Exam     Answer:   stroke     Order Specific Question:   Decision Support Exception     Answer:   Emergency Medical Condition (MA) [1]    CTA CODE NEURO HEAD AND NECK W CONT     Standing Status:   Standing     Number of Occurrences:   1     Order Specific Question:   Transport     Answer:   Stretcher [5]     Order Specific Question:   Reason for Exam     Answer:   stroke     Order Specific Question:   Does patient have history of Renal Disease?      Answer:   No     Order Specific Question:   Decision Support Exception     Answer:   Emergency Medical Condition (MA) [1]    XR CHEST PORT Standing Status:   Standing     Number of Occurrences:   1     Order Specific Question:   Reason for Exam     Answer:   weak    CBC WITH AUTOMATED DIFF     Standing Status:   Standing     Number of Occurrences:   1    METABOLIC PANEL, COMPREHENSIVE     Standing Status:   Standing     Number of Occurrences:   1    TROPONIN-HIGH SENSITIVITY     Standing Status:   Standing     Number of Occurrences:   1    PROTHROMBIN TIME + INR     Standing Status:   Standing     Number of Occurrences:   1    URINALYSIS W/ REFLEX CULTURE     Standing Status:   Standing     Number of Occurrences:   1    LACTIC ACID     Standing Status:   Standing     Number of Occurrences:   1    LACTIC ACID, PLASMA     If lactic acid level is greater than 2, then a repeat lactic acid level is to be drawn in 6 hours. Standing Status:   Standing     Number of Occurrences:   1    LACTIC ACID, PLASMA     If initial lactic acid level is greater than 2, then a repeat lactic acid level is to be drawn in 6 hours.      Standing Status:   Standing     Number of Occurrences:   71572    TSH 3RD GENERATION     Standing Status:   Standing     Number of Occurrences:   1    POC GLUCOSE     Standing Status:   Standing     Number of Occurrences:   1    GLUCOSE, POC     Standing Status:   Standing     Number of Occurrences:   1    EKG 12 LEAD INITIAL     Standing Status:   Standing     Number of Occurrences:   1     Order Specific Question:   Reason for Exam:     Answer:   ams    SAMPLE TO BLOOD BANK     Standing Status:   Standing     Number of Occurrences:   1    iopamidoL (ISOVUE-370) 76 % injection 100 mL    ondansetron (ZOFRAN) 4 mg/2 mL injection     Kiah Ellis: cabinet override    ondansetron (ZOFRAN) injection 4 mg    naloxone (NARCAN) injection 2 mg    insulin regular (NOVOLIN R, HUMULIN R) injection 7 Units    diphenhydrAMINE (BENADRYL) injection 12.5 mg                 Provider Notes (Medical Decision Making):   58-year-old female on Plavix and Eliquis. Presents 3 hours 10 minutes into an acute stroke. Patient is not a TNKase candidate secondary to being on anticoagulation. CTA performed showing a large vessel occlusion concerning of a distal basilar artery occlusion per reviewed by the neurologist as well as neuro interventional radiologist through [address] [facility].  neuro innerv recommending transfer to [facility] ER where they will evaluate for thrombectomy candidate based on NIH stroke scale performed by the neurologist.    ED Course as of 10/31/22 1343   Mon Oct 31, 2022   1243 EKG at 1234 normal sinus rhythm rate of 76. LVH. No ST changes. Biphasic T wave in lead III. V3. Regional dysrhythmia. Interpreted by ER physician. [HP]   36 ANABELLA neuro got NIH 22 ronnie nonverbal    Neuro innerv review CTA [HP]      ED Course User Index  [HP] Sawyer Cantrell MD     Elevated blood glucose 400s. Patient given insulin. With her allergy also given Benadryl. Initially hypertensive remains systolic 559P over 90. We will do serial blood pressures to ensure no medication is indicated with her acute stroke. Will have goal of blood pressure permissive hypertension. Consults      Accept by dr Timmy Meyers ed        Transferred to Another Facility    Procedures         CRITICAL CARE NOTE :  1:40 PM  Amount of Critical Care Time: 55 minutes    IMPENDING DETERIORATION -Airway, Respiratory, Cardiovascular, and CNS  ASSOCIATED RISK FACTORS - Hypoxia, Bleeding, and CNS Decompensation  MANAGEMENT- Bedside Assessment, Supervision of Care, and Transfer  INTERPRETATION -  CT Scan  INTERVENTIONS - hemodynamic mngmt  CASE REVIEW - Hospitalist/Intensivist  TREATMENT RESPONSE -Stable  PERFORMED BY - Self    NOTES   :  I have spent critical care time involved in lab review, consultations with specialist, family decision- making, bedside attention and documentation. This time excludes time spent in any separate billed procedures.   During this entire length of time I was immediately available to the patient . Britt Gregory MD          Disposition       Emergency Department Disposition:  Transferred to Another Facility      Diagnosis     Clinical Impression:   1. Cerebrovascular accident (CVA), unspecified mechanism ([address]        Attestations:    Britt Gregory MD    Please note that this dictation was completed with [email or web address], the computer voice recognition software. Quite often unanticipated grammatical, syntax, homophones, and other interpretive errors are inadvertently transcribed by the computer software. Please disregard these errors. Please excuse any errors that have escaped final proofreading. Thank you.

## 2022-10-31 NOTE — PROGRESS NOTES
Neurocritical Care Code Stroke Documentation    Patient arrives to the ED at 1430 as a stroke transfer from [facility] for mechanical thrombectomy due to posterior circulation occlusion seen on CTA. Symptoms:   Right-sided weakness, dysarthria, confusion   Baseline mRS 0   Last Known Well: After discussing with son, around 11:00 am today   Medical hx:   Past Medical History:   Diagnosis Date    Colon polyps     Diabetes ([address]     Diverticulosis     Hypertension     Ill-defined condition     Pancreatitis  Remote stroke       Anticoagulation: On Plavix and Eliquis    VAN:   Negative   NIHSS:   1a-LOC:1    1b-Month/Age:1    1c-Open/Close Hand:0    2-Best Gaze:0    3-Visual Fields:0    4-Facial Palsy:1    5a-Left Arm:0    5b-Right Arm:1    6a-Left Le    6b-Right Le    7-Limb Ataxia:0    8-Sensory:0    9-Best Language:0    10-Dysarthria:2    11-Extinction/Inattention:0  TOTAL SCORE:8   Imaging:   Imaging done at outside hospital:    CT: IMPRESSION  No acute process or change compared to the prior exam.       CTA: IMPRESSION     1. 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 2. Unchanged occlusion of the nondominant diminutive right vertebral artery at the V3-V4 junction, and additional severe stenosis at the right vertebral artery origin. 3. Mild stenosis (less than 50% by NASCET criteria) of the proximal bilateral  internal carotid arteries. 4. Additional atherosclerotic disease as above. Plan:   TNK Candidate: NO    Mechanical thrombectomy Candidate: YES, patient to go emergently to [facility] for mechanical thrombectomy. Patient vomited while in ED and had to receive a dose of Zofran. I tried calling the patient's son via phone for consent. He initially answered, but then we got disconnected. I tried to call back multiple times and I could not reach him. We will have to obtain emergency consent for procedure and anesthesia.      Discussed with Dr Jacque Jung, ED Physician Dr. Roge Ta, and RN    Arrival time: 8425  Time spent: 30 minutes. Addendum: I was able to reach patient's son around [phone] and spoke with him about the procedure. I updated him on plan of care.    Rae Fisher NP

## 2022-10-31 NOTE — Clinical Note
Left chest prepped with ChloraPrep Wet prep solution applied at: 719. Wet prep solution dried at: 722. Wet prep elapsed drying time: 3 mins.

## 2022-10-31 NOTE — ED TRIAGE NOTES
Pt arrives as transfer via EMS from [facility] w/ cc of stroke. Pt transferred to [facility] for thrombectomy. IR currently not available; pt in ED room until IR is ready. Pt NIH currently a 7. Pt A&Ox4 w/ some confusion. EMS reports Left sided weakness & facial droop. [facility] neuro NP notes R sided weakness. Pt on monitor waiting for IR.

## 2022-10-31 NOTE — ANESTHESIA PREPROCEDURE EVALUATION
Relevant Problems   NEUROLOGY   (+) CVA (cerebral vascular accident) ([facility] [address]      CARDIOVASCULAR   (+) NSTEMI (non-ST elevated myocardial infarction) [facility])       Anesthetic History   No history of anesthetic complications            Review of Systems / Medical History  Patient summary reviewed, nursing notes reviewed and pertinent labs reviewed    Pulmonary          Shortness of breath         Neuro/Psych       CVA  TIA     Cardiovascular    Hypertension      CHF  Dysrhythmias : atrial fibrillation  Past MI, CAD, PAD, cardiac stents (10/28/2021) and hyperlipidemia      Comments: 10/2021    Interpretation Summary    · LV: Estimated LVEF is 20 - 25%. Mildly dilated left ventricle. Increased wall thickness. Severely reduced systolic function. · LA: Moderately dilated left atrium. · PV: Mild pulmonic valve regurgitation is present. · AV: Aortic valve leaflet calcification present without reduced excursion. · MV: Mitral valve non-specific thickening. Moderate to severe mitral valve regurgitation is present. · TV: Moderate tricuspid valve regurgitation is present. Right Ventricular Arterial Pressure (RVSP) is 41 mmHg. · Pericardium: Trivial pericardial effusion.    GI/Hepatic/Renal         Renal disease: CRI       Endo/Other    Diabetes: type 2, using insulin    Anemia     Other Findings   Comments: Low PLTs         Past Medical History:   Diagnosis Date    Colon polyps     Diabetes ([facility] [address]     Diverticulosis     Hypertension     Ill-defined condition     Pancreatitis        Past Surgical History:   Procedure Laterality Date    COLONOSCOPY N/A 4/29/2021    COLONOSCOPY performed by Jo-Ann Shen MD at [address]  2021    x2       Current Outpatient Medications   Medication Instructions    acetaminophen (TYLENOL ARTHRITIS PAIN) 650 mg, Oral, EVERY 8 HOURS AS NEEDED    apixaban (ELIQUIS) 2.5 mg, Oral, DAILY    carvediloL (COREG) 3.125 mg, Oral, 2 TIMES DAILY WITH MEALS    clopidogreL (PLAVIX) 75 mg, Oral, DAILY    furosemide (LASIX) 40 mg, Oral, DAILY    glipiZIDE (GLUCOTROL) 10 mg, Oral, DAILY BEFORE BREAKFAST    pantoprazole (PROTONIX) 40 mg, Oral, DAILY BEFORE BREAKFAST    sacubitril/valsartan (ENTRESTO PO) Oral    spironolactone (ALDACTONE) 25 mg, Oral, DAILY       No current facility-administered medications for this visit. Current Outpatient Medications   Medication Sig    sacubitril/valsartan (ENTRESTO PO) Take  by mouth.  glipiZIDE (GLUCOTROL) 5 mg tablet Take 2 Tablets by mouth Daily (before breakfast).  apixaban (Eliquis) 2.5 mg tablet Take 2.5 mg by mouth daily.  spironolactone (ALDACTONE) 25 mg tablet Take 25 mg by mouth daily.  acetaminophen (Tylenol Arthritis Pain) 650 mg TbER Take 650 mg by mouth every eight (8) hours as needed for Pain. Indications: pain    furosemide (LASIX) 40 mg tablet Take 1 Tablet by mouth daily.  pantoprazole (PROTONIX) 40 mg tablet Take 1 Tablet by mouth Daily (before breakfast).  clopidogreL (PLAVIX) 75 mg tab Take 1 Tablet by mouth daily.  carvediloL (COREG) 3.125 mg tablet Take 1 Tablet by mouth two (2) times daily (with meals). Facility-Administered Medications Ordered in Other Visits   Medication Dose Route Frequency    lidocaine (XYLOCAINE) 20 mg/mL (2 %) injection 400 mg  20 mL SubCUTAneous RAD ONCE    iopamidoL (ISOVUE 300) 61 % contrast injection 100 mL  100 mL IntraCATHeter RAD ONCE    heparin 4000 units/1000 mL (4 units/mL) in NS infusion **FOR ANGIO USE ONLY**   IntraVENous RAD PRN    heparin (porcine) 1,000 unit/mL injection           No data found.     Lab Results   Component Value Date/Time    WBC 6.4 10/31/2022 12:42 PM    HGB 12.2 10/31/2022 12:42 PM    HCT 37.1 10/31/2022 12:42 PM    PLATELET 612 (L) 66/21/8075 12:42 PM    MCV 85.5 10/31/2022 12:42 PM     Lab Results   Component Value Date/Time    Sodium 133 (L) 10/31/2022 12:42 PM    Potassium 3.6 10/31/2022 12:42 PM    Chloride 98 10/31/2022 12:42 PM    CO2 23 10/31/2022 12:42 PM    Anion gap 12 10/31/2022 12:42 PM    Glucose 444 (H) 10/31/2022 12:42 PM    BUN 40 (H) 10/31/2022 12:42 PM    Creatinine 1.28 (H) 10/31/2022 12:42 PM    BUN/Creatinine ratio 31 (H) 10/31/2022 12:42 PM    GFR est AA >60 03/23/2022 04:11 AM    GFR est non-AA >60 03/23/2022 04:11 AM    Calcium 9.5 10/31/2022 12:42 PM     No results found for: APTT, PTP, INR, INREXT, INREXT  Lab Results   Component Value Date/Time    Glucose 444 (H) 10/31/2022 12:42 PM    Glucose (POC) 398 (H) 10/31/2022 12:49 PM     Physical Exam    Airway  Mallampati: II  TM Distance: > 6 cm  Neck ROM: normal range of motion   Mouth opening: Normal     Cardiovascular  Regular rate and rhythm,  S1 and S2 normal,  no murmur, click, rub, or gallop             Dental  No notable dental hx       Pulmonary  Breath sounds clear to auscultation               Abdominal  GI exam deferred       Other Findings            Anesthetic Plan    ASA: 4, emergent  Anesthesia type: general          Induction: Intravenous  Anesthetic plan and risks discussed with: Patient and Family

## 2022-10-31 NOTE — PROGRESS NOTES
1725: TRANSFER - IN REPORT:    Verbal report received from Stephen Ramos RN (name) on Dieudonne Lemos  being received from Angio (unit) for routine progression of care      Report consisted of patients Situation, Background, Assessment and   Recommendations(SBAR). Information from the following report(s) SBAR, Kardex, Procedure Summary, and Dual Neuro Assessment was reviewed with the receiving nurse. Opportunity for questions and clarification was provided. Assessment completed upon patients arrival to unit and care assumed. 1700: Pt arrived to ICU 2. Pt drowsy, orientedx4, CHRISTIANSEN equally, PERRLA, no loss of sensation, no facial droop. Speech slightly slurred and delayed. Denies HA. Pulses palpable in BLE. Right groin site C/D/I, no hematoma or oozing. Temp 93.7, placed on Lynnette Hugger. NIH 1. Primary Nurse Yessica Potts and Nicki COCHRAN RN performed a dual skin assessment on this patient No impairment noted  Jon score is 15    1930: Bedside shift change report given to Kam Bennett RN (oncoming nurse) by Nba Walsh RN (offgoing nurse). Report included the following information SBAR, Kardex, Procedure Summary, Intake/Output, MAR, Accordion, and Dual Neuro Assessment.

## 2022-10-31 NOTE — Clinical Note
Coronary sinus lead screwed into place. OBSERVATION GOALS:      Assess for viability of myocardium    -Treat BP, goal close to 120/90 - monitor daily    -Treat symptoms of angina (lisinopril, nitroprusside SL) - met     -Daily labs to monitor for signs of end organ damage - continue daily    -I/O, daily weights, heart failure maintenance - continue daily

## 2022-10-31 NOTE — Clinical Note
Coronary sinus lead inserted. , advanced. and positioned.  [facility] [id number]

## 2022-11-01 NOTE — PROGRESS NOTES
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 Rebecca David.     NIHSS prior to thrombectomy was 8 and post thrombectomy is currently at 2 for dysmetria and decreased sensation to LLE. Pending MRI Brain wo contrast. Will resume AC after mri depending on the stroke burden. 05:30   P2y12 not therapeutic 222. Will resume Plavix and give 75mg NOW and continue daily. Discussed with Dr. Rebecca David    06:30  Patient c/o R flank severe pain. 25mg fentanyl given per intensivist; ordered Lidocaine patch. Will obtain Abd US to ensure no retro-peritoneal bleed. In the interim, will hold Plavix and if negative, will resume however, will require ng-tube as patient failed bed-side swallow eval     Physical Exam:  Gen: NAD, calm, cooperative  Lungs: CTAB ant b/l, non-labored,  ABD: + bowel sounds, firm, non-tender, non-distended   Neuro: Alert, oriented to self, place, situation, time. Follows commands. Speech clear. PERRL, 3 mm bilaterally. No disconjugate gaze present. EOMI. Face symmetric. Palate symmetric. Wolfgang spontaneously and purposefully 5/5 strength proximally/distally. Bulk and tone normal. No involuntary movements. Gait: deferred  Sensation: decreased sensation to LLE  Coordination: L dysmetria noted  Skin: Warm, dry, color appropriate for ethnicity. R groin surgical site C/D/I, no bleeding/hematoma/bruises, no tenderness       NIHSS  1a  Level of consciousness: 0=alert; keenly responsive   1b. LOC questions:  0=Answers both questions correctly   1c. LOC commands: 0=Performs both tasks correctly   2. Best Gaze: 0=normal   3. Visual: 0=No visual loss   4. Facial Palsy: 0=Normal symmetric movement   5a. Motor left arm: 0=No drift, arm holds 90 (or 45) degrees for full 10 seconds   5b. Motor right arm: 0=No drift, arm holds 90 (or 45) degrees for full 10 seconds   6a.  Motor left le=No drift; leg holds 30-degree position for full 5 seconds. 6b  Motor right le=No drift; leg holds 30-degree position for full 5 seconds. 7. Limb Ataxia: 1=Present in one limb   8. Sensory: 1=Mild to moderate sensory loss; patient feels pinprick is less sharp or is dull on the affected side; or there is a loss of superficial pain with pinprick but patient is aware of being touched. 9. Best Language:  0=No aphasia, normal   10. Dysarthria: 0=Normal   11.  Extinction and Inattention: 0=No abnormality    Total:    2        Continue current plan per NIS.     >25% time spent in counseling or coordination of care of the above in the assessment and plan       ERMA Hopper-NP  Neurocritical Care Nurse Practitioner  [phone]

## 2022-11-01 NOTE — PROGRESS NOTES
Reviewed chart for transitions of care, and discussed in rounds. CM met with patient at bedside to explain role and offer support. Patient is alert and oriented x4, and confirmed demographics. Baseline:   ADLs/IDALS: Independent  Previous Home Health: Years ago  Previous SNF/IPR: [facility]  ER Contact: Ahmet Chowdary December 499 [phone]    Patient lives in a single  level house with 4  steps to enter. Patient is independent with ADLs/IDALs   Patient uses a  cane  to ambulate. Patient's preferred pharmacy is [facility]  located on [address] . Patient's family  is expected to transport at discharge. Reason for Admission:   CVA                    RUR Score:  19%              PCP: First and Last name:   Pedro Mckay MD     Name of Practice:    Are you a current patient: Yes/No: Yes   Approximate date of last visit:    Can you participate in a virtual visit if needed: No    Do you (patient/family) have any concerns for transition/discharge? None voiced                 Plan for utilizing home health:   TBD    Current Advanced Directive/Advance Care Plan:  Full Code      Healthcare Decision Maker:   Click here to complete [address] including selection of the Healthcare Decision Maker Relationship (ie \"Primary\")            Primary Decision Maker: tracie hobbs - Ahmet - [phone]    Transition of Care Plan:           Patient presented to [facility] from home with AMS, facial droop and weakness. Transferred to [facility] for Thrombectomy. Care manager met with patient to introduce self and explain role. Patient lives independently in her own home, her son Haleigh Campbell lives next door. Per patient she has 5 supportive children and grandchildren. She uses a cane but also has a walker and a WC. She sees her PCP every 3 months, her son takes her to appointments. Care manager confirmed demographic information. Will follow for transitions of care.   Jayden Borrego RN,Care Management  Care Management Interventions  PCP Verified by CM: Yes (Dr Yvon Hoover)  [address] Belinda Khalil: No  Discharge Durable Medical Equipment: No  Physical Therapy Consult: Yes  Occupational Therapy Consult: Yes  Speech Therapy Consult: Yes  Support Systems: Child(francisco) (Son Mike Greeley [phone])  Confirm Follow Up Transport: Family Medicare pt has received, reviewed, and signed 1 st IM letter informing them of their right to appeal the discharge. Signed copy has been placed on pt bedside chart.

## 2022-11-01 NOTE — CONSULTS
Neurology Attending Addendum:    Overview, interval history and physical as documented by Ms. Evonne Rubinstein reviewed and agree with its contents. On exam patient is somewhat lethargic, and just working with therapy services and is a bit worn out. Other than being tired says she feels generally weak but really has no complaints. Denies any dysphagia. Is not particularly talkative but does not appear to endorse any focal symptoms. Discussion regarding her past history was limited. On exam exam is as noted below agree with dysmetria of left upper extremity which is rather overt as being the main symptom on limited examination. At this juncture, patient is undergone serologic assessment with a notably elevated A1c and an LDL just above goal for which atorvastatin was decreased to 10 mg.  Currently is on Plavix, etiology of infarct remains either cardioembolic or atheroembolic from proximal posterior circulation disease, patient does have an echocardiogram from does not 21 raising concern for embolism from both PFF as well as systolic cardiomyopathy but will need to better understand why patient was on Eliquis (objective reason versus empiric given reported stroke). Ultimately may need to return to regimen of antithrombotic and anticoagulation versus full dose aspirin and Plavix with outpatient cardiac monitoring. Will await repeat echo as well. Would maintain blood pressure according to parameters set out by NIS. Remains pending repeat CT scan would likely plan to keep in ICU for today for close monitoring given cerebellar involvement and discharge to stepdown unit tomorrow. Please call question concerns        NEUROLOGY CONSULT  Desirae Antonio NP        Date Time: 11/01/22 2:30 AM  [facility]  Attending Physician: Leonard Montez MD    REASON FOR CONSULTATION:   EVAL AND TREAT TIA/CVA    History of Present Illness:   Mazin Thompson is a 81 y.o.  F with a pmh of Hypertension, DM with Retinopathy, colon polyps, retinal vein occlusion of left eye, diverticulosis, pancreatitis, CAD s/p stent, HLD, Cardiomyopathy with EF 25%, and multiple strokes in the past who presented to [facility] on 10/31/22 via EMS as level one code S alert with altered mental status, Right-sided weakness, and dysarthria. CT head showed no acute process. Patient was seen by tele-neuro and NIHSS was 22. Blood glucose was in the 400's. The patient was not deemed tNK canadiadtae as patient has taken Eliquis and Plavix. CTA H/N showed occlusion of the left posterior cerebral artery in the proximal P2 segment with areas of distal reconstitution. Unchanged occlusion of the nondominant diminutive right vertebral artery at the V3-V4 junction, and additional severe stenosis at the right vertebral artery origin. NIS was consulted and the patient was transferred to [facility] for a emergent mechanical thrombectomy. Upon arrival to [facility], the patient's NIHSS was 8. Post thrombectomy NIHSS was 2. The patient was admitted to ICU post thrombectomy for close monitoring and neurology was consulted for stroke maganement. No reported history of atrial fibrillation.      Past Medical History:     Past Medical History:   Diagnosis Date    Colon polyps     Diabetes ([facility] [address]     Diverticulosis     Hypertension     Ill-defined condition     Pancreatitis        Allergies   Allergen Reactions    Insulins Itching    Penicillins Other (comments)     Pt states it makes her pass out       Social & Family History:     Social History     Socioeconomic History    Marital status:      Spouse name: Not on file    Number of children: Not on file    Years of education: Not on file    Highest education level: Not on file   Occupational History    Not on file   Tobacco Use    Smoking status: Never    Smokeless tobacco: Never   Vaping Use    Vaping Use: Never used   Substance and Sexual Activity    Alcohol use: Not Currently    Drug use: Never    Sexual activity: Not on file   Other Topics Concern     Service Not Asked    Blood Transfusions Not Asked    Caffeine Concern Not Asked    Occupational Exposure Not Asked    [address] Hazards Not Asked    Sleep Concern Not Asked    Stress Concern Not Asked    Weight Concern Not Asked    Special Diet Not Asked    Back Care Not Asked    Exercise Not Asked    Bike Helmet Not Asked    Seat Belt Not Asked    Self-Exams Not Asked   Social History Narrative    Lives at home with daughter, independent with activity     Social Determinants of Health     Financial Resource Strain: Not on file   Food Insecurity: Not on file   Transportation Needs: Not on file   Physical Activity: Not on file   Stress: Not on file   Social Connections: Not on file   Intimate Partner Violence: Not on file   Housing Stability: Not on file       Family History   Problem Relation Age of Onset    Diabetes Other      Meds:     Current Facility-Administered Medications   Medication Dose Route Frequency    heparin 4000 units/1000 mL (4 units/mL) in NS infusion **FOR ANGIO USE ONLY**   IntraVENous RAD PRN    heparin (porcine) 1,000 unit/mL injection        sodium chloride (NS) flush 5-40 mL  5-40 mL IntraVENous Q8H    sodium chloride (NS) flush 5-40 mL  5-40 mL IntraVENous PRN    lidocaine (PF) (XYLOCAINE) 10 mg/mL (1 %) injection 0.5 mL  0.5 mL SubCUTAneous PRN    fentaNYL citrate (PF) injection 50 mcg  50 mcg IntraVENous PRN    midazolam (VERSED) injection 1 mg  1 mg IntraVENous PRN    midazolam (VERSED) injection 1 mg  1 mg IntraVENous PRN    acetaminophen (TYLENOL) tablet 650 mg  650 mg Oral ONCE    ropivacaine (PF) (NAROPIN) 5 mg/mL (0.5 %) injection 30 mL  30 mL Peripheral Nerve Block PRN    sodium chloride (NS) flush 5-40 mL  5-40 mL IntraVENous Q8H    sodium chloride (NS) flush 5-40 mL  5-40 mL IntraVENous PRN    oxyCODONE IR (ROXICODONE) tablet 5 mg  5 mg Oral PRN    fentaNYL citrate (PF) injection 25 mcg  25 mcg IntraVENous Multiple    morphine injection 2 mg  2 mg IntraVENous Multiple    ondansetron (ZOFRAN) injection 4 mg  4 mg IntraVENous PRN    midazolam (VERSED) injection 1 mg  1 mg IntraVENous Q5MIN PRN    meperidine (DEMEROL) injection 12.5 mg  12.5 mg IntraVENous ONCE    acetaminophen (TYLENOL) tablet 650 mg  650 mg Oral Q4H PRN    Or    acetaminophen (TYLENOL) solution 650 mg  650 mg Per NG tube Q4H PRN    Or    acetaminophen (TYLENOL) suppository 650 mg  650 mg Rectal Q4H PRN    sodium chloride (NS) flush 5-40 mL  5-40 mL IntraVENous Q8H    sodium chloride (NS) flush 5-40 mL  5-40 mL IntraVENous PRN    niCARdipine (CARDENE) 25 mg in 0.9% sodium chloride 250 mL ([id number])  0-15 mg/hr IntraVENous TITRATE    [Held by provider] apixaban (ELIQUIS) tablet 2.5 mg  2.5 mg Oral DAILY    [Held by provider] clopidogreL (PLAVIX) tablet 75 mg  75 mg Oral DAILY    pantoprazole (PROTONIX) tablet 40 mg  40 mg Oral ACB    insulin lispro (HUMALOG) injection   SubCUTAneous Q6H    glucose chewable tablet 16 g  4 Tablet Oral PRN    glucagon (GLUCAGEN) injection 1 mg  1 mg IntraMUSCular PRN    dextrose 10 % infusion 0-250 mL  0-250 mL IntraVENous PRN    0.9% sodium chloride infusion  100 mL/hr IntraVENous CONTINUOUS    hydrALAZINE (APRESOLINE) 20 mg/mL injection 20 mg  20 mg IntraVENous Q6H PRN     I personally reviewed all of the medications    Review of Systems:   No headache, eye, ear nose, throat problems; no coughing or wheezing or shortness of breath, No chest pain or orthopnea, no abdominal pain, nausea or vomiting, No pain in the body or extremities, no psychiatric, neurological, endocrine, hematological or cardiac complaints except as noted above. Physical Exam:   Blood pressure (!) 114/55, pulse 70, temperature 99.2 °F (37.3 °C), resp. rate 22, height 5' 6\" (1.676 m), weight 42.4 kg (93 lb 7.6 oz), SpO2 97 %. GEN: NAD, Cooperative, Calm  HEENT: Normocephalic.  Non-icter, no congestion  Lungs:  CTA bilaterally Ant, non-labored breathing   Cardiac: S1,S2, irreg irreg rate and rhythm, no carotid bruits, no gallops  Abdomen: Normal bowel sounds, no distention, non-tender  Extremities: no clubbing, cyanosis, or edema  Skin: no rashes or lesions noted; R groin surgical site C/D/I, no bleeding/hematoma/bruises, no tenderness       NEURO:  Mental status: A & O x 4. Slow to response otherwise able to follow commands appropriately  Cranial Nerves: II-XII intact; EOMI, PERRL, No dysarthria or aphasia. Full facial strength, no asymmetry. Hearing intact bilaterally. Tongue protrudes to midline, palate elevates symmetrically. Shrug Shoulders B/L  Motor: Normal bulk and tone, mild LUE drift otherwise 5/5 strength x 4 extremities proximally and distally; No involuntary movements. Coordination: dysmetria in LUE,intact FTN  to R and HTS testing  Reflexes: +2 throughout, up-going toes bilaterally   Sensation: decreased sensation to LLE; intact x 3 extremities to light touch  Gait:  Deferred     NIHSS  1a  Level of consciousness: 0=alert; keenly responsive   1b. LOC questions:  0=Answers both questions correctly   1c. LOC commands: 0=Performs both tasks correctly   2. Best Gaze: 0=normal   3. Visual: 0=No visual loss   4. Facial Palsy: 0=Normal symmetric movement   5a. Motor left arm: 0=No drift, arm holds 90 (or 45) degrees for full 10 seconds   5b. Motor right arm: 0=No drift, arm holds 90 (or 45) degrees for full 10 seconds   6a. Motor left le=No drift; leg holds 30-degree position for full 5 seconds. 6b  Motor right le=No drift; leg holds 30-degree position for full 5 seconds. 7. Limb Ataxia: 1=Present in one limb   8. Sensory: 1=Mild to moderate sensory loss; patient feels pinprick is less sharp or is dull on the affected side; or there is a loss of superficial pain with pinprick but patient is aware of being touched. 9. Best Language:  0=No aphasia, normal   10. Dysarthria: 0=Normal   11.  Extinction and Inattention: 0=No abnormality     Total:    2       Labs/images:     Lab Results   Component Value Date/Time    WBC 6.4 10/31/2022 12:42 PM    HGB 12.2 10/31/2022 12:42 PM    HCT 37.1 10/31/2022 12:42 PM    PLATELET 123 (L) 15/96/1842 12:42 PM    MCV 85.5 10/31/2022 12:42 PM      Lab Results   Component Value Date/Time    Sodium 137 10/31/2022 08:06 PM    Potassium 4.5 10/31/2022 08:06 PM    Chloride 105 10/31/2022 08:06 PM    CO2 19 (L) 10/31/2022 08:06 PM    Anion gap 13 10/31/2022 08:06 PM    Glucose 364 (H) 10/31/2022 08:06 PM    BUN 32 (H) 10/31/2022 08:06 PM    Creatinine 1.12 (H) 10/31/2022 08:06 PM    BUN/Creatinine ratio 29 (H) 10/31/2022 08:06 PM    GFR est AA >60 03/23/2022 04:11 AM    GFR est non-AA >60 03/23/2022 04:11 AM    Calcium 9.0 10/31/2022 08:06 PM    Bilirubin, total 0.8 10/31/2022 12:42 PM    Alk. phosphatase 152 (H) 10/31/2022 12:42 PM    Protein, total 6.2 (L) 10/31/2022 12:42 PM    Albumin 3.1 (L) 10/31/2022 12:42 PM    Globulin 3.1 10/31/2022 12:42 PM    A-G Ratio 1.0 (L) 10/31/2022 12:42 PM    ALT (SGPT) 14 10/31/2022 12:42 PM    AST (SGOT) 7 (L) 10/31/2022 12:42 PM         CT Results (most recent):  Results from Hospital Encounter encounter on 10/31/22    CTA CODE NEURO HEAD AND NECK W CONT    Narrative  EXAM:  CTA CODE NEURO HEAD AND NECK W CONT    INDICATION:   stroke    COMPARISON:  CT head 10/31/2022 MRI brain 5/5/2021 CTA neck 5/6/2021. CONTRAST:  100 mL of Isovue-370. TECHNIQUE:  Unenhanced  images were obtained to localize the volume for  acquisition. Multislice helical axial CT angiography was performed from the  aortic arch to the top of the head during uneventful rapid bolus intravenous  contrast administration. Coronal and sagittal reformations and 3D post  processing was performed. CT dose reduction was achieved through use of a  standardized protocol tailored for this examination and automatic exposure  control for dose modulation. This study was analyzed by the [address]  algorithm.     FINDINGS:    CTA Head:  There is no evidence of large vessel occlusion or flow-limiting stenosis of the  intracranial internal carotid, anterior cerebral, and middle cerebral arteries. Multifocal moderate stenoses of the bilateral A2 segments. Calcification of the  bilateral carotid siphons with mild stenoses. The anterior communicating artery  is patent, with small right A1 segment. See below regarding right vertebral artery occlusion. There is occlusion of the  left posterior cerebral artery in the proximal P2 segment with areas of distal  reconstitution. No other flow-limiting stenosis in the posterior circulation. The right posterior communicating artery is patent, the left is not well seen. There is no evidence of aneurysm or vascular malformation. The dural venous  sinuses and deep cerebral venous system are patent. No evidence of abnormal  enhancement on delayed phase images. Chronic microvascular ischemic disease and  multiple chronic infarcts again noted. CTA NECK:  NASCET method was utilized for calculating stenosis. Mild calcific atherosclerosis of the thoracic aorta. The common carotid arteries  demonstrate no significant stenosis. Calcific atherosclerosis of the proximal  right internal carotid artery with mild (less than 50%) stenosis, unchanged. Calcific atherosclerosis of the proximal left internal carotid artery with mild  (less than 50%) stenosis, unchanged. There is a left dominant vertebrobasilar arterial system. Severe stenosis at the  origin of the diminutive right vertebral artery, with discontinuous  opacification in the right V2 segment, and occlusion of the right V3-V4  junction, unchanged. No significant stenosis of the dominant left vertebral  artery. Visualized soft tissues of the neck are unremarkable. Visualized lung apices are  clear. No acute fracture or aggressive osseous lesion.  Severe degenerative disc  disease in the mid and lower cervical spine, and scattered areas of severe facet  arthropathy in the cervical spine. Impression  1. Occlusion of the left posterior cerebral artery in the proximal P2 segment  with areas of distal reconstitution. 2. Unchanged occlusion of the nondominant diminutive right vertebral artery at  the V3-V4 junction, and additional severe stenosis at the right vertebral artery  origin. 3. Mild stenosis (less than 50% by NASCET criteria) of the proximal bilateral  internal carotid arteries. 4. Additional atherosclerotic disease as above. Chart reviewed    Assessment & Plan:   81 y.o. F with a pmh of Hypertension, DM with Retinopathy, colon polyps, retinal vein occlusion of left eye, diverticulosis, pancreatitis, CAD s/p stent, HLD, Cardiomyopathy with EF 25%, and multiple strokes who is s/p thrombectomy for LPCA occlusion in the proximal P2 segment. Presumed etiology is cardio-embolic/cardiomyopathy with low EF of 25% on previous Echo. Suspect underlying PAF given dilated L atrium. Pending TTE  MRI brain completed; pending radiology read. Upon my review, noted bilateral cerebellum strokes largest in the left.    Vessel imaging study with CTA head and neck completed  Pending Lipid panel, TSH, Hemoglobin A1c for tight glycemic control, and troponin  Continue with secondary stroke prevention, antiplatelet, high intensity statin Lipitor 80mg daily, and BP control keep SBP<140  DVT prophy  NPO until swallow eval  Stroke education  PT/OT/ST evals  Q1hr neuro checks   Hold any anticoagulant given size of cerebellar stroke to avoid any hemorrhagic conversion  Given size of cerebellar stroke, will repeat CTH in 24hr    Low threshold to repeat CT head if any worsening change in neurologic condition       Further neurology recommendations to follow by Dr. Jabari Baeza     Case discussed with: intensivist, NP Zenon Walker, patient, and primary nurse       >55% time spent in counseling or coordination of care of the above in the assessment and plan     Signed by: Eber Santos, NP      Please note that this dictation was completed with [employer], the computer voice recognition software. Quite often unanticipated grammatical, syntax, homophones, and other interpretive errors are inadvertently transcribed by the computer software. Please disregard these errors. Please excuse any errors that have escaped final proofreading.

## 2022-11-01 NOTE — PROGRESS NOTES
SLP Contact Note    SLP evaluation complete. Recommend easy to chew diet/thin liquids. Having some odynophagia. If this persists can consider a swish/swallow mouth wash. Full note to follow.       Thank you,  ADINA ChaudhryEd, [id number] [facility]  Speech-Language Pathologist

## 2022-11-01 NOTE — PROGRESS NOTES
Spiritual Care Assessment/Progress Note  [facility]      NAME: Toan Gomez      MRN: [id number]  AGE: 81 y.o.  SEX: female  Yarsanism Affiliation: Madelinh witness   Language: English     11/1/2022     Total Time (in minutes): 40     Spiritual Assessment begun in [address] [facility] through conversation with:         [x]Patient        [] Family    [] Friend(s)        Reason for Consult: Initial/Spiritual assessment, critical care     Spiritual beliefs: (Please include comment if needed)     [x] Identifies with a hernandez tradition:    Pati witness     [] Supported by a hernandez community:            [] Claims no spiritual orientation:           [] Seeking spiritual identity:                [] Adheres to an individual form of spirituality:           [] Not able to assess:                           Identified resources for coping:      [] Prayer                               [] Music                  [] Guided Imagery     [x] Family/friends                 [] Pet visits     [] Devotional reading                         [] Unknown     [] Other:                                               Interventions offered during this visit: (See comments for more details)    Patient Interventions: Bridging, Normalization of emotional/spiritual concerns, Catharsis/review of pertinent events in supportive environment           Plan of Care:     [x] Support spiritual and/or cultural needs    [] Support AMD and/or advance care planning process      [] Support grieving process   [] Coordinate Rites and/or Rituals    [] Coordination with community clergy   [] No spiritual needs identified at this time   [] Detailed Plan of Care below (See Comments)  [] Make referral to Music Therapy  [] Make referral to Pet Therapy     [] Make referral to Addiction services  [] Make referral to [facility]  [] Make referral to Spiritual Care Partner  [] No future visits requested        [x] Contact Spiritual Care for further referrals Comments:  was rounding in the ICU. Wily Lebron was seen in room 2. She shared that she is Jehovah Witness and her hernandez bring her comfort. She shared she has a son and was feeling tried.  proved active listening, support, orientation to spiritual care and built relation through building a relationship. Advised nurse to contact [facility] for any further referrals.     Chaplain Kiera Patel MDiv, MAPT

## 2022-11-01 NOTE — PROGRESS NOTES
NeuroInterventional Surgery Progress Note  Zo Wyman NP    Admit Date: 10/31/2022   LOS: 1 day      Daily Progress Note: 11/1/2022    HPI: Sabine Vaca is a 81 y.o. F with a pmh of DMII, HTN, and remote stroke with no residual deficits who presented to [facility] ED 10/31/22 with AMS and left sided weakness. A Code Stroke was called and a stat CT head was obtained which was negative for acute process. NIHSS was 22 and she was not candidate for TNK. Reportedly, she is on Plavix and Eliquis at home but her son does not know why she takes these. CTA head/neck was then obtained which showed left PCA occlusion. NIS was consulted and she was then transferred to [facility] for higher level of care. Upon arrival, she underwent emergent endovascular reperfusion with Dr Mannie Alvarez, TICI:3.     Subjective:     No neuro events overnight. NIS will sign off, further stroke management per Neurology. Please call with any further questions or concerns. Allergies   Allergen Reactions    Insulins Itching    Penicillins Other (comments)     Pt states it makes her pass out       Review of Systems:  Pertinent items are noted in the History of Present Illness. Past Medical History:   Diagnosis Date    Colon polyps     Diabetes ([facility] [address]     Diverticulosis     Hypertension     Ill-defined condition     Pancreatitis      Family History   Problem Relation Age of Onset    Diabetes Other      Social History     Tobacco Use    Smoking status: Never    Smokeless tobacco: Never   Substance Use Topics    Alcohol use: Not Currently      Prior to Admission Medications   Prescriptions Last Dose Informant Patient Reported? Taking?   acetaminophen (Tylenol Arthritis Pain) 650 mg TbER   Yes No   Sig: Take 650 mg by mouth every eight (8) hours as needed for Pain. Indications: pain   apixaban (Eliquis) 2.5 mg tablet   Yes No   Sig: Take 2.5 mg by mouth daily.    carvediloL (COREG) 3.125 mg tablet   No No   Sig: Take 1 Tablet by mouth two (2) times daily (with meals). clopidogreL (PLAVIX) 75 mg tab   No No   Sig: Take 1 Tablet by mouth daily. furosemide (LASIX) 40 mg tablet   No No   Sig: Take 1 Tablet by mouth daily. glipiZIDE (GLUCOTROL) 5 mg tablet   No No   Sig: Take 2 Tablets by mouth Daily (before breakfast). pantoprazole (PROTONIX) 40 mg tablet   No No   Sig: Take 1 Tablet by mouth Daily (before breakfast). sacubitril/valsartan (ENTRESTO PO)   Yes No   Sig: Take  by mouth. spironolactone (ALDACTONE) 25 mg tablet   Yes No   Sig: Take 25 mg by mouth daily. Facility-Administered Medications: None       Objective:   Vital signs  Temp (24hrs), Av.1 °F (35.6 °C), Min:92.7 °F (33.7 °C), Max:99.2 °F (37.3 °C)   No intake/output data recorded. 10/30 1901 -  0700  In: 1063.3 [I.V.:1063.3]  Out:  [Urine:]  Visit Vitals  BP (!) 126/59   Pulse 81   Temp 99.2 °F (37.3 °C)   Resp 22   Ht 5' 6\" (1.676 m)   Wt 93 lb 7.6 oz (42.4 kg)   SpO2 98%   BMI 15.09 kg/m²      O2 Device: None (Room air)   Vitals:    22 0400 22 0500 22 0600 22 0700   BP: 122/69 105/67 (!) 108/56 (!) 126/59   Pulse: 90 70 73 81   Resp: 25  22   Temp: 99.2 °F (37.3 °C)      SpO2: 98% 99% 97% 98%   Weight:       Height:            Physical Exam:  GENERAL: Calm, cooperative, NAD  SKIN: Warm, dry, color appropriate for ethnicity. Groin site soft, with no odor, bleeding or drainage noted. Neurologic Exam:  Mental Status:  Alert and oriented x 4. Appropriate affect, mood and behavior. Language:    Normal fluency, repetition, comprehension and naming. Cranial Nerves:   Pupils 3 mm, equal, round and reactive to light. Visual fields full to confrontation. Extraocular movements intact. Facial sensation intact. Left facial droop present. Hearing grossly intact bilaterally. No dysarthria. Shoulder shrug 5/5 bilaterally. Motor:    No pronator drift.       Bulk and tone normal.      5/5 power in all extremities proximally and distally. No involuntary movements. Sensation:    Sensation intact throughout to light touch    Coordination & Gait: Gait deferred. FTN with ataxia present. NIHSS:      1a-LOC:0    1b-Month/Age:0    1c-Open/Close Hand:0    2-Best Gaze:0    3-Visual Fields:0    4-Facial Palsy:1    5a-Left Arm:0    5b-Right Arm:0    6a-Left Le    6b-Right Le    7-Limb Ataxia:2    8-Sensory:0    9-Best Language:0    10-Dysarthria:0    11-Extinction/Inattention:0  TOTAL SCORE:3    Labs:  Lab Results   Component Value Date/Time    WBC 11.0 2022 03:30 AM    HGB 12.1 2022 03:30 AM    HCT 36.7 2022 03:30 AM    PLATELET 070 (L) 10/45/8082 03:30 AM    MCV 84.6 2022 03:30 AM     Lab Results   Component Value Date/Time    Sodium 138 2022 03:30 AM    Potassium 4.1 2022 03:30 AM    Chloride 109 (H) 2022 03:30 AM    CO2 24 2022 03:30 AM    Anion gap 5 2022 03:30 AM    Glucose 181 (H) 2022 03:30 AM    BUN 29 (H) 2022 03:30 AM    Creatinine 1.18 (H) 2022 03:30 AM    BUN/Creatinine ratio 25 (H) 2022 03:30 AM    GFR est AA >60 2022 04:11 AM    GFR est non-AA >60 2022 04:11 AM    Calcium 8.9 2022 03:30 AM     Imaging:  CT Results (maximum last 3): Results from Hospital Encounter encounter on 10/31/22    CTA CODE NEURO HEAD AND NECK W CONT    Narrative  EXAM:  CTA CODE NEURO HEAD AND NECK W CONT    INDICATION:   stroke    COMPARISON:  CT head 10/31/2022 MRI brain 2021 CTA neck 2021. CONTRAST:  100 mL of Isovue-370. TECHNIQUE:  Unenhanced  images were obtained to localize the volume for  acquisition. Multislice helical axial CT angiography was performed from the  aortic arch to the top of the head during uneventful rapid bolus intravenous  contrast administration. Coronal and sagittal reformations and 3D post  processing was performed.   CT dose reduction was achieved through use of a  standardized protocol tailored for this examination and automatic exposure  control for dose modulation. This study was analyzed by the [address]  algorithm. FINDINGS:    CTA Head:  There is no evidence of large vessel occlusion or flow-limiting stenosis of the  intracranial internal carotid, anterior cerebral, and middle cerebral arteries. Multifocal moderate stenoses of the bilateral A2 segments. Calcification of the  bilateral carotid siphons with mild stenoses. The anterior communicating artery  is patent, with small right A1 segment. See below regarding right vertebral artery occlusion. There is occlusion of the  left posterior cerebral artery in the proximal P2 segment with areas of distal  reconstitution. No other flow-limiting stenosis in the posterior circulation. The right posterior communicating artery is patent, the left is not well seen. There is no evidence of aneurysm or vascular malformation. The dural venous  sinuses and deep cerebral venous system are patent. No evidence of abnormal  enhancement on delayed phase images. Chronic microvascular ischemic disease and  multiple chronic infarcts again noted. CTA NECK:  NASCET method was utilized for calculating stenosis. Mild calcific atherosclerosis of the thoracic aorta. The common carotid arteries  demonstrate no significant stenosis. Calcific atherosclerosis of the proximal  right internal carotid artery with mild (less than 50%) stenosis, unchanged. Calcific atherosclerosis of the proximal left internal carotid artery with mild  (less than 50%) stenosis, unchanged. There is a left dominant vertebrobasilar arterial system. Severe stenosis at the  origin of the diminutive right vertebral artery, with discontinuous  opacification in the right V2 segment, and occlusion of the right V3-V4  junction, unchanged. No significant stenosis of the dominant left vertebral  artery. Visualized soft tissues of the neck are unremarkable. Visualized lung apices are  clear. No acute fracture or aggressive osseous lesion. Severe degenerative disc  disease in the mid and lower cervical spine, and scattered areas of severe facet  arthropathy in the cervical spine. Impression  1. Occlusion of the left posterior cerebral artery in the proximal P2 segment  with areas of distal reconstitution. 2. Unchanged occlusion of the nondominant diminutive right vertebral artery at  the V3-V4 junction, and additional severe stenosis at the right vertebral artery  origin. 3. Mild stenosis (less than 50% by NASCET criteria) of the proximal bilateral  internal carotid arteries. 4. Additional atherosclerotic disease as above. CT CODE NEURO HEAD WO CONTRAST    Narrative  EXAM: CT CODE NEURO HEAD WO CONTRAST    INDICATION: Left-sided facial droop and weakness today    COMPARISON: 5/4/2021. CONTRAST: None. TECHNIQUE: Unenhanced CT of the head was performed using 5 mm images. Brain and  bone windows were generated. Coronal and sagittal reformats. CT dose reduction  was achieved through use of a standardized protocol tailored for this  examination and automatic exposure control for dose modulation. FINDINGS:  The ventricles and sulci are normal in size, shape and configuration. .  Significant small vessel ischemic changes again noted in the periventricular  white matter. Right occipital encephalomalacia is unchanged. There is no  intracranial hemorrhage, extra-axial collection, or mass effect. The basilar  cisterns are open. No CT evidence of acute infarct. The bone windows demonstrate no abnormalities. The visualized portions of the  paranasal sinuses and mastoid air cells are clear. Vascular calcification is  evident.     Impression  No acute process or change compared to the prior exam.      Results from Hospital Encounter encounter on 03/17/22    CT ABD PELV W CONT    Narrative  PROCEDURE: CT ABD PELV W CONT    HISTORY:Left lower quadrant pain    COMPARISON:Previous exam 19 February 2022    Department policy stipulates all CT scans at this facility are performed using  dose reduction optimization techniques as appropriate to the performed exam,  including the following: Automated exposure control, adjustments of the mA  and/or KVP according to the patient size, and the use of iterative  reconstruction technique. LIMITATIONS: None    TECHNIQUE: Axial images with multiplanar reconstruction following intravenous  contrast.100 mL Isovue-370    CHEST: Heart remains dilated. There is persistent atelectasis in the left base. No pleural effusion or pneumothorax. LIVER: Hypodense structures in the liver too small to characterize, unchanged  GALLBLADDER: Normal  BILIARY TREE: Normal  PANCREAS: Dilatation of the distal main pancreatic duct in the tail of the  pancreas is unchanged. This is associated with some calcifications and what  appear to be surgical clips in the area. Fluid density is seen around the  pancreas infiltrating into the mesenteric fat. SPLEEN: Normal  ADRENAL GLANDS: Normal  KIDNEYS/URETERS/BLADDER: Left kidney again shows an altered axis. Small cortical  cyst unchanged from the left kidney. No hydronephrosis or stone disease on  either side. The bladder is distended but otherwise unremarkable  RETROPERITONEUM/AORTA: Extensive atherosclerotic changes are again seen in the  aorta and its branches. No aneurysm or dissection. No periaortic adenopathy. BOWEL/MESENTERY: There is no pathologic distention of the stomach or small  bowel. The colon shows diverticulosis without evidence of diverticulitis. Beginning in the mid descending colon extending through the rectum there is mild  colonic wall thickening. However, this area of the colon is not well distended. No pericolonic fat stranding appreciated.   APPENDIX: Identified and normal  PERITONEAL CAVITY: No free intraperitoneal air or fluid  REPRODUCTIVE ORGANS: Normal  BONE/TISSUES: Bones show diffuse spondylosis but no acute process. There is a  small midline epigastric ventral hernia containing only fat. No complications  apparent. OTHER: None    Impression  Chronic changes in the pancreas which appear stable. There is  peripancreatic fluid present which has been seen in the past. Correlate with  laboratory data to exclude acute pancreatitis. No necrosis or other complication  of acute pancreatitis is seen. There is chronic dilatation of the distal main  pancreatic duct suggestive of previous pancreatitis and necrosis. Mild diffuse wall thickening distal colon and rectosigmoid. This may be artifact  of collapse. A mild element of colitis could also give this appearance. While  there is extensive diverticulosis pattern does not suggest diverticulitis this  time. Chronic changes elsewhere which appear stable    Assessment:   Active Problems:    Stroke [facility]) (10/31/2022)      Plan:     Acute Ischemic CVA   - s/p emergent cerebral angiogram and thrombectomy by Dr. Franco Pal 10/31/22   - Neuro checks per post thrombectomy protocol   - Cont home plavix 75 mg daily   - MRI brain showing Bilateral cerebellar infarcts   - SBP goal 100-140    - Neurology consulted for further stroke management    D/w Dr. Colin Pollack, NP  Neurocritical Care Nurse Practitioner      I personally reviewed imaging, saw and evaluated the patient and agree with the NP assessment and plan as documented in the note above.   This is a late entry for service dated 11/1/22

## 2022-11-01 NOTE — PROGRESS NOTES
1930:Bedside and Verbal shift change report given to [address] Nuñez  (oncoming nurse) by Nuris Patel  (offgoing nurse). Report included the following information SBAR, Kardex, ED Summary, OR Summary, Procedure Summary, Intake/Output, MAR, Recent Results, Cardiac Rhythm NSR, Alarm Parameters , and Dual Neuro Assessment. 0730: Bedside and Verbal shift change report given to [facility] (oncoming nurse) by [address] Nuñez  (offgoing nurse). Report included the following information SBAR, Kardex, ED Summary, Procedure Summary, Intake/Output, MAR, Recent Results, Cardiac Rhythm NSR, Alarm Parameters , and Dual Neuro Assessment.          N/S @ 100 ML/HR  Q1 Neuros, groin checks

## 2022-11-01 NOTE — PROGRESS NOTES
Problem: Pressure Injury - Risk of  Goal: *Prevention of pressure injury  Description: Document Jon Scale and appropriate interventions in the flowsheet. Outcome: Progressing Towards Goal  Note: Pressure Injury Interventions:  Sensory Interventions: Assess changes in LOC, Assess need for specialty bed, Avoid rigorous massage over bony prominences, Check visual cues for pain, Discuss PT/OT consult with provider, Float heels, Maintain/enhance activity level, Keep linens dry and wrinkle-free, Minimize linen layers, Monitor skin under medical devices, Turn and reposition approx. every two hours (pillows and wedges if needed)         Activity Interventions: Pressure redistribution bed/mattress(bed type), PT/OT evaluation    Mobility Interventions: Float heels, HOB 30 degrees or less, Pressure redistribution bed/mattress (bed type), PT/OT evaluation, Turn and reposition approx. every two hours(pillow and wedges)    Nutrition Interventions: Document food/fluid/supplement intake, Discuss nutritional consult with provider, Offer support with meals,snacks and hydration    Friction and Shear Interventions: Feet elevated on foot rest, Foam dressings/transparent film/skin sealants, HOB 30 degrees or less, Lift team/patient mobility team                Problem: Patient Education: Go to Patient Education Activity  Goal: Patient/Family Education  Outcome: Progressing Towards Goal     Problem: Falls - Risk of  Goal: *Absence of Falls  Description: Document Vernia Colder Fall Risk and appropriate interventions in the flowsheet.   Outcome: Progressing Towards Goal  Note: Fall Risk Interventions:                 Elimination Interventions: Bed/chair exit alarm, Call light in reach, Toileting schedule/hourly rounds              Problem: Patient Education: Go to Patient Education Activity  Goal: Patient/Family Education  Outcome: Progressing Towards Goal

## 2022-11-01 NOTE — PROGRESS NOTES
0745: Bedside shift change report given to Nayeli Glass RN (oncoming nurse) by Karlee Rojas RN (offgoing nurse). Report included the following information SBAR, Intake/Output, Cardiac Rhythm NSR, Alarm Parameters , and Dual Neuro Assessment. 1724: Patient attempted to eat dinner, but was unable to do so due to nausea. Patient provided with tea and chicken broth at this time. 1945: Bedside shift change report given to Dori Dimas (oncoming nurse) by Nayeli Glass RN (offgoing nurse). Report included the following information SBAR, Intake/Output, Cardiac Rhythm NSR to ST, Alarm Parameters , and Dual Neuro Assessment.

## 2022-11-01 NOTE — PROGRESS NOTES
[facility] Progress Note    Patient: Ebenezer Miller MRN: [id number]  SSN: [id number]    YOB: 1940  Age: 81 y.o. Sex: female      Subjective:      Ebenezer Miller is a 81 y.o. female who has a PMH of HTN and prior stroke who presented to the ED with AMS and facial droop. She presented to the ED at [facility] with LKW 3 hours and 10 min prior to presentation. She is on plavix and eliquis as an outpatient. 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 She was not a TNKase candidate due to being on anticoagulation. She was transferred to [facility] for thrombectomy. NIHSS 8 upon arrival. The patient was taken to angiogram for mechanical thrombectomy. The patient underwent general anesthesia for the procedure. She was extubated after the procedure and was taken to the ICU for further management post-procedure. MRI brain showed acute bilateral superior cerebellar infarctions and mild associated petechial  hemorrhage. Possible small foci of acute infarction right occipital lobe. No significant mass effect. No hydrocephalus    Past Medical History:   Diagnosis Date    Colon polyps     Diabetes ([facility] [address]     Diverticulosis     Hypertension     Ill-defined condition     Pancreatitis      Past Surgical History:   Procedure Laterality Date    COLONOSCOPY N/A 4/29/2021    COLONOSCOPY performed by Abdulkadir Estrada MD at [facility]  2021    x2      Family History   Problem Relation Age of Onset    Diabetes Other      Social History     Tobacco Use    Smoking status: Never    Smokeless tobacco: Never   Substance Use Topics    Alcohol use: Not Currently      Prior to Admission medications    Medication Sig Start Date End Date Taking? Authorizing Provider   sacubitril/valsartan (ENTRESTO PO) Take  by mouth. Provider, Historical   glipiZIDE (GLUCOTROL) 5 mg tablet Take 2 Tablets by mouth Daily (before breakfast).  3/22/22   Russell Mohan PA-C   apixaban (Eliquis) 2.5 mg tablet Take 2.5 mg by mouth daily. Provider, Historical   spironolactone (ALDACTONE) 25 mg tablet Take 25 mg by mouth daily. Provider, Historical   acetaminophen (Tylenol Arthritis Pain) 650 mg TbER Take 650 mg by mouth every eight (8) hours as needed for Pain. Indications: pain    Provider, Historical   furosemide (LASIX) 40 mg tablet Take 1 Tablet by mouth daily. 11/24/21   Stephani Gaviria PA-C   pantoprazole (PROTONIX) 40 mg tablet Take 1 Tablet by mouth Daily (before breakfast). 11/25/21   Stephani Gaviria PA-C   clopidogreL (PLAVIX) 75 mg tab Take 1 Tablet by mouth daily. 10/30/21   Bib Munoz MD   carvediloL (COREG) 3.125 mg tablet Take 1 Tablet by mouth two (2) times daily (with meals).  10/29/21   Bib Munoz MD        Allergies   Allergen Reactions    Insulins Itching    Penicillins Other (comments)     Pt states it makes her pass out       Review of Systems:  As per HPI    Objective:     Vitals:    11/01/22 0500 11/01/22 0600 11/01/22 0700 11/01/22 0800   BP: 105/67 (!) 108/56 (!) 126/59 136/69   Pulse: 70 73 81 77   Resp: 19 26 22 29   Temp:       SpO2: 99% 97% 98% 99%   Weight:       Height:            Physical Exam:  GENERAL: Awake, alert  EYE: PERRLA  THROAT & NECK: Supple, no JVD  LUNG: CTAB  HEART: RRR, 2+ pulses, no edema  ABDOMEN: Soft, nontender, nondistended  EXTREMITIES:  2+ pulses, no edema  SKIN: c/d/i  NEUROLOGIC: Oriented x3, no deficits, equal strength in all extremities  PSYCHIATRIC: Alert    Assessment:     Acute Ischemic CVA due to a posterior circulation occlusion: Status post thrombectomy:  - Q1h neurochecks  - Cardene for goal SBP <140  - PT/OT  - Stroke order set  - NIS/Neurology consulted  - TTE  - On plavix and eliquis as outpt  - Restarted plavix, holding eliquis per NIS  - CT ordered for 1200 today    HTN: Cardene for SBP <140    DM2 c/b hyperglycemia  - SSI    CRITICAL CARE CONSULTANT ATTESTATION  I had a face to face encounter with the patient, reviewed and interpreted patient data including clinical events, labs, images, vital signs, I/O's, and examined patient. I have discussed the case and the plan and management of the patient's care with the consulting services, the bedside nurses and the respiratory therapist.  I have discussed the case with NIS    NOTE OF PERSONAL INVOLVEMENT IN CARE   This patient has a high probability of imminent, clinically significant deterioration, which requires the highest level of preparedness to intervene urgently. I participated in the decision-making and personally managed or directed the management of the following life and organ supporting interventions that required my frequent assessment to treat or prevent imminent deterioration. I personally spent 45 minutes of critical care time. This is time spent at this critically ill patient's bedside actively involved in patient care as well as the coordination of care and discussions with the patient's family. This does not include any procedural time which has been billed separately.     Chelita Dewey NP  [facility] Critical Care  11/1/2022       Signed By: Chelita Dewey NP     November 1, 2022

## 2022-11-01 NOTE — PROGRESS NOTES
Problem: Dysphagia (Adult)  Goal: *Acute Goals and Plan of Care (Insert Text)  Description: Speech Therapy Goals  Initiated 11/1/22    1. Patient will tolerate regular diet with thin liquids with no adverse effects within 7 days. Outcome: Progressing Towards Goal    SPEECH LANGUAGE PATHOLOGY BEDSIDE SWALLOW EVALUATION  Patient: Madisyn Mina [de-identified]81 y.o. 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 Trials of thin liquid via straw, puree, and solids were completed at bedside with no overt difficulty or s/s aspiration noted across trials. Pt reports some pain with swallowing secretions yesterday and today with puree trials. Noted pt taking small, piecemeal bites of solids and spoke with pt if this was related to pain. Pt denied pain with solid trials but requested softer foods. Spoke with pt about diet options while in the hospital and pt expressed agreement with easy to chew diet with thin liquids. If odynophagia continues, could consider a swish and swallow. Will continue to follow for diet tolerance. Patient will benefit from skilled intervention to address the above impairments. Patients rehabilitation potential is considered to be Good     PLAN :  Recommendations and Planned Interventions:  --Easy to chew diet, thin liquid  --Meds in applesauce as tolerated  --General aspiration precautions including completely upright for all PO  --Good oral care    Frequency/Duration: Patient will be followed by speech-language pathology 2 times a week to address goals. Discharge Recommendations: To Be Determined     SUBJECTIVE:   Patient stated some soups would be great!  when discussing diet options in the hospital.     OBJECTIVE:     Past Medical History:   Diagnosis Date Colon polyps     Diabetes ([facility] [address]     Diverticulosis     Hypertension     Ill-defined condition     Pancreatitis      Past Surgical History:   Procedure Laterality Date    COLONOSCOPY N/A 4/29/2021    COLONOSCOPY performed by Ari Venegas MD at [facility]  2021    x2     Prior Level of Function/Home Situation:   Home Situation  Home Environment: Private residence  # Steps to Enter: 4  Rails to Enter: Yes  Hand Rails : Left  One/Two Story Residence: One story  Living Alone: Yes  Support Systems: Child(francisco) (son Royce West lives nextdoor, two other family members down the street)  Current DME Used/Available at Home: [place], straight, Wheelchair, Walker, rolling, Shower chair, Raised toilet seat  Tub or Shower Type: Tub/Shower combination  Diet prior to admission: Presumed regular diet, thin liquids  Current Diet: NPO   Cognitive and Communication Status:  Neurologic State: Alert, Drowsy (Became more drowsy as evaluation progressed; however, vitals stable (although BP slightly elevated above parameters). )  Orientation Level: Oriented X4  Cognition: Follows commands  Perception: Cues to attend right visual field, Cues to attend left visual field, Cues to attend to left side of body, Cues to maintain midline in sitting (decreased RL alteral visual tracking)  Perseveration: No perseveration noted  Safety/Judgement: Decreased awareness of environment  Oral Assessment:  Oral Assessment  Labial: No impairment  Dentition: Natural  Oral Hygiene: oral mucosa moist  Lingual: No impairment  Velum: No impairment  Mandible: No impairment  P.O. Trials:  Patient Position: upright in bed  Vocal quality prior to P.O.: No impairment  Consistency Presented: Thin liquid;Puree; Solid  How Presented: Self-fed/presented;Spoon;Straw     Bolus Acceptance: No impairment  Bolus Formation/Control: No impairment     Propulsion: No impairment  Oral Residue: Less than 10% of bolus; Lingual  Initiation of Swallow: No impairment  Laryngeal Elevation: Functional  Aspiration Signs/Symptoms: None  Pharyngeal Phase Characteristics: Multiple swallows  Effective Modifications: None     NOMS:   The NOMS functional outcome measure was used to quantify this patient's level of swallowing impairment. Based on the NOMS, the patient was determined to be at level 7 for swallow function     NOMS Swallowing Levels:  Level 1 (CN): NPO  Level 2 (CM): NPO but takes consistency in therapy  Level 3 (CL): Takes less than 50% of nutrition p.o. and continues with nonoral feedings; and/or safe with mod cues; and/or max diet restriction  Level 4 (CK): Safe swallow but needs mod cues; and/or mod diet restriction; and/or still requires some nonoral feeding/supplements  Level 5 (CJ): Safe swallow with min diet restriction; and/or needs min cues  Level 6 (CI): Independent with p.o.; rare cues; usually self cues; may need to avoid some foods or needs extra time  Level 7 ([address] [place] [address]): Independent for all p.o.  KYRA. (2003). National Outcomes Measurement System (NOMS): Adult Speech-Language Pathology User's Guide. Pain:  Pain Scale 1: Numeric (0 - 10)  Pain Intensity 1: 0  Pain Location 1: Abdomen    After treatment:   Patient left in no apparent distress in bed, Call bell within reach, Nursing notified, and Bed / chair alarm activated    COMMUNICATION/EDUCATION:   The patient's plan of care including recommendations, planned interventions, and recommended diet changes were discussed with: Registered nurse. Patient/family have participated as able in goal setting and plan of care. Patient/family agree to work toward stated goals and plan of care. Thank you for this referral.  Jeanie Mediate  Time Calculation: 15 mins    Was present with student for the entirety of session in a supervisory capacity. Agree with assessment and recommendations as written above.     Nirmal Byrne [address], [id number] [facility]  Speech-Language Pathologist

## 2022-11-01 NOTE — BRIEF OP NOTE
Neuro interventional surgery brief Procedure Note    Patient: Katelyn Russell MRN: [id number]  SSN: [id number]    YOB: 1940  Age: 81 y.o. Sex: female      Date of Procedure: 11/1/2022    Pre-Procedure Diagnosis: Basilar artery stroke-acute    Post-Procedure Diagnosis: SAME    Procedure(s): Intracranial mechanical thrombectomy    Brief Description of Procedure: Intraluminal thrombus at the basilar tip identified with sluggish distal flow into the superior cerebellar and posterior cerebral arteries. Mechanical thrombectomy performed with TICI 3 recanalization. No immediate complications. Right common femoral arterial puncture site hemostasis achieved by deploying Angio-Seal closure device without complications. Selective catheterization:    Left subclavian artery  Left vertebral artery  Basilar artery    Endovascular intervention:  Mechanical thrombectomy of distal basilar artery thrombus. Performed By: Jesse Clemons MD     Assistants: None    Anesthesia: General    Estimated Blood Loss: 20 ml    Specimens: None    Implants: None    Findings: As above    Complications: None    Full report to follow in imaging section.

## 2022-11-01 NOTE — PROGRESS NOTES
Problem: Mobility Impaired (Adult and Pediatric)  Goal: *Acute Goals and Plan of Care (Insert Text)  Description:   FUNCTIONAL STATUS PRIOR TO ADMISSION: Patient was modified independent using a single point cane for functional mobility. HOME SUPPORT PRIOR TO ADMISSION: The patient lived alone with several family members living nearby (son next  door). Physical Therapy Goals  Initiated 11/1/2022  1. Patient will move from supine to sit and sit to supine , scoot up and down, and roll side to side in bed with modified independence within 7 day(s). 2.  Patient will transfer from bed to chair and chair to bed with modified independence using the least restrictive device within 7 day(s). 3.  Patient will perform sit to stand with supervision/set-up within 7 day(s). 4.  Patient will ambulate with minimal assistance/contact guard assist for 100 feet with the least restrictive device within 7 day(s). 5.  Patient will ascend/descend 4 stairs with one handrail(s) with minimal assistance/contact guard assist within 7 day(s). 6.  Patient will improve Black Balance score by 7 points within 7 days. Outcome: Progressing Towards Goal     PHYSICAL THERAPY EVALUATION- NEURO POPULATION  Patient: Damon Johns [de-identified]81 y.o. female)  Date: 11/1/2022  Primary Diagnosis: Stroke [facility]) [I63.9]       Precautions:   Fall, Skin (SBP<140)      ASSESSMENT  Based on the objective data described below, the patient presents with poor sitting balance, decreased mobility, generalized weakness, decreased fine motor coordination, delayed processing/command following, poor attention to task, reports of dizziness, abdominal discomfort, poor activity tolerance as compared to PLOF. This is an 81year old female who presented to ED with L facial drooping and weakness. Found to have a L PCA occlusion, underwent thrombectomy on 10/31/22. Repeat MRI post thrombectomy revealed bilateral cerebellar CVA/hemorrhage (L>R) and R occipital infarction. Patient with complaints of abdominal pain, awaiting US results. On day of evaluation, she required max A x 2 for most mobility. Poor sitting balance is observed requiring almost constant physical assistance. Noted to have L posterior lateral leaning. Able to sit EOB for 10 second intervals without physical support from therapists but required her own BUE support on bed or chair. Patient 3/5 MMT for all BLE except bilateral ankle DF/PF. Unclear if patient was producing her best effort during MMT. Despite consistent complaints of dizziness, no orthostatic BP noted. Patient BP elevated above her parameters sitting EOB therefore no transfer training or standing balance assessment performed. After several minutes of sitting EOB, patient noted to become more lethargic, nauseated, and increased abominable pain. Patient requested to lay back down. RN notified of BP. Plan to work toward tolerating 3 hours of therapy per day as patient is well below functional baseline. Will continue to follow in acute setting. 11/01/22 1005 11/01/22 1010 11/01/22 1017   Vital Signs   Pulse (Heart Rate) 89 98 95   Level of Consciousness 0 0 0   /75 (!) 149/89 (!) 153/90   MAP (Calculated) 94 109 111   BP 1 Location Left upper arm Left upper arm Left upper arm   BP 1 Method Automatic Automatic Automatic   BP Patient Position  --  Sitting  (EOB, dizziness and nausea reported) Sitting  (EOB)   Resp Rate  --  22 27   O2 Sat (%)  --  96 % 100 %      11/01/22 1024   Vital Signs   Pulse (Heart Rate) 78   Level of Consciousness 0   BP (!) 152/69   MAP (Calculated) 97   BP 1 Location Left upper arm   BP 1 Method Automatic   BP Patient Position Supine   Resp Rate 19   O2 Sat (%) 99 %       Current Level of Function Impacting Discharge (mobility/balance): max A x 2 for bed mobility    Functional Outcome Measure: The patient scored Total: 1/56 on the [facility] which is indicative of high fall risk.     Other factors to consider for discharge: high PLOF     Patient will benefit from skilled therapy intervention to address the above noted impairments. PLAN :  Recommendations and Planned Interventions: bed mobility training, transfer training, gait training, therapeutic exercises, neuromuscular re-education, patient and family training/education, and therapeutic activities      Frequency/Duration: Patient will be followed by physical therapy:  5 times a week to address goals. Recommendation for discharge: (in order for the patient to meet his/her long term goals)  Therapy 3 hours per day 5-7 days per week pending progress    This discharge recommendation:  Has not yet been discussed the attending provider and/or case management    IF patient discharges home will need the following DME: to be determined (TBD)         SUBJECTIVE:   Patient stated I am so cold, I am so dizzy.     OBJECTIVE DATA SUMMARY:   HISTORY:    Past Medical History:   Diagnosis Date    Colon polyps     Diabetes ([facility] [address]     Diverticulosis     Hypertension     Ill-defined condition     Pancreatitis      Past Surgical History:   Procedure Laterality Date    COLONOSCOPY N/A 4/29/2021    COLONOSCOPY performed by Abdulkadir Estrada MD at [facility]  2021    x2       Personal factors and/or comorbidities impacting plan of care: high PLOF    Home Situation  Home Environment: Private residence  # Steps to Enter: 4  Rails to Enter: Yes  Hand Rails : Left  One/Two Story Residence: One story  Living Alone: Yes  Support Systems: Child(francisco) ([facility], two other family members down the street)  Current DME Used/Available at Home: [place], straight, Wheelchair, Walker, rolling, Shower chair, Raised toilet seat  Tub or Shower Type: Tub/Shower combination    EXAMINATION/PRESENTATION/DECISION MAKING:   Critical Behavior:  Neurologic State: Alert  Orientation Level: Oriented X4  Cognition: Decreased attention/concentration, Follows commands, Impaired decision making, Impulsive, Poor safety awareness  Safety/Judgement: Decreased awareness of environment, Decreased awareness of need for assistance, Decreased awareness of need for safety, Decreased insight into deficits  Hearing:   [facility]  Range Of Motion:  AROM: Generally decreased, functional (L>R)           PROM: Generally decreased, functional           Strength:    Strength: Generally decreased, functional (L>R; distal>proximal)        Tone & Sensation:       Unable to fully assess due to patient fatigue/dizziness         Coordination:  Coordination: Generally decreased, functional  Vision:   Tracking: Able to track stimulus in all quadrants w/o difficulty (Quick return to midline from all L VFs.)  Diplopia: No  Acuity:  (Patient reported recieving shots for cateracts in her L eye prior to admission.)  Corrective Lenses: Glasses  Functional Mobility:  Bed Mobility:     Supine to Sit: Maximum assistance;Assist x2  Sit to Supine: Maximum assistance;Assist x2  Scooting: Maximum assistance; Moderate assistance;Assist x2 (Largely Max A x2, briefly Mod A x2.)  Transfers:     Unable to assess due to elevated BP out of parameters     Balance:   Sitting: Impaired  Sitting - Static: Poor (constant support)  Sitting - Dynamic: Poor (constant support)  Ambulation/Gait Training:      N/a    Functional Measure  Black Balance Test:    Sitting to Standin  Standing Unsupported: 0  Sitting with Back Unsupported: 1  Standing to Sittin  Transfers: 0  Standing Unsupported with Eyes Closed: 0  Standing Unsupported with Feet Together: 0  Reach Forward with Outstretched Arm: 0   Object: 0  Turn to Look Over Shoulders: 0  Turn 360 Degrees: 0  Alternate Foot on Step/Stool: 0  Standing Unsupported One Foot in Front: 0  Stand on One Le  Total:          56=Maximum possible score;   0-20=High fall risk  21-40=Moderate fall risk   41-56=Low fall risk        Physical Therapy Evaluation Charge Determination History Examination Presentation Decision-Making   HIGH Complexity :3+ comorbidities / personal factors will impact the outcome/ POC  LOW Complexity : 1-2 Standardized tests and measures addressing body structure, function, activity limitation and / or participation in recreation  LOW Complexity : Stable, uncomplicated  LOW Complexity : FOTO score of       Based on the above components, the patient evaluation is determined to be of the following complexity level: LOW     Pain Rating:  Reports 6-7/10 pain in abdomen, out for US this AM    Activity Tolerance:   Fair, Poor, SpO2 stable on RA, and requires frequent rest breaks      After treatment patient left in no apparent distress:   Supine in bed, Heels elevated for pressure relief, Call bell within reach, Bed / chair alarm activated, and Side rails x 3    COMMUNICATION/EDUCATION:   The patients plan of care was discussed with: Occupational therapist, Speech therapist, and Registered nurse. Patient was educated regarding her deficit(s) of weakness, impaired balance as this relates to her diagnosis of CVA. She demonstrated poor/fair understanding as evidenced by decreased insight into deficits. Patient and/or family was verbally educated on the BE FAST acronym for signs/symptoms of CVA and TIA. BE FAST was written on patient's communication board  for visual education and reinforcement. All questions answered with patient indicating fair/poor understanding. Fall prevention education was provided and the patient/caregiver indicated understanding. and Patient/family have participated as able in goal setting and plan of care.     Thank you for this referral.  Guerrero Saldivar, PT   Time Calculation: 33 mins

## 2022-11-01 NOTE — PROGRESS NOTES
Problem: Self Care Deficits Care Plan (Adult)  Goal: *Acute Goals and Plan of Care (Insert Text)  Description: FUNCTIONAL STATUS PRIOR TO ADMISSION: Patient was modified independent with ADLs, IADLs, and functional mobility using a SPC. HOME SUPPORT: The patient lived alone; however, reports her son lives next door and two other family members live down the road. Occupational Therapy Goals  Initiated 11/1/2022   1. Patient will perform self-feeding with LUE as FM/GM assist with supervision/set-up within 7 day(s). 2.  Patient will perform grooming EOB with LUE as FM/GM assist with minimal assistance/contact guard assist within 7 day(s). 3.  Patient will perform bathing with moderate assistance within 7 day(s). 4.  Patient will perform upper body dressing with supervision/set-up within 7 day(s). 5.  Patient will perform lower body dressing with maximal assistance within 7 day(s). 6.  Patient will perform toilet transfers to/from [facility] with moderate assistance within 7 day(s). 7.  Patient will perform all aspects of toileting with maximal assistance within 7 day(s). 8.  Patient will participate in upper extremity therapeutic exercise/activities with supervision/set-up within 7 day(s). 9.  Patient will utilize energy conservation techniques during functional activities with verbal, visual, and tactile cues within 7 day(s). 10.  Patient will complete the [address] within 7 days.       Outcome: Not Met    OCCUPATIONAL THERAPY EVALUATION  Patient: Gilberto Schwab [de-identified]81 y.o. female)  Date: 11/1/2022  Primary Diagnosis: Stroke [facility]) [I63.9]       Precautions: Fall, Skin (SBP<140)    ASSESSMENT  Based on the objective data described below, the patient presents with L cervical rotation/gaze preference, anticipatory fear of falling, and decreased sitting balance, coordination (L>R), activity tolerance, lower body access, ROM (L>R), strength (L>R; distal>proximal), cognition (insight, processing, safety, judgment, complex command following, and attention), midline orientation and righting (L posterolateral lean in sitting), and visual scanning and attention to the R>L s/p admission for L sided weakness, dysarthria, and AMS, imaging indicating CORI cerebellar CVA/hemorrhage and R occipital infarction s/p MT. At baseline, patient was modified independent with ADLs, IADLs, and functional mobility using a SPC. She now presents below her baseline, requiring Min-Total A for ADLs and Max A x2 for bed mobility. Mobility limited by patient reported dizziness and intermittent eye opening/closing throughout; however, vitals stable, although BP slightly elevated above parameters at EOB - RN aware. Sitting balance limited by L posterolateral lean, despite constant therapist support; however, improved with use of recliner armrest for BUE anterior support and MAX multimodal cues, fair carryover. Patient attempted to tailor sit to don socks EOB; however, was limited by anticipatory fear of falling despite constant therapist support and Max multimodal cues, ultimately requiring Total A. Patient reported increased nausea and abdominal pain towards end of session and was returned to supine to complete OT/PT evaluation - RN alerted. Given PLOF and current function, recommending IPR at d/c - pending progress. Current Level of Function Impacting Discharge (ADLs/self-care): Min-Total A for ADLs and Max A x2 for bed mobility. Functional Outcome Measure: The patient scored 10/100 on the Barthel Index which is indicative of being totally dependent for basic self-care. Unable to complete Hollace Dung at this time d/t sitting balance, cognition, and complex command following. Other factors to consider for discharge: PLOF, lives alone, and assist x2 required. Patient will benefit from skilled therapy intervention to address the above noted impairments.        PLAN :  Recommendations and Planned Interventions: self care training, functional mobility training, therapeutic exercise, balance training, visual/perceptual training, therapeutic activities, cognitive retraining, endurance activities, neuromuscular re-education, patient education, home safety training, and family training/education    Frequency/Duration: Patient will be followed by occupational therapy 5 times a week to address goals. Recommendation for discharge: (in order for the patient to meet his/her long term goals)  Therapy 3 hours per day 5-7 days per week, pending progress. This discharge recommendation:  Has been made in collaboration with the attending provider and/or case management    IF patient discharges home will need the following DME: To be determined (TBD). SUBJECTIVE:   Patient stated My stomach really hurts.  Re: patient returned to supine and RN alerted - abdominal ultrasound results pending.     OBJECTIVE DATA SUMMARY:   HISTORY:   Past Medical History:   Diagnosis Date    Colon polyps     Diabetes ([facility] [address]     Diverticulosis     Hypertension     Ill-defined condition     Pancreatitis      Past Surgical History:   Procedure Laterality Date    COLONOSCOPY N/A 4/29/2021    COLONOSCOPY performed by Liz Ring MD at [facility]  2021    x2     Expanded or extensive additional review of patient history:     Home Situation  Home Environment: Private residence  # Steps to Enter: 4  Rails to Enter: Yes  Hand Rails : Left  One/Two Story Residence: One story  Living Alone: Yes  Support Systems: Child(francisco) (son Foster Lovett lives nextdoor, two other family members down the street)  Current DME Used/Available at Home: Georga Cecy, straight, Wheelchair, Walker, rolling, Shower chair, Raised toilet seat  Tub or Shower Type: Tub/Shower combination    Hand dominance: Right    EXAMINATION OF PERFORMANCE DEFICITS:  Cognitive/Behavioral Status:  Neurologic State: Alert;Drowsy (Became more drowsy as evaluation progressed; however, vitals stable (although BP slightly elevated above parameters). )  Orientation Level: Oriented X4  Cognition: Decreased attention/concentration; Follows commands; Impaired decision making; Impulsive;Poor safety awareness  Perception: Cues to attend right visual field;Cues to attend left visual field;Cues to attend to left side of body;Cues to maintain midline in sitting (decreased RL alteral visual tracking)  Perseveration: No perseveration noted  Safety/Judgement: Decreased awareness of environment;Decreased awareness of need for assistance;Decreased awareness of need for safety;Decreased insight into deficits    Skin: Appears intact. Edema: None. Hearing:       Vision/Perceptual:    Tracking: Requires cues, head turns, or add eye shifts to track (Quick return to midline from all R>L VFs--limited smooth tracking on R side)                 Diplopia: No    Acuity:  (Patient reported recieving shots for cateracts in her L eye prior to admission.)    Corrective Lenses: Glasses    Range of Motion:  AROM: Generally decreased, functional (L>R)  PROM: Generally decreased, functional                      Strength:  Strength: Generally decreased, functional (L>R; distal>proximal)                Coordination:  Coordination: Generally decreased, functional  Fine Motor Skills-Upper: Left Impaired;Right Intact    Gross Motor Skills-Upper: Left Impaired;Right Intact    Tone & Sensation:                            Balance:  Sitting: Impaired  Sitting - Static: Poor (constant support)  Sitting - Dynamic: Poor (constant support)    Functional Mobility and Transfers for ADLs:  Bed Mobility:  Supine to Sit: Maximum assistance;Assist x2  Sit to Supine: Maximum assistance;Assist x2  Scooting: Maximum assistance; Moderate assistance;Assist x2 (Largely Max A x2, briefly Mod A x2 EOB)    Transfers: Toilet Transfer : Maximum assistance (Inferred on/off bedpan.)    ADL Assessment:  Feeding: Minimum assistance    Oral Facial Hygiene/Grooming:  Moderate assistance (Inferred in supported sitting)    Bathing: Maximum assistance    Upper Body Dressing: Moderate assistance    Lower Body Dressing: Total assistance    Toileting: Total assistance     ADL Intervention and task modifications:    Lower Body Dressing Assistance  Dressing Assistance: Total assistance(dependent)  Socks: Total assistance (dependent)  Leg Crossed Method Used: No  Position Performed: Seated edge of bed  Cues: Physical assistance; Tactile cues provided;Verbal cues provided;Visual cues provided;Visual/perceptual training/retraining    Toileting  Toileting Assistance: Total assistance(dependent)  Bladder Hygiene: Total assistance (dependent) ([place]auer)    Cognitive Retraining  Problem Solving: Awareness of environment  Executive Functions: Executing cognitive plans  Organizing/Sequencing: Breaking task down  Attention to Task: Single task  Following Commands: Follows one step commands/directions  Safety/Judgement: Decreased awareness of environment;Decreased awareness of need for assistance;Decreased awareness of need for safety;Decreased insight into deficits  Cues: Tactile cues provided;Verbal cues provided;Visual cues provided      Functional Measure:    Unable to complete Fugl Iverson at this time d/t sitting balance, cognition, and complex command following. Barthel Index:    Barthel Index:  Bathin  Bladder: 0  Bowels: 5  Groomin  Dressin  Feedin  Mobility: 0  Stairs: 0  Toilet Use: 0  Transfer (Bed to Chair and Back): 0  Total: 10/100      The Barthel ADL Index: Guidelines  1. The index should be used as a record of what a patient does, not as a record of what a patient could do. 2. The main aim is to establish degree of independence from any help, physical or verbal, however minor and for whatever reason. 3. The need for supervision renders the patient not independent. 4. A patient's performance should be established using the best available evidence.  Asking the patient, friends/relatives and nurses are the usual sources, but direct observation and common sense are also important. However direct testing is not needed. 5. Usually the patient's performance over the preceding 24-48 hours is important, but occasionally longer periods will be relevant. 6. Middle categories imply that the patient supplies over 50 per cent of the effort. 7. Use of aids to be independent is allowed. Score Interpretation (from [address])    Independent   60-79 Minimally independent   40-59 Partially dependent   20-39 Very dependent   <20 Totally dependent     -Scot Jo., Barthel, D.W. (1965). Functional evaluation: the Barthel Index. [address] ([address] (1997). The Barthel activities of daily living index: self-reporting versus actual performance in the old (> or = 75 years). Journal [address] 45(7), [address] [facility], IVONE, Jaxon Guy., Angel Griffin. (1999). Measuring the change in disability after inpatient rehabilitation; comparison of the responsiveness of the Barthel Index and Functional Duke Measure. Journal of Neurology, Neurosurgery, and Psychiatry, 66(4), 753-135. aDvid Bhakta, N.J.A, FREEDOM Paez, & Bhakti Tejeda M.A. (2004) Assessment of post-stroke quality of life in cost-effectiveness studies: The usefulness of the Barthel Index and the EuroQoL-5D. Quality of Life Research, 15, 028-02     Occupational Therapy Evaluation Charge Determination   History Examination Decision-Making   LOW Complexity : Brief history review  MEDIUM Complexity : 3-5 performance deficits relating to physical, cognitive , or psychosocial skils that result in activity limitations and / or participation restrictions MEDIUM Complexity : Patient may present with comorbidities that affect occupational performnce.  Miniml to moderate modification of tasks or assistance (eg, physical or verbal ) with assesment(s) is necessary to enable patient to complete evaluation       Based on the above components, the patient evaluation is determined to be of the following complexity level: LOW   Pain Rating:  Patient reported stomach pain 6/10, as a result, was returned to supine and RN alerted - pending abdominal ultrasound imaging results. Activity Tolerance:   Fair    After treatment patient left in no apparent distress:    Supine in bed, Heels elevated for pressure relief, Call bell within reach, Bed / chair alarm activated, and Side rails x 3    COMMUNICATION/EDUCATION:   The patients plan of care was discussed with: Physical therapist and Registered nurse. Patient was educated regarding her deficit(s) above as this relates to her diagnosis of CVA. She demonstrated Fair understanding as evidenced by verbal discussion and activity. Not yet appropriate for BE FAST education d/t limited cognition. Home safety education was provided and the patient/caregiver indicated understanding., Patient/family have participated as able in goal setting and plan of care. , and Patient/family agree to work toward stated goals and plan of care. This patients plan of care is appropriate for delegation to [facility]. Thank you for this referral.  MAXIMO Mohan  Time Calculation: 31 mins    Regarding student involvement in patient care:  A student participated in this treatment session. Per CMS Medicare statements and AOTA guidelines I certify that the following was true:  1. I was present and directly observed the entire session. 2. I made all skilled judgments and clinical decisions regarding care. 3. I am the practitioner responsible for assessment, treatment, and documentation.

## 2022-11-02 NOTE — H&P
[facility] Adult  Hospitalist Group     ICU Transfer/Accept Summary     [place] [address] [place]  DATE OF TRANSFER: 11/2/2022       PATIENT ID: Raza Luong  MRN: [id number]   YOB: 1940    PRIMARY CARE PROVIDER: Omar Ziegler MD   DATE OF ADMISSION: 10/31/2022  2:31 PM    ATTENDING PHYSICIAN: Chau Douglas MD  CONSULTATIONS:   IP CONSULT TO NEUROLOGY  IP CONSULT TO NEUROINTERVENTIONAL SURGERY    PROCEDURES/SURGERIES:   * No surgery found *    REASON FOR ADMISSION: <principal problem not specified>     HOSPITAL PROBLEM LIST:  Patient Active Problem List   Diagnosis Code    GI bleed K92.2    Right sided weakness R53.1    CVA (cerebral vascular accident) ([facility] [address] I63.9    Epigastric pain R10.13    Vasculitis ([facility]) I77.6    Pancreatic mass K86.89    Pancreatitis K85.90    Elevated troponin R77.8    NSTEMI (non-ST elevated myocardial infarction) ([facility]) I21.4    Bursitis of multiple sites M71.9    Abdominal pain R10.9    Acute pancreatitis K85.90    Acute on chronic combined systolic and diastolic ACC/AHA stage C congestive heart failure (HCC) I50.43    Pleural effusion, right J90    Intraductal papillary mucinous neoplasm of pancreas D49.0    Cyst of pancreas K86.2    Stroke ([facility] [address] I63.9         Brief HPI and Hospital Course:      Raza Luong is a 81 y.o. female who has a PMH of HTN and prior stroke who presented to the ED with AMS and facial droop. She presented to the ED at [facility] with LKW 3 hours and 10 min prior to presentation. She is on plavix and eliquis as an outpatient. 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 She was not a TNKase candidate due to being on anticoagulation. She was transferred to [facility] for thrombectomy. NIHSS 8 upon arrival. The patient was taken to angiogram for mechanical thrombectomy. The patient underwent general anesthesia for the procedure.   She was extubated after the procedure and was taken to the ICU for further management post-procedure. MRI brain showed acute bilateral superior cerebellar infarctions and mild associated petechial  hemorrhage. Possible small foci of acute infarction right occipital lobe. No significant mass effect. No hydrocephalus. CT head on 11/1 without acute change. Assessment and Plan:    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 status post thrombectomy  Hypertension goal SBP less than 140 did not require Cardene in ICU  Diabetes type 2 with hyperglycemia check A1c    --Every 4 hours neurochecks to continue at in [facility] PT OT SLP evaluation  --Neuro interventional surgery and neurology consulted and following  --Echocardiogram EF of 10 to 15%  --Currently on Plavix and statin  --Holding Eliquis as per neuro interventional surgery for preventing hemorrhagic conversion can restart in 5 to 7 days  --Low threshold for repeating CT if any worsening neurologic condition    Acute on chronic systolic CHF with EF 10 to 15%  --Blood pressure low but stable  --Patient should be on beta-blocker and Entresto  --Med rec patient was on Eliquis Lasix Entresto Aldactone and Coreg  --Cardiology consult for further management    Moderate protein calorie malnutrition    Full code  PT OT Case management consult for dispo planning  Expected discharge 48 hours         PHYSICAL EXAMINATION:  Visit Vitals  BP (!) 101/55   Pulse 85   Temp 98.8 °F (37.1 °C)   Resp 19   Ht 5' 6\" (1.676 m)   Wt 50 kg (110 lb 3.7 oz)   SpO2 99%   BMI 17.79 kg/m²       General:          Alert, cooperative, no distress  HEENT:           Atraumatic, MMM            Neck:               Supple, symmetrical,  thyroid: non tender  Lungs:             Clear to auscultation bilaterally. No Wheezing or Rhonchi. No rales. Heart:              Regular  rhythm,  No  murmur   No edema  Abdomen:       Soft, non-tender. Not distended. Bowel sounds normal  Extremities:     No cyanosis. No clubbing,  +2 distal pulses  Skin:                Not pale. Not Jaundiced  No rashes   Psych:             Not anxious or agitated. Neurologic:      Alert, moves all extremities    Labs:     Recent Labs     11/02/22 0442 11/01/22 0330   WBC 8.9 11.0   HGB 11.8 12.1   HCT 35.6 36.7   * 145*     Recent Labs     11/02/22 0442 11/01/22  0330 10/31/22  2006    138 137   K 4.0 4.1 4.5   * 109* 105   CO2 23 24 19*   BUN 20 29* 32*   CREA 0.97 1.18* 1.12*   * 181* 364*   CA 8.9 8.9 9.0   MG  --   --  1.7   PHOS  --   --  3.2     Recent Labs     11/02/22  0442 11/01/22  0330 10/31/22  1242   ALT 25 24 14    134* 152*   TBILI 1.1* 0.6 0.8   TP 6.2* 6.9 6.2*   ALB 2.8* 3.0* 3.1*   GLOB 3.4 3.9 3.1     Recent Labs     11/01/22  0330 10/31/22  1242   INR 1.1 1.3*   PTP 11.5* 16.0*      No results for input(s): FE, TIBC, PSAT, FERR in the last 72 hours. No results found for: FOL, RBCF   No results for input(s): PH, PCO2, PO2 in the last 72 hours. No results for input(s): CPK, CKNDX, TROIQ in the last 72 hours.     No lab exists for component: CPKMB  Lab Results   Component Value Date/Time    Cholesterol, total 146 11/01/2022 03:30 AM    HDL Cholesterol 69 11/01/2022 03:30 AM    LDL, calculated 71 11/01/2022 03:30 AM    Triglyceride 30 11/01/2022 03:30 AM    CHOL/HDL Ratio 2.1 11/01/2022 03:30 AM     Lab Results   Component Value Date/Time    Glucose (POC) 134 (H) 11/02/2022 04:55 AM    Glucose (POC) 193 (H) 11/02/2022 12:40 AM    Glucose (POC) 196 (H) 11/01/2022 04:50 PM    Glucose (POC) 149 (H) 11/01/2022 11:51 AM    Glucose (POC) 100 11/01/2022 05:45 AM     Lab Results   Component Value Date/Time    Color Yellow/Straw 10/31/2022 12:54 PM    Appearance Clear 10/31/2022 12:54 PM    Specific gravity 1.029 10/31/2022 12:54 PM    Specific gravity 1.017 03/17/2022 09:25 PM    pH (UA) 5.0 10/31/2022 12:54 PM    Protein Negative 10/31/2022 12:54 PM    Glucose >300 (A) 10/31/2022 12:54 PM    Ketone 5 (A) 10/31/2022 12:54 PM    Bilirubin Negative 10/31/2022 12:54 PM    Urobilinogen 0.1 10/31/2022 12:54 PM    Nitrites Negative 10/31/2022 12:54 PM    Leukocyte Esterase Negative 10/31/2022 12:54 PM    Bacteria Negative 10/31/2022 12:54 PM    WBC 0-4 10/31/2022 12:54 PM    RBC 0-5 10/31/2022 12:54 PM         CODE STATUS:   Full Code    DNR    Partial    Comfort Care       Signed:   Bunny Cannon MD  Date of Service:  11/2/2022  10:54 AM

## 2022-11-02 NOTE — PROGRESS NOTES
Problem: Dysphagia (Adult)  Goal: *Acute Goals and Plan of Care (Insert Text)  Description: Speech Therapy Goals  Initiated 11/1/22    1. Patient will tolerate regular diet with thin liquids with no adverse effects within 7 days. Outcome: Resolved/Met    SPEECH LANGUAGE PATHOLOGY MOTOR SPEECH EVALUATION AND DYSPHAGIA TREATMENT/DISCHARGE  Patient: Daisy Briseno [de-identified]81 y.o. female)  Date: 11/2/2022  Diagnosis: Stroke [facility]) [I63.9] <principal problem not specified>      Precautions:  Fall, Skin (SBP<140)    ASSESSMENT:  Pt was seen for swallowing treatment today to assess for pt's tolerance of easy to chew diet with thin liquids. Trials of thin liquid via straw, puree, and solids were completed at bedside with no overt difficulty or s/s aspiration noted at bedside. Pt reports continued pain with swallowing with solids, but notes improvements compared to yesterday. Pt expressed agreement with continuing easy to chew diet \"at least for today\" given pain with swallowing. Recommend advancing diet as tolerated. If pain with swallowing continues, recommend considering GI involvement to further assess possible etiology given hx of cardiomegaly and epigastric pain. Given tolerance of current diet indicated by stable WBC and respiratory status, suspect swallow function has returned to baseline and therefore, SLP will sign off. Please reconsult should SLP be of any assistance. A motor speech evaluation was also conducted today given acute cerebellar infarcts and slight imprecision noted at conversation level. Pt with quick and precise consonants including \"p, t, and k\" during [address] task. Unclear if conversation level imprecision related to fatigue vs. baseline. If motor speech concerns continue, recommend SLP intervention at next level of care.        PLAN:  --Easy to chew diet, thin liquids  --RN can advance to regular diet, thin liquids as tolerated  --Meds in applesauce  --General aspiration precautions  --Good oral care    Patient will be discharged from acute skilled speech therapy at this time. Rationale for discharge:  Goals achieved    Discharge Recommendations:  None     SUBJECTIVE:   Patient stated my cream of wheat this morning was good! . OBJECTIVE:   Cognitive and Communication Status:  Neurologic State: Alert, Appropriate for age  Orientation Level: Oriented X4  Cognition: Appropriate decision making, Appropriate safety awareness, Follows commands, Appropriate for age attention/concentration    Perception: Cues to attend right visual field, Cues to attend left visual field, Cues to attend to left side of body, Cues to maintain midline in sitting (decreased RL alteral visual tracking)    Perseveration: No perseveration noted    Safety/Judgement: Decreased awareness of environment  Dysphagia Treatment:  Oral Assessment:  Oral Assessment  Labial: No impairment  Dentition: Natural  Oral Hygiene: oral mucosa moist  Lingual: No impairment  Velum: No impairment  Mandible: No impairment  P.O. Trials:  Patient Position: upright in bed  Vocal quality prior to P.O.: No impairment  Consistency Presented: Thin liquid;Puree; Solid  How Presented: Self-fed/presented;Spoon;Straw     Bolus Acceptance: No impairment  Bolus Formation/Control: No impairment     Propulsion: No impairment  Oral Residue: Less than 10% of bolus; Lingual  Initiation of Swallow: No impairment  Laryngeal Elevation: Functional  Aspiration Signs/Symptoms: None  Pharyngeal Phase Characteristics: Multiple swallows  Effective Modifications: None     NOMS:   The NOMS functional outcome measure was used to quantify this patient's level of swallowing impairment.   Based on the NOMS, the patient was determined to be at level 7 for swallow function     NOMS Swallowing Levels:  Level 1 (CN): NPO  Level 2 (CM): NPO but takes consistency in therapy  Level 3 (CL): Takes less than 50% of nutrition p.o. and continues with nonoral feedings; and/or safe with mod cues; and/or max diet restriction  Level 4 (CK): Safe swallow but needs mod cues; and/or mod diet restriction; and/or still requires some nonoral feeding/supplements  Level 5 (CJ): Safe swallow with min diet restriction; and/or needs min cues  Level 6 (CI): Independent with p.o.; rare cues; usually self cues; may need to avoid some foods or needs extra time  Level 7 ([address] [place] [address]): Independent for all p.o.  KYRA. (2003). National Outcomes Measurement System (NOMS): Adult Speech-Language Pathology User's Guide. Pain:  Pain Scale 1: Numeric (0 - 10)  Pain Intensity 1: 0     After treatment:   Patient left in no apparent distress in bed, Call bell within reach, Nursing notified, and Bed / chair alarm activated    COMMUNICATION/EDUCATION:   The patient's plan of care including recommendations, planned interventions, and recommended diet changes were discussed with: Registered nurse. Beatriz Zhao  Time Calculation: 20 mins    Was present with student for the entirety of session in a supervisory capacity. Agree with assessment and recommendations as written above.     Nirmal Zapata 96, [address] [facility]  Speech-Language Pathologist

## 2022-11-02 NOTE — PROGRESS NOTES
Problem: Self Care Deficits Care Plan (Adult)  Goal: *Acute Goals and Plan of Care (Insert Text)  Description: FUNCTIONAL STATUS PRIOR TO ADMISSION: Patient was modified independent with ADLs, IADLs, and functional mobility using a SPC. HOME SUPPORT: The patient lived alone; however, reports her son lives next door and two other family members live down the road. Occupational Therapy Goals  Initiated 11/1/2022   1. Patient will perform self-feeding with LUE as FM/GM assist with supervision/set-up within 7 day(s). 2.  Patient will perform grooming EOB with LUE as FM/GM assist with minimal assistance/contact guard assist within 7 day(s). 3.  Patient will perform bathing with moderate assistance within 7 day(s). 4.  Patient will perform upper body dressing with supervision/set-up within 7 day(s). 5.  Patient will perform lower body dressing with maximal assistance within 7 day(s). 6.  Patient will perform toilet transfers to/from [facility] with moderate assistance within 7 day(s). 7.  Patient will perform all aspects of toileting with maximal assistance within 7 day(s). 8.  Patient will participate in upper extremity therapeutic exercise/activities with supervision/set-up within 7 day(s). 9.  Patient will utilize energy conservation techniques during functional activities with verbal, visual, and tactile cues within 7 day(s). 10.  Patient will complete the [id number] within 7 days. Outcome: Progressing Towards Goal    OCCUPATIONAL THERAPY TREATMENT  Patient: Madisyn Mina (9 y.o. female)  Date: 11/2/2022  Diagnosis: Stroke [facility]) [I63.9] <principal problem not specified>      Precautions: Fall, Skin (SBP<140)  Chart, occupational therapy assessment, plan of care, and goals were reviewed.     ASSESSMENT  Patient continues with skilled OT services and is progressing towards goals; however, remains limited by anticipatory fear of falling and decreased sitting<standing balance, coordination (R>L), activity tolerance, distal lower body access, ROM, strength, midline orientation and righting (L lateral lean in standing), proprioception, cardiopulmonary endurance, and cognition (insight, sequencing, safety, judgment, and attention) with seated ADLs and functional mobility in prep for OOB ADLs this session. Improvement noted in patient's ability to complete bed mobility with CGA; however, required Max A for two attempts to  prep for side stepping - ultimately requiring OTS anterior support to stand with Min A and MAX multimodal cues for sequencing (R>L) - fair carryover. Patient able to wash her face and engage in upper body dressing with CGA and MAX encouragement to use BUE without support from bed rail - fair carryover. Of note, patient's BP elevated above parameters at beginning of session, RN alerted and administered PRN medication, and patient's BP stable at session end. Given PLOF, current function, and motivation recommending IPR at d/c. Current Level of Function Impacting Discharge (ADLs): Min-Max A for ADLs and CGA-Max A for limited OOB mobility. Other factors to consider for discharge: PLOF, lives alone, and assist x2 required. PLAN :  Patient continues to benefit from skilled intervention to address the above impairments. Continue treatment per established plan of care to address goals. Recommend with staff: Recommend with nursing, ADLs with assist, OOB to chair 3x/day and toileting via [facility] with assist x2. Thank you for completing as able in order to maintain patient strength, endurance and independence. Recommendation for discharge: (in order for the patient to meet his/her long term goals)  Therapy 3 hours per day 5-7 days per week    This discharge recommendation:  Has been made in collaboration with the attending provider and/or case management    IF patient discharges home will need the following DME: To be determined (TBD).        SUBJECTIVE:   Patient stated I just feel like I'm going to fall.  Re: reassured that she was safe sitting on bed. OBJECTIVE DATA SUMMARY:   Cognitive/Behavioral Status:  Neurologic State: Alert  Orientation Level: Oriented X4  Cognition: Decreased attention/concentration; Follows commands; Impaired decision making; Impulsive;Poor safety awareness  Perception: Appears intact  Perseveration: No perseveration noted  Safety/Judgement: Decreased awareness of environment;Decreased awareness of need for assistance;Decreased awareness of need for safety;Decreased insight into deficits    Functional Mobility and Transfers for ADLs:  Bed Mobility:  Supine to Sit: Contact guard assistance  Sit to Supine: Minimum assistance  Scooting: Contact guard assistance    Transfers:  Sit to Stand: Minimum assistance;Maximum assistance (multiple attempts to achieve success with anterior OTS support; initial Max A, progressed to Min)          Balance:  Sitting: Impaired  Sitting - Static: Fair (occasional)  Sitting - Dynamic: Fair (occasional)  Standing: Impaired; With support (BUE support with slight L lateral lean)  Standing - Static: Poor;Constant support  Standing - Dynamic : Constant support;Poor    ADL Intervention:  Grooming  Grooming Assistance: Contact guard assistance  Position Performed: Seated edge of bed  Washing Face: Contact guard assistance  Cues: Tactile cues provided;Verbal cues provided;Visual cues provided    Upper Body Dressing Assistance  Dressing Assistance: Contact guard assistance  Hospital Gown: Contact guard assistance  Cues: Tactile cues provided;Verbal cues provided;Visual cues provided    Cognitive Retraining  Problem Solving: Awareness of environment  Executive Functions: Executing cognitive plans  Organizing/Sequencing: Breaking task down  Attention to Task: Single task  Following Commands:  Follows one step commands/directions  Safety/Judgement: Decreased awareness of environment;Decreased awareness of need for assistance;Decreased awareness of need for safety;Decreased insight into deficits  Cues: Tactile cues provided;Verbal cues provided;Visual cues provided    Pain:  Patient reported stomach pain, but did not rate it. Activity Tolerance:   Fair    After treatment patient left in no apparent distress:   Supine in bed, Call bell within reach, and Side rails x 3    COMMUNICATION/COLLABORATION:   The patients plan of care was discussed with: Physical therapist and Registered nurse. MAXIMO Lawler  Time Calculation: 26 mins    Regarding student involvement in patient care:  A student participated in this treatment session. Per CMS Medicare statements and AOTA guidelines I certify that the following was true:  1. I was present and directly observed the entire session. 2. I made all skilled judgments and clinical decisions regarding care. 3. I am the practitioner responsible for assessment, treatment, and documentation.

## 2022-11-02 NOTE — PROGRESS NOTES
Transition of Care Plan  RUR-  Medium   DISPOSITION: Inpatient Rehab   F/U with PCP/Specialist    Transport: [facility]  Care management spoke with patient's son to discuss transitions of care. Therapy is recommending inpatient rehab. Patient's son would like for the referral made to [facility]. Referral made through [facility].    Serenity Cortez RN,Care Management

## 2022-11-02 NOTE — PROGRESS NOTES
0730: Bedside, Verbal, and Written shift change report given to Nayana Barrow RN (oncoming nurse) by  KANCHAN Ballard (offgoing nurse). Report included the following information SBAR, Kardex, ED Summary, Intake/Output, MAR, Recent Results, Cardiac Rhythm NSR, and Quality Measures. 1400: TRANSFER - OUT REPORT:    Verbal report given to [facility] RN (name) on Ebenezer Miller  being transferred to NSTU (unit) for routine progression of care       Report consisted of patients Situation, Background, Assessment and   Recommendations(SBAR). Information from the following report(s) SBAR, Kardex, ED Summary, Intake/Output, MAR, Recent Results, Cardiac Rhythm NSR, and Quality Measures was reviewed with the receiving nurse. Lines:   Peripheral IV 10/31/22 Right Antecubital (Active)   Site Assessment Clean, dry, & intact 11/02/22 0800   Phlebitis Assessment 0 11/02/22 0800   Infiltration Assessment 0 11/02/22 0800   Dressing Status Clean, dry, & intact 11/02/22 0800   Dressing Type Transparent;Tape 11/02/22 0800   Hub Color/Line Status Pink;Flushed;Capped 11/02/22 0800   Action Taken Open ports on tubing capped 11/02/22 0800   Alcohol Cap Used Yes 11/02/22 0800        Opportunity for questions and clarification was provided.       Patient transported with:   Monitor  Registered Nurse  Tech

## 2022-11-02 NOTE — PROGRESS NOTES
[facility] Progress Note    Patient: Raven Nayak MRN: [id number]  SSN: [id number]    YOB: 1940  Age: 81 y.o. Sex: female      Subjective:      Raven Nayak is a 81 y.o. female who has a PMH of HTN and prior stroke who presented to the ED with AMS and facial droop. She presented to the ED at [facility] with LKW 3 hours and 10 min prior to presentation. She is on plavix and eliquis as an outpatient. 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 She was not a TNKase candidate due to being on anticoagulation. She was transferred to [facility] for thrombectomy. NIHSS 8 upon arrival. The patient was taken to angiogram for mechanical thrombectomy. The patient underwent general anesthesia for the procedure. She was extubated after the procedure and was taken to the ICU for further management post-procedure. MRI brain showed acute bilateral superior cerebellar infarctions and mild associated petechial  hemorrhage. Possible small foci of acute infarction right occipital lobe. No significant mass effect. No hydrocephalus. CT head on 11/1 without acute change. Past Medical History:   Diagnosis Date    Colon polyps     Diabetes ([facility] [address]     Diverticulosis     Hypertension     Ill-defined condition     Pancreatitis      Past Surgical History:   Procedure Laterality Date    COLONOSCOPY N/A 4/29/2021    COLONOSCOPY performed by Jeimy Lozano MD at [facility] [place]  2021    x2      Family History   Problem Relation Age of Onset    Diabetes Other      Social History     Tobacco Use    Smoking status: Never    Smokeless tobacco: Never   Substance Use Topics    Alcohol use: Not Currently      Prior to Admission medications    Medication Sig Start Date End Date Taking? Authorizing Provider   sacubitril/valsartan (ENTRESTO PO) Take  by mouth. Provider, Historical   glipiZIDE (GLUCOTROL) 5 mg tablet Take 2 Tablets by mouth Daily (before breakfast).  3/22/22   Amarilys Howard Jeffry Wayne PA-C   apixaban (Eliquis) 2.5 mg tablet Take 2.5 mg by mouth daily. Provider, Historical   spironolactone (ALDACTONE) 25 mg tablet Take 25 mg by mouth daily. Provider, Historical   acetaminophen (Tylenol Arthritis Pain) 650 mg TbER Take 650 mg by mouth every eight (8) hours as needed for Pain. Indications: pain    Provider, Historical   furosemide (LASIX) 40 mg tablet Take 1 Tablet by mouth daily. 11/24/21   Soren Gaviria PA-C   pantoprazole (PROTONIX) 40 mg tablet Take 1 Tablet by mouth Daily (before breakfast). 11/25/21   Soren Gaviria PA-C   clopidogreL (PLAVIX) 75 mg tab Take 1 Tablet by mouth daily. 10/30/21   James Munoz MD   carvediloL (COREG) 3.125 mg tablet Take 1 Tablet by mouth two (2) times daily (with meals). 10/29/21   James Munoz MD        Allergies   Allergen Reactions    Insulins Itching    Penicillins Other (comments)     Pt states it makes her pass out       Review of Systems:  As per HPI    Objective:     Vitals:    11/02/22 0400 11/02/22 0800 11/02/22 0900 11/02/22 1000   BP: 138/85 128/79 (!) 141/78 (!) 101/55   Pulse: 77 79 89 85   Resp: 16 15 22 19   Temp: 98 °F (36.7 °C) 98.8 °F (37.1 °C)     SpO2: 99% 100% 100% 99%   Weight:       Height:            Physical Exam:  GENERAL: Awake, alert  EYE: PERRLA  THROAT & NECK: Supple, no JVD  LUNG: CTAB  HEART: RRR, 2+ pulses, no edema  ABDOMEN: Soft, nontender, nondistended  EXTREMITIES:  2+ pulses, no edema  SKIN: c/d/i  NEUROLOGIC: Oriented x3, no deficits, equal strength in all extremities  PSYCHIATRIC: Alert    Assessment:     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  Did not require Cardene in ICU    DM2 c/b hyperglycemia  - SSI    CRITICAL CARE CONSULTANT ATTESTATION  I had a face to face encounter with the patient, reviewed and interpreted patient data including clinical events, labs, images, vital signs, I/O's, and examined patient.   I have discussed the case and the plan and management of the patient's care with the consulting services, the bedside nurses and the respiratory therapist.  I have discussed the case with [address] to transfer to [facility]    Level 1316 Wali Proctor NP  [address]  11/2/2022       Signed By: Rigoberto Spatz, NP     November 2, 2022

## 2022-11-02 NOTE — PROGRESS NOTES
Problem: Pressure Injury - Risk of  Goal: *Prevention of pressure injury  Description: Document Jon Scale and appropriate interventions in the flowsheet. Outcome: Progressing Towards Goal  Note: Pressure Injury Interventions:  Sensory Interventions: Assess changes in LOC, Keep linens dry and wrinkle-free, Minimize linen layers    Moisture Interventions: Check for incontinence Q2 hours and as needed, Internal/External urinary devices, Limit adult briefs    Activity Interventions: Increase time out of bed, PT/OT evaluation    Mobility Interventions: PT/OT evaluation    Nutrition Interventions: Offer support with meals,snacks and hydration    Friction and Shear Interventions: Lift sheet, Minimize layers                Problem: Patient Education: Go to Patient Education Activity  Goal: Patient/Family Education  Outcome: Progressing Towards Goal     Problem: Falls - Risk of  Goal: *Absence of Falls  Description: Document Sourav Fall Risk and appropriate interventions in the flowsheet. Outcome: Progressing Towards Goal  Note: Fall Risk Interventions:  Mobility Interventions: Patient to call before getting OOB, Bed/chair exit alarm         Medication Interventions: Patient to call before getting OOB, Teach patient to arise slowly    Elimination Interventions: Bed/chair exit alarm, Call light in reach              Problem: Patient Education: Go to Patient Education Activity  Goal: Patient/Family Education  Outcome: Progressing Towards Goal     Problem: Diabetes Self-Management  Goal: *Disease process and treatment process  Description: Define diabetes and identify own type of diabetes; list 3 options for treating diabetes. Outcome: Progressing Towards Goal  Goal: *Incorporating nutritional management into lifestyle  Description: Describe effect of type, amount and timing of food on blood glucose; list 3 methods for planning meals.   Outcome: Progressing Towards Goal  Goal: *Incorporating physical activity into lifestyle  Description: State effect of exercise on blood glucose levels. Outcome: Progressing Towards Goal  Goal: *Developing strategies to promote health/change behavior  Description: Define the ABC's of diabetes; identify appropriate screenings, schedule and personal plan for screenings. Outcome: Progressing Towards Goal  Goal: *Using medications safely  Description: State effect of diabetes medications on diabetes; name diabetes medication taking, action and side effects. Outcome: Progressing Towards Goal  Goal: *Monitoring blood glucose, interpreting and using results  Description: Identify recommended blood glucose targets  and personal targets. Outcome: Progressing Towards Goal  Goal: *Prevention, detection, treatment of acute complications  Description: List symptoms of hyper- and hypoglycemia; describe how to treat low blood sugar and actions for lowering  high blood glucose level. Outcome: Progressing Towards Goal  Goal: *Prevention, detection and treatment of chronic complications  Description: Define the natural course of diabetes and describe the relationship of blood glucose levels to long term complications of diabetes.   Outcome: Progressing Towards Goal  Goal: *Developing strategies to address psychosocial issues  Description: Describe feelings about living with diabetes; identify support needed and support network  Outcome: Progressing Towards Goal  Goal: *Insulin pump training  Outcome: Progressing Towards Goal  Goal: *Sick day guidelines  Outcome: Progressing Towards Goal  Goal: *Patient Specific Goal (EDIT GOAL, INSERT TEXT)  Outcome: Progressing Towards Goal     Problem: TIA/CVA Stroke: Day 2 Until Discharge  Goal: Activity/Safety  Outcome: Progressing Towards Goal  Goal: Diagnostic Test/Procedures  Outcome: Progressing Towards Goal  Goal: Nutrition/Diet  Outcome: Progressing Towards Goal  Goal: Discharge Planning  Outcome: Progressing Towards Goal  Goal: Medications  Outcome: Progressing Towards Goal  Goal: Respiratory  Outcome: Progressing Towards Goal  Goal: Treatments/Interventions/Procedures  Outcome: Progressing Towards Goal  Goal: Psychosocial  Outcome: Progressing Towards Goal  Goal: *Verbalizes anxiety and depression are reduced or absent  Outcome: Progressing Towards Goal  Goal: *Absence of aspiration  Outcome: Progressing Towards Goal  Goal: *Absence of deep venous thrombosis signs and symptoms(Stroke Metric)  Outcome: Progressing Towards Goal  Goal: *Optimal pain control at patient's stated goal  Outcome: Progressing Towards Goal  Goal: *Tolerating diet  Outcome: Progressing Towards Goal  Goal: *Ability to perform ADLs and demonstrates progressive mobility and function  Outcome: Progressing Towards Goal  Goal: *Stroke education continued(Stroke Metric)  Outcome: Progressing Towards Goal

## 2022-11-02 NOTE — PROGRESS NOTES
ICU NIGHT CHECKLIST     Night round completed with attending physician and bedside nurse. Plan of care for patient reviewed. Medication weaning / changes discussed. Necessity of any lines, drains, and/or tubes addressed. Respiratory status / modalities discussed.        Agata Melchor NP-C    Critical Care Medicine  [facility]

## 2022-11-02 NOTE — PROGRESS NOTES
Neurology Staff Addendum:  I have personally seen and examined the patient. I have personally reviewed the chart and images. Elements of my examination included history of present illness, review of systems, review of past medical and surgical history, review of medications, and physical and neurological examination. I have personally reviewed the findings and impressions with the nurse practitioner and am in agreement with their note with changes below. Pt is an 80yo RH female with HTN, DM, CM, CVA, reportedly on Eliquis 2.5mg bid and Plavix 75mg daily at home, presenting to [facility] 10/31/22 with AMS, left sided weakness, found to have left PCA occlusion, chronic right vertebral artery occlusion, transferred to [facility] for thrombectomy. MRI brain with bilateral acute cerebellar infarcts with petechial hem, punctate right occipital stroke, old right occipital CVA and CIWM changes. TTE with EF 10-15%, no PFO. EKG - NSR. HgbA1C 13.1. LDL 71. Visit Vitals  /64   Pulse (!) 108   Temp 98.5 °F (36.9 °C)   Resp 28   Ht 5' 6\" (1.676 m)   Wt 110 lb 3.7 oz (50 kg)   SpO2 98%   BMI 17.79 kg/m²       Physical Exam:  General: Well developed well nourished patient in no apparent distress. Cardiac: Regular rate and rhythm with no murmurs. Extremities: 2+ Radial pulses, no cyanosis or edema    Neurological Exam:  Mental Status: Sleepy, Oriented to time, place and person. Speech and language intact. Attention and fund of knowledge appropriate. Normal recent and remote memory. Cranial Nerves:   VFF, PERRL, EOMI, no nystagmus, no ptosis. Facial movement is symmetric. Hearing is intact   Motor:  5/5 strength on right, mild left  weakness, 5/5 bilateral HF. Normal bulk and tone. No PD.  No tremors               Cerebellar:  Intact FTN, but slow on left, dysdiadochokinesia on left with STELLA.      A/P:Pt is an 80yo RH female with HTN, DM, CM, CVA, reportedly on Eliquis 2.5mg bid and Plavix 75mg daily at home, admitted 10/31/22 with AMS, left sided weakness, found to have left PCA occlusion, chronic right vertebral artery occlusion, s/p thrombectomy. MRI brain with bilateral acute cerebellar infarcts with petechial hem, punctate right occipital stroke, old right occipital CVA and CIWM changes. TTE with EF 10-15%, no PFO. Exam with left UE incoordination and mild weakness. Most likely cardioembolic stroke  -Continue Plavix  -Hold on [address] x 1 week, so 11/7/22, due to risk of hemorrhagic conversion  -Ok to change neuro checks to q 4 hours  -Goal SBP <140  -May transfer to 6NS. -D/w RN    The duration of this visit was 55 minutes spent on interview, examination, review of records/labs/imaging, counseling, explanation of diagnosis, planning of further management, documentation and coordination of care. Flako Saldana MD  [facility] Neurology        Neurology Progress Note  Benny Stiles NP    Admit Date: 10/31/2022   LOS: 2 days      Daily Progress Note: 11/2/2022    S/P: cerebral angiogram with mechanical thrombectomy for left posterior cerebral artery occlusion in the proximal P2 segment with Dr. Monet Bergman    Chief Complaint   Patient presents with    Right-sided weakness, dysarthria, confusion      HPI:   Black Lomas is a 81 y.o. F with a pmh of Hypertension, DM with Retinopathy, colon polyps, retinal vein occlusion of left eye, diverticulosis, pancreatitis, CAD s/p stent, HLD, Cardiomyopathy with EF 25%, and multiple strokes in the past who presented to [facility] on 10/31/22 via EMS as level one code S alert with altered mental status, Right-sided weakness, and dysarthria. CT head showed no acute process. Patient was seen by tele-neuro and NIHSS was 22. Blood glucose was in the 400's. The patient was not deemed tNK canadiadtae as patient has taken Eliquis and Plavix. CTA H/N showed occlusion of the left posterior cerebral artery in the proximal P2 segment with areas of distal reconstitution.  Unchanged occlusion of the nondominant diminutive right vertebral artery at the V3-V4 junction, and additional severe stenosis at the right vertebral artery origin. NIS was consulted and the patient was transferred to [facility] for a emergent mechanical thrombectomy. Upon arrival to [facility], the patient's NIHSS was 8. Post thrombectomy NIHSS was 2. The patient was admitted to ICU post thrombectomy for close monitoring and neurology was consulted for stroke maganement. No reported history of atrial fibrillation. SUBJECTIVE:   No acute events noted overnight. No worsening or new neuro changes. The patient stated abd pain seems to be improving. US abd showed no acute findings. Stable mild right-sided hydronephrosis. Allergies   Allergen Reactions    Insulins Itching    Penicillins Other (comments)     Pt states it makes her pass out       Past Medical History:   Diagnosis Date    Colon polyps     Diabetes ([facility] [address]     Diverticulosis     Hypertension     Ill-defined condition     Pancreatitis      Family History   Problem Relation Age of Onset    Diabetes Other      Social History     Tobacco Use    Smoking status: Never    Smokeless tobacco: Never   Substance Use Topics    Alcohol use: Not Currently      Prior to Admission Medications   Prescriptions Last Dose Informant Patient Reported? Taking?   acetaminophen (Tylenol Arthritis Pain) 650 mg TbER   Yes No   Sig: Take 650 mg by mouth every eight (8) hours as needed for Pain. Indications: pain   apixaban (Eliquis) 2.5 mg tablet   Yes No   Sig: Take 2.5 mg by mouth daily. carvediloL (COREG) 3.125 mg tablet   No No   Sig: Take 1 Tablet by mouth two (2) times daily (with meals). clopidogreL (PLAVIX) 75 mg tab   No No   Sig: Take 1 Tablet by mouth daily. furosemide (LASIX) 40 mg tablet   No No   Sig: Take 1 Tablet by mouth daily. glipiZIDE (GLUCOTROL) 5 mg tablet   No No   Sig: Take 2 Tablets by mouth Daily (before breakfast).    pantoprazole (PROTONIX) 40 mg tablet   No No   Sig: Take 1 Tablet by mouth Daily (before breakfast). sacubitril/valsartan (ENTRESTO PO)   Yes No   Sig: Take  by mouth. spironolactone (ALDACTONE) 25 mg tablet   Yes No   Sig: Take 25 mg by mouth daily. Facility-Administered Medications: None       OBJECTIVES:   Temp (24hrs), Av.2 °F (36.8 °C), Min:97.6 °F (36.4 °C), Max:99.2 °F (37.3 °C)   No intake/output data recorded.   10/31 0701 -  1900  In: 5903 [P.O.:360; I.V.:1860]  Out: 1217 [Urine:2210]  Visit Vitals  /66 (BP 1 Location: Left upper arm, BP Patient Position: At rest)   Pulse 78   Temp 97.8 °F (36.6 °C)   Resp 17   Ht 5' 6\" (1.676 m)   Wt 50 kg (110 lb 3.7 oz)   SpO2 99%   BMI 17.79 kg/m²      O2 Device: None (Room air)   Vitals:    22 2100 22 2200 22 2300 22 0000   BP: 130/66 (!) 145/70 (!) 141/81 130/66   Pulse: 97 88 81 78   Resp: 27 27 19 17   Temp:    97.8 °F (36.6 °C)   SpO2: 100% 99% 99% 99%   Weight:       Height:            Meds:     Current Facility-Administered Medications   Medication Dose Route Frequency    clopidogreL (PLAVIX) tablet 75 mg  75 mg Oral DAILY    pantoprazole (PROTONIX) injection 40 mg  40 mg IntraVENous Q24H    And    sodium chloride (NS) flush 10 mL  10 mL IntraVENous Q24H    lidocaine 4 % patch 2 Patch  2 Patch TransDERmal Q24H    traMADoL (ULTRAM) tablet 25 mg  25 mg Oral Q6H PRN    atorvastatin (LIPITOR) tablet 10 mg  10 mg Oral QHS    amLODIPine (NORVASC) tablet 5 mg  5 mg Oral DAILY    ondansetron (ZOFRAN) injection 4 mg  4 mg IntraVENous Q4H PRN    sodium chloride (NS) flush 5-40 mL  5-40 mL IntraVENous Q8H    sodium chloride (NS) flush 5-40 mL  5-40 mL IntraVENous PRN    lidocaine (PF) (XYLOCAINE) 10 mg/mL (1 %) injection 0.5 mL  0.5 mL SubCUTAneous PRN    fentaNYL citrate (PF) injection 50 mcg  50 mcg IntraVENous PRN    midazolam (VERSED) injection 1 mg  1 mg IntraVENous PRN    midazolam (VERSED) injection 1 mg  1 mg IntraVENous PRN    ropivacaine (PF) (NAROPIN) 5 mg/mL (0.5 %) injection 30 mL  30 mL Peripheral Nerve Block PRN    sodium chloride (NS) flush 5-40 mL  5-40 mL IntraVENous Q8H    sodium chloride (NS) flush 5-40 mL  5-40 mL IntraVENous PRN    oxyCODONE IR (ROXICODONE) tablet 5 mg  5 mg Oral PRN    fentaNYL citrate (PF) injection 25 mcg  25 mcg IntraVENous Multiple    morphine injection 2 mg  2 mg IntraVENous Multiple    midazolam (VERSED) injection 1 mg  1 mg IntraVENous Q5MIN PRN    acetaminophen (TYLENOL) tablet 650 mg  650 mg Oral Q4H PRN    Or    acetaminophen (TYLENOL) solution 650 mg  650 mg Per NG tube Q4H PRN    Or    acetaminophen (TYLENOL) suppository 650 mg  650 mg Rectal Q4H PRN    sodium chloride (NS) flush 5-40 mL  5-40 mL IntraVENous Q8H    sodium chloride (NS) flush 5-40 mL  5-40 mL IntraVENous PRN    niCARdipine (CARDENE) 25 mg in 0.9% sodium chloride 250 mL ([id number])  0-15 mg/hr IntraVENous TITRATE    [Held by provider] apixaban (ELIQUIS) tablet 2.5 mg  2.5 mg Oral DAILY    insulin lispro (HUMALOG) injection   SubCUTAneous Q6H    glucose chewable tablet 16 g  4 Tablet Oral PRN    glucagon (GLUCAGEN) injection 1 mg  1 mg IntraMUSCular PRN    dextrose 10 % infusion 0-250 mL  0-250 mL IntraVENous PRN    hydrALAZINE (APRESOLINE) 20 mg/mL injection 20 mg  20 mg IntraVENous Q6H PRN     I personally reviewed all of the medications    Review of Systems:   No headache, eye, ear nose, throat problems; no coughing or wheezing or shortness of breath, No chest pain or orthopnea, no abdominal pain, nausea or vomiting, No pain in the body or extremities, no psychiatric, neurological, endocrine, hematological or cardiac complaints except as noted above. Physical Exam:   Blood pressure 130/66, pulse 78, temperature 97.8 °F (36.6 °C), resp. rate 17, height 5' 6\" (1.676 m), weight 50 kg (110 lb 3.7 oz), SpO2 99 %. GEN: NAD, Cooperative, Calm  HEENT: Normocephalic.  Non-icter, no congestion  Lungs:  CTA bilaterally Ant, non-labored breathing   Cardiac: S1,S2, irreg irreg rate and rhythm, no carotid bruits, no gallops  Abdomen: Normal bowel sounds, no distention, non-tender  Extremities: no clubbing, cyanosis, or edema  Skin: no rashes or lesions noted; R groin surgical site C/D/I, no bleeding/hematoma/bruises, no tenderness         NEURO:  Mental status: A & O x 4. Slow to response otherwise able to follow commands appropriately  Cranial Nerves: II-XII intact; EOMI, PERRL, No dysarthria or aphasia. Full facial strength, no asymmetry. Hearing intact bilaterally. Tongue protrudes to midline, palate elevates symmetrically. Shrug Shoulders B/L  Motor: Normal bulk and tone, mild LUE drift otherwise 5/5 strength x 4 extremities proximally and distally; No involuntary movements. Coordination: dysmetria in LUE, intact FTN to R and HTS testing  Reflexes: +2 throughout, up-going toes bilaterally   Sensation: decreased sensation to Left side; intact to right-side extremities to light touch  Gait:  Deferred      NIHSS  1a  Level of consciousness: 0=alert; keenly responsive   1b. LOC questions:  0=Answers both questions correctly   1c. LOC commands: 0=Performs both tasks correctly   2. Best Gaze: 0=normal   3. Visual: 0=No visual loss   4. Facial Palsy: 0=Normal symmetric movement   5a. Motor left arm: 0=No drift, arm holds 90 (or 45) degrees for full 10 seconds   5b. Motor right arm: 0=No drift, arm holds 90 (or 45) degrees for full 10 seconds   6a. Motor left le=No drift; leg holds 30-degree position for full 5 seconds. 6b  Motor right le=No drift; leg holds 30-degree position for full 5 seconds. 7. Limb Ataxia: 1=Present in one limb   8. Sensory: 1=Mild to moderate sensory loss; patient feels pinprick is less sharp or is dull on the affected side; or there is a loss of superficial pain with pinprick but patient is aware of being touched. 9. Best Language:  0=No aphasia, normal   10. Dysarthria: 0=Normal   11.  Extinction and Inattention: 0=No abnormality     Total:    2       Labs/images: Lab Results   Component Value Date/Time    WBC 11.0 11/01/2022 03:30 AM    HGB 12.1 11/01/2022 03:30 AM    HCT 36.7 11/01/2022 03:30 AM    PLATELET 270 (L) 07/46/0278 03:30 AM    MCV 84.6 11/01/2022 03:30 AM      Lab Results   Component Value Date/Time    Sodium 138 11/01/2022 03:30 AM    Potassium 4.1 11/01/2022 03:30 AM    Chloride 109 (H) 11/01/2022 03:30 AM    CO2 24 11/01/2022 03:30 AM    Anion gap 5 11/01/2022 03:30 AM    Glucose 181 (H) 11/01/2022 03:30 AM    BUN 29 (H) 11/01/2022 03:30 AM    Creatinine 1.18 (H) 11/01/2022 03:30 AM    BUN/Creatinine ratio 25 (H) 11/01/2022 03:30 AM    GFR est AA >60 03/23/2022 04:11 AM    GFR est non-AA >60 03/23/2022 04:11 AM    Calcium 8.9 11/01/2022 03:30 AM    Bilirubin, total 0.6 11/01/2022 03:30 AM    Alk. phosphatase 134 (H) 11/01/2022 03:30 AM    Protein, total 6.9 11/01/2022 03:30 AM    Albumin 3.0 (L) 11/01/2022 03:30 AM    Globulin 3.9 11/01/2022 03:30 AM    A-G Ratio 0.8 (L) 11/01/2022 03:30 AM    ALT (SGPT) 24 11/01/2022 03:30 AM    AST (SGOT) 14 (L) 11/01/2022 03:30 AM       CT Results (most recent):  Results from Hospital Encounter encounter on 10/31/22    CT HEAD WO CONT    Narrative  HEAD CT WITHOUT CONTRAST: 11/1/2022 12:11 PM    INDICATION: Follow-up cerebellar stroke    COMPARISON: MRI 10/31/2022. PROCEDURE: Axial images of the head were obtained without contrast. Coronal and  sagittal reformats were performed. CT dose reduction was achieved through use of  a standardized protocol tailored for this examination and automatic exposure  control for dose modulation. FINDINGS: Subacute infarctions in the bilateral superior cerebellar hemispheres  are again noted. There is an old infarction in the right occipital lobe. Periventricular white matter hypodensity is nonspecific, but consistent with  chronic small vessel ischemic disease. The ventricles and sulci are appropriate  in size and configuration for age.  No mass effect or intracranial hemorrhage. Impression  1. Subacute, bilateral, cerebellar infarctions. 2. Chronic, right occipital infarction. MRI Results (most recent):  Results from [place] encounter on 10/31/22    MRI BRAIN WO CONT    Narrative  *PRELIMINARY REPORT*    Bilateral cerebellar infarcts. Nonspecific white matter changes. Small right  occipital encephalomalacia. Findings relayed to patient's nurse, Gume Sierra, 11/01/2022 12:32 by telephone. Preliminary report was provided by Dr. Bijan Brantley the on-call radiologist, at [phone]    Final report to follow. *END PRELIMINARY REPORT    FINAL REPORT:    INDICATION:   acute stroke    EXAMINATION:  MRI BRAIN WO CONTRAST    COMPARISON:  CTA October 31, 2022    TECHNIQUE:  Multiplanar multisequence acquisition without contrast of the brain. FINDINGS:    Ventricles:  Midline, no hydrocephalus. Brain Parenchyma/Brainstem: Moderate patchy T2 hyperintensity in the  supratentorial white matter. Smaller chronic infarction right occipital lobe. Bilateral superior cerebellar infarctions. Possible small foci of acute  infarction right temporal occipital junction (series 3 image 18). Intracranial Hemorrhage:  Small amount of hemosiderin deposition within the  areas of bilateral cerebellar infarction consistent with petechial hemorrhage. Chronic microhemorrhages bilateral centrum semiovale. Basal Cisterns:  Normal.  Flow Voids:  Normal.  Additional Comments:  N/A. Impression  1. Acute bilateral superior cerebellar infarctions and mild associated petechial  hemorrhage. Possible small foci of acute infarction right occipital lobe. No  significant mass effect. No hydrocephalus. 2. Chronic microvascular ischemic disease. Chronic right occipital infarction. Assessment:     Active Problems:    Stroke [facility]) (10/31/2022)      Plan:   81 y.o.  F who si s/p thrombectomy of L PCA occlusion in the proximal P2 segment suspect to be cardio-embolic phenomena in the settings of Low EF of 10-15%. MRI Brain wo showed acute bilateral superior cerebellar infarctions and mild associated petechial hemorrhage. Possible small foci of acute infarction right occipital lobe. No significant mass effect or hydrocephalus. Repeat CTH in 24hr showed subacute, bilateral, cerebellar infarctions. Chronic, right occipital infarction    US of the abd is negative  Echo showed EF of 10 -15%. Left ventricle is moderately dilated. Mildly increased wall thickness  Plavix 75mg daily resumed  Continue to hold [facility] to avoid hemorrhagic conversion~will need to re-start 5-7days. Patient at risk for recurrent CE strokes  Will repeat CTH at that time and if negative for bleed, can be restarted. Continue with statin (ldl at goal) and blood pressure control to keep sbp<140  Q1hr neuro checks  PT/OT/ST eval  Stroke education  DVT ppx  Can downgrade to nstu in the morning if remained neuro-intact  Low threshold to repeat CT head if any worsening change in neurologic condition    Further neurology recommendations to follow by Dr. Vianney Monroy    Chart reviewed    Case discussed with: primary nurse, patient, and intensivist     >35% time spent in counseling or coordination of care of the above in the assessment and plan     Signed By: Bob Nath NP                    November 2, 2022    Please note that this dictation was completed with Nichol Miller, the computer voice recognition software. Quite often unanticipated grammatical, syntax, homophones, and other interpretive errors are inadvertently transcribed by the computer software. Please disregard these errors. Please excuse any errors that have escaped final proofreading.

## 2022-11-03 NOTE — PROGRESS NOTES
Problem: Mobility Impaired (Adult and Pediatric)  Goal: *Acute Goals and Plan of Care (Insert Text)  Description:   FUNCTIONAL STATUS PRIOR TO ADMISSION: Patient was modified independent using a single point cane for functional mobility. HOME SUPPORT PRIOR TO ADMISSION: The patient lived alone with several family members living nearby (son next  door). Physical Therapy Goals  Initiated 11/1/2022  1. Patient will move from supine to sit and sit to supine , scoot up and down, and roll side to side in bed with modified independence within 7 day(s). 2.  Patient will transfer from bed to chair and chair to bed with modified independence using the least restrictive device within 7 day(s). 3.  Patient will perform sit to stand with supervision/set-up within 7 day(s). 4.  Patient will ambulate with minimal assistance/contact guard assist for 100 feet with the least restrictive device within 7 day(s). 5.  Patient will ascend/descend 4 stairs with one handrail(s) with minimal assistance/contact guard assist within 7 day(s). 6.  Patient will improve Black Balance score by 7 points within 7 days. Outcome: Progressing Towards Goal     PHYSICAL THERAPY TREATMENT  Patient: Raza Luong (41 y.o. female)  Date: 11/3/2022  Diagnosis: Stroke [facility]) [I63.9] <principal problem not specified>      Precautions: Fall, Skin (SBP<140)  Chart, physical therapy assessment, plan of care and goals were reviewed. ASSESSMENT  Patient continues with skilled PT services and is progressing towards goals. Patient received in the chair agreeable to treatment but reporting fatigue. Patient tolerated gait training today, noted to have a very narrow MARIBEL and at times scissoring pattern. Required fairly constant cues to widen MARIBEL and improve step length in order to demonstrate a safer pattern and prevent falls.  Original plan to walk in room distances to door and back however patient became fatigued and requested to turn around after approximately 6 feet. Required extra assistance during turns/obstacle negotiation. Able to return to chair and vitals stable. Patient states she gets nauseated every time she moves. Provided patient with call bell and all other items she needed. Patient remains well below functional baseline and would benefit from IPR at discharge. Will continue to follow in acute setting. Vitals:    11/03/22 1200 11/03/22 1210 11/03/22 1400 11/03/22 1434   BP:  115/72  134/70   BP 1 Location:  Left upper arm  Left arm   BP Patient Position:  Sitting; Other (Comment)  Comment: after gait training     Pulse: 99 100 (!) 101 93   Temp:    98.3 °F (36.8 °C)   Resp:  15  17   Height:       Weight:       SpO2:  99%           Current Level of Function Impacting Discharge (mobility/balance): min to mod A for transfers, min A gait training up to 12ft with RW, constant support while standing     Other factors to consider for discharge: high PLOF         PLAN :  Patient continues to benefit from skilled intervention to address the above impairments. Continue treatment per established plan of care. to address goals. Recommendation for discharge: (in order for the patient to meet his/her long term goals)  Therapy 3 hours per day 5-7 days per week    This discharge recommendation:  Has been made in collaboration with the attending provider and/or case management    IF patient discharges home will need the following DME: to be determined (TBD)       SUBJECTIVE:   Patient stated I am feeling very tired.     OBJECTIVE DATA SUMMARY:   Critical Behavior:  Neurologic State: Alert, Eyes open spontaneously  Orientation Level: Oriented X4  Cognition: Appropriate decision making, Appropriate for age attention/concentration, Appropriate safety awareness, Follows commands  Safety/Judgement: Decreased awareness of environment, Decreased awareness of need for assistance, Decreased awareness of need for safety, Decreased insight into deficits  Functional Mobility Training:  Bed Mobility:     Supine to Sit:  (received sitting up in chair)  Sit to Supine:  (left sitting up in chair)           Transfers:  Sit to Stand: Minimum assistance; Moderate assistance;Assist x1  Stand to Sit: Minimum assistance;Assist x1  Stand Pivot Transfers: Moderate assistance;Assist x1                          Balance:  Standing: Impaired; Without support  Standing - Static: Constant support; Fair  Standing - Dynamic : Constant support;Fair;Poor  Ambulation/Gait Training:  Distance (ft): 12 Feet (ft)  Assistive Device: Walker, rolling;Gait belt  Ambulation - Level of Assistance: Minimal assistance;Assist x1        Gait Abnormalities: Decreased step clearance;Scissoring;Path deviations        Base of Support: Narrowed; Center of gravity altered  Stance: Weight shift  Speed/Bhargavi: Slow;Shuffled;Pace decreased (<100 feet/min)  Step Length: Left shortened;Right shortened        Pain Rating:  Mild complaints of discomfort in abdomen     Activity Tolerance:   Fair and requires rest breaks    After treatment patient left in no apparent distress:   Sitting in chair, Call bell within reach, and Bed / chair alarm activated    COMMUNICATION/COLLABORATION:   The patients plan of care was discussed with: Occupational therapist and Registered nurse. Patient was educated regarding Her deficit(s) of weakness, poor balance, mobility impairements as this relates to Her diagnosis of CVA. She demonstrated Fair understanding as evidenced by discussion of symptoms. Patient and/or family was verbally educated on the BE FAST acronym for signs/symptoms of CVA and TIA. BE FAST was written on patient's communication board  for visual education and reinforcement. All questions answered with patient indicating fair understanding.      Yvette Galvez, PT   Time Calculation: 14 mins

## 2022-11-03 NOTE — PROGRESS NOTES
Problem: Pressure Injury - Risk of  Goal: *Prevention of pressure injury  Description: Document Jon Scale and appropriate interventions in the flowsheet. Outcome: Progressing Towards Goal  Note: Pressure Injury Interventions:  Sensory Interventions: Assess changes in LOC, Check visual cues for pain, Maintain/enhance activity level, Minimize linen layers    Moisture Interventions: Check for incontinence Q2 hours and as needed    Activity Interventions: Increase time out of bed, PT/OT evaluation    Mobility Interventions: PT/OT evaluation    Nutrition Interventions: Offer support with meals,snacks and hydration    Friction and Shear Interventions: Lift sheet, Minimize layers                Problem: Patient Education: Go to Patient Education Activity  Goal: Patient/Family Education  Outcome: Progressing Towards Goal     Problem: Falls - Risk of  Goal: *Absence of Falls  Description: Document Sourav Fall Risk and appropriate interventions in the flowsheet.   Outcome: Progressing Towards Goal  Note: Fall Risk Interventions:  Mobility Interventions: Assess mobility with egress test, Bed/chair exit alarm, Communicate number of staff needed for ambulation/transfer, Patient to call before getting OOB         Medication Interventions: Patient to call before getting OOB, Teach patient to arise slowly, Bed/chair exit alarm    Elimination Interventions: Bed/chair exit alarm, Call light in reach              Problem: Patient Education: Go to Patient Education Activity  Goal: Patient/Family Education  Outcome: Progressing Towards Goal     Problem: TIA/CVA Stroke: Day 2 Until Discharge  Goal: Activity/Safety  Outcome: Progressing Towards Goal  Goal: Diagnostic Test/Procedures  Outcome: Progressing Towards Goal  Goal: Nutrition/Diet  Outcome: Progressing Towards Goal  Goal: Discharge Planning  Outcome: Progressing Towards Goal  Goal: Medications  Outcome: Progressing Towards Goal  Goal: Respiratory  Outcome: Progressing Towards Goal  Goal: Treatments/Interventions/Procedures  Outcome: Progressing Towards Goal  Goal: Psychosocial  Outcome: Progressing Towards Goal  Goal: *Verbalizes anxiety and depression are reduced or absent  Outcome: Progressing Towards Goal  Goal: *Absence of aspiration  Outcome: Progressing Towards Goal  Goal: *Absence of deep venous thrombosis signs and symptoms(Stroke Metric)  Outcome: Progressing Towards Goal  Goal: *Optimal pain control at patient's stated goal  Outcome: Progressing Towards Goal  Goal: *Tolerating diet  Outcome: Progressing Towards Goal  Goal: *Ability to perform ADLs and demonstrates progressive mobility and function  Outcome: Progressing Towards Goal  Goal: *Stroke education continued(Stroke Metric)  Outcome: Progressing Towards Goal

## 2022-11-03 NOTE — PROGRESS NOTES
Patient seen in room 653. Procedure explained, she was alert and oriented to person, place and time. Consent signed and placed on chart. # 22 IV placed in Left AC.

## 2022-11-03 NOTE — PROGRESS NOTES
Problem: Self Care Deficits Care Plan (Adult)  Goal: *Acute Goals and Plan of Care (Insert Text)  Description: FUNCTIONAL STATUS PRIOR TO ADMISSION: Patient was modified independent with ADLs, IADLs, and functional mobility using a SPC. HOME SUPPORT: The patient lived alone; however, reports her son lives next door and two other family members live down the road. Occupational Therapy Goals  Initiated 11/1/2022   1. Patient will perform self-feeding with LUE as FM/GM assist with supervision/set-up within 7 day(s). 2.  Patient will perform grooming EOB with LUE as FM/GM assist with minimal assistance/contact guard assist within 7 day(s). 3.  Patient will perform bathing with moderate assistance within 7 day(s). 4.  Patient will perform upper body dressing with supervision/set-up within 7 day(s). 5.  Patient will perform lower body dressing with maximal assistance within 7 day(s). 6.  Patient will perform toilet transfers to/from [facility] with moderate assistance within 7 day(s). 7.  Patient will perform all aspects of toileting with maximal assistance within 7 day(s). 8.  Patient will participate in upper extremity therapeutic exercise/activities with supervision/set-up within 7 day(s). 9.  Patient will utilize energy conservation techniques during functional activities with verbal, visual, and tactile cues within 7 day(s). 10.  Patient will complete the [id number] within 7 days. Outcome: Progressing Towards Goal    OCCUPATIONAL THERAPY TREATMENT  Patient: Kenney Vences (87 y.o. female)  Date: 11/3/2022  Diagnosis: Stroke [facility]) [I63.9] <principal problem not specified>      Precautions: Fall, Skin (SBP<140)  Chart, occupational therapy assessment, plan of care, and goals were reviewed.     ASSESSMENT  Patient continues with skilled OT services and is progressing towards goals; however, remains limited by anticipatory fear of falling and decreased sitting<standing balance, coordination (R>L), activity tolerance, ROM, strength, midline orientation and righting (L posterolateral lean in sitting/standing), proprioception, cardiopulmonary endurance, and cognition (insight, sequencing, safety, judgment, and attention) with seated/standing ADLs and functional mobility this session. Utilized RW this session to promote patient's stability and confidence, as a result, largely Mod A during two sit>stand transfers; however, once standing, requiring constant Min A for balance during toileting and bed>chair transfer with MAX multimodal cues for RW safety (I.e., sequencing, hand placement, and staying in close proximity) - fair carryover. Requiring Mod A throughout all aspects of toileting and lower body dressing and Mod multimodal cues for safety - fair carryover. Of note, slight improvement in anticipatory fear of falling and cardiopulmonary endurance with increased activity (however, remains slightly elevated), recovers with rest breaks. Given PLOF, current function, and motivation, recommending IPR at d/c to facilitate increased functional independence. Current Level of Function Impacting Discharge (ADLs): Min-Mod A for ADLs and CGA-Mod A for OOB mobility using RW. Other factors to consider for discharge: PLOF, lives alone, pending cardio plan. PLAN :  Patient continues to benefit from skilled intervention to address the above impairments. Continue treatment per established plan of care to address goals. Recommend with staff: Recommend with nursing, ADLs with assist, OOB to chair 3x/day and toileting via  [facility]  with assist using RW. Thank you for completing as able in order to maintain patient strength, endurance and independence.      Recommendation for discharge: (in order for the patient to meet his/her long term goals)  Therapy 3 hours per day 5-7 days per week    This discharge recommendation:  Has been made in collaboration with the attending provider and/or case management    IF patient discharges home will need the following DME: To be determined (TBD). SUBJECTIVE:   Patient stated It felt a little better.  Re: using a RW for functional mobility during bed>chair transfer. OBJECTIVE DATA SUMMARY:   Cognitive/Behavioral Status:  Neurologic State: Alert  Orientation Level: Oriented X4  Cognition: Decreased attention/concentration; Follows commands; Impaired decision making;Poor safety awareness  Perception: Appears intact  Perseveration: No perseveration noted  Safety/Judgement: Decreased awareness of environment;Decreased awareness of need for assistance;Decreased awareness of need for safety;Decreased insight into deficits    Functional Mobility and Transfers for ADLs:  Bed Mobility:  Supine to Sit: Contact guard assistance  Sit to Supine:  (Left in recliner)  Scooting: Contact guard assistance; Additional time    Transfers:  Sit to Stand: Moderate assistance;Assist x1;Adaptive equipment (RW)          Balance:  Sitting: Impaired  Sitting - Static: Fair (occasional)  Sitting - Dynamic: Fair (occasional)  Standing: Impaired; With support (RW)  Standing - Static: Fair;Constant support  Standing - Dynamic : Fair;Poor;Constant support    ADL Intervention:  Lower Body Dressing Assistance  Dressing Assistance: Moderate assistance  Protective Undergarmet: Moderate assistance (A for sitting & balance standing at RW and coordination to pull up back of brief)  Leg Crossed Method Used: No  Position Performed: Seated edge of bed;Standing  Cues: Physical assistance; Tactile cues provided;Verbal cues provided;Visual cues provided  Adaptive Equipment Used: Walker    Toileting  Toileting Assistance: Moderate assistance (Min A for balance standing at RW & brief mgmt)  Bladder Hygiene: Minimum assistance  Bowel Hygiene: Minimum assistance  Clothing Management: Moderate assistance  Cues: Physical assistance for pants up; Tactile cues provided;Verbal cues provided;Visual cues provided  Adaptive Equipment: Walker    Cognitive Retraining  Problem Solving: Awareness of environment  Executive Functions: Executing cognitive plans  Organizing/Sequencing: Breaking task down  Attention to Task: Single task  Following Commands: Follows one step commands/directions  Safety/Judgement: Decreased awareness of environment;Decreased awareness of need for assistance;Decreased awareness of need for safety;Decreased insight into deficits  Cues: Tactile cues provided;Verbal cues provided;Visual cues provided    Pain:  Patient reported 6/10 pain at MT groin site and nausea throughout session - RN aware. Activity Tolerance:   Fair    After treatment patient left in no apparent distress:   Sitting in chair, Call bell within reach, and Bed / chair alarm activated    COMMUNICATION/COLLABORATION:   The patients plan of care was discussed with: Physical therapist and Registered nurse. MAXIMO Sesay  Time Calculation: 32 mins    Regarding student involvement in patient care:  A student participated in this treatment session. Per CMS Medicare statements and AOTA guidelines I certify that the following was true:  1. I was present and directly observed the entire session. 2. I made all skilled judgments and clinical decisions regarding care. 3. I am the practitioner responsible for assessment, treatment, and documentation.

## 2022-11-03 NOTE — DIABETES MGMT
[facility]    CLINICAL NURSE SPECIALIST CONSULT     Initial Presentation   Alisa Ambrosio is a 81 y.o. female who presented to [facility] ED with a new onset L-sided facial droop and weakness. 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 HX:   Past Medical History:   Diagnosis Date    Colon polyps     Diabetes ([facility] [address]     Diverticulosis     Hypertension     Ill-defined condition     Pancreatitis     CVA    INITIAL DX:   Stroke ([facility] [address] [I63.9]     Current Treatment     TX: Mechanical thrombectomy    Consulted by  Nba Jones MD  for advanced diabetes nursing assessment and care for:   [x] Inpatient management strategy  [x] Home management assessment      Hospital Course   Clinical progress has been uncomplicated. 10/31: Admission. Cerebral angiogram and mechanical thrombectomy, ICU for post-op care  11/1: MRI brain showing Bilateral cerebellar infarcts  11/2: Echo showed EF of 10 -15%. Left ventricle is moderately dilated. Mildly increased wall thickness. Transfer to acute care  Diabetes History   Type 2 Diabetes: Diagnosed about 10 years ago  Ambulatory BG management provided by: Berny Stewart MD PCP     Diabetes-related Medical History    Neurological complications  Peripheral neuropathy  Microvascular disease  Nephropathy  Macrovascular disease  Cerebral vascular accident  Other associated conditions     HF    Diabetes Medication History  Key Antihyperglycemic Medications               glipiZIDE (GLUCOTROL) 5 mg tablet Take 2 Tablets by mouth Daily (before breakfast). Wily Lebron is a very poor historian. She struggles to tell me what medication she is taking daily for diabetes- does mention she forgets \"sometimes\".     Diabetes self-management practices:   Eating pattern   I had a difficult time understanding her explanation of typical dietary intake    May have oatmeal for breakfast   Lunch/Dinner: Soup, pork chops, potatoes   Drinking: Water, juice, coffee  Physical activity pattern   Sedentary   Monitoring pattern   Has a glucometer, ran out of strips and lancets  Taking medications pattern  [x] In-consistent administration  [x] Affordable  Social determinants of health impacting diabetes self-management practices   Concerned that you need to know more about how to stay healthy with diabetes  Overall evaluation:    [x] Not achieving A1c target with drug therapy & self-care practices      Lives alone but next door to her son  Her son checks in with her most days  Does not drive  Subjective   I take me medicine most days.      Objective   Physical exam  General Underweight frail appearing AA female in no acute distress/ill-appearing. Conversant and cooperative  Neuro  Alert, oriented   Vital Signs Visit Vitals  /72 (BP 1 Location: Left upper arm, BP Patient Position: Sitting; Other (Comment))   Pulse 100   Temp 98.6 °F (37 °C)   Resp 15   Ht 5' 6\" (1.676 m)   Wt 49.8 kg (109 lb 12.6 oz)   SpO2 99%   BMI 17.72 kg/m²     Skin  Warm and dry. Acanthosis noted along neckline. No lipohypertrophy or lipoatrophy noted at injection sites   Heart   Regular rate and rhythm.  No murmurs, rubs or gallops  Lungs  Clear to auscultation without rales or rhonchi  Extremities No foot wounds        Laboratory  Recent Labs     22  0535 22  0442 22  0330   * 152* 181*   AGAP 7 7 5   TRIGL  --   --  30   WBC 7.0 8.9 11.0   CREA 1.08* 0.97 1.18*   AST 16 23 14*   ALT 18 25 24         Blood glucose pattern      Significant diabetes-related events over the past 24-72 hours  A1C 13.1%  Fasting B  Pre-prandial: 210-311  Correction: 15-21 units in the last 24h  GFR 52    Assessment and Plan   Nursing Diagnosis Risk for unstable blood glucose pattern   Nursing Intervention Domain 4888 Decision-making Support   Nursing Interventions Examined current inpatient diabetes/blood glucose control   Explored factors facilitating and impeding inpatient management  Explored corrective strategies with patient and responsible inpatient provider   Informed patient of rational for insulin strategy while hospitalized     Evaluation   Johnson Pedroza is an 81 y.o. 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 A1C on admission is significantly elevated at 13.1%. BG on admission was 444. She had very poor po intake POD 2 but as oral intake improved, -311. She would benefit from basal and bolus insulin this admission. Please strive for optimal glucose control in the setting of CVA- 140-180mg/dl. Recommendations   POC glucose ACHS    Consistent carbohydrate diet (60 grams CHO/meal)    3. Start the subcutaneous Insulin Order set (6023)  Insulin Dosing Specific recommendation   Basal                                       (Based on weight, BMI & GFR) [x] 0.3 units/kg/D: 8 units NPH Q12   Allergy listed for Lantus (itching)      Bolus Start 3 units humalog/meal    Hold if patient is NPO or consumes less than 50% of carbohydrates on meal tray Advance by 2 units daily for   persistent pre-prandial   hyperglycemia     Corrective                                       (Useful in adjusting insulin dosing) [x] [facility]             Billing Code(s)   [x] 49266     Before making these care recommendations, I personally reviewed the hospitalization record, including notes, laboratory & diagnostic data and current medications, and examined the patient at the bedside (circumstances permitting) before making care recommendations. More than fifty (50) percent of the time was spent in patient counseling and/or care coordination.   Total minutes: 48      YISSEL Strong BC-ADM  Board Certified Advanced Diabetes Manager  Clinical Nurse Specialist  [facility]  Access via [address] [place] [address]

## 2022-11-03 NOTE — PROGRESS NOTES
Comprehensive Nutrition Assessment    Type and Reason for Visit: Initial    Nutrition Recommendations/Plan:   Diabetes CNS consult for A1c 13.1. Continue daily ONS TID, easy to chew diet. Weekly weights. Malnutrition Assessment:  Malnutrition Status:  Severe malnutrition (11/03/22 1105)    Context:  Chronic illness     Findings of the 6 clinical characteristics of malnutrition:   Energy Intake:  Mild decrease in energy intake (specify)  Weight Loss:  20% over 1 year     Body Fat Loss:  Severe body fat loss, Buccal region, Triceps   Muscle Mass Loss:  Severe muscle mass loss, Clavicles (pectoralis &deltoids)  Fluid Accumulation:  No significant fluid accumulation,     Strength:  Not performed        Nutrition Assessment:    Past Medical History:   Diagnosis Date    Colon polyps     Diabetes ([address]     Diverticulosis     Hypertension     Ill-defined condition     Pancreatitis    Pt transferred to [facility] from [facility] for thrombectomy 11/1. Nutrition review completed for pt d/t low body wt/BMI. Pt reports poor oral intake with some N/V, stomach feeling uneasy over the last month. She reports UBW as 155# prior to \"getting sick\" a few years back. She exhibits severe fat and muscle wasting. She does drink Ensure at home and agreeable to drink while admitted, consumed about 50% of her shake this morning. Tolerating easy to chew diet. Does state some difficulties moving bowels from time to time, on pericolace now but has not had a BM since admission. Does not check blood sugar levels at home d/t financial reasons - spoke with CM to pass on to IPR, as pending IPR placement.      Nutritionally Significant Medications:  Humalog, IV Protonix, Pericolace; PRN Zofran      Estimated Daily Nutrient Needs:  Energy Requirements Based On: Kcal/kg  Weight Used for Energy Requirements: Current  Energy (kcal/day): 8045-9654 (28-30 kcal/kg)  Weight Used for Protein Requirements: Current  Protein (g/day): 55 (1.1 g/kg)  Method Used for Fluid Requirements: 1 ml/kcal  Fluid (ml/day): 1500    Nutrition Related Findings:   Edema: No data recorded    Last BM:  (PTA),      Wounds: None      Current Nutrition Therapies:  Diet: Easy to chew  Supplements: Ensure HP with meals  Meal intake: Patient Vitals for the past 168 hrs:   % Diet Eaten   11/02/22 0900 26 - 50%   11/01/22 1724 0%   11/01/22 1256 1 - 25%     Supplement intake: No data found. Nutrition Support: N/A      Anthropometric Measures:  Height: 5' 6\" (167.6 cm)  Ideal Body Weight (IBW): 130 lbs (59 kg)     Current Body Wt:  49.8 kg (109 lb 12.6 oz), 84.5 % IBW.  Bed scale  Current BMI (kg/m2): 17.7  Usual Body Weight: 68 kg (150 lb)  % Weight Change (Calculated): -26.8  Weight Adjustment: No adjustment                 BMI Category: Underweight (BMI less than 22) age over 72    Wt Readings from Last 10 Encounters:   11/03/22 49.8 kg (109 lb 12.6 oz)   10/31/22 42.4 kg (93 lb 8 oz)   05/10/22 53.1 kg (117 lb)   03/21/22 56.7 kg (125 lb)   02/23/22 59.7 kg (131 lb 9.8 oz)   11/19/21 62.6 kg (138 lb)   10/22/21 63.5 kg (140 lb)   07/08/21 65.3 kg (143 lb 15.4 oz)   05/05/21 67.5 kg (148 lb 13 oz)   05/05/21 67.5 kg (148 lb 13 oz)           Nutrition Diagnosis:   Unintended weight loss related to early satiety, inadequate protein-energy intake as evidenced by BMI, weight loss    Nutrition Interventions:   Food and/or Nutrient Delivery: Continue oral nutrition supplement, Continue current diet  Nutrition Education/Counseling: Education not indicated  Coordination of Nutrition Care: Continue to monitor while inpatient  Plan of Care discussed with: AMINAH    Goals:     Goals: PO intake 50% or greater, by next RD assessment       Nutrition Monitoring and Evaluation:   Behavioral-Environmental Outcomes: None identified  Food/Nutrient Intake Outcomes: Food and nutrient intake, Diet advancement/tolerance, Supplement intake  Physical Signs/Symptoms Outcomes: Biochemical data, Meal time behavior, Weight    Discharge Planning:    Continue oral nutrition supplement, Continue current diet    Javier Ventura, RD  Available via [employer] or ext [phone]

## 2022-11-03 NOTE — PROGRESS NOTES
Bedside shift change report given to Va Whitney (oncoming nurse) by Taya Shen (offgoing nurse). Report included the following information SBAR, MAR, Cardiac Rhythm (NSR), and Dual Neuro Assessment.

## 2022-11-03 NOTE — ROUTINE PROCESS
1930: Bedside shift change report given to Missy RN (oncoming nurse) by Josefina Sol RN (offgoing nurse). Report included the following information SBAR, Kardex, Procedure Summary, Intake/Output, MAR, Recent Results, Cardiac Rhythm SR, and Dual Neuro Assessment.

## 2022-11-03 NOTE — PROGRESS NOTES
Physical Therapy 11/3/22    Chart reviewed, attempted to see patient. Entered room at [phone]. Patient had just received breakfast tray. Politely declined treatment until after eating breakfast. Will defer and follow up as able.     Thank you for your consideration,    Sandrita Alfaro, PT, DPT

## 2022-11-03 NOTE — PROGRESS NOTES
Transition of Care Plan  RUR- Med 18%  DISPOSITION: IPR - [facility] -  pending medical stability   F/U with PCP/Specialist    Transport: [facility]    Emergency contact: Skylar Rene [phone]    CM barriers to discharge: None    Plan for patient to discharge to [facility] when stable. Cardiology consulted for EF 10-15%. CM will follow. TODD Maravilla.

## 2022-11-03 NOTE — PROGRESS NOTES
[id number] [facility]  Hospitalist Group                                                                                          Hospitalist Progress Note  Spike Jordan MD  Answering service: [phone] OR 36 from in house phone        Date of Service:  11/3/2022  NAME:  Marine Novak  :  1940  MRN:  [id number]      Admission Summary:   Marine Novak is a 81 y.o. female who has a PMH of HTN and prior stroke who presented to the ED with AMS and facial droop. She presented to the ED at [facility] with LKW 3 hours and 10 min prior to presentation. She is on plavix and eliquis as an outpatient. 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 She was not a TNKase candidate due to being on anticoagulation. She was transferred to [facility] for thrombectomy. NIHSS 8 upon arrival. The patient was taken to angiogram for mechanical thrombectomy. The patient underwent general anesthesia for the procedure. She was extubated after the procedure and was taken to the ICU for further management post-procedure. MRI brain showed acute bilateral superior cerebellar infarctions and mild associated petechial  hemorrhage. Possible small foci of acute infarction right occipital lobe. No significant mass effect. No hydrocephalus. CT head on  without acute change. Interval history / Subjective:   Patient seen and examined laying in bed no acute complaints  We will consult cardiology for low EF?   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 patient will need a LifeVest  --Patient should be on beta-blocker and Entresto  --Med rec patient was on Eliquis ( holding as per NIS )  Lasix Entresto Aldactone and Coreg  --Cardiology consult for further management     Moderate protein calorie malnutrition Body mass index is 17.72 kg/m². Code status: Full  DVT prophylaxis: SCD    Care Plan discussed with: Patient/Family and Nurse  Anticipated Disposition: SNF/LTC  Anticipated Discharge: 24 hours to 48 hours     Hospital Problems  Date Reviewed: 3/18/2022            Codes Class Noted POA    Stroke [facility]) ICD-10-CM: I63.9  ICD-9-CM: 434.91  10/31/2022 Unknown             Review of Systems:   A comprehensive review of systems was negative. Vital Signs:    Last 24hrs VS reviewed since prior progress note.  Most recent are:  Visit Vitals  BP (!) 121/53 (BP 1 Location: Left upper arm, BP Patient Position: At rest)   Pulse 89   Temp 98.6 °F (37 °C)   Resp 24   Ht 5' 6\" (1.676 m)   Wt 49.8 kg (109 lb 12.6 oz)   SpO2 98%   BMI 17.72 kg/m²       No intake or output data in the 24 hours ending 11/03/22 1119     Physical Examination:     I had a face to face encounter with this patient and independently examined them on 11/3/2022 as outlined below:          General : alert x 3, awake, no acute distress, speech difficulty   HEENT: PEERL, EOMI, moist mucus membrane, TM clear  Neck: supple, no JVD, no meningeal signs  Chest: Clear to auscultation bilaterally   CVS: S1 S2 heard, Capillary refill less than 2 seconds  Abd: soft/ Non tender, non distended, BS physiological,   Ext: no clubbing, no cyanosis, no edema, brisk 2+ DP pulses  Neuro/Psych: pleasant mood and affect,   Skin: warm     Data Review:    I personally reviewed  Image and labs      Labs:     Recent Labs     11/03/22  0535 11/02/22  0442   WBC 7.0 8.9 HGB 11.1* 11.8   HCT 33.9* 35.6   * 129*     Recent Labs     11/03/22  0535 11/02/22  0442 11/01/22  0330 10/31/22  2006    140 138 137   K 4.0 4.0 4.1 4.5    110* 109* 105   CO2 23 23 24 19*   BUN 23* 20 29* 32*   CREA 1.08* 0.97 1.18* 1.12*   * 152* 181* 364*   CA 9.0 8.9 8.9 9.0   MG  --   --   --  1.7   PHOS  --   --   --  3.2     Recent Labs     11/03/22 0535 11/02/22 0442 11/01/22 0330   ALT 18 25 24    113 134*   TBILI 1.5* 1.1* 0.6   TP 6.0* 6.2* 6.9   ALB 2.8* 2.8* 3.0*   GLOB 3.2 3.4 3.9     Recent Labs     11/01/22  0330 10/31/22  1242   INR 1.1 1.3*   PTP 11.5* 16.0*      No results for input(s): FE, TIBC, PSAT, FERR in the last 72 hours. No results found for: FOL, RBCF   No results for input(s): PH, PCO2, PO2 in the last 72 hours. No results for input(s): CPK, CKNDX, TROIQ in the last 72 hours.     No lab exists for component: CPKMB  Lab Results   Component Value Date/Time    Cholesterol, total 146 11/01/2022 03:30 AM    HDL Cholesterol 69 11/01/2022 03:30 AM    LDL, calculated 71 11/01/2022 03:30 AM    Triglyceride 30 11/01/2022 03:30 AM    CHOL/HDL Ratio 2.1 11/01/2022 03:30 AM     Lab Results   Component Value Date/Time    Glucose (POC) 205 (H) 11/03/2022 06:45 AM    Glucose (POC) 210 (H) 11/02/2022 11:07 PM    Glucose (POC) 287 (H) 11/02/2022 06:14 PM    Glucose (POC) 313 (H) 11/02/2022 05:07 PM    Glucose (POC) 298 (H) 11/02/2022 11:11 AM     Lab Results   Component Value Date/Time    Color Yellow/Straw 10/31/2022 12:54 PM    Appearance Clear 10/31/2022 12:54 PM    Specific gravity 1.029 10/31/2022 12:54 PM    Specific gravity 1.017 03/17/2022 09:25 PM    pH (UA) 5.0 10/31/2022 12:54 PM    Protein Negative 10/31/2022 12:54 PM    Glucose >300 (A) 10/31/2022 12:54 PM    Ketone 5 (A) 10/31/2022 12:54 PM    Bilirubin Negative 10/31/2022 12:54 PM    Urobilinogen 0.1 10/31/2022 12:54 PM    Nitrites Negative 10/31/2022 12:54 PM    Leukocyte Esterase Negative 10/31/2022 12:54 PM    Bacteria Negative 10/31/2022 12:54 PM    WBC 0-4 10/31/2022 12:54 PM    RBC 0-5 10/31/2022 12:54 PM         Medications Reviewed:     Current Facility-Administered Medications   Medication Dose Route Frequency    senna-docusate (PERICOLACE) 8.6-50 mg per tablet 1 Tablet  1 Tablet Oral DAILY    clopidogreL (PLAVIX) tablet 75 mg  75 mg Oral DAILY    pantoprazole (PROTONIX) injection 40 mg  40 mg IntraVENous Q24H    And    sodium chloride (NS) flush 10 mL  10 mL IntraVENous Q24H    lidocaine 4 % patch 2 Patch  2 Patch TransDERmal Q24H    traMADoL (ULTRAM) tablet 25 mg  25 mg Oral Q6H PRN    atorvastatin (LIPITOR) tablet 10 mg  10 mg Oral QHS    amLODIPine (NORVASC) tablet 5 mg  5 mg Oral DAILY    ondansetron (ZOFRAN) injection 4 mg  4 mg IntraVENous Q4H PRN    acetaminophen (TYLENOL) tablet 650 mg  650 mg Oral Q4H PRN    Or    acetaminophen (TYLENOL) solution 650 mg  650 mg Per NG tube Q4H PRN    Or    acetaminophen (TYLENOL) suppository 650 mg  650 mg Rectal Q4H PRN    sodium chloride (NS) flush 5-40 mL  5-40 mL IntraVENous Q8H    sodium chloride (NS) flush 5-40 mL  5-40 mL IntraVENous PRN    [Held by provider] apixaban (ELIQUIS) tablet 2.5 mg  2.5 mg Oral DAILY    insulin lispro (HUMALOG) injection   SubCUTAneous Q6H    glucose chewable tablet 16 g  4 Tablet Oral PRN    glucagon (GLUCAGEN) injection 1 mg  1 mg IntraMUSCular PRN    dextrose 10 % infusion 0-250 mL  0-250 mL IntraVENous PRN    hydrALAZINE (APRESOLINE) 20 mg/mL injection 20 mg  20 mg IntraVENous Q6H PRN     ______________________________________________________________________  EXPECTED LENGTH OF STAY: 4d 2h  ACTUAL LENGTH OF STAY:          3      Please note that this dictation was completed with [employer], the [employer] voice recognition software. Quite often unanticipated grammatical, syntax, homophones, and other interpretive errors are inadvertently transcribed by the computer software. Please disregard these errors.   Please excuse any errors that have escaped final proofreading.                 Parris Albright MD

## 2022-11-03 NOTE — PROGRESS NOTES
Occupational Therapy  11/03/22    Chart reviewed. Attempted to see for OT treatment however patient just received breakfast tray, pleasantly declining at this time to eat her meal, agreeable for therapy to return later today. Will follow up as able/appropriate.      Thank you,   Kim Hilliard, OTD, OTR/L

## 2022-11-03 NOTE — PROGRESS NOTES
Problem: Pressure Injury - Risk of  Goal: *Prevention of pressure injury  Description: Document Jon Scale and appropriate interventions in the flowsheet. Outcome: Progressing Towards Goal  Note: Pressure Injury Interventions:  Sensory Interventions: Assess changes in LOC, Keep linens dry and wrinkle-free, Minimize linen layers    Moisture Interventions: Check for incontinence Q2 hours and as needed, Internal/External urinary devices, Limit adult briefs    Activity Interventions: Increase time out of bed, PT/OT evaluation    Mobility Interventions: PT/OT evaluation    Nutrition Interventions: Offer support with meals,snacks and hydration    Friction and Shear Interventions: Lift sheet, Minimize layers                Problem: Patient Education: Go to Patient Education Activity  Goal: Patient/Family Education  Outcome: Progressing Towards Goal     Problem: Falls - Risk of  Goal: *Absence of Falls  Description: Document Sourav Fall Risk and appropriate interventions in the flowsheet. Outcome: Progressing Towards Goal  Note: Fall Risk Interventions:  Mobility Interventions: Patient to call before getting OOB, Bed/chair exit alarm         Medication Interventions: Patient to call before getting OOB, Teach patient to arise slowly    Elimination Interventions: Bed/chair exit alarm, Call light in reach              Problem: Patient Education: Go to Patient Education Activity  Goal: Patient/Family Education  Outcome: Progressing Towards Goal     Problem: Aspiration - Risk of  Goal: *Absence of aspiration  Outcome: Progressing Towards Goal     Problem: Patient Education: Go to Patient Education Activity  Goal: Patient/Family Education  Outcome: Progressing Towards Goal     Problem: Diabetes Self-Management  Goal: *Disease process and treatment process  Description: Define diabetes and identify own type of diabetes; list 3 options for treating diabetes.   Outcome: Progressing Towards Goal  Goal: *Incorporating nutritional management into lifestyle  Description: Describe effect of type, amount and timing of food on blood glucose; list 3 methods for planning meals. Outcome: Progressing Towards Goal  Goal: *Incorporating physical activity into lifestyle  Description: State effect of exercise on blood glucose levels. Outcome: Progressing Towards Goal  Goal: *Developing strategies to promote health/change behavior  Description: Define the ABC's of diabetes; identify appropriate screenings, schedule and personal plan for screenings. Outcome: Progressing Towards Goal  Goal: *Using medications safely  Description: State effect of diabetes medications on diabetes; name diabetes medication taking, action and side effects. Outcome: Progressing Towards Goal  Goal: *Monitoring blood glucose, interpreting and using results  Description: Identify recommended blood glucose targets  and personal targets. Outcome: Progressing Towards Goal  Goal: *Prevention, detection, treatment of acute complications  Description: List symptoms of hyper- and hypoglycemia; describe how to treat low blood sugar and actions for lowering  high blood glucose level. Outcome: Progressing Towards Goal  Goal: *Prevention, detection and treatment of chronic complications  Description: Define the natural course of diabetes and describe the relationship of blood glucose levels to long term complications of diabetes.   Outcome: Progressing Towards Goal  Goal: *Developing strategies to address psychosocial issues  Description: Describe feelings about living with diabetes; identify support needed and support network  Outcome: Progressing Towards Goal  Goal: *Insulin pump training  Outcome: Progressing Towards Goal  Goal: *Sick day guidelines  Outcome: Progressing Towards Goal  Goal: *Patient Specific Goal (EDIT GOAL, INSERT TEXT)  Outcome: Progressing Towards Goal     Problem: Patient Education: Go to Patient Education Activity  Goal: Patient/Family Education  Outcome: Progressing Towards Goal     Problem: Patient Education: Go to Patient Education Activity  Goal: Patient/Family Education  Outcome: Progressing Towards Goal     Problem: Patient Education: Go to Patient Education Activity  Goal: Patient/Family Education  Outcome: Progressing Towards Goal     Problem: Patient Education: Go to Patient Education Activity  Goal: Patient/Family Education  Outcome: Progressing Towards Goal     Problem: TIA/CVA Stroke: Day 2 Until Discharge  Goal: Activity/Safety  Outcome: Progressing Towards Goal  Goal: Diagnostic Test/Procedures  Outcome: Progressing Towards Goal  Goal: Nutrition/Diet  Outcome: Progressing Towards Goal  Goal: Discharge Planning  Outcome: Progressing Towards Goal  Goal: Medications  Outcome: Progressing Towards Goal  Goal: Respiratory  Outcome: Progressing Towards Goal  Goal: Treatments/Interventions/Procedures  Outcome: Progressing Towards Goal  Goal: Psychosocial  Outcome: Progressing Towards Goal  Goal: *Verbalizes anxiety and depression are reduced or absent  Outcome: Progressing Towards Goal  Goal: *Absence of aspiration  Outcome: Progressing Towards Goal  Goal: *Absence of deep venous thrombosis signs and symptoms(Stroke Metric)  Outcome: Progressing Towards Goal  Goal: *Optimal pain control at patient's stated goal  Outcome: Progressing Towards Goal  Goal: *Tolerating diet  Outcome: Progressing Towards Goal  Goal: *Ability to perform ADLs and demonstrates progressive mobility and function  Outcome: Progressing Towards Goal  Goal: *Stroke education continued(Stroke Metric)  Outcome: Progressing Towards Goal

## 2022-11-03 NOTE — CONSULTS
[facility]  Cardiology Care Note                  [x]Initial visit     []Established visit     Patient Name: Joan Villafana - QU:[id number] - LSL:[id number]  Primary Cardiologist: Dr Sean Andrade Cardiologist: Jessica Mas MD     Reason for initial visit: Hx Dilated Cardiomyopathy with a further reduced EF. HPI:   Patient is a 81year old female with a med hx of Dilated cardiomyopathy originally diagnosed 5/21 with EF 40-45%. She had a NSTEMI 10/21 with repeat echo showing EF had further declined to 20-25%. She underwent a left and right heart cath with PCI to the mid RCA. Cardiac output at that time 2.5L/min, PA pressure 60/18 with mean of 35mmHg. No significant gradient was noted across aortic valve. Sh was at that time given Coreg, Lasix, statin, ASA, and Plavix. She was identified per EKG 10/29/21 to have afib and was started on Eliquis. Most recently seen in the hospital with acute recurrent pancreatitis 2/22 and was maintaining SR. She was told at that time she had intraductal papillary mucinous neoplasm of the pancreas by imaging. She was d/c at that time on Coreg, Lasix Spironolactone, Plavix and Eliquis. She has been maintaining SR per EKG and telemetry. Her reported PTA medications include Coreg, Entresto, Lasix, Spironolactone, Plavix, and low dose Eliquis. She presented to the hospital this admission with an acute CVA and is now s/p thrombectomy. Her Eliquis is currently being held d/t concerns for hemorrhagic conversion. She is on Plavix/statin for management currently. She had an echo showing EF has again declined to 10-15%. She reports compliance with her medications. She has not seen the cardiologist since d/c from the hospital d/t transportation issues. She has been followed by her PCP.   She reports that her stomach doctor told her that her pancreas issue is not cancer, it is \"just a tumor\". She had an ultrasound this admission showing NL pancreas. SUBJECTIVE:  Currently she denies CP, SOB, orthopnea, PND edema, or palpitations. She denies these symptoms prior to admission, though she was fairly inactive. She lives by herself, administers her own medications with a pillbox. She has not been driving, but otherwise has been mobile without assistance. Post CVA she seems to have regained mobility with P/T and O/T working with her currently. Assessment and Plan     Dilated cardiomyopathy: pt with EF 40-45% per echo 5/21. Further decline 10/21 with GDMT started. This has been continued including Coreg, Spironolactone, Entresto, and Lasix. She reports when she last spoke with the cardiologist they were recommending a defibrillator, but she didn't want to have to go back to the hospital for placement. Echo completed this admission shows EF now 20-25% despite revascularization 10/21 to RCA and GDMT. She reportedly does not have pancreatic cancer per pt and with NL pancreas on current ultrasound. She is currently compensated. QRS is not wide, therefore does not qualify for BIV ICD, but is a candidate for an ICD given the duration/further reduction in her EF despite med management/revascularization. D/W patient and she is in agreement to proceed. She is adamant that this needs to be done this hospitalization d/t transportation issues. Will keep her NPO after MN for procedure in am. Restart Coreg, Entresto, and Spironolactone as BP allows. Will stop Amlodipine and start Coreg at this time. CAD: pt denies symptoms. EKG without acute ischemic changes. She does have some elevation in troponin which can be seen in the setting of an acute CVA. With absence of symptoms, no acute ischemic changes to her EKG would not pursue ischemic evaluation. PAF: hx of PAF 10/21. Restart Eliquis 2.5mg bid when cleared to do so by neurology.    CVA: hx of previous CVA and now with recurrent CVA s/p thrombectomy: cont on statin, Plavix per neurology. _______________________________________  MARIANNA Addendum    I have seen, interviewed, and examined the patient. I agree with the history, exam, and was involved in the formulation of the assessment and plan as detailed in the Cardiology/Cardiac Electrophysiology consultation documentation composed today by the Advance Practice Provider (MARIANNA, NP, PA). Please see the MARIANNAs note for full details, below includes any additional revisions. I have performed the exam and medical decision making portions in its entirety. Physical exam:  Visit Vitals  /70 (BP 1 Location: Left arm)   Pulse 93   Temp 98.3 °F (36.8 °C)   Resp 17   Ht 5' 6\" (1.676 m)   Wt 109 lb 12.6 oz (49.8 kg)   SpO2 99%   BMI 17.72 kg/m²       GENERAL: No acute distress  HEAD: AT/NC  HEENT: Sclerae anicteric, ocular muscles intact. CARDIOVASCULAR: Regular rate and rhythm, normal S1/S2, no murmurs/rubs/gallops  RESPIRATORY: clear to auscultation bilaterally, without wheezes/rales/rhonchi  ABDOMINAL: soft, non-tender, non-distended, positive bowel sounds  EXTREMITIES: warm, well perfused, no lower extremities edema, no cyanosis. NEURO: alert, oriented, answering questions appropriately, no focal neurologic deficits  PSY: normal mood    Data: Labs, ECG, telemetry personally reviewed and interpreted    Current active problems:   1. ischemic cardiomyopathy, LVEF of 10 to 15%  2. Acute CVA status post thrombectomy  3. CAD status post prior PCI in October 2021  4. History of atrial fibrillation    Assessment and Plan:   Cardiac electrophysiology was consulted regarding the management of cardiomyopathy and consideration for LifeVest versus ICD. Patient has had a longstanding history of cardiomyopathy for around 1 year. Last echocardiogram from October 2021 demonstrated LVEF of 20 to 25% despite revascularization of PCI to RCA and guideline directed medical therapy. Patient reports taking her medications. Repeat echocardiogram during this presentation 11/1/2022 demonstrated severely reduced ejection fraction to 10 to 15% with global hypokinesis. It has been discussed in the past to the patient regarding the need for ICD implantation. However she lives by [facility] and does not want to come in the hospital for such procedure. In the setting of CHF with New York Heart Association class II symptoms, persistent depressed LVEF less than 35% despite optimal medical management and prior revascularization, she would benefit from ICD implantation for prevention of sudden cardiac death. Due to her living far away from the hospital and propensity to not want to come back to the hospital, she wishes to proceed with device implantation during this hospitalization.  - I have discussed the risks and benefits of ICD implant with the pt including but not limited to MI, death, bleeding, infection, lead dislodgement, pneumothorax, tamponade. We have reviewed an evidence-based tool (https://patientdecisionaid.org/wp-content/uploads/2016/06/ICD-tool-shortened-V1-3-. pdf), all questions were answered and patient reports full understanding of discussion and wish to proceed with the procedure.  - N.p.o. after midnight for single-chamber ICD implantation in the morning  - Continue optimal medical management for CHF as long as it does not impedes management of acute CVA in regards to carvedilol, Entresto and eventually Aldactone  - She will ultimately benefit from resumption of Eliquis when deemed appropriate from neurology perspective given her history of CVA and atrial fibrillation    Thank you so much for the consultation. Sachi ALANIS will continue to follow along. Please don't hesitate to contact us with any questions or referrals.       [x]    High complexity decision making was performed  [x]    Patient is at high-risk of decompensation with multiple organ involvement      ___________________________    An Paulie Thompson MD, [facility] [place], [address]  Director, Electrophysiology, [employer] and [address] [facility]  [address] [place], [address] [facility] [phone]   [address]  [phone]  [place] [address]               ____________________________________________________________    Cardiac testing  10/31/22    ECHO ADULT COMPLETE 11/01/2022 11/1/2022    Interpretation Summary    Left Ventricle: Severely reduced left ventricular systolic function with a visually estimated EF of 10 -15%. Left ventricle is moderately dilated. Mildly increased wall thickness. Severe global hypokinesis present. Abnormal diastolic function. Aortic Valve: Tricuspid valve. Mild regurgitation. Mitral Valve: Mild to moderate regurgitation. Interatrial Septum: Agitated saline study was negative with and without provocation. Signed by: Hayley Panchal MD on 11/1/2022  4:14 PM          10/22/21    CARDIAC PROCEDURE 10/28/2021 10/28/2021    Conclusion  · Severe mid RCA disease status post drug-eluting stent with 3.0 x 34, overlapping with a 2.75 x 12 mm drug-eluting stent postdilated with a 3.0 noncompliant balloon up to 16 tuan  · Nonobstructive coronary artery disease of 1st and 2nd diagonal and proximal LCx  · Cardiac output 2.5 L/min, PA pressure is 60/18 with a mean of 35 mmHg. Procedure done:  1. Ultrasound-guided right brachiocephalic vein access  2. Right heart catheterization via right brachiocephalic vein  3. Left heart catheterization via right radial artery  4. Selective coronary angiogram via right radial artery  5. PCI of mid RCA with a 3.0 x 34 mm drug-eluting stent overlapping with a 2.75 x 12 mm drug-eluting stent postdilated with a 3.0 noncompliant balloon up to 16 tuan.     Consent:  After explained to the patient and her son indication, alternative, risk benefits and complications of left and right heart catheterization, coronary angiogram and possible PCI they verbalized understanding and agreed to proceed with the procedure. Findings:  Coronary angiogram:    1. RCA is a large artery, codominant that has 95% stenosis, tubular lesion in the proximal to mid RCA that was treated with a 3.0 x 34 mm drug-eluting stent overlapping with a 2.75 x 12 mm drug-eluting stent postdilated with a 3.0 noncompliant balloon up to 16 tuan. 2.  Left main coronary artery is a large artery that is free of disease that supplies a large LAD and large left circumflex artery  3. LAD is a large artery that reaches the apex gives off 1st large diagonal that has minimal irregularities in its mid segment and medium size 2nd diagonal that has minimal irregularities in its mid segment. LAD with no significant coronary artery disease  4. Left circumflex artery is large, codominant. It has 20% stenosis in its proximal segment and it supplies a large obtuse marginal and a large left PL V. Right heart catheterization  1.  RA pressure 7/1 with mean of 2 mmHg  2.  RV pressure 50/0 with RVEDP of 2 mmHg    3.  LV pressure 133/1 with LVEDP of 5 mmHg  4. No significant gradient across aortic valve  5. Aortic sat 96%, PA sat 50.4, RA sat 52%. 6.  Pulmonary capillary wedge pressure 23/25 with mean of 19 mmHg  7.  PA pressure 60/18 with mean of 35 mmHg  8. Cardiac output 2.5 L/min by Scooby method. Recommendations:  1. Aggressive risk factor modification  2. Aspirin 81 mg indefinitely  3. Dual antiplatelet therapy for 1 year.     Signed by: Pricila Luong MD on 10/28/2021  4:04 PM        Most recent HS troponins:  Recent Labs     11/01/22  0330 10/31/22  2006   TROPHS 474* 406*     ECG: SR, LVH  Review of Systems    [x]All other systems reviewed and all negative except as written in HPI    [] Patient unable to provide secondary to condition         Past Medical History:   Diagnosis Date    Colon polyps     Diabetes ([facility] [address]     Diverticulosis     Hypertension     Ill-defined condition     Pancreatitis      Past Surgical History:   Procedure Laterality Date    COLONOSCOPY N/A 4/29/2021    COLONOSCOPY performed by Sriram Antoine MD at [facility]  2021    x2     Social Hx:  reports that she has never smoked. She has never used smokeless tobacco. She reports that she does not currently use alcohol. She reports that she does not use drugs. Family Hx: family history includes Diabetes in an other family member. Allergies   Allergen Reactions    Insulins Itching    Penicillins Other (comments)     Pt states it makes her pass out          OBJECTIVE:  Wt Readings from Last 3 Encounters:   11/03/22 49.8 kg (109 lb 12.6 oz)   10/31/22 42.4 kg (93 lb 8 oz)   05/10/22 53.1 kg (117 lb)     No intake or output data in the 24 hours ending 11/03/22 1519      Physical Exam    Vitals:   Vitals:    11/03/22 1200 11/03/22 1210 11/03/22 1400 11/03/22 1434   BP:  115/72  134/70   Pulse: 99 100 (!) 101 93   Resp:  15  17   Temp:    98.3 °F (36.8 °C)   SpO2:  99%     Weight:       Height:         Telemetry: normal sinus rhythm        General:    Alert, cooperative, no distress, appears stated age. Neck:   Supple, no carotid bruit and no JVD. Back:     Symmetric,    Lungs:     Clear to auscultation bilaterally. Heart[de-identified]    Regular rate and rhythm, S1, S2 normal, no murmur, click, rub or gallop. Abdomen:     Soft, non-tender. Bowel sounds normal.    Extremities:   Extremities normal, atraumatic, no cyanosis or edema. Vascular:   Pulses - reece UE/LE equal   Skin:   Skin color normal. No rashes or lesions on visible areas   Neurologic:   Alert, Moves all extremities. Data Review:     Radiology:   XR Results (most recent):  Results from Hospital Encounter encounter on 10/31/22    XR CHEST PORT    Narrative  EXAM:  XR CHEST PORT    INDICATION: weak    COMPARISON: Chest x-ray 11/19/2021.     TECHNIQUE: Single frontal view of the chest.    FINDINGS: No lobar consolidation, pleural effusion, or pneumothorax. Unchanged  cardiomegaly. Atherosclerotic calcifications of the aorta No acute or aggressive  osseous lesion. Impression  1. No evidence of an acute cardiopulmonary process. CT Results (most recent):  Results from Hospital Encounter encounter on 10/31/22    CT HEAD WO CONT    Narrative  HEAD CT WITHOUT CONTRAST: 11/1/2022 12:11 PM    INDICATION: Follow-up cerebellar stroke    COMPARISON: MRI 10/31/2022. PROCEDURE: Axial images of the head were obtained without contrast. Coronal and  sagittal reformats were performed. CT dose reduction was achieved through use of  a standardized protocol tailored for this examination and automatic exposure  control for dose modulation. FINDINGS: Subacute infarctions in the bilateral superior cerebellar hemispheres  are again noted. There is an old infarction in the right occipital lobe. Periventricular white matter hypodensity is nonspecific, but consistent with  chronic small vessel ischemic disease. The ventricles and sulci are appropriate  in size and configuration for age. No mass effect or intracranial hemorrhage. Impression  1. Subacute, bilateral, cerebellar infarctions. 2. Chronic, right occipital infarction. MRI Results (most recent):  Results from [place] encounter on 10/31/22    MRI BRAIN WO CONT    Narrative  **PRELIMINARY REPORT**    Bilateral cerebellar infarcts. Nonspecific white matter changes. Small right  occipital encephalomalacia. Findings relayed to patient's nurse, Duran Case, 11/01/2022 12:32 by telephone. Preliminary report was provided by Dr. Junie Canas the on-call radiologist, at 0549    Final report to follow. **END PRELIMINARY REPORT**    FINAL REPORT:    INDICATION:   acute stroke    EXAMINATION:  MRI BRAIN WO CONTRAST    COMPARISON:  CTA October 31, 2022    TECHNIQUE:  Multiplanar multisequence acquisition without contrast of the brain.       FINDINGS:    Ventricles: Midline, no hydrocephalus. Brain Parenchyma/Brainstem: Moderate patchy T2 hyperintensity in the  supratentorial white matter. Smaller chronic infarction right occipital lobe. Bilateral superior cerebellar infarctions. Possible small foci of acute  infarction right temporal occipital junction (series 3 image 18). Intracranial Hemorrhage:  Small amount of hemosiderin deposition within the  areas of bilateral cerebellar infarction consistent with petechial hemorrhage. Chronic microhemorrhages bilateral centrum semiovale. Basal Cisterns:  Normal.  Flow Voids:  Normal.  Additional Comments:  N/A. Impression  1. Acute bilateral superior cerebellar infarctions and mild associated petechial  hemorrhage. Possible small foci of acute infarction right occipital lobe. No  significant mass effect. No hydrocephalus. 2. Chronic microvascular ischemic disease. Chronic right occipital infarction. No results for input(s): CPK, TROIQ in the last 72 hours. No lab exists for component: CKQMB, CPKMB, BMPP  Recent Labs     11/03/22  0535 11/02/22  0442 11/01/22  0330 10/31/22  2006    140   < > 137   K 4.0 4.0   < > 4.5    110*   < > 105   CO2 23 23   < > 19*   BUN 23* 20   < > 32*   CREA 1.08* 0.97   < > 1.12*   * 152*   < > 364*   PHOS  --   --   --  3.2   CA 9.0 8.9   < > 9.0    < > = values in this interval not displayed. Recent Labs     11/03/22 0535 11/02/22 0442   WBC 7.0 8.9   HGB 11.1* 11.8   HCT 33.9* 35.6   * 129*     Recent Labs     11/03/22  0535 11/02/22 0442 11/01/22 0330   PTP  --   --  11.5*   INR  --   --  1.1    113 134*     Recent Labs     11/01/22 0330   CHOL 146   LDLC 71     No results for input(s): CRP, TSH, TSHEXT in the last 72 hours.     No lab exists for component: ESR        Current meds:    Current Facility-Administered Medications:     senna-docusate (PERICOLACE) 8.6-50 mg per tablet 1 Tablet, 1 Tablet, Oral, DAILY, Tere Gaucher, NP, 1 Tablet at 11/03/22 0846    clopidogreL (PLAVIX) tablet 75 mg, 75 mg, Oral, DAILY, [employer], NP, 75 mg at 11/03/22 0846    pantoprazole (PROTONIX) injection 40 mg, 40 mg, IntraVENous, Q24H, 40 mg at 11/03/22 0845 **AND** sodium chloride (NS) flush 10 mL, 10 mL, IntraVENous, Q24H, Aiyana Man NP, 10 mL at 11/02/22 0608    lidocaine 4 % patch 2 Patch, 2 Patch, TransDERmal, Q24H, Sally BELTRAN NP-C, 2 Patch at 11/03/22 0703    traMADoL (ULTRAM) tablet 25 mg, 25 mg, Oral, Q6H PRN, Aiyana Man NP, 25 mg at 11/02/22 2301    atorvastatin (LIPITOR) tablet 10 mg, 10 mg, Oral, QHS, Tono Lopez MD, 10 mg at 11/02/22 2142    amLODIPine (NORVASC) tablet 5 mg, 5 mg, Oral, DAILY, Aiyana Man NP, 5 mg at 11/03/22 0845    ondansetron (ZOFRAN) injection 4 mg, 4 mg, IntraVENous, Q4H PRN, Aiyana Man NP, 4 mg at 11/02/22 1628    acetaminophen (TYLENOL) tablet 650 mg, 650 mg, Oral, Q4H PRN, 650 mg at 11/02/22 1917 **OR** acetaminophen (TYLENOL) solution 650 mg, 650 mg, Per NG tube, Q4H PRN **OR** acetaminophen (TYLENOL) suppository 650 mg, 650 mg, Rectal, Q4H PRN, Aiyana Man NP, 650 mg at 11/01/22 0553    sodium chloride (NS) flush 5-40 mL, 5-40 mL, IntraVENous, Q8H, Mai Allen NP, 10 mL at 11/03/22 1436    sodium chloride (NS) flush 5-40 mL, 5-40 mL, IntraVENous, PRN, Mai Allen NP    [Held by provider] apixaban (ELIQUIS) tablet 2.5 mg, 2.5 mg, Oral, DAILY, Aiyana Kava, NP    insulin lispro (HUMALOG) injection, , SubCUTAneous, Q6H, Aiyana Man NP, 10 Units at 11/03/22 1237    glucose chewable tablet 16 g, 4 Tablet, Oral, PRN, Aiyana Man NP    glucagon (GLUCAGEN) injection 1 mg, 1 mg, IntraMUSCular, PRN, Aiyana Man NP    dextrose 10 % infusion 0-250 mL, 0-250 mL, IntraVENous, PRN, Aiyana Man NP    hydrALAZINE (APRESOLINE) 20 mg/mL injection 20 mg, 20 mg, IntraVENous, Q6H PRN, Mai Allen NP, 20 mg at 11/02/22 Obed De La O NP  [facility], [address] [facility] [address]  Bhavik Chapman  [phone]      Taqueria Narayanan MD

## 2022-11-04 NOTE — PROGRESS NOTES
Problem: Self Care Deficits Care Plan (Adult)  Goal: *Acute Goals and Plan of Care (Insert Text)  Description: FUNCTIONAL STATUS PRIOR TO ADMISSION: Patient was modified independent with ADLs, IADLs, and functional mobility using a SPC. HOME SUPPORT: The patient lived alone; however, reports her son lives next door and two other family members live down the road. Occupational Therapy Goals  Initiated 11/1/2022, continued 11/4/2022  1. Patient will perform self-feeding with LUE as FM/GM assist with supervision/set-up within 7 day(s). 2.  Patient will perform grooming EOB with LUE as FM/GM assist with minimal assistance/contact guard assist within 7 day(s). 3.  Patient will perform bathing with moderate assistance within 7 day(s). 4.  Patient will perform upper body dressing with supervision/set-up within 7 day(s). 5.  Patient will perform lower body dressing with maximal assistance within 7 day(s). 6.  Patient will perform toilet transfers to/from [facility] with moderate assistance within 7 day(s). 7.  Patient will perform all aspects of toileting with maximal assistance within 7 day(s). 8.  Patient will participate in upper extremity therapeutic exercise/activities with supervision/set-up within 7 day(s). (Within LUE ICD precautions)  9. Patient will utilize energy conservation techniques during functional activities with verbal, visual, and tactile cues within 7 day(s). 10.  Patient will complete the PLASTIQ within 7 days. Outcome: Not Progressing Towards Goal     OCCUPATIONAL THERAPY RE-EVALUATION  Patient: Trey Koehler [de-identified]81 y.o. female)  Date: 11/4/2022  Diagnosis: Stroke ([address] [I63.9] <principal problem not specified>  Procedure(s) (LRB):  INSERT ICD SINGLE (N/A) Day of Surgery  Precautions: Fall, Skin (SBP<140), LUE ICD precautions  Chart, occupational therapy assessment, plan of care, and goals were reviewed.     ASSESSMENT  Patient seen for OT evaluation POD 0 ICD implant, after bedrest period ended. Note order to maintain LUE immobilized today, which was followed for session and sling was donned. Patient remains limited by impaired balance, ataxia, pain at L ICD site and L torso, dizziness with upright activity, and reduced activity tolerance due to hemodynamic instability, symptomatic with dizziness and reduced alertness. She was receptive to education on ICD precautions but will require re-education when more alert. Note patient was using a RW prior to ICD implant but this will no longer be an option with new precautions. Continue to recommend rehab at d/c. Vitals:    11/04/22 1342 11/04/22 11/04/22 1352 11/04/22 1357 11/04/22 1358   BP: 132/78 84/64 (!) 101/58 (!) 87/52 104/63   BP 1 Location: Right upper arm Right upper arm Right upper arm Right upper arm Right upper arm   BP Patient Position: Semi fowlers, at rest Sitting at EOB, drowsy Semi fowlers Semi fowlers Supine; Other (Comment)  Comment: feet elevated   Pulse: 80 85 80 78 83       Current Level of Function Impacting Discharge (ADLs): Required maximum assistance for supine<>sit and contact guard to minimum assistance to maintain static sitting balance. Infer overall minimum to total assistance UB ADLs and total assistance LB ADLs         PLAN :  Recommendations and Planned Interventions: self care training, functional mobility training, therapeutic exercise, balance training, therapeutic activities, endurance activities, neuromuscular re-education, patient education, home safety training, and family training/education    Frequency/Duration: Patient will be followed by occupational therapy 5 times a week to address goals.     Recommendation for discharge: (in order for the patient to meet his/her long term goals)  Therapy 3 hours per day 5-7 days per week    This discharge recommendation:  Has been made in collaboration with the attending provider and/or case management       SUBJECTIVE:   Patient stated I feel dizzy.    OBJECTIVE DATA SUMMARY:   Hospital course since last seen and reason for reevaluation: POD 0 ICD    Cognitive/Behavioral Status:  Neurologic State: Alert  Orientation Level: Oriented to person;Oriented to place; Disoriented to situation;Disoriented to time  Cognition: Follows commands             Skin: visible skin appears intact, surgical dressing c/d/i    Edema: none noted    Hearing: WDL    Vision/Perceptual:    Tracking: Able to track stimulus in all quadrants w/o difficulty              Visual Fields:  (intact)                 Range of Motion:    AROM:  (RUE WFL, LUE immobilized)                         Strength:    Strength:  (RUE WFL, LUE immobilized)                Coordination:  Coordination:  (RUE mildly ataxic, LUE immobilized)  Fine Motor Skills-Upper: Left Impaired;Right Impaired    Gross Motor Skills-Upper: Left Impaired;Right Impaired    Tone & Sensation:    Tone: Normal  Sensation: Intact                        Functional Mobility and Transfers for ADLs:  Bed Mobility:  Supine to Sit: Maximum assistance  Sit to Supine: Maximum assistance;Assist x2  Scooting: Maximum assistance;Assist x2        Balance:  Sitting: Impaired  Sitting - Static: Fair (occasional)  Sitting - Dynamic: Poor (constant support)  Standing:  (unable)    ADL Assessment:  Feeding: Minimum assistance (inferred d/t RUE ataxia, LUE immobilized)    Oral Facial Hygiene/Grooming: Minimum assistance (inferred d/t RUE ataxia, LUE immobilized)    Bathing: Maximum assistance (inferred)         Upper Body Dressing: Total assistance (inferred d/t ataxia, LUE immobilized, impaired balance)    Lower Body Dressing: Total assistance (inferred)    Toileting: Total assistance (inferred)          Pain:  Patient reported severe pain at L ICD site and L torso. RN notified.   Ice pack refilled for ICD site    Activity Tolerance:   Fair    After treatment patient left in no apparent distress:   Supine in bed, Call bell within reach, and Caregiver / family present    COMMUNICATION/COLLABORATION:   The patients plan of care was discussed with: Physical therapist and Registered nurse.      Giovanna Garcia OT  Time Calculation: 26 mins

## 2022-11-04 NOTE — PROGRESS NOTES
Transition of Care Plan  RUR- Med 18%  DISPOSITION: [facility] - [facility] [place] -  pending medical stability   F/U with PCP/Specialist    Transport: [facility]    Emergency contact: Kev Fischer [phone]    CM barriers to discharge: None    Plan for patient to have pacemaker placed today, likely ready for discharge tomorrow. CM informed [facility] liaison, Courtney Apple of possible DC tomorrow. On call liaison for [facility] is Radha Horta [phone], CM plan to check bed availability if patient is stable tomorrow. Message left for patient's son, Pretty Xie, regarding discharge plan and preferred transport method. Nick Meter) CABRERA Monsivais.S.W.

## 2022-11-04 NOTE — PROGRESS NOTES
Cardiac Cath Lab Procedure Area Arrival Note:    Caitlin Mcintosh arrived to Cardiac Cath Lab, Procedure Area. Patient identifiers verified with NAME and DATE OF BIRTH. Procedure verified with patient. Consent forms verified. Allergies verified. Patient informed of procedure and plan of care. Questions answered with review. Patient voiced understanding of procedure and plan of care. Patient on cardiac monitor, non-invasive blood pressure, SPO2 monitor. On RA and placed on O2 @ 2 lpm via NC.  IV of NS on pump at 25 ml/hr. Patient status doing well without problems. Patient is A&Ox 4. Patient reports no CP and no SOB. Patient medicated during procedure with orders obtained and verified by Dr. Magali Sanchez. Refer to patients Cardiac Cath Lab PROCEDURE REPORT for vital signs, assessment, status, and response during procedure, printed at end of case. Printed report on chart or scanned into chart. 0638 Transfer to [address] [place] [address] from Procedure Area    Verbal report given to FLORA Andre. RT on Caitlin Mcintosh being transferred to Cardiac Cath Lab  for routine progression of care   Patient is post ICD insertion procedure. Patient stable upon transfer to . Report consisted of patients Situation, Background, Assessment and   Recommendations(SBAR). Information from the following report(s) SBAR, Procedure Summary, and MAR was reviewed with the receiving nurse. Opportunity for questions and clarification was provided. Patient medicated during procedure with orders obtained and verified by Dr. Magali Sanchez. Refer to patient PROCEDURE REPORT for vital signs, assessment, status, and response during procedure.

## 2022-11-04 NOTE — PROGRESS NOTES
Neurology Progress Note    Patient ID:  Kehinde Jackson  [id number]  73 y.o.  1940    Subjective:      No symptom events noted over the past 24 hours. Patient is resting comfortably in bed with ice pack over chest wall complaining of some degree of nausea which she is experienced on and off.     Current Facility-Administered Medications   Medication Dose Route Frequency    sodium chloride (NS) flush 5-40 mL  5-40 mL IntraVENous Q8H    sodium chloride (NS) flush 5-40 mL  5-40 mL IntraVENous PRN    carvediloL (COREG) tablet 6.25 mg  6.25 mg Oral BID WITH MEALS    [START ON 11/5/2022] pantoprazole (PROTONIX) tablet 40 mg  40 mg Oral ACB    traMADoL (ULTRAM) tablet 50 mg  50 mg Oral Q6H PRN    dextrose 10 % infusion 0-250 mL  0-250 mL IntraVENous PRN    insulin NPH (NOVOLIN N, HUMULIN N) injection 7 Units  0.15 Units/kg SubCUTAneous Q12H    insulin lispro (HUMALOG) injection   SubCUTAneous AC&HS    insulin lispro (HUMALOG) injection 3 Units  3 Units SubCUTAneous TID WITH MEALS    senna-docusate (PERICOLACE) 8.6-50 mg per tablet 1 Tablet  1 Tablet Oral DAILY    clopidogreL (PLAVIX) tablet 75 mg  75 mg Oral DAILY    lidocaine 4 % patch 2 Patch  2 Patch TransDERmal Q24H    atorvastatin (LIPITOR) tablet 10 mg  10 mg Oral QHS    ondansetron (ZOFRAN) injection 4 mg  4 mg IntraVENous Q4H PRN    acetaminophen (TYLENOL) tablet 650 mg  650 mg Oral Q4H PRN    Or    acetaminophen (TYLENOL) solution 650 mg  650 mg Per NG tube Q4H PRN    Or    acetaminophen (TYLENOL) suppository 650 mg  650 mg Rectal Q4H PRN    sodium chloride (NS) flush 5-40 mL  5-40 mL IntraVENous Q8H    sodium chloride (NS) flush 5-40 mL  5-40 mL IntraVENous PRN    [Held by provider] apixaban (ELIQUIS) tablet 2.5 mg  2.5 mg Oral DAILY    glucose chewable tablet 16 g  4 Tablet Oral PRN    glucagon (GLUCAGEN) injection 1 mg  1 mg IntraMUSCular PRN    dextrose 10 % infusion 0-250 mL  0-250 mL IntraVENous PRN    hydrALAZINE (APRESOLINE) 20 mg/mL injection 20 mg  20 mg IntraVENous Q6H PRN          Objective:     Patient Vitals for the past 8 hrs:   BP Temp Pulse Resp SpO2   11/04/22 1431 -- -- 80 -- --   11/04/22 1358 104/63 -- 83 -- --   11/04/22 1357 (!) 87/52 -- 78 -- --   11/04/22 1352 (!) 101/58 -- 80 -- --   11/04/22 1351 (!) 102/59 -- 80 -- --   11/04/22 1346 (!) 84/64 -- 85 -- --   11/04/22 1342 132/78 -- 80 -- --   11/04/22 1249 115/60 -- 80 20 100 %   11/04/22 1218 -- -- 87 -- --   11/04/22 1046 (!) 158/81 -- 82 16 99 %   11/04/22 1026 -- -- 81 -- --   11/04/22 1025 -- -- 78 -- --   11/04/22 0946 (!) 140/74 98.4 °F (36.9 °C) 80 15 99 %   11/04/22 0945 137/76 -- 88 20 98 %   11/04/22 0930 (!) 142/78 -- 91 15 99 %   11/04/22 0915 (!) 141/75 -- 86 20 100 %   11/04/22 0900 135/78 -- 87 14 100 %   11/04/22 0845 131/79 -- 84 14 100 %       No intake/output data recorded.   11/02 1901 - 11/04 0700  In: 120 [P.O.:120]  Out: 400 [Urine:400]    Lab Review   Recent Results (from the past 24 hour(s))   GLUCOSE, POC    Collection Time: 11/03/22  4:22 PM   Result Value Ref Range    Glucose (POC) 264 (H) 65 - 117 mg/dL    Performed by [address] [place] [address], POC    Collection Time: 11/03/22  9:20 PM   Result Value Ref Range    Glucose (POC) 289 (H) 65 - 117 mg/dL    Performed by Alexis Agrawal  PCT    CBC W/O DIFF    Collection Time: 11/04/22  4:19 AM   Result Value Ref Range    WBC 5.8 3.6 - 11.0 K/uL    RBC 3.99 3.80 - 5.20 M/uL    HGB 11.2 (L) 11.5 - 16.0 g/dL    HCT 34.5 (L) 35.0 - 47.0 %    MCV 86.5 80.0 - 99.0 FL    MCH 28.1 26.0 - 34.0 PG    MCHC 32.5 30.0 - 36.5 g/dL    RDW 13.2 11.5 - 14.5 %    PLATELET 363 (L) 597 - 400 K/uL    MPV 12.7 8.9 - 12.9 FL    NRBC 0.0 0  WBC    ABSOLUTE NRBC 0.00 0.00 - 2.98 K/uL   METABOLIC PANEL, COMPREHENSIVE    Collection Time: 11/04/22  4:19 AM   Result Value Ref Range    Sodium 136 136 - 145 mmol/L    Potassium 4.4 3.5 - 5.1 mmol/L    Chloride 105 97 - 108 mmol/L    CO2 24 21 - 32 mmol/L    Anion gap 7 5 - 15 mmol/L    Glucose 289 (H) 65 - 100 mg/dL    BUN 24 (H) 6 - 20 MG/DL    Creatinine 0.99 0.55 - 1.02 MG/DL    BUN/Creatinine ratio 24 (H) 12 - 20      eGFR 57 (L) >60 ml/min/1.73m2    Calcium 8.9 8.5 - 10.1 MG/DL    Bilirubin, total 1.4 (H) 0.2 - 1.0 MG/DL    ALT (SGPT) 17 12 - 78 U/L    AST (SGOT) 12 (L) 15 - 37 U/L    Alk. phosphatase 112 45 - 117 U/L    Protein, total 5.7 (L) 6.4 - 8.2 g/dL    Albumin 2.7 (L) 3.5 - 5.0 g/dL    Globulin 3.0 2.0 - 4.0 g/dL    A-G Ratio 0.9 (L) 1.1 - 2.2     GLUCOSE, POC    Collection Time: 11/04/22 12:32 PM   Result Value Ref Range    Glucose (POC) 326 (H) 65 - 117 mg/dL    Performed by Laura Longoria      Elderly female resting comfortably in bed. Resting with ice pack over left chest wall at incision site for ICD/pacemaker. HEENT is notable for cachexia/muscle wasting. Neck appears supple to observation. Cardiopulmonary exams are deferred. Abdomen is nondistended. Extremities are thin/dry. Neurologically patient appears alert and oriented attention appears reasonably intact. Speech is soft, slightly dysarthric. Language is nonfluent. Cranial nerves II through XII appear grossly intact. Remainder examination is deferred.     Assessment:     Christofer Morales is a 78-year-old right-hand-dominant female with multi medical problems who presented to [facility] as a transfer from [facility] for facial weakness along with right hemiparesis found to have multifocal posterior circulation infarcts status post ICD/pacemaker placement    Plan:   Multifocal posterior circulation infarct:  Etiology remains confined to thromboembolic versus cardioembolic  Remain unsure of reason for Eliquis, patient has no documented history of H arrhythmia certainly has atrial dilation as well as severe cardiomyopathy placing her at risk for cardioembolism  Patient is back on Plavix as a subtherapeutic PY 12 assay from earlier admission we will recheck PY 12 and if remains subtherapeutic consider transitioning to full dose aspirin given plans to reintroduce Eliquis.   Regarding Eliquis, will plan to repeat CT scan as before to ensure there is no further blossoming of petechial hemorrhages before starting anticoagulation  Unfortunately overall patient looks unwell with concern for long-term mortality/morbidity  Risk factors have otherwise been adjusted family asked to speak to  to discuss insulin options due to cost  Neurology will follow until medication regimen is solidified please call question concerns      Signed:  Matteo Walker MD  11/4/2022  3:17 PM

## 2022-11-04 NOTE — PROGRESS NOTES
Physician Progress Note      PATIENT:               Sonia Thompson  CSN #:                  [id number]  :                       1940  ADMIT DATE:       10/31/2022 2:31 PM  [address] [place] DATE:  RESPONDING  PROVIDER #:        Viktoriya Barajas MD          QUERY TEXT:    Noted documentation of Acute on Chronic Systolic CHF beginning with the  Progress Note. There is no documentation of SOB, edema, and Oct 31 CXR showing No evidence of an acute cardiopulmonary process. In order to support the diagnosis of Acute on Chronic Systolic CHF, please include additional clinical indicators in your documentation. Or please document if Systolic CHF is chronic. The medical record reflects the following:  Risk Factors: CHF with EF 10-15%, CM, HTN  Clinical Indicators: admitted with stroke symptoms. Found to have acute ischemic CVA. Echo was performed with EF 10-15%. Pt denies having SOB, or edema, with 10/31 CXR on admission being negative for acute process. Lungs are described as being clear. 'A/C Systolic CHF' is being documented beginning with the  Progress Note. Treatment: Coreg, Norvasc, Apresoline, I/O, Cardiac consult    B[place] Criteria for Acute CHF: Heart Failure exacerbation met with two major criteria or one major and two minor met ([email or web address])  Major: acute pulmonary edema on CXR, cardiomegaly, hepatojugular reflux, JVD, paroxysmal nocturnal dyspnea or orthopnea, rales or S3 gallop;  Minor: ankle edema, AKBAR, hepatomegaly, nocturnal cough, pleural effusion on CXR, tachycardia with HR > 120 bpm    Thank you  Teresa Lam RN  Clinical Documentation  [phone], or via Perfect Serve  Options provided:  -- Acute on Chronic Systolic CHF currently as evidenced by, Please document evidence.   -- Acute on Chronic Systolic CHF ruled out after study and chronic Systolic CHF confirmed  -- Other - I will add my own diagnosis  -- Disagree - Not applicable / Not valid  -- Disagree - Clinically unable to determine / Unknown  -- Refer to Clinical Documentation Reviewer    PROVIDER RESPONSE TEXT:    This patient is in Acute on Chronic Systolic CHF as evidenced by ef 10%    Query created by: Carter Medrano on 11/4/2022 12:48 PM      Electronically signed by:  Janis Baeza MD 11/4/2022 2:37 PM

## 2022-11-04 NOTE — PROGRESS NOTES
[facility]  Cardiology Care Note                  [x]Initial visit     []Established visit     Patient Name: Sabine Vaca - AIM:[birth year] - YYW:[id number]  Primary Cardiologist: Dr Wander De Oliveira Cardiologist: Jessica Saini MD     Reason for initial visit: Hx Dilated Cardiomyopathy with a further reduced EF. HPI:   Patient is a 81year old female with a med hx of Dilated cardiomyopathy originally diagnosed 5/21 with EF 40-45%. She had a NSTEMI 10/21 with repeat echo showing EF had further declined to 20-25%. She underwent a left and right heart cath with PCI to the mid RCA. Cardiac output at that time 2.5L/min, PA pressure 60/18 with mean of 35mmHg. No significant gradient was noted across aortic valve. Sh was at that time given Coreg, Lasix, statin, ASA, and Plavix. She was identified per EKG 10/29/21 to have afib and was started on Eliquis. Most recently seen in the hospital with acute recurrent pancreatitis 2/22 and was maintaining SR. She was told at that time she had intraductal papillary mucinous neoplasm of the pancreas by imaging. She was d/c at that time on Coreg, Lasix Spironolactone, Plavix and Eliquis. She has been maintaining SR per EKG and telemetry. Her reported PTA medications include Coreg, Entresto, Lasix, Spironolactone, Plavix, and low dose Eliquis. She presented to the hospital this admission with an acute CVA and is now s/p thrombectomy. Her Eliquis is currently being held d/t concerns for hemorrhagic conversion. She is on Plavix/statin for management currently. She had an echo showing EF has again declined to 10-15%. She reports compliance with her medications. She has not seen the cardiologist since d/c from the hospital d/t transportation issues. She has been followed by her PCP.   She reports that her stomach doctor told her that her pancreas issue is not cancer, it is \"just a tumor\". She had an ultrasound this admission showing NL pancreas. SUBJECTIVE:  Currently she denies CP, SOB, orthopnea, PND edema, or palpitations. She denies these symptoms prior to admission, though she was fairly inactive. She lives by herself, administers her own medications with a pillbox. She has not been driving, but otherwise has been mobile without assistance. Post CVA she seems to have regained mobility with P/T and O/T working with her currently. Assessment and Plan     Current active problems:   1. ischemic cardiomyopathy, LVEF of 10 to 15%  2. Acute CVA status post thrombectomy  3. CAD status post prior PCI in October 2021  4. History of atrial fibrillation    Assessment and Plan:   Cardiac electrophysiology was consulted regarding the management of cardiomyopathy and consideration for LifeVest versus ICD. Patient has had a longstanding history of cardiomyopathy for around 1 year. Last echocardiogram from October 2021 demonstrated LVEF of 20 to 25% despite revascularization of PCI to RCA and guideline directed medical therapy. Patient reports taking her medications. Repeat echocardiogram during this presentation 11/1/2022 demonstrated severely reduced ejection fraction to 10 to 15% with global hypokinesis. It has been discussed in the past to the patient regarding the need for ICD implantation. However she lives by [address] and does not want to come in the hospital for such procedure. In the setting of CHF with New York Heart Association class II symptoms, persistent depressed LVEF less than 35% despite optimal medical management and prior revascularization, she would benefit from ICD implantation for prevention of sudden cardiac death.   Due to her living far away from the hospital and propensity to not want to come back to the hospital, she wishes to proceed with device implantation during this hospitalization.  - I have discussed the risks and benefits of ICD implant with the pt including but not limited to MI, death, bleeding, infection, lead dislodgement, pneumothorax, tamponade. We have reviewed an evidence-based tool (https://patientdecisionaid.org/wp-content/uploads/2016/06/ICD-tool-shortened-V1-3-. pdf), all questions were answered and patient reports full understanding of discussion and wish to proceed with the procedure.  - N.p.o. for single-chamber ICD implantation in the morning  - Continue optimal medical management for CHF as long as it does not impedes management of acute CVA in regards to carvedilol, Entresto and eventually Aldactone  - She will ultimately benefit from resumption of Eliquis when deemed appropriate from neurology perspective given her history of CVA and atrial fibrillation  - FU with site check in 7-10 days  - FU with Dr. Tila Pool in 3 months    Thank you so much for the consultation. [employer] EP will continue to follow along. Please don't hesitate to contact us with any questions or referrals. [x]    High complexity decision making was performed  [x]    Patient is at high-risk of decompensation with multiple organ involvement      ___________________________    An Sukumar Hamilton MD, [facility], [facility]  Director, Electrophysiology, [facility] and [address] [place] [address] [address] [place] [phone]   [phone]  [address]               ____________________________________________________________    Cardiac testing  10/31/22    ECHO ADULT COMPLETE 11/01/2022 11/1/2022    Interpretation Summary    Left Ventricle: Severely reduced left ventricular systolic function with a visually estimated EF of 10 -15%. Left ventricle is moderately dilated. Mildly increased wall thickness. Severe global hypokinesis present. Abnormal diastolic function. Aortic Valve: Tricuspid valve. Mild regurgitation.     Mitral Valve: Mild to moderate regurgitation. Interatrial Septum: Agitated saline study was negative with and without provocation. Signed by: Chad Velazquez MD on 11/1/2022  4:14 PM          10/22/21    CARDIAC PROCEDURE 10/28/2021 10/28/2021    Conclusion  · Severe mid RCA disease status post drug-eluting stent with 3.0 x 34, overlapping with a 2.75 x 12 mm drug-eluting stent postdilated with a 3.0 noncompliant balloon up to 16 tuan  · Nonobstructive coronary artery disease of 1st and 2nd diagonal and proximal LCx  · Cardiac output 2.5 L/min, PA pressure is 60/18 with a mean of 35 mmHg. Procedure done:  1. Ultrasound-guided right brachiocephalic vein access  2. Right heart catheterization via right brachiocephalic vein  3. Left heart catheterization via right radial artery  4. Selective coronary angiogram via right radial artery  5. PCI of mid RCA with a 3.0 x 34 mm drug-eluting stent overlapping with a 2.75 x 12 mm drug-eluting stent postdilated with a 3.0 noncompliant balloon up to 16 tuan. Consent:  After explained to the patient and her son indication, alternative, risk benefits and complications of left and right heart catheterization, coronary angiogram and possible PCI they verbalized understanding and agreed to proceed with the procedure. Findings:  Coronary angiogram:    1. RCA is a large artery, codominant that has 95% stenosis, tubular lesion in the proximal to mid RCA that was treated with a 3.0 x 34 mm drug-eluting stent overlapping with a 2.75 x 12 mm drug-eluting stent postdilated with a 3.0 noncompliant balloon up to 16 tuan. 2.  Left main coronary artery is a large artery that is free of disease that supplies a large LAD and large left circumflex artery  3. LAD is a large artery that reaches the apex gives off 1st large diagonal that has minimal irregularities in its mid segment and medium size 2nd diagonal that has minimal irregularities in its mid segment.   LAD with no significant coronary artery disease  4. Left circumflex artery is large, codominant. It has 20% stenosis in its proximal segment and it supplies a large obtuse marginal and a large left PL V. Right heart catheterization  1.  RA pressure 7/1 with mean of 2 mmHg  2.  RV pressure 50/0 with RVEDP of 2 mmHg    3.  LV pressure 133/1 with LVEDP of 5 mmHg  4. No significant gradient across aortic valve  5. Aortic sat 96%, PA sat 50.4, RA sat 52%. 6.  Pulmonary capillary wedge pressure 23/25 with mean of 19 mmHg  7.  PA pressure 60/18 with mean of 35 mmHg  8. Cardiac output 2.5 L/min by Scooby method. Recommendations:  1. Aggressive risk factor modification  2. Aspirin 81 mg indefinitely  3. Dual antiplatelet therapy for 1 year. Signed by: Mireya Oates MD on 10/28/2021  4:04 PM        Most recent HS troponins:  No results for input(s): TROPHS in the last 72 hours. No lab exists for component:  CKMB    ECG: SR, LVH  Review of Systems    [x]All other systems reviewed and all negative except as written in HPI    [] Patient unable to provide secondary to condition         Past Medical History:   Diagnosis Date    Colon polyps     Diabetes ([facility] [address]     Diverticulosis     Hypertension     Ill-defined condition     Pancreatitis      Past Surgical History:   Procedure Laterality Date    COLONOSCOPY N/A 4/29/2021    COLONOSCOPY performed by Cari Haas MD at [facility] [place]  2021    x2     Social Hx:  reports that she has never smoked. She has never used smokeless tobacco. She reports that she does not currently use alcohol. She reports that she does not use drugs. Family Hx: family history includes Diabetes in an other family member.   Allergies   Allergen Reactions    Insulins Itching    Penicillins Other (comments)     Pt states it makes her pass out          OBJECTIVE:  Wt Readings from Last 3 Encounters:   11/04/22 108 lb (49 kg)   10/31/22 93 lb 8 oz (42.4 kg)   05/10/22 117 lb (53.1 kg) Intake/Output Summary (Last 24 hours) at 11/4/2022 0721  Last data filed at 11/4/2022 0553  Gross per 24 hour   Intake 120 ml   Output 400 ml   Net -280 ml         Physical Exam    Vitals:   Vitals:    11/04/22 0356 11/04/22 0530 11/04/22 0548 11/04/22 0634   BP:  134/75     Pulse: 86 89 83    Resp:  17     Temp:  98.4 °F (36.9 °C)     SpO2:    99%   Weight:       Height:         Telemetry: normal sinus rhythm        PHYSICAL EXAM  General:  Alert and oriented, in no acute distress  Head:  Atraumatic, normocephalic  Eyes:  extraocular muscles intact  Neck:  Supple, normal range of motion  Lungs:  Clear to auscultation bilaterally, no wheezes/rales/rhonchi   Cardiovascular:  Regular rate and rhythm, normal S1-S2, no murmurs/rubs/gallops  Abdomen:  Soft, nontender, nondistended, normoactive bowel sounds  Skin:  Intact, no rash  Extremities:, no clubbing, cyanosis, or edema  Musculoskeletal: normal range of motion  Neurological:  Alert and oriented, no focal neurologic deficits  Psychiatric:  Normal mood and affect        Data Review:     Radiology:   XR Results (most recent):  Results from Hospital Encounter encounter on 10/31/22    XR CHEST PORT    Narrative  EXAM:  XR CHEST PORT    INDICATION: weak    COMPARISON: Chest x-ray 11/19/2021. TECHNIQUE: Single frontal view of the chest.    FINDINGS: No lobar consolidation, pleural effusion, or pneumothorax. Unchanged  cardiomegaly. Atherosclerotic calcifications of the aorta No acute or aggressive  osseous lesion. Impression  1. No evidence of an acute cardiopulmonary process. CT Results (most recent):  Results from Hospital Encounter encounter on 10/31/22    CT HEAD WO CONT    Narrative  HEAD CT WITHOUT CONTRAST: 11/1/2022 12:11 PM    INDICATION: Follow-up cerebellar stroke    COMPARISON: MRI 10/31/2022. PROCEDURE: Axial images of the head were obtained without contrast. Coronal and  sagittal reformats were performed.  CT dose reduction was achieved through use of  a standardized protocol tailored for this examination and automatic exposure  control for dose modulation. FINDINGS: Subacute infarctions in the bilateral superior cerebellar hemispheres  are again noted. There is an old infarction in the right occipital lobe. Periventricular white matter hypodensity is nonspecific, but consistent with  chronic small vessel ischemic disease. The ventricles and sulci are appropriate  in size and configuration for age. No mass effect or intracranial hemorrhage. Impression  1. Subacute, bilateral, cerebellar infarctions. 2. Chronic, right occipital infarction. MRI Results (most recent):  Results from [place] encounter on 10/31/22    MRI BRAIN WO CONT    Narrative  **PRELIMINARY REPORT**    Bilateral cerebellar infarcts. Nonspecific white matter changes. Small right  occipital encephalomalacia. Findings relayed to patient's nurse, Usha Robertson, 11/01/2022 12:32 by telephone. Preliminary report was provided by Dr. Dede Reaves the on-call radiologist, at 4847    Final report to follow. **END PRELIMINARY REPORT**    FINAL REPORT:    INDICATION:   acute stroke    EXAMINATION:  MRI BRAIN WO CONTRAST    COMPARISON:  CTA October 31, 2022    TECHNIQUE:  Multiplanar multisequence acquisition without contrast of the brain. FINDINGS:    Ventricles:  Midline, no hydrocephalus. Brain Parenchyma/Brainstem: Moderate patchy T2 hyperintensity in the  supratentorial white matter. Smaller chronic infarction right occipital lobe. Bilateral superior cerebellar infarctions. Possible small foci of acute  infarction right temporal occipital junction (series 3 image 18). Intracranial Hemorrhage:  Small amount of hemosiderin deposition within the  areas of bilateral cerebellar infarction consistent with petechial hemorrhage. Chronic microhemorrhages bilateral centrum semiovale.   Basal Cisterns:  Normal.  Flow Voids:  Normal.  Additional Comments: N/A.    Impression  1. Acute bilateral superior cerebellar infarctions and mild associated petechial  hemorrhage. Possible small foci of acute infarction right occipital lobe. No  significant mass effect. No hydrocephalus. 2. Chronic microvascular ischemic disease. Chronic right occipital infarction. No results for input(s): CPK, TROIQ in the last 72 hours. No lab exists for component: CKQMB, CPKMB, BMPP  Recent Labs     11/04/22 0419 11/03/22  0535    137   K 4.4 4.0    107   CO2 24 23   BUN 24* 23*   CREA 0.99 1.08*   * 202*   CA 8.9 9.0     Recent Labs     11/04/22 0419 11/03/22  0535   WBC 5.8 7.0   HGB 11.2* 11.1*   HCT 34.5* 33.9*   * 130*     Recent Labs     11/04/22 0419 11/03/22  0535    106     No results for input(s): CHOL, LDLC in the last 72 hours. No lab exists for component: TGL, HDLC,  HBA1C    No results for input(s): CRP, TSH, TSHEXT, TSHEXT in the last 72 hours.     No lab exists for component: ESR        Current meds:    Current Facility-Administered Medications:     dextrose 10 % infusion 0-250 mL, 0-250 mL, IntraVENous, PRN, Jose Segovia MD    insulin NPH (NOVOLIN N, HUMULIN N) injection 7 Units, 0.15 Units/kg, SubCUTAneous, Q12H, Jose Caballero MD, 7 Units at 11/03/22 2150    insulin lispro (HUMALOG) injection, , SubCUTAneous, AC&HS, Cristhian Craven MD, 4 Units at 11/03/22 2150    insulin lispro (HUMALOG) injection 3 Units, 3 Units, SubCUTAneous, TID WITH MEALS, Jose Caballero MD, 3 Units at 11/03/22 1855    carvediloL (COREG) tablet 3.125 mg, 3.125 mg, Oral, BID WITH MEALS, Bhupinder REYES, NP, 3.125 mg at 11/03/22 1854    senna-docusate (PERICOLACE) 8.6-50 mg per tablet 1 Tablet, 1 Tablet, Oral, DAILY, Doylene Rho, NP, 1 Tablet at 11/03/22 0846    clopidogreL (PLAVIX) tablet 75 mg, 75 mg, Oral, DAILY, Mireya Dixon NP, 75 mg at 11/03/22 0846    pantoprazole (PROTONIX) injection 40 mg, 40 mg, IntraVENous, Q24H, 40 mg at 11/03/22 0845 **AND** sodium chloride (NS) flush 10 mL, 10 mL, IntraVENous, Q24H, Raven Aguirre, NP, 10 mL at 11/04/22 0600    lidocaine 4 % patch 2 Patch, 2 Patch, TransDERmal, Q24H, ERNESTO Lerner, 2 Patch at 11/03/22 0703    traMADoL (ULTRAM) tablet 25 mg, 25 mg, Oral, Q6H PRN, Raven Aguirre NP, 25 mg at 11/03/22 2151    atorvastatin (LIPITOR) tablet 10 mg, 10 mg, Oral, QHS, HekmatdoAvis clark MD, 10 mg at 11/03/22 2152    ondansetron (ZOFRAN) injection 4 mg, 4 mg, IntraVENous, Q4H PRN, Raven Aguirre NP, 4 mg at 11/04/22 0424    acetaminophen (TYLENOL) tablet 650 mg, 650 mg, Oral, Q4H PRN, 650 mg at 11/02/22 1917 **OR** acetaminophen (TYLENOL) solution 650 mg, 650 mg, Per NG tube, Q4H PRN **OR** acetaminophen (TYLENOL) suppository 650 mg, 650 mg, Rectal, Q4H PRN, Raven Aguirre NP, 650 mg at 11/01/22 0553    sodium chloride (NS) flush 5-40 mL, 5-40 mL, IntraVENous, Q8H, Diallo Allen NP, 10 mL at 11/04/22 0550    sodium chloride (NS) flush 5-40 mL, 5-40 mL, IntraVENous, PRN, Diallo Allen NP    [Held by provider] apixaban (ELIQUIS) tablet 2.5 mg, 2.5 mg, Oral, DAILY, Raven Aguirre NP    glucose chewable tablet 16 g, 4 Tablet, Oral, PRN, Raven Aguirre NP    glucagon (GLUCAGEN) injection 1 mg, 1 mg, IntraMUSCular, PRN, Raven Aguirre NP    dextrose 10 % infusion 0-250 mL, 0-250 mL, IntraVENous, PRN, Raven Aguirre NP    hydrALAZINE (APRESOLINE) 20 mg/mL injection 20 mg, 20 mg, IntraVENous, Q6H PRN, Diallo Allen NP, 20 mg at 11/02/22 Nik Harding MD  [facility] [address] [address]  [facility]  [phone]      Shireen Manjarrez MD

## 2022-11-04 NOTE — PROGRESS NOTES
Patient post ICD implantation, tolerating procedure well. Noted to be tachycardic. Increase Coreg to 6.25mg bid. Add back her GDMT as her BP allows post CVA. Would add Entresto 24/26mg bid next. Cardiology will follow prn. Office appts being arranged for post device site check and MD follow up in 3 months at our [facility] office. Device instructions added to d/c instructions.

## 2022-11-04 NOTE — DISCHARGE INSTRUCTIONS
Restrictions for ICDs     Follow up will be scheduled for 10-14 days post implant with our device clinic and then 3 months post implant with MD.   This is subject to change based off of discharge orders placed by MD or NP. Restrictions:    NO raising affected arm above shoulder height until: 1 month (or 4 weeks) post implant   NO lifting (with affected arm) heavier than 10 pounds until:  1 month (or 4 weeks) post implant, slowly work back into regular activity after   NO fast, swinging motions (raking, golfing/swimming/power walking) until:  6 weeks post implant, slowly work back into regular activity after   NO arc welding or chainsaw use: ICD: NOT allowed  Pacemaker: 1 month (or 4 weeks) post implant. Refer to patient number on device card to reference certain equipment. NO soaking in water (bathtub, hot tub, pool, river, ocean, etc.): 1 month (or 4 weeks) post implant or until completely healed   Allowed to shower: Ok to get bandage wet, OK to shower after bandage removed. Do not let shower beat on incision site. Pat dry. NO dental work for 8 weeks after your implant. (antibiotics only needed with certain procedures)   MRI compatible devices AND leads:  Must wait until 6 weeks after implant. DRIVING: No driving for 3 days, or longer depending on MD's recommendations. You must wear seatbelt per [place]. If this is uncomfortable, use a pad or towel over the site or avoid driving until discomfort lessens. **Remote monitoring is STRONGLY recommended for our device clinic and instruction will be given based off of your device and device company.

## 2022-11-04 NOTE — PROGRESS NOTES
Problem: Mobility Impaired (Adult and Pediatric)  Goal: *Acute Goals and Plan of Care (Insert Text)  Description:   FUNCTIONAL STATUS PRIOR TO ADMISSION: Patient was modified independent using a single point cane for functional mobility. HOME SUPPORT PRIOR TO ADMISSION: The patient lived alone with several family members living nearby (son next  door). Physical Therapy Goals  Updated s/p ICD placement 11/4/2022  1. Patient will move from supine to sit and sit to supine , scoot up and down, and roll side to side in bed with modified independence within 7 day(s). 2.  Patient will transfer from bed to chair and chair to bed with min assistance using the least restrictive device within 7 day(s). 3.  Patient will perform sit to stand with min assistance within 7 day(s). 4.  Patient will ambulate with minimal assistance/contact guard assist for 50 feet with the least restrictive device within 7 day(s). 5.  Patient will ascend/descend 4 stairs with one handrail(s) with minimal assistance/contact guard assist within 7 day(s). 6.  Patient will improve Black Balance score by 7 points within 7 days. Initiated 11/1/2022  1. Patient will move from supine to sit and sit to supine , scoot up and down, and roll side to side in bed with modified independence within 7 day(s). 2.  Patient will transfer from bed to chair and chair to bed with modified independence using the least restrictive device within 7 day(s). 3.  Patient will perform sit to stand with supervision/set-up within 7 day(s). 4.  Patient will ambulate with minimal assistance/contact guard assist for 100 feet with the least restrictive device within 7 day(s). 5.  Patient will ascend/descend 4 stairs with one handrail(s) with minimal assistance/contact guard assist within 7 day(s). 6.  Patient will improve Black Balance score by 7 points within 7 days.      Outcome: Progressing Towards Goal     PHYSICAL THERAPY REEVALUATION  Patient: Frederic Méndez Richie Ashley [de-identified]81 y.o. female)  Date: 11/4/2022  Primary Diagnosis: Stroke ([facility] [address] [I63.9]  Procedure(s) (LRB):  INSERT ICD SINGLE (N/A) Day of Surgery   Precautions: ICD precautions (L arm in sling)  Fall, Skin (SBP<140)      ASSESSMENT  Based on the objective data described below, the patient presents with significant mobility deficits, generalized weakness, poor sitting balance, decreased attention and difficulty following commands, orthostatic hypotension, increased need for assistance. Patient is POD#0 s/p ICD but cleared by RN to see. Assisted in donning LUE sling. Patient not following precautions despite educating her several times. Patient initially agreeable to treatment however she has a very poor activity tolerance once sitting EOB. Patient became increasingly lethargic but denied dizziness at the time. Noted to have significant orthostatic BP drop sitting. Assisted to return to supine, BP improved slightly. Patient became more lethargic and captured BP again, noted to be hypotensive. Alerted RN, laid patient flat and elevated feet. BP improved. Patient had two family members at bedside. All needs met. Patient's decline in status is likely due to new surgical procedure. Continue to recommend IPR at discharge. Will continue to follow in acute setting. 11/04/22 1342 11/04/22 1346 11/04/22 1351   Vital Signs   Pulse (Heart Rate) 80 85 80   Level of Consciousness 0 1 1   /78 (!) 84/64 (!) 102/59   MAP (Calculated) 96 71 73   BP 1 Location Right upper arm Right upper arm Right upper arm   BP Patient Position At rest;Semi fowlers Sitting Semi fowlers      11/04/22 1352 11/04/22 1357 11/04/22 1358   Vital Signs   Pulse (Heart Rate) 80 78 83   Level of Consciousness  --   --   --    BP (!) 101/58 (!) 87/52 104/63   MAP (Calculated) 72 (!) 64 77   BP 1 Location Right upper arm Right upper arm Right upper arm   BP Patient Position Semi fowlers Semi fowlers Supine; Other (Comment)  (feet elevated) Current Level of Function Impacting Discharge (mobility/balance): max A for bed mobility, unable to attempt transfers or standing    Functional Outcome Measure: The patient scored 1/56 on the SCANLON outcome measure which is indicative of high risk for falls. Other factors to consider for discharge: high PLOF, strong family support     Patient will benefit from skilled therapy intervention to address the above noted impairments. PLAN :  Recommendations and Planned Interventions: bed mobility training, transfer training, gait training, therapeutic exercises, neuromuscular re-education, patient and family training/education, and therapeutic activities      Frequency/Duration: Patient will be followed by physical therapy:  5 times a week to address goals. Recommendation for discharge: (in order for the patient to meet his/her long term goals)  Therapy 3 hours per day 5-7 days per week    This discharge recommendation:  Has been made in collaboration with the attending provider and/or case management    Equipment recommendations for successful discharge (if) home: TBD         SUBJECTIVE:   Patient stated This surgery hurts so bad.     OBJECTIVE DATA SUMMARY:   HISTORY:    Past Medical History:   Diagnosis Date    Colon polyps     Diabetes ([facility] [address]     Diverticulosis     Hypertension     Ill-defined condition     Pancreatitis      Past Surgical History:   Procedure Laterality Date    COLONOSCOPY N/A 4/29/2021    COLONOSCOPY performed by Nadine Nunez MD at [facility]  2021    x2     Hospital course since last seen and reason for reevaluation: underwent ICD placement this AM. Bed rest orders ended at 1300    Personal factors and/or comorbidities impacting plan of care: high PLOF, motivated, complicated hospital stay    Home Situation  Home Environment: Private residence  # Steps to Enter: 4  Rails to Enter: Yes  Hand Rails : Left  One/Two Story Residence: One story  Living Alone: Yes  Support Systems: Child(francisco) ([facility], two other family members down the street)  Patient Expects to be Discharged to[de-identified] Rehab hospital/unit acute  Current DME Used/Available at Home: Cane, straight, Wheelchair, Walker, rolling, Shower chair, Raised toilet seat  Tub or Shower Type: Tub/Shower combination    EXAMINATION/PRESENTATION/DECISION MAKING:   Critical Behavior:  Neurologic State: Alert  Orientation Level: Oriented to person, Oriented to place, Disoriented to situation, Disoriented to time  Cognition: Follows commands  Safety/Judgement: Decreased awareness of environment, Decreased awareness of need for assistance, Decreased awareness of need for safety, Decreased insight into deficits  Hearing:   [facility]  Skin:  incision to L upper chest s/p ICD placement  Edema: none noted  Range Of Motion:  AROM:  (RUE WFL, LUE immobilized  Simultaneous filing. User may not have seen previous data.)                       Strength:    Strength:  (RUE WFL, LUE immobilized  Simultaneous filing. User may not have seen previous data.)                    Tone & Sensation:   Tone: Normal              Sensation: Intact               Coordination:  Coordination:  (RUE mildly ataxic, LUE immobilized)  Vision:   Tracking: Able to track stimulus in all quadrants w/o difficulty  Visual Fields:  (intact)  Functional Mobility:  Bed Mobility:     Supine to Sit: Maximum assistance  Sit to Supine: Maximum assistance;Assist x2  Scooting: Maximum assistance;Assist x2  Transfers:      Unable, became orthostatic     Balance:   Sitting: Impaired (Simultaneous filing. User may not have seen previous data.)  Sitting - Static: Fair (occasional) (Simultaneous filing. User may not have seen previous data.)  Sitting - Dynamic: Poor (constant support) (Simultaneous filing.  User may not have seen previous data.)  Standing:  (unable)  Ambulation/Gait Training:      NT      Functional Measure:  [facility] Test:    Sitting to Standin  Standing Unsupported: 0  Sitting with Back Unsupported: 1  Standing to Sittin  Transfers: 0  Standing Unsupported with Eyes Closed: 0  Standing Unsupported with Feet Together: 0  Reach Forward with Outstretched Arm: 0   Object: 0  Turn to Look Over Shoulders: 0  Turn 360 Degrees: 0  Alternate Foot on Step/Stool: 0  Standing Unsupported One Foot in Front: 0  Stand on One Le  Total:          56=Maximum possible score;   0-20=High fall risk  21-40=Moderate fall risk   41-56=Low fall risk            Pain Rating:  Reports significant pain in LUE and around chest    Activity Tolerance:   Poor, requires rest breaks, and signs and symptoms of orthostatic hypotension    After treatment patient left in no apparent distress:   Supine in bed, Call bell within reach, Caregiver / family present, and Side rails x 3    COMMUNICATION/EDUCATION:   The patients plan of care was discussed with: Occupational therapist and Registered nurse. Fall prevention education was provided and the patient/caregiver indicated understanding. and Patient/family have participated as able in goal setting and plan of care.     Thank you for this referral.  Lord Martell, PT   Time Calculation: 25 mins

## 2022-11-04 NOTE — ROUTINE PROCESS
0845  TRANSFER - IN REPORT:    Verbal report received from Devon Haynes on [employer]  being received from [facility] for routine progression of care. Report consisted of patients Situation, Background, Assessment and Recommendations(SBAR). Information from the following report(s) SBAR, Procedure Summary, Intake/Output, MAR, Recent Results, and Cardiac Rhythm NSR  was reviewed with the receiving clinician. Opportunity for questions and clarification was provided. Assessment completed upon patients arrival to [address] [place] [address] and care assumed. Cardiac Cath Lab Recovery Arrival Note:    [employer] arrived to [facility] recovery area. Patient procedure= ICD Implant. Patient on cardiac monitor, non-invasive blood pressure, SPO2 monitor. On O2 @ 2 lpm via NC.  IV  Saline Locked. Patient status doing well without problems. Patient is A&Ox 4. Patient reports no complaints. PROCEDURE SITE CHECK:    Procedure site:without any bleeding and no hematoma, no pain/discomfort reported at procedure site. No change in patient status. Continue to monitor patient and status. 0405  Ice pack placed over incision.

## 2022-11-04 NOTE — PROGRESS NOTES
Keiry Day 9 Called Report on pt. TRANSFER - IN REPORT:    Verbal report received from April, RN(name) on Yulisa Ion  being received from 6S room 653(unit) for routine progression of care      Report consisted of patients Situation, Background, Assessment and   Recommendations(SBAR). Information from the following report(s) SBAR, Procedure Summary, Intake/Output, MAR, Recent Results, and Cardiac Rhythm NSR  was reviewed with the receiving nurse. Opportunity for questions and clarification was provided. Assessment completed upon patients arrival to unit and care assumed. 0650  Pt arrived to CCL, transferred by 2 x CCL RN's  Pt was taken directly back to procedural area.

## 2022-11-04 NOTE — PROGRESS NOTES
Physical Therapy 11/4/22    Chart reviewed. Patient is currently off the floor for ICD implant and has bedrest ordered until 1300. Will defer PT at this time and will follow-up for post-op PT re-evaluation as able and appropriate.     Thank you for your consideration,    Raghavendra Sampson, PT, DPT

## 2022-11-04 NOTE — PROGRESS NOTES
0930  CCL RN called report to floor. TRANSFER - OUT REPORT:    Verbal report given to Royer Jerry RN(name) on [employer]  being transferred to 6S (unit) for routine progression of care       Report consisted of patients Situation, Background, Assessment and   Recommendations(SBAR). Information from the following report(s) SBAR, Procedure Summary, Intake/Output, MAR, Recent Results, and Cardiac Rhythm NSR  was reviewed with the receiving nurse. Lines:   Peripheral IV 10/31/22 Right Antecubital (Active)   Site Assessment Clean, dry, & intact 11/04/22 0845   Phlebitis Assessment 0 11/04/22 0845   Infiltration Assessment 0 11/04/22 0845   Dressing Status Clean, dry, & intact 11/04/22 0845   Dressing Type Transparent 11/04/22 0845   Hub Color/Line Status Pink 11/04/22 0845   Action Taken Open ports on tubing capped 11/04/22 0845   Alcohol Cap Used Yes 11/04/22 0845       Peripheral IV 11/03/22 Left Antecubital (Active)   Site Assessment Clean, dry, & intact 11/04/22 0845   Phlebitis Assessment 0 11/04/22 0845   Infiltration Assessment 0 11/04/22 0845   Dressing Status Clean, dry, & intact 11/04/22 0845   Dressing Type Transparent 11/04/22 0845   Hub Color/Line Status Blue 11/04/22 0845   Action Taken Open ports on tubing capped 11/04/22 0845   Alcohol Cap Used Yes 11/04/22 0845        Opportunity for questions and clarification was provided.       0945  Pt transferred to floor By 2 x CCL RN's

## 2022-11-04 NOTE — PROCEDURES
ICD Implantation    Procedure Date: 11/4/22  Lab Physician: Jessica Bond MD, [facility], [place]    INDICATIONS:  80 yo M with a history of ischemic cardiomyopathy with LVEF of 10-15%, CHF with NYHA class II symptoms on optimal medical management, CAD s/p PCI, h/o CVA presenting for ICD implantation for primary prevention ICD. COMMENTS:  After informed consent was obtained, the patient was brought to the electrophysiology laboratory in the fasting state, and was prepped and draped in the usual sterile fashion. IV antibiotic was administered prophylactically. Conscious sedation was administered by nursing staff independent of those performing the procedure under my supervision with intermittent dosing of anxiolytics and narcotics. Local anesthetic was delivered to the left pectoral region and an incision was made in the left deltopectoral groove. The subclavia vein was accessed using a micropuncture needle with ultrasound guidance ( Ultrasound evaluation of possible access sites. Patency of the selected vessel. Realtime visualization of the vascular needle entry was performed) and the vein was cannulated and a retaining wire was placed in the IVC under fluoroscopy. A 9F peel-away sheath was placed in the vein and a Medtronic lead was advanced to the RV apex under fluoroscopic guidance. Ventricular sensing and pacing thresholds were checked and were good. Wound Closure  A subcutaneous pocket was then created using blunt dissection and it was copiously irrigated with a saline solution containing antibiotics. The leads were connected to a Medtronic generator and the entire system was implanted into the pocket. The subcutaneous tissues were then closed with 2 continuous layers of 2-0 Stratafix sutures. The skin was closed with a running 4-0 Stratafix subcuticular stitch. Steri strips were placed over the incision. The wound was covered with a dry sterile dressing.     The patient tolerated the procedure well and left the laboratory in good condition. Conscious sedation was provided and appropriate monitoring performed by members of the EP nursing staff. CONCLUSIONS:  Successful implantation of a Medtronic single chamber ICD. RECOMMENDATIONS:  1. CXR to assess for leads placement and rule out complications  2. F/U device clinic/wound check in 10-14 days  3. EP clinic follow-up in 4 months.

## 2022-11-04 NOTE — DIABETES MGMT
[address] [facility]    CLINICAL NURSE SPECIALIST CONSULT     Initial Presentation   Black Lomas is a 81 y.o. female who presented to [facility] 10/31/22  with a new onset L-sided facial droop and weakness. AMS+. 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 Hypothermic. Hypertensive. 02 sats 99%  ER exam:  Neurological:      General: No focal deficit present. Mental Status: She is alert and oriented to person, place, and time. Mental status is at baseline. She is confused. GCS: GCS eye subscore is 4. GCS verbal subscore is 5. GCS motor subscore is 6. Cranial Nerves: No cranial nerve deficit. Comments: L sided weaness, facial droop. Nonverb. NIH 22   LAB: WBC 6.4. Normal H&H. /AG 12. Creatinine 1.28/eGFR 42. Normal liver enzymes. Troponin & lactic acid elevated. Urinary glucose & ketone positive  CXR: No acute cardiopulmonary process  CT Head: No acute process or change compared to the prior exam.    HX:   Past Medical History:   Diagnosis Date    Colon polyps     Diabetes ([facility] [address]     Diverticulosis     Hypertension     Ill-defined condition     Pancreatitis     CVA    INITIAL DX:   Stroke ([facility] [address] [I63.9]     Current Treatment     TX: Mechanical thrombectomy. Clot prevention. BP management. Insulin. GI prophylaxis. PT/OT    Consulted by  Neida Petty MD  for advanced diabetes nursing assessment and care for:   [x] Inpatient management strategy  [x] Home management assessment    Hospital Course   Clinical progress has been uncomplicated. 10/31/22 Admission. Cerebral angiogram and mechanical thrombectomy, ICU for post-op care  11/1/22 MRI brain showing Bilateral cerebellar infarcts  11/2/22 Echo showed EF of 10 -15%. Left ventricle is moderately dilated. Mildly increased wall thickness.  Transfer to acute care  11/4/22 Neurology: Etiology remains confined to thromboembolic versus cardioembolic  Remain unsure of reason for Eliquis, patient has no documented history of H arrhythmia certainly has atrial dilation as well as severe cardiomyopathy placing her at risk for cardioembolism  Patient is back on Plavix as a subtherapeutic PY 12 assay from earlier admission we will recheck PY 12 and if remains subtherapeutic consider transitioning to full dose aspirin given plans to reintroduce Eliquis. Regarding Eliquis, will plan to repeat CT scan as before to ensure there is no further blossoming of petechial hemorrhages before starting anticoagulation  Unfortunately overall patient looks unwell with concern for long-term mortality/morbidity  Risk factors have otherwise been adjusted family asked to speak to  to discuss insulin options due to cost  Neurology will follow until medication regimen is solidified please call question concerns    Diabetes History   Type 2 Diabetes: Diagnosed about 10 years ago, treated with oral agents  Family history positive for diabetes  Ambulatory BG management provided by: Radha Williamson MD PCP     Diabetes-related Medical History    Neurological complications  Peripheral neuropathy  Microvascular disease  Nephropathy  Macrovascular disease  Cerebral vascular accident  Other associated conditions     HF    Diabetes Medication History  Key Antihyperglycemic Medications               glipiZIDE (GLUCOTROL) 5 mg tablet Take 2 Tablets by mouth Daily (before breakfast). Geneva Silvia is a very poor historian, struggling to note diabetes medication. Does admit to forgetting them.     Diabetes self-management practices:   Eating pattern   I had a difficult time understanding her explanation of typical dietary intake    May have oatmeal for breakfast   Lunch/Dinner: Soup, pork chops, potatoes   Drinking: Water, juice, coffee  Physical activity pattern   Sedentary   Monitoring pattern   Has a glucometer, ran out of strips and lancets  Taking medications pattern  [x] In-consistent administration  [x] Affordable  Social determinants of health impacting diabetes self-management practices   Concerned that you need to know more about how to stay healthy with diabetes  Overall evaluation:    [x] Not achieving A1c target with drug therapy & self-care practices      Lives alone but next door to her son  Her son checks in with her most days  Does not drive    Subjective   NA     Objective   Physical exam  General Underweight frail appearing AA female who is ill-appearing  Neuro  Sleeping  Vital Signs Visit Vitals  /63 (BP 1 Location: Right upper arm, BP Patient Position: Supine; Other (Comment))   Pulse 80   Temp 98.4 °F (36.9 °C)   Resp 20   Ht 5' 6\" (1.676 m)   Wt 49 kg (108 lb)   SpO2 100%   BMI 17.43 kg/m²     Skin  Warm and dry. Acanthosis noted along neckline. No lipohypertrophy or lipoatrophy noted at injection sites   Heart   Regular rate and rhythm. No murmurs, rubs or gallops  Lungs  Clear to auscultation without rales or rhonchi  Extremities No foot wounds      Laboratory  Recent Labs     11/04/22  0419 11/03/22  0535 11/02/22  0442   * 202* 152*   AGAP 7 7 7   WBC 5.8 7.0 8.9   CREA 0.99 1.08* 0.97   AST 12* 16 23   ALT 17 18 25     Blood glucose pattern      Significant diabetes-related events over the past 24-72 hours  Admission . A1C 13.1%  No consistent basal insulin dose  Corrective insulin in use; using 21 units/day for last three (3) days    Assessment and Plan   Nursing Diagnosis Risk for unstable blood glucose pattern   Nursing Intervention Domain 0558 Decision-making Support   Nursing Interventions Examined current inpatient diabetes/blood glucose control   Explored factors facilitating and impeding inpatient management  Explored corrective strategies with patient and responsible inpatient provider   Informed patient of rational for insulin strategy while hospitalized     Evaluation   Elisha Fritz is an 81 y.o. 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 Based on A1C on admission of 13.1% and BG of 444, patient likely needs attention to who is available to assist in her home diabetes management. Based on her weight, BMI and needs over the past three days, would recommend a basal dose of insulin along with corrective to assist in determining a home regimen. Recommendations     [x] NPH insulin 7 units BID    [x] Humalog insulin 3 units with each consumed meal    [x] BG monitoring QID     [x] Normal sensitivity corrective insulin    Billing Code(s)   [x] 43609     Before making these care recommendations, I personally reviewed the hospitalization record, including notes, laboratory & diagnostic data and current medications, and examined the patient at the bedside (circumstances permitting) before making care recommendations. More than fifty (50) percent of the time was spent in patient counseling and/or care coordination.   Total minutes: 28    Jasmin Sutton DNP, APRN-CNS, ACNS-BC, BC-ADM, [facility]  Clinical Nurse Specialist  [facility]  Access via [address] [place] [address]

## 2022-11-04 NOTE — PROGRESS NOTES
[address] [facility] Adult  Hospitalist Group                                                                                          Hospitalist Progress Note  Kendal Agustin MD  Answering service: [phone] OR 36 from in house phone        Date of Service:  2022  NAME:  Anna Marie Villela  :  1940  MRN:  [id number]      Admission Summary:   Anna Marie Villela is a 81 y.o. female who has a PMH of HTN and prior stroke who presented to the ED with AMS and facial droop. She presented to the ED at [address] [place] [address] with LKW 3 hours and 10 min prior to presentation. She is on plavix and eliquis as an outpatient. 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 She was not a TNKase candidate due to being on anticoagulation. She was transferred to [facility] for thrombectomy. NIHSS 8 upon arrival. The patient was taken to angiogram for mechanical thrombectomy. The patient underwent general anesthesia for the procedure. She was extubated after the procedure and was taken to the ICU for further management post-procedure. MRI brain showed acute bilateral superior cerebellar infarctions and mild associated petechial  hemorrhage. Possible small foci of acute infarction right occipital lobe. No significant mass effect. No hydrocephalus. CT head on  without acute change.      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/p thrombectomy  Hypertension goal SBP less than 140 did not require Cardene in ICU     --Every 4 hours neurochecks to continue at in NSTU PT OT eval  --Neuro interventional surgery and neurology consulted and following  --Currently on Plavix and statin  --Holding Eliquis as per neuro interventional surgery for preventing hemorrhagic conversion can restart in 5 to 7 days  --Low threshold for repeating CT if any worsening neurologic condition     Acute on chronic systolic CHF with EF 10 to 15%  --Post ICD placement 11/4 by cardiology   --Echocardiogram EF of 10 to 15%  --Patient should be on beta-blocker and Entresto dose adjusted by cardiology  --Med rec patient was on Eliquis ( holding as per NIS )  Lasix Entresto Aldactone and Coreg  --Cardiology consult for further management     Moderate protein calorie malnutrition Body mass index is 17.43 kg/m². Code status: Full  DVT prophylaxis: SCD    Care Plan discussed with: Patient/Family and Nurse  Anticipated Disposition: SNF/LTC  Anticipated Discharge: 24 hrs     Hospital Problems  Date Reviewed: 3/18/2022            Codes Class Noted POA    Stroke [facility]) ICD-10-CM: I63.9  ICD-9-CM: 434.91  10/31/2022 Unknown           Review of Systems:   A comprehensive review of systems was negative. Vital Signs:    Last 24hrs VS reviewed since prior progress note.  Most recent are:  Visit Vitals  BP (!) 158/81 (BP 1 Location: Right upper arm, BP Patient Position: At rest)   Pulse 82   Temp 98.4 °F (36.9 °C)   Resp 16   Ht 5' 6\" (1.676 m)   Wt 49 kg (108 lb)   SpO2 99%   BMI 17.43 kg/m²         Intake/Output Summary (Last 24 hours) at 11/4/2022 1052  Last data filed at 11/4/2022 0553  Gross per 24 hour   Intake 120 ml   Output 400 ml   Net -280 ml          Physical Examination:     I had a face to face encounter with this patient and independently examined them on 11/4/2022 as outlined below:          General : alert x 3, awake, no acute distress, speech difficulty   HEENT: PEERL, EOMI, moist mucus membrane, TM clear  Neck: supple, no JVD, no meningeal signs  Chest: Clear to auscultation bilaterally   CVS: S1 S2 heard, Capillary refill less than 2 seconds  Abd: soft/ Non tender, non distended, BS physiological,   Ext: no clubbing, no cyanosis, no edema, brisk 2+ DP pulses  Neuro/Psych: pleasant mood and affect, Skin: warm     Data Review:    I personally reviewed  Image and labs      Labs:     Recent Labs     11/04/22 0419 11/03/22 0535   WBC 5.8 7.0   HGB 11.2* 11.1*   HCT 34.5* 33.9*   * 130*       Recent Labs     11/04/22 0419 11/03/22 0535 11/02/22 0442    137 140   K 4.4 4.0 4.0    107 110*   CO2 24 23 23   BUN 24* 23* 20   CREA 0.99 1.08* 0.97   * 202* 152*   CA 8.9 9.0 8.9       Recent Labs     11/04/22 0419 11/03/22 0535 11/02/22 0442   ALT 17 18 25    106 113   TBILI 1.4* 1.5* 1.1*   TP 5.7* 6.0* 6.2*   ALB 2.7* 2.8* 2.8*   GLOB 3.0 3.2 3.4       No results for input(s): INR, PTP, APTT, INREXT, INREXT in the last 72 hours. No results for input(s): FE, TIBC, PSAT, FERR in the last 72 hours. No results found for: FOL, RBCF   No results for input(s): PH, PCO2, PO2 in the last 72 hours. No results for input(s): CPK, CKNDX, TROIQ in the last 72 hours.     No lab exists for component: CPKMB  Lab Results   Component Value Date/Time    Cholesterol, total 146 11/01/2022 03:30 AM    HDL Cholesterol 69 11/01/2022 03:30 AM    LDL, calculated 71 11/01/2022 03:30 AM    Triglyceride 30 11/01/2022 03:30 AM    CHOL/HDL Ratio 2.1 11/01/2022 03:30 AM     Lab Results   Component Value Date/Time    Glucose (POC) 289 (H) 11/03/2022 09:20 PM    Glucose (POC) 264 (H) 11/03/2022 04:22 PM    Glucose (POC) 311 (H) 11/03/2022 11:56 AM    Glucose (POC) 205 (H) 11/03/2022 06:45 AM    Glucose (POC) 210 (H) 11/02/2022 11:07 PM     Lab Results   Component Value Date/Time    Color Yellow/Straw 10/31/2022 12:54 PM    Appearance Clear 10/31/2022 12:54 PM    Specific gravity 1.029 10/31/2022 12:54 PM    Specific gravity 1.017 03/17/2022 09:25 PM    pH (UA) 5.0 10/31/2022 12:54 PM    Protein Negative 10/31/2022 12:54 PM    Glucose >300 (A) 10/31/2022 12:54 PM    Ketone 5 (A) 10/31/2022 12:54 PM    Bilirubin Negative 10/31/2022 12:54 PM    Urobilinogen 0.1 10/31/2022 12:54 PM    Nitrites Negative 10/31/2022 12:54 PM    Leukocyte Esterase Negative 10/31/2022 12:54 PM    Bacteria Negative 10/31/2022 12:54 PM    WBC 0-4 10/31/2022 12:54 PM    RBC 0-5 10/31/2022 12:54 PM         Medications Reviewed:     Current Facility-Administered Medications   Medication Dose Route Frequency    sodium chloride (NS) flush 5-40 mL  5-40 mL IntraVENous Q8H    sodium chloride (NS) flush 5-40 mL  5-40 mL IntraVENous PRN    ceFAZolin (ANCEF) 2 g in sterile water (preservative free) 20 mL IV syringe  2 g IntraVENous Q8H    carvediloL (COREG) tablet 6.25 mg  6.25 mg Oral BID WITH MEALS    [START ON 11/5/2022] pantoprazole (PROTONIX) tablet 40 mg  40 mg Oral ACB    dextrose 10 % infusion 0-250 mL  0-250 mL IntraVENous PRN    insulin NPH (NOVOLIN N, HUMULIN N) injection 7 Units  0.15 Units/kg SubCUTAneous Q12H    insulin lispro (HUMALOG) injection   SubCUTAneous AC&HS    insulin lispro (HUMALOG) injection 3 Units  3 Units SubCUTAneous TID WITH MEALS    senna-docusate (PERICOLACE) 8.6-50 mg per tablet 1 Tablet  1 Tablet Oral DAILY    clopidogreL (PLAVIX) tablet 75 mg  75 mg Oral DAILY    lidocaine 4 % patch 2 Patch  2 Patch TransDERmal Q24H    traMADoL (ULTRAM) tablet 25 mg  25 mg Oral Q6H PRN    atorvastatin (LIPITOR) tablet 10 mg  10 mg Oral QHS    ondansetron (ZOFRAN) injection 4 mg  4 mg IntraVENous Q4H PRN    acetaminophen (TYLENOL) tablet 650 mg  650 mg Oral Q4H PRN    Or    acetaminophen (TYLENOL) solution 650 mg  650 mg Per NG tube Q4H PRN    Or    acetaminophen (TYLENOL) suppository 650 mg  650 mg Rectal Q4H PRN    sodium chloride (NS) flush 5-40 mL  5-40 mL IntraVENous Q8H    sodium chloride (NS) flush 5-40 mL  5-40 mL IntraVENous PRN    [Held by provider] apixaban (ELIQUIS) tablet 2.5 mg  2.5 mg Oral DAILY    glucose chewable tablet 16 g  4 Tablet Oral PRN    glucagon (GLUCAGEN) injection 1 mg  1 mg IntraMUSCular PRN    dextrose 10 % infusion 0-250 mL  0-250 mL IntraVENous PRN    hydrALAZINE (APRESOLINE) 20 mg/mL injection 20 mg  20 mg IntraVENous Q6H PRN     ______________________________________________________________________  EXPECTED LENGTH OF STAY: 4d 2h  ACTUAL LENGTH OF STAY:          4      Please note that this dictation was completed with [employer], the computer voice recognition software. Quite often unanticipated grammatical, syntax, homophones, and other interpretive errors are inadvertently transcribed by the computer software. Please disregard these errors. Please excuse any errors that have escaped final proofreading.                 Bonnie Bui MD

## 2022-11-04 NOTE — PROGRESS NOTES
Bedside shift change report given to Lavern Castano (oncoming nurse) by Tana Mcadams (offgoing nurse). Report included the following information SBAR, MAR, Cardiac Rhythm (NSR, ST), and Dual Neuro Assessment.

## 2022-11-04 NOTE — PROGRESS NOTES
[id number] Report given to Yoly BELTRAN in cardio cath lab, patient transported off unit at 0700 via bed  with two RN. Bedside shift change report given to Laxmi Davenport RN (oncoming nurse) by Rochelle HEMPHILL/Elaine HEMPHILL  (offgoing nurse). Report included the following information Kardex and Cardiac Rhythm NSR.

## 2022-11-05 NOTE — PROGRESS NOTES
Problem: Pressure Injury - Risk of  Goal: *Prevention of pressure injury  Description: Document Jon Scale and appropriate interventions in the flowsheet.   Outcome: Progressing Towards Goal  Note: Pressure Injury Interventions:  Sensory Interventions: Assess changes in LOC, Discuss PT/OT consult with provider    Moisture Interventions: Absorbent underpads, Check for incontinence Q2 hours and as needed    Activity Interventions: Pressure redistribution bed/mattress(bed type), PT/OT evaluation    Mobility Interventions: HOB 30 degrees or less, Pressure redistribution bed/mattress (bed type)    Nutrition Interventions: Document food/fluid/supplement intake    Friction and Shear Interventions: HOB 30 degrees or less                Problem: Patient Education: Go to Patient Education Activity  Goal: Patient/Family Education  Outcome: Progressing Towards Goal     Problem: Patient Education: Go to Patient Education Activity  Goal: Patient/Family Education  Outcome: Progressing Towards Goal     Problem: Aspiration - Risk of  Goal: *Absence of aspiration  Outcome: Progressing Towards Goal     Problem: Patient Education: Go to Patient Education Activity  Goal: Patient/Family Education  Outcome: Progressing Towards Goal

## 2022-11-05 NOTE — PROGRESS NOTES
[address] [facility] Adult  Hospitalist Group                                                                                          Hospitalist Progress Note  Fadi Valle MD  Answering service: [phone] OR 36 from in house phone        Date of Service:  2022  NAME:  Joan Villafana  :  1940  MRN:  [id number]      Admission Summary:   Joan Villafana is a 81 y.o. female who has a PMH of HTN and prior stroke who presented to the ED with AMS and facial droop. She presented to the ED at [facility] with LKW 3 hours and 10 min prior to presentation. She is on plavix and eliquis as an outpatient. 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 She was not a TNKase candidate due to being on anticoagulation. She was transferred to [facility] for thrombectomy. NIHSS 8 upon arrival. The patient was taken to angiogram for mechanical thrombectomy. The patient underwent general anesthesia for the procedure. She was extubated after the procedure and was taken to the ICU for further management post-procedure. MRI brain showed acute bilateral superior cerebellar infarctions and mild associated petechial  hemorrhage. Possible small foci of acute infarction right occipital lobe. No significant mass effect. No hydrocephalus. CT head on  without acute change.      Interval history / Subjective:   Patient seen and examined        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/p thrombectomy  Subtherapeutic Plavix response  -MRI brain with acute bilateral superior cerebral infarct and mild petechial hemorrhage  -A1c 13.1 LDL 71  -TTE with no intra-arterial septum seen  -P2Y12  response 222 unchanged from previous  -Neurosurgery and neurology on board       -Full dose aspirin with plan to reintroduce Eliquis after CT scan  -Plavix DC per neurology due to subtherapeutic response  -Follow CT scan to ensure no blossoming of hemorrhage  -PT OT speech  -Neurovascular checks  -SBP goal less than 140     Acute on chronic systolic CHF with EF 10 to 15%  --Post ICD placement 11/4 by cardiology   --Echocardiogram EF of 10 to 15%  --Patient should be on beta-blocker and Entresto dose adjusted by cardiology  --Med rec patient was on Eliquis ( holding as per NIS )  Lasix Entresto Aldactone and Coreg  --Cardiology consult for further management     Moderate protein calorie malnutrition Body mass index is 17.43 kg/m². Code status: Full  DVT prophylaxis: SCD    Care Plan discussed with: Patient/Family and Nurse  Anticipated Disposition: SNF/LTC  Anticipated Discharge: 24 hrs     Hospital Problems  Date Reviewed: 3/18/2022            Codes Class Noted POA    Stroke [facility]) ICD-10-CM: I63.9  ICD-9-CM: 434.91  10/31/2022 Unknown           Review of Systems:   A comprehensive review of systems was negative. Vital Signs:    Last 24hrs VS reviewed since prior progress note.  Most recent are:  Visit Vitals  /79 (BP 1 Location: Right upper arm, BP Patient Position: At rest)   Pulse (P) 81   Temp 98 °F (36.7 °C)   Resp 18   Ht 5' 6\" (1.676 m)   Wt 49 kg (108 lb)   SpO2 99%   BMI 17.43 kg/m²       No intake or output data in the 24 hours ending 11/05/22 0933     Physical Examination:     I had a face to face encounter with this patient and independently examined them on 11/5/2022 as outlined below:          General : alert x 3, awake, no acute distress, speech difficulty   HEENT: PEERL, EOMI, moist mucus membrane, TM clear  Neck: supple, no JVD, no meningeal signs  Chest: Clear to auscultation bilaterally   CVS: S1 S2 heard, Capillary refill less than 2 seconds  Abd: soft/ Non tender, non distended, BS physiological,   Ext: no clubbing, no cyanosis, no edema, brisk 2+ DP pulses  Neuro/Psych: pleasant mood and affect,   Skin: warm     Data Review:    I personally reviewed  Image and labs      Labs:     Recent Labs     11/04/22  0414 11/03/22  0535   WBC 5.8 7.0   HGB 11.2* 11.1*   HCT 34.5* 33.9* * 130*     Recent Labs     11/04/22 0419 11/03/22  0535    137   K 4.4 4.0    107   CO2 24 23   BUN 24* 23*   CREA 0.99 1.08*   * 202*   CA 8.9 9.0     Recent Labs     11/04/22  0419 11/03/22  0535   ALT 17 18    106   TBILI 1.4* 1.5*   TP 5.7* 6.0*   ALB 2.7* 2.8*   GLOB 3.0 3.2     No results for input(s): INR, PTP, APTT, INREXT, INREXT in the last 72 hours. No results for input(s): FE, TIBC, PSAT, FERR in the last 72 hours. No results found for: FOL, RBCF   No results for input(s): PH, PCO2, PO2 in the last 72 hours. No results for input(s): CPK, CKNDX, TROIQ in the last 72 hours.     No lab exists for component: CPKMB  Lab Results   Component Value Date/Time    Cholesterol, total 146 11/01/2022 03:30 AM    HDL Cholesterol 69 11/01/2022 03:30 AM    LDL, calculated 71 11/01/2022 03:30 AM    Triglyceride 30 11/01/2022 03:30 AM    CHOL/HDL Ratio 2.1 11/01/2022 03:30 AM     Lab Results   Component Value Date/Time    Glucose (POC) 286 (H) 11/05/2022 08:24 AM    Glucose (POC) 169 (H) 11/04/2022 10:22 PM    Glucose (POC) 180 (H) 11/04/2022 05:13 PM    Glucose (POC) 326 (H) 11/04/2022 12:32 PM    Glucose (POC) 289 (H) 11/03/2022 09:20 PM     Lab Results   Component Value Date/Time    Color Yellow/Straw 10/31/2022 12:54 PM    Appearance Clear 10/31/2022 12:54 PM    Specific gravity 1.029 10/31/2022 12:54 PM    Specific gravity 1.017 03/17/2022 09:25 PM    pH (UA) 5.0 10/31/2022 12:54 PM    Protein Negative 10/31/2022 12:54 PM    Glucose >300 (A) 10/31/2022 12:54 PM    Ketone 5 (A) 10/31/2022 12:54 PM    Bilirubin Negative 10/31/2022 12:54 PM    Urobilinogen 0.1 10/31/2022 12:54 PM    Nitrites Negative 10/31/2022 12:54 PM    Leukocyte Esterase Negative 10/31/2022 12:54 PM    Bacteria Negative 10/31/2022 12:54 PM    WBC 0-4 10/31/2022 12:54 PM    RBC 0-5 10/31/2022 12:54 PM         Medications Reviewed:     Current Facility-Administered Medications   Medication Dose Route Frequency sodium chloride (NS) flush 5-40 mL  5-40 mL IntraVENous Q8H    sodium chloride (NS) flush 5-40 mL  5-40 mL IntraVENous PRN    carvediloL (COREG) tablet 6.25 mg  6.25 mg Oral BID WITH MEALS    pantoprazole (PROTONIX) tablet 40 mg  40 mg Oral ACB    traMADoL (ULTRAM) tablet 50 mg  50 mg Oral Q6H PRN    dextrose 10 % infusion 0-250 mL  0-250 mL IntraVENous PRN    insulin NPH (NOVOLIN N, HUMULIN N) injection 7 Units  0.15 Units/kg SubCUTAneous Q12H    insulin lispro (HUMALOG) injection   SubCUTAneous AC&HS    insulin lispro (HUMALOG) injection 3 Units  3 Units SubCUTAneous TID WITH MEALS    senna-docusate (PERICOLACE) 8.6-50 mg per tablet 1 Tablet  1 Tablet Oral DAILY    clopidogreL (PLAVIX) tablet 75 mg  75 mg Oral DAILY    lidocaine 4 % patch 2 Patch  2 Patch TransDERmal Q24H    atorvastatin (LIPITOR) tablet 10 mg  10 mg Oral QHS    ondansetron (ZOFRAN) injection 4 mg  4 mg IntraVENous Q4H PRN    acetaminophen (TYLENOL) tablet 650 mg  650 mg Oral Q4H PRN    Or    acetaminophen (TYLENOL) solution 650 mg  650 mg Per NG tube Q4H PRN    Or    acetaminophen (TYLENOL) suppository 650 mg  650 mg Rectal Q4H PRN    sodium chloride (NS) flush 5-40 mL  5-40 mL IntraVENous Q8H    sodium chloride (NS) flush 5-40 mL  5-40 mL IntraVENous PRN    [Held by provider] apixaban (ELIQUIS) tablet 2.5 mg  2.5 mg Oral DAILY    glucose chewable tablet 16 g  4 Tablet Oral PRN    glucagon (GLUCAGEN) injection 1 mg  1 mg IntraMUSCular PRN    dextrose 10 % infusion 0-250 mL  0-250 mL IntraVENous PRN    hydrALAZINE (APRESOLINE) 20 mg/mL injection 20 mg  20 mg IntraVENous Q6H PRN     ______________________________________________________________________  EXPECTED LENGTH OF STAY: 4d 2h  ACTUAL LENGTH OF STAY:          5      Please note that this dictation was completed with [employer], the computer voice recognition software.   Quite often unanticipated grammatical, syntax, homophones, and other interpretive errors are inadvertently transcribed by the computer software. Please disregard these errors. Please excuse any errors that have escaped final proofreading.                 Thomas Brown MD

## 2022-11-06 NOTE — PROGRESS NOTES
CRITICAL CARE NOTE      Name: Ebenezer Miller   : 1940   MRN: [id number]   Date: 2022      REASON FOR ICU ADMISSION:  Shock     PRINCIPAL ICU DIAGNOSIS   Shock, undifferentiated   HFrEF (10-15%) s/p AICD  Acute bl posterior, cerebellar ischemic CVA s/p thrombectomy w/ petechial hemorrhage, hx prior CVA  Cerebellar   DM    BRIEF PATIENT SUMMARY   Pt is a 81 y.o F w/ PMH as above admitted to [facility] on 10/31 for suspected CVA w/ finding of acute bl posterior ischemic CVA, now s/p thrombectomy. Pt w/ finding of acute HFrEF 10-15% as part of CVA workup. Cardiology consulted and S/p AICD placement . Pt w/ episodes of hypotension intermittently responsive to small IVF bolus. Complaint of abd pain, n/v, somnolent but protecting airway and rouses to voice and answer questions. Will transfer pt to ICU for further management. COMPREHENSIVE ASSESSMENT & PLAN:SYSTEM BASED     24 HOUR EVENTS: Transferred to CCU for hypotension.     NEUROLOGICAL:   S/p thrombectomy, L sided weakness  Neurology following  PT/OT    PULMONOLOGY:   O2 per NC for spO2 92-98%  CXR pending    CARDIOVASCULAR:   Warm and dry, will treat as distributive shock however w/ r/o cardiogenic component  AICD site w/o overt sign of infection  Levophed for MAP >65  Careful IVF resuscitation  Trend lactate, UOP, monitor for evidence of hypoperfusion  NICOM monitoring  Trend EKG, cardiac enzymes  GDMT held 2/2 hypotension  On eliquis, asa, statin    GASTROINTESTINAL   KUB  Cardiac diet  Bowel regimen     RENAL/ELECTROLYTE/FLUIDS:   Strict I/Os  Fluids as above  Monitor and replace lytes PRN    ENDOCRINE:   NPH, SSI  Hypoglycemic protocol  Goal glucose 120-180    HEMATOLOGY/ONCOLOGY:   On eliquis, ASA  Refused blood transfusion     INFECTIOUS DISEASE:   PanCx, start Vanc/zosyn  Trend PCT      ICU DAILY CHECKLIST     Code Status:Full code  DVT Prophylaxis:eliquis  T/L/D: Tubes: none  Lines: PIV  Drains: none  SUP: PPI  Diet: Regular  Activity Level:ad alli w/ assistance  ABCDEF Bundle/Checklist Completed:Yes  Disposition: Stay in ICU  Multidisciplinary Rounds Completed:  Pending  Goals of Care Discussion/Palliative: Yes  Patient/Family Updated: Yes      Radha   As noted above    SUBJECTIVE   Review of Systems   Unable to perform ROS: Critical illness   Respiratory:  Negative for shortness of breath. Cardiovascular:  Negative for chest pain. Gastrointestinal:  Positive for abdominal pain, nausea and vomiting. OBJECTIVE     Labs and Data: Reviewed 11/06/22  Medications: Reviewed 11/06/22  Imaging: Reviewed 11/06/22    Physical Exam  Vitals and nursing note reviewed. Constitutional:       Appearance: She is ill-appearing. Comments: somnolent   HENT:      Head: Normocephalic. Nose: Nose normal. No congestion. Mouth/Throat:      Mouth: Mucous membranes are dry. Pharynx: Oropharynx is clear. No oropharyngeal exudate. Eyes:      General: No scleral icterus. Extraocular Movements: Extraocular movements intact. Pupils: Pupils are equal, round, and reactive to light. Cardiovascular:      Rate and Rhythm: Normal rate and regular rhythm. Pulses: Normal pulses. Heart sounds: Normal heart sounds. No murmur heard. No friction rub. No gallop. Comments: L upper chest ICD site, c/d/I, w/o purulence or fluctuance   Pulmonary:      Effort: Pulmonary effort is normal. No respiratory distress. Breath sounds: Normal breath sounds. No wheezing or rales. Abdominal:      General: Abdomen is flat. There is no distension. Palpations: Abdomen is soft. Tenderness: There is no abdominal tenderness. There is no guarding. Comments: hypoactive   Musculoskeletal:         General: Normal range of motion. Cervical back: Normal range of motion and neck supple. No rigidity. Right lower leg: No edema. Left lower leg: No edema.    Lymphadenopathy:      Cervical: No cervical adenopathy. Skin:     General: Skin is warm and dry. Capillary Refill: Capillary refill takes more than 3 seconds. Coloration: Skin is not jaundiced. Neurological:      Comments: Somnolent but rouses to voice, confused, residual L sided weakness   Psychiatric:      Comments: Somnolent, difficult to asses        Visit Vitals  /67   Pulse 92   Temp 97.5 °F (36.4 °C)   Resp 20   Ht 5' 6\" (1.676 m)   Wt 52.5 kg (115 lb 11.9 oz)   SpO2 98%   BMI 18.68 kg/m²    O2 Flow Rate (L/min): 2 l/min O2 Device: None (Room air) Temp (24hrs), Av.7 °F (36.5 °C), Min:97.4 °F (36.3 °C), Max:98.1 °F (36.7 °C)           Intake/Output:   No intake or output data in the 24 hours ending 22 1746    Imaging    10/31/22    ECHO ADULT COMPLETE 2022    Interpretation Summary    Left Ventricle: Severely reduced left ventricular systolic function with a visually estimated EF of 10 -15%. Left ventricle is moderately dilated. Mildly increased wall thickness. Severe global hypokinesis present. Abnormal diastolic function. Aortic Valve: Tricuspid valve. Mild regurgitation. Mitral Valve: Mild to moderate regurgitation. Interatrial Septum: Agitated saline study was negative with and without provocation. Signed by: Marce Arreola MD on 2022  4:14 PM         CRITICAL CARE DOCUMENTATION  I had a face to face encounter with the patient, reviewed and interpreted patient data including clinical events, labs, images, vital signs, I/O's, and examined patient. I have discussed the case and the plan and management of the patient's care with the consulting services, the bedside nurses and the respiratory therapist.      NOTE OF PERSONAL INVOLVEMENT IN CARE   This patient has a high probability of imminent, clinically significant deterioration, which requires the highest level of preparedness to intervene urgently.  I participated in the decision-making and personally managed or directed the management of the following life and organ supporting interventions that required my frequent assessment to treat or prevent imminent deterioration. I personally spent 35 minutes of critical care time. This is time spent at this critically ill patient's bedside actively involved in patient care as well as the coordination of care. This does not include any procedural time which has been billed separately.     Roe Medrano MD   Critical Care Medicine  [facility]

## 2022-11-06 NOTE — PROGRESS NOTES
[address] [facility] Adult  Hospitalist Group                                                                                          Hospitalist Progress Note  Margarita Jiang MD  Answering service: [phone] from in house phone        Date of Service:  2022  NAME:  Toan Gomez  :  1940  MRN:  [id number]      Admission Summary:   Toan Gomez is a 81 y.o. female who has a PMH of HTN and prior stroke who presented to the ED with AMS and facial droop. She presented to the ED at [facility] with LKW 3 hours and 10 min prior to presentation. She is on plavix and eliquis as an outpatient. 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 She was not a TNKase candidate due to being on anticoagulation. She was transferred to [facility] for thrombectomy. NIHSS 8 upon arrival. The patient was taken to angiogram for mechanical thrombectomy. The patient underwent general anesthesia for the procedure. She was extubated after the procedure and was taken to the ICU for further management post-procedure. MRI brain showed acute bilateral superior cerebellar infarctions and mild associated petechial  hemorrhage. Possible small foci of acute infarction right occipital lobe. No significant mass effect. No hydrocephalus. CT head on  without acute change. Interval history / Subjective:   Patient seen and examined earlier this morning by me for follow-up of acute CVA. Blood pressures have been labile all morning going into the 80s over 40s. Patient complaining of some pain around the area of ICD implantation. She reports feeling weak.        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/p thrombectomy  Subtherapeutic Plavix response  -MRI brain with acute bilateral superior cerebral infarct and mild petechial hemorrhage  -A1c 13.1 LDL 71  -TTE with no intra-arterial septum seen  -P2Y12  response 222 unchanged from previous  Neurology on board       -Full dose aspirin with plan to reintroduce Eliquis after CT scan  -Plavix DC per neurology due to subtherapeutic response  -Follow CT scan to ensure no blossoming of hemorrhage  -PT OT speech  -Neurovascular checks  -SBP goal less than 140  11/6-Per neurology recommendations, plan to restart Eliquis tomorrow and change aspirin to 81 mg daily tomorrow. Acute on chronic systolic CHF with EF 10 to 15%  --Post ICD placement 11/4 by cardiology   --Echocardiogram EF of 10 to 15%  --Patient should be on beta-blocker and Entresto dose adjusted by cardiology  --Med rec patient was on Eliquis ( holding as per NIS )  Lasix Entresto Aldactone and Coreg  --Cardiology consult for further management    Type 2 diabetes  Blood sugars remain labile  Continue current management  Diabetes management team is on board     Moderate protein calorie malnutrition Body mass index is 18.68 kg/m². Code status: Full  DVT prophylaxis: SCD    Care Plan discussed with: Patient/Family and Nurse  Anticipated Disposition: SNF/LTC  Anticipated Discharge: 24 hrs     Hospital Problems  Date Reviewed: 3/18/2022            Codes Class Noted POA    Stroke [facility]) ICD-10-CM: I63.9  ICD-9-CM: 434.91  10/31/2022 Unknown         Review of Systems:   A comprehensive review of systems was negative. Vital Signs:    Last 24hrs VS reviewed since prior progress note.  Most recent are:  Visit Vitals  /67   Pulse 77   Temp 97.5 °F (36.4 °C)   Resp 20   Ht 5' 6\" (1.676 m)   Wt 52.5 kg (115 lb 11.9 oz)   SpO2 98%   BMI 18.68 kg/m²       No intake or output data in the 24 hours ending 11/06/22 1409     Physical Examination:     I had a face to face encounter with this patient and independently examined them on 11/6/2022 as outlined below:          General : alert x 3, awake, no acute distress, speech difficulty   HEENT: PEERL, EOMI, moist mucus membrane, TM clear  Neck: supple, no JVD, no meningeal signs  Chest: Clear to auscultation bilaterally   CVS: S1 S2 heard, Capillary refill less than 2 seconds  Abd: soft/ Non tender, non distended, BS physiological,   Ext: no clubbing, no cyanosis, no edema, brisk 2+ DP pulses  Neuro/Psych: pleasant mood and affect,   Skin: warm     Data Review:    I personally reviewed  Image and labs      Labs:     Recent Labs     11/06/22 0406 11/05/22  0811   WBC 7.0 7.3   HGB 11.7 10.8*   HCT 35.4 32.9*   * 108*       Recent Labs     11/06/22 0406 11/05/22  0811 11/04/22  0419    133* 136   K 4.0 4.6 4.4    102 105   CO2 26 27 24   BUN 32* 33* 24*   CREA 0.94 0.97 0.99   * 271* 289*   CA 8.9 8.5 8.9       Recent Labs     11/06/22 0406 11/05/22  0811 11/04/22  0419   ALT 11* 13 17    108 112   TBILI 0.8 0.9 1.4*   TP 6.2* 5.9* 5.7*   ALB 2.5* 2.5* 2.7*   GLOB 3.7 3.4 3.0       No results for input(s): INR, PTP, APTT, INREXT, INREXT in the last 72 hours. No results for input(s): FE, TIBC, PSAT, FERR in the last 72 hours. No results found for: FOL, RBCF   No results for input(s): PH, PCO2, PO2 in the last 72 hours. No results for input(s): CPK, CKNDX, TROIQ in the last 72 hours.     No lab exists for component: CPKMB  Lab Results   Component Value Date/Time    Cholesterol, total 146 11/01/2022 03:30 AM    HDL Cholesterol 69 11/01/2022 03:30 AM    LDL, calculated 71 11/01/2022 03:30 AM    Triglyceride 30 11/01/2022 03:30 AM    CHOL/HDL Ratio 2.1 11/01/2022 03:30 AM     Lab Results   Component Value Date/Time    Glucose (POC) 272 (H) 11/06/2022 11:11 AM    Glucose (POC) 169 (H) 11/06/2022 07:42 AM    Glucose (POC) 174 (H) 11/05/2022 10:19 PM    Glucose (POC) 148 (H) 11/05/2022 06:34 PM    Glucose (POC) 62 (L) 11/05/2022 04:48 PM     Lab Results   Component Value Date/Time    Color Yellow/Straw 10/31/2022 12:54 PM    Appearance Clear 10/31/2022 12:54 PM    Specific gravity 1.029 10/31/2022 12:54 PM    Specific gravity 1.017 03/17/2022 09:25 PM    pH (UA) 5.0 10/31/2022 12:54 PM    Protein Negative 10/31/2022 12:54 PM    Glucose >300 (A) 10/31/2022 12:54 PM    Ketone 5 (A) 10/31/2022 12:54 PM    Bilirubin Negative 10/31/2022 12:54 PM    Urobilinogen 0.1 10/31/2022 12:54 PM    Nitrites Negative 10/31/2022 12:54 PM    Leukocyte Esterase Negative 10/31/2022 12:54 PM    Bacteria Negative 10/31/2022 12:54 PM    WBC 0-4 10/31/2022 12:54 PM    RBC 0-5 10/31/2022 12:54 PM         Medications Reviewed:     Current Facility-Administered Medications   Medication Dose Route Frequency    aspirin tablet 325 mg  325 mg Oral DAILY    sodium chloride (NS) flush 5-40 mL  5-40 mL IntraVENous Q8H    sodium chloride (NS) flush 5-40 mL  5-40 mL IntraVENous PRN    [Held by provider] carvediloL (COREG) tablet 6.25 mg  6.25 mg Oral BID WITH MEALS    pantoprazole (PROTONIX) tablet 40 mg  40 mg Oral ACB    traMADoL (ULTRAM) tablet 50 mg  50 mg Oral Q6H PRN    dextrose 10 % infusion 0-250 mL  0-250 mL IntraVENous PRN    insulin NPH (NOVOLIN N, HUMULIN N) injection 7 Units  0.15 Units/kg SubCUTAneous Q12H    insulin lispro (HUMALOG) injection   SubCUTAneous AC&HS    insulin lispro (HUMALOG) injection 3 Units  3 Units SubCUTAneous TID WITH MEALS    senna-docusate (PERICOLACE) 8.6-50 mg per tablet 1 Tablet  1 Tablet Oral DAILY    lidocaine 4 % patch 2 Patch  2 Patch TransDERmal Q24H    atorvastatin (LIPITOR) tablet 10 mg  10 mg Oral QHS    ondansetron (ZOFRAN) injection 4 mg  4 mg IntraVENous Q4H PRN    acetaminophen (TYLENOL) tablet 650 mg  650 mg Oral Q4H PRN    Or    acetaminophen (TYLENOL) solution 650 mg  650 mg Per NG tube Q4H PRN    Or    acetaminophen (TYLENOL) suppository 650 mg  650 mg Rectal Q4H PRN    sodium chloride (NS) flush 5-40 mL  5-40 mL IntraVENous Q8H    sodium chloride (NS) flush 5-40 mL  5-40 mL IntraVENous PRN    [Held by provider] apixaban (ELIQUIS) tablet 2.5 mg  2.5 mg Oral DAILY    glucose chewable tablet 16 g  4 Tablet Oral PRN    glucagon (GLUCAGEN) injection 1 mg  1 mg IntraMUSCular PRN    dextrose 10 % infusion 0-250 mL  0-250 mL IntraVENous PRN    hydrALAZINE (APRESOLINE) 20 mg/mL injection 20 mg  20 mg IntraVENous Q6H PRN     ______________________________________________________________________  EXPECTED LENGTH OF STAY: 4d 2h  ACTUAL LENGTH OF STAY:          6      Please note that this dictation was completed with [employer], the computer voice recognition software. Quite often unanticipated grammatical, syntax, homophones, and other interpretive errors are inadvertently transcribed by the computer software. Please disregard these errors. Please excuse any errors that have escaped final proofreading.                 Bhavin Mercer MD

## 2022-11-06 NOTE — PROGRESS NOTES
Problem: Pressure Injury - Risk of  Goal: *Prevention of pressure injury  Description: Document Jon Scale and appropriate interventions in the flowsheet. Outcome: Progressing Towards Goal  Note: Pressure Injury Interventions:  Sensory Interventions: Assess changes in LOC, Discuss PT/OT consult with provider    Moisture Interventions: Absorbent underpads, Check for incontinence Q2 hours and as needed    Activity Interventions: Pressure redistribution bed/mattress(bed type), PT/OT evaluation    Mobility Interventions: HOB 30 degrees or less, Pressure redistribution bed/mattress (bed type), PT/OT evaluation    Nutrition Interventions: Document food/fluid/supplement intake    Friction and Shear Interventions: HOB 30 degrees or less                Problem: Patient Education: Go to Patient Education Activity  Goal: Patient/Family Education  Outcome: Progressing Towards Goal     Problem: Falls - Risk of  Goal: *Absence of Falls  Description: Document Sourav Fall Risk and appropriate interventions in the flowsheet. Outcome: Progressing Towards Goal  Note: Fall Risk Interventions:  Mobility Interventions: Bed/chair exit alarm, Patient to call before getting OOB    Mentation Interventions: Bed/chair exit alarm, Door open when patient unattended    Medication Interventions: Bed/chair exit alarm, Patient to call before getting OOB    Elimination Interventions: Call light in reach, Bed/chair exit alarm, Patient to call for help with toileting needs              Problem: Patient Education: Go to Patient Education Activity  Goal: Patient/Family Education  Outcome: Progressing Towards Goal     Problem: Aspiration - Risk of  Goal: *Absence of aspiration  Outcome: Progressing Towards Goal     Problem: Diabetes Self-Management  Goal: *Disease process and treatment process  Description: Define diabetes and identify own type of diabetes; list 3 options for treating diabetes.   Outcome: Progressing Towards Goal  Goal: *Incorporating nutritional management into lifestyle  Description: Describe effect of type, amount and timing of food on blood glucose; list 3 methods for planning meals. Outcome: Progressing Towards Goal  Goal: *Incorporating physical activity into lifestyle  Description: State effect of exercise on blood glucose levels. Outcome: Progressing Towards Goal  Goal: *Developing strategies to promote health/change behavior  Description: Define the ABC's of diabetes; identify appropriate screenings, schedule and personal plan for screenings. Outcome: Progressing Towards Goal  Goal: *Using medications safely  Description: State effect of diabetes medications on diabetes; name diabetes medication taking, action and side effects. Outcome: Progressing Towards Goal  Goal: *Monitoring blood glucose, interpreting and using results  Description: Identify recommended blood glucose targets  and personal targets. Outcome: Progressing Towards Goal  Goal: *Prevention, detection, treatment of acute complications  Description: List symptoms of hyper- and hypoglycemia; describe how to treat low blood sugar and actions for lowering  high blood glucose level. Outcome: Progressing Towards Goal  Goal: *Prevention, detection and treatment of chronic complications  Description: Define the natural course of diabetes and describe the relationship of blood glucose levels to long term complications of diabetes.   Outcome: Progressing Towards Goal  Goal: *Developing strategies to address psychosocial issues  Description: Describe feelings about living with diabetes; identify support needed and support network  Outcome: Progressing Towards Goal  Goal: *Insulin pump training  Outcome: Progressing Towards Goal  Goal: *Sick day guidelines  Outcome: Progressing Towards Goal  Goal: *Patient Specific Goal (EDIT GOAL, INSERT TEXT)  Outcome: Progressing Towards Goal

## 2022-11-06 NOTE — PROGRESS NOTES
Neurology Progress Note     NAME: Raza Luong   :  1940   MRN:  [id number]   DATE:  2022    Assessment:     Active Problems:    Stroke ([address] (10/31/2022)      Pt is an 82yo RH female with HTN, DM, CVA, ischemic CM on Eliquis 2.5mg bid and Plavix 75mg daily at home, presenting to [facility] 10/31/22 with AMS, left sided weakness, found to have left PCA occlusion, chronic right vertebral artery occlusion, transferred to [facility] for thrombectomy. MRI brain with bilateral acute cerebellar infarcts with petechial hem, punctate right occipital stroke, old right occipital CVA and CIWM changes. TTE with EF 10-15%, no PFO. EKG - NSR. HgbA1C 13.1. LDL 71. Repeat [facility] 22 without hemorrhage, stable. P2Y12 22 was 222, switched to ASA 325mg daily. Exam SBP 88, left UE incoordination. Vessel to vessel thromboembolic vs cardioembolic strokes  Plan:   -Continue ASA 325mg daily, would decrease to 81mg daily after Eliquis restarted  -Okay to restart Eliquis 22  -Continue Lipitor 10mg daily, LDL at goal.  -Needs better diabetes control  -Will defer tx of labile BPs and constipation to primary team  -PT/OT/Rehab  -Please call if needed. Subjective:    Pt states she is just not doing well. She is hurting all down her left side, had ICD placed on 22. Also c/o not having a BM in several days.      Objective:   Chart reviewed since last seen    Current Facility-Administered Medications   Medication Dose Route Frequency    aspirin tablet 325 mg  325 mg Oral DAILY    sodium chloride (NS) flush 5-40 mL  5-40 mL IntraVENous Q8H    sodium chloride (NS) flush 5-40 mL  5-40 mL IntraVENous PRN    carvediloL (COREG) tablet 6.25 mg  6.25 mg Oral BID WITH MEALS    pantoprazole (PROTONIX) tablet 40 mg  40 mg Oral ACB    traMADoL (ULTRAM) tablet 50 mg  50 mg Oral Q6H PRN dextrose 10 % infusion 0-250 mL  0-250 mL IntraVENous PRN    insulin NPH (NOVOLIN N, HUMULIN N) injection 7 Units  0.15 Units/kg SubCUTAneous Q12H    insulin lispro (HUMALOG) injection   SubCUTAneous AC&HS    insulin lispro (HUMALOG) injection 3 Units  3 Units SubCUTAneous TID WITH MEALS    senna-docusate (PERICOLACE) 8.6-50 mg per tablet 1 Tablet  1 Tablet Oral DAILY    lidocaine 4 % patch 2 Patch  2 Patch TransDERmal Q24H    atorvastatin (LIPITOR) tablet 10 mg  10 mg Oral QHS    ondansetron (ZOFRAN) injection 4 mg  4 mg IntraVENous Q4H PRN    acetaminophen (TYLENOL) tablet 650 mg  650 mg Oral Q4H PRN    Or    acetaminophen (TYLENOL) solution 650 mg  650 mg Per NG tube Q4H PRN    Or    acetaminophen (TYLENOL) suppository 650 mg  650 mg Rectal Q4H PRN    sodium chloride (NS) flush 5-40 mL  5-40 mL IntraVENous Q8H    sodium chloride (NS) flush 5-40 mL  5-40 mL IntraVENous PRN    [Held by provider] apixaban (ELIQUIS) tablet 2.5 mg  2.5 mg Oral DAILY    glucose chewable tablet 16 g  4 Tablet Oral PRN    glucagon (GLUCAGEN) injection 1 mg  1 mg IntraMUSCular PRN    dextrose 10 % infusion 0-250 mL  0-250 mL IntraVENous PRN    hydrALAZINE (APRESOLINE) 20 mg/mL injection 20 mg  20 mg IntraVENous Q6H PRN       Visit Vitals  /69   Pulse 92   Temp 97.9 °F (36.6 °C)   Resp 19   Ht 5' 6\" (1.676 m)   Wt 115 lb 11.9 oz (52.5 kg)   SpO2 98%   BMI 18.68 kg/m²     Temp (24hrs), Av.7 °F (36.5 °C), Min:97.3 °F (36.3 °C), Max:98.1 °F (36.7 °C)      No intake/output data recorded. No intake/output data recorded. Physical Exam:  General: Well developed well nourished patient in no apparent distress. Cardiac: Regular rhythm, tachycardic  Resp: Non-labored. Extremities: 2+ Radial pulses, no cyanosis or edema    Neurological Exam:  Mental Status: Oriented to time, place and person. Speech and language intact. Attention and fund of knowledge appropriate. Normal recent and remote memory.    Cranial Nerves:   EOMI, no nystagmus, no ptosis. Facial movement is symmetric. Hearing is intact. Motor:  5/5 strength in upper and lower proximal and distal muscles. Normal bulk and tone. No PD.  No tremors   Reflexes:      Sensory:      Gait:     Cerebellar:  Dysmetria and dysdiadochokinesia on left         Lab Review   Recent Results (from the past 24 hour(s))   GLUCOSE, POC    Collection Time: 11/05/22 11:54 AM   Result Value Ref Range    Glucose (POC) 253 (H) 65 - 117 mg/dL    Performed by [facility], POC    Collection Time: 11/05/22  4:45 PM   Result Value Ref Range    Glucose (POC) 50 (LL) 65 - 117 mg/dL    Performed by Shwetha Linda ([facility])    GLUCOSE, POC    Collection Time: 11/05/22  4:48 PM   Result Value Ref Range    Glucose (POC) 62 (L) 65 - 117 mg/dL    Performed by Shwetha Linda ([facility])    GLUCOSE, POC    Collection Time: 11/05/22  6:34 PM   Result Value Ref Range    Glucose (POC) 148 (H) 65 - 117 mg/dL    Performed by Anirudhmidavid Jewcharanjit    GLUCOSE, POC    Collection Time: 11/05/22 10:19 PM   Result Value Ref Range    Glucose (POC) 174 (H) 65 - 117 mg/dL    Performed by Guy Cole ([facility])    CBC W/O DIFF    Collection Time: 11/06/22  4:06 AM   Result Value Ref Range    WBC 7.0 3.6 - 11.0 K/uL    RBC 4.07 3.80 - 5.20 M/uL    HGB 11.7 11.5 - 16.0 g/dL    HCT 35.4 35.0 - 47.0 %    MCV 87.0 80.0 - 99.0 FL    MCH 28.7 26.0 - 34.0 PG    MCHC 33.1 30.0 - 36.5 g/dL    RDW 13.1 11.5 - 14.5 %    PLATELET 642 (L) 093 - 400 K/uL    MPV 12.3 8.9 - 12.9 FL    NRBC 0.0 0  WBC    ABSOLUTE NRBC 0.00 0.00 - 5.89 K/uL   METABOLIC PANEL, COMPREHENSIVE    Collection Time: 11/06/22  4:06 AM   Result Value Ref Range    Sodium 136 136 - 145 mmol/L    Potassium 4.0 3.5 - 5.1 mmol/L    Chloride 103 97 - 108 mmol/L    CO2 26 21 - 32 mmol/L    Anion gap 7 5 - 15 mmol/L    Glucose 163 (H) 65 - 100 mg/dL    BUN 32 (H) 6 - 20 MG/DL    Creatinine 0.94 0.55 - 1.02 MG/DL    BUN/Creatinine ratio 34 (H) 12 - 20      eGFR >60 >60 ml/min/1.73m2 Calcium 8.9 8.5 - 10.1 MG/DL    Bilirubin, total 0.8 0.2 - 1.0 MG/DL    ALT (SGPT) 11 (L) 12 - 78 U/L    AST (SGOT) 11 (L) 15 - 37 U/L    Alk. phosphatase 109 45 - 117 U/L    Protein, total 6.2 (L) 6.4 - 8.2 g/dL    Albumin 2.5 (L) 3.5 - 5.0 g/dL    Globulin 3.7 2.0 - 4.0 g/dL    A-G Ratio 0.7 (L) 1.1 - 2.2     GLUCOSE, POC    Collection Time: 11/06/22  7:42 AM   Result Value Ref Range    Glucose (POC) 169 (H) 65 - 117 mg/dL    Performed by Brandon Ku   PCT        Imaging Review   MRI Results (most recent):  Results from Hospital Encounter encounter on 10/31/22    MRI BRAIN WO CONT    Narrative  *PRELIMINARY REPORT*    Bilateral cerebellar infarcts. Nonspecific white matter changes. Small right  occipital encephalomalacia. Findings relayed to patient's nurse, Usha Robertson, 11/01/2022 12:32 by telephone. Preliminary report was provided by Dr. Andrea Santillan the on-call radiologist, at [id number]    Final report to follow. *END PRELIMINARY REPORT*    FINAL REPORT:    INDICATION:   acute stroke    EXAMINATION:  MRI BRAIN WO CONTRAST    COMPARISON:  CTA October 31, 2022    TECHNIQUE:  Multiplanar multisequence acquisition without contrast of the brain. FINDINGS:    Ventricles:  Midline, no hydrocephalus. Brain Parenchyma/Brainstem: Moderate patchy T2 hyperintensity in the  supratentorial white matter. Smaller chronic infarction right occipital lobe. Bilateral superior cerebellar infarctions. Possible small foci of acute  infarction right temporal occipital junction (series 3 image 18). Intracranial Hemorrhage:  Small amount of hemosiderin deposition within the  areas of bilateral cerebellar infarction consistent with petechial hemorrhage. Chronic microhemorrhages bilateral centrum semiovale. Basal Cisterns:  Normal.  Flow Voids:  Normal.  Additional Comments:  N/A. Impression  1. Acute bilateral superior cerebellar infarctions and mild associated petechial  hemorrhage.  Possible small foci of acute infarction right occipital lobe. No  significant mass effect. No hydrocephalus. 2. Chronic microvascular ischemic disease. Chronic right occipital infarction. CT Results (most recent):  Results from Hospital Encounter encounter on 10/31/22    CT HEAD WO CONT    Narrative  INDICATION: for follow up to ensure no hemorrhage    COMPARISON: 11/1/2022    EXAM: CT HEAD without contrast.    TECHNIQUE: Unenhanced CT Head is performed. CT dose reduction was achieved  through use of a standardized protocol tailored for this examination and  automatic exposure control for dose modulation. FINDINGS:  There is no bleed or shift. There is no hydrocephalus or extra-axial fluid  collection. No significant change of bilateral subacute cerebellar and chronic  right occipital, infarcts. Unremarkable bone windows. Impression  No hemorrhage. No significant change of infarcts. Care Plan discussed with:  Patient x   Family    RN x   Care Manager    Consultant/Specialist:  x     Signed: David Kramer MD

## 2022-11-06 NOTE — PROGRESS NOTES
1805 TRANSFER - IN REPORT:    Verbal report received from Radha Cox RN(name) on Jessica Arredondo  being received from [facility](unit) for urgent transfer      Report consisted of patients Situation, Background, Assessment and   Recommendations(SBAR). Information from the following report(s) SBAR, Intake/Output, MAR, Recent Results, Cardiac Rhythm NSR, and Dual Neuro Assessment was reviewed with the receiving nurse. Opportunity for questions and clarification was provided. Assessment completed upon patients arrival to unit and care assumed. Primary Nurse Tre Escalante RN and Nava Paul RN performed a dual skin assessment on this patient No impairment noted  Current Bed:   NOBLE Quintanilla      Jon score is 14          1815  XR at bedside, Say Taylor MD reviewed results, decided patient does not need CT of abdomen  1830 T 34.7, bairhugger placed on patient    [address], [address] @ bedside NICOM ordered,   CO 4.3  CI 2.7  TPR 1404    1845 Labs drawn    1900 Levo @ 4mcg started MAP 62,  ml bolus given    Alcides Sauceda MD @ bedside , 250ml LR bolus order received    1930 Bedside and Verbal shift change report given to Mireya Clinton RN (oncoming nurse) by Abeba Hernandez RN (offgoing nurse). Report included the following information SBAR, Kardex, Intake/Output, MAR, Recent Results, Cardiac Rhythm NSR, and Dual Neuro Assessment.

## 2022-11-06 NOTE — PROGRESS NOTES
Pharmacist Note - Vancomycin Dosing    Consult provided for this 81 y.o. female for indication of sepsis of unknown etiology. Antibiotic regimen(s): vancomycin  Patient on vancomycin PTA? Not PTA, but one dose  in surgery    Recent Labs     22  0406 22  0811 22  0419   WBC 7.0 7.3 5.8   CREA 0.94 0.97 0.99   BUN 32* 33* 24*     Frequency of BMP: daily  Height: 167.6 cm  Weight: 52.5 kg  Est CrCl: 38.9 ml/min; UO: not charted  Temp (24hrs), Av.7 °F (36.5 °C), Min:97.4 °F (36.3 °C), Max:98.1 °F (36.7 °C)    Cultures:  10/31 blood cx: NGTD x 5 days (prelim)    MRSA Swab ordered (if applicable)? N/A    The plan below is expected to result in a target range of AUC/RITO 400-600    Therapy will be initiated with a loading dose of 1250 mg IV x 1 to be followed by a maintenance dose of 500 mg IV every 12 hours. Pharmacy to follow patient daily and order levels / make dose adjustments as appropriate. Thank you,   Valente Lea, PHARMD    *Vancomycin has been dosed used Bayesian kinetics software to target an AUC/RITO of 400-600, which provides adequate exposure for an assumed infection due to MRSA with an RITO of 1 or less while reducing the risk of nephrotoxicity as seen with traditional trough based dosing goals.

## 2022-11-06 NOTE — PROGRESS NOTES
17:06 Responded to RRT, pt with low bp, she has been a very difficult stick, we are unable to draw labs. Fluids given per order, albumin started. Respiratory therapy did not respond to rapid response. Call made to RT .     17:30 Intensivist at bedside,    17:38 Call made to RT by house nursing supervisor for assistance with arterial stick for labs, still not show at bedside. 18:05 Transferred to ccu for hypovolemia. 18:20 In ccu still unable to start new IV or obtain labs for EPOC or troponin. Call made again for RT for assistance with labs.

## 2022-11-06 NOTE — PROGRESS NOTES
Adult RR called. Patient continued to c/o abdominal pain and BP became low into the 50s and 60s SBP. Patient diaphoretic and clammy at the time. Fluids given and status improved, but no improvement in pain or BP. Albumin ordered. The patient became more somnolent and less responsive, but would still wake to voice. EKG looked ok. Pending CXR and trop. Critical care called as patient BP did not improve. I spoke to the patient and her son, with her continuing to be full code. Patient will be transferred to ICU. CRITICAL CARE ATTESTATION:  I had a face to face encounter with the patient, reviewed and interpreted patient data including clinical events, labs, images, vital signs, I/O's, and examined patient. I have discussed the case and the plan and management of the patient's care with the consulting services, the bedside nurses and necessary ancillary providers. NOTE OF PERSONAL INVOLVEMENT IN CARE   This patient has a high probability of imminent, clinically significant deterioration, which requires the highest level of preparedness to intervene urgently. I participated in the decision-making and personally managed or directed the management of the following life and organ supporting interventions that required my frequent assessment to treat or prevent imminent deterioration. I personally spent 35 minutes of critical care time. This is time spent at this critically ill patient's bedside actively involved in patient care as well as the coordination of care and discussions with the patient's family. This does not include any procedural time which has been billed separately.

## 2022-11-07 NOTE — PROGRESS NOTES
Bedside and Verbal shift change report given to Rosa Rosa (oncoming nurse) by Milagros/Alla (offgoing nurse). Report included the following information SBAR, Kardex, ED Summary, Intake/Output, MAR, Recent Results, Med Rec Status, Cardiac Rhythm Sinus Rhythm, and Alarm Parameters.      2000 Son called for update  2200 Repeat troponin, 12 lead done  0100 Physician contacted for low urine output, coming to assess patient  0330 Physician ordered 250mL LR bolus, then send labs  0350 Labs sent  0630 repeated NICOM w/ physician, not fluid responsive    0730 Report to Alla RN

## 2022-11-07 NOTE — PROGRESS NOTES
Problem: Mobility Impaired (Adult and Pediatric)  Goal: *Acute Goals and Plan of Care (Insert Text)  Description:   FUNCTIONAL STATUS PRIOR TO ADMISSION: Patient was modified independent using a single point cane for functional mobility. HOME SUPPORT PRIOR TO ADMISSION: The patient lived alone with several family members living nearby (son next  door). Physical Therapy Goals  Updated s/p ICD placement 11/4/2022  1. Patient will move from supine to sit and sit to supine , scoot up and down, and roll side to side in bed with modified independence within 7 day(s). 2.  Patient will transfer from bed to chair and chair to bed with min assistance using the least restrictive device within 7 day(s). 3.  Patient will perform sit to stand with min assistance within 7 day(s). 4.  Patient will ambulate with minimal assistance/contact guard assist for 50 feet with the least restrictive device within 7 day(s). 5.  Patient will ascend/descend 4 stairs with one handrail(s) with minimal assistance/contact guard assist within 7 day(s). 6.  Patient will improve Black Balance score by 7 points within 7 days. Initiated 11/1/2022  1. Patient will move from supine to sit and sit to supine , scoot up and down, and roll side to side in bed with modified independence within 7 day(s). 2.  Patient will transfer from bed to chair and chair to bed with modified independence using the least restrictive device within 7 day(s). 3.  Patient will perform sit to stand with supervision/set-up within 7 day(s). 4.  Patient will ambulate with minimal assistance/contact guard assist for 100 feet with the least restrictive device within 7 day(s). 5.  Patient will ascend/descend 4 stairs with one handrail(s) with minimal assistance/contact guard assist within 7 day(s). 6.  Patient will improve Black Balance score by 7 points within 7 days.      Outcome: Progressing Towards Goal   PHYSICAL THERAPY TREATMENT  Patient: Ebenezer Miller (81 y.o. female)  Date: 11/7/2022  Diagnosis: Stroke ([facility] [address] [I63.9] <principal problem not specified>  Procedure(s) (LRB):  INSERT ICD SINGLE (N/A) 3 Days Post-Op  Precautions: Fall, Skin (SBP<140), L PPM   Chart, physical therapy assessment, plan of care and goals were reviewed. ASSESSMENT  Patient continues with skilled PT services and is progressing towards goals. Patient found supine in bed and agreeable to PT. Educated patient on pacemaker precautions now POD #3 s/p ICD placement, however patient demonstrating poor carryover of precautions despite education and frequent reminders throughout session. Patient transferred supine > sit with mod A x 2 demonstrating stable BP. Patient sat EOB with CGA- up to mod A for balance while completing L UE ROM exercises. Patient completed supine <> sit with max A x 2 demonstrating poor ability to utilize posterior chain for upright posture. In standing, patient with decreased responsiveness and noted to have orthostatic BP drop (see below). Patient returned to supine with max A x 2 with BP stabilizing in total supine position initially, however demonstrating continued SBP drop into 80s through MAP >65. RN present and aware of patient status, requesting no further assistance from PT/OT. Patient left in care of RN supine in bed with all needs in reach.        11/07/22 1125 11/07/22 1132 11/07/22 1137   Vital Signs   /68 110/72 (!) 112/99   MAP (Calculated) 84 85 103   BP 1 Location Right upper arm Right upper arm Right upper arm   BP 1 Method Automatic Automatic Automatic   BP Patient Position Semi fowlers (pre-activity) Sitting  (EOB) Sitting  (EOB, following seated activity)      11/07/22 1142 11/07/22 1144 11/07/22 1149   Vital Signs   BP 98/63 116/69 96/63   MAP (Calculated) 75 85 74   BP 1 Location Right upper arm Right upper arm Right upper arm   BP 1 Method Automatic Automatic Automatic   BP Patient Position Standing Supine (post-activity) Semi fowlers (post-activity)      11/07/22 1150   Vital Signs   BP (!) 86/68   MAP (Calculated) 74   BP 1 Location Right upper arm   BP 1 Method Automatic   BP Patient Position Supine (post-activity) - RN present, states MAP >65 and BP stable, stating no further need for PT/OT assistance       Current Level of Function Impacting Discharge (mobility/balance): sit <> stand max A x 2    Other factors to consider for discharge: orthostatic hypotension, poor carryover of pacemaker precautions          PLAN :  Patient continues to benefit from skilled intervention to address the above impairments. Continue treatment per established plan of care. to address goals. Recommendation for discharge: (in order for the patient to meet his/her long term goals)  Therapy 3 hours per day 5-7 days per week    This discharge recommendation:  A follow-up discussion with the attending provider and/or case management is planned    IF patient discharges home will need the following DME: to be determined (TBD)       SUBJECTIVE:   Patient stated I feel nauseous .     OBJECTIVE DATA SUMMARY:   Critical Behavior:  Neurologic State: Eyes open spontaneously, Appropriate for age, Alert  Orientation Level: Oriented X4  Cognition: Follows commands, Appropriate for age attention/concentration, Appropriate decision making  Safety/Judgement: Decreased awareness of environment, Decreased awareness of need for assistance, Decreased awareness of need for safety, Decreased insight into deficits  Functional Mobility Training:  Bed Mobility:     Supine to Sit: Moderate assistance;Assist x2  Sit to Supine: Maximum assistance;Assist x2  Scooting: Moderate assistance;Assist x2        Transfers:  Sit to Stand: Maximum assistance;Assist x2  Stand to Sit: Maximum assistance;Assist x2                             Balance:  Sitting: Impaired; Without support  Sitting - Static: Fair (occasional); Good (unsupported)  Sitting - Dynamic: Fair (occasional)  Standing: Impaired; With support  Standing - Static: Poor  Standing - Dynamic : Not tested      Therapeutic Exercises:   Therapeutic Exercises:       EXERCISE   Sets   Reps   Active Active Assist   Passive Self ROM   Comments   Shoulder flexion  1 5 [] [x] [] [] Only to 90 deg within PPM precautions   Shoulder abduction  1 5 [] [x] [] [] Only to 90 deg within PPM precautions        Pain Rating:  Patient endorses pain of L shoulder with AAROM - RN aware     Activity Tolerance:   Fair, SpO2 stable on RA, requires rest breaks, and signs and symptoms of orthostatic hypotension    After treatment patient left in no apparent distress:   Supine in bed, Call bell within reach, Caregiver / family present, Side rails x 3, and RN present    COMMUNICATION/COLLABORATION:   The patients plan of care was discussed with: Occupational therapist and Registered nurse.      Elena Albrecht, PT   Time Calculation: 32 mins

## 2022-11-07 NOTE — DIABETES MGMT
[address] [place] [address]    CLINICAL NURSE SPECIALIST CONSULT     Initial Presentation   Susan Mcmanus is a 81 y.o. female who presented to [facility] ED with a new onset L-sided facial droop and weakness. CTA performed showing a large vessel occlusion concerning of a distal basilar artery occlusion and transferred to [address] [place] [address] for evaluation and management. HX:   Past Medical History:   Diagnosis Date    Colon polyps     Diabetes ([facility] [address]     Diverticulosis     Hypertension     Ill-defined condition     Pancreatitis     CVA    INITIAL DX:   Stroke ([facility] [address] [I63.9]     Current Treatment     TX: Mechanical thrombectomy    Consulted by  Leonid Shea MD  for advanced diabetes nursing assessment and care for:   [x] Inpatient management strategy  [x] Home management assessment    Hospital Course   Clinical progress has been uncomplicated. 10/31: Admission. Cerebral angiogram and mechanical thrombectomy, ICU for post-op care  11/1: MRI brain showing Bilateral cerebellar infarcts  11/2: Echo showed EF of 10 -15%. Left ventricle is moderately dilated. Mildly increased wall thickness. Transfer to acute care  11/4: Medtronic single chamber ICD placement   11/6: Rapid response for hypotension, abd pain and lethargy. IVF. Albumin. ICU transfer and vasopressors started  11/7: Vasopressors off and transferred out of ICU  Diabetes History   Type 2 Diabetes: Diagnosed about 10 years ago  Ambulatory BG management provided by: Rama Rachel MD PCP     Diabetes-related Medical History    Neurological complications  Peripheral neuropathy  Microvascular disease  Nephropathy  Macrovascular disease  Cerebral vascular accident  Other associated conditions     HF    Diabetes Medication History  Key Antihyperglycemic Medications               glipiZIDE (GLUCOTROL) 5 mg tablet (Taking) Take 2 Tablets by mouth Daily (before breakfast). Digna Carreon is a very poor historian.   She struggles to tell me what medication she is taking daily for diabetes- does mention she forgets \"sometimes\". Diabetes self-management practices:   Eating pattern   I had a difficult time understanding her explanation of typical dietary intake    May have oatmeal for breakfast   Lunch/Dinner: Soup, pork chops, potatoes   Drinking: Water, juice, coffee  Physical activity pattern   Sedentary   Monitoring pattern   Has a glucometer, ran out of strips and lancets  Taking medications pattern  [x] In-consistent administration  [x] Affordable  Social determinants of health impacting diabetes self-management practices   Concerned that you need to know more about how to stay healthy with diabetes  Overall evaluation:    [x] Not achieving A1c target with drug therapy & self-care practices      Lives alone but next door to her son  Her son checks in with her most days  Does not drive  Subjective   I guess I am doing ok.      Objective   Physical exam  General Underweight frail appearing AA female in no acute distress/ill-appearing. Conversant and cooperative  Neuro  Alert, oriented   Vital Signs Visit Vitals  BP (!) 103/53   Pulse 97   Temp 99.1 °F (37.3 °C)   Resp 23   Ht 5' 6\" (1.676 m)   Wt 53.4 kg (117 lb 11.6 oz)   SpO2 98%   BMI 19.00 kg/m²     Skin  Warm and dry. Acanthosis noted along neckline. No lipohypertrophy or lipoatrophy noted at injection sites   Heart   Regular rate and rhythm.  No murmurs, rubs or gallops  Lungs  Clear to auscultation without rales or rhonchi  Extremities No foot wounds        Laboratory  Recent Labs     22  0350 22  1853 22  0406 22  0811   * 129* 163* 271*   AGAP 6 3* 7 4*   WBC 7.6 7.2 7.0 7.3   CREA 1.37* 1.09* 0.94 0.97   AST 6*  --  11* 8*   ALT 7*  --  11* 13           Blood glucose pattern      Significant diabetes-related events over the past 24-72 hours  A1C 13.1%  Fasting B (67 on 2 am labs)  Pre-prandial: 184-249  Basal: 7 units NPH Q12  Bolus: 3 units with meals- held for the last few days  Correction: 6 units in the last 24h  GFR 56  EF 10-15%  PNA on antibiotics     Assessment and Plan   Nursing Diagnosis Risk for unstable blood glucose pattern   Nursing Intervention Domain 3637 Decision-making Support   Nursing Interventions Examined current inpatient diabetes/blood glucose control   Explored factors facilitating and impeding inpatient management  Explored corrective strategies with patient and responsible inpatient provider   Informed patient of rational for insulin strategy while hospitalized     Evaluation   Alisa Ambrosio is an 81 y.o. 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 A1C on admission is significantly elevated at 13.1%. BG on admission was 444. She had very poor po intake POD 2 but as oral intake improved, -311. Moderate dose basal and low dose bolus insulin were than started. Bolus humalog held following ICD placement. Now eating and can resume. Please strive for optimal glucose control in the setting of CVA- 140-180mg/dl. Based on A1C on admission of 13.1% and BG of 444, patient likely needs attention from who is available to assist in her home diabetes management. Recommendations   POC glucose ACHS    Consistent carbohydrate diet (60 grams CHO/meal)    3.  Continue the subcutaneous Insulin Order set (0007)  Insulin Dosing Specific recommendation   Basal                                       (Based on weight, BMI & GFR) Adjust basal insulin:  8 units NPH AM  6 units NPH PM  Allergy listed for Lantus (itching)      Bolus If eating more than 50% of meals and pre-meal BG  sustained over 180mg/dl, 3 units humalog/meal    Hold if patient is NPO or consumes less than 50% of carbohydrates on meal tray Advance by 2 units daily for   persistent pre-prandial   hyperglycemia     Corrective (Useful in adjusting insulin dosing) High sensitivity ACHS             Billing Code(s)   [x] 41136     Before making these care recommendations, I personally reviewed the hospitalization record, including notes, laboratory & diagnostic data and current medications, and examined the patient at the bedside (circumstances permitting) before making care recommendations. More than fifty (50) percent of the time was spent in patient counseling and/or care coordination.   Total minutes: 13      YISSEL Orantes BC-ADM  Board Certified Advanced Diabetes Manager  Clinical Nurse Specialist  [facility] for Diabetes [facility]  Access via [facility]

## 2022-11-07 NOTE — PROGRESS NOTES
CRITICAL CARE NOTE      Name: Damon Johns   : 1940   MRN: [id number]   Date: 2022      REASON FOR ICU ADMISSION:  Shock     PRINCIPAL ICU DIAGNOSIS   Shock, septic  HFrEF (10-15%) s/p AICD  Acute bl posterior, cerebellar ischemic CVA s/p thrombectomy w/ petechial hemorrhage, hx prior CVA  Cerebellar   DM    BRIEF PATIENT SUMMARY   Pt is a 81 y.o F w/ PMH as above admitted to [facility] on 10/31 for suspected CVA w/ finding of acute bl posterior ischemic CVA, now s/p thrombectomy. Pt w/ finding of acute HFrEF 10-15% as part of CVA workup. Cardiology consulted and S/p AICD placement . Pt w/ episodes of hypotension intermittently responsive to small IVF bolus. Complaint of abd pain, n/v, somnolent but protecting airway and rouses to voice and answer questions. Transferred pt to ICU for further management. COMPREHENSIVE ASSESSMENT & PLAN:SYSTEM BASED     24 HOUR EVENTS:   Required levophed o/n, weaned w/ further fluid resuscitation. Hypotermia and mentation improved.     NEUROLOGICAL:   S/p thrombectomy, L sided weakness  Neurology following  PT/OT    PULMONOLOGY:   O2 per NC for spO2 92-98%  CXR w/ L infiltrate, likely PNA given septic picture  PRN nebs, encourage good pulmonary toilet    CARDIOVASCULAR:   Likely sepsis 2/2 PNA  AICD site w/o overt sign of infection  MAP >65, now off levophed   Careful IVF resuscitation PRN  monitor for evidence of hypoperfusion  NICOM monitoring  GDMT held 2/2 hypotension, will resume as tolerated  On eliquis, asa, statin    GASTROINTESTINAL   Cardiac diet  Bowel regimen     RENAL/ELECTROLYTE/FLUIDS:   Strict I/Os  Fluids as above  Monitor and replace lytes PRN    ENDOCRINE:   NPH, SSI  Hypoglycemic protocol  Goal glucose 120-180    HEMATOLOGY/ONCOLOGY:   On eliquis, ASA  Refused blood transfusion     INFECTIOUS DISEASE:   NGTD, UA w/ yeast will cover w/ fluconazole  C/w Vanc/zosyn, de-escalate in AM if no Cx growth  Trend PCT      ICU DAILY CHECKLIST     Code Status:Full code  DVT Prophylaxis:eliquis  T/L/D: Tubes: none  Lines: PIV  Drains: none  SUP: PPI  Diet: Regular  Activity Level:ad alli w/ assistance  ABCDEF Bundle/Checklist Completed:Yes  Disposition: Likely transfer to floor this afternoon  Multidisciplinary Rounds Completed:  Pending  Goals of Care Discussion/Palliative: Yes  Patient/Family Updated: Yes      HOSPITAL COURSE/DAILY EVENT LOG   As noted above    SUBJECTIVE   Review of Systems   Unable to perform ROS: Critical illness   Respiratory:  Negative for shortness of breath. Cardiovascular:  Negative for chest pain. Gastrointestinal:  Positive for abdominal pain. Negative for nausea and vomiting. OBJECTIVE     Labs and Data: Reviewed 11/07/22  Medications: Reviewed 11/07/22  Imaging: Reviewed 11/07/22    Physical Exam  Vitals and nursing note reviewed. Constitutional:       General: She is not in acute distress. Appearance: She is not diaphoretic. HENT:      Head: Normocephalic. Nose: Nose normal. No congestion. Mouth/Throat:      Pharynx: Oropharynx is clear. No oropharyngeal exudate. Eyes:      General: No scleral icterus. Extraocular Movements: Extraocular movements intact. Pupils: Pupils are equal, round, and reactive to light. Cardiovascular:      Rate and Rhythm: Normal rate and regular rhythm. Pulses: Normal pulses. Heart sounds: Normal heart sounds. No murmur heard. No friction rub. No gallop. Comments: L upper chest ICD site, c/d/I, w/o purulence or fluctuance   Pulmonary:      Effort: Pulmonary effort is normal. No respiratory distress. Breath sounds: Normal breath sounds. No wheezing or rales. Abdominal:      General: Abdomen is flat. There is no distension. Palpations: Abdomen is soft. Tenderness: There is no abdominal tenderness. There is no guarding. Comments: hypoactive   Musculoskeletal:         General: Normal range of motion.       Cervical back: Normal range of motion and neck supple. No rigidity. Right lower leg: No edema. Left lower leg: No edema. Lymphadenopathy:      Cervical: No cervical adenopathy. Skin:     General: Skin is warm and dry. Capillary Refill: Capillary refill takes less than 2 seconds. Coloration: Skin is not jaundiced. Neurological:      Mental Status: She is alert. Comments: residual L sided weakness   Psychiatric:         Mood and Affect: Mood normal.        Visit Vitals  /72   Pulse (!) 102   Temp 98.6 °F (37 °C)   Resp 28   Ht 5' 6\" (1.676 m)   Wt 53.4 kg (117 lb 11.6 oz)   SpO2 98%   BMI 19.00 kg/m²    O2 Flow Rate (L/min): 2 l/min O2 Device: None (Room air) Temp (24hrs), Av.6 °F (36.4 °C), Min:94.5 °F (34.7 °C), Max:99.1 °F (37.3 °C)           Intake/Output:     Intake/Output Summary (Last 24 hours) at 2022 0814  Last data filed at 2022 0700  Gross per 24 hour   Intake 1173.98 ml   Output 190 ml   Net 983.98 ml       Imaging    10/31/22    ECHO ADULT COMPLETE 2022    Interpretation Summary    Left Ventricle: Severely reduced left ventricular systolic function with a visually estimated EF of 10 -15%. Left ventricle is moderately dilated. Mildly increased wall thickness. Severe global hypokinesis present. Abnormal diastolic function. Aortic Valve: Tricuspid valve. Mild regurgitation. Mitral Valve: Mild to moderate regurgitation. Interatrial Septum: Agitated saline study was negative with and without provocation. Signed by: Sergey Lauren MD on 2022  4:14 PM         CRITICAL CARE DOCUMENTATION  I had a face to face encounter with the patient, reviewed and interpreted patient data including clinical events, labs, images, vital signs, I/O's, and examined patient.   I have discussed the case and the plan and management of the patient's care with the consulting services, the bedside nurses and the respiratory therapist.      NOTE OF PERSONAL INVOLVEMENT IN CARE   This patient has a high probability of imminent, clinically significant deterioration, which requires the highest level of preparedness to intervene urgently. I participated in the decision-making and personally managed or directed the management of the following life and organ supporting interventions that required my frequent assessment to treat or prevent imminent deterioration. I personally spent 35 minutes of critical care time. This is time spent at this critically ill patient's bedside actively involved in patient care as well as the coordination of care. This does not include any procedural time which has been billed separately.     Rene Burgos MD   Critical Care Medicine  [facility]

## 2022-11-07 NOTE — PROGRESS NOTES
[address] [facility] Adult  Hospitalist Group     ICU Transfer/Accept Summary     [place] [address] [place]  DATE OF TRANSFER: 11/7/2022       PATIENT ID: Caitlin Mcintosh  MRN: [id number]   YOB: 1940    PRIMARY CARE PROVIDER: Nino Marie MD   DATE OF ADMISSION: 10/31/2022  2:31 PM    ATTENDING PHYSICIAN: Estelita Maya MD  CONSULTATIONS:   IP CONSULT TO NEUROLOGY  IP CONSULT TO NEUROINTERVENTIONAL SURGERY  IP CONSULT TO CARDIOLOGY    PROCEDURES/SURGERIES:   Procedure(s):  INSERT ICD SINGLE    REASON FOR ADMISSION: <principal problem not specified>     HOSPITAL PROBLEM LIST:  Patient Active Problem List   Diagnosis Code    GI bleed K92.2    Right sided weakness R53.1    CVA (cerebral vascular accident) ([address] I63.9    Epigastric pain R10.13    Vasculitis (HCC) I77.6    Pancreatic mass K86.89    Pancreatitis K85.90    Elevated troponin R77.8    NSTEMI (non-ST elevated myocardial infarction) ([facility]) I21.4    Bursitis of multiple sites M71.9    Abdominal pain R10.9    Acute pancreatitis K85.90    Acute on chronic combined systolic and diastolic ACC/AHA stage C congestive heart failure (HCC) I50.43    Pleural effusion, right J90    Intraductal papillary mucinous neoplasm of pancreas D49.0    Cyst of pancreas K86.2    Stroke ([facility] [address] I63.9         Brief HPI and Hospital Course:      Pt is a 81 y.o F w/ PMH as above admitted to [facility] on 10/31 for suspected CVA w/ finding of acute bl posterior ischemic CVA, now s/p thrombectomy. Pt w/ finding of acute HFrEF 10-15% as part of CVA workup. Cardiology consulted and S/p AICD placement 11/4. Pt w/ episodes of hypotension intermittently responsive to small IVF bolus. Complaint of abd pain, n/v, somnolent but protecting airway and rouses to voice and answer questions. Transferred pt to ICU for further management.  Hemodynamically stable, and transferred out of unit on 11/7       Assessment and Plan:    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 s/p thrombectomy  Subtherapeutic Plavix response  -MRI brain with acute bilateral superior cerebral infarct and mild petechial hemorrhage  -A1c 13.1 LDL 71  -TTE with no intra-arterial septum seen  -P2Y12  response 222 unchanged from previous  -started on Eliquis, aspirin adjusted to 81 mg, lipitor   -Plavix DC per neurology due to subtherapeutic response  -CT scan of head on 11/5 no hemorrhage, no significant change of infarct  -PT OT speech  -continue Neurovascular checks  -SBP goal less than 140  -Neurologist on board     Acute on chronic systolic CHF with EF 10 to 15%  -s/p ICD placement 11/4 by cardiology   -Echocardiogram EF of 10 to 15%  -Hypotensive, BP low normal, on albuin  -coreg, Lasix, Entresto Aldactone on hold  --Cardiology consulted    Sepsis due to pneumonia   -on iv cefepime, vancomycin, fluconazole   -pressor off   -recoeved iv albumin x 1  -tachycardic, afebrile and no leukocytosis   -blood cx 11/6 no growth    JERAMY likely due to intravascular volume depletion   -lasix on hold  -avoid nephrotoxin   -monitor renal function      T2DM  A1c 13.1  Blood sugars remain labile  Continue NPH, sliding scale, monitor finger stick glucose   Diabetes management team is on board     Moderate protein calorie malnutrition   -BMI 18.68 kg/m². -nutritionist on board      Code status: Full  DVT prophylaxis: SCD     Care Plan discussed with: Patient/Family and Nurse  Anticipated Disposition: SNF/LTC  Anticipated Discharge: > 48 hrs          PHYSICAL EXAMINATION:  Visit Vitals  BP (!) 103/53   Pulse 97   Temp 99.1 °F (37.3 °C)   Resp 23   Ht 5' 6\" (1.676 m)   Wt 53.4 kg (117 lb 11.6 oz)   SpO2 98%   BMI 19.00 kg/m²       General:          Alert, cooperative, no distress  HEENT:           Atraumatic, MMM            Neck:               Supple, symmetrical,  thyroid: non tender  Lungs:             Clear to auscultation bilaterally. No Wheezing or Rhonchi. No rales.   Heart: Regular  rhythm,  No  murmur   No edema  Abdomen:       Soft, non-tender. Not distended. Bowel sounds normal  Extremities:     No cyanosis. Skin:                Not pale. Not Jaundiced  No rashes   Psych:             Not anxious or agitated. Neurologic:      Alert, moves all extremities, oriented X 3. Labs:     Recent Labs     11/07/22  0350 11/06/22  1853   WBC 7.6 7.2   HGB 8.2* 9.1*   HCT 25.7* 28.0*   * 126*     Recent Labs     11/07/22  0350 11/06/22  1853 11/06/22  0406    136 136   K 4.6 3.9 4.0    106 103   CO2 26 27 26   BUN 41* 40* 32*   CREA 1.37* 1.09* 0.94   * 129* 163*   CA 8.3* 8.2* 8.9     Recent Labs     11/07/22  0350 11/06/22  0406 11/05/22  0811   ALT 7* 11* 13   AP 79 109 108   TBILI 0.7 0.8 0.9   TP 4.9* 6.2* 5.9*   ALB 2.5* 2.5* 2.5*   GLOB 2.4 3.7 3.4     No results for input(s): INR, PTP, APTT, INREXT, INREXT in the last 72 hours. No results for input(s): FE, TIBC, PSAT, FERR in the last 72 hours. No results found for: FOL, RBCF   No results for input(s): PH, PCO2, PO2 in the last 72 hours. No results for input(s): CPK, CKNDX, TROIQ in the last 72 hours.     No lab exists for component: CPKMB  Lab Results   Component Value Date/Time    Cholesterol, total 146 11/01/2022 03:30 AM    HDL Cholesterol 69 11/01/2022 03:30 AM    LDL, calculated 71 11/01/2022 03:30 AM    Triglyceride 30 11/01/2022 03:30 AM    CHOL/HDL Ratio 2.1 11/01/2022 03:30 AM     Lab Results   Component Value Date/Time    Glucose (POC) 151 (H) 11/07/2022 11:53 AM    Glucose (POC) 121 (H) 11/07/2022 08:52 AM    Glucose (POC) 124 (H) 11/07/2022 06:55 AM    Glucose (POC) 146 (H) 11/06/2022 09:17 PM    Glucose (POC) 150 (H) 11/06/2022 04:19 PM    Glucose,  (H) 11/06/2022 06:48 PM     Lab Results   Component Value Date/Time    Color YELLOW/STRAW 11/06/2022 06:43 PM    Appearance CLEAR 11/06/2022 06:43 PM    Specific gravity 1.025 11/06/2022 06:43 PM    Specific gravity 1.029 10/31/2022 12:54 PM    pH (UA) 5.5 11/06/2022 06:43 PM    Protein Negative 11/06/2022 06:43 PM    Glucose 100 (A) 11/06/2022 06:43 PM    Ketone TRACE (A) 11/06/2022 06:43 PM    Bilirubin Negative 11/06/2022 06:43 PM    Urobilinogen 0.2 11/06/2022 06:43 PM    Nitrites Negative 11/06/2022 06:43 PM    Leukocyte Esterase MODERATE (A) 11/06/2022 06:43 PM    Epithelial cells FEW 11/06/2022 06:43 PM    Bacteria Negative 11/06/2022 06:43 PM    WBC 10-20 11/06/2022 06:43 PM    RBC 0-5 11/06/2022 06:43 PM         CODE STATUS:  x Full Code    DNR    Partial    Comfort Care       Signed:   Mary Lou Swain MD  Date of Service:  11/7/2022  1:17 PM

## 2022-11-07 NOTE — PROGRESS NOTES
0730 Bedside, Verbal, and Written shift change report given to Jose E Arredondo Student Nurse and Alla RN (oncoming nurse) by Jose Enrique Pitt RN (offgoing nurse). Report included the following information SBAR, Kardex, Intake/Output, MAR, Recent Results, and Cardiac Rhythm   . [id number] PT at bedside working with patient. [address] [facility] [address] 1450 MD at bedside. NSTU transfer orders placed. 1930 Bedside, Verbal, and Written shift change report given to Catrina Palma RN (oncoming nurse) by Lia Diaz Nurse and KANCHAN Gold (offgoing nurse). Report included the following information SBAR, Intake/Output, MAR, Recent Results, and Cardiac Rhythm NSR .

## 2022-11-07 NOTE — PROGRESS NOTES
Problem: Self Care Deficits Care Plan (Adult)  Goal: *Acute Goals and Plan of Care (Insert Text)  Description: FUNCTIONAL STATUS PRIOR TO ADMISSION: Patient was modified independent with ADLs, IADLs, and functional mobility using a SPC. HOME SUPPORT: The patient lived alone; however, reports her son lives next door and two other family members live down the road. Occupational Therapy Goals  Initiated 11/1/2022, continued 11/4/2022  1. Patient will perform self-feeding with LUE as FM/GM assist with supervision/set-up within 7 day(s). 2.  Patient will perform grooming EOB with LUE as FM/GM assist with minimal assistance/contact guard assist within 7 day(s). 3.  Patient will perform bathing with moderate assistance within 7 day(s). 4.  Patient will perform upper body dressing with supervision/set-up within 7 day(s). 5.  Patient will perform lower body dressing with maximal assistance within 7 day(s). 6.  Patient will perform toilet transfers to/from [facility] with moderate assistance within 7 day(s). 7.  Patient will perform all aspects of toileting with maximal assistance within 7 day(s). 8.  Patient will participate in upper extremity therapeutic exercise/activities with supervision/set-up within 7 day(s). (Within LUE ICD precautions)  9. Patient will utilize energy conservation techniques during functional activities with verbal, visual, and tactile cues within 7 day(s). 10.  Patient will complete the [id number] within 7 days. Outcome: Progressing Towards Goal   OCCUPATIONAL THERAPY TREATMENT  Patient: Trae Jorgensen (42 y.o. female)  Date: 11/7/2022  Diagnosis: Stroke ([address] [I63.9] <principal problem not specified>  Procedure(s) (LRB):  INSERT ICD SINGLE (N/A) 3 Days Post-Op  Precautions: Fall, Skin (SBP<140)  Chart, occupational therapy assessment, plan of care, and goals were reviewed. ASSESSMENT  Patient continues with skilled OT services and is progressing towards goals.   Pt alert and agreeable to therapy, received supine in bed with family present, s/p L ICD placement POD3. Pt educated on PPM precautions, pt with poor recall of precautions and did not demonstrate adherence requiring max multimodal cues throughout session to maintain. Pt completed bed mobility to EOB where she completed therapeutic exercises with L UE to increase mobility required to completed ADLs at baseline level. Pt stood in preparation for standing ADLs with mod-max Ax2. Pt with decreased strength and endurance in BLE. Pt orthostatic and symptomatic this date (see below), noted to close eyes and rest head on therapists chest when standing. Pt returned to supine, scooted to [facility] with total Ax2. BP continued to drop in supine, RN made aware, pt left in care of RN with family present. Pt continues to function at a mod-total A x2 for functional transfers, inferred min-mod A for seated ADLs, would benefit from IPR upon discharge as pt is well below baseline level of ADL completion.        11/07/22 1125 11/07/22 1132 11/07/22 1137   Vital Signs   Pulse (Heart Rate) 100 (!) 107 (!) 110   /68 110/72 (!) 112/99   MAP (Calculated) 84 85 103   BP 1 Location Right upper arm Right upper arm Right upper arm   BP 1 Method Automatic Automatic Automatic   BP Patient Position Semi fowlers Sitting  (EOB) Sitting  (EOB, following seated activity)      11/07/22 1142 11/07/22 1144 11/07/22 1149   Vital Signs   Pulse (Heart Rate) (!) 112 (!) 104 97   BP 98/63 116/69 96/63   MAP (Calculated) 75 85 74   BP 1 Location Right upper arm Right upper arm Right upper arm   BP 1 Method Automatic Automatic Automatic   BP Patient Position Standing Supine Semi fowlers      11/07/22 1150   Vital Signs   Pulse (Heart Rate) 97   BP (!) 86/68   MAP (Calculated) 74   BP 1 Location Right upper arm   BP 1 Method Automatic   BP Patient Position Supine       Current Level of Function Impacting Discharge (ADLs): mod-total A x2 for functional transfers, inferred min-mod A for seated ADLs    Other factors to consider for discharge: PLOF, supportive family, PPM precautions         PLAN :  Patient continues to benefit from skilled intervention to address the above impairments. Continue treatment per established plan of care to address goals. Recommend with staff: bed in chair position for meals    Recommend next OT session: EOB ADLs, sit to stand trials in preparation for standing ADLs    Recommendation for discharge: (in order for the patient to meet his/her long term goals)  Therapy 3 hours per day 5-7 days per week    This discharge recommendation:  A follow-up discussion with the attending provider and/or case management is planned    IF patient discharges home will need the following DME: patient owns DME required for discharge       SUBJECTIVE:   Patient stated I feel really nauseous and dizzy.     OBJECTIVE DATA SUMMARY:   Cognitive/Behavioral Status:  Neurologic State: Alert  Orientation Level: Appropriate for age  Cognition: Appropriate for age attention/concentration; Follows commands  Perception: Appears intact  Perseveration: No perseveration noted  Safety/Judgement: Awareness of environment    Functional Mobility and Transfers for ADLs:  Bed Mobility:  Supine to Sit: Moderate assistance;Assist x2  Sit to Supine: Maximum assistance;Assist x2  Scooting: Moderate assistance;Assist x2    Transfers:  Sit to Stand: Maximum assistance;Assist x2          Balance:  Sitting: Impaired; Without support  Sitting - Static: Fair (occasional); Good (unsupported)  Sitting - Dynamic: Fair (occasional)  Standing: Impaired; With support  Standing - Static: Poor  Standing - Dynamic : Not tested    ADL Intervention:       Lower Body Dressing Assistance  Dressing Assistance: Moderate assistance, required initiation over toes and able to pull up from there  Socks:  Moderate assistance  Leg Crossed Method Used: Yes  Position Performed: Long sitting on bed         Cognitive Retraining  Safety/Judgement: Awareness of environment    Therapeutic Exercises:   Shoulder flexion and abduction of L UE up to 90* x5 reps each    Pain:  Pain at PPM site    Activity Tolerance:   Poor and requires rest breaks    After treatment patient left in no apparent distress:   Supine in bed, Call bell within reach, Caregiver / family present, and Side rails x 3    COMMUNICATION/COLLABORATION:   The patients plan of care was discussed with: Physical therapist and Registered nurse. MAXIMO Hughes  Time Calculation: 29 mins     Regarding student involvement in patient care:  A student participated in this treatment session. Per CMS Medicare statements and AOTA guidelines I certify that the following was true:  1. I was present and directly observed the entire session. 2. I made all skilled judgments and clinical decisions regarding care. 3. I am the practitioner responsible for assessment, treatment, and documentation.

## 2022-11-08 NOTE — PROGRESS NOTES
1930: Bedside and Verbal shift change report given to Onur Wyatt RN (oncoming nurse) by Kayley Booth RN (offgoing nurse). Report included the following information SBAR, Kardex, ED Summary, Procedure Summary, Intake/Output, MAR, Recent Results, Med Rec Status, Cardiac Rhythm NSR/ST depression, Alarm Parameters , and Dual Neuro Assessment. 2000: Resumed pt care, VSS, no complains of pain. Pt a/o x4, no neuro deficits noted     2100: Pt has not voided 6hr post stein removal, bladder scan = 164     0115: PRN tylenol given for rib/abdominal pain     0200: AM labs drawn and sent     0300: Glucose on AM labs 67, FSBG = 102. Apple juice given     0730: Bedside and Verbal shift change report given to Garcia Early RN (oncoming nurse) by Onur Wyatt RN (offgoing nurse). Report included the following information SBAR, Kardex, ED Summary, Procedure Summary, Intake/Output, MAR, Recent Results, Med Rec Status, Cardiac Rhythm NSR/ST Depression, Alarm Parameters , and Dual Neuro Assessment.

## 2022-11-08 NOTE — CONSULTS
Palliative Medicine Consult  Francesco: [phone] ([phone])    Patient Name: Walker Tony  YOB: 1940    Date of Initial Consult: 11/8/2022  Reason for Consult: care decisions  Requesting Provider: Dr. Dheeraj Riley   Primary Care Physician: Anirudh Allen MD     SUMMARY:   Walker Tony is a 81 y.o. with a past history of DM2, HTN, ischemic CM (dx. 5/2021 EF 40-45%, NSTEMI 10/2021 PCI to RCA, EF 20-25%), hx acute pancreatitis, who was admitted on 10/31/2022 from home with a diagnosis of acute bilateral ischemic posterior CVA. Current medical issues leading to Palliative Medicine involvement include: patient underwent thrombectomy for acute CVA, she also was found to have EF 10-15% on ECHO and had AICD placement 11/4/22. She has made progress with physical therapy and is recommended for inpatient rehab at time of discharge    Patient lives in her own home, with son Tri Panrell living next door. She has 5 adult children. Verner Solan. Witness/ does not want blood products. No AMD on chart. PALLIATIVE DIAGNOSES:   Acute CVA s/p thrombectomy  Ischemic cardiomyopathy EF 10-15%   Anemia  Hypoalbuminemia  Pneumonia, improved  JERAMY, resolved   Palliative medicine encounter        PLAN:   Palliative medicine services introduced to patient as added layer of support in chronic illness. She is sleepy right now, after being up for a while to chair so conversation is limited today. She affirms her goals are for attending rehab and describes that she is motivated to regain prior level of function if possible  She has 5 adult children, but son Jerod Colunga  is one she relies on the most as he lives next door. Supported by other family also. We talked about option of completing mpoa paperwork during this admission, she would like to discuss with Jerod Colunga before completing. Patient has prior ACP note (see 4/28/2021) that indicates she would choose Jerod Ip as primary mpoa. And desire for FULL CODE.  (Also notes, no blood products due to being Katrinka Duverney. Witness)      As patient is making good gains and goals are clear, will sign off. If patient desires to complete AMD appointing son Linda Dean as Elena Velasquez, recommend placing consult to  for AMD completion. Please call as needed [phone]     GOALS OF CARE / TREATMENT PREFERENCES:     GOALS OF CARE:  Patient/Health Care Proxy Stated Goals: Rehabilitation    TREATMENT PREFERENCES:   Code Status: Full Code    Patient and family's personal goals include: rehab, work to regain prior level of function    Important upcoming milestones or family events: not discussed today    The patient identifies the following as important for living well: wants to get back to her own home after rehab      Advance Care Planning:  [] The [place] [id number] Team has updated the ACP Navigator with Health Care Decision Maker and Patient Capacity      Primary Decision Maker: tracie hobbs - [phone]  Advance Care Planning 11/6/2022   Patient's Healthcare Decision Maker is: Legal Next of [facility] 296 Directive None   Patient Would Like to Complete Advance Directive -       Medical Interventions: Full interventions       Other:    As far as possible, the palliative care team has discussed with patient / health care proxy about goals of care / treatment preferences for patient.      HISTORY:     History obtained from: chart    CHIEF COMPLAINT: admitted with stroke symptoms     HPI/SUBJECTIVE:    The patient is:   [x] Verbal and participatory  [] Non-participatory due to:     81year old female tells me that day of admission she didn't feel right, not like herself at all, and her sister came to the door and she ended up at the hospital.     Today, she is tired, after having been up in chair now for a while  She's a little sore where AICD was placed and the pain med made her nauseated     Clinical Pain Assessment (nonverbal scale for severity on nonverbal patients):   Clinical Pain Assessment  Severity: 2          Duration: for how long has pt been experiencing pain (e.g., 2 days, 1 month, years)  Frequency: how often pain is an issue (e.g., several times per day, once every few days, constant)     FUNCTIONAL ASSESSMENT:     Palliative Performance Scale (PPS):  PPS: 30       PSYCHOSOCIAL/SPIRITUAL SCREENING:     Palliative IDT has assessed this patient for cultural preferences / practices and a referral made as appropriate to needs (Cultural Services, Patient Advocacy, Ethics, etc.)    Any spiritual / Anabaptism concerns:  [] Yes /  [x] No   If \"Yes\" to discuss with pastoral care during IDT     Does caregiver feel burdened by caring for their loved one:   [] Yes /  [x] No /  [] No Caregiver Present/Available [] No Caregiver [] Pt Lives at [place] [address]  If \"Yes\" to discuss with social work during IDT    Anticipatory grief assessment:   [x] Normal  / [] Maladaptive     If \"Maladaptive\" to discuss with social work during IDT    ESAS Anxiety: Anxiety: 0    ESAS Depression: Depression: 0        REVIEW OF SYSTEMS:     Positive and pertinent negative findings in ROS are noted above in HPI. The following systems were [x] reviewed / [] unable to be reviewed as noted in HPI  Other findings are noted below. Systems: constitutional, ears/nose/mouth/throat, respiratory, gastrointestinal, genitourinary, musculoskeletal, integumentary, neurologic, psychiatric, endocrine. Positive findings noted below. Modified ESAS Completed by: provider   Fatigue: 5 Drowsiness: 2   Depression: 0 Pain: 2   Anxiety: 0 Nausea: 0   Anorexia: 0 Dyspnea: 2                    PHYSICAL EXAM:     From RN flowsheet:  Wt Readings from Last 3 Encounters:   11/08/22 54.2 kg (119 lb 7.8 oz)   10/31/22 42.4 kg (93 lb 8 oz)   05/10/22 53.1 kg (117 lb)     Blood pressure (!) 103/54, pulse 93, temperature 97.8 °F (36.6 °C), resp. rate 24, height 5' 6\" (1.676 m), weight 54.2 kg (119 lb 7.8 oz), SpO2 98 %.     Pain Scale 1: Numeric (0 - 10)  Pain Intensity 1: 0  Pain Onset 1: acute  Pain Location 1: Abdomen  Pain Orientation 1: Anterior, Left  Pain Description 1: Aching  Pain Intervention(s) 1: Medication (see MAR)  Last bowel movement, if known:     Constitutional: thin elderly female NAD sitting in recliner with nasal cannula in place  Eyes: pupils equal, anicteric  AICD in place left upper chest  No respiratory distress  Speech is fluent  Insight intact          HISTORY:     Active Problems:    Stroke ([facility] [address] (10/31/2022)      Past Medical History:   Diagnosis Date    Colon polyps     Diabetes ([address]     Diverticulosis     Hypertension     Ill-defined condition     Pancreatitis       Past Surgical History:   Procedure Laterality Date    COLONOSCOPY N/A 4/29/2021    COLONOSCOPY performed by Federica Jackson MD at [facility] ENDOSCOPY    [address]  2021    x2    IR PERC ART [address] [facility] [address] SAHSA SPANN INTRACRANIAL  10/31/2022      Family History   Problem Relation Age of Onset    Diabetes Other       History reviewed, no pertinent family history.   Social History     Tobacco Use    Smoking status: Never    Smokeless tobacco: Never   Substance Use Topics    Alcohol use: Not Currently     Allergies   Allergen Reactions    Insulins Itching    Penicillins Other (comments)     Pt states it makes her pass out      Current Facility-Administered Medications   Medication Dose Route Frequency    HYDROcodone-acetaminophen (NORCO) 5-325 mg per tablet 1 Tablet  1 Tablet Oral Q6H PRN    fluconazole (DIFLUCAN) tablet 200 mg  200 mg Oral DAILY    apixaban (ELIQUIS) tablet 2.5 mg  2.5 mg Oral BID    aspirin chewable tablet 81 mg  81 mg Oral DAILY    polyethylene glycol (MIRALAX) packet 17 g  17 g Oral DAILY    hydrALAZINE (APRESOLINE) 20 mg/mL injection 10 mg  10 mg IntraVENous Q6H PRN    cefepime (MAXIPIME) 2 g in 0.9% sodium chloride (MBP/ADV) 100 mL MBP  2 g IntraVENous Q12H    sodium chloride (NS) flush 5-40 mL  5-40 mL IntraVENous Q8H    sodium chloride (NS) flush 5-40 mL  5-40 mL IntraVENous PRN    [Held by provider] carvediloL (COREG) tablet 6.25 mg  6.25 mg Oral BID WITH MEALS    pantoprazole (PROTONIX) tablet 40 mg  40 mg Oral ACB    [Held by provider] traMADoL (ULTRAM) tablet 50 mg  50 mg Oral Q6H PRN    dextrose 10 % infusion 0-250 mL  0-250 mL IntraVENous PRN    insulin NPH (NOVOLIN N, HUMULIN N) injection 7 Units  0.15 Units/kg SubCUTAneous Q12H    insulin lispro (HUMALOG) injection   SubCUTAneous AC&HS    [Held by provider] insulin lispro (HUMALOG) injection 3 Units  3 Units SubCUTAneous TID WITH MEALS    senna-docusate (PERICOLACE) 8.6-50 mg per tablet 1 Tablet  1 Tablet Oral DAILY    lidocaine 4 % patch 2 Patch  2 Patch TransDERmal Q24H    atorvastatin (LIPITOR) tablet 10 mg  10 mg Oral QHS    ondansetron (ZOFRAN) injection 4 mg  4 mg IntraVENous Q4H PRN    acetaminophen (TYLENOL) tablet 650 mg  650 mg Oral Q4H PRN    Or    acetaminophen (TYLENOL) solution 650 mg  650 mg Per NG tube Q4H PRN    Or    acetaminophen (TYLENOL) suppository 650 mg  650 mg Rectal Q4H PRN    sodium chloride (NS) flush 5-40 mL  5-40 mL IntraVENous Q8H    sodium chloride (NS) flush 5-40 mL  5-40 mL IntraVENous PRN    glucose chewable tablet 16 g  4 Tablet Oral PRN    glucagon (GLUCAGEN) injection 1 mg  1 mg IntraMUSCular PRN    dextrose 10 % infusion 0-250 mL  0-250 mL IntraVENous PRN          LAB AND IMAGING FINDINGS:     Lab Results   Component Value Date/Time    WBC 7.6 11/08/2022 02:20 AM    HGB 7.6 (L) 11/08/2022 02:20 AM    PLATELET 639 (L) 78/93/2846 02:20 AM     Lab Results   Component Value Date/Time    Sodium 138 11/08/2022 02:20 AM    Potassium 3.9 11/08/2022 02:20 AM    Chloride 110 (H) 11/08/2022 02:20 AM    CO2 25 11/08/2022 02:20 AM    BUN 41 (H) 11/08/2022 02:20 AM    Creatinine 1.01 11/08/2022 02:20 AM    Calcium 8.3 (L) 11/08/2022 02:20 AM    Magnesium 1.7 10/31/2022 08:06 PM    Phosphorus 3.2 10/31/2022 08:06 PM      Lab Results   Component Value Date/Time    Alk.  phosphatase 83 11/08/2022 02:20 AM    Protein, total 5.7 (L) 11/08/2022 02:20 AM    Albumin 2.5 (L) 11/08/2022 02:20 AM    Globulin 3.2 11/08/2022 02:20 AM     Lab Results   Component Value Date/Time    INR 1.1 11/01/2022 03:30 AM    Prothrombin time 11.5 (H) 11/01/2022 03:30 AM    aPTT 31.3 05/04/2021 02:23 PM      Lab Results   Component Value Date/Time    Iron 32 (L) 04/30/2021 01:27 AM    TIBC 206 (L) 04/30/2021 01:27 AM    Iron % saturation 16 (L) 04/30/2021 01:27 AM    Ferritin 106 04/30/2021 01:27 AM      No results found for: PH, PCO2, PO2  No components found for: GLPOC   No results found for: CPK, CKMB             Total time:   Counseling / coordination time, spent as noted above:   > 50% counseling / coordination?:     Prolonged service was provided for  []30 min   []75 min in face to face time in the presence of the patient, spent as noted above. Time Start:   Time End:   Note: this can only be billed with 66954 (initial) or 20816 (follow up). If multiple start / stop times, list each separately.

## 2022-11-08 NOTE — PROGRESS NOTES
[address] [facility] Adult  Hospitalist Group                                                                                          Hospitalist Progress Note  Remigio Guzman MD  Answering service: [phone] from in house phone        Date of Service:  2022  NAME:  Trae Jorgensen  :  1940  MRN:  [id number]      Admission Summary:     Pt is a 81 y.o F w/ PMH as above admitted to [facility] on 10/31 for suspected CVA w/ finding of acute bl posterior ischemic CVA, now s/p thrombectomy. Pt w/ finding of acute HFrEF 10-15% as part of CVA workup. Cardiology consulted and S/p AICD placement . Pt w/ episodes of hypotension intermittently responsive to small IVF bolus. Complaint of abd pain, n/v, somnolent but protecting airway and rouses to voice and answer questions. Transferred pt to ICU for further management. Hemodynamically stable, and transferred out of unit on      Interval history / Subjective:     She said she has pain on her right side of abdomen, defibrillator place, feels nausea but no vomiting. No bowel movement since admission.  No left side chest pain     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/p thrombectomy  Subtherapeutic Plavix response  -MRI brain with acute bilateral superior cerebral infarct and mild petechial hemorrhage  -A1c 13.1 LDL 71  -TTE with no intra-arterial septum seen  -P2Y12  response 222 unchanged from previous  -started on Eliquis, aspirin adjusted to 81 mg, lipitor   -Plavix DC per neurology due to subtherapeutic response  -CT scan of head on  no hemorrhage, no significant change of infarct  -PT OT speech  -continue Neurovascular checks  -SBP goal less than 140  -Neurologist on board     Acute on chronic systolic CHF with EF 10 to 15%  -s/p ICD placement  by cardiology   -Echocardiogram EF of 10 to 15%  -Hypotension improving, received albumin   -coreg, Lasix, Entresto Aldactone on hold  -continue I/O and daily weight monitoring  --Cardiology consulted     Sepsis due to pneumonia   -on iv cefepime and fluconazole   -pressor off   -recoeved iv albumin x 1  -tachycardia improved, afebrile and no leukocytosis   -MRSA negative, d/c vancomycin 11/8  -blood cx 11/6 no growth     JERAMY likely due to intravascular volume depletion   -lasix on hold  -improved and creatinine normal  -avoid nephrotoxin   -monitor renal function      T2DM with hyperglycemia  -A1c 13.1  -Blood sugars remain labile  -Continue NPH, sliding scale, monitor finger stick glucose   -Diabetes management team is on board     Moderate protein calorie malnutrition   -BMI 18.68 kg/m². -nutritionist on board    Full Code : high risk for decompensation, consult to palliative care  team          Code status: Full Code  Prophylaxis: Eliquis  Care Plan discussed with: Patient, Nurse and CM  Anticipated Disposition: TBD > 48 hrs     Hospital Problems  Date Reviewed: 3/18/2022            Codes Class Noted POA    Stroke [facility]) ICD-10-CM: I63.9  ICD-9-CM: 434.91  10/31/2022 Unknown           Vital Signs:    Last 24hrs VS reviewed since prior progress note. Most recent are:  Visit Vitals  /62   Pulse 91   Temp 97.6 °F (36.4 °C)   Resp 27   Ht 5' 6\" (1.676 m)   Wt 54.2 kg (119 lb 7.8 oz)   SpO2 99%   BMI 19.29 kg/m²         Intake/Output Summary (Last 24 hours) at 11/8/2022 0916  Last data filed at 11/8/2022 0700  Gross per 24 hour   Intake 5400 ml   Output 350 ml   Net 5050 ml        Physical Examination:     I had a face to face encounter with this patient and independently examined them on 11/8/2022 as outlined below:          Constitutional:  No acute distress, cooperative, pleasant    ENT:  Oral mucosa moist, oropharynx benign. Resp:  Decrease bronchial breath sound bilaterally. No wheezing/rhonchi/rales. No accessory muscle use. CV:  Regular rhythm, normal rate, no murmurs, gallops, rubs    GI:  Soft, non distended, non tender. normoactive bowel sounds, no hepatosplenomegaly     Musculoskeletal:  No edema,      Neurologic:  Conscious and alert, oriented to place and person, moves all extremities. CN II-XII reviewed            Data Review:    Review and/or order of clinical lab test  Review and/or order of tests in the radiology section of CPT  Review and/or order of tests in the medicine section of CPT      Labs:     Recent Labs     11/08/22 0220 11/07/22  0350   WBC 7.6 7.6   HGB 7.6* 8.2*   HCT 23.3* 25.7*   * 115*     Recent Labs     11/08/22 0220 11/07/22  0350 11/06/22  1853    139 136   K 3.9 4.6 3.9   * 107 106   CO2 25 26 27   BUN 41* 41* 40*   CREA 1.01 1.37* 1.09*   GLU 67 128* 129*   CA 8.3* 8.3* 8.2*     Recent Labs     11/08/22 0220 11/07/22  0350 11/06/22  0406   ALT 8* 7* 11*   AP 83 79 109   TBILI 0.5 0.7 0.8   TP 5.7* 4.9* 6.2*   ALB 2.5* 2.5* 2.5*   GLOB 3.2 2.4 3.7     No results for input(s): INR, PTP, APTT, INREXT in the last 72 hours. No results for input(s): FE, TIBC, PSAT, FERR in the last 72 hours. No results found for: FOL, RBCF   No results for input(s): PH, PCO2, PO2 in the last 72 hours. No results for input(s): CPK, CKNDX, TROIQ in the last 72 hours.     No lab exists for component: CPKMB  Lab Results   Component Value Date/Time    Cholesterol, total 146 11/01/2022 03:30 AM    HDL Cholesterol 69 11/01/2022 03:30 AM    LDL, calculated 71 11/01/2022 03:30 AM    Triglyceride 30 11/01/2022 03:30 AM    CHOL/HDL Ratio 2.1 11/01/2022 03:30 AM     Lab Results   Component Value Date/Time    Glucose (POC) 184 (H) 11/08/2022 08:45 AM    Glucose (POC) 102 11/08/2022 03:13 AM    Glucose (POC) 202 (H) 11/07/2022 09:06 PM    Glucose (POC) 224 (H) 11/07/2022 06:17 PM    Glucose (POC) 151 (H) 11/07/2022 11:53 AM     Lab Results   Component Value Date/Time    Color YELLOW/STRAW 11/06/2022 06:43 PM    Appearance CLEAR 11/06/2022 06:43 PM    Specific gravity 1.025 11/06/2022 06:43 PM    Specific gravity 1.029 10/31/2022 12:54 PM    pH (UA) 5.5 11/06/2022 06:43 PM    Protein Negative 11/06/2022 06:43 PM    Glucose 100 (A) 11/06/2022 06:43 PM    Ketone TRACE (A) 11/06/2022 06:43 PM    Bilirubin Negative 11/06/2022 06:43 PM    Urobilinogen 0.2 11/06/2022 06:43 PM    Nitrites Negative 11/06/2022 06:43 PM    Leukocyte Esterase MODERATE (A) 11/06/2022 06:43 PM    Epithelial cells FEW 11/06/2022 06:43 PM    Bacteria Negative 11/06/2022 06:43 PM    WBC 10-20 11/06/2022 06:43 PM    RBC 0-5 11/06/2022 06:43 PM         Medications Reviewed:     Current Facility-Administered Medications   Medication Dose Route Frequency    HYDROcodone-acetaminophen (NORCO) 5-325 mg per tablet 1 Tablet  1 Tablet Oral Q6H PRN    fluconazole (DIFLUCAN) tablet 200 mg  200 mg Oral DAILY    vancomycin (VANCOCIN) 500 mg in 0.9% sodium chloride (MBP/ADV) 100 mL MBP  500 mg IntraVENous Q18H    apixaban (ELIQUIS) tablet 2.5 mg  2.5 mg Oral BID    aspirin chewable tablet 81 mg  81 mg Oral DAILY    polyethylene glycol (MIRALAX) packet 17 g  17 g Oral DAILY    hydrALAZINE (APRESOLINE) 20 mg/mL injection 10 mg  10 mg IntraVENous Q6H PRN    vancomycin pharmacy to dose   Other Rx Dosing/Monitoring    cefepime (MAXIPIME) 2 g in 0.9% sodium chloride (MBP/ADV) 100 mL MBP  2 g IntraVENous Q12H    sodium chloride (NS) flush 5-40 mL  5-40 mL IntraVENous Q8H    sodium chloride (NS) flush 5-40 mL  5-40 mL IntraVENous PRN    [Held by provider] carvediloL (COREG) tablet 6.25 mg  6.25 mg Oral BID WITH MEALS    pantoprazole (PROTONIX) tablet 40 mg  40 mg Oral ACB    [Held by provider] traMADoL (ULTRAM) tablet 50 mg  50 mg Oral Q6H PRN    dextrose 10 % infusion 0-250 mL  0-250 mL IntraVENous PRN    insulin NPH (NOVOLIN N, HUMULIN N) injection 7 Units  0.15 Units/kg SubCUTAneous Q12H    insulin lispro (HUMALOG) injection   SubCUTAneous AC&HS    [Held by provider] insulin lispro (HUMALOG) injection 3 Units  3 Units SubCUTAneous TID WITH MEALS    senna-docusate (PERICOLACE) 8.6-50 mg per tablet 1 Tablet  1 Tablet Oral DAILY    lidocaine 4 % patch 2 Patch  2 Patch TransDERmal Q24H    atorvastatin (LIPITOR) tablet 10 mg  10 mg Oral QHS    ondansetron (ZOFRAN) injection 4 mg  4 mg IntraVENous Q4H PRN    acetaminophen (TYLENOL) tablet 650 mg  650 mg Oral Q4H PRN    Or    acetaminophen (TYLENOL) solution 650 mg  650 mg Per NG tube Q4H PRN    Or    acetaminophen (TYLENOL) suppository 650 mg  650 mg Rectal Q4H PRN    sodium chloride (NS) flush 5-40 mL  5-40 mL IntraVENous Q8H    sodium chloride (NS) flush 5-40 mL  5-40 mL IntraVENous PRN    glucose chewable tablet 16 g  4 Tablet Oral PRN    glucagon (GLUCAGEN) injection 1 mg  1 mg IntraMUSCular PRN    dextrose 10 % infusion 0-250 mL  0-250 mL IntraVENous PRN     ______________________________________________________________________  EXPECTED LENGTH OF STAY: 4d 2h  ACTUAL LENGTH OF STAY:          8                 Shira Givens MD

## 2022-11-08 NOTE — PROGRESS NOTES
Problem: Self Care Deficits Care Plan (Adult)  Goal: *Acute Goals and Plan of Care (Insert Text)  Description: FUNCTIONAL STATUS PRIOR TO ADMISSION: Patient was modified independent with ADLs, IADLs, and functional mobility using a SPC. HOME SUPPORT: The patient lived alone; however, reports her son lives next door and two other family members live down the road. Occupational Therapy Goals  Initiated 11/1/2022, continued 11/4/2022  1. Patient will perform self-feeding with LUE as FM/GM assist with supervision/set-up within 7 day(s). 2.  Patient will perform grooming EOB with LUE as FM/GM assist with minimal assistance/contact guard assist within 7 day(s). 3.  Patient will perform bathing with moderate assistance within 7 day(s). 4.  Patient will perform upper body dressing with supervision/set-up within 7 day(s). 5.  Patient will perform lower body dressing with maximal assistance within 7 day(s). 6.  Patient will perform toilet transfers to/from [facility] with moderate assistance within 7 day(s). 7.  Patient will perform all aspects of toileting with maximal assistance within 7 day(s). 8.  Patient will participate in upper extremity therapeutic exercise/activities with supervision/set-up within 7 day(s). (Within LUE ICD precautions)  9. Patient will utilize energy conservation techniques during functional activities with verbal, visual, and tactile cues within 7 day(s). 10.  Patient will complete the Cherylene Due within 7 days. Outcome: Progressing Towards Goal   OCCUPATIONAL THERAPY TREATMENT  Patient: Gilberto Schwab (33 y.o. female)  Date: 11/8/2022  Diagnosis: Stroke ([facility] [address] [I63.9] <principal problem not specified>  Procedure(s) (LRB):  INSERT ICD SINGLE (N/A) 4 Days Post-Op  Precautions: Fall, Skin (SBP<140)  Chart, occupational therapy assessment, plan of care, and goals were reviewed. ASSESSMENT  Patient continues with skilled OT services and is progressing towards goals.   Pt alert and agreeable to therapy, received supine in bed. Pt able to verbalize 1/3 precautions (no overhead reaching) at beginning of session, 3/3 at the end, but required max multimodal cues to maintain them throughout session. Pt completed bed mobility to EOB with min Ax2 where she donned socks in tailor sitting with min A for balance. Pt completed bed to [facility] to chair transfer with mod-max Ax2, bladder hygiene in sitting with set up. Pt set up for meal in chair. VSS this session. Pt continues to require mod-max Ax2 for functional transfers, min A for donning socks, would benefit from IPR upon discharge d/t fall risk, decreased sitting balance, strength, endurance, and ability to perform ADLs at baseline. Current Level of Function Impacting Discharge (ADLs): mod-max Ax2 for functional transfers, min A for donning socks    Other factors to consider for discharge: PLOF, PPM precautions, lives alone         PLAN :  Patient continues to benefit from skilled intervention to address the above impairments. Continue treatment per established plan of care to address goals. Recommend with staff: Radha Simmons or bed in chair for meals, x2 Assist for transfers, Ivan Donath for bladder    Recommend next OT session: bed to chair, seated ADLs    Recommendation for discharge: (in order for the patient to meet his/her long term goals)  Therapy 3 hours per day 5-7 days per week    This discharge recommendation:  Has been made in collaboration with the attending provider and/or case management    IF patient discharges home will need the following DME: patient owns DME required for discharge       SUBJECTIVE:   Patient stated I think I need to go to the bathroom.     OBJECTIVE DATA SUMMARY:   Cognitive/Behavioral Status:  Neurologic State: Alert  Orientation Level: Appropriate for age  Cognition: Appropriate for age attention/concentration; Follows commands; Impaired decision making  Perception: Appears intact  Perseveration: No perseveration noted  Safety/Judgement: Lack of insight into deficits    Functional Mobility and Transfers for ADLs:  Bed Mobility:  Supine to Sit: Minimum assistance;Assist x2;Bed Modified  Scooting: Moderate assistance;Assist x1    Transfers:  Sit to Stand: Moderate assistance;Maximum assistance;Assist x1  Functional Transfers  Toilet Transfer : Moderate assistance;Maximum assistance;Assist x2  Bed to Chair: Maximum assistance;Assist x1    Balance:  Sitting: Impaired; Without support  Sitting - Static: Poor (constant support); Fair (occasional)  Sitting - Dynamic: Fair (occasional)  Standing: Impaired; With support  Standing - Static: Poor;Fair;Constant support  Standing - Dynamic : Poor;Fair;Constant support    ADL Intervention:  Feeding  Feeding Assistance: Set-up                             Lower Body Dressing Assistance  Dressing Assistance: min A for balance  Socks: min A for balance  Leg Crossed Method Used: Yes  Position Performed: Bending forward method;Seated edge of bed    Toileting  Toileting Assistance: Total assistance(dependent)  Bladder Hygiene: Set-up  Clothing Management: Total assistance (dependent)    Cognitive Retraining  Safety/Judgement: Lack of insight into deficits      Pain:  Pain at ICD incision site    Activity Tolerance:   Fair, Poor, SpO2 stable on RA, and requires rest breaks    After treatment patient left in no apparent distress:   Sitting in chair and Call bell within reach    COMMUNICATION/COLLABORATION:   The patients plan of care was discussed with: Physical therapist and Registered nurse. MAXIMO Farrell  Time Calculation: 29 mins     Regarding student involvement in patient care:  A student participated in this treatment session. Per CMS Medicare statements and AOTA guidelines I certify that the following was true:  1. I was present and directly observed the entire session. 2. I made all skilled judgments and clinical decisions regarding care.   3. I am the practitioner responsible for assessment, treatment, and documentation.

## 2022-11-08 NOTE — PROGRESS NOTES
Pharmacist Note - Vancomycin Dosing  Therapy day 3  Indication: sepsis/PNA  Current regimen: 500 mg Q18H    Recent Labs     11/08/22  0220 11/07/22  0350 11/06/22  1853   WBC 7.6 7.6 7.2   CREA 1.01 1.37* 1.09*   BUN 41* 41* 40*       A random vancomycin level of 15.3 mcg/mL was obtained and from this level, the patient's AUC24 is calculated to be 410 with the current regimen. Goal target range AUC/RITO 400-600          Plan: Continue current regimen. Pharmacy will continue to monitor this patient daily for changes in clinical status and renal function. *Random vancomycin levels are used to calculate AUC/RITO, this level should not be interpreted as a trough. Vancomycin has been dosed using Bayesian kinetics software to target an AUC24:RITO of 400-600, which provides adequate exposure for as assumed infection due to MRSA with an RITO of 1 or less while reducing the risk of nephrotoxicity as seen with traditional trough based dosing goals.

## 2022-11-08 NOTE — PROGRESS NOTES
0730 Bedside and Verbal shift change report given to Fany Mercado (oncoming nurse) by Michael Ann (offgoing nurse). Report included the following information SBAR, Kardex, ED Summary, Procedure Summary, Intake/Output, MAR, Accordion, and Recent Results.

## 2022-11-08 NOTE — PROGRESS NOTES
Problem: Mobility Impaired (Adult and Pediatric)  Goal: *Acute Goals and Plan of Care (Insert Text)  Description:   FUNCTIONAL STATUS PRIOR TO ADMISSION: Patient was modified independent using a single point cane for functional mobility. HOME SUPPORT PRIOR TO ADMISSION: The patient lived alone with several family members living nearby (son next  door). Physical Therapy Goals  Updated s/p ICD placement 11/4/2022  1. Patient will move from supine to sit and sit to supine , scoot up and down, and roll side to side in bed with modified independence within 7 day(s). 2.  Patient will transfer from bed to chair and chair to bed with min assistance using the least restrictive device within 7 day(s). 3.  Patient will perform sit to stand with min assistance within 7 day(s). 4.  Patient will ambulate with minimal assistance/contact guard assist for 50 feet with the least restrictive device within 7 day(s). 5.  Patient will ascend/descend 4 stairs with one handrail(s) with minimal assistance/contact guard assist within 7 day(s). 6.  Patient will improve Black Balance score by 7 points within 7 days. Initiated 11/1/2022  1. Patient will move from supine to sit and sit to supine , scoot up and down, and roll side to side in bed with modified independence within 7 day(s). 2.  Patient will transfer from bed to chair and chair to bed with modified independence using the least restrictive device within 7 day(s). 3.  Patient will perform sit to stand with supervision/set-up within 7 day(s). 4.  Patient will ambulate with minimal assistance/contact guard assist for 100 feet with the least restrictive device within 7 day(s). 5.  Patient will ascend/descend 4 stairs with one handrail(s) with minimal assistance/contact guard assist within 7 day(s). 6.  Patient will improve Black Balance score by 7 points within 7 days.      Outcome: Progressing Towards Goal   PHYSICAL THERAPY TREATMENT  Patient: Damon Johns (81 y.o. female)  Date: 11/8/2022  Diagnosis: Stroke ([facility] [address] [I63.9] <principal problem not specified>  Procedure(s) (LRB):  INSERT ICD SINGLE (N/A) 4 Days Post-Op  Precautions: Fall, Skin (SBP<140)  Chart, physical therapy assessment, plan of care and goals were reviewed. ASSESSMENT  Patient continues with skilled PT services and is progressing towards goals. Patient found supine in bed and agreeable to PT, oriented x 4 today. Patient able to state 1/2 PPM precautions and required max cues for adherence to no push/pulling with L UE throughout mobility. Patient completed supine > sit with min A x 2 and bed modified, demonstrated poor progressing to fair static sitting balance, completed sit <> stand with mod A x 1 from elevated EOB and transferred bed > BSC with max A x 1 via stand pivot. Patient then transferred via stand pivot BSC > chair with max A x 1, demonstrating increased fatigue and bilateral LE buckling. Orthostatic assessment completed and BP monitored throughout session with no further orthostatic hypotension noted today. Patient left seated in chair with all needs in reach; at end of session, patient able to recall 2/2 PPM precautions when asked. Recommend IPR at discharge to promote return to mod I PLOF. Current Level of Function Impacting Discharge (mobility/balance): max A x 1 bed <> chair via stand pivot     Other factors to consider for discharge: decreased safety awareness, non-adherence to L PPM precautions despite cues          PLAN :  Patient continues to benefit from skilled intervention to address the above impairments. Continue treatment per established plan of care. to address goals.     Recommendation for discharge: (in order for the patient to meet his/her long term goals)  Therapy 3 hours per day 5-7 days per week    This discharge recommendation:  A follow-up discussion with the attending provider and/or case management is planned    IF patient discharges home will need the following DME: to be determined (TBD)       SUBJECTIVE:   Patient stated I feel like a wobbly duck.     OBJECTIVE DATA SUMMARY:   Critical Behavior:  Neurologic State: Alert  Orientation Level: Oriented X4  Cognition: Appropriate safety awareness, Appropriate decision making, Appropriate for age attention/concentration  Safety/Judgement: Awareness of environment  Functional Mobility Training:  Bed Mobility:     Supine to Sit: Minimum assistance;Assist x2;Bed Modified     Scooting: Moderate assistance;Assist x1        Transfers:  Sit to Stand: Moderate assistance;Maximum assistance;Assist x1  Stand to Sit: Maximum assistance;Assist x1        Bed to Chair: Maximum assistance;Assist x1                    Balance:  Sitting: Impaired; Without support  Sitting - Static: Poor (constant support); Fair (occasional)  Sitting - Dynamic: Fair (occasional)  Standing: Impaired; With support  Standing - Static: Poor;Fair;Constant support  Standing - Dynamic : Poor;Fair;Constant support    Pain Rating:  Patient endorses soreness across L shoulder and chest at post-op site - RN aware     Activity Tolerance:   Good, SpO2 stable on RA, and requires frequent rest breaks    After treatment patient left in no apparent distress:   Sitting in chair, Heels elevated for pressure relief, Call bell within reach, and Bed / chair alarm activated    COMMUNICATION/COLLABORATION:   The patients plan of care was discussed with: Occupational therapist and Registered nurse.      Olman Concepcion, PT   Time Calculation: 38 mins

## 2022-11-08 NOTE — PROGRESS NOTES
SENA: Anticipate discharge to [facility] pending medical progress. Transportation likely BLS v in car with son. RUR: 23%    ER Contact: Ahmet Vazquez [phone]    Disposition: Patient admitted 10/31 for stroke. Underwent thrombectomy and placement of AICD. Lives alone in one story home with 4 steps to enter. Uses a cane and has access to wheelchair and walker. Son lives next door. IPR recommended and [facility] following. Palliative has been consulted. CM will continue to follow for discharge needs.     [address], [address] [facility]  [phone]

## 2022-11-09 NOTE — PROGRESS NOTES
[address] [facility] Adult  Hospitalist Group                                                                                          Hospitalist Progress Note  Bhakti Bustamante MD  Answering service: [phone] from in house phone        Date of Service:  2022  NAME:  Hermelinda Silva  :  1940  MRN:  [id number]      Admission Summary:     Pt is a 81 y.o F w/ PMH as above admitted to [facility] on 10/31 for suspected CVA w/ finding of acute bl posterior ischemic CVA, now s/p thrombectomy. Pt w/ finding of acute HFrEF 10-15% as part of CVA workup. Cardiology consulted and S/p AICD placement . Pt w/ episodes of hypotension intermittently responsive to small IVF bolus. Complaint of abd pain, n/v, somnolent but protecting airway and rouses to voice and answer questions. Transferred pt to ICU for further management. Hemodynamically stable, and transferred out of unit on      Interval history / Subjective:     She said she has pain on her right foot heel pain.  Has nausea but no vomiting     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/p thrombectomy  Subtherapeutic Plavix response  -MRI brain with acute bilateral superior cerebral infarct and mild petechial hemorrhage  -A1c 13.1 LDL 71  -TTE with no intra-arterial septum seen  -P2Y12  response 222 unchanged from previous  -started on Eliquis, aspirin adjusted to 81 mg, lipitor   -Plavix DC per neurology due to subtherapeutic response  -CT scan of head on  no hemorrhage, no significant change of infarct  -PT OT speech  -continue Neurovascular checks  -SBP goal less than 140  -Neurologist on board     Acute on chronic systolic CHF with EF 10 to 15%  -s/p ICD placement  by cardiology   -Echocardiogram EF of 10 to 15%  -Hypotension improving, received albumin   -resume coreg,   -Lasix, Entresto Aldactone on hold  -continue I/O and daily weight monitoring  --Cardiology on board Sepsis due to pneumonia   -on iv cefepime and fluconazole, to switch to oral on discharge  -pressor off   -recoeved iv albumin x 1  -tachycardia improved, afebrile and no leukocytosis   -MRSA negative, d/c vancomycin 11/8  -blood cx 11/6 no growth     JERAMY likely due to intravascular volume depletion   -lasix on hold  -improved and creatinine normal  -avoid nephrotoxin   -monitor renal function      T2DM with hyperglycemia  -A1c 13.1  -Blood sugars remain labile  -Continue NPH, sliding scale, monitor finger stick glucose   -Diabetes management team is on board     Moderate protein calorie malnutrition   -BMI 18.68 kg/m². -nutritionist on board    Full Code : high risk for decompensation, consult to palliative care  team          Code status: Full Code  Prophylaxis: Eliquis  Care Plan discussed with: Patient, Nurse and CM  Anticipated Disposition: SNF > 48 hrs     Hospital Problems  Date Reviewed: 3/18/2022            Codes Class Noted POA    Stroke [facility]) ICD-10-CM: I63.9  ICD-9-CM: 434.91  10/31/2022 Unknown         Vital Signs:    Last 24hrs VS reviewed since prior progress note. Most recent are:  Visit Vitals  /68 (BP 1 Location: Right upper arm, BP Patient Position: At rest)   Pulse 82   Temp 98 °F (36.7 °C)   Resp 17   Ht 5' 6\" (1.676 m)   Wt 55 kg (121 lb 4.1 oz)   SpO2 97%   BMI 19.57 kg/m²         Intake/Output Summary (Last 24 hours) at 11/9/2022 1119  Last data filed at 11/8/2022 2000  Gross per 24 hour   Intake --   Output 0 ml   Net 0 ml          Physical Examination:     I had a face to face encounter with this patient and independently examined them on 11/9/2022 as outlined below:          Constitutional:  No acute distress, cooperative, pleasant    ENT:  Oral mucosa moist, oropharynx benign. Resp:  Decrease bronchial breath sound bilaterally. No wheezing/rhonchi/rales. No accessory muscle use.     CV:  Regular rhythm, normal rate, no murmurs, gallops, rubs    GI:  Soft, non distended, non tender. normoactive bowel sounds, no hepatosplenomegaly     Musculoskeletal:  No edema,      Neurologic:  Conscious and alert, oriented to place and person, moves all extremities. CN II-XII reviewed            Data Review:    Review and/or order of clinical lab test  Review and/or order of tests in the radiology section of CPT  Review and/or order of tests in the medicine section of [facility]      Labs:     Recent Labs     11/09/22 0451 11/08/22 0220   WBC 6.2 7.6   HGB 7.9* 7.6*   HCT 24.3* 23.3*    143*       Recent Labs     11/09/22 0451 11/08/22 0220 11/07/22  0350    138 139   K 4.4 3.9 4.6   * 110* 107   CO2 28 25 26   BUN 30* 41* 41*   CREA 0.87 1.01 1.37*   GLU 98 67 128*   CA 8.4* 8.3* 8.3*       Recent Labs     11/09/22 0451 11/08/22 0220 11/07/22  0350   ALT 9* 8* 7*   AP 94 83 79   TBILI 0.5 0.5 0.7   TP 5.5* 5.7* 4.9*   ALB 2.5* 2.5* 2.5*   GLOB 3.0 3.2 2.4       No results for input(s): INR, PTP, APTT, INREXT, INREXT in the last 72 hours. No results for input(s): FE, TIBC, PSAT, FERR in the last 72 hours. No results found for: FOL, RBCF   No results for input(s): PH, PCO2, PO2 in the last 72 hours. No results for input(s): CPK, CKNDX, TROIQ in the last 72 hours.     No lab exists for component: CPKMB  Lab Results   Component Value Date/Time    Cholesterol, total 146 11/01/2022 03:30 AM    HDL Cholesterol 69 11/01/2022 03:30 AM    LDL, calculated 71 11/01/2022 03:30 AM    Triglyceride 30 11/01/2022 03:30 AM    CHOL/HDL Ratio 2.1 11/01/2022 03:30 AM     Lab Results   Component Value Date/Time    Glucose (POC) 104 11/09/2022 08:29 AM    Glucose (POC) 89 11/08/2022 09:43 PM    Glucose (POC) 156 (H) 11/08/2022 05:25 PM    Glucose (POC) 249 (H) 11/08/2022 12:07 PM    Glucose (POC) 184 (H) 11/08/2022 08:45 AM     Lab Results   Component Value Date/Time    Color YELLOW/STRAW 11/06/2022 06:43 PM    Appearance CLEAR 11/06/2022 06:43 PM    Specific gravity 1.025 11/06/2022 06:43 PM Specific gravity 1.029 10/31/2022 12:54 PM    pH (UA) 5.5 11/06/2022 06:43 PM    Protein Negative 11/06/2022 06:43 PM    Glucose 100 (A) 11/06/2022 06:43 PM    Ketone TRACE (A) 11/06/2022 06:43 PM    Bilirubin Negative 11/06/2022 06:43 PM    Urobilinogen 0.2 11/06/2022 06:43 PM    Nitrites Negative 11/06/2022 06:43 PM    Leukocyte Esterase MODERATE (A) 11/06/2022 06:43 PM    Epithelial cells FEW 11/06/2022 06:43 PM    Bacteria Negative 11/06/2022 06:43 PM    WBC 10-20 11/06/2022 06:43 PM    RBC 0-5 11/06/2022 06:43 PM         Medications Reviewed:     Current Facility-Administered Medications   Medication Dose Route Frequency    HYDROcodone-acetaminophen (NORCO) 5-325 mg per tablet 1 Tablet  1 Tablet Oral Q6H PRN    insulin NPH (NOVOLIN N, HUMULIN N) injection 8 Units  8 Units SubCUTAneous QAM    insulin NPH (NOVOLIN N, HUMULIN N) injection 6 Units  6 Units SubCUTAneous QHS    insulin lispro (HUMALOG) injection   SubCUTAneous AC&HS    dextrose 10 % infusion 0-250 mL  0-250 mL IntraVENous PRN    fluconazole (DIFLUCAN) tablet 200 mg  200 mg Oral DAILY    apixaban (ELIQUIS) tablet 2.5 mg  2.5 mg Oral BID    aspirin chewable tablet 81 mg  81 mg Oral DAILY    polyethylene glycol (MIRALAX) packet 17 g  17 g Oral DAILY    hydrALAZINE (APRESOLINE) 20 mg/mL injection 10 mg  10 mg IntraVENous Q6H PRN    cefepime (MAXIPIME) 2 g in 0.9% sodium chloride (MBP/ADV) 100 mL MBP  2 g IntraVENous Q12H    sodium chloride (NS) flush 5-40 mL  5-40 mL IntraVENous Q8H    sodium chloride (NS) flush 5-40 mL  5-40 mL IntraVENous PRN    [Held by provider] carvediloL (COREG) tablet 6.25 mg  6.25 mg Oral BID WITH MEALS    pantoprazole (PROTONIX) tablet 40 mg  40 mg Oral ACB    [Held by provider] traMADoL (ULTRAM) tablet 50 mg  50 mg Oral Q6H PRN    dextrose 10 % infusion 0-250 mL  0-250 mL IntraVENous PRN    insulin lispro (HUMALOG) injection 3 Units  3 Units SubCUTAneous TID WITH MEALS    senna-docusate (PERICOLACE) 8.6-50 mg per tablet 1 Tablet  1 Tablet Oral DAILY    lidocaine 4 % patch 2 Patch  2 Patch TransDERmal Q24H    atorvastatin (LIPITOR) tablet 10 mg  10 mg Oral QHS    ondansetron (ZOFRAN) injection 4 mg  4 mg IntraVENous Q4H PRN    acetaminophen (TYLENOL) tablet 650 mg  650 mg Oral Q4H PRN    Or    acetaminophen (TYLENOL) solution 650 mg  650 mg Per NG tube Q4H PRN    Or    acetaminophen (TYLENOL) suppository 650 mg  650 mg Rectal Q4H PRN    sodium chloride (NS) flush 5-40 mL  5-40 mL IntraVENous Q8H    sodium chloride (NS) flush 5-40 mL  5-40 mL IntraVENous PRN    glucose chewable tablet 16 g  4 Tablet Oral PRN    glucagon (GLUCAGEN) injection 1 mg  1 mg IntraMUSCular PRN    dextrose 10 % infusion 0-250 mL  0-250 mL IntraVENous PRN     ______________________________________________________________________  EXPECTED LENGTH OF STAY: 4d 2h  ACTUAL LENGTH OF STAY:          9                 Kike Olson MD

## 2022-11-09 NOTE — PROGRESS NOTES
Problem: Mobility Impaired (Adult and Pediatric)  Goal: *Acute Goals and Plan of Care (Insert Text)  Description:   FUNCTIONAL STATUS PRIOR TO ADMISSION: Patient was modified independent using a single point cane for functional mobility. HOME SUPPORT PRIOR TO ADMISSION: The patient lived alone with several family members living nearby (son next  door). Physical Therapy Goals  Updated s/p ICD placement 11/4/2022  1. Patient will move from supine to sit and sit to supine , scoot up and down, and roll side to side in bed with modified independence within 7 day(s). 2.  Patient will transfer from bed to chair and chair to bed with min assistance using the least restrictive device within 7 day(s). 3.  Patient will perform sit to stand with min assistance within 7 day(s). 4.  Patient will ambulate with minimal assistance/contact guard assist for 50 feet with the least restrictive device within 7 day(s). 5.  Patient will ascend/descend 4 stairs with one handrail(s) with minimal assistance/contact guard assist within 7 day(s). 6.  Patient will improve Black Balance score by 7 points within 7 days. Initiated 11/1/2022  1. Patient will move from supine to sit and sit to supine , scoot up and down, and roll side to side in bed with modified independence within 7 day(s). 2.  Patient will transfer from bed to chair and chair to bed with modified independence using the least restrictive device within 7 day(s). 3.  Patient will perform sit to stand with supervision/set-up within 7 day(s). 4.  Patient will ambulate with minimal assistance/contact guard assist for 100 feet with the least restrictive device within 7 day(s). 5.  Patient will ascend/descend 4 stairs with one handrail(s) with minimal assistance/contact guard assist within 7 day(s). 6.  Patient will improve Black Balance score by 7 points within 7 days.      Outcome: Progressing Towards Goal   PHYSICAL THERAPY TREATMENT  Patient: Ebenezer Miller (81 y.o. female)  Date: 11/9/2022  Diagnosis: Stroke ([facility] [address] [I63.9] <principal problem not specified>  Procedure(s) (LRB):  INSERT ICD SINGLE (N/A) 5 Days Post-Op  Precautions: Fall, Skin (SBP<140), PM precautions x4-6 weeks: no shoulder abd/flex >90*, no pushing/pulling/lifting >10lbs; RW use without UE WBing only  Chart, physical therapy assessment, plan of care and goals were reviewed. ASSESSMENT  Pt seen following RN clearance with OT 2/2 increased skilled needs anticipated. Patient continues with skilled PT services and is progressing slowly towards goals. Ms Remy Holland reported 9/10 pain at [facility] site, nausea, and her DTR reports no BM since 10/31; however, pt pleasant and agreeable to participation this morning. Pt completed bed mobility with increased cues/reminders for LUE precautions,  transfers, and brief steps to chair on L for meal. Pt demonstrates decreased insight into precautions, strength, balance, AROM (limited by PM precautions), and impaired overall functional mobility from reported baseline status of mod I with SPC. Following session events, pt in chair and working further with OT, NAD. Alarm placed/activated, call bell within reach and DTR bedside. Recommend: OOB to chair for all meals with assist x2 for B HHA/pivot to chair with gait belt    Next session: assist x2 for progression of gait training, transfers, bed mobility, and OOB to chair    Current Level of Function Impacting Discharge (mobility/balance): upwards moderate assistance with safety concerns    Other factors to consider for discharge: pt lived alone PTA         PLAN :  Patient continues to benefit from skilled intervention to address the above impairments. Continue treatment per established plan of care. to address goals.     Recommendation for discharge: (in order for the patient to meet his/her long term goals)  Therapy 3 hours per day 5-7 days per week    This discharge recommendation:  Has been made in collaboration with the attending provider and/or case management    IF patient discharges home will need the following DME: to be determined (TBD)       SUBJECTIVE:   Patient stated I am so cold. .. Pt with cold fingers noted-wrapped in blankets for comfort. OBJECTIVE DATA SUMMARY:   Critical Behavior:  Neurologic State: Alert, Drowsy  Orientation Level: Oriented X4  Cognition: Appropriate decision making  Safety/Judgement: Awareness of environment (Requires reminders of pacemaker precautions)  Functional Mobility Training:  Bed Mobility:  Supine to Sit: Minimum assistance; cues for LUE disuse and sequencing pushing with RUE/scooting to EOB  Scooting: Minimum assistance (Reminders not to use LUE to pull/push)  Transfers:   Sit to stand: cues to push with RUE and Johnnie x2   Stand to sit: cues to reach back with RUE for controlled descent with Johnnie x2  Bed to Chair: Moderate assistance  Balance:  Sitting: Intact; Without support  Sitting - Static: Good (unsupported) (unsupported while brushing teeth)  Sitting - Dynamic: Fair (occasional)  Standing: Impaired; With support  Standing - Static: Fair;Constant support  Standing - Dynamic : Poor;Constant support  Ambulation/Gait Training:  Distance (ft): 4 Feet (ft) (steps from EOB to chair on L)  Assistive Device: Gait belt (B HHA)  Ambulation - Level of Assistance: Moderate assistance; Additional time;Assist x2  Gait Abnormalities: Decreased step clearance; Path deviations;Trunk sway increased (cues for postural extension; side steps to L with HHA)  Base of Support: Center of gravity altered;Narrowed  Speed/Bhargavi: Slow;Pace decreased (<100 feet/min)  Step Length: Right shortened;Left shortened  Therapeutic Exercises:   Chair APs, marching, LAQ recommended in chair (APs performed in bed prior to movement)  Pain Ratin/10 L shoulder at PM site    Activity Tolerance:   Fair and VSS with activity/NAD    After treatment patient left in no apparent distress:   Sitting in chair, Call bell within reach, Bed / chair alarm activated, and Caregiver / family present    COMMUNICATION/COLLABORATION:   The patients plan of care was discussed with: Occupational therapist and Registered nurse.      Mary Castro   Time Calculation: 23 mins

## 2022-11-09 NOTE — DIABETES MGMT
[address] [facility]    CLINICAL NURSE SPECIALIST CONSULT     Initial Presentation   Christiano Valdes is a 81 y.o. female who presented to [facility] ED with a new onset L-sided facial droop and weakness. 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 HX:   Past Medical History:   Diagnosis Date    Colon polyps     Diabetes ([facility] [address]     Diverticulosis     Hypertension     Ill-defined condition     Pancreatitis     CVA    INITIAL DX:   Stroke ([facility] [address] [I63.9]     Current Treatment     TX: Mechanical thrombectomy    Consulted by  Chuckie Coates MD  for advanced diabetes nursing assessment and care for:   [x] Inpatient management strategy  [x] Home management assessment    Hospital Course   Clinical progress has been uncomplicated. 10/31: Admission. Cerebral angiogram and mechanical thrombectomy, ICU for post-op care  11/1: MRI brain showing Bilateral cerebellar infarcts  11/2: Echo showed EF of 10 -15%. Left ventricle is moderately dilated. Mildly increased wall thickness. Transfer to acute care  11/4: [employer] single chamber ICD placement   11/6: Rapid response for hypotension, abd pain and lethargy. IVF. Albumin. ICU transfer and vasopressors started  11/7: Vasopressors off and transferred out of ICU  Diabetes History   Type 2 Diabetes: Diagnosed about 10 years ago  Ambulatory BG management provided by: Laith Lindsay MD PCP     Diabetes-related Medical History    Neurological complications  Peripheral neuropathy  Microvascular disease  Nephropathy  Macrovascular disease  Cerebral vascular accident  Other associated conditions     HF    Diabetes Medication History  Key Antihyperglycemic Medications               glipiZIDE (GLUCOTROL) 5 mg tablet (Taking) Take 2 Tablets by mouth Daily (before breakfast). Stuart Pretty is a very poor historian.   She struggles to tell me what medication she is taking daily for diabetes- does mention she forgets \"sometimes\". Diabetes self-management practices:   Eating pattern   I had a difficult time understanding her explanation of typical dietary intake    May have oatmeal for breakfast   Lunch/Dinner: Soup, pork chops, potatoes   Drinking: Water, juice, coffee  Physical activity pattern   Sedentary   Monitoring pattern   Has a glucometer, ran out of strips and lancets  Taking medications pattern  [x] In-consistent administration  [x] Affordable  Social determinants of health impacting diabetes self-management practices   Concerned that you need to know more about how to stay healthy with diabetes  Overall evaluation:    [x] Not achieving A1c target with drug therapy & self-care practices      Lives alone but next door to her son  Her son checks in with her most days  Does not drive  Subjective   I guess I am doing ok.      Objective   Physical exam  General Underweight frail appearing AA female in no acute distress/ill-appearing. Conversant and cooperative  Neuro  Alert, oriented   Vital Signs Visit Vitals  /68 (BP 1 Location: Right upper arm, BP Patient Position: At rest)   Pulse 90   Temp 98 °F (36.7 °C)   Resp 17   Ht 5' 6\" (1.676 m)   Wt 55 kg (121 lb 4.1 oz)   SpO2 97%   BMI 19.57 kg/m²     Skin  Warm and dry. Acanthosis noted along neckline. No lipohypertrophy or lipoatrophy noted at injection sites   Heart   Regular rate and rhythm.  No murmurs, rubs or gallops  Lungs  Clear to auscultation without rales or rhonchi  Extremities No foot wounds        Laboratory  Recent Labs     22  0451 22  0220 22  0350   GLU 98 67 128*   AGAP 3* 3* 6   WBC 6.2 7.6 7.6   CREA 0.87 1.01 1.37*   AST 7* 8* 6*   ALT 9* 8* 7*           Blood glucose pattern      Significant diabetes-related events over the past 24-72 hours  A1C 13.1%  Fasting B  Pre-prandial:   Basal: 8 units NPH AM, 6 units pm  Bolus: 3 units with meals- several doses held with poor PO intake  Correction: 6 units in the last 24h  GFR 56  EF 10-15%  PNA on antibiotics     Assessment and Plan   Nursing Diagnosis Risk for unstable blood glucose pattern   Nursing Intervention Domain 1586 Decision-making Support   Nursing Interventions Examined current inpatient diabetes/blood glucose control   Explored factors facilitating and impeding inpatient management  Explored corrective strategies with patient and responsible inpatient provider   Informed patient of rational for insulin strategy while hospitalized     Evaluation   Aguilar Felix is an 81 y.o. 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 A1C on admission is significantly elevated at 13.1%. BG on admission was 444. She had very poor po intake POD 2 but as oral intake improved, -311. Moderate dose basal and low dose bolus insulin were than started. Bolus humalog held following ICD placement. Now eating and can resume. Please strive for optimal glucose control in the setting of CVA- 140-180mg/dl. Based on A1C on admission of 13.1% and BG of 444, patient likely needs attention from who is available to assist in her home diabetes management. Recommendations   POC glucose ACHS    Consistent carbohydrate diet (60 grams CHO/meal)    3.  Adjust the subcutaneous Insulin Order set (7793)  Insulin Dosing Specific recommendation   Basal                                       (Based on weight, BMI & GFR) Adjust basal insulin:  8 units NPH AM  4 units NPH PM  Allergy listed for Lantus (itching)      Bolus If eating more than 50% of meals and pre-meal BG  sustained over 180mg/dl, 3 units humalog/meal    Hold if patient is NPO or consumes less than 50% of carbohydrates on meal tray Advance by 2 units daily for   persistent pre-prandial   hyperglycemia     Corrective (Useful in adjusting insulin dosing) High sensitivity ACHS             Billing Code(s)   [x] 52377     Before making these care recommendations, I personally reviewed the hospitalization record, including notes, laboratory & diagnostic data and current medications, and examined the patient at the bedside (circumstances permitting) before making care recommendations. More than fifty (50) percent of the time was spent in patient counseling and/or care coordination.   Total minutes: 13      YISSEL Del Toro BC-ADM  Board Certified Advanced Diabetes Manager  Clinical Nurse Specialist  Program for Diabetes Health  Access via [email or web address]

## 2022-11-09 NOTE — PROGRESS NOTES
Problem: Self Care Deficits Care Plan (Adult)  Goal: *Acute Goals and Plan of Care (Insert Text)  Description: FUNCTIONAL STATUS PRIOR TO ADMISSION: Patient was modified independent with ADLs, IADLs, and functional mobility using a SPC. HOME SUPPORT: The patient lived alone; however, reports her son lives next door and two other family members live down the road. Occupational Therapy Goals  Initiated 11/1/2022, continued 11/4/2022  1. Patient will perform self-feeding with LUE as FM/GM assist with supervision/set-up within 7 day(s). 2.  Patient will perform grooming EOB with LUE as FM/GM assist with minimal assistance/contact guard assist within 7 day(s). 3.  Patient will perform bathing with moderate assistance within 7 day(s). 4.  Patient will perform upper body dressing with supervision/set-up within 7 day(s). 5.  Patient will perform lower body dressing with maximal assistance within 7 day(s). 6.  Patient will perform toilet transfers to/from [facility] with moderate assistance within 7 day(s). 7.  Patient will perform all aspects of toileting with maximal assistance within 7 day(s). 8.  Patient will participate in upper extremity therapeutic exercise/activities with supervision/set-up within 7 day(s). (Within LUE ICD precautions)  9. Patient will utilize energy conservation techniques during functional activities with verbal, visual, and tactile cues within 7 day(s). 10.  Patient will complete the [id number] within 7 days. Outcome: Progressing Towards Goal     OCCUPATIONAL THERAPY TREATMENT  Patient: Trae Jorgensen (89 y.o. female)  Date: 11/9/2022  Diagnosis: Stroke ([address] [I63.9] <principal problem not specified>  Procedure(s) (LRB):  INSERT ICD SINGLE (N/A) 5 Days Post-Op  Precautions: Fall, Skin (SBP<140)  Chart, occupational therapy assessment, plan of care, and goals were reviewed. ASSESSMENT  Patient continues with skilled OT services and is progressing towards goals.   Pt alert and agreeable to therapy, received supine in bed. Pt able to verbalize 2/3 precautions (no overhead reaching, no pulling or pushing) at beginning of session, but continues to require max tactile and verbal cues to maintain these precautions throughout session, especially when moving in bed and sit to stand/ stand to sit. Pt completed grooming sitting EOB with setup to MIN A, bed mobility to EOB with min Ax2 Pt completed bed to chair transfer with mod Ax2. Pt set up for meal in chair. Pt complaining of nausea and pain, nurse notified. Pt continues to require mod-max Ax2 for functional transfers, min A for donning socks, would benefit from IPR upon discharge d/t fall risk, decreased sitting balance, strength, endurance, and ability to perform ADLs at baseline     Current Level of Function Impacting Discharge (ADLs): MOD A for functional transfers, MIN to MOD A for lower body ADLs, MIN A for upper body ADLs. Other factors to consider for discharge: Pacemaker precautions         PLAN :  Patient continues to benefit from skilled intervention to address the above impairments. Continue treatment per established plan of care to address goals. Recommend with staff: OOB to chair for meals with assistance    Recommend next OT session: standing ADLs with HHA for balance, therapeutic exercise RUE    Recommendation for discharge: (in order for the patient to meet his/her long term goals)  Therapy 3 hours per day 5-7 days per week    This discharge recommendation:  Has not yet been discussed the attending provider and/or case management    IF patient discharges home will need the following DME: TBD       SUBJECTIVE:   Patient stated I'll try, I don't know if I can do it, though.  Pt referring to transfer to the chair    OBJECTIVE DATA SUMMARY:   Cognitive/Behavioral Status:  Neurologic State: Alert;Drowsy  Orientation Level: Oriented X4  Cognition: Appropriate decision making        Safety/Judgement: Awareness of environment (Requires reminders of pacemaker precautions)    Functional Mobility and Transfers for ADLs:  Bed Mobility:  Supine to Sit: Minimum assistance  Scooting: Minimum assistance (Reminders not to use LUE to pull/push)    Transfers:        Bed to Chair: Moderate assistance    Balance:  Sitting: Intact; Without support  Sitting - Static: Good (unsupported) (unsupported while brushing teeth)  Sitting - Dynamic: Fair (occasional)  Standing: Impaired; With support  Standing - Static: Fair;Constant support  Standing - Dynamic : Poor;Constant support    ADL Intervention:  Feeding  Feeding Assistance: Set-up  Container Management: Set-up    Grooming  Grooming Assistance: Stand-by assistance  Position Performed: Seated edge of bed  Washing Face: Set-up  Brushing Teeth: Minimum assistance                   Upper Body [address] Gown: Contact guard assistance    Lower Body Dressing Assistance  Protective Undergarmet: Moderate assistance  Socks: Minimum assistance    Toileting  Toileting Assistance: Total assistance(dependent) (Pt with purwick and brief)    Cognitive Retraining  Attention to Task: Single task  Following Commands: Follows one step commands/directions  Safety/Judgement: Awareness of environment (Requires reminders of pacemaker precautions)  Cues: Tactile cues provided;Verbal cues provided    Therapeutic Exercises:   Encouraged pt to perform RUE exercises     Pain:  Pt reported 9/10 pain in L side, nurse notified and bringing medication    Activity Tolerance:   Good    After treatment patient left in no apparent distress:   Sitting in chair, Call bell within reach, Bed / chair alarm activated, and Caregiver / family present (daughter in law)    COMMUNICATION/COLLABORATION:   The patients plan of care was discussed with: Physical therapist and Registered nurse.      Bety Jay OT  Time Calculation: 24 mins

## 2022-11-09 NOTE — PROGRESS NOTES
Problem: Pressure Injury - Risk of  Goal: *Prevention of pressure injury  Description: Document Jon Scale and appropriate interventions in the flowsheet. Outcome: Progressing Towards Goal  Note: Pressure Injury Interventions:  Sensory Interventions: Assess changes in LOC    Moisture Interventions: Absorbent underpads    Activity Interventions: Increase time out of bed    Mobility Interventions: Assess need for specialty bed    Nutrition Interventions: Document food/fluid/supplement intake    Friction and Shear Interventions: Minimize layers                Problem: Patient Education: Go to Patient Education Activity  Goal: Patient/Family Education  Outcome: Progressing Towards Goal     Problem: Falls - Risk of  Goal: *Absence of Falls  Description: Document Sourav Fall Risk and appropriate interventions in the flowsheet.   Outcome: Progressing Towards Goal  Note: Fall Risk Interventions:  Mobility Interventions: Communicate number of staff needed for ambulation/transfer    Mentation Interventions: Door open when patient unattended    Medication Interventions: Evaluate medications/consider consulting pharmacy    Elimination Interventions: Call light in reach    History of Falls Interventions: Door open when patient unattended

## 2022-11-09 NOTE — PROGRESS NOTES
Bedside shift change report given to Ly Campbell (oncoming nurse) by Bayron Hernandez (offgoing nurse). Report included the following information SBAR, MAR, and Accordion.

## 2022-11-10 NOTE — PROGRESS NOTES
Bedside and Verbal shift change report given to Unruly RN (oncoming nurse) by Leighann Hyman RN (offgoing nurse). Report included the following information SBAR, Kardex, ED Summary, Procedure Summary, Accordion, Recent Results, Cardiac Rhythm SR, Alarm Parameters , and Dual Neuro Assessment.

## 2022-11-10 NOTE — PROGRESS NOTES
Transition of Care Plan  RUR- High 22%  DISPOSITION: IPR - [facility] -  pending labs  F/U with PCP/Specialist    Transport: [facility] 11/11 3pm    Emergency contact: Zora Melendez [phone]    AMINAH barriers to discharge: None    [facility] has a bed available for patient today. Per Dr. Nasima Bazzi, likely stable but labs are still pending. [facility] requested for 6pm. CM spoke with patient's son, Mitchell Walters and he prefers [facility] transport for patient. CM will follow. RN to call report #[phone].     4:30pm: Per Dr. Nasima Bazzi, holding DC until tomorrow. [facility] pushed to tomorrow at 3pm. [facility] notified. ADINA ClementsSMARIO.

## 2022-11-10 NOTE — PROGRESS NOTES
Problem: Pressure Injury - Risk of  Goal: *Prevention of pressure injury  Description: Document Jon Scale and appropriate interventions in the flowsheet. Outcome: Progressing Towards Goal  Note: Pressure Injury Interventions:  Sensory Interventions: Assess changes in LOC, Use 30-degree side-lying position    Moisture Interventions: Absorbent underpads    Activity Interventions: Increase time out of bed    Mobility Interventions: Assess need for specialty bed    Nutrition Interventions: Offer support with meals,snacks and hydration    Friction and Shear Interventions: Minimize layers                Problem: Patient Education: Go to Patient Education Activity  Goal: Patient/Family Education  Outcome: Progressing Towards Goal     Problem: Falls - Risk of  Goal: *Absence of Falls  Description: Document Sourav Fall Risk and appropriate interventions in the flowsheet. Outcome: Progressing Towards Goal  Note: Fall Risk Interventions:  Mobility Interventions: Bed/chair exit alarm    Mentation Interventions: Door open when patient unattended    Medication Interventions: Bed/chair exit alarm    Elimination Interventions: Bed/chair exit alarm    History of Falls Interventions: Door open when patient unattended         Problem: Patient Education: Go to Patient Education Activity  Goal: Patient/Family Education  Outcome: Progressing Towards Goal     Problem: Aspiration - Risk of  Goal: *Absence of aspiration  Outcome: Progressing Towards Goal     Problem: Patient Education: Go to Patient Education Activity  Goal: Patient/Family Education  Outcome: Progressing Towards Goal     Problem: Diabetes Self-Management  Goal: *Disease process and treatment process  Description: Define diabetes and identify own type of diabetes; list 3 options for treating diabetes.   Outcome: Progressing Towards Goal  Goal: *Incorporating nutritional management into lifestyle  Description: Describe effect of type, amount and timing of food on blood glucose; list 3 methods for planning meals. Outcome: Progressing Towards Goal  Goal: *Incorporating physical activity into lifestyle  Description: State effect of exercise on blood glucose levels. Outcome: Progressing Towards Goal  Goal: *Developing strategies to promote health/change behavior  Description: Define the ABC's of diabetes; identify appropriate screenings, schedule and personal plan for screenings. Outcome: Progressing Towards Goal  Goal: *Using medications safely  Description: State effect of diabetes medications on diabetes; name diabetes medication taking, action and side effects. Outcome: Progressing Towards Goal  Goal: *Monitoring blood glucose, interpreting and using results  Description: Identify recommended blood glucose targets  and personal targets. Outcome: Progressing Towards Goal  Goal: *Prevention, detection, treatment of acute complications  Description: List symptoms of hyper- and hypoglycemia; describe how to treat low blood sugar and actions for lowering  high blood glucose level. Outcome: Progressing Towards Goal  Goal: *Prevention, detection and treatment of chronic complications  Description: Define the natural course of diabetes and describe the relationship of blood glucose levels to long term complications of diabetes. Outcome: Progressing Towards Goal  Goal: *Developing strategies to address psychosocial issues  Description: Describe feelings about living with diabetes; identify support needed and support network  Outcome: Progressing Towards Goal  Goal: *Insulin pump training  Outcome: Progressing Towards Goal  Goal: *Sick day guidelines  Outcome: Progressing Towards Goal  Goal: *Patient Specific Goal (EDIT GOAL, INSERT TEXT)  Outcome: Progressing Towards Goal     Problem: Diabetes Self-Management  Goal: *Disease process and treatment process  Description: Define diabetes and identify own type of diabetes; list 3 options for treating diabetes.   Outcome: Progressing Towards Goal  Goal: *Incorporating nutritional management into lifestyle  Description: Describe effect of type, amount and timing of food on blood glucose; list 3 methods for planning meals. Outcome: Progressing Towards Goal  Goal: *Incorporating physical activity into lifestyle  Description: State effect of exercise on blood glucose levels. Outcome: Progressing Towards Goal  Goal: *Developing strategies to promote health/change behavior  Description: Define the ABC's of diabetes; identify appropriate screenings, schedule and personal plan for screenings. Outcome: Progressing Towards Goal  Goal: *Using medications safely  Description: State effect of diabetes medications on diabetes; name diabetes medication taking, action and side effects. Outcome: Progressing Towards Goal  Goal: *Monitoring blood glucose, interpreting and using results  Description: Identify recommended blood glucose targets  and personal targets. Outcome: Progressing Towards Goal  Goal: *Prevention, detection, treatment of acute complications  Description: List symptoms of hyper- and hypoglycemia; describe how to treat low blood sugar and actions for lowering  high blood glucose level. Outcome: Progressing Towards Goal  Goal: *Prevention, detection and treatment of chronic complications  Description: Define the natural course of diabetes and describe the relationship of blood glucose levels to long term complications of diabetes.   Outcome: Progressing Towards Goal  Goal: *Developing strategies to address psychosocial issues  Description: Describe feelings about living with diabetes; identify support needed and support network  Outcome: Progressing Towards Goal  Goal: *Insulin pump training  Outcome: Progressing Towards Goal  Goal: *Sick day guidelines  Outcome: Progressing Towards Goal  Goal: *Patient Specific Goal (EDIT GOAL, INSERT TEXT)  Outcome: Progressing Towards Goal

## 2022-11-10 NOTE — PROGRESS NOTES
[facility]  Hospitalist Group                                                                                          Hospitalist Progress Note  Gurvinder Qureshi MD  Answering service: [phone] from in house phone        Date of Service:  11/10/2022  NAME:  Abdiel Richmond  :  1940  MRN:  [id number]      Admission Summary:     Pt is a 81 y.o F w/ PMH as above admitted to [facility] on 10/31 for suspected CVA w/ finding of acute bl posterior ischemic CVA, now s/p thrombectomy. Pt w/ finding of acute HFrEF 10-15% as part of CVA workup. Cardiology consulted and S/p AICD placement . Pt w/ episodes of hypotension intermittently responsive to small IVF bolus. Complaint of abd pain, n/v, somnolent but protecting airway and rouses to voice and answer questions. Transferred pt to ICU for further management. Hemodynamically stable, and transferred out of unit on      Interval history / Subjective:   Continues to have pain at site of ICD. No bowel movement in 10 days, given suppository today. No CP or SOB. No other complaints.       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/p thrombectomy  Subtherapeutic Plavix response  -MRI brain with acute bilateral superior cerebral infarct and mild petechial hemorrhage  -A1c 13.1 LDL 71  -TTE with no intra-arterial septum seen  -P2Y12  response 222 unchanged from previous  -started on Eliquis, aspirin adjusted to 81 mg, lipitor   -Plavix DC per neurology due to subtherapeutic response  -CT scan of head on  no hemorrhage, no significant change of infarct  -PT OT speech  -continue Neurovascular checks  -SBP goal less than 140  -Neurologist on board  -Waxing and waning mental status, obtain repeat CT head     Acute on chronic anemia, etiology uncertain  - Noted to have downtrending Hgb, may  be related to blood draws and procedures, no obvious sign of bleeding   - Continue anticoagulation for now, but hold if significant drop or evidence of bleeding  - Obtain KUB, hemoccult, daily CBC      Acute on chronic systolic CHF with EF 10 to 15%  -s/p ICD placement 11/4 by cardiology   -Echocardiogram EF of 10 to 15%  -Hypotension improving, received albumin 11/7  -resume coreg,   -Lasix, Entresto Aldactone on hold  -continue I/O and daily weight monitoring  --Cardiology on board     Sepsis due to pneumonia   -on iv cefepime and fluconazole, to switch to oral on discharge  -pressor off   -recieved iv albumin x 1  -tachycardia improved, afebrile and no leukocytosis   -MRSA negative, d/c vancomycin 11/8  -blood cx 11/6 no growth     JERAMY likely due to intravascular volume depletion   -lasix on hold  -improved and creatinine normal  -avoid nephrotoxin   -monitor renal function      T2DM with hyperglycemia  -A1c 13.1  -Blood sugars remain labile  -Continue NPH, sliding scale, monitor finger stick glucose   -Diabetes management team is on board     Moderate protein calorie malnutrition   -BMI 18.68 kg/m². -nutritionist on board    Full Code : high risk for decompensation, consult to palliative care  team          Code status: Full Code  Prophylaxis: Eliquis  Care Plan discussed with: Patient, Nurse and CM  Anticipated Disposition: SNF > 48 hrs     Hospital Problems  Date Reviewed: 3/18/2022            Codes Class Noted POA    Stroke [facility]) ICD-10-CM: I63.9  ICD-9-CM: 434.91  10/31/2022 Unknown         Vital Signs:    Last 24hrs VS reviewed since prior progress note.  Most recent are:  Visit Vitals  BP (!) 143/76 (BP 1 Location: Left upper arm, BP Patient Position: At rest)   Pulse 87   Temp 98 °F (36.7 °C)   Resp 28   Ht 5' 6\" (1.676 m)   Wt 58 kg (127 lb 13.9 oz)   SpO2 98%   BMI 20.64 kg/m²         Intake/Output Summary (Last 24 hours) at 11/10/2022 1924  Last data filed at 11/10/2022 1838  Gross per 24 hour   Intake --   Output 300 ml   Net -300 ml        Physical Examination:     I had a face to face encounter with this patient and independently examined them on 11/10/2022 as outlined below:          Constitutional:  No acute distress, cooperative, pleasant    ENT:  Oral mucosa moist, oropharynx benign. Resp:  Decrease bronchial breath sound bilaterally. No wheezing/rhonchi/rales. No accessory muscle use. CV:  Regular rhythm, normal rate, no murmurs, gallops, rubs    GI:  Soft, non distended, non tender. normoactive bowel sounds, no hepatosplenomegaly     Musculoskeletal:  No edema,      Neurologic:  Conscious and alert, oriented to place and person, moves all extremities. CN II-XII reviewed            Data Review:    Review and/or order of clinical lab test  Review and/or order of tests in the radiology section of CPT  Review and/or order of tests in the medicine section of CPT      Labs:     Recent Labs     11/10/22  0222 11/09/22  0451   WBC 5.6 6.2   HGB 7.5* 7.9*   HCT 23.5* 24.3*   * 154     Recent Labs     11/10/22  1528 11/10/22  0222 11/09/22  0451    138 142   K 4.2 5.3* 4.4   * 109* 111*   CO2 25 23 28   BUN 25* 28* 30*   CREA 0.88 0.95 0.87   GLU 99 176* 98   CA 8.9 8.3* 8.4*     Recent Labs     11/10/22  0222 11/09/22  0451 11/08/22  0220   ALT 10* 9* 8*   AP 96 94 83   TBILI 0.5 0.5 0.5   TP 5.6* 5.5* 5.7*   ALB 2.5* 2.5* 2.5*   GLOB 3.1 3.0 3.2     No results for input(s): INR, PTP, APTT, INREXT, INREXT in the last 72 hours. No results for input(s): FE, TIBC, PSAT, FERR in the last 72 hours. No results found for: FOL, RBCF   No results for input(s): PH, PCO2, PO2 in the last 72 hours. No results for input(s): CPK, CKNDX, TROIQ in the last 72 hours.     No lab exists for component: CPKMB  Lab Results   Component Value Date/Time    Cholesterol, total 146 11/01/2022 03:30 AM    HDL Cholesterol 69 11/01/2022 03:30 AM    LDL, calculated 71 11/01/2022 03:30 AM    Triglyceride 30 11/01/2022 03:30 AM    CHOL/HDL Ratio 2.1 11/01/2022 03:30 AM     Lab Results   Component Value Date/Time    Glucose (POC) 84 11/10/2022 04:48 PM    Glucose (POC) 84 11/10/2022 04:45 PM    Glucose (POC) 120 (H) 11/10/2022 12:54 PM    Glucose (POC) 167 (H) 11/10/2022 08:25 AM    Glucose (POC) 89 11/09/2022 10:02 PM     Lab Results   Component Value Date/Time    Color YELLOW/STRAW 11/06/2022 06:43 PM    Appearance CLEAR 11/06/2022 06:43 PM    Specific gravity 1.025 11/06/2022 06:43 PM    Specific gravity 1.029 10/31/2022 12:54 PM    pH (UA) 5.5 11/06/2022 06:43 PM    Protein Negative 11/06/2022 06:43 PM    Glucose 100 (A) 11/06/2022 06:43 PM    Ketone TRACE (A) 11/06/2022 06:43 PM    Bilirubin Negative 11/06/2022 06:43 PM    Urobilinogen 0.2 11/06/2022 06:43 PM    Nitrites Negative 11/06/2022 06:43 PM    Leukocyte Esterase MODERATE (A) 11/06/2022 06:43 PM    Epithelial cells FEW 11/06/2022 06:43 PM    Bacteria Negative 11/06/2022 06:43 PM    WBC 10-20 11/06/2022 06:43 PM    RBC 0-5 11/06/2022 06:43 PM         Medications Reviewed:     Current Facility-Administered Medications   Medication Dose Route Frequency    polyethylene glycol (MIRALAX) packet 17 g  17 g Oral BID    insulin NPH (NOVOLIN N, HUMULIN N) injection 4 Units  4 Units SubCUTAneous QHS    HYDROcodone-acetaminophen (NORCO) 5-325 mg per tablet 1 Tablet  1 Tablet Oral Q6H PRN    insulin NPH (NOVOLIN N, HUMULIN N) injection 8 Units  8 Units SubCUTAneous QAM    insulin lispro (HUMALOG) injection   SubCUTAneous AC&HS    dextrose 10 % infusion 0-250 mL  0-250 mL IntraVENous PRN    fluconazole (DIFLUCAN) tablet 200 mg  200 mg Oral DAILY    apixaban (ELIQUIS) tablet 2.5 mg  2.5 mg Oral BID    aspirin chewable tablet 81 mg  81 mg Oral DAILY    hydrALAZINE (APRESOLINE) 20 mg/mL injection 10 mg  10 mg IntraVENous Q6H PRN    cefepime (MAXIPIME) 2 g in 0.9% sodium chloride (MBP/ADV) 100 mL MBP  2 g IntraVENous Q12H    sodium chloride (NS) flush 5-40 mL  5-40 mL IntraVENous Q8H    sodium chloride (NS) flush 5-40 mL  5-40 mL IntraVENous PRN    [Held by provider] carvediloL (COREG) tablet 6.25 mg  6.25 mg Oral BID WITH MEALS    pantoprazole (PROTONIX) tablet 40 mg  40 mg Oral ACB    [Held by provider] traMADoL (ULTRAM) tablet 50 mg  50 mg Oral Q6H PRN    dextrose 10 % infusion 0-250 mL  0-250 mL IntraVENous PRN    insulin lispro (HUMALOG) injection 3 Units  3 Units SubCUTAneous TID WITH MEALS    senna-docusate (PERICOLACE) 8.6-50 mg per tablet 1 Tablet  1 Tablet Oral DAILY    lidocaine 4 % patch 2 Patch  2 Patch TransDERmal Q24H    atorvastatin (LIPITOR) tablet 10 mg  10 mg Oral QHS    ondansetron (ZOFRAN) injection 4 mg  4 mg IntraVENous Q4H PRN    acetaminophen (TYLENOL) tablet 650 mg  650 mg Oral Q4H PRN    Or    acetaminophen (TYLENOL) solution 650 mg  650 mg Per NG tube Q4H PRN    Or    acetaminophen (TYLENOL) suppository 650 mg  650 mg Rectal Q4H PRN    sodium chloride (NS) flush 5-40 mL  5-40 mL IntraVENous Q8H    sodium chloride (NS) flush 5-40 mL  5-40 mL IntraVENous PRN    glucose chewable tablet 16 g  4 Tablet Oral PRN    glucagon (GLUCAGEN) injection 1 mg  1 mg IntraMUSCular PRN    dextrose 10 % infusion 0-250 mL  0-250 mL IntraVENous PRN     ______________________________________________________________________  EXPECTED LENGTH OF STAY: 4d 2h  ACTUAL LENGTH OF STAY:          10                 Nick Brand MD

## 2022-11-10 NOTE — PROGRESS NOTES
Comprehensive Nutrition Assessment    Type and Reason for Visit: Reassess    Nutrition Recommendations/Plan:   Continue ONS TID. ? enema/stronger bowel regimen. Pt without BM x 10 days. Malnutrition Assessment:  Malnutrition Status:  Severe malnutrition (11/03/22 1105)    Context:  Chronic illness     Findings of the 6 clinical characteristics of malnutrition:   Energy Intake:  Mild decrease in energy intake (specify)  Weight Loss:  20% over 1 year     Body Fat Loss:  Severe body fat loss, Buccal region, Triceps   Muscle Mass Loss:  Severe muscle mass loss, Clavicles (pectoralis &deltoids)  Fluid Accumulation:  No significant fluid accumulation,     Strength:  Not performed        Nutrition Assessment:    Past Medical History:   Diagnosis Date    Colon polyps     Diabetes ([address]     Diverticulosis     Hypertension     Ill-defined condition     Pancreatitis    Pt transferred to [facility] from [facility] for thrombectomy 11/1.    11/10:  Pt seen for follow-up. She was sitting up in bed, but reports feeling sleepy. Breakfast tray with some bites of pancakes taken, nothing else eaten. I offered to re-heat for her, but she says she \"will work on that later. \" [employer] again the need for a bowel movement - still nothing recorded since PTA. Encouraged PO/ONS intakes. Awaiting IPR placement. New bedscale wt taken 127# - suspect not accurate as would indicate 34# wt gain. 11/3: Nutrition review completed for pt d/t low body wt/BMI. Pt reports poor oral intake with some N/V, stomach feeling uneasy over the last month. She reports UBW as 155# prior to \"getting sick\" a few years back. She exhibits severe fat and muscle wasting. She does drink Ensure at home and agreeable to drink while admitted, consumed about 50% of her shake this morning. Tolerating easy to chew diet. Does state some difficulties moving bowels from time to time, on pericolace now but has not had a BM since admission.  Does not check blood sugar levels at home d/t financial reasons - spoke with CM to pass on to IPR, as pending IPR placement. Nutritionally Significant Medications:  Novolin, Humalog, Miralax, Pericolace, Protonix, IV Cefepime; PRN Zofran    Estimated Daily Nutrient Needs:  Energy Requirements Based On: Kcal/kg  Weight Used for Energy Requirements: Current  Energy (kcal/day): 4823-2255 (28-30 kcal/kg)  Weight Used for Protein Requirements: Current  Protein (g/day): 55 (1.1 g/kg)  Method Used for Fluid Requirements: 1 ml/kcal  Fluid (ml/day): 1500    Nutrition Related Findings:   Edema: No data recorded    Last BM:  (PTA),      Wounds: None      Current Nutrition Therapies:  Diet: Easy to chew  Supplements: Ensure HP with meals  Meal intake: Patient Vitals for the past 168 hrs:   % Diet Eaten   11/07/22 0800 1 - 25%     Supplement intake: No data found. Nutrition Support: N/A      Anthropometric Measures:  Height: 5' 6\" (167.6 cm)  Ideal Body Weight (IBW): 130 lbs (59 kg)     Current Body Wt:  49.8 kg (109 lb 12.6 oz), 84.5 % IBW.  Bed scale  Current BMI (kg/m2): 17.7  Usual Body Weight: 68 kg (150 lb)  % Weight Change (Calculated): -26.8  Weight Adjustment: No adjustment                 BMI Category: Underweight (BMI less than 22) age over 72    Wt Readings from Last 10 Encounters:   11/10/22 58 kg (127 lb 13.9 oz)   10/31/22 42.4 kg (93 lb 8 oz)   05/10/22 53.1 kg (117 lb)   03/21/22 56.7 kg (125 lb)   02/23/22 59.7 kg (131 lb 9.8 oz)   11/19/21 62.6 kg (138 lb)   10/22/21 63.5 kg (140 lb)   07/08/21 65.3 kg (143 lb 15.4 oz)   05/05/21 67.5 kg (148 lb 13 oz)   05/05/21 67.5 kg (148 lb 13 oz)           Nutrition Diagnosis:   Unintended weight loss related to early satiety, inadequate protein-energy intake as evidenced by BMI, weight loss    Nutrition Interventions:   Food and/or Nutrient Delivery: Continue oral nutrition supplement, Continue current diet  Nutrition Education/Counseling: Education not indicated  Coordination of Nutrition Care: Continue to monitor while inpatient  Plan of Care discussed with: CM    Goals:  Previous Goal Met: Progressing toward goal(s)  Goals: PO intake 50% or greater, by next RD assessment       Nutrition Monitoring and Evaluation:   Behavioral-Environmental Outcomes: None identified  Food/Nutrient Intake Outcomes: Food and nutrient intake, Diet advancement/tolerance, Supplement intake  Physical Signs/Symptoms Outcomes: GI status, Constipation, Biochemical data, Meal time behavior    Discharge Planning:    Continue oral nutrition supplement, Continue current diet    Gilberto Paz RD  Available via [employer] or ext [phone]

## 2022-11-10 NOTE — PROGRESS NOTES
Bedside and Verbal shift change report given to Poornima Grove RN (oncoming nurse) by Roshni RN/Student  (offgoing nurse). Report included the following information SBAR, Kardex, ED Summary, Procedure Summary, Recent Results, Cardiac Rhythm SR, and Dual Neuro Assessment.

## 2022-11-10 NOTE — PROGRESS NOTES
Bedside and Verbal shift change report given to Amie Cabrera [facility] student nurse, Zee Ayoub (oncoming nurse) by Krishna Caceres RN (offgoing nurse). Report included the following information SBAR, Kardex, Intake/Output, MAR, Recent Results, Cardiac Rhythm NSR, and Alarm Parameters .

## 2022-11-11 NOTE — PROGRESS NOTES
Physical Therapy  (Delayed entry)    Chart reviewed. Pt shouting out and PT arrived to room for assistance. Noted pt and partner in argument upon arrival. Pt grasping partner's shirt and partner pulling away, stumbling backwards into tray table, no injury. PT attempted to pacify situation by speaking with partner in hallway (pt continued to shout obscenities at her). Stressed desire to make sure she and pt are safe, and recommended some physical distance after listening to her concerns regarding pt's status and medical care. RN called and notified of situation. Spoke with OT and updated her on pt's state and continued inappropriateness for therapy services. Will D/C PT at this time and please re-consult if this improves.     Raeann Harrington, PT, DPT

## 2022-11-11 NOTE — PROGRESS NOTES
Occupational Therapy: defer    Chart reviewed, consulted with RN, attempted OT treatment session. Patient declined activity at this time despite encouragement and options for session, citing nausea, abdominal pain, and fatigue. Will defer OT and will follow-up as able and appropriate.     Keaton Gomez, OTR/L

## 2022-11-11 NOTE — PROGRESS NOTES
Problem: Mobility Impaired (Adult and Pediatric)  Goal: *Acute Goals and Plan of Care (Insert Text)  Description:   FUNCTIONAL STATUS PRIOR TO ADMISSION: Patient was modified independent using a single point cane for functional mobility. HOME SUPPORT PRIOR TO ADMISSION: The patient lived alone with several family members living nearby (son next  door). Physical Therapy Goals  Updated 11/11/2022  1. Patient will move from supine to sit and sit to supine  in bed with supervision/set-up within 7 day(s). 2.  Patient will transfer from bed to chair and chair to bed with minimal assistance/contact guard assist using the least restrictive device within 7 day(s). 3.  Patient will perform sit to stand with minimal assistance/contact guard assist within 7 day(s). 4.  Patient will ambulate with moderate assistance  x2 for 15 feet with the least restrictive device within 7 day(s). 5.  Patient will recall 3/3 PPM precautions and demonstrate adherence with mobility with min cues within 7 days. Physical Therapy Goals  Updated s/p ICD placement 11/4/2022  1. Patient will move from supine to sit and sit to supine , scoot up and down, and roll side to side in bed with modified independence within 7 day(s). 2.  Patient will transfer from bed to chair and chair to bed with min assistance using the least restrictive device within 7 day(s). 3.  Patient will perform sit to stand with min assistance within 7 day(s). 4.  Patient will ambulate with minimal assistance/contact guard assist for 50 feet with the least restrictive device within 7 day(s). 5.  Patient will ascend/descend 4 stairs with one handrail(s) with minimal assistance/contact guard assist within 7 day(s). 6.  Patient will improve Black Balance score by 7 points within 7 days. Initiated 11/1/2022  1.   Patient will move from supine to sit and sit to supine , scoot up and down, and roll side to side in bed with modified independence within 7 day(s). 2.  Patient will transfer from bed to chair and chair to bed with modified independence using the least restrictive device within 7 day(s). 3.  Patient will perform sit to stand with supervision/set-up within 7 day(s). 4.  Patient will ambulate with minimal assistance/contact guard assist for 100 feet with the least restrictive device within 7 day(s). 5.  Patient will ascend/descend 4 stairs with one handrail(s) with minimal assistance/contact guard assist within 7 day(s). 6.  Patient will improve Black Balance score by 7 points within 7 days. Outcome: Progressing Towards Goal   PHYSICAL THERAPY TREATMENT: WEEKLY REASSESSMENT  Patient: Alisa Ambrosio (40 y.o. female)  Date: 11/11/2022  Primary Diagnosis: Stroke ([facility] [address] [I63.9]  Procedure(s) (LRB):  INSERT ICD SINGLE (N/A) 7 Days Post-Op   Precautions:   Fall, Skin (SBP<140)      ASSESSMENT  Pt seen following RN clearance. OT co-tx 2/2 increased skilled needs anticipated. Pt with supportive granddaughter bedside and encouraging-pt agreeable. Patient continues with skilled PT services and is progressing limitedly towards goals. Session events aborted during toileting this date due to suspected near syncopal episode: pt completed bed mobility, sitting EOB (VSS for orthostatics-elevated with sitting even), then brief steps to [facility] on R with HHA x2; during +BM pt presented with decreased attention/responses/interaction and head lulled back. Decision made for dependent pivot back to bed with assist x2 and BP assessed (unable to read at that time). Pt placed in trendelenburg and BP stable in 120's. Pt with gradually increased communication and mentation. Rn called in to assess pt for acute needs, VSS. Pt dependently cleaned and positioned. Pt planned for IPR though today's events are concerning for participation in therapy setting.  Of note: pt with newly diagnosed hemothorax and pulmonary following-recommend cardio back on case for decision making (pt does not receive blood products and will need Hgb monitored closely). SpO2 stable on RA during session events. From functional mobility standpoint: pt with good active AROM though significant incoordination/imbalance and generally decreased strength with c/o L sided-abd pain and PPM site pain. She is well-below her baseline status and will benefit from rehab at d/c. Patient's progression toward goals since last assessment: limited due to medical complexity    Current Level of Function Impacting Discharge (mobility/balance): upwards total A with near syncope today    Functional Outcome Measure: The patient scored 3 on the BBS outcome measure which is indicative of high fall risk (improvement from initial eval). Other factors to consider for discharge: medically complex         PLAN :  Goals have been updated based on progression since last assessment. Patient continues to benefit from skilled intervention to address the above impairments. Recommendations and Planned Interventions: bed mobility training, transfer training, gait training, and therapeutic exercises      Frequency/Duration: Patient will be followed by physical therapy:  5 times a week to address goals. Recommendation for discharge: (in order for the patient to meet his/her long term goals)  Therapy 3 hours per day 5-7 days per week     This discharge recommendation:  Has been made in collaboration with the attending provider and/or case management    IF patient discharges home will need the following DME: to be determined (TBD)         SUBJECTIVE:   Patient stated I feel nauseous. .. Some improvement after emesis.     OBJECTIVE DATA SUMMARY:   HISTORY:    Past Medical History:   Diagnosis Date    Colon polyps     Diabetes ([address]     Diverticulosis     Hypertension     Ill-defined condition     Pancreatitis      Past Surgical History:   Procedure Laterality Date    COLONOSCOPY N/A 4/29/2021    COLONOSCOPY performed by Sriram Antoine MD at [facility] ENDOSCOPY    HX CORONARY STENT PLACEMENT  2021    x2    IR [facility] THROMB INFUSION INTRACRANIAL  10/31/2022       Personal factors and/or comorbidities impacting plan of care: as above    Home Situation  Home Environment: Private residence  # Steps to Enter: 4  Rails to Enter: Yes  Hand Rails : Left  One/Two Story Residence: One story  Living Alone: Yes (Son next door)  Support Systems: Child(francisco)  Patient Expects to be Discharged to[de-identified] Rehab hospital/unit acute  Current DME Used/Available at Home: Cane, straight  Tub or Shower Type: Tub/Shower combination    EXAMINATION/PRESENTATION/DECISION MAKING:   Critical Behavior:  Neurologic State: Alert  Orientation Level: Oriented X4  Cognition: Follows commands  Safety/Judgement: Awareness of environment (Requires reminders of pacemaker precautions)  Hearing: Auditory  Auditory Impairment: None  Functional Mobility:  Bed Mobility:  Supine to Sit: Additional time;Bed Modified;Minimum assistance (cues for LUE disuse/manual support for compliance and cues for sequencing/pushing with RUE for mobility)  Sit to Supine: Total assistance;Assist x2 (pt with questionable near syncopal episode on [facility])  Scooting: Minimum assistance; Total assistance;Assist x2 (Johnnie in scooting to EOB/sitting; total A for boost to [facility] post episode)  Transfers:  Sit to Stand: Moderate assistance;Assist x2  Stand to sit: mod A and cues not to use LUE  Balance:   Sitting: Impaired  Sitting - Static: Fair (occasional)  Sitting - Dynamic: Poor (constant support); Fair (occasional) (posterior lean with near syncopal episode on [facility])  Standing: Impaired; With support  Standing - Static: Poor;Constant support  Standing - Dynamic : Poor;Constant support  Ambulation/Gait Training:   Brief shuffling steps from EOB to [facility] on R with poor motor planning/sequencing/coordination; B HHA for balance and cues for postural extension; decreased foot clearance      Functional Measure:  Black Balance Test:    Sitting to Standin  Standing Unsupported: 0  Sitting with Back Unsupported: 3  Standing to Sittin  Transfers: 0  Standing Unsupported with Eyes Closed: 0  Standing Unsupported with Feet Together: 0  Reach Forward with Outstretched Arm: 0   Object: 0  Turn to Look Over Shoulders: 0  Turn 360 Degrees: 0  Alternate Foot on Step/Stool: 0  Standing Unsupported One Foot in Front: 0  Stand on One Le  Total: 3         56=Maximum possible score;   0-20=High fall risk  21-40=Moderate fall risk   41-56=Low fall risk               Pain Rating:  VAS not provided    Activity Tolerance:   Poor, SpO2 stable on RA, and signs and symptoms of orthostatic hypotension    After treatment patient left in no apparent distress:   Supine in bed, Call bell within reach, Caregiver / family present, and RN present for needs    COMMUNICATION/EDUCATION:   The patients plan of care was discussed with: Occupational therapist and Registered nurse. Fall prevention education was provided and the patient/caregiver indicated understanding., Patient/family have participated as able in goal setting and plan of care. , and Patient/family agree to work toward stated goals and plan of care.     Thank you for this referral.  Katie Machado   Time Calculation: 25 mins

## 2022-11-11 NOTE — CONSULTS
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 As she cannot get blood products. Draining the effusion at  this point could cause her HgB and HD stability to drop considerably. However if the hemothorax is left for to long it could become complex. I am afraid she will likely end up needing a VATS in the future. Unless she has a significant rally, would continue to monitor. Would monitor throughout the weekend HgB, HD, and chest imaging  Suggest getting cards back involved  Discussed with hospitalist      Daily Progression:    Consult Note requested by hospitalist    Patient presented for admission 10/31 for CVA; was taken to angiogram for thrombectomy; then monitored in ICU until . Transferred out of ICU monitoring on . ICD was placed on  by Dr Rohini Swartz for CM. 22 chest x-ray post procedure did not show any pleural effusions nor PTX. Chest x-ray on 22 showed a new moderate to large left pleural effusion. By CT abdomen today showed a larger left pleural effusion with increased HUs suggestive of hemothorax. She has been having increasing left sided chest pains since that time. HgB has trended down since then. Eliquis was restarted on 22. Currently HD stable, though BP a little soft. On room air. No acute dyspnea. She was on plavix and eliquis as outpatient.     22 ECHO  Result status: Final result       Left Ventricle: Severely reduced left ventricular systolic function with a visually estimated EF of 10 -15%. Left ventricle is moderately dilated. Mildly increased wall thickness. Severe global hypokinesis present. Abnormal diastolic function. Aortic Valve: Tricuspid valve. Mild regurgitation. Mitral Valve: Mild to moderate regurgitation. Interatrial Septum: Agitated saline study was negative with and without provocation. I have reviewed the labs and previous days notes. A comprehensive review of systems was negative. Past Medical History:   Diagnosis Date    Colon polyps     Diabetes ([address]     Diverticulosis     Hypertension     Ill-defined condition     Pancreatitis       Past Surgical History:   Procedure Laterality Date    COLONOSCOPY N/A 4/29/2021    COLONOSCOPY performed by Zora Alarcon MD at [facility] [place]  2021    x2    IR PER[facility] THROMB INFUSION INTRACRANIAL  10/31/2022      Prior to Admission medications    Medication Sig Start Date End Date Taking? Authorizing Provider   sacubitril/valsartan (ENTRESTO PO) Take  by mouth. Yes Provider, Historical   glipiZIDE (GLUCOTROL) 5 mg tablet Take 2 Tablets by mouth Daily (before breakfast). 3/22/22  Yes Keith Buck PA-C   apixaban (ELIQUIS) 2.5 mg tablet Take 2.5 mg by mouth daily. Yes Provider, Historical   spironolactone (ALDACTONE) 25 mg tablet Take 25 mg by mouth daily. Yes Provider, Historical   acetaminophen (TYLENOL) 650 mg TbER Take 650 mg by mouth every eight (8) hours as needed for Pain. Indications: pain   Yes Provider, Historical   furosemide (LASIX) 40 mg tablet Take 1 Tablet by mouth daily. 11/24/21  Yes ReasonerPankaj PA-C   pantoprazole (PROTONIX) 40 mg tablet Take 1 Tablet by mouth Daily (before breakfast). 11/25/21  Yes Reasoner, Pankaj Lawson PA-C   clopidogreL (PLAVIX) 75 mg tab Take 1 Tablet by mouth daily. 10/30/21  Yes Sidney Marks MD   carvediloL (COREG) 3.125 mg tablet Take 1 Tablet by mouth two (2) times daily (with meals).  10/29/21  Yes Carla Montez MD     Allergies   Allergen Reactions    Insulins Itching    Penicillins Other (comments)     Pt states it makes her pass out      Social History     Tobacco Use    Smoking status: Never    Smokeless tobacco: Never   Substance Use Topics    Alcohol use: Not Currently      Family History   Problem Relation Age of Onset    Diabetes Other      OBJECTIVE:     Vital Signs:     Visit Vitals  BP (!) 100/58   Pulse 94   Temp 98.7 °F (37.1 °C)   Resp 16   Ht 5' 6\" (1.676 m)   Wt 60.8 kg (134 lb 0.6 oz)   SpO2 97%   BMI 21.63 kg/m²      Temp (24hrs), Av.1 °F (36.7 °C), Min:97.6 °F (36.4 °C), Max:98.9 °F (37.2 °C)     Intake/Output:     Last shift: No intake/output data recorded.     Last 3 shifts:  1901 -  0700  In: 100 [I.V.:100]  Out: 500 [Urine:500]        Intake/Output Summary (Last 24 hours) at 2022 1442  Last data filed at 2022 0603  Gross per 24 hour   Intake 100 ml   Output 500 ml   Net -400 ml       Physical Exam:                                        Exam Findings Other   General: No resp distress noted, appears stated age    [de-identified]:  No ulcers, JVD not elevated, no cervical LAD    Chest: No pectus deformity, normal chest rise b/l    HEART:  RRR, no murmurs/rubs/gallops    Lungs:  CTA b/l, no rhonchi/crackles/wheeze, diminished BS at bases    ABD: Soft/NT, non rigid mildly distended    EXT: No cyanosis/clubbing/edema, normal peripheral pulses    Skin: No rashes or ulcers, no mottling    Neuro: A/O x 3        Medications:  Current Facility-Administered Medications   Medication Dose Route Frequency    polyethylene glycol (MIRALAX) packet 17 g  17 g Oral BID    insulin NPH (NOVOLIN N, HUMULIN N) injection 4 Units  4 Units SubCUTAneous QHS    HYDROcodone-acetaminophen (NORCO) 5-325 mg per tablet 1 Tablet  1 Tablet Oral Q6H PRN    insulin NPH (NOVOLIN N, HUMULIN N) injection 8 Units  8 Units SubCUTAneous QAM    insulin lispro (HUMALOG) injection   SubCUTAneous AC&HS    dextrose 10 % infusion 0-250 mL  0-250 mL IntraVENous PRN    fluconazole (DIFLUCAN) tablet 200 mg  200 mg Oral DAILY    [Held by provider] apixaban (ELIQUIS) tablet 2.5 mg  2.5 mg Oral BID    aspirin chewable tablet 81 mg  81 mg Oral DAILY    hydrALAZINE (APRESOLINE) 20 mg/mL injection 10 mg  10 mg IntraVENous Q6H PRN    cefepime (MAXIPIME) 2 g in 0.9% sodium chloride (MBP/ADV) 100 mL MBP  2 g IntraVENous Q12H    sodium chloride (NS) flush 5-40 mL  5-40 mL IntraVENous Q8H    sodium chloride (NS) flush 5-40 mL  5-40 mL IntraVENous PRN    [Held by provider] carvediloL (COREG) tablet 6.25 mg  6.25 mg Oral BID WITH MEALS    pantoprazole (PROTONIX) tablet 40 mg  40 mg Oral ACB    [Held by provider] traMADoL (ULTRAM) tablet 50 mg  50 mg Oral Q6H PRN    dextrose 10 % infusion 0-250 mL  0-250 mL IntraVENous PRN    senna-docusate (PERICOLACE) 8.6-50 mg per tablet 1 Tablet  1 Tablet Oral DAILY    lidocaine 4 % patch 2 Patch  2 Patch TransDERmal Q24H    atorvastatin (LIPITOR) tablet 10 mg  10 mg Oral QHS    ondansetron (ZOFRAN) injection 4 mg  4 mg IntraVENous Q4H PRN    acetaminophen (TYLENOL) tablet 650 mg  650 mg Oral Q4H PRN    Or    acetaminophen (TYLENOL) solution 650 mg  650 mg Per NG tube Q4H PRN    Or    acetaminophen (TYLENOL) suppository 650 mg  650 mg Rectal Q4H PRN    sodium chloride (NS) flush 5-40 mL  5-40 mL IntraVENous Q8H    sodium chloride (NS) flush 5-40 mL  5-40 mL IntraVENous PRN    glucose chewable tablet 16 g  4 Tablet Oral PRN    glucagon (GLUCAGEN) injection 1 mg  1 mg IntraMUSCular PRN    dextrose 10 % infusion 0-250 mL  0-250 mL IntraVENous PRN       Labs:  ABG No results for input(s): PHI, PCO2I, PO2I, HCO3I, SO2I, FIO2I in the last 72 hours.      CBC Recent Labs     11/11/22  0707 11/10/22  0222 11/09/22  0451   WBC 7.1 5.6 6.2   HGB 7.5* 7.5* 7.9*   HCT 24.2* 23.5* 24.3*    135* 154   MCV 91.7 88.3 86.8   MCH 28.4 28.2 35.9        Metabolic  Panel Recent Labs     11/11/22  0707 11/10/22  1523 11/10/22  0222 11/09/22  0451    141 138 142   K 4.7 4.2 5.3* 4.4   * 109* 109* 111*   CO2 20* 25 23 28   * 99 176* 98   BUN 21* 25* 28* 30*   CREA 0.79 0.88 0.95 0.87   CA 8.7 8.9 8.3* 8.4*   ALB 2.5*  --  2.5* 2.5*   ALT 9*  --  10* 9*        Pertinent Labs                Earl Trotter PA-C  11/11/2022

## 2022-11-11 NOTE — DIABETES MGMT
[address] [facility]    CLINICAL NURSE SPECIALIST CONSULT     Initial Presentation   Thomas Cox is a 81 y.o. female who presented to [facility] ED with a new onset L-sided facial droop and weakness. 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 HX:   Past Medical History:   Diagnosis Date    Colon polyps     Diabetes ([facility]     Diverticulosis     Hypertension     Ill-defined condition     Pancreatitis     CVA    INITIAL DX:   Stroke ([facility] [I63.9]     Current Treatment     TX: Mechanical thrombectomy    Consulted by  Dmitriy Spencer MD  for advanced diabetes nursing assessment and care for:   [x] Inpatient management strategy  [x] Home management assessment    Hospital Course   Clinical progress has been uncomplicated. 10/31: Admission. Cerebral angiogram and mechanical thrombectomy, ICU for post-op care  11/1: MRI brain showing Bilateral cerebellar infarcts  11/2: Echo showed EF of 10 -15%. Left ventricle is moderately dilated. Mildly increased wall thickness. Transfer to acute care  11/4: [employer] single chamber ICD placement   11/6: Rapid response for hypotension, abd pain and lethargy. IVF. Albumin. ICU transfer and vasopressors started  11/7: Vasopressors off and transferred out of ICU  11/10: Waxing and waning mental status. CT head: Evolution of left cerebellar infarction as expected. No new superimposed hemorrhage. Acute anemia: KUB obtained: The bowel gas pattern is normal. Moderately levoconvexity distorts the abdominal cavity.   Diabetes History   Type 2 Diabetes: Diagnosed about 10 years ago  Ambulatory BG management provided by: Rosalie Salmon MD PCP     Diabetes-related Medical History    Neurological complications  Peripheral neuropathy  Microvascular disease  Nephropathy  Macrovascular disease  Cerebral vascular accident  Other associated conditions     HF    Diabetes Medication History  Key Antihyperglycemic Medications               glipiZIDE (GLUCOTROL) 5 mg tablet (Taking) Take 2 Tablets by mouth Daily (before breakfast). Sumanth Wei is a very poor historian. She struggles to tell me what medication she is taking daily for diabetes- does mention she forgets \"sometimes\". Diabetes self-management practices:   Eating pattern   I had a difficult time understanding her explanation of typical dietary intake    May have oatmeal for breakfast   Lunch/Dinner: Soup, pork chops, potatoes   Drinking: Water, juice, coffee  Physical activity pattern   Sedentary   Monitoring pattern   Has a glucometer, ran out of strips and lancets  Taking medications pattern  [x] In-consistent administration  [x] Affordable  Social determinants of health impacting diabetes self-management practices   Concerned that you need to know more about how to stay healthy with diabetes  Overall evaluation:    [x] Not achieving A1c target with drug therapy & self-care practices      Lives alone but next door to her son  Her son checks in with her most days  Does not drive  Subjective   I guess I am doing ok.      Objective   Physical exam  General Underweight frail appearing AA female in no acute distress/ill-appearing. Conversant and cooperative  Neuro  Alert, oriented   Vital Signs Visit Vitals  BP (!) 90/52   Pulse 93   Temp 97.6 °F (36.4 °C)   Resp 26   Ht 5' 6\" (1.676 m)   Wt 60.8 kg (134 lb 0.6 oz)   SpO2 96%   BMI 21.63 kg/m²     Skin  Warm and dry. Acanthosis noted along neckline. No lipohypertrophy or lipoatrophy noted at injection sites   Heart   Regular rate and rhythm.  No murmurs, rubs or gallops  Lungs  Clear to auscultation without rales or rhonchi  Extremities No foot wounds        Laboratory  Recent Labs     11/11/22  0707 11/10/22  1528 11/10/22  0222 11/09/22  0451   * 99 176* 98   AGAP 10 7 6 3*   WBC 7.1  --  5.6 6.2   CREA 0.79 0.88 0.95 0.87 AST 11*  --  16 7*   ALT 9*  --  10* 9*           Blood glucose pattern      Significant diabetes-related events over the past 24-72 hours  A1C 13.1%  Fasting B  Pre-prandial:   Basal: 8 units NPH AM, 4 units pm- evening dose held overnight   Bolus: 3 units with meals- several doses held with poor PO intake  Correction: 0 units in the last 24h  GFR 56  EF 10-15%  PNA on antibiotics     Assessment and Plan   Nursing Diagnosis Risk for unstable blood glucose pattern   Nursing Intervention Domain 5254 Decision-making Support   Nursing Interventions Examined current inpatient diabetes/blood glucose control   Explored factors facilitating and impeding inpatient management  Explored corrective strategies with patient and responsible inpatient provider   Informed patient of rational for insulin strategy while hospitalized     Evaluation   Hermelinda Silva is an 81 y.o. 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 A1C on admission is significantly elevated at 13.1%. BG on admission was 444. She had very poor po intake POD 2 but as oral intake improved, -311. Moderate dose basal and low dose bolus insulin were than started. Bolus humalog held following ICD placement. Now eating and can resume. Please strive for optimal glucose control in the setting of CVA- 140-180mg/dl. Based on A1C on admission of 13.1% and BG of 444, patient likely needs attention from who is available to assist in her home diabetes management. Recommendations   POC glucose ACHS    Consistent carbohydrate diet (60 grams CHO/meal)    3.  Adjust the subcutaneous Insulin Order set (9019)  Insulin Dosing Specific recommendation   Basal                                       (Based on weight, BMI & GFR) 8 units NPH AM  4 units NPH PM  Allergy listed for Lantus (itching) Corrective                                       (Useful in adjusting insulin dosing) High sensitivity ACHS         4. D/C Bolus Humalog  Billing Code(s)   [x] 84230     Before making these care recommendations, I personally reviewed the hospitalization record, including notes, laboratory & diagnostic data and current medications, and examined the patient at the bedside (circumstances permitting) before making care recommendations. More than fifty (50) percent of the time was spent in patient counseling and/or care coordination.   Total minutes: 13      Paola Finn, YISSEL BC-ADM  Board Certified Advanced Diabetes Manager  Clinical Nurse Specialist  [facility]  Access via [facility]

## 2022-11-11 NOTE — PROGRESS NOTES
Transition of Care Plan  RUR- High 21%  DISPOSITION: IPR - [facility] -  pending medical stability  F/U with PCP/Specialist    Transport: [facility] on will call    Emergency contact: Heather Ovens [phone]    AMINAH barriers to discharge: None    [facility] has a bed available for patient today. [facility] rscheduled for 3pm. CM spoke with patient's son 11/10, Haleigh Campbell and he prefers [facility] transport for patient. RN to call report #[phone]. Medicare pt has received, reviewed, and signed 2nd IM letter informing them of their right to appeal the discharge. Signed copy has been placed on pt bedside chart.    1:53pm: Per Dr. Dianne Christine, patient not stable for discharge. Likely here through weekend. [facility] placed back on will call. [facility] informed. [place] Last) TODD Cook.

## 2022-11-11 NOTE — PROGRESS NOTES
Problem: Pressure Injury - Risk of  Goal: *Prevention of pressure injury  Description: Document Jon Scale and appropriate interventions in the flowsheet. Outcome: Progressing Towards Goal  Note: Pressure Injury Interventions:  Sensory Interventions: Assess changes in LOC, Avoid rigorous massage over bony prominences, Check visual cues for pain, Float heels, Keep linens dry and wrinkle-free, Pad between skin to skin, Minimize linen layers    Moisture Interventions: Absorbent underpads, Assess need for specialty bed, Limit adult briefs, Internal/External urinary devices, Maintain skin hydration (lotion/cream)    Activity Interventions: Increase time out of bed, Pressure redistribution bed/mattress(bed type), PT/OT evaluation, Assess need for specialty bed    Mobility Interventions: Assess need for specialty bed, Float heels, Pressure redistribution bed/mattress (bed type), PT/OT evaluation, Turn and reposition approx. every two hours(pillow and wedges)    Nutrition Interventions: Document food/fluid/supplement intake, Offer support with meals,snacks and hydration    Friction and Shear Interventions: Apply protective barrier, creams and emollients, Lift sheet, Minimize layers                Problem: Patient Education: Go to Patient Education Activity  Goal: Patient/Family Education  Outcome: Progressing Towards Goal     Problem: Falls - Risk of  Goal: *Absence of Falls  Description: Document Sourav Fall Risk and appropriate interventions in the flowsheet.   Outcome: Progressing Towards Goal  Note: Fall Risk Interventions:  Mobility Interventions: Bed/chair exit alarm, Patient to call before getting OOB, Communicate number of staff needed for ambulation/transfer, Strengthening exercises (ROM-active/passive)    Mentation Interventions: Adequate sleep, hydration, pain control, Bed/chair exit alarm, Evaluate medications/consider consulting pharmacy, Update white board, Toileting rounds, Reorient patient, More frequent rounding, Increase mobility    Medication Interventions: Bed/chair exit alarm, Patient to call before getting OOB, Teach patient to arise slowly    Elimination Interventions: Bed/chair exit alarm, Call light in reach, Patient to call for help with toileting needs, Toileting schedule/hourly rounds    History of Falls Interventions: Bed/chair exit alarm, Room close to nurse's station, Door open when patient unattended         Problem: Patient Education: Go to Patient Education Activity  Goal: Patient/Family Education  Outcome: Progressing Towards Goal     Problem: Aspiration - Risk of  Goal: *Absence of aspiration  Outcome: Progressing Towards Goal     Problem: Patient Education: Go to Patient Education Activity  Goal: Patient/Family Education  Outcome: Progressing Towards Goal

## 2022-11-11 NOTE — PROGRESS NOTES
Physical Therapy: defer     Chart reviewed, consulted with RN, attempted PT treatment session. Patient resting in bed with granddaughter present and politely, but adamantly, refusing participation at this time citing fatigue and nausea. Pt continues to defer despite encouragement and education. Will follow up as able and appropriate.     Thank you,  Elin Marrero, PT, DPT

## 2022-11-11 NOTE — PROGRESS NOTES
Problem: Self Care Deficits Care Plan (Adult)  Goal: *Acute Goals and Plan of Care (Insert Text)  Description: FUNCTIONAL STATUS PRIOR TO ADMISSION: Patient was modified independent with ADLs, IADLs, and functional mobility using a SPC. HOME SUPPORT: The patient lived alone; however, reports her son lives next door and two other family members live down the road. Occupational Therapy Goals  Initiated 11/1/2022, continued 11/4/2022, continued 11/11/2022  1. Patient will perform self-feeding with LUE as FM/GM assist with supervision/set-up within 7 day(s). 2.  Patient will perform grooming EOB with LUE as FM/GM assist with minimal assistance/contact guard assist within 7 day(s). 3.  Patient will perform bathing with moderate assistance within 7 day(s). 4.  Patient will perform upper body dressing with supervision/set-up within 7 day(s). 5.  Patient will perform lower body dressing with maximal assistance within 7 day(s). 6.  Patient will perform toilet transfers to/from [facility] with moderate assistance within 7 day(s). 7.  Patient will perform all aspects of toileting with maximal assistance within 7 day(s). 8.  Patient will participate in upper extremity therapeutic exercise/activities with supervision/set-up within 7 day(s). (Within LUE ICD precautions)  9. Patient will utilize energy conservation techniques during functional activities with verbal, visual, and tactile cues within 7 day(s). 10.  Patient will complete the [address] within 7 days. Outcome: Progressing Towards Goal    OCCUPATIONAL THERAPY TREATMENT/WEEKLY RE-ASSESSMENT  Patient: Walker Tony (76 y.o. female)  Date: 11/11/2022  Diagnosis: Stroke ([facility] [I63.9] <principal problem not specified>  Procedure(s) (LRB):  INSERT ICD SINGLE (N/A) 7 Days Post-Op  Precautions: Fall, Skin (SBP<140)  Chart, occupational therapy assessment, plan of care, and goals were reviewed.     ASSESSMENT  Patient is progressing in OT goals slowly (continued). Remains limited by generalized weakness, LUE ICD precautions (requiring frequent cuing for compliance), impaired activity tolerance, nausea, impaired balance, impaired coordination (L>R ataxia), and impaired functional reach for LB ADLs. Patient was nauseous with initial activity and BP was soft but increased seated at EOB (MAP 70s). Patient then expressed need to have BM and transferred to [facility] with moderate assistance x2. After voiding bladder and passing BM seated, patient had near syncope with sudden posterior lean and decreased alertness (possible hypotensive vs vasovagal episode? RN and hospitalist notified). Required totalA x2 for stand-pivot to return to bed and total assistance for pericare rolling in bed. Alertness improved supine with MAP 70s. SPO2 remained >93% on room air with activity. Continue to recommend rehab at d/c. Current Level of Function Impacting Discharge (ADLs): moderate assistance x2 for stand-pivot prior to near syncope. Total assistance toileting. Infer overall set-up to moderate assistance UB ADLs and total assistance LB ADLs         PLAN :  Goals have been updated based on progression since last assessment. Patient continues to benefit from skilled intervention to address the above impairments. Continue to follow patient 5 times a week to address goals. Recommendation for discharge: (in order for the patient to meet his/her long term goals)  Therapy 3 hours per day 5-7 days per week    This discharge recommendation:  Has been made in collaboration with the attending provider and/or case management         SUBJECTIVE:   Patient stated I feel nauseous.     OBJECTIVE DATA SUMMARY:   Cognitive/Behavioral Status:  Neurologic State: Alert  Orientation Level: Oriented X4  Cognition: Follows commands             Functional Mobility and Transfers for ADLs:  Bed Mobility:  Supine to Sit: Additional time;Bed Modified;Minimum assistance (cues for LUE disuse/manual support for compliance and cues for sequencing/pushing with RUE for mobility)  Sit to Supine: Total assistance;Assist x2 (pt with questionable near syncopal episode on [facility])  Scooting: Minimum assistance; Total assistance;Assist x2 (Johnnie in scooting to EOB/sitting; total A for boost to [facility][1] post episode)    Transfers:  Sit to Stand: Moderate assistance;Assist x2  Functional Transfers  Toilet Transfer : Moderate assistance;Assist x2;Total assistance (modA x2 bed to BSC, totalA x2 BSC to bed with near syncopal event)       Balance:  Sitting: Impaired  Sitting - Static: Fair (occasional)  Sitting - Dynamic: Poor (constant support); Fair (occasional) (posterior lean with near syncopal episode on [facility])  Standing: Impaired; With support  Standing - Static: Poor;Constant support  Standing - Dynamic : Poor;Constant support    ADL Intervention:          Toileting  Bowel Hygiene: Total assistance (dependent) (rolling in bed)         Pain:  Patient did not report pain    Activity Tolerance:   Fair and signs and symptoms of orthostatic hypotension    After treatment patient left in no apparent distress:   Supine in bed, Call bell within reach, Bed / chair alarm activated, and Caregiver / family present    COMMUNICATION/COLLABORATION:   The patients plan of care was discussed with: Physical therapist, Registered nurse, Physician, and Case management.      Fredrick Grove OT  Time Calculation: 27 mins

## 2022-11-12 NOTE — PROGRESS NOTES
Called patient to discuss emergency blood products including albumin. She states she \"wants to live as long as possible\" but also wants to \"listen to the bible\". At this time, she does not want blood products, even in a life-saving situation. Will not give albumin as derived from human blood products. Addendum: BPs improved. Will stop fluids. Given history of significant HF.

## 2022-11-12 NOTE — PROGRESS NOTES
[facility] Adult  Hospitalist Group                                                                                          Hospitalist Progress Note  Nyasia Lisa MD  Answering service: [phone] from in house phone        Date of Service:  2022  NAME:  Kehinde Jackson  :  1940  MRN:  [id number]      Admission Summary:     Pt is a 81 y.o F w/ PMH as above admitted to [facility] on 10/31 for suspected CVA w/ finding of acute bl posterior ischemic CVA, now s/p thrombectomy. Pt w/ finding of acute HFrEF 10-15% as part of CVA workup. Cardiology consulted and S/p AICD placement . Pt w/ episodes of hypotension intermittently responsive to small IVF bolus. Complaint of abd pain, n/v, somnolent but protecting airway and rouses to voice and answer questions. Transferred pt to ICU for further management. Hemodynamically stable, and transferred out of unit on      Interval history / Subjective:   Examined patient on morning rounds and again this afternoon. Patient continued to endorses left flank pain. CT abdomen was obtained that demonstrated large left sided hemothorax. Pulmonology consulted, case is difficult as patient does not want blood products and evacuation of the hemothorax may result in additional bleeding. Discussed case with patient's son, who would like us to encourage patient to consider blood products if necessary, and he plans to come to bedside tomorrow to discuss as well.       Assessment & Plan:     Large Left Pleural Effusion, concerning for Hemothorax  Acute on chronic blood loss anemia   - Demonstrated on CT abdomen   - Pulmonology consulted, as patient's hemoglobin is 7.5 and she does not wish to receive any blood products, will continue to monitor for now, will discuss possibility of blood transfusion with patient tomorrow at son's request, though I did explain that we will honor the patient's wishes in this regard   - Appreciate pulmonology assistance, may needs VATS  - CXR in AM   - Cardiology consult in morning   - Stop eliquis   - IVFs for soft pressures     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 s/p thrombectomy  Subtherapeutic Plavix response  -MRI brain with acute bilateral superior cerebral infarct and mild petechial hemorrhage  -A1c 13.1 LDL 71  -TTE with no intra-arterial septum seen  -P2Y12  response 222 unchanged from previous  -started on Eliquis, aspirin adjusted to 81 mg, lipitor   -Plavix DC per neurology due to subtherapeutic response  -CT scan of head on 11/5 no hemorrhage, no significant change of infarct  -PT OT speech  -continue Neurovascular checks  -SBP goal less than 140  -Neurologist on board  -CT Head 11/10 without acute change        Acute on chronic systolic CHF with EF 10 to 15%  -s/p ICD placement 11/4 by cardiology   -Echocardiogram EF of 10 to 15%  -Borderline hypotensive, stable on IVF, will give 1 dose of albumin 11/11  -Holding all BP medications   -continue I/O and daily weight monitoring  -Will reengage cardiology      Sepsis due to pneumonia   -on iv cefepime and fluconazole, to switch to oral on discharge  -pressor off   -recieved iv albumin x 1  -tachycardia improved, afebrile and no leukocytosis   -MRSA negative, d/c vancomycin 11/8  -blood cx 11/6 no growth     JERAMY likely due to intravascular volume depletion   -lasix on hold  -improved and creatinine normal  -avoid nephrotoxin   -monitor renal function      T2DM with hyperglycemia  -A1c 13.1  -Blood sugars remain labile  -Continue NPH, sliding scale, monitor finger stick glucose   -Diabetes management team is on board     Moderate protein calorie malnutrition   -BMI 18.68 kg/m².    -nutritionist on board    Full Code : high risk for decompensation, consult to palliative care  team          Code status: Full Code  Prophylaxis: Eliquis  Care Plan discussed with: Patient, Nurse and CM  Anticipated Disposition: SNF > 48 hrs     Hospital Problems  Date Reviewed: 3/18/2022            Codes Class Noted POA    Stroke [facility]) ICD-10-CM: I63.9  ICD-9-CM: 434.91  10/31/2022 Unknown         Vital Signs:    Last 24hrs VS reviewed since prior progress note. Most recent are:  Visit Vitals  BP (!) 100/58   Pulse 87   Temp 98.7 °F (37.1 °C)   Resp 16   Ht 5' 6\" (1.676 m)   Wt 60.8 kg (134 lb 0.6 oz)   SpO2 97%   BMI 21.63 kg/m²         Intake/Output Summary (Last 24 hours) at 11/11/2022 1916  Last data filed at 11/11/2022 7206  Gross per 24 hour   Intake 100 ml   Output 200 ml   Net -100 ml        Physical Examination:     I had a face to face encounter with this patient and independently examined them on 11/11/2022 as outlined below:          Constitutional:  No acute distress, cooperative, pleasant    ENT:  Oral mucosa moist, oropharynx benign. Resp:  Decrease bronchial breath sound bilaterally. No wheezing/rhonchi/rales. No accessory muscle use. CV:  Regular rhythm, normal rate, no murmurs, gallops, rubs    GI:  Soft, non distended, non tender. normoactive bowel sounds, no hepatosplenomegaly     Musculoskeletal:  No edema,      Neurologic:  Conscious and alert, oriented to place and person, moves all extremities.  CN II-XII reviewed            Data Review:    Review and/or order of clinical lab test  Review and/or order of tests in the radiology section of CPT  Review and/or order of tests in the medicine section of CPT      Labs:     Recent Labs     11/11/22  0707 11/10/22  0222   WBC 7.1 5.6   HGB 7.5* 7.5*   HCT 24.2* 23.5*    135*     Recent Labs     11/11/22  0707 11/10/22  1528 11/10/22  0222    141 138   K 4.7 4.2 5.3*   * 109* 109*   CO2 20* 25 23   BUN 21* 25* 28*   CREA 0.79 0.88 0.95   * 99 176*   CA 8.7 8.9 8.3*     Recent Labs     11/11/22  0707 11/10/22  0222 11/09/22  0451   ALT 9* 10* 9*   AP 99 96 94   TBILI 0.5 0.5 0.5   TP 5.4* 5.6* 5.5*   ALB 2.5* 2.5* 2.5*   GLOB 2.9 3.1 3.0     No results for input(s): INR, PTP, APTT, INREXT, INREXT in the last 72 hours. No results for input(s): FE, TIBC, PSAT, FERR in the last 72 hours. No results found for: FOL, RBCF   No results for input(s): PH, PCO2, PO2 in the last 72 hours. No results for input(s): CPK, CKNDX, TROIQ in the last 72 hours.     No lab exists for component: CPKMB  Lab Results   Component Value Date/Time    Cholesterol, total 146 11/01/2022 03:30 AM    HDL Cholesterol 69 11/01/2022 03:30 AM    LDL, calculated 71 11/01/2022 03:30 AM    Triglyceride 30 11/01/2022 03:30 AM    CHOL/HDL Ratio 2.1 11/01/2022 03:30 AM     Lab Results   Component Value Date/Time    Glucose (POC) 108 11/11/2022 05:25 PM    Glucose (POC) 204 (H) 11/11/2022 12:11 PM    Glucose (POC) 124 (H) 11/11/2022 08:45 AM    Glucose (POC) 69 11/10/2022 09:56 PM    Glucose (POC) 84 11/10/2022 04:48 PM     Lab Results   Component Value Date/Time    Color YELLOW/STRAW 11/06/2022 06:43 PM    Appearance CLEAR 11/06/2022 06:43 PM    Specific gravity 1.025 11/06/2022 06:43 PM    Specific gravity 1.029 10/31/2022 12:54 PM    pH (UA) 5.5 11/06/2022 06:43 PM    Protein Negative 11/06/2022 06:43 PM    Glucose 100 (A) 11/06/2022 06:43 PM    Ketone TRACE (A) 11/06/2022 06:43 PM    Bilirubin Negative 11/06/2022 06:43 PM    Urobilinogen 0.2 11/06/2022 06:43 PM    Nitrites Negative 11/06/2022 06:43 PM    Leukocyte Esterase MODERATE (A) 11/06/2022 06:43 PM    Epithelial cells FEW 11/06/2022 06:43 PM    Bacteria Negative 11/06/2022 06:43 PM    WBC 10-20 11/06/2022 06:43 PM    RBC 0-5 11/06/2022 06:43 PM         Medications Reviewed:     Current Facility-Administered Medications   Medication Dose Route Frequency    lactated Ringers infusion  100 mL/hr IntraVENous CONTINUOUS    polyethylene glycol (MIRALAX) packet 17 g  17 g Oral BID    insulin NPH (NOVOLIN N, HUMULIN N) injection 4 Units  4 Units SubCUTAneous QHS    HYDROcodone-acetaminophen (NORCO) 5-325 mg per tablet 1 Tablet  1 Tablet Oral Q6H PRN    insulin NPH (NOVOLIN N, HUMULIN N) injection 8 Units  8 Units SubCUTAneous QAM    insulin lispro (HUMALOG) injection   SubCUTAneous AC&HS    dextrose 10 % infusion 0-250 mL  0-250 mL IntraVENous PRN    fluconazole (DIFLUCAN) tablet 200 mg  200 mg Oral DAILY    [Held by provider] apixaban (ELIQUIS) tablet 2.5 mg  2.5 mg Oral BID    aspirin chewable tablet 81 mg  81 mg Oral DAILY    hydrALAZINE (APRESOLINE) 20 mg/mL injection 10 mg  10 mg IntraVENous Q6H PRN    cefepime (MAXIPIME) 2 g in 0.9% sodium chloride (MBP/ADV) 100 mL MBP  2 g IntraVENous Q12H    sodium chloride (NS) flush 5-40 mL  5-40 mL IntraVENous Q8H    sodium chloride (NS) flush 5-40 mL  5-40 mL IntraVENous PRN    [Held by provider] carvediloL (COREG) tablet 6.25 mg  6.25 mg Oral BID WITH MEALS    pantoprazole (PROTONIX) tablet 40 mg  40 mg Oral ACB    [Held by provider] traMADoL (ULTRAM) tablet 50 mg  50 mg Oral Q6H PRN    dextrose 10 % infusion 0-250 mL  0-250 mL IntraVENous PRN    senna-docusate (PERICOLACE) 8.6-50 mg per tablet 1 Tablet  1 Tablet Oral DAILY    lidocaine 4 % patch 2 Patch  2 Patch TransDERmal Q24H    atorvastatin (LIPITOR) tablet 10 mg  10 mg Oral QHS    ondansetron (ZOFRAN) injection 4 mg  4 mg IntraVENous Q4H PRN    acetaminophen (TYLENOL) tablet 650 mg  650 mg Oral Q4H PRN    Or    acetaminophen (TYLENOL) solution 650 mg  650 mg Per NG tube Q4H PRN    Or    acetaminophen (TYLENOL) suppository 650 mg  650 mg Rectal Q4H PRN    sodium chloride (NS) flush 5-40 mL  5-40 mL IntraVENous Q8H    sodium chloride (NS) flush 5-40 mL  5-40 mL IntraVENous PRN    glucose chewable tablet 16 g  4 Tablet Oral PRN    glucagon (GLUCAGEN) injection 1 mg  1 mg IntraMUSCular PRN    dextrose 10 % infusion 0-250 mL  0-250 mL IntraVENous PRN     ______________________________________________________________________  EXPECTED LENGTH OF STAY: 4d 2h  ACTUAL LENGTH OF STAY:          6                 Lisa Shook MD

## 2022-11-12 NOTE — PROGRESS NOTES
[facility] Adult  Hospitalist Group                                                                                          Hospitalist Progress Note  Suzanne Thomas MD  Answering service: [phone] from in house phone        Date of Service:  2022  NAME:  Hermelinda Silva  :  1940  MRN:  [id number]      Admission Summary:     Pt is a 81 y.o F w/ PMH as above admitted to [facility] on 10/31 for suspected CVA w/ finding of acute bl posterior ischemic CVA, now s/p thrombectomy. Pt w/ finding of acute HFrEF 10-15% as part of CVA workup. Cardiology consulted and S/p AICD placement . Pt w/ episodes of hypotension intermittently responsive to small IVF bolus. Complaint of abd pain, n/v, somnolent but protecting airway and rouses to voice and answer questions. Transferred pt to ICU for further management. Hemodynamically stable, and transferred out of unit on      Interval history / Subjective:   Pt states she feels fine today though it appears that there is always \"one thing after another\". She told me again today that she does not want blood products due to her [employer].       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 s/p thrombectomy  Subtherapeutic Plavix response  -MRI brain with acute bilateral superior cerebral infarct and mild petechial hemorrhage  -A1c 13.1 LDL 71  -TTE with no intra-arterial septum seen  -P2Y12  response 222 unchanged from previous, Plavix DC per neurology due to subtherapeutic response  -started on Eliquis, aspirin adjusted to 81 mg, lipitor   --CT scan of head on 11/5 no hemorrhage, no significant change of infarct  -continue Neurovascular checks  -SBP goal less than 140  -CT Head 11/10 without acute change   - Holding eliquis due to hemothorax     Acute on chronic systolic CHF with EF 10 to 15%  -s/p ICD placement 11/4 by cardiology   -Echocardiogram EF of 10 to 15%  -Borderline hypotensive, stable on IVF, will give 1 dose of albumin 11/11  -Holding all BP medications   -continue I/O and daily weight monitoring  -Will reengage cardiology      Sepsis due to pneumonia, resolved  -on iv cefepime (today day 6/7) and fluconazole (day 6/7)  -tachycardia improved, afebrile and no leukocytosis   -MRSA negative, d/c vancomycin 11/8  -blood cx 11/6 no growth     JERAMY likely due to intravascular volume depletion, resolved  -avoid nephrotoxin   -monitor renal function   - Small Cr bump today, giving fluids (1000cc over 13 hours), encourage oral intake      T2DM with hyperglycemia  -A1c 13.1  -Blood sugars remain labile  -Continue NPH, sliding scale, monitor finger stick glucose   -Diabetes management team is on board  - Poor oral intake     Moderate protein calorie malnutrition   -BMI 18.68 kg/m². -nutritionist on board      Code status: Full Code  Prophylaxis: Eliquis  Care Plan discussed with: Patient, Nurse and CM  Anticipated Disposition: SNF > 48 hrs     Hospital Problems  Date Reviewed: 3/18/2022            Codes Class Noted POA    Stroke [facility]) ICD-10-CM: I63.9  ICD-9-CM: 434.91  10/31/2022 Unknown         Vital Signs:    Last 24hrs VS reviewed since prior progress note.  Most recent are:  Visit Vitals  /73 (BP 1 Location: Left upper arm, BP Patient Position: At rest)   Pulse 95   Temp 97.9 °F (36.6 °C)   Resp 26   Ht 5' 6\" (1.676 m)   Wt 59.7 kg (131 lb 9.8 oz)   SpO2 96%   BMI 21.24 kg/m²         Intake/Output Summary (Last 24 hours) at 11/12/2022 1142  Last data filed at 11/12/2022 1046  Gross per 24 hour   Intake --   Output 200 ml   Net -200 ml        Physical Examination:     I had a face to face encounter with this patient and independently examined them on 11/12/2022 as outlined below:          Constitutional:  No acute distress, cooperative, pleasant    ENT:  Oral mucosa moist, oropharynx benign. Resp:  Decrease bronchial breath sound bilaterally. No wheezing/rhonchi/rales. No accessory muscle use. CV:  Regular rhythm, normal rate, no murmurs, gallops, rubs    GI:  Soft, non distended, non tender. normoactive bowel sounds, no hepatosplenomegaly     Musculoskeletal:  No edema,      Neurologic:  Conscious and alert, oriented to place and person, moves all extremities. CN II-XII reviewed            Data Review:    Review and/or order of clinical lab test  Review and/or order of tests in the radiology section of CPT  Review and/or order of tests in the medicine section of CPT      Labs:     Recent Labs     11/12/22  0300 11/11/22  0707   WBC 10.0 7.1   HGB 7.8* 7.5*   HCT 25.1* 24.2*    204     Recent Labs     11/12/22  0300 11/11/22  0707 11/10/22  1528    139 141   K 4.7 4.7 4.2    109* 109*   CO2 22 20* 25   BUN 29* 21* 25*   CREA 1.05* 0.79 0.88   GLU 83 109* 99   CA 8.8 8.7 8.9     Recent Labs     11/12/22  0300 11/11/22  0707 11/10/22  0222   ALT 10* 9* 10*    99 96   TBILI 0.6 0.5 0.5   TP 5.7* 5.4* 5.6*   ALB 2.6* 2.5* 2.5*   GLOB 3.1 2.9 3.1     No results for input(s): INR, PTP, APTT, INREXT, INREXT in the last 72 hours. No results for input(s): FE, TIBC, PSAT, FERR in the last 72 hours. No results found for: FOL, RBCF   No results for input(s): PH, PCO2, PO2 in the last 72 hours. No results for input(s): CPK, CKNDX, TROIQ in the last 72 hours.     No lab exists for component: CPKMB  Lab Results   Component Value Date/Time    Cholesterol, total 146 11/01/2022 03:30 AM    HDL Cholesterol 69 11/01/2022 03:30 AM    LDL, calculated 71 11/01/2022 03:30 AM    Triglyceride 30 11/01/2022 03:30 AM    CHOL/HDL Ratio 2.1 11/01/2022 03:30 AM     Lab Results   Component Value Date/Time    Glucose (POC) 149 (H) 11/12/2022 11:29 AM    Glucose (POC) 72 11/12/2022 07:59 AM    Glucose (POC) 73 11/12/2022 07:58 AM    Glucose (POC) 109 11/11/2022 09:16 PM    Glucose (POC) 108 11/11/2022 05:25 PM     Lab Results   Component Value Date/Time    Color YELLOW/STRAW 11/06/2022 06:43 PM    Appearance CLEAR 11/06/2022 06:43 PM    Specific gravity 1.025 11/06/2022 06:43 PM    Specific gravity 1.029 10/31/2022 12:54 PM    pH (UA) 5.5 11/06/2022 06:43 PM    Protein Negative 11/06/2022 06:43 PM    Glucose 100 (A) 11/06/2022 06:43 PM    Ketone TRACE (A) 11/06/2022 06:43 PM    Bilirubin Negative 11/06/2022 06:43 PM    Urobilinogen 0.2 11/06/2022 06:43 PM    Nitrites Negative 11/06/2022 06:43 PM    Leukocyte Esterase MODERATE (A) 11/06/2022 06:43 PM    Epithelial cells FEW 11/06/2022 06:43 PM    Bacteria Negative 11/06/2022 06:43 PM    WBC 10-20 11/06/2022 06:43 PM    RBC 0-5 11/06/2022 06:43 PM         Medications Reviewed:     Current Facility-Administered Medications   Medication Dose Route Frequency    lactated Ringers infusion  75 mL/hr IntraVENous CONTINUOUS    polyethylene glycol (MIRALAX) packet 17 g  17 g Oral BID    insulin NPH (NOVOLIN N, HUMULIN N) injection 4 Units  4 Units SubCUTAneous QHS    HYDROcodone-acetaminophen (NORCO) 5-325 mg per tablet 1 Tablet  1 Tablet Oral Q6H PRN    insulin NPH (NOVOLIN N, HUMULIN N) injection 8 Units  8 Units SubCUTAneous QAM    insulin lispro (HUMALOG) injection   SubCUTAneous AC&HS    dextrose 10 % infusion 0-250 mL  0-250 mL IntraVENous PRN    fluconazole (DIFLUCAN) tablet 200 mg  200 mg Oral DAILY    [Held by provider] apixaban (ELIQUIS) tablet 2.5 mg  2.5 mg Oral BID    aspirin chewable tablet 81 mg  81 mg Oral DAILY    hydrALAZINE (APRESOLINE) 20 mg/mL injection 10 mg  10 mg IntraVENous Q6H PRN    cefepime (MAXIPIME) 2 g in 0.9% sodium chloride (MBP/ADV) 100 mL MBP  2 g IntraVENous Q12H    sodium chloride (NS) flush 5-40 mL  5-40 mL IntraVENous Q8H    sodium chloride (NS) flush 5-40 mL  5-40 mL IntraVENous PRN    [Held by provider] carvediloL (COREG) tablet 6.25 mg  6.25 mg Oral BID WITH MEALS    pantoprazole (PROTONIX) tablet 40 mg  40 mg Oral ACB    [Held by provider] traMADoL (ULTRAM) tablet 50 mg  50 mg Oral Q6H PRN    dextrose 10 % infusion 0-250 mL  0-250 mL IntraVENous PRN    senna-docusate (PERICOLACE) 8.6-50 mg per tablet 1 Tablet  1 Tablet Oral DAILY    lidocaine 4 % patch 2 Patch  2 Patch TransDERmal Q24H    atorvastatin (LIPITOR) tablet 10 mg  10 mg Oral QHS    ondansetron (ZOFRAN) injection 4 mg  4 mg IntraVENous Q4H PRN    acetaminophen (TYLENOL) tablet 650 mg  650 mg Oral Q4H PRN    Or    acetaminophen (TYLENOL) solution 650 mg  650 mg Per NG tube Q4H PRN    Or    acetaminophen (TYLENOL) suppository 650 mg  650 mg Rectal Q4H PRN    sodium chloride (NS) flush 5-40 mL  5-40 mL IntraVENous Q8H    sodium chloride (NS) flush 5-40 mL  5-40 mL IntraVENous PRN    glucose chewable tablet 16 g  4 Tablet Oral PRN    glucagon (GLUCAGEN) injection 1 mg  1 mg IntraMUSCular PRN    dextrose 10 % infusion 0-250 mL  0-250 mL IntraVENous PRN     ______________________________________________________________________  EXPECTED LENGTH OF STAY: 4d 2h  ACTUAL LENGTH OF STAY:          15                 Lisa Howard MD

## 2022-11-12 NOTE — PROGRESS NOTES
Problem: Pressure Injury - Risk of  Goal: *Prevention of pressure injury  Description: Document Jon Scale and appropriate interventions in the flowsheet. Outcome: Progressing Towards Goal  Note: Pressure Injury Interventions:  Sensory Interventions: Assess changes in LOC, Discuss PT/OT consult with provider, Float heels, Keep linens dry and wrinkle-free, Minimize linen layers, Pad between skin to skin    Moisture Interventions: Absorbent underpads, Limit adult briefs, Internal/External urinary devices, Minimize layers    Activity Interventions: Increase time out of bed, Pressure redistribution bed/mattress(bed type), PT/OT evaluation    Mobility Interventions: HOB 30 degrees or less, Pressure redistribution bed/mattress (bed type), PT/OT evaluation    Nutrition Interventions: Document food/fluid/supplement intake    Friction and Shear Interventions: HOB 30 degrees or less, Minimize layers                Problem: Patient Education: Go to Patient Education Activity  Goal: Patient/Family Education  Outcome: Progressing Towards Goal     Problem: Falls - Risk of  Goal: *Absence of Falls  Description: Document Sourav Fall Risk and appropriate interventions in the flowsheet.   Outcome: Progressing Towards Goal  Note: Fall Risk Interventions:  Mobility Interventions: Bed/chair exit alarm, Communicate number of staff needed for ambulation/transfer, OT consult for ADLs, Patient to call before getting OOB, PT Consult for assist device competence, PT Consult for mobility concerns, Strengthening exercises (ROM-active/passive)    Mentation Interventions: Bed/chair exit alarm, Adequate sleep, hydration, pain control, Door open when patient unattended, Increase mobility, More frequent rounding, Reorient patient, Evaluate medications/consider consulting pharmacy    Medication Interventions: Assess postural VS orthostatic hypotension, Evaluate medications/consider consulting pharmacy, Bed/chair exit alarm, Patient to call before getting OOB, Teach patient to arise slowly    Elimination Interventions: Bed/chair exit alarm, Call light in reach, Patient to call for help with toileting needs, Stay With Me (per policy), Toileting schedule/hourly rounds    History of Falls Interventions: Bed/chair exit alarm, Consult care management for discharge planning, Door open when patient unattended, Evaluate medications/consider consulting pharmacy         Problem: Patient Education: Go to Patient Education Activity  Goal: Patient/Family Education  Outcome: Progressing Towards Goal     Problem: Aspiration - Risk of  Goal: *Absence of aspiration  Outcome: Progressing Towards Goal     Problem: Patient Education: Go to Patient Education Activity  Goal: Patient/Family Education  Outcome: Progressing Towards Goal     Problem: Patient Education: Go to Patient Education Activity  Goal: Patient/Family Education  Outcome: Progressing Towards Goal     Problem: Patient Education: Go to Patient Education Activity  Goal: Patient/Family Education  Outcome: Progressing Towards Goal     Problem: Patient Education: Go to Patient Education Activity  Goal: Patient/Family Education  Outcome: Progressing Towards Goal     Problem: Patient Education: Go to Patient Education Activity  Goal: Patient/Family Education  Outcome: Progressing Towards Goal     Problem: TIA/CVA Stroke: Day 2 Until Discharge  Goal: Psychosocial  Outcome: Progressing Towards Goal  Goal: *Verbalizes anxiety and depression are reduced or absent  Outcome: Progressing Towards Goal  Goal: *Absence of aspiration  Outcome: Progressing Towards Goal  Goal: *Absence of deep venous thrombosis signs and symptoms(Stroke Metric)  Outcome: Progressing Towards Goal  Goal: *Optimal pain control at patient's stated goal  Outcome: Progressing Towards Goal  Goal: *Tolerating diet  Outcome: Progressing Towards Goal  Goal: *Ability to perform ADLs and demonstrates progressive mobility and function  Outcome: Progressing Towards Goal  Goal: *Stroke education continued(Stroke Metric)  Outcome: Progressing Towards Goal     Problem: Patient Education: Go to Patient Education Activity  Goal: Patient/Family Education  Outcome: Progressing Towards Goal     Problem: Heart Failure: Discharge Outcomes  Goal: *Demonstrates ability to perform prescribed activity without shortness of breath or discomfort  Outcome: Progressing Towards Goal  Goal: *Left ventricular function assessment completed prior to or during stay, or planned for post-discharge  Outcome: Progressing Towards Goal  Goal: *ACEI prescribed if LVEF less than 40% and no contraindications or ARB prescribed  Outcome: Progressing Towards Goal  Goal: *Verbalizes understanding and describes prescribed diet  Outcome: Progressing Towards Goal  Goal: *Verbalizes understanding/describes prescribed medications  Outcome: Progressing Towards Goal  Goal: *Describes available resources and support systems  Description: (eg: Home Health, Palliative Care, Advanced Medical Directive)  Outcome: Progressing Towards Goal  Goal: *Describes smoking cessation resources  Outcome: Progressing Towards Goal  Goal: *Understands and describes signs and symptoms to report to providers(Stroke Metric)  Outcome: Progressing Towards Goal  Goal: *Describes/verbalizes understanding of follow-up/return appt  Description: (eg: to physicians, diabetes treatment coordinator, and other resources  Outcome: Progressing Towards Goal  Goal: *Describes importance of continuing daily weights and changes to report to physician  Outcome: Progressing Towards Goal     Problem: Patient Education: Go to Patient Education Activity  Goal: Patient/Family Education  Outcome: Progressing Towards Goal     Problem: Pain  Goal: *Control of Pain  Outcome: Progressing Towards Goal     Problem: Patient Education: Go to Patient Education Activity  Goal: Patient/Family Education  Outcome: Progressing Towards Goal

## 2022-11-12 NOTE — PROGRESS NOTES
Bedside and Verbal shift change report given to Eben Loving RN (oncoming nurse) by Tiff Larson RN (offgoing nurse). Report included the following information Recent Results, Cardiac Rhythm SR, Pre Procedure Checklist, and Dual Neuro Assessment.

## 2022-11-12 NOTE — PROGRESS NOTES
Bedside and Verbal shift change report given to [address] (oncoming nurse) by Katy Chau (offgoing nurse). Report included the following information SBAR, Kardex, Intake/Output, Recent Results, Cardiac Rhythm SR, and Quality Measures.

## 2022-11-12 NOTE — PROGRESS NOTES
Elsy Galeana has not voided on my shift. bladder scan shows 261 in bladder.  Thank you Magdalena Hernandez

## 2022-11-13 NOTE — PROGRESS NOTES
Problem: Pressure Injury - Risk of  Goal: *Prevention of pressure injury  Description: Document Jon Scale and appropriate interventions in the flowsheet. Outcome: Progressing Towards Goal  Note: Pressure Injury Interventions:  Sensory Interventions: Assess changes in LOC, Discuss PT/OT consult with provider, Float heels, Keep linens dry and wrinkle-free, Minimize linen layers, Pad between skin to skin    Moisture Interventions: Absorbent underpads, Limit adult briefs, Minimize layers    Activity Interventions: Increase time out of bed, PT/OT evaluation, Pressure redistribution bed/mattress(bed type)    Mobility Interventions: HOB 30 degrees or less, Pressure redistribution bed/mattress (bed type), PT/OT evaluation    Nutrition Interventions: Document food/fluid/supplement intake    Friction and Shear Interventions: HOB 30 degrees or less, Minimize layers                Problem: Patient Education: Go to Patient Education Activity  Goal: Patient/Family Education  Outcome: Progressing Towards Goal     Problem: Falls - Risk of  Goal: *Absence of Falls  Description: Document Sourav Fall Risk and appropriate interventions in the flowsheet.   Outcome: Progressing Towards Goal  Note: Fall Risk Interventions:  Mobility Interventions: Bed/chair exit alarm, Communicate number of staff needed for ambulation/transfer, OT consult for ADLs, PT Consult for mobility concerns, Patient to call before getting OOB, PT Consult for assist device competence, Strengthening exercises (ROM-active/passive)    Mentation Interventions: Adequate sleep, hydration, pain control, Door open when patient unattended, Evaluate medications/consider consulting pharmacy, Bed/chair exit alarm, Increase mobility, More frequent rounding, Reorient patient    Medication Interventions: Assess postural VS orthostatic hypotension, Bed/chair exit alarm, Evaluate medications/consider consulting pharmacy, Patient to call before getting OOB, Teach patient to arise slowly    Elimination Interventions: Call light in reach, Toilet paper/wipes in reach, Patient to call for help with toileting needs    History of Falls Interventions: Consult care management for discharge planning, Bed/chair exit alarm, Evaluate medications/consider consulting pharmacy, Door open when patient unattended         Problem: Patient Education: Go to Patient Education Activity  Goal: Patient/Family Education  Outcome: Progressing Towards Goal     Problem: Aspiration - Risk of  Goal: *Absence of aspiration  Outcome: Progressing Towards Goal     Problem: Patient Education: Go to Patient Education Activity  Goal: Patient/Family Education  Outcome: Progressing Towards Goal     Problem: Diabetes Self-Management  Goal: *Disease process and treatment process  Description: Define diabetes and identify own type of diabetes; list 3 options for treating diabetes. Outcome: Progressing Towards Goal  Goal: *Incorporating nutritional management into lifestyle  Description: Describe effect of type, amount and timing of food on blood glucose; list 3 methods for planning meals. Outcome: Progressing Towards Goal  Goal: *Incorporating physical activity into lifestyle  Description: State effect of exercise on blood glucose levels. Outcome: Progressing Towards Goal  Goal: *Developing strategies to promote health/change behavior  Description: Define the ABC's of diabetes; identify appropriate screenings, schedule and personal plan for screenings. Outcome: Progressing Towards Goal  Goal: *Using medications safely  Description: State effect of diabetes medications on diabetes; name diabetes medication taking, action and side effects. Outcome: Progressing Towards Goal  Goal: *Monitoring blood glucose, interpreting and using results  Description: Identify recommended blood glucose targets  and personal targets.   Outcome: Progressing Towards Goal  Goal: *Prevention, detection, treatment of acute complications  Description: List symptoms of hyper- and hypoglycemia; describe how to treat low blood sugar and actions for lowering  high blood glucose level. Outcome: Progressing Towards Goal  Goal: *Prevention, detection and treatment of chronic complications  Description: Define the natural course of diabetes and describe the relationship of blood glucose levels to long term complications of diabetes.   Outcome: Progressing Towards Goal  Goal: *Developing strategies to address psychosocial issues  Description: Describe feelings about living with diabetes; identify support needed and support network  Outcome: Progressing Towards Goal  Goal: *Insulin pump training  Outcome: Progressing Towards Goal  Goal: *Sick day guidelines  Outcome: Progressing Towards Goal  Goal: *Patient Specific Goal (EDIT GOAL, INSERT TEXT)  Outcome: Progressing Towards Goal     Problem: Patient Education: Go to Patient Education Activity  Goal: Patient/Family Education  Outcome: Progressing Towards Goal     Problem: Patient Education: Go to Patient Education Activity  Goal: Patient/Family Education  Outcome: Progressing Towards Goal     Problem: Patient Education: Go to Patient Education Activity  Goal: Patient/Family Education  Outcome: Progressing Towards Goal     Problem: TIA/CVA Stroke: Day 2 Until Discharge  Goal: Psychosocial  Outcome: Progressing Towards Goal  Goal: *Verbalizes anxiety and depression are reduced or absent  Outcome: Progressing Towards Goal  Goal: *Absence of aspiration  Outcome: Progressing Towards Goal  Goal: *Absence of deep venous thrombosis signs and symptoms(Stroke Metric)  Outcome: Progressing Towards Goal  Goal: *Optimal pain control at patient's stated goal  Outcome: Progressing Towards Goal  Goal: *Tolerating diet  Outcome: Progressing Towards Goal  Goal: *Ability to perform ADLs and demonstrates progressive mobility and function  Outcome: Progressing Towards Goal  Goal: *Stroke education continued(Stroke Metric)  Outcome: Progressing Towards Goal     Problem: Patient Education: Go to Patient Education Activity  Goal: Patient/Family Education  Outcome: Progressing Towards Goal     Problem: Heart Failure: Discharge Outcomes  Goal: *Demonstrates ability to perform prescribed activity without shortness of breath or discomfort  Outcome: Progressing Towards Goal  Goal: *Left ventricular function assessment completed prior to or during stay, or planned for post-discharge  Outcome: Progressing Towards Goal  Goal: *ACEI prescribed if LVEF less than 40% and no contraindications or ARB prescribed  Outcome: Progressing Towards Goal  Goal: *Verbalizes understanding and describes prescribed diet  Outcome: Progressing Towards Goal  Goal: *Verbalizes understanding/describes prescribed medications  Outcome: Progressing Towards Goal  Goal: *Describes available resources and support systems  Description: (eg: Home Health, Palliative Care, Advanced Medical Directive)  Outcome: Progressing Towards Goal  Goal: *Describes smoking cessation resources  Outcome: Progressing Towards Goal  Goal: *Understands and describes signs and symptoms to report to providers(Stroke Metric)  Outcome: Progressing Towards Goal  Goal: *Describes/verbalizes understanding of follow-up/return appt  Description: (eg: to physicians, diabetes treatment coordinator, and other resources  Outcome: Progressing Towards Goal  Goal: *Describes importance of continuing daily weights and changes to report to physician  Outcome: Progressing Towards Goal     Problem: Patient Education: Go to Patient Education Activity  Goal: Patient/Family Education  Outcome: Progressing Towards Goal     Problem: Pain  Goal: *Control of Pain  Outcome: Progressing Towards Goal     Problem: Patient Education: Go to Patient Education Activity  Goal: Patient/Family Education  Outcome: Progressing Towards Goal

## 2022-11-13 NOTE — PROGRESS NOTES
Bedside and Verbal shift change report given to Ann-Marie (oncoming nurse) by Betsy Kirby RN (offgoing nurse). Report included the following information SBAR, Kardex, Intake/Output, MAR, Recent Results, and Med Rec Status.

## 2022-11-14 NOTE — PROGRESS NOTES
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 Lets stick to just a thoracentesis this time and monitor Hgb. If it drops she will need VATS  Discussed with hospitalist   O2 titration above 90%     Daily Progression:      Now with complete left hemithorax       Consult Note requested by hospitalist    Patient presented for admission 10/31 for CVA; was taken to angiogram for thrombectomy; then monitored in ICU until . Transferred out of ICU monitoring on . ICD was placed on  by Dr Ginny España for CM. 22 chest x-ray post procedure did not show any pleural effusions nor PTX. Chest x-ray on 22 showed a new moderate to large left pleural effusion. By CT abdomen today showed a larger left pleural effusion with increased HUs suggestive of hemothorax. She has been having increasing left sided chest pains since that time. HgB has trended down since then. Eliquis was restarted on 22. Currently HD stable, though BP a little soft. On room air. No acute dyspnea. She was on plavix and eliquis as outpatient. 22 ECHO  Result status: Final result       Left Ventricle: Severely reduced left ventricular systolic function with a visually estimated EF of 10 -15%. Left ventricle is moderately dilated. Mildly increased wall thickness. Severe global hypokinesis present. Abnormal diastolic function. Aortic Valve: Tricuspid valve. Mild regurgitation. Mitral Valve: Mild to moderate regurgitation.     Interatrial Septum: Agitated saline study was negative with and without provocation. I have reviewed the labs and previous days notes. A comprehensive review of systems was negative. Past Medical History:   Diagnosis Date    Colon polyps     Diabetes ([address]     Diverticulosis     Hypertension     Ill-defined condition     Pancreatitis       Past Surgical History:   Procedure Laterality Date    COLONOSCOPY N/A 4/29/2021    COLONOSCOPY performed by Henrique Dasilva MD at [facility]  2021    x2    IR [facility] THROMB INFUSION INTRACRANIAL  10/31/2022      Prior to Admission medications    Medication Sig Start Date End Date Taking? Authorizing Provider   sacubitril/valsartan (ENTRESTO PO) Take  by mouth. Yes Provider, Historical   glipiZIDE (GLUCOTROL) 5 mg tablet Take 2 Tablets by mouth Daily (before breakfast). 3/22/22  Yes Soren Singh PA-C   apixaban (ELIQUIS) 2.5 mg tablet Take 2.5 mg by mouth daily. Yes Provider, Historical   spironolactone (ALDACTONE) 25 mg tablet Take 25 mg by mouth daily. Yes Provider, Historical   acetaminophen (TYLENOL) 650 mg TbER Take 650 mg by mouth every eight (8) hours as needed for Pain. Indications: pain   Yes Provider, Historical   furosemide (LASIX) 40 mg tablet Take 1 Tablet by mouth daily. 11/24/21  Yes Tyler Gaviria PA-C   pantoprazole (PROTONIX) 40 mg tablet Take 1 Tablet by mouth Daily (before breakfast). 11/25/21  Yes Tyler Gaviria PA-C   clopidogreL (PLAVIX) 75 mg tab Take 1 Tablet by mouth daily. 10/30/21  Yes Calvin Sosa MD   carvediloL (COREG) 3.125 mg tablet Take 1 Tablet by mouth two (2) times daily (with meals).  10/29/21  Yes Calvin Sosa MD     Allergies   Allergen Reactions    Insulins Itching    Penicillins Other (comments)     Pt states it makes her pass out      Social History     Tobacco Use    Smoking status: Never    Smokeless tobacco: Never   Substance Use Topics    Alcohol use: Not Currently      Family History   Problem Relation Age of Onset    Diabetes Other      OBJECTIVE:     Vital Signs:     Visit Vitals  /66 (BP 1 Location: Left upper arm, BP Patient Position: At rest;Lying;Supine)   Pulse (!) 102   Temp 98.1 °F (36.7 °C)   Resp 21   Ht 5' 6\" (1.676 m)   Wt 57.5 kg (126 lb 12.2 oz)   SpO2 97%   BMI 20.46 kg/m²      Temp (24hrs), Av.1 °F (36.7 °C), Min:98 °F (36.7 °C), Max:98.3 °F (36.8 °C)     Intake/Output:     Last shift: No intake/output data recorded.     Last 3 shifts:  1901 -  0700  In: 900 [I.V.:900]  Out: 1500 [Urine:1500]        Intake/Output Summary (Last 24 hours) at 2022 1125  Last data filed at 2022 2271  Gross per 24 hour   Intake --   Output 1500 ml   Net -1500 ml         Physical Exam:                                        Exam Findings Other   General: No resp distress noted, appears stated age    [de-identified]:  No ulcers, JVD not elevated, no cervical LAD    Chest: No pectus deformity, normal chest rise b/l    HEART:  RRR, no murmurs/rubs/gallops    Lungs:  CTA b/l, no rhonchi/crackles/wheeze, diminished BS at bases    ABD: Soft/NT, non rigid mildly distended    EXT: No cyanosis/clubbing/edema, normal peripheral pulses    Skin: No rashes or ulcers, no mottling    Neuro: A/O x 3        Medications:  Current Facility-Administered Medications   Medication Dose Route Frequency    polyethylene glycol (MIRALAX) packet 17 g  17 g Oral BID    insulin NPH (NOVOLIN N, HUMULIN N) injection 4 Units  4 Units SubCUTAneous QHS    HYDROcodone-acetaminophen (NORCO) 5-325 mg per tablet 1 Tablet  1 Tablet Oral Q6H PRN    insulin NPH (NOVOLIN N, HUMULIN N) injection 8 Units  8 Units SubCUTAneous QAM    insulin lispro (HUMALOG) injection   SubCUTAneous AC&HS    dextrose 10 % infusion 0-250 mL  0-250 mL IntraVENous PRN    [Held by provider] apixaban (ELIQUIS) tablet 2.5 mg  2.5 mg Oral BID    aspirin chewable tablet 81 mg  81 mg Oral DAILY    hydrALAZINE (APRESOLINE) 20 mg/mL injection 10 mg  10 mg IntraVENous Q6H PRN    sodium chloride (NS) flush 5-40 mL  5-40 mL IntraVENous Q8H    sodium chloride (NS) flush 5-40 mL  5-40 mL IntraVENous PRN    [Held by provider] carvediloL (COREG) tablet 6.25 mg  6.25 mg Oral BID WITH MEALS    pantoprazole (PROTONIX) tablet 40 mg  40 mg Oral ACB    [Held by provider] traMADoL (ULTRAM) tablet 50 mg  50 mg Oral Q6H PRN    dextrose 10 % infusion 0-250 mL  0-250 mL IntraVENous PRN    senna-docusate (PERICOLACE) 8.6-50 mg per tablet 1 Tablet  1 Tablet Oral DAILY    lidocaine 4 % patch 2 Patch  2 Patch TransDERmal Q24H    atorvastatin (LIPITOR) tablet 10 mg  10 mg Oral QHS    ondansetron (ZOFRAN) injection 4 mg  4 mg IntraVENous Q4H PRN    acetaminophen (TYLENOL) tablet 650 mg  650 mg Oral Q4H PRN    Or    acetaminophen (TYLENOL) solution 650 mg  650 mg Per NG tube Q4H PRN    Or    acetaminophen (TYLENOL) suppository 650 mg  650 mg Rectal Q4H PRN    sodium chloride (NS) flush 5-40 mL  5-40 mL IntraVENous Q8H    sodium chloride (NS) flush 5-40 mL  5-40 mL IntraVENous PRN    glucose chewable tablet 16 g  4 Tablet Oral PRN    glucagon (GLUCAGEN) injection 1 mg  1 mg IntraMUSCular PRN    dextrose 10 % infusion 0-250 mL  0-250 mL IntraVENous PRN       Labs:  ABG No results for input(s): PHI, PCO2I, PO2I, HCO3I, SO2I, FIO2I in the last 72 hours.      CBC Recent Labs     11/14/22 0233 11/13/22 0627 11/12/22  0300   WBC 8.9 10.1 10.0   HGB 7.4* 7.3* 7.8*   HCT 22.9* 22.3* 25.1*    256 201   MCV 89.1 87.1 91.6   MCH 28.8 28.5 92.1          Metabolic  Panel Recent Labs     11/14/22 0233 11/13/22 0627 11/12/22  0300   * 136 138   K 4.2 4.2 4.7    104 108   CO2 25 22 22   * 93 83   BUN 20 19 29*   CREA 0.75 0.79 1.05*   CA 8.3* 8.6 8.8   ALB 2.4* 2.5* 2.6*   ALT 9* 8* 10*          Pertinent Labs                Earl Oliva PA-C  11/14/2022

## 2022-11-14 NOTE — DIABETES MGMT
[address] [facility]    CLINICAL NURSE SPECIALIST CONSULT     Initial Presentation   Fiorella Jefferson is a 81 y.o. female who presented to [address] [place] [address] ED with a new onset L-sided facial droop and weakness. CTA performed showing a large vessel occlusion concerning of a distal basilar artery occlusion and transferred to [facility] for evaluation and management. HX:   Past Medical History:   Diagnosis Date    Colon polyps     Diabetes ([facility] [address]     Diverticulosis     Hypertension     Ill-defined condition     Pancreatitis     CVA    INITIAL DX:   Stroke ([facility] [address] [I63.9]     Current Treatment     TX: Mechanical thrombectomy    Consulted by  Marissa Renee MD  for advanced diabetes nursing assessment and care for:   [x] Inpatient management strategy  [x] Home management assessment    Hospital Course   Clinical progress has been uncomplicated. 10/31: Admission. Cerebral angiogram and mechanical thrombectomy, ICU for post-op care  11/1: MRI brain showing Bilateral cerebellar infarcts  11/2: Echo showed EF of 10 -15%. Left ventricle is moderately dilated. Mildly increased wall thickness. Transfer to acute care  11/4: [employer] single chamber ICD placement   11/6: Rapid response for hypotension, abd pain and lethargy. IVF. Albumin. ICU transfer and vasopressors started  11/7: Vasopressors off and transferred out of ICU  11/10: Waxing and waning mental status. CT head: Evolution of left cerebellar infarction as expected. No new superimposed hemorrhage. Acute anemia: KUB obtained: The bowel gas pattern is normal. Moderately levoconvexity distorts the abdominal cavity. 11/11: Pulmonary consult: left pleural effusion   11/14: Complete left hemithorax.   Likely will get a left thoracentesis   Diabetes History   Type 2 Diabetes: Diagnosed about 10 years ago  Ambulatory BG management provided by: River Reeder MD PCP     Diabetes-related Medical History    Neurological complications  Peripheral neuropathy  Microvascular disease  Nephropathy  Macrovascular disease  Cerebral vascular accident  Other associated conditions     HF    Diabetes Medication History  Key Antihyperglycemic Medications               glipiZIDE (GLUCOTROL) 5 mg tablet (Taking) Take 2 Tablets by mouth Daily (before breakfast). Gigi Vergara is a very poor historian. She struggles to tell me what medication she is taking daily for diabetes- does mention she forgets \"sometimes\". Diabetes self-management practices:   Eating pattern   I had a difficult time understanding her explanation of typical dietary intake    May have oatmeal for breakfast   Lunch/Dinner: Soup, pork chops, potatoes   Drinking: Water, juice, coffee  Physical activity pattern   Sedentary   Monitoring pattern   Has a glucometer, ran out of strips and lancets  Taking medications pattern  [x] In-consistent administration  [x] Affordable  Social determinants of health impacting diabetes self-management practices   Concerned that you need to know more about how to stay healthy with diabetes  Overall evaluation:    [x] Not achieving A1c target with drug therapy & self-care practices      Lives alone but next door to her son  Her son checks in with her most days  Does not drive  Subjective      Objective   Physical exam  General Underweight frail appearing AA female in no acute distress/ill-appearing. Neuro  Soundly sleeping  Vital Signs Visit Vitals  /74 (BP 1 Location: Left upper arm, BP Patient Position: At rest;Lying)   Pulse (!) 108   Temp 98.4 °F (36.9 °C)   Resp 22   Ht 5' 6\" (1.676 m)   Wt 57.5 kg (126 lb 12.2 oz)   SpO2 98%   BMI 20.46 kg/m²     Skin  Warm and dry. Acanthosis noted along neckline. No lipohypertrophy or lipoatrophy noted at injection sites   Heart   Regular rate and rhythm.  No murmurs, rubs or gallops  Lungs  Clear to auscultation without rales or rhonchi  Extremities No foot wounds        Laboratory  Recent Labs     11/14/22  8269 22  0627 22  0300   * 93 83   AGAP 7 10 8   WBC 8.9 10.1 10.0   CREA 0.75 0.79 1.05*   AST 14* 13* 13*   ALT 9* 8* 10*           Blood glucose pattern      Significant diabetes-related events over the past 24-72 hours  A1C 13.1%  Fasting B  Pre-prandial: 215-263  Basal: 8 units NPH AM, 4 units pm  Correction: 0 units in the last 24h  GFR 56  EF 10-15%  Not able to find nurse to discuss oral intake/patient's day. No family at bedside. Assessment and Plan   Nursing Diagnosis Risk for unstable blood glucose pattern   Nursing Intervention Domain 6798 Decision-making Support   Nursing Interventions Examined current inpatient diabetes/blood glucose control   Explored factors facilitating and impeding inpatient management  Explored corrective strategies with patient and responsible inpatient provider   Informed patient of rational for insulin strategy while hospitalized     Evaluation   Raven Nayak is an 81 y.o. 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 A1C on admission is significantly elevated at 13.1%. BG on admission was 444. She had very poor po intake POD 2 but as oral intake improved, -311. Moderate dose basal and low dose bolus insulin were than started. She was ordered mealtime humalog but was stopped as had low intake over the last several days. Today, her BG is elevated the first time in days- unclear if it is related to oral intake. Will continue to monitor and adjust insulin as needed. Please strive for optimal glucose control in the setting of CVA- 140-180mg/dl. Based on A1C on admission of 13.1% and BG of 444, patient likely needs attention from who is available to assist in her home diabetes management. Recommendations   POC glucose ACHS    Consistent carbohydrate diet (60 grams CHO/meal).  Please document oral intake. 3. Adjust the subcutaneous Insulin Order set (0843)  Insulin Dosing Specific recommendation   Basal                                       (Based on weight, BMI & GFR) 8 units NPH AM  4 units NPH PM  Allergy listed for Lantus (itching)      Corrective                                       (Useful in adjusting insulin dosing) High sensitivity ACHS         Billing Code(s)   [x] 50113     Before making these care recommendations, I personally reviewed the hospitalization record, including notes, laboratory & diagnostic data and current medications, and examined the patient at the bedside (circumstances permitting) before making care recommendations. More than fifty (50) percent of the time was spent in patient counseling and/or care coordination.   Total minutes: 13      YISSEL Holden BC-ADM  Board Certified Advanced Diabetes Manager  Clinical Nurse Specialist  [facility]  Access via [address] [place] [address]

## 2022-11-14 NOTE — PROGRESS NOTES
PUlm attending note. Chart reviewed , case discussed with  Rosita Martinez and chest imaging reviewed. Agree with the assessment and plans as  per Mr. Rosita Regans . Cxr seen and I believe it was post tap today and the left side is sig improved. (I could not find  the thoracentesis report. Eb Salmon  )    Cxr ordered for the am .

## 2022-11-14 NOTE — PROGRESS NOTES
Bedside and Verbal shift change report given to Ralph lPatt RN (oncoming nurse) by Vj Alejandre RN (offgoing nurse). Report included the following information SBAR, Kardex, ED Summary, Intake/Output, Recent Results, Cardiac Rhythm SR/AV Paced, Quality Measures, and Dual Neuro Assessment.

## 2022-11-14 NOTE — PROGRESS NOTES
Transition of Care Plan  RUR- High 20%  DISPOSITION: IPR - [facility] -  pending medical stability  F/U with PCP/Specialist    Transport: [facility] on will call    Emergency contact: Brandon Rojas [phone]    CM barriers to discharge: None    Plan for patient to discharge to [facility] when stable. Patient likely >48 hours from discharge. CM will follow. [facility] TODD Farmer.

## 2022-11-14 NOTE — PROGRESS NOTES
[facility] Adult  Hospitalist Group                                                                                          Hospitalist Progress Note  Mariah Erickson MD  Answering service: [phone] from in house phone        Date of Service:  2022  NAME:  Ebenezer Miller  :  1940  MRN:  [id number]      Admission Summary:     Pt is a 81 y.o F w/ PMH as above admitted to [facility] on 10/31 for suspected CVA w/ finding of acute bl posterior ischemic CVA, now s/p thrombectomy. Pt w/ finding of acute HFrEF 10-15% as part of CVA workup. Cardiology consulted and S/p AICD placement . Pt w/ episodes of hypotension intermittently responsive to small IVF bolus. Complaint of abd pain, n/v, somnolent but protecting airway and rouses to voice and answer questions. Transferred pt to ICU for further management. Hemodynamically stable, and transferred out of unit on      Interval history / Subjective:   No complaints this morning. Denies any pain. She is wondering when she can leave the hospital. All questions discussed/answered.       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 s/p thrombectomy  Subtherapeutic Plavix response  -MRI brain with acute bilateral superior cerebral infarct and mild petechial hemorrhage  -A1c 13.1 LDL 71  -TTE with no intra-arterial septum seen  -P2Y12  response 222 unchanged from previous, Plavix DC per neurology due to subtherapeutic response  -started on Eliquis, aspirin adjusted to 81 mg, lipitor   --CT scan of head on 11/5 no hemorrhage, no significant change of infarct  -continue Neurovascular checks  -SBP goal less than 140  -CT Head 11/10 without acute change   - Holding eliquis due to hemothorax     Acute on chronic systolic CHF with EF 10 to 15%  -s/p ICD placement 11/4 by cardiology   -Echocardiogram EF of 10 to 15%  -Holding all BP medications   -continue I/O and daily weight monitoring  - Euvolemic     Sepsis due to pneumonia, resolved  -on iv cefepime (today day 6/7) and fluconazole (day 6/7)  -tachycardia improved, afebrile and no leukocytosis   -MRSA negative, d/c vancomycin 11/8  -blood cx 11/6 no growth     JERAMY likely due to intravascular volume depletion, resolved  -avoid nephrotoxin   -monitor renal function   - Cr stable     T2DM with hyperglycemia  -A1c 13.1  -Blood sugars remain labile  -Continue NPH, sliding scale, monitor finger stick glucose   -Diabetes management team is on board  - Poor oral intake     Moderate protein calorie malnutrition   -BMI 18.68 kg/m². -nutritionist on board      Code status: Full Code  Prophylaxis: Eliquis  held given hemothorax  Care Plan discussed with: Patient, Nurse and CM  Anticipated Disposition: SNF > 48 hrs     Hospital Problems  Date Reviewed: 3/18/2022            Codes Class Noted POA    Stroke [facility]) ICD-10-CM: I63.9  ICD-9-CM: 434.91  10/31/2022 Unknown         Vital Signs:    Last 24hrs VS reviewed since prior progress note.  Most recent are:  Visit Vitals  /83 (BP 1 Location: Left upper arm, BP Patient Position: At rest)   Pulse 99   Temp 98.3 °F (36.8 °C)   Resp 21   Ht 5' 6\" (1.676 m)   Wt 57.5 kg (126 lb 12.2 oz)   SpO2 93%   BMI 20.46 kg/m²         Intake/Output Summary (Last 24 hours) at 11/14/2022 0858  Last data filed at 11/14/2022 1859  Gross per 24 hour   Intake --   Output 1500 ml   Net -1500 ml        Physical Examination:     I had a face to face encounter with this patient and independently examined them on 11/14/2022 as outlined below:          Constitutional:  No acute distress, cooperative, pleasant    ENT:  Oral mucosa moist, oropharynx benign. Resp:  Decrease bronchial breath sound bilaterally, no breath sounds in left lung base. No wheezing/rhonchi/rales. No accessory muscle use. CV:  Regular rhythm, normal rate, no murmurs, gallops, rubs    GI:  Soft, non distended, non tender. normoactive bowel sounds, no hepatosplenomegaly     Musculoskeletal:  No edema,      Neurologic:  Conscious and alert, oriented to place and person, moves all extremities. CN II-XII reviewed            Data Review:    Review and/or order of clinical lab test  Review and/or order of tests in the radiology section of CPT  Review and/or order of tests in the medicine section of CPT      Labs:     Recent Labs     11/14/22 0233 11/13/22 0627   WBC 8.9 10.1   HGB 7.4* 7.3*   HCT 22.9* 22.3*    256     Recent Labs     11/14/22 0233 11/13/22 0627 11/12/22  0300   * 136 138   K 4.2 4.2 4.7    104 108   CO2 25 22 22   BUN 20 19 29*   CREA 0.75 0.79 1.05*   * 93 83   CA 8.3* 8.6 8.8     Recent Labs     11/14/22 0233 11/13/22 0627 11/12/22  0300   ALT 9* 8* 10*    108 110   TBILI 0.7 0.7 0.6   TP 5.8* 5.8* 5.7*   ALB 2.4* 2.5* 2.6*   GLOB 3.4 3.3 3.1     No results for input(s): INR, PTP, APTT, INREXT, INREXT in the last 72 hours. No results for input(s): FE, TIBC, PSAT, FERR in the last 72 hours. No results found for: FOL, RBCF   No results for input(s): PH, PCO2, PO2 in the last 72 hours. No results for input(s): CPK, CKNDX, TROIQ in the last 72 hours.     No lab exists for component: CPKMB  Lab Results   Component Value Date/Time    Cholesterol, total 146 11/01/2022 03:30 AM    HDL Cholesterol 69 11/01/2022 03:30 AM    LDL, calculated 71 11/01/2022 03:30 AM    Triglyceride 30 11/01/2022 03:30 AM    CHOL/HDL Ratio 2.1 11/01/2022 03:30 AM     Lab Results   Component Value Date/Time Glucose (POC) 203 (H) 11/14/2022 08:44 AM    Glucose (POC) 215 (H) 11/13/2022 09:16 PM    Glucose (POC) 216 (H) 11/13/2022 04:28 PM    Glucose (POC) 260 (H) 11/13/2022 11:38 AM    Glucose (POC) 107 11/13/2022 08:09 AM     Lab Results   Component Value Date/Time    Color YELLOW/STRAW 11/06/2022 06:43 PM    Appearance CLEAR 11/06/2022 06:43 PM    Specific gravity 1.025 11/06/2022 06:43 PM    Specific gravity 1.029 10/31/2022 12:54 PM    pH (UA) 5.5 11/06/2022 06:43 PM    Protein Negative 11/06/2022 06:43 PM    Glucose 100 (A) 11/06/2022 06:43 PM    Ketone TRACE (A) 11/06/2022 06:43 PM    Bilirubin Negative 11/06/2022 06:43 PM    Urobilinogen 0.2 11/06/2022 06:43 PM    Nitrites Negative 11/06/2022 06:43 PM    Leukocyte Esterase MODERATE (A) 11/06/2022 06:43 PM    Epithelial cells FEW 11/06/2022 06:43 PM    Bacteria Negative 11/06/2022 06:43 PM    WBC 10-20 11/06/2022 06:43 PM    RBC 0-5 11/06/2022 06:43 PM         Medications Reviewed:     Current Facility-Administered Medications   Medication Dose Route Frequency    polyethylene glycol (MIRALAX) packet 17 g  17 g Oral BID    insulin NPH (NOVOLIN N, HUMULIN N) injection 4 Units  4 Units SubCUTAneous QHS    HYDROcodone-acetaminophen (NORCO) 5-325 mg per tablet 1 Tablet  1 Tablet Oral Q6H PRN    insulin NPH (NOVOLIN N, HUMULIN N) injection 8 Units  8 Units SubCUTAneous QAM    insulin lispro (HUMALOG) injection   SubCUTAneous AC&HS    dextrose 10 % infusion 0-250 mL  0-250 mL IntraVENous PRN    [Held by provider] apixaban (ELIQUIS) tablet 2.5 mg  2.5 mg Oral BID    aspirin chewable tablet 81 mg  81 mg Oral DAILY    hydrALAZINE (APRESOLINE) 20 mg/mL injection 10 mg  10 mg IntraVENous Q6H PRN    sodium chloride (NS) flush 5-40 mL  5-40 mL IntraVENous Q8H    sodium chloride (NS) flush 5-40 mL  5-40 mL IntraVENous PRN    [Held by provider] carvediloL (COREG) tablet 6.25 mg  6.25 mg Oral BID WITH MEALS    pantoprazole (PROTONIX) tablet 40 mg  40 mg Oral ACB    [Held by provider] traMADoL (ULTRAM) tablet 50 mg  50 mg Oral Q6H PRN    dextrose 10 % infusion 0-250 mL  0-250 mL IntraVENous PRN    senna-docusate (PERICOLACE) 8.6-50 mg per tablet 1 Tablet  1 Tablet Oral DAILY    lidocaine 4 % patch 2 Patch  2 Patch TransDERmal Q24H    atorvastatin (LIPITOR) tablet 10 mg  10 mg Oral QHS    ondansetron (ZOFRAN) injection 4 mg  4 mg IntraVENous Q4H PRN    acetaminophen (TYLENOL) tablet 650 mg  650 mg Oral Q4H PRN    Or    acetaminophen (TYLENOL) solution 650 mg  650 mg Per NG tube Q4H PRN    Or    acetaminophen (TYLENOL) suppository 650 mg  650 mg Rectal Q4H PRN    sodium chloride (NS) flush 5-40 mL  5-40 mL IntraVENous Q8H    sodium chloride (NS) flush 5-40 mL  5-40 mL IntraVENous PRN    glucose chewable tablet 16 g  4 Tablet Oral PRN    glucagon (GLUCAGEN) injection 1 mg  1 mg IntraMUSCular PRN    dextrose 10 % infusion 0-250 mL  0-250 mL IntraVENous PRN     ______________________________________________________________________  EXPECTED LENGTH OF STAY: 4d 2h  ACTUAL LENGTH OF STAY:          15                 Lisa Schultz MD

## 2022-11-14 NOTE — PROGRESS NOTES
Problem: Pressure Injury - Risk of  Goal: *Prevention of pressure injury  Description: Document Jon Scale and appropriate interventions in the flowsheet. Outcome: Progressing Towards Goal  Note: Pressure Injury Interventions:  Sensory Interventions: Assess changes in LOC, Discuss PT/OT consult with provider, Keep linens dry and wrinkle-free    Moisture Interventions: Absorbent underpads    Activity Interventions: Increase time out of bed    Mobility Interventions: HOB 30 degrees or less    Nutrition Interventions: Document food/fluid/supplement intake    Friction and Shear Interventions: HOB 30 degrees or less                Problem: Patient Education: Go to Patient Education Activity  Goal: Patient/Family Education  Outcome: Progressing Towards Goal     Problem: Falls - Risk of  Goal: *Absence of Falls  Description: Document Sourav Fall Risk and appropriate interventions in the flowsheet. Outcome: Progressing Towards Goal  Note: Fall Risk Interventions:  Mobility Interventions: Bed/chair exit alarm    Mentation Interventions: Adequate sleep, hydration, pain control    Medication Interventions: Assess postural VS orthostatic hypotension    Elimination Interventions: Call light in reach    History of Falls Interventions: Consult care management for discharge planning         Problem: Patient Education: Go to Patient Education Activity  Goal: Patient/Family Education  Outcome: Progressing Towards Goal     Problem: Patient Education: Go to Patient Education Activity  Goal: Patient/Family Education  Outcome: Progressing Towards Goal     Problem: Diabetes Self-Management  Goal: *Disease process and treatment process  Description: Define diabetes and identify own type of diabetes; list 3 options for treating diabetes.   Outcome: Progressing Towards Goal  Goal: *Incorporating nutritional management into lifestyle  Description: Describe effect of type, amount and timing of food on blood glucose; list 3 methods for planning meals. Outcome: Progressing Towards Goal  Goal: *Incorporating physical activity into lifestyle  Description: State effect of exercise on blood glucose levels. Outcome: Progressing Towards Goal  Goal: *Developing strategies to promote health/change behavior  Description: Define the ABC's of diabetes; identify appropriate screenings, schedule and personal plan for screenings. Outcome: Progressing Towards Goal  Goal: *Using medications safely  Description: State effect of diabetes medications on diabetes; name diabetes medication taking, action and side effects. Outcome: Progressing Towards Goal  Goal: *Monitoring blood glucose, interpreting and using results  Description: Identify recommended blood glucose targets  and personal targets. Outcome: Progressing Towards Goal  Goal: *Prevention, detection, treatment of acute complications  Description: List symptoms of hyper- and hypoglycemia; describe how to treat low blood sugar and actions for lowering  high blood glucose level. Outcome: Progressing Towards Goal  Goal: *Prevention, detection and treatment of chronic complications  Description: Define the natural course of diabetes and describe the relationship of blood glucose levels to long term complications of diabetes.   Outcome: Progressing Towards Goal  Goal: *Developing strategies to address psychosocial issues  Description: Describe feelings about living with diabetes; identify support needed and support network  Outcome: Progressing Towards Goal  Goal: *Insulin pump training  Outcome: Progressing Towards Goal  Goal: *Sick day guidelines  Outcome: Progressing Towards Goal  Goal: *Patient Specific Goal (EDIT GOAL, INSERT TEXT)  Outcome: Progressing Towards Goal     Problem: Patient Education: Go to Patient Education Activity  Goal: Patient/Family Education  Outcome: Progressing Towards Goal     Problem: Patient Education: Go to Patient Education Activity  Goal: Patient/Family Education  Outcome: Progressing Towards Goal

## 2022-11-14 NOTE — PROGRESS NOTES
Bedside and Verbal shift change report given to [facility] [address] (oncoming nurse) by [facility] RN (offgoing nurse). Report included the following information SBAR, Kardex, Intake/Output, MAR, Recent Results, and Med Rec Status.

## 2022-11-15 NOTE — PROGRESS NOTES
[address] Adult  Hospitalist Group                                                                                          Hospitalist Progress Note  Chandler Graham MD  Answering service: [phone] from in house phone        Date of Service:  11/15/2022  NAME:  Yulisa Guadalupe  :  1940  MRN:  [id number]      Admission Summary:     Pt is a 81 y.o F w/ PMH as above admitted to [facility] on 10/31 for suspected CVA w/ finding of acute bl posterior ischemic CVA, now s/p thrombectomy. Pt w/ finding of acute HFrEF 10-15% as part of CVA workup. Cardiology consulted and S/p AICD placement . Pt w/ episodes of hypotension intermittently responsive to small IVF bolus. Complaint of abd pain, n/v, somnolent but protecting airway and rouses to voice and answer questions. Transferred pt to ICU for further management. Hemodynamically stable, and transferred out of unit on      Interval history / Subjective:   No complaints this morning. States she doesn't have pain at thoracentesis site. Thoracentesis yesterday with 1500cc removed; rapid response called last night with low BP noted but improved afterwards and has been stable since. Assessment & Plan:     Large Left Pleural Effusion, concerning for Hemothorax s/p thoracentesis   Acute on chronic blood loss anemia   - Demonstrated on CT abdomen   - Pulmonology consulted  - Discussed case with cardiology, no additional interventions from their end   - HOLDING eliquis   - Hemoglobin has been stable ~7.5  - S/p thoracentesis yesterday with 1500cc removed, subsequent CXR with interval decreased in hemothorax   - Vitals stable today and hemoglobin stable   - Repeat CXR   - Will monitor, if stable tomorrow, then will restart eliquis and plan for hopeful disposition by end of week.      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 s/p thrombectomy  Subtherapeutic Plavix response  -MRI brain with acute bilateral superior cerebral infarct and mild petechial hemorrhage  -A1c 13.1 LDL 71  -TTE with no intra-arterial septum seen  -P2Y12  response 222 unchanged from previous, Plavix DC per neurology due to subtherapeutic response  -started on Eliquis, aspirin adjusted to 81 mg, lipitor   --CT scan of head on 11/5 no hemorrhage, no significant change of infarct  -continue Neurovascular checks  -SBP goal less than 140  -CT Head 11/10 without acute change   - Holding eliquis due to hemothorax, see above      Acute on chronic systolic CHF with EF 10 to 15%  -s/p ICD placement 11/4 by cardiology   -Echocardiogram EF of 10 to 15%  -Holding all BP medications   -continue I/O and daily weight monitoring  - Euvolemic     Sepsis due to pneumonia, resolved  -on iv cefepime (today day 6/7) and fluconazole (day 6/7)  -tachycardia improved, afebrile and no leukocytosis   -MRSA negative, d/c vancomycin 11/8  -blood cx 11/6 no growth     JERAMY likely due to intravascular volume depletion, resolved  -avoid nephrotoxin   -monitor renal function   - Cr stable     T2DM with hyperglycemia  -A1c 13.1  -Blood sugars remain labile  -Continue NPH, sliding scale, monitor finger stick glucose   -Diabetes management team is on board  - Poor oral intake     Moderate protein calorie malnutrition   -BMI 18.68 kg/m². -nutritionist on board      Code status: Full Code  Prophylaxis: Eliquis  held given hemothorax, likely restart tomorrow   Care Plan discussed with: Patient, Nurse and CM  Anticipated Disposition: SNF in 24-48h hrs     Hospital Problems  Date Reviewed: 3/18/2022            Codes Class Noted POA    Stroke [facility]) ICD-10-CM: I63.9  ICD-9-CM: 434.91  10/31/2022 Unknown         Vital Signs:    Last 24hrs VS reviewed since prior progress note.  Most recent are:  Visit Vitals  /60 (BP Patient Position: Sitting)   Pulse 91   Temp 98.7 °F (37.1 °C)   Resp 24   Ht 5' 6\" (1.676 m)   Wt 55.9 kg (123 lb 3.8 oz)   SpO2 100%   BMI 19.89 kg/m²       No intake or output data in the 24 hours ending 11/15/22 1531       Physical Examination:     I had a face to face encounter with this patient and independently examined them on 11/15/2022 as outlined below:          Constitutional:  No acute distress, cooperative, pleasant    ENT:  Oral mucosa moist, oropharynx benign. Resp:  Improved aeration over left lung field. No wheezing/rhonchi/rales. No accessory muscle use. CV:  Regular rhythm, normal rate, no murmurs, gallops, rubs    GI:  Soft, non distended, non tender. normoactive bowel sounds, no hepatosplenomegaly     Musculoskeletal:  No edema,      Neurologic:  Conscious and alert, oriented to place and person, moves all extremities. CN II-XII reviewed            Data Review:    Review and/or order of clinical lab test  Review and/or order of tests in the radiology section of CPT  Review and/or order of tests in the medicine section of CPT      Labs:     Recent Labs     11/15/22  0420 11/14/22  0233   WBC 9.4 8.9   HGB 7.6* 7.4*   HCT 23.6* 22.9*    259     Recent Labs     11/15/22  0420 11/14/22  0233 11/13/22  0627   * 135* 136   K 4.3 4.2 4.2    103 104   CO2 24 25 22   BUN 19 20 19   CREA 0.74 0.75 0.79   * 225* 93   CA 8.8 8.3* 8.6     Recent Labs     11/15/22  0420 11/14/22  0233 11/13/22  0627   ALT 9* 9* 8*   AP 99 117 108   TBILI 0.6 0.7 0.7   TP 5.7* 5.8* 5.8*   ALB 2.0* 2.4* 2.5*   GLOB 3.7 3.4 3.3     No results for input(s): INR, PTP, APTT, INREXT, INREXT in the last 72 hours. No results for input(s): FE, TIBC, PSAT, FERR in the last 72 hours. No results found for: FOL, RBCF   No results for input(s): PH, PCO2, PO2 in the last 72 hours. No results for input(s): CPK, CKNDX, TROIQ in the last 72 hours.     No lab exists for component: CPKMB  Lab Results   Component Value Date/Time    Cholesterol, total 146 11/01/2022 03:30 AM    HDL Cholesterol 69 11/01/2022 03:30 AM    LDL, calculated 71 11/01/2022 03:30 AM    Triglyceride 30 11/01/2022 03:30 AM    CHOL/HDL Ratio 2.1 11/01/2022 03:30 AM     Lab Results   Component Value Date/Time    Glucose (POC) 259 (H) 11/15/2022 11:05 AM    Glucose (POC) 193 (H) 11/15/2022 07:53 AM    Glucose (POC) 189 (H) 11/14/2022 10:06 PM    Glucose (POC) 211 (H) 11/14/2022 04:21 PM    Glucose (POC) 263 (H) 11/14/2022 11:35 AM     Lab Results   Component Value Date/Time    Color YELLOW/STRAW 11/06/2022 06:43 PM    Appearance CLEAR 11/06/2022 06:43 PM    Specific gravity 1.025 11/06/2022 06:43 PM    Specific gravity 1.029 10/31/2022 12:54 PM    pH (UA) 5.5 11/06/2022 06:43 PM    Protein Negative 11/06/2022 06:43 PM    Glucose 100 (A) 11/06/2022 06:43 PM    Ketone TRACE (A) 11/06/2022 06:43 PM    Bilirubin Negative 11/06/2022 06:43 PM    Urobilinogen 0.2 11/06/2022 06:43 PM    Nitrites Negative 11/06/2022 06:43 PM    Leukocyte Esterase MODERATE (A) 11/06/2022 06:43 PM    Epithelial cells FEW 11/06/2022 06:43 PM    Bacteria Negative 11/06/2022 06:43 PM    WBC 10-20 11/06/2022 06:43 PM    RBC 0-5 11/06/2022 06:43 PM         Medications Reviewed:     Current Facility-Administered Medications   Medication Dose Route Frequency    polyethylene glycol (MIRALAX) packet 17 g  17 g Oral BID    insulin NPH (NOVOLIN N, HUMULIN N) injection 4 Units  4 Units SubCUTAneous QHS    HYDROcodone-acetaminophen (NORCO) 5-325 mg per tablet 1 Tablet  1 Tablet Oral Q6H PRN    insulin NPH (NOVOLIN N, HUMULIN N) injection 8 Units  8 Units SubCUTAneous QAM    insulin lispro (HUMALOG) injection   SubCUTAneous AC&HS    dextrose 10 % infusion 0-250 mL  0-250 mL IntraVENous PRN    [Held by provider] apixaban (ELIQUIS) tablet 2.5 mg  2.5 mg Oral BID    aspirin chewable tablet 81 mg  81 mg Oral DAILY    hydrALAZINE (APRESOLINE) 20 mg/mL injection 10 mg  10 mg IntraVENous Q6H PRN    sodium chloride (NS) flush 5-40 mL  5-40 mL IntraVENous Q8H    sodium chloride (NS) flush 5-40 mL  5-40 mL IntraVENous PRN    [Held by provider] carvediloL (COREG) tablet 6.25 mg  6.25 mg Oral BID WITH MEALS    pantoprazole (PROTONIX) tablet 40 mg  40 mg Oral ACB    [Held by provider] traMADoL (ULTRAM) tablet 50 mg  50 mg Oral Q6H PRN    dextrose 10 % infusion 0-250 mL  0-250 mL IntraVENous PRN    senna-docusate (PERICOLACE) 8.6-50 mg per tablet 1 Tablet  1 Tablet Oral DAILY    lidocaine 4 % patch 2 Patch  2 Patch TransDERmal Q24H    atorvastatin (LIPITOR) tablet 10 mg  10 mg Oral QHS    ondansetron (ZOFRAN) injection 4 mg  4 mg IntraVENous Q4H PRN    acetaminophen (TYLENOL) tablet 650 mg  650 mg Oral Q4H PRN    Or    acetaminophen (TYLENOL) solution 650 mg  650 mg Per NG tube Q4H PRN    Or    acetaminophen (TYLENOL) suppository 650 mg  650 mg Rectal Q4H PRN    sodium chloride (NS) flush 5-40 mL  5-40 mL IntraVENous Q8H    sodium chloride (NS) flush 5-40 mL  5-40 mL IntraVENous PRN    glucose chewable tablet 16 g  4 Tablet Oral PRN    glucagon (GLUCAGEN) injection 1 mg  1 mg IntraMUSCular PRN    dextrose 10 % infusion 0-250 mL  0-250 mL IntraVENous PRN     ______________________________________________________________________  EXPECTED LENGTH OF STAY: 7d 2h  ACTUAL LENGTH OF STAY:          13                 Lisa Hull MD

## 2022-11-15 NOTE — PROGRESS NOTES
Problem: Self Care Deficits Care Plan (Adult)  Goal: *Acute Goals and Plan of Care (Insert Text)  Description: FUNCTIONAL STATUS PRIOR TO ADMISSION: Patient was modified independent with ADLs, IADLs, and functional mobility using a SPC. HOME SUPPORT: The patient lived alone; however, reports her son lives next door and two other family members live down the road. Occupational Therapy Goals  Initiated 11/1/2022, continued 11/4/2022, continued 11/11/2022  1. Patient will perform self-feeding with LUE as FM/GM assist with supervision/set-up within 7 day(s). 2.  Patient will perform grooming EOB with LUE as FM/GM assist with minimal assistance/contact guard assist within 7 day(s). 3.  Patient will perform bathing with moderate assistance within 7 day(s). 4.  Patient will perform upper body dressing with supervision/set-up within 7 day(s). 5.  Patient will perform lower body dressing with maximal assistance within 7 day(s). 6.  Patient will perform toilet transfers to/from [facility] with moderate assistance within 7 day(s). 7.  Patient will perform all aspects of toileting with maximal assistance within 7 day(s). 8.  Patient will participate in upper extremity therapeutic exercise/activities with supervision/set-up within 7 day(s). (Within LUE ICD precautions)  9. Patient will utilize energy conservation techniques during functional activities with verbal, visual, and tactile cues within 7 day(s). 10.  Patient will complete the Lovette Mare within 7 days. Outcome: Progressing Towards Goal   OCCUPATIONAL THERAPY TREATMENT  Patient: Raven Nayak (38 y.o. female)  Date: 11/15/2022  Diagnosis: Stroke ([facility] [address] [I63.9] <principal problem not specified>  Procedure(s) (LRB):  INSERT ICD SINGLE (N/A) 11 Days Post-Op  Precautions: Fall, Skin (SBP<140)  Chart, occupational therapy assessment, plan of care, and goals were reviewed.        ASSESSMENT  Patient continues with skilled OT services and is progressing towards goals. Patient presents this session with n/v with mobility, impaired activity tolerance, initially drowsy but improves over session, BP increasing with mobility, and requiring increased assist for mobility this date. Patient participating in bed level exercises prior to mobility in attempts to raise BP. Patient sat edge of bed and after several minutes had episode of emesis, nursing made aware. Patient returned to side lying after emesis ceased and repositioned in bed for comfort and with HOB raised and emesis bag in hand. Patient with poor tolerance due to general feelings of fatigue and nausea. Patient making slow overall progress toward goals. Noted prior to emesis that patient with increased secretions and difficulty managing with drooling noted at edge of bed. Patient would benefit from skilled OT services during admission to improve independence with self care and functional mobility/transfers. [facility] to [facility] pending progress at this time. 11/15/22 1413 11/15/22 1421   Vital Signs   Pulse (Heart Rate) 96  --    Heart Rate Source Monitor  --    BP 99/64 95/74   MAP (Calculated) 76 81   BP 1 Location Right upper arm  --    BP 1 Method Automatic  --    BP Patient Position Lying  (post exercises) Sitting   Resp Rate 24  --    O2 Sat (%) 100 %  --    O2 Device None (Room air)  --       11/15/22 1426   Vital Signs   Pulse (Heart Rate)  --    Heart Rate Source  --    /60   MAP (Calculated) 77   BP 1 Location  --    BP 1 Method  --    BP Patient Position Sitting   Resp Rate  --    O2 Sat (%)  --    O2 Device  --      Current Level of Function Impacting Discharge (ADLs): min to mod A for mobility/transfers, setup for grooming EOB    Other factors to consider for discharge: prior level of function, limited progress toward goals         PLAN :  Patient continues to benefit from skilled intervention to address the above impairments.   Continue treatment per established plan of care to address goals. Recommend with staff: bed in modified chair position for meals, bedpan for toileting    Recommend next OT session: continue POC    Recommendation for discharge: (in order for the patient to meet his/her long term goals)  Therapy 3 hours per day 5-7 days per week, pending progress/tolerance    This discharge recommendation:  Has been made in collaboration with the attending provider and/or case management    IF patient discharges home will need the following DME: TBD pending progress       SUBJECTIVE:   Patient stated I have a lot of grand kids.     OBJECTIVE DATA SUMMARY:   Cognitive/Behavioral Status:  Neurologic State: Drowsy (initially drowsy but increased alertness over session)  Orientation Level: Oriented to person  Cognition: Follows commands             Functional Mobility and Transfers for ADLs:  Bed Mobility:  Supine to Sit: Moderate assistance; Additional time;Bed Modified (HOB elevated and rail provided; cues for LUE non-use)  Sit to Supine: Minimum assistance; Additional time  Scooting: Moderate assistance; Additional time (pad assist for hips to EOB/feet flat on floor)    Transfers:             Balance:  Sitting: Impaired  Sitting - Static: Good (unsupported)  Sitting - Dynamic: Fair (occasional)  Standing:  (NT)    ADL Intervention:       Grooming  Position Performed: Seated edge of bed  Washing Face: Set-up                                       Therapeutic Exercises:   Exercise: Sets: Reps:  Active range of motion: Active assist range of motion: Passive range of motion: Self range of motion: Comments:   Shoulder flexion/extension 1 10 [] [x] [] [] To 90 degrees due to pacemaker   Elbow flexion/extension 1 10 [] [x] [] []         Pain:  Pain on left side of abdomen, patient pointing and unable to rate    Activity Tolerance:   Fair and requires rest breaks    After treatment patient left in no apparent distress:   Supine in bed, Call bell within reach, Bed / chair alarm activated, and Side rails x 3    COMMUNICATION/COLLABORATION:   The patients plan of care was discussed with: Physical therapist and Registered nurse.      Malena Spring OTR/L  Time Calculation: 23 mins

## 2022-11-15 NOTE — PROGRESS NOTES
Pulmonary, Critical Care, and Sleep Medicine~Progress Note    Name: Joan Villafana MRN: [id number]   : 1940 Hospital: [facility]   Date: 11/15/2022 2:42 PM Admission: 10/31/2022     Impression Plan   Left pleural effusion, iatrogenic following ICD placement   Acute Ischemic CVA  CM EF 10-15%, s/p  ICD placement    Hx of a fib   HTN  DM II  Reported chronic pancreatitis   NO BLOOD PRODUCTS Eliquis on hold   HgB has been stable   Chest film tomorrow. Might need another tap, bu we will assess tomorrow   O2 titration above 90%     Daily Progression:    11/15  Bloody exudative pleural effusion. 1.5L removed. HgB stable  Breathing better, but site pain noted       Now with complete left hemithorax       Consult Note requested by hospitalist    Patient presented for admission 10/31 for CVA; was taken to angiogram for thrombectomy; then monitored in ICU until . Transferred out of ICU monitoring on . ICD was placed on  by Dr Flor Gómez for CM. 22 chest x-ray post procedure did not show any pleural effusions nor PTX. Chest x-ray on 22 showed a new moderate to large left pleural effusion. By CT abdomen today showed a larger left pleural effusion with increased HUs suggestive of hemothorax. She has been having increasing left sided chest pains since that time. HgB has trended down since then. Eliquis was restarted on 22. Currently HD stable, though BP a little soft. On room air. No acute dyspnea. She was on plavix and eliquis as outpatient. 22 ECHO  Result status: Final result       Left Ventricle: Severely reduced left ventricular systolic function with a visually estimated EF of 10 -15%. Left ventricle is moderately dilated. Mildly increased wall thickness. Severe global hypokinesis present. Abnormal diastolic function. Aortic Valve: Tricuspid valve. Mild regurgitation. Mitral Valve: Mild to moderate regurgitation. Interatrial Septum: Agitated saline study was negative with and without provocation. I have reviewed the labs and previous days notes. A comprehensive review of systems was negative. Past Medical History:   Diagnosis Date    Colon polyps     Diabetes ([address]     Diverticulosis     Hypertension     Ill-defined condition     Pancreatitis       Past Surgical History:   Procedure Laterality Date    COLONOSCOPY N/A 4/29/2021    COLONOSCOPY performed by Wing Bert MD at [facility]  2021    x2    IR [facility] THROMB INFUSION INTRACRANIAL  10/31/2022      Prior to Admission medications    Medication Sig Start Date End Date Taking? Authorizing Provider   sacubitril/valsartan (ENTRESTO PO) Take  by mouth. Yes Provider, Historical   glipiZIDE (GLUCOTROL) 5 mg tablet Take 2 Tablets by mouth Daily (before breakfast). 3/22/22  Yes Ambrose Rai PA-C   apixaban (ELIQUIS) 2.5 mg tablet Take 2.5 mg by mouth daily. Yes Provider, Historical   spironolactone (ALDACTONE) 25 mg tablet Take 25 mg by mouth daily. Yes Provider, Historical   acetaminophen (TYLENOL) 650 mg TbER Take 650 mg by mouth every eight (8) hours as needed for Pain. Indications: pain   Yes Provider, Historical   furosemide (LASIX) 40 mg tablet Take 1 Tablet by mouth daily. 11/24/21  Yes Kiera Gaviria PA-C   pantoprazole (PROTONIX) 40 mg tablet Take 1 Tablet by mouth Daily (before breakfast). 11/25/21  Yes Kiera Gaviria PA-C   clopidogreL (PLAVIX) 75 mg tab Take 1 Tablet by mouth daily. 10/30/21  Yes Edilia Yarbrough MD   carvediloL (COREG) 3.125 mg tablet Take 1 Tablet by mouth two (2) times daily (with meals).  10/29/21  Yes Edilia Yarbrough MD     Allergies   Allergen Reactions    Insulins Itching    Penicillins Other (comments)     Pt states it makes her pass out      Social History     Tobacco Use    Smoking status: Never    Smokeless tobacco: Never   Substance Use Topics    Alcohol use: Not Currently      Family History   Problem Relation Age of Onset    Diabetes Other      OBJECTIVE:     Vital Signs:     Visit Vitals  /69 (BP 1 Location: Right upper arm, BP Patient Position: At rest)   Pulse 89   Temp 98.2 °F (36.8 °C)   Resp 16   Ht 5' 6\" (1.676 m)   Wt 55.9 kg (123 lb 3.8 oz)   SpO2 100%   BMI 19.89 kg/m²      Temp (24hrs), Av.2 °F (36.8 °C), Min:97.9 °F (36.6 °C), Max:98.6 °F (37 °C)     Intake/Output:     Last shift: No intake/output data recorded.     Last 3 shifts:  1901 - 11/15 0700  In: -   Out: 1500 [Urine:1500]      No intake or output data in the 24 hours ending 11/15/22 5645      Physical Exam:                                        Exam Findings Other   General: No resp distress noted, appears stated age    [de-identified]:  No ulcers, JVD not elevated, no cervical LAD    Chest: No pectus deformity, normal chest rise b/l    HEART:  RRR, no murmurs/rubs/gallops    Lungs:  CTA b/l, no rhonchi/crackles/wheeze, diminished BS at bases    ABD: Soft/NT, non rigid mildly distended    EXT: No cyanosis/clubbing/edema, normal peripheral pulses    Skin: No rashes or ulcers, no mottling    Neuro: A/O x 3        Medications:  Current Facility-Administered Medications   Medication Dose Route Frequency    polyethylene glycol (MIRALAX) packet 17 g  17 g Oral BID    insulin NPH (NOVOLIN N, HUMULIN N) injection 4 Units  4 Units SubCUTAneous QHS    HYDROcodone-acetaminophen (NORCO) 5-325 mg per tablet 1 Tablet  1 Tablet Oral Q6H PRN    insulin NPH (NOVOLIN N, HUMULIN N) injection 8 Units  8 Units SubCUTAneous QAM    insulin lispro (HUMALOG) injection   SubCUTAneous AC&HS    dextrose 10 % infusion 0-250 mL  0-250 mL IntraVENous PRN    [Held by provider] apixaban (ELIQUIS) tablet 2.5 mg  2.5 mg Oral BID    aspirin chewable tablet 81 mg  81 mg Oral DAILY    hydrALAZINE (APRESOLINE) 20 mg/mL injection 10 mg  10 mg IntraVENous Q6H PRN    sodium chloride (NS) flush 5-40 mL  5-40 mL IntraVENous Q8H sodium chloride (NS) flush 5-40 mL  5-40 mL IntraVENous PRN    [Held by provider] carvediloL (COREG) tablet 6.25 mg  6.25 mg Oral BID WITH MEALS    pantoprazole (PROTONIX) tablet 40 mg  40 mg Oral ACB    [Held by provider] traMADoL (ULTRAM) tablet 50 mg  50 mg Oral Q6H PRN    dextrose 10 % infusion 0-250 mL  0-250 mL IntraVENous PRN    senna-docusate (PERICOLACE) 8.6-50 mg per tablet 1 Tablet  1 Tablet Oral DAILY    lidocaine 4 % patch 2 Patch  2 Patch TransDERmal Q24H    atorvastatin (LIPITOR) tablet 10 mg  10 mg Oral QHS    ondansetron (ZOFRAN) injection 4 mg  4 mg IntraVENous Q4H PRN    acetaminophen (TYLENOL) tablet 650 mg  650 mg Oral Q4H PRN    Or    acetaminophen (TYLENOL) solution 650 mg  650 mg Per NG tube Q4H PRN    Or    acetaminophen (TYLENOL) suppository 650 mg  650 mg Rectal Q4H PRN    sodium chloride (NS) flush 5-40 mL  5-40 mL IntraVENous Q8H    sodium chloride (NS) flush 5-40 mL  5-40 mL IntraVENous PRN    glucose chewable tablet 16 g  4 Tablet Oral PRN    glucagon (GLUCAGEN) injection 1 mg  1 mg IntraMUSCular PRN    dextrose 10 % infusion 0-250 mL  0-250 mL IntraVENous PRN       Labs:  ABG No results for input(s): PHI, PCO2I, PO2I, HCO3I, SO2I, FIO2I in the last 72 hours.      CBC Recent Labs     11/15/22  0420 11/14/22  0233 11/13/22  0627   WBC 9.4 8.9 10.1   HGB 7.6* 7.4* 7.3*   HCT 23.6* 22.9* 22.3*    259 256   MCV 89.4 89.1 87.1   MCH 28.8 28.8 22.8          Metabolic  Panel Recent Labs     11/15/22  0420 11/14/22  0233 11/13/22  0627   * 135* 136   K 4.3 4.2 4.2    103 104   CO2 24 25 22   * 225* 93   BUN 19 20 19   CREA 0.74 0.75 0.79   CA 8.8 8.3* 8.6   ALB 2.0* 2.4* 2.5*   ALT 9* 9* 8*          Pertinent Labs                Earl Zavala PA-C  11/15/2022

## 2022-11-15 NOTE — PROGRESS NOTES
Problem: Mobility Impaired (Adult and Pediatric)  Goal: *Acute Goals and Plan of Care (Insert Text)  Description:   FUNCTIONAL STATUS PRIOR TO ADMISSION: Patient was modified independent using a single point cane for functional mobility. HOME SUPPORT PRIOR TO ADMISSION: The patient lived alone with several family members living nearby (son next  door). Physical Therapy Goals  Updated 11/11/2022  1. Patient will move from supine to sit and sit to supine  in bed with supervision/set-up within 7 day(s). 2.  Patient will transfer from bed to chair and chair to bed with minimal assistance/contact guard assist using the least restrictive device within 7 day(s). 3.  Patient will perform sit to stand with minimal assistance/contact guard assist within 7 day(s). 4.  Patient will ambulate with moderate assistance  x2 for 15 feet with the least restrictive device within 7 day(s). 5.  Patient will recall 3/3 PPM precautions and demonstrate adherence with mobility with min cues within 7 days. Physical Therapy Goals  Updated s/p ICD placement 11/4/2022  1. Patient will move from supine to sit and sit to supine , scoot up and down, and roll side to side in bed with modified independence within 7 day(s). 2.  Patient will transfer from bed to chair and chair to bed with min assistance using the least restrictive device within 7 day(s). 3.  Patient will perform sit to stand with min assistance within 7 day(s). 4.  Patient will ambulate with minimal assistance/contact guard assist for 50 feet with the least restrictive device within 7 day(s). 5.  Patient will ascend/descend 4 stairs with one handrail(s) with minimal assistance/contact guard assist within 7 day(s). 6.  Patient will improve Black Balance score by 7 points within 7 days. Initiated 11/1/2022  1.   Patient will move from supine to sit and sit to supine , scoot up and down, and roll side to side in bed with modified independence within 7 day(s). 2.  Patient will transfer from bed to chair and chair to bed with modified independence using the least restrictive device within 7 day(s). 3.  Patient will perform sit to stand with supervision/set-up within 7 day(s). 4.  Patient will ambulate with minimal assistance/contact guard assist for 100 feet with the least restrictive device within 7 day(s). 5.  Patient will ascend/descend 4 stairs with one handrail(s) with minimal assistance/contact guard assist within 7 day(s). 6.  Patient will improve Black Balance score by 7 points within 7 days. Outcome: Not Progressing Towards Goal   PHYSICAL THERAPY TREATMENT  Patient: Toan Gomez (42 y.o. female)  Date: 11/15/2022  Diagnosis: Stroke ([address] [I63.9] <principal problem not specified>  Procedure(s) (LRB):  INSERT ICD SINGLE (N/A) 11 Days Post-Op  Precautions: Fall, Skin (SBP<140)  Chart, physical therapy assessment, plan of care and goals were reviewed. ASSESSMENT  Patient continues with skilled PT services and is not progressing towards goals. Pt seen following RN clearance and thoracentesis yesterday. Pt presents with decreased alertness/attention/interaction upon arrival and increased difficulty with secretions (\"pooling\" in lower hip in semi-fowlers and running from her lip in sitting EOB). Pt seen for bed therex for BP boost, bed mobility, seated balance, and orthostatic BP assessments. BP stable though pt, again, with nausea and \"dry heaving\" with sitting EOB. Unable to progress pt to transfer training this date. Please be aware that this pt presented with a near syncopal episode last session on [facility] despite stable orthostatics supine to sit (increase even noted).   Vitals:    11/15/22 1413 11/15/22 1421 11/15/22 1426   BP: 99/64 95/74 110/60   BP 1 Location: Right upper arm     BP Patient Position: Lying  Comment: post exercises Sitting Sitting   Following distress with sitting (N/V, LE therex), pt with slightly increased attention/interaction/appropriateness. RN notified that pt presents with decline over previous 2 therapy sessions and poor progress 2/2 nausea, vomiting, L flank pain, L PPM site pain, and poor appetite/intake. Therapy will continue to follow and progress though she remains limited by medical status at this time. Recommend: bed in chair position for meals and BP checks for tolerance    Next session: supine therex, orthostatics, transfers, OOB to chair/gait as able    Current Level of Function Impacting Discharge (mobility/balance): modA with cues for LUE disuse    Other factors to consider for discharge: pt with decreasing activity tolerance and debility         PLAN :  Patient continues to benefit from skilled intervention to address the above impairments. Continue treatment per established plan of care. to address goals. Recommendation for discharge: (in order for the patient to meet his/her long term goals)  Therapy 3 hours per day 5-7 days per week (pending progress at this point?)    This discharge recommendation:  Has been made in collaboration with the attending provider and/or case management    IF patient discharges home will need the following DME: to be determined (TBD)       SUBJECTIVE:   Patient stated Thanks for trying to work with me. .. Pt speaking more clearly and coherently at end of session. OBJECTIVE DATA SUMMARY:   Critical Behavior:  Neurologic State: Alert  Orientation Level: Oriented to person, Oriented to place, Disoriented to situation, Disoriented to time  Cognition: Follows commands  Safety/Judgement: Awareness of environment (Requires reminders of pacemaker precautions)  Functional Mobility Training:  Bed Mobility:  Supine to Sit: Moderate assistance; Additional time;Bed Modified (HOB elevated and rail provided; cues for LUE non-use)  Sit to Supine: Minimum assistance; Additional time  Scooting: Moderate assistance; Additional time (pad assist for hips to EOB/feet flat on floor)  Balance:  Sitting: Impaired  Sitting - Static: Good (unsupported)  Sitting - Dynamic: Fair (occasional)  Standing:  (NT)      Therapeutic Exercises:   Therapeutic Exercises:       EXERCISE   Sets   Reps   Active Active Assist   Passive Self ROM   Comments   Ankle Pumps 2 10 [x] [] [] [] Supine and seated EOB   Quad Sets/Glut Sets  10 [x] [] [] []    Heel Slides  10 [] [x] [] []        Pain Rating:  Pt c/o L flank pain under breast    Activity Tolerance:   Poor and pt returned to supine 2/2 N/V with sitting    After treatment patient left in no apparent distress:   Supine in bed, Heels elevated for pressure relief, Call bell within reach, and Side rails x 3    COMMUNICATION/COLLABORATION:   The patients plan of care was discussed with: Occupational therapist and Registered nurse.      Kaushal Mendes   Time Calculation: 25 mins

## 2022-11-15 NOTE — PROGRESS NOTES
Problem: Pressure Injury - Risk of  Goal: *Prevention of pressure injury  Description: Document Jon Scale and appropriate interventions in the flowsheet. Outcome: Progressing Towards Goal  Note: Pressure Injury Interventions:  Sensory Interventions: Assess need for specialty bed, Avoid rigorous massage over bony prominences, Float heels, Keep linens dry and wrinkle-free, Maintain/enhance activity level, Minimize linen layers, Monitor skin under medical devices, Pad between skin to skin, Pressure redistribution bed/mattress (bed type)    Moisture Interventions: Internal/External urinary devices, Limit adult briefs, Maintain skin hydration (lotion/cream), Minimize layers    Activity Interventions: Increase time out of bed, Pressure redistribution bed/mattress(bed type), PT/OT evaluation    Mobility Interventions: HOB 30 degrees or less, Pressure redistribution bed/mattress (bed type)    Nutrition Interventions: Document food/fluid/supplement intake, Discuss nutritional consult with provider, Offer support with meals,snacks and hydration    Friction and Shear Interventions: HOB 30 degrees or less, Lift sheet, Minimize layers                Problem: Patient Education: Go to Patient Education Activity  Goal: Patient/Family Education  Outcome: Progressing Towards Goal     Problem: Falls - Risk of  Goal: *Absence of Falls  Description: Document Sourav Fall Risk and appropriate interventions in the flowsheet.   Outcome: Progressing Towards Goal  Note: Fall Risk Interventions:  Mobility Interventions: Bed/chair exit alarm, Communicate number of staff needed for ambulation/transfer, Patient to call before getting OOB, OT consult for ADLs, PT Consult for assist device competence, PT Consult for mobility concerns    Mentation Interventions: Adequate sleep, hydration, pain control, Bed/chair exit alarm, Door open when patient unattended, Evaluate medications/consider consulting pharmacy, More frequent rounding, Reorient patient, Room close to nurse's station, Self-releasing belt    Medication Interventions: Bed/chair exit alarm, Evaluate medications/consider consulting pharmacy, Patient to call before getting OOB, Teach patient to arise slowly    Elimination Interventions: Call light in reach, Bed/chair exit alarm, Elevated toilet seat, Patient to call for help with toileting needs, Stay With Me (per policy), Toilet paper/wipes in reach, Toileting schedule/hourly rounds    History of Falls Interventions: Bed/chair exit alarm, Consult care management for discharge planning, Door open when patient unattended, Evaluate medications/consider consulting pharmacy         Problem: Patient Education: Go to Patient Education Activity  Goal: Patient/Family Education  Outcome: Progressing Towards Goal

## 2022-11-15 NOTE — PROGRESS NOTES
Bedside and Verbal shift change report given to Nando Mejias RN (oncoming nurse) by KANCHAN So (offgoing nurse). Report included the following information SBAR, Kardex, ED Summary, Intake/Output, MAR, Recent Results, and Cardiac Rhythm [id number] .

## 2022-11-15 NOTE — PROGRESS NOTES
Problem: Pressure Injury - Risk of  Goal: *Prevention of pressure injury  Description: Document Jon Scale and appropriate interventions in the flowsheet. 11/14/2022 2032 by Shagufta Bailey RN  Outcome: Progressing Towards Goal  Note: Pressure Injury Interventions:  Sensory Interventions: Assess changes in LOC    Moisture Interventions: Absorbent underpads, Check for incontinence Q2 hours and as needed, Internal/External urinary devices, Minimize layers    Activity Interventions: Increase time out of bed    Mobility Interventions: HOB 30 degrees or less, Float heels    Nutrition Interventions: Document food/fluid/supplement intake    Friction and Shear Interventions: HOB 30 degrees or less, Minimize layers             11/14/2022 2005 by Shagufta Bailey RN  Note: Pressure Injury Interventions:  Sensory Interventions: Assess changes in LOC    Moisture Interventions: Absorbent underpads, Check for incontinence Q2 hours and as needed, Internal/External urinary devices, Minimize layers    Activity Interventions: Increase time out of bed    Mobility Interventions: HOB 30 degrees or less, Float heels    Nutrition Interventions: Document food/fluid/supplement intake    Friction and Shear Interventions: HOB 30 degrees or less, Minimize layers                Problem: Patient Education: Go to Patient Education Activity  Goal: Patient/Family Education  Outcome: Progressing Towards Goal     Problem: Falls - Risk of  Goal: *Absence of Falls  Description: Document Sourav Fall Risk and appropriate interventions in the flowsheet.   Outcome: Progressing Towards Goal  Note: Fall Risk Interventions:  Mobility Interventions: Bed/chair exit alarm    Mentation Interventions: Adequate sleep, hydration, pain control, Reorient patient    Medication Interventions: Patient to call before getting OOB    Elimination Interventions: Call light in reach, Patient to call for help with toileting needs    History of Falls Interventions: Consult care management for discharge planning         Problem: Aspiration - Risk of  Goal: *Absence of aspiration  Outcome: Progressing Towards Goal     Problem: Patient Education: Go to Patient Education Activity  Goal: Patient/Family Education  Outcome: Progressing Towards Goal     Problem: Diabetes Self-Management  Goal: *Disease process and treatment process  Description: Define diabetes and identify own type of diabetes; list 3 options for treating diabetes. Outcome: Progressing Towards Goal     Problem: Diabetes Self-Management  Goal: *Incorporating nutritional management into lifestyle  Description: Describe effect of type, amount and timing of food on blood glucose; list 3 methods for planning meals. Outcome: Progressing Towards Goal     Problem: Diabetes Self-Management  Goal: *Monitoring blood glucose, interpreting and using results  Description: Identify recommended blood glucose targets  and personal targets. Outcome: Progressing Towards Goal     Problem: Diabetes Self-Management  Goal: *Prevention, detection, treatment of acute complications  Description: List symptoms of hyper- and hypoglycemia; describe how to treat low blood sugar and actions for lowering  high blood glucose level. Outcome: Progressing Towards Goal     Problem: Diabetes Self-Management  Goal: *Prevention, detection and treatment of chronic complications  Description: Define the natural course of diabetes and describe the relationship of blood glucose levels to long term complications of diabetes.   Outcome: Progressing Towards Goal     Problem: Diabetes Self-Management  Goal: *Developing strategies to address psychosocial issues  Description: Describe feelings about living with diabetes; identify support needed and support network  Outcome: Progressing Towards Goal     Problem: TIA/CVA Stroke: Day 2 Until Discharge  Goal: *Tolerating diet  Outcome: Progressing Towards Goal     Problem: TIA/CVA Stroke: Day 2 Until Discharge  Goal: *Absence of aspiration  Outcome: Progressing Towards Goal     Problem: TIA/CVA Stroke: Day 2 Until Discharge  Goal: *Absence of deep venous thrombosis signs and symptoms(Stroke Metric)  Outcome: Progressing Towards Goal     Problem: TIA/CVA Stroke: Day 2 Until Discharge  Goal: *Stroke education continued(Stroke Metric)  Outcome: Progressing Towards Goal     Problem: Patient Education: Go to Patient Education Activity  Goal: Patient/Family Education  Outcome: Progressing Towards Goal

## 2022-11-15 NOTE — PROGRESS NOTES
Problem: Pressure Injury - Risk of  Goal: *Prevention of pressure injury  Description: Document Jon Scale and appropriate interventions in the flowsheet. Outcome: Progressing Towards Goal  Note: Pressure Injury Interventions:  Sensory Interventions: Assess changes in LOC, Check visual cues for pain, Float heels, Turn and reposition approx. every two hours (pillows and wedges if needed)    Moisture Interventions: Absorbent underpads, Apply protective barrier, creams and emollients, Check for incontinence Q2 hours and as needed, Internal/External urinary devices, Limit adult briefs    Activity Interventions: Increase time out of bed, PT/OT evaluation, Pressure redistribution bed/mattress(bed type)    Mobility Interventions: HOB 30 degrees or less, PT/OT evaluation, Pressure redistribution bed/mattress (bed type)    Nutrition Interventions: Document food/fluid/supplement intake    Friction and Shear Interventions: Lift sheet, Minimize layers                Problem: Patient Education: Go to Patient Education Activity  Goal: Patient/Family Education  Outcome: Progressing Towards Goal     Problem: Falls - Risk of  Goal: *Absence of Falls  Description: Document Sourav Fall Risk and appropriate interventions in the flowsheet.   Outcome: Progressing Towards Goal  Note: Fall Risk Interventions:  Mobility Interventions: Bed/chair exit alarm, Communicate number of staff needed for ambulation/transfer, PT Consult for mobility concerns, Patient to call before getting OOB    Mentation Interventions: Adequate sleep, hydration, pain control, Door open when patient unattended, Eyeglasses and hearing aids, Toileting rounds    Medication Interventions: Evaluate medications/consider consulting pharmacy, Patient to call before getting OOB    Elimination Interventions: Call light in reach, Patient to call for help with toileting needs, Toileting schedule/hourly rounds    History of Falls Interventions: Vital signs minimum Q4HRs X 24 hrs (comment for end date), Door open when patient unattended         Problem: Patient Education: Go to Patient Education Activity  Goal: Patient/Family Education  Outcome: Progressing Towards Goal     Problem: Aspiration - Risk of  Goal: *Absence of aspiration  Outcome: Progressing Towards Goal     Problem: Patient Education: Go to Patient Education Activity  Goal: Patient/Family Education  Outcome: Progressing Towards Goal     Problem: Diabetes Self-Management  Goal: *Disease process and treatment process  Description: Define diabetes and identify own type of diabetes; list 3 options for treating diabetes. Outcome: Progressing Towards Goal  Goal: *Incorporating nutritional management into lifestyle  Description: Describe effect of type, amount and timing of food on blood glucose; list 3 methods for planning meals. Outcome: Progressing Towards Goal  Goal: *Incorporating physical activity into lifestyle  Description: State effect of exercise on blood glucose levels. Outcome: Progressing Towards Goal  Goal: *Developing strategies to promote health/change behavior  Description: Define the ABC's of diabetes; identify appropriate screenings, schedule and personal plan for screenings. Outcome: Progressing Towards Goal  Goal: *Using medications safely  Description: State effect of diabetes medications on diabetes; name diabetes medication taking, action and side effects. Outcome: Progressing Towards Goal  Goal: *Monitoring blood glucose, interpreting and using results  Description: Identify recommended blood glucose targets  and personal targets. Outcome: Progressing Towards Goal  Goal: *Prevention, detection, treatment of acute complications  Description: List symptoms of hyper- and hypoglycemia; describe how to treat low blood sugar and actions for lowering  high blood glucose level.   Outcome: Progressing Towards Goal  Goal: *Prevention, detection and treatment of chronic complications  Description: Define the natural course of diabetes and describe the relationship of blood glucose levels to long term complications of diabetes.   Outcome: Progressing Towards Goal  Goal: *Developing strategies to address psychosocial issues  Description: Describe feelings about living with diabetes; identify support needed and support network  Outcome: Progressing Towards Goal  Goal: *Insulin pump training  Outcome: Progressing Towards Goal  Goal: *Sick day guidelines  Outcome: Progressing Towards Goal  Goal: *Patient Specific Goal (EDIT GOAL, INSERT TEXT)  Outcome: Progressing Towards Goal     Problem: Patient Education: Go to Patient Education Activity  Goal: Patient/Family Education  Outcome: Progressing Towards Goal     Problem: Patient Education: Go to Patient Education Activity  Goal: Patient/Family Education  Outcome: Progressing Towards Goal     Problem: Patient Education: Go to Patient Education Activity  Goal: Patient/Family Education  Outcome: Progressing Towards Goal     Problem: Patient Education: Go to Patient Education Activity  Goal: Patient/Family Education  Outcome: Progressing Towards Goal     Problem: TIA/CVA Stroke: Day 2 Until Discharge  Goal: Psychosocial  Outcome: Progressing Towards Goal  Goal: *Verbalizes anxiety and depression are reduced or absent  Outcome: Progressing Towards Goal  Goal: *Absence of aspiration  Outcome: Progressing Towards Goal  Goal: *Absence of deep venous thrombosis signs and symptoms(Stroke Metric)  Outcome: Progressing Towards Goal  Goal: *Optimal pain control at patient's stated goal  Outcome: Progressing Towards Goal  Goal: *Tolerating diet  Outcome: Progressing Towards Goal  Goal: *Ability to perform ADLs and demonstrates progressive mobility and function  Outcome: Progressing Towards Goal  Goal: *Stroke education continued(Stroke Metric)  Outcome: Progressing Towards Goal     Problem: Patient Education: Go to Patient Education Activity  Goal: Patient/Family Education  Outcome: Progressing Towards Goal     Problem: Heart Failure: Day 2  Goal: Off Pathway (Use only if patient is Off Pathway)  Outcome: Progressing Towards Goal  Goal: Activity/Safety  Outcome: Progressing Towards Goal  Goal: Consults, if ordered  Outcome: Progressing Towards Goal  Goal: Diagnostic Test/Procedures  Outcome: Progressing Towards Goal  Goal: Nutrition/Diet  Outcome: Progressing Towards Goal  Goal: Discharge Planning  Outcome: Progressing Towards Goal  Goal: Medications  Outcome: Progressing Towards Goal  Goal: Respiratory  Outcome: Progressing Towards Goal  Goal: Treatments/Interventions/Procedures  Outcome: Progressing Towards Goal  Goal: Psychosocial  Outcome: Progressing Towards Goal  Goal: *Oxygen saturation within defined limits  Outcome: Progressing Towards Goal  Goal: *Hemodynamically stable  Outcome: Progressing Towards Goal  Goal: *Optimal pain control at patient's stated goal  Outcome: Progressing Towards Goal  Goal: *Anxiety reduced or absent  Outcome: Progressing Towards Goal  Goal: *Demonstrates progressive activity  Outcome: Progressing Towards Goal     Problem: Heart Failure: Day 3  Goal: Off Pathway (Use only if patient is Off Pathway)  Outcome: Progressing Towards Goal  Goal: Activity/Safety  Outcome: Progressing Towards Goal  Goal: Diagnostic Test/Procedures  Outcome: Progressing Towards Goal  Goal: Nutrition/Diet  Outcome: Progressing Towards Goal  Goal: Discharge Planning  Outcome: Progressing Towards Goal  Goal: Medications  Outcome: Progressing Towards Goal  Goal: Respiratory  Outcome: Progressing Towards Goal  Goal: Treatments/Interventions/Procedures  Outcome: Progressing Towards Goal  Goal: Psychosocial  Outcome: Progressing Towards Goal  Goal: *Oxygen saturation within defined limits  Outcome: Progressing Towards Goal  Goal: *Hemodynamically stable  Outcome: Progressing Towards Goal  Goal: *Optimal pain control at patient's stated goal  Outcome: Progressing Towards Goal  Goal: *Anxiety reduced or absent  Outcome: Progressing Towards Goal  Goal: *Demonstrates progressive activity  Outcome: Progressing Towards Goal

## 2022-11-16 NOTE — PROGRESS NOTES
Bedside and Verbal shift change report given to Chris Olson (oncoming nurse) by Ivan Luis RN/Yokasta RN (offgoing nurse). Report included the following information ED Summary, Procedure Summary, Intake/Output, Recent Results, Cardiac Rhythm SR/AV paced, Alarm Parameters , and Dual Neuro Assessment.

## 2022-11-16 NOTE — PROGRESS NOTES
Pulmonary, Critical Care, and Sleep Medicine~Progress Note    Name: Toan Gomez MRN: [id number]   : 1940 Hospital: [facility]   Date: 2022 2:42 PM Admission: 10/31/2022     Impression Plan   Left pleural effusion, iatrogenic following ICD placement   Acute Ischemic CVA  CM EF 10-15%, s/p  ICD placement    Hx of a fib   HTN  DM II  Reported chronic pancreatitis   NO BLOOD PRODUCTS Eliquis on hold   HgB did drop overnight; hopefully just leveling out  Chest film stable. No plans for another thoracentesis or any aggressive inventions with no worsening on chest x-ray today   O2 titration above 90%. On room air  Discussed with hospitalist      Daily Progression:      Chest x-ray today   IMPRESSION  Slightly more prominent appearance of left lung airspace opacities. Small left  pleural effusion. 11/15  Bloody exudative pleural effusion. 1.5L removed. HgB stable  Breathing better, but site pain noted       Now with complete left hemithorax       Consult Note requested by hospitalist    Patient presented for admission 10/31 for CVA; was taken to angiogram for thrombectomy; then monitored in ICU until . Transferred out of ICU monitoring on . ICD was placed on  by Dr Nihcelle Solis for CM. 22 chest x-ray post procedure did not show any pleural effusions nor PTX. Chest x-ray on 22 showed a new moderate to large left pleural effusion. By CT abdomen today showed a larger left pleural effusion with increased HUs suggestive of hemothorax. She has been having increasing left sided chest pains since that time. HgB has trended down since then. Eliquis was restarted on 22. Currently HD stable, though BP a little soft. On room air. No acute dyspnea. She was on plavix and eliquis as outpatient.     22 ECHO  Result status: Final result       Left Ventricle: Severely reduced left ventricular systolic function with a visually estimated EF of 10 -15%. Left ventricle is moderately dilated. Mildly increased wall thickness. Severe global hypokinesis present. Abnormal diastolic function. Aortic Valve: Tricuspid valve. Mild regurgitation. Mitral Valve: Mild to moderate regurgitation. Interatrial Septum: Agitated saline study was negative with and without provocation. I have reviewed the labs and previous days notes. A comprehensive review of systems was negative. Past Medical History:   Diagnosis Date    Colon polyps     Diabetes ([address]     Diverticulosis     Hypertension     Ill-defined condition     Pancreatitis       Past Surgical History:   Procedure Laterality Date    COLONOSCOPY N/A 4/29/2021    COLONOSCOPY performed by Ari Venegas MD at [facility]  2021    x2    IR [facility] THROMB INFUSION INTRACRANIAL  10/31/2022      Prior to Admission medications    Medication Sig Start Date End Date Taking? Authorizing Provider   sacubitril/valsartan (ENTRESTO PO) Take  by mouth. Yes Provider, Historical   glipiZIDE (GLUCOTROL) 5 mg tablet Take 2 Tablets by mouth Daily (before breakfast). 3/22/22  Yes Salinas Chávez PA-C   apixaban (ELIQUIS) 2.5 mg tablet Take 2.5 mg by mouth daily. Yes Provider, Historical   spironolactone (ALDACTONE) 25 mg tablet Take 25 mg by mouth daily. Yes Provider, Historical   acetaminophen (TYLENOL) 650 mg TbER Take 650 mg by mouth every eight (8) hours as needed for Pain. Indications: pain   Yes Provider, Historical   furosemide (LASIX) 40 mg tablet Take 1 Tablet by mouth daily. 11/24/21  Yes Arabella Gaviria PA-C   pantoprazole (PROTONIX) 40 mg tablet Take 1 Tablet by mouth Daily (before breakfast). 11/25/21  Yes Arabella Gaviria PA-C   clopidogreL (PLAVIX) 75 mg tab Take 1 Tablet by mouth daily. 10/30/21  Yes Cesario Foreman MD   carvediloL (COREG) 3.125 mg tablet Take 1 Tablet by mouth two (2) times daily (with meals).  10/29/21  Yes Cesario Foreman MD     Allergies   Allergen Reactions    Insulins Itching    Penicillins Other (comments)     Pt states it makes her pass out      Social History     Tobacco Use    Smoking status: Never    Smokeless tobacco: Never   Substance Use Topics    Alcohol use: Not Currently      Family History   Problem Relation Age of Onset    Diabetes Other      OBJECTIVE:     Vital Signs:     Visit Vitals  /60   Pulse (!) 103   Temp 98.4 °F (36.9 °C)   Resp 22   Ht 5' 6\" (1.676 m)   Wt 55.9 kg (123 lb 3.8 oz)   SpO2 99%   BMI 19.89 kg/m²      Temp (24hrs), Av.7 °F (37.1 °C), Min:98.4 °F (36.9 °C), Max:98.9 °F (37.2 °C)     Intake/Output:     Last shift: No intake/output data recorded. Last 3 shifts: No intake/output data recorded.       No intake or output data in the 24 hours ending 22 1020      Physical Exam:                                        Exam Findings Other   General: No resp distress noted, appears stated age    [de-identified]:  No ulcers, JVD not elevated, no cervical LAD    Chest: No pectus deformity, normal chest rise b/l    HEART:  RRR, no murmurs/rubs/gallops    Lungs:  CTA b/l, no rhonchi/crackles/wheeze, diminished BS at bases    ABD: Soft/NT, non rigid mildly distended    EXT: No cyanosis/clubbing/edema, normal peripheral pulses    Skin: No rashes or ulcers, no mottling    Neuro: A/O x 3        Medications:  Current Facility-Administered Medications   Medication Dose Route Frequency    polyethylene glycol (MIRALAX) packet 17 g  17 g Oral BID    insulin NPH (NOVOLIN N, HUMULIN N) injection 4 Units  4 Units SubCUTAneous QHS    HYDROcodone-acetaminophen (NORCO) 5-325 mg per tablet 1 Tablet  1 Tablet Oral Q6H PRN    insulin NPH (NOVOLIN N, HUMULIN N) injection 8 Units  8 Units SubCUTAneous QAM    insulin lispro (HUMALOG) injection   SubCUTAneous AC&HS    dextrose 10 % infusion 0-250 mL  0-250 mL IntraVENous PRN    [Held by provider] apixaban (ELIQUIS) tablet 2.5 mg  2.5 mg Oral BID    aspirin chewable tablet 81 mg  81 mg Oral DAILY    hydrALAZINE (APRESOLINE) 20 mg/mL injection 10 mg  10 mg IntraVENous Q6H PRN    sodium chloride (NS) flush 5-40 mL  5-40 mL IntraVENous Q8H    sodium chloride (NS) flush 5-40 mL  5-40 mL IntraVENous PRN    [Held by provider] carvediloL (COREG) tablet 6.25 mg  6.25 mg Oral BID WITH MEALS    pantoprazole (PROTONIX) tablet 40 mg  40 mg Oral ACB    [Held by provider] traMADoL (ULTRAM) tablet 50 mg  50 mg Oral Q6H PRN    dextrose 10 % infusion 0-250 mL  0-250 mL IntraVENous PRN    senna-docusate (PERICOLACE) 8.6-50 mg per tablet 1 Tablet  1 Tablet Oral DAILY    lidocaine 4 % patch 2 Patch  2 Patch TransDERmal Q24H    atorvastatin (LIPITOR) tablet 10 mg  10 mg Oral QHS    ondansetron (ZOFRAN) injection 4 mg  4 mg IntraVENous Q4H PRN    acetaminophen (TYLENOL) tablet 650 mg  650 mg Oral Q4H PRN    Or    acetaminophen (TYLENOL) solution 650 mg  650 mg Per NG tube Q4H PRN    Or    acetaminophen (TYLENOL) suppository 650 mg  650 mg Rectal Q4H PRN    sodium chloride (NS) flush 5-40 mL  5-40 mL IntraVENous Q8H    sodium chloride (NS) flush 5-40 mL  5-40 mL IntraVENous PRN    glucose chewable tablet 16 g  4 Tablet Oral PRN    glucagon (GLUCAGEN) injection 1 mg  1 mg IntraMUSCular PRN    dextrose 10 % infusion 0-250 mL  0-250 mL IntraVENous PRN       Labs:  ABG No results for input(s): PHI, PCO2I, PO2I, HCO3I, SO2I, FIO2I in the last 72 hours.      CBC Recent Labs     11/16/22  0123 11/15/22  0420 11/14/22  0233   WBC 7.1 9.4 8.9   HGB 6.3* 7.6* 7.4*   HCT 19.8* 23.6* 22.9*    275 259   MCV 87.6 89.4 89.1   MCH 27.9 28.8 19.1          Metabolic  Panel Recent Labs     11/16/22  0123 11/15/22  0420 11/14/22  0233    135* 135*   K 3.8 4.3 4.2    104 103   CO2 26 24 25   * 184* 225*   BUN 22* 19 20   CREA 0.87 0.74 0.75   CA 8.8 8.8 8.3*   ALB 2.1* 2.0* 2.4*   ALT 8* 9* 9*          Pertinent Labs                Earl Zavala PA-C  11/16/2022

## 2022-11-16 NOTE — DIABETES MGMT
[address] [facility]    CLINICAL NURSE SPECIALIST CONSULT     Initial Presentation   Rusty Cuenca is a 81 y.o. female who presented to [facility] with a new onset L-sided facial droop and weakness. 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 HX:   Past Medical History:   Diagnosis Date    Colon polyps     Diabetes ([facility] [address]     Diverticulosis     Hypertension     Ill-defined condition     Pancreatitis     CVA    INITIAL DX:   Stroke ([facility] [address] [I63.9]     Current Treatment     TX: Mechanical thrombectomy    Consulted by  Wendy Topete MD  for advanced diabetes nursing assessment and care for:   [x] Inpatient management strategy  [x] Home management assessment    Hospital Course   Clinical progress has been uncomplicated. 10/31: Admission. Cerebral angiogram and mechanical thrombectomy, ICU for post-op care  11/1: MRI brain showing Bilateral cerebellar infarcts  11/2: Echo showed EF of 10 -15%. Left ventricle is moderately dilated. Mildly increased wall thickness. Transfer to acute care  11/4: [employer] single chamber ICD placement   11/6: Rapid response for hypotension, abd pain and lethargy. IVF. Albumin. ICU transfer and vasopressors started  11/7: Vasopressors off and transferred out of ICU  11/10: Waxing and waning mental status. CT head: Evolution of left cerebellar infarction as expected. No new superimposed hemorrhage. Acute anemia: KUB obtained: The bowel gas pattern is normal. Moderately levoconvexity distorts the abdominal cavity. 11/11: Pulmonary consult: left pleural effusion   11/14: Complete left hemithorax. 11/15: Left thoracentesis with 1.5L removed.   subsequent CXR with interval decreased in hemothorax     Diabetes History   Type 2 Diabetes: Diagnosed about 10 years ago  Ambulatory BG management provided by: Pedro Mckay MD PCP     Diabetes-related Medical History    Neurological complications  Peripheral neuropathy  Microvascular disease  Nephropathy  Macrovascular disease  Cerebral vascular accident  Other associated conditions     HF    Diabetes Medication History  Key Antihyperglycemic Medications               glipiZIDE (GLUCOTROL) 5 mg tablet (Taking) Take 2 Tablets by mouth Daily (before breakfast). Gio Felix is a very poor historian. She struggles to tell me what medication she is taking daily for diabetes- does mention she forgets \"sometimes\". Diabetes self-management practices:   Eating pattern   I had a difficult time understanding her explanation of typical dietary intake    May have oatmeal for breakfast   Lunch/Dinner: Soup, pork chops, potatoes   Drinking: Water, juice, coffee  Physical activity pattern   Sedentary   Monitoring pattern   Has a glucometer, ran out of strips and lancets  Taking medications pattern  [x] In-consistent administration  [x] Affordable  Social determinants of health impacting diabetes self-management practices   Concerned that you need to know more about how to stay healthy with diabetes  Overall evaluation:    [x] Not achieving A1c target with drug therapy & self-care practices      Lives alone but next door to her son  Her son checks in with her most days  Does not drive  Subjective      Objective   Physical exam  General Underweight frail appearing AA female in no acute distress/ill-appearing. Neuro  Soundly sleeping  Vital Signs Visit Vitals  /60   Pulse (!) 103   Temp 98.4 °F (36.9 °C)   Resp 22   Ht 5' 6\" (1.676 m)   Wt 55.9 kg (123 lb 3.8 oz)   SpO2 99%   BMI 19.89 kg/m²     Skin  Warm and dry. Acanthosis noted along neckline. No lipohypertrophy or lipoatrophy noted at injection sites   Heart   Regular rate and rhythm.  No murmurs, rubs or gallops  Lungs  Clear to auscultation without rales or rhonchi  Extremities No foot wounds        Laboratory  Recent Labs     11/16/22  8078 11/15/22  0420 22  0233   * 184* 225*   AGAP 7 7 7   WBC 7.1 9.4 8.9   CREA 0.87 0.74 0.75   AST 7* 10* 14*   ALT 8* 9* 9*           Blood glucose pattern      Significant diabetes-related events over the past 24-72 hours  A1C 13.1%  Fasting B  Pre-prandial: 177-259  Basal: 8 units NPH AM, 4 units pm  Correction: 5 units in the last 24h  GFR 56  EF 10-15%  Not able to find nurse to discuss oral intake/patient's day. No family at bedside. Assessment and Plan   Nursing Diagnosis Risk for unstable blood glucose pattern   Nursing Intervention Domain 5251 Decision-making Support   Nursing Interventions Examined current inpatient diabetes/blood glucose control   Explored factors facilitating and impeding inpatient management  Explored corrective strategies with patient and responsible inpatient provider   Informed patient of rational for insulin strategy while hospitalized     Evaluation   Christiano Valdes is an 81 y.o. 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 A1C on admission is significantly elevated at 13.1%. BG on admission was 444. She had very poor po intake POD 2 but as oral intake improved, -311. Moderate dose basal and low dose bolus insulin were than started. She was ordered mealtime humalog but was stopped as had low intake over the last several days. Today, her BG is elevated the first time in days- unclear if it is related to oral intake. Will continue to monitor and adjust insulin as needed. Please strive for optimal glucose control in the setting of CVA- 140-180mg/dl. Based on A1C on admission of 13.1% and BG of 444, patient likely needs attention from who is available to assist in her home diabetes management. Recommendations   POC glucose ACHS    Consistent carbohydrate diet (60 grams CHO/meal).  Please document oral intake. 3. Adjust the subcutaneous Insulin Order set (3001)  Insulin Dosing Specific recommendation   Basal                                       (Based on weight, BMI & GFR) If daytime BG remain elevated, increase to 12 units NPH AM    Continue 4 units NPH PM  Allergy listed for Lantus (itching)      Corrective                                       (Useful in adjusting insulin dosing) High sensitivity ACHS         Billing Code(s)   [x] 55708     Before making these care recommendations, I personally reviewed the hospitalization record, including notes, laboratory & diagnostic data and current medications, and examined the patient at the bedside (circumstances permitting) before making care recommendations. More than fifty (50) percent of the time was spent in patient counseling and/or care coordination.   Total minutes: 13      YISSEL Wilhelm BC-ADM  Board Certified Advanced Diabetes Manager  Clinical Nurse Specialist  Program for Diabetes Health  Access via [facility]

## 2022-11-16 NOTE — PROGRESS NOTES
Transition of Care Plan  RUR- High 22%  DISPOSITION: SNF - pending choice  F/U with PCP/Specialist    Transport: [facility] on will call    Emergency contact: Casey Dubon [phone]    AMINAH barriers to discharge: None    CM spoke with [facility] liaison, Alice Roland, who stated they would no longer be able to accept patient due to activity tolerance. CM called patient's son, Juan Carlos Bravo to explain the change in recommendation to SNF at discharge. Juan Carlos Bravo is reluctant to this change and does not want his mother in a nursing home. He agreed to review SNF list and discuss with family. SNF choice list emailed to [email or web address]    10:45am: CM received call from patient's son Juan Carlos Bravo, who expressed concerns that patient's assets would be taken from her if she was in SNF. CM reassured patient's son that patient does not have Medicaid, this is the only situation in which her income would be taken. Patient's son does not want patient to be screened for Medicaid. CM will follow. TODD Shanks.

## 2022-11-16 NOTE — PROGRESS NOTES
Problem: Pressure Injury - Risk of  Goal: *Prevention of pressure injury  Description: Document Jon Scale and appropriate interventions in the flowsheet. Outcome: Progressing Towards Goal  Note: Pressure Injury Interventions:  Sensory Interventions: Assess changes in LOC, Discuss PT/OT consult with provider, Keep linens dry and wrinkle-free, Minimize linen layers    Moisture Interventions: Absorbent underpads, Check for incontinence Q2 hours and as needed, Maintain skin hydration (lotion/cream), Minimize layers    Activity Interventions: PT/OT evaluation, Pressure redistribution bed/mattress(bed type)    Mobility Interventions: HOB 30 degrees or less, Float heels, Turn and reposition approx. every two hours(pillow and wedges)    Nutrition Interventions: Document food/fluid/supplement intake    Friction and Shear Interventions: HOB 30 degrees or less, Lift sheet, Minimize layers                Problem: Patient Education: Go to Patient Education Activity  Goal: Patient/Family Education  Outcome: Progressing Towards Goal     Problem: Falls - Risk of  Goal: *Absence of Falls  Description: Document Sourav Fall Risk and appropriate interventions in the flowsheet.   Outcome: Progressing Towards Goal  Note: Fall Risk Interventions:  Mobility Interventions: OT consult for ADLs, Bed/chair exit alarm, Patient to call before getting OOB    Mentation Interventions: Adequate sleep, hydration, pain control, Bed/chair exit alarm    Medication Interventions: Patient to call before getting OOB, Bed/chair exit alarm    Elimination Interventions: Call light in reach, Bed/chair exit alarm    History of Falls Interventions: Bed/chair exit alarm         Problem: Patient Education: Go to Patient Education Activity  Goal: Patient/Family Education  Outcome: Progressing Towards Goal     Problem: Aspiration - Risk of  Goal: *Absence of aspiration  Outcome: Progressing Towards Goal     Problem: Diabetes Self-Management  Goal: *Disease process and treatment process  Description: Define diabetes and identify own type of diabetes; list 3 options for treating diabetes. Outcome: Progressing Towards Goal  Goal: *Incorporating nutritional management into lifestyle  Description: Describe effect of type, amount and timing of food on blood glucose; list 3 methods for planning meals. Outcome: Progressing Towards Goal  Goal: *Incorporating physical activity into lifestyle  Description: State effect of exercise on blood glucose levels. Outcome: Progressing Towards Goal  Goal: *Developing strategies to promote health/change behavior  Description: Define the ABC's of diabetes; identify appropriate screenings, schedule and personal plan for screenings. Outcome: Progressing Towards Goal  Goal: *Using medications safely  Description: State effect of diabetes medications on diabetes; name diabetes medication taking, action and side effects. Outcome: Progressing Towards Goal  Goal: *Monitoring blood glucose, interpreting and using results  Description: Identify recommended blood glucose targets  and personal targets. Outcome: Progressing Towards Goal  Goal: *Prevention, detection, treatment of acute complications  Description: List symptoms of hyper- and hypoglycemia; describe how to treat low blood sugar and actions for lowering  high blood glucose level. Outcome: Progressing Towards Goal  Goal: *Prevention, detection and treatment of chronic complications  Description: Define the natural course of diabetes and describe the relationship of blood glucose levels to long term complications of diabetes.   Outcome: Progressing Towards Goal  Goal: *Developing strategies to address psychosocial issues  Description: Describe feelings about living with diabetes; identify support needed and support network  Outcome: Progressing Towards Goal  Goal: *Insulin pump training  Outcome: Progressing Towards Goal  Goal: *Sick day guidelines  Outcome: Progressing Towards Goal  Goal: *Patient Specific Goal (EDIT GOAL, INSERT TEXT)  Outcome: Progressing Towards Goal     Problem: Patient Education: Go to Patient Education Activity  Goal: Patient/Family Education  Outcome: Progressing Towards Goal     Problem: Patient Education: Go to Patient Education Activity  Goal: Patient/Family Education  Outcome: Progressing Towards Goal     Problem: Patient Education: Go to Patient Education Activity  Goal: Patient/Family Education  Outcome: Progressing Towards Goal     Problem: Patient Education: Go to Patient Education Activity  Goal: Patient/Family Education  Outcome: Progressing Towards Goal     Problem: Heart Failure: Discharge Outcomes  Goal: *Demonstrates ability to perform prescribed activity without shortness of breath or discomfort  Outcome: Progressing Towards Goal  Goal: *Left ventricular function assessment completed prior to or during stay, or planned for post-discharge  Outcome: Progressing Towards Goal  Goal: *ACEI prescribed if LVEF less than 40% and no contraindications or ARB prescribed  Outcome: Progressing Towards Goal  Goal: *Verbalizes understanding and describes prescribed diet  Outcome: Progressing Towards Goal  Goal: *Verbalizes understanding/describes prescribed medications  Outcome: Progressing Towards Goal  Goal: *Describes available resources and support systems  Description: (eg: Home Health, Palliative Care, Advanced Medical Directive)  Outcome: Progressing Towards Goal  Goal: *Describes smoking cessation resources  Outcome: Progressing Towards Goal  Goal: *Understands and describes signs and symptoms to report to providers(Stroke Metric)  Outcome: Progressing Towards Goal  Goal: *Describes/verbalizes understanding of follow-up/return appt  Description: (eg: to physicians, diabetes treatment coordinator, and other resources  Outcome: Progressing Towards Goal  Goal: *Describes importance of continuing daily weights and changes to report to physician  Outcome: Progressing Towards Goal     Problem: Patient Education: Go to Patient Education Activity  Goal: Patient/Family Education  Outcome: Progressing Towards Goal     Problem: Pain  Goal: *Control of Pain  Outcome: Progressing Towards Goal     Problem: Patient Education: Go to Patient Education Activity  Goal: Patient/Family Education  Outcome: Progressing Towards Goal

## 2022-11-16 NOTE — PROGRESS NOTES
Physical Therapy    Chart reviewed. Pt cleared by RN for participation; however, pt with steady decline with therapy since 11/11/22. Pt has been presenting with N/V with sitting EOB (11/11 and 11/15) requiring return to bed and 1 instance of near syncope (11/11). Pt now with low Hgb 6.3 and no option for transfusion. Pt medically complex with CVA, s/p ICD, EF 10-15%, s/p thoracentesis and hemithorax>pleural effusions. Decision made to complete supine therex. Pt tolerated well with good command following and AROM/muscle recruitment. Pt with quick fatigue and c/o L flank pain (well-below ICD site); rest breaks PRN and encouragement for progression. Therapeutic Exercises:       EXERCISE   Sets   Reps   Active Active Assist   Passive Self ROM   Comments   Ankle Pumps  10 [x] [] [] []    Short Arc Quads  10 [x] [] [] []    Heel Slides  10 [] [x] [] []    Hip abd/add  10 [x] [] [] []      Pt left in NAD with all needs met and heels floated with education to ensure bony prominences are relieved. Pt with \"boggy\" heels and c/o pain. Pt initially being recommended for IPR, however, in light of medical/functional mobility decline, SNF may be more appropriate at this time.     Warren Wolff, PT, DPT

## 2022-11-16 NOTE — PROGRESS NOTES
Problem: Pressure Injury - Risk of  Goal: *Prevention of pressure injury  Description: Document Jon Scale and appropriate interventions in the flowsheet. Outcome: Progressing Towards Goal  Note: Pressure Injury Interventions:  Sensory Interventions: Assess changes in LOC, Discuss PT/OT consult with provider, Keep linens dry and wrinkle-free, Minimize linen layers    Moisture Interventions: Absorbent underpads, Check for incontinence Q2 hours and as needed, Maintain skin hydration (lotion/cream), Minimize layers    Activity Interventions: PT/OT evaluation, Pressure redistribution bed/mattress(bed type)    Mobility Interventions: HOB 30 degrees or less, Float heels, Turn and reposition approx. every two hours(pillow and wedges)    Nutrition Interventions: Document food/fluid/supplement intake    Friction and Shear Interventions: HOB 30 degrees or less, Lift sheet, Minimize layers                Problem: Patient Education: Go to Patient Education Activity  Goal: Patient/Family Education  Outcome: Progressing Towards Goal     Problem: Falls - Risk of  Goal: *Absence of Falls  Description: Document Sourav Fall Risk and appropriate interventions in the flowsheet.   Outcome: Progressing Towards Goal  Note: Fall Risk Interventions:  Mobility Interventions: OT consult for ADLs, Bed/chair exit alarm, Patient to call before getting OOB    Mentation Interventions: Adequate sleep, hydration, pain control, Bed/chair exit alarm    Medication Interventions: Patient to call before getting OOB, Bed/chair exit alarm    Elimination Interventions: Call light in reach, Bed/chair exit alarm    History of Falls Interventions: Bed/chair exit alarm         Problem: Patient Education: Go to Patient Education Activity  Goal: Patient/Family Education  Outcome: Progressing Towards Goal     Problem: Aspiration - Risk of  Goal: *Absence of aspiration  Outcome: Progressing Towards Goal     Problem: Patient Education: Go to Patient Education Activity  Goal: Patient/Family Education  Outcome: Progressing Towards Goal     Problem: Diabetes Self-Management  Goal: *Disease process and treatment process  Description: Define diabetes and identify own type of diabetes; list 3 options for treating diabetes. Outcome: Progressing Towards Goal  Goal: *Incorporating nutritional management into lifestyle  Description: Describe effect of type, amount and timing of food on blood glucose; list 3 methods for planning meals. Outcome: Progressing Towards Goal  Goal: *Incorporating physical activity into lifestyle  Description: State effect of exercise on blood glucose levels. Outcome: Progressing Towards Goal  Goal: *Developing strategies to promote health/change behavior  Description: Define the ABC's of diabetes; identify appropriate screenings, schedule and personal plan for screenings. Outcome: Progressing Towards Goal  Goal: *Using medications safely  Description: State effect of diabetes medications on diabetes; name diabetes medication taking, action and side effects. Outcome: Progressing Towards Goal  Goal: *Monitoring blood glucose, interpreting and using results  Description: Identify recommended blood glucose targets  and personal targets. Outcome: Progressing Towards Goal  Goal: *Prevention, detection, treatment of acute complications  Description: List symptoms of hyper- and hypoglycemia; describe how to treat low blood sugar and actions for lowering  high blood glucose level. Outcome: Progressing Towards Goal  Goal: *Prevention, detection and treatment of chronic complications  Description: Define the natural course of diabetes and describe the relationship of blood glucose levels to long term complications of diabetes.   Outcome: Progressing Towards Goal  Goal: *Developing strategies to address psychosocial issues  Description: Describe feelings about living with diabetes; identify support needed and support network  Outcome: Progressing Towards Goal  Goal: *Insulin pump training  Outcome: Progressing Towards Goal  Goal: *Sick day guidelines  Outcome: Progressing Towards Goal  Goal: *Patient Specific Goal (EDIT GOAL, INSERT TEXT)  Outcome: Progressing Towards Goal     Problem: Patient Education: Go to Patient Education Activity  Goal: Patient/Family Education  Outcome: Progressing Towards Goal     Problem: TIA/CVA Stroke: Day 2 Until Discharge  Goal: Psychosocial  Outcome: Progressing Towards Goal  Goal: *Verbalizes anxiety and depression are reduced or absent  Outcome: Progressing Towards Goal  Goal: *Absence of aspiration  Outcome: Progressing Towards Goal  Goal: *Absence of deep venous thrombosis signs and symptoms(Stroke Metric)  Outcome: Progressing Towards Goal  Goal: *Optimal pain control at patient's stated goal  Outcome: Progressing Towards Goal  Goal: *Tolerating diet  Outcome: Progressing Towards Goal  Goal: *Ability to perform ADLs and demonstrates progressive mobility and function  Outcome: Progressing Towards Goal  Goal: *Stroke education continued(Stroke Metric)  Outcome: Progressing Towards Goal     Problem: Patient Education: Go to Patient Education Activity  Goal: Patient/Family Education  Outcome: Progressing Towards Goal     Problem: Heart Failure: Day 2  Goal: Off Pathway (Use only if patient is Off Pathway)  Outcome: Progressing Towards Goal  Goal: Activity/Safety  Outcome: Progressing Towards Goal  Goal: Consults, if ordered  Outcome: Progressing Towards Goal  Goal: Diagnostic Test/Procedures  Outcome: Progressing Towards Goal  Goal: Nutrition/Diet  Outcome: Progressing Towards Goal  Goal: Discharge Planning  Outcome: Progressing Towards Goal  Goal: Medications  Outcome: Progressing Towards Goal  Goal: Respiratory  Outcome: Progressing Towards Goal  Goal: Treatments/Interventions/Procedures  Outcome: Progressing Towards Goal  Goal: Psychosocial  Outcome: Progressing Towards Goal  Goal: *Oxygen saturation within defined limits  Outcome: Progressing Towards Goal  Goal: *Hemodynamically stable  Outcome: Progressing Towards Goal  Goal: *Optimal pain control at patient's stated goal  Outcome: Progressing Towards Goal  Goal: *Anxiety reduced or absent  Outcome: Progressing Towards Goal  Goal: *Demonstrates progressive activity  Outcome: Progressing Towards Goal     Problem: Heart Failure: Day 3  Goal: Off Pathway (Use only if patient is Off Pathway)  Outcome: Progressing Towards Goal  Goal: Activity/Safety  Outcome: Progressing Towards Goal  Goal: Diagnostic Test/Procedures  Outcome: Progressing Towards Goal  Goal: Nutrition/Diet  Outcome: Progressing Towards Goal  Goal: Discharge Planning  Outcome: Progressing Towards Goal  Goal: Medications  Outcome: Progressing Towards Goal  Goal: Respiratory  Outcome: Progressing Towards Goal  Goal: Treatments/Interventions/Procedures  Outcome: Progressing Towards Goal  Goal: Psychosocial  Outcome: Progressing Towards Goal  Goal: *Oxygen saturation within defined limits  Outcome: Progressing Towards Goal  Goal: *Hemodynamically stable  Outcome: Progressing Towards Goal  Goal: *Optimal pain control at patient's stated goal  Outcome: Progressing Towards Goal  Goal: *Anxiety reduced or absent  Outcome: Progressing Towards Goal  Goal: *Demonstrates progressive activity  Outcome: Progressing Towards Goal

## 2022-11-16 NOTE — PROGRESS NOTES
[facility]  Hospitalist Group                                                                                          Hospitalist Progress Note  Chandler Graham MD  Answering service: [phone] from in house phone        Date of Service:  2022  NAME:  Yulisa Guadalupe  :  1940  MRN:  [id number]      Admission Summary:     Pt is a 81 y.o F w/ PMH as above admitted to [facility] on 10/31 for suspected CVA w/ finding of acute bl posterior ischemic CVA, now s/p thrombectomy. Pt w/ finding of acute HFrEF 10-15% as part of CVA workup. Cardiology consulted and S/p AICD placement . Pt w/ episodes of hypotension intermittently responsive to small IVF bolus. Complaint of abd pain, n/v, somnolent but protecting airway and rouses to voice and answer questions. Transferred pt to ICU for further management. Hemodynamically stable, and transferred out of unit on      Interval history / Subjective:   No complaints this morning. When informed about her hemoglobin level patient states she does not want blood products. I informed her that her son has called and would like me to call him, she replied that she is \"grown\" and \"will call him myself\" and did NOT want me to call him today. Assessment & Plan:     Large Left Pleural Effusion, concerning for Hemothorax s/p thoracentesis   Acute on chronic blood loss anemia   - Demonstrated on CT abdomen   - Pulmonology consulted  - Discussed case with cardiology, no additional interventions from their end   - HOLDING eliquis   - Hemoglobin has been stable ~7.5  - S/p thoracentesis  with 1500cc removed,   - Hemoglobin down to 6.3 today, vital signs stable. Eliquis has not been restart. CXR with slight increase in hemothorax.  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 s/p thrombectomy  Subtherapeutic Plavix response  -MRI brain with acute bilateral superior cerebral infarct and mild petechial hemorrhage  -A1c 13.1 LDL 71  -TTE with no intra-arterial septum seen  -P2Y12  response 222 unchanged from previous, Plavix DC per neurology due to subtherapeutic response  -started on Eliquis, aspirin adjusted to 81 mg, lipitor   --CT scan of head on 11/5 no hemorrhage, no significant change of infarct  -continue Neurovascular checks  -SBP goal less than 140  -CT Head 11/10 without acute change   - Holding eliquis due to hemothorax, see above      Acute on chronic systolic CHF with EF 10 to 15%  -s/p ICD placement 11/4 by cardiology   -Echocardiogram EF of 10 to 15%  -Holding all BP medications   -continue I/O and daily weight monitoring  - Euvolemic     Sepsis due to pneumonia, resolved  -on iv cefepime (today day 6/7) and fluconazole (day 6/7)  -tachycardia improved, afebrile and no leukocytosis   -MRSA negative, d/c vancomycin 11/8  -blood cx 11/6 no growth     JERAMY likely due to intravascular volume depletion, resolved  -avoid nephrotoxin   -monitor renal function   - Cr stable     T2DM with hyperglycemia  -A1c 13.1  -Blood sugars remain labile  -Continue NPH, sliding scale, monitor finger stick glucose   -Diabetes management team is on board  - Poor oral intake     Moderate protein calorie malnutrition   -BMI 18.68 kg/m². -nutritionist on board      Code status: Full Code  Prophylaxis: Eliquis  held given hemothorax, restart when hemoglobin stable  Care Plan discussed with: Patient, Nurse and CM  Anticipated Disposition: SNF in 24-48h hrs     Hospital Problems  Date Reviewed: 3/18/2022            Codes Class Noted POA    Stroke [facility]) ICD-10-CM: I63.9  ICD-9-CM: 434.91  10/31/2022 Unknown         Vital Signs:    Last 24hrs VS reviewed since prior progress note.  Most recent are:  Visit Vitals  /76 (BP 1 Location: Right upper arm, BP Patient Position: Supine)   Pulse 95   Temp 98.7 °F (37.1 °C)   Resp 27   Ht 5' 6\" (1.676 m)   Wt 55.9 kg (123 lb 3.8 oz)   SpO2 97%   BMI 19.89 kg/m²       No intake or output data in the 24 hours ending 11/16/22 1425       Physical Examination:     I had a face to face encounter with this patient and independently examined them on 11/16/2022 as outlined below:          Constitutional:  No acute distress, cooperative, pleasant    ENT:  Oral mucosa moist, oropharynx benign. Resp:  Improved aeration over left lung field. No wheezing/rhonchi/rales. No accessory muscle use. CV:  Regular rhythm, normal rate, no murmurs, gallops, rubs    GI:  Soft, non distended, non tender. normoactive bowel sounds, no hepatosplenomegaly     Musculoskeletal:  No edema,      Neurologic:  Conscious and alert, oriented to place and person, moves all extremities. CN II-XII reviewed            Data Review:    Review and/or order of clinical lab test  Review and/or order of tests in the radiology section of CPT  Review and/or order of tests in the medicine section of CPT      Labs:     Recent Labs     11/16/22  0123 11/15/22  0420   WBC 7.1 9.4   HGB 6.3* 7.6*   HCT 19.8* 23.6*    275     Recent Labs     11/16/22  0123 11/15/22  0420 11/14/22  0233    135* 135*   K 3.8 4.3 4.2    104 103   CO2 26 24 25   BUN 22* 19 20   CREA 0.87 0.74 0.75   * 184* 225*   CA 8.8 8.8 8.3*     Recent Labs     11/16/22  0123 11/15/22  0420 11/14/22  0233   ALT 8* 9* 9*   AP 91 99 117   TBILI 0.5 0.6 0.7   TP 5.7* 5.7* 5.8*   ALB 2.1* 2.0* 2.4*   GLOB 3.6 3.7 3.4     No results for input(s): INR, PTP, APTT, INREXT, INREXT in the last 72 hours. No results for input(s): FE, TIBC, PSAT, FERR in the last 72 hours. No results found for: FOL, RBCF   No results for input(s): PH, PCO2, PO2 in the last 72 hours. No results for input(s): CPK, CKNDX, TROIQ in the last 72 hours.     No lab exists for component: CPKMB  Lab Results   Component Value Date/Time    Cholesterol, total 146 11/01/2022 03:30 AM    HDL Cholesterol 69 11/01/2022 03:30 AM    LDL, calculated 71 11/01/2022 03:30 AM    Triglyceride 30 11/01/2022 03:30 AM    CHOL/HDL Ratio 2.1 11/01/2022 03:30 AM     Lab Results   Component Value Date/Time    Glucose (POC) 125 (H) 11/16/2022 12:07 PM    Glucose (POC) 111 11/16/2022 07:56 AM    Glucose (POC) 206 (H) 11/15/2022 09:31 PM    Glucose (POC) 177 (H) 11/15/2022 04:17 PM    Glucose (POC) 259 (H) 11/15/2022 11:05 AM     Lab Results   Component Value Date/Time    Color YELLOW/STRAW 11/06/2022 06:43 PM    Appearance CLEAR 11/06/2022 06:43 PM    Specific gravity 1.025 11/06/2022 06:43 PM    Specific gravity 1.029 10/31/2022 12:54 PM    pH (UA) 5.5 11/06/2022 06:43 PM    Protein Negative 11/06/2022 06:43 PM    Glucose 100 (A) 11/06/2022 06:43 PM    Ketone TRACE (A) 11/06/2022 06:43 PM    Bilirubin Negative 11/06/2022 06:43 PM    Urobilinogen 0.2 11/06/2022 06:43 PM    Nitrites Negative 11/06/2022 06:43 PM    Leukocyte Esterase MODERATE (A) 11/06/2022 06:43 PM    Epithelial cells FEW 11/06/2022 06:43 PM    Bacteria Negative 11/06/2022 06:43 PM    WBC 10-20 11/06/2022 06:43 PM    RBC 0-5 11/06/2022 06:43 PM         Medications Reviewed:     Current Facility-Administered Medications   Medication Dose Route Frequency    iron sucrose (VENOFER) 200 mg in 0.9% sodium chloride 100 mL IVPB  200 mg IntraVENous Q24H    polyethylene glycol (MIRALAX) packet 17 g  17 g Oral BID    insulin NPH (NOVOLIN N, HUMULIN N) injection 4 Units  4 Units SubCUTAneous QHS    HYDROcodone-acetaminophen (NORCO) 5-325 mg per tablet 1 Tablet  1 Tablet Oral Q6H PRN    insulin NPH (NOVOLIN N, HUMULIN N) injection 8 Units  8 Units SubCUTAneous QAM    insulin lispro (HUMALOG) injection   SubCUTAneous AC&HS    dextrose 10 % infusion 0-250 mL  0-250 mL IntraVENous PRN    [Held by provider] apixaban (ELIQUIS) tablet 2.5 mg  2.5 mg Oral BID    aspirin chewable tablet 81 mg  81 mg Oral DAILY    hydrALAZINE (APRESOLINE) 20 mg/mL injection 10 mg  10 mg IntraVENous Q6H PRN sodium chloride (NS) flush 5-40 mL  5-40 mL IntraVENous Q8H    sodium chloride (NS) flush 5-40 mL  5-40 mL IntraVENous PRN    [Held by provider] carvediloL (COREG) tablet 6.25 mg  6.25 mg Oral BID WITH MEALS    pantoprazole (PROTONIX) tablet 40 mg  40 mg Oral ACB    [Held by provider] traMADoL (ULTRAM) tablet 50 mg  50 mg Oral Q6H PRN    dextrose 10 % infusion 0-250 mL  0-250 mL IntraVENous PRN    senna-docusate (PERICOLACE) 8.6-50 mg per tablet 1 Tablet  1 Tablet Oral DAILY    lidocaine 4 % patch 2 Patch  2 Patch TransDERmal Q24H    atorvastatin (LIPITOR) tablet 10 mg  10 mg Oral QHS    ondansetron (ZOFRAN) injection 4 mg  4 mg IntraVENous Q4H PRN    acetaminophen (TYLENOL) tablet 650 mg  650 mg Oral Q4H PRN    Or    acetaminophen (TYLENOL) solution 650 mg  650 mg Per NG tube Q4H PRN    Or    acetaminophen (TYLENOL) suppository 650 mg  650 mg Rectal Q4H PRN    sodium chloride (NS) flush 5-40 mL  5-40 mL IntraVENous Q8H    sodium chloride (NS) flush 5-40 mL  5-40 mL IntraVENous PRN    glucose chewable tablet 16 g  4 Tablet Oral PRN    glucagon (GLUCAGEN) injection 1 mg  1 mg IntraMUSCular PRN    dextrose 10 % infusion 0-250 mL  0-250 mL IntraVENous PRN     ______________________________________________________________________  EXPECTED LENGTH OF STAY: 7d 2h  ACTUAL LENGTH OF STAY:          12                 Lisa Mcdermott MD

## 2022-11-17 NOTE — DIABETES MGMT
[facility]    CLINICAL NURSE SPECIALIST CONSULT     Initial Presentation   Kehinde Jackson is a 81 y.o. female who presented to [facility] ED with a new onset L-sided facial droop and weakness. 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 HX:   Past Medical History:   Diagnosis Date    Colon polyps     Diabetes ([facility] [address]     Diverticulosis     Hypertension     Ill-defined condition     Pancreatitis     CVA    INITIAL DX:   Stroke ([facility] [address] [I63.9]     Current Treatment     TX: Mechanical thrombectomy    Consulted by  Cornelio Tijerina MD  for advanced diabetes nursing assessment and care for:   [x] Inpatient management strategy  [x] Home management assessment    Hospital Course   Clinical progress has been uncomplicated. 10/31: Admission. Cerebral angiogram and mechanical thrombectomy, ICU for post-op care  11/1: MRI brain showing Bilateral cerebellar infarcts  11/2: Echo showed EF of 10 -15%. Left ventricle is moderately dilated. Mildly increased wall thickness. Transfer to acute care  11/4: [employer] single chamber ICD placement   11/6: Rapid response for hypotension, abd pain and lethargy. IVF. Albumin. ICU transfer and vasopressors started  11/7: Vasopressors off and transferred out of ICU  11/10: Waxing and waning mental status. CT head: Evolution of left cerebellar infarction as expected. No new superimposed hemorrhage. Acute anemia: KUB obtained: The bowel gas pattern is normal. Moderately levoconvexity distorts the abdominal cavity. 11/11: Pulmonary consult: left pleural effusion   11/14: Complete left hemithorax. 11/15: Left thoracentesis with 1.5L removed.   subsequent CXR with interval decreased in hemothorax     Diabetes History   Type 2 Diabetes: Diagnosed about 10 years ago  Ambulatory BG management provided by: Shahbaz Wing MD PCP     Diabetes-related Medical History    Neurological complications  Peripheral neuropathy  Microvascular disease  Nephropathy  Macrovascular disease  Cerebral vascular accident  Other associated conditions     HF    Diabetes Medication History  Key Antihyperglycemic Medications               glipiZIDE (GLUCOTROL) 5 mg tablet (Taking) Take 2 Tablets by mouth Daily (before breakfast). Tawanda Tatum is a very poor historian. She struggles to tell me what medication she is taking daily for diabetes- does mention she forgets \"sometimes\". Diabetes self-management practices:   Eating pattern   I had a difficult time understanding her explanation of typical dietary intake    May have oatmeal for breakfast   Lunch/Dinner: Soup, pork chops, potatoes   Drinking: Water, juice, coffee  Physical activity pattern   Sedentary   Monitoring pattern   Has a glucometer, ran out of strips and lancets  Taking medications pattern  [x] In-consistent administration  [x] Affordable  Social determinants of health impacting diabetes self-management practices   Concerned that you need to know more about how to stay healthy with diabetes  Overall evaluation:    [x] Not achieving A1c target with drug therapy & self-care practices      Lives alone but next door to her son  Her son checks in with her most days  Does not drive  Subjective      Objective   Physical exam  General Underweight frail appearing AA female in no acute distress/ill-appearing. Neuro  Soundly sleeping  Vital Signs Visit Vitals  /67 (BP Patient Position: Supine)   Pulse (!) 104   Temp 99 °F (37.2 °C)   Resp 28   Ht 5' 6\" (1.676 m)   Wt 55.9 kg (123 lb 3.8 oz)   SpO2 98%   BMI 19.89 kg/m²     Skin  Warm and dry. Acanthosis noted along neckline. No lipohypertrophy or lipoatrophy noted at injection sites   Heart   Regular rate and rhythm.  No murmurs, rubs or gallops  Lungs  Clear to auscultation without rales or rhonchi  Extremities No foot wounds        Laboratory  Recent Labs 22  0047 22  0123 11/15/22  0420   GLU 78 134* 184*   AGAP 7 7 7   WBC 5.7 7.1 9.4   CREA 0.64 0.87 0.74   AST 8* 7* 10*   ALT 8* 8* 9*           Blood glucose pattern      Significant diabetes-related events over the past 24-72 hours  A1C 13.1%  Fasting B  Pre-prandial:   Basal: 8 units NPH AM, 4 units pm- dose held last night  Correction: 5 units in the last 24h  GFR 56  EF 10-15%  Not able to find nurse to discuss oral intake/patient's day. No family at bedside. Assessment and Plan   Nursing Diagnosis Risk for unstable blood glucose pattern   Nursing Intervention Domain 5253 Decision-making Support   Nursing Interventions Examined current inpatient diabetes/blood glucose control   Explored factors facilitating and impeding inpatient management  Explored corrective strategies with patient and responsible inpatient provider   Informed patient of rational for insulin strategy while hospitalized     Evaluation   Yulisa Guadalupe is an 81 y.o. 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 A1C on admission is significantly elevated at 13.1%. BG on admission was 444. She had very poor po intake POD 2 but as oral intake improved, -311. Moderate dose basal and low dose bolus insulin were than started. She was ordered mealtime humalog but was stopped as had low intake over the last several days. Today, her BG is elevated the first time in days- unclear if it is related to oral intake. Will continue to monitor and adjust insulin as needed. Please strive for optimal glucose control in the setting of CVA- 140-180mg/dl. Based on A1C on admission of 13.1% and BG of 444, patient likely needs attention from who is available to assist in her home diabetes management.   Recommendations   POC glucose ACHS    Consistent carbohydrate diet (60 grams CHO/meal). Please document oral intake. 3. Continue the subcutaneous Insulin Order set (4984)  Insulin Dosing Specific recommendation   Basal                                       (Based on weight, BMI & GFR) If daytime BG remain elevated, increase to 12 units NPH AM    Continue 4 units NPH PM  Allergy listed for Lantus (itching)      Corrective                                       (Useful in adjusting insulin dosing) High sensitivity ACHS           Diabetes Management Team to sign off at this point as patient's blood glucose remains stable. Please re-consult us if patient needs change. Thank you for including us in their care. Billing Code(s)   [x] 61639     Before making these care recommendations, I personally reviewed the hospitalization record, including notes, laboratory & diagnostic data and current medications, and examined the patient at the bedside (circumstances permitting) before making care recommendations. More than fifty (50) percent of the time was spent in patient counseling and/or care coordination.   Total minutes: 13      YISSEL Kirkpatrick BC-ADM  Board Certified Advanced Diabetes Manager  Clinical Nurse Specialist  [facility]  Access via [address] [place] [address]

## 2022-11-17 NOTE — PROGRESS NOTES
Pulmonary, Critical Care, and Sleep Medicine~Progress Note    Name: Suha Lemon MRN: [id number]   : 1940 Hospital: [facility]   Date: 2022 2:42 PM Admission: 10/31/2022     Impression Plan   Left pleural effusion, iatrogenic following ICD placement   Acute Ischemic CVA  CM EF 10-15%, s/p  ICD placement    Hx of a fib   HTN  DM II  Reported chronic pancreatitis   NO BLOOD PRODUCTS Eliquis on hold   Chest film stable. No plans for another thoracentesis or any aggressive inventions   O2 titration above 90%. On room air  Hgb rallying   Will need repeat chest x-ray in 2 wks following discharge      Daily Progression:      Chest film stable; notable small effusion with atx  On room air   Hgb back up to 6.7      Chest x-ray today   IMPRESSION  Slightly more prominent appearance of left lung airspace opacities. Small left  pleural effusion. 11/15  Bloody exudative pleural effusion. 1.5L removed. HgB stable  Breathing better, but site pain noted       Now with complete left hemithorax       Consult Note requested by hospitalist    Patient presented for admission 10/31 for CVA; was taken to angiogram for thrombectomy; then monitored in ICU until . Transferred out of ICU monitoring on . ICD was placed on  by Dr Redd Antunez for CM. 22 chest x-ray post procedure did not show any pleural effusions nor PTX. Chest x-ray on 22 showed a new moderate to large left pleural effusion. By CT abdomen today showed a larger left pleural effusion with increased HUs suggestive of hemothorax. She has been having increasing left sided chest pains since that time. HgB has trended down since then. Eliquis was restarted on 22. Currently HD stable, though BP a little soft. On room air. No acute dyspnea. She was on plavix and eliquis as outpatient.     22 ECHO  Result status: Final result       Left Ventricle: Severely reduced left ventricular systolic function with a visually estimated EF of 10 -15%. Left ventricle is moderately dilated. Mildly increased wall thickness. Severe global hypokinesis present. Abnormal diastolic function. Aortic Valve: Tricuspid valve. Mild regurgitation. Mitral Valve: Mild to moderate regurgitation. Interatrial Septum: Agitated saline study was negative with and without provocation. I have reviewed the labs and previous days notes. A comprehensive review of systems was negative. Past Medical History:   Diagnosis Date    Colon polyps     Diabetes ([address]     Diverticulosis     Hypertension     Ill-defined condition     Pancreatitis       Past Surgical History:   Procedure Laterality Date    COLONOSCOPY N/A 4/29/2021    COLONOSCOPY performed by Sulma Abbasi MD at [facility]  2021    x2    IR [facility]C [facility] THROMB INFUSION INTRACRANIAL  10/31/2022      Prior to Admission medications    Medication Sig Start Date End Date Taking? Authorizing Provider   sacubitril/valsartan (ENTRESTO PO) Take  by mouth. Yes Provider, Historical   glipiZIDE (GLUCOTROL) 5 mg tablet Take 2 Tablets by mouth Daily (before breakfast). 3/22/22  Yes Shahla Blake PA-C   apixaban (ELIQUIS) 2.5 mg tablet Take 2.5 mg by mouth daily. Yes Provider, Historical   spironolactone (ALDACTONE) 25 mg tablet Take 25 mg by mouth daily. Yes Provider, Historical   acetaminophen (TYLENOL) 650 mg TbER Take 650 mg by mouth every eight (8) hours as needed for Pain. Indications: pain   Yes Provider, Historical   furosemide (LASIX) 40 mg tablet Take 1 Tablet by mouth daily. 11/24/21  Yes Alfredo Gaviria PA-C   pantoprazole (PROTONIX) 40 mg tablet Take 1 Tablet by mouth Daily (before breakfast). 11/25/21  Yes Alfredo Gaviria PA-C   clopidogreL (PLAVIX) 75 mg tab Take 1 Tablet by mouth daily.  10/30/21  Yes Moises Stern MD   carvediloL (COREG) 3.125 mg tablet Take 1 Tablet by mouth two (2) times daily (with meals). 10/29/21  Yes Daniela Fernandez MD     Allergies   Allergen Reactions    Insulins Itching    Penicillins Other (comments)     Pt states it makes her pass out      Social History     Tobacco Use    Smoking status: Never    Smokeless tobacco: Never   Substance Use Topics    Alcohol use: Not Currently      Family History   Problem Relation Age of Onset    Diabetes Other      OBJECTIVE:     Vital Signs:     Visit Vitals  BP (!) 112/54   Pulse 92   Temp 98.1 °F (36.7 °C)   Resp 18   Ht 5' 6\" (1.676 m)   Wt 55.9 kg (123 lb 3.8 oz)   SpO2 100%   BMI 19.89 kg/m²      Temp (24hrs), Av.2 °F (36.8 °C), Min:97.5 °F (36.4 °C), Max:98.7 °F (37.1 °C)     Intake/Output:     Last shift: No intake/output data recorded. Last 3 shifts: No intake/output data recorded.       No intake or output data in the 24 hours ending 22 0931      Physical Exam:                                        Exam Findings Other   General: No resp distress noted, appears stated age    [de-identified]:  No ulcers, JVD not elevated, no cervical LAD    Chest: No pectus deformity, normal chest rise b/l    HEART:  RRR, no murmurs/rubs/gallops    Lungs:  CTA b/l, no rhonchi/crackles/wheeze, diminished BS at bases    ABD: Soft/NT, non rigid mildly distended    EXT: No cyanosis/clubbing/edema, normal peripheral pulses    Skin: No rashes or ulcers, no mottling    Neuro: A/O x 3        Medications:  Current Facility-Administered Medications   Medication Dose Route Frequency    iron sucrose (VENOFER) 200 mg in 0.9% sodium chloride 100 mL IVPB  200 mg IntraVENous Q24H    polyethylene glycol (MIRALAX) packet 17 g  17 g Oral BID    insulin NPH (NOVOLIN N, HUMULIN N) injection 4 Units  4 Units SubCUTAneous QHS    HYDROcodone-acetaminophen (NORCO) 5-325 mg per tablet 1 Tablet  1 Tablet Oral Q6H PRN    insulin NPH (NOVOLIN N, HUMULIN N) injection 8 Units  8 Units SubCUTAneous QAM    insulin lispro (HUMALOG) injection   SubCUTAneous AC&HS dextrose 10 % infusion 0-250 mL  0-250 mL IntraVENous PRN    [Held by provider] apixaban (ELIQUIS) tablet 2.5 mg  2.5 mg Oral BID    aspirin chewable tablet 81 mg  81 mg Oral DAILY    hydrALAZINE (APRESOLINE) 20 mg/mL injection 10 mg  10 mg IntraVENous Q6H PRN    sodium chloride (NS) flush 5-40 mL  5-40 mL IntraVENous Q8H    sodium chloride (NS) flush 5-40 mL  5-40 mL IntraVENous PRN    [Held by provider] carvediloL (COREG) tablet 6.25 mg  6.25 mg Oral BID WITH MEALS    pantoprazole (PROTONIX) tablet 40 mg  40 mg Oral ACB    [Held by provider] traMADoL (ULTRAM) tablet 50 mg  50 mg Oral Q6H PRN    dextrose 10 % infusion 0-250 mL  0-250 mL IntraVENous PRN    senna-docusate (PERICOLACE) 8.6-50 mg per tablet 1 Tablet  1 Tablet Oral DAILY    lidocaine 4 % patch 2 Patch  2 Patch TransDERmal Q24H    atorvastatin (LIPITOR) tablet 10 mg  10 mg Oral QHS    ondansetron (ZOFRAN) injection 4 mg  4 mg IntraVENous Q4H PRN    acetaminophen (TYLENOL) tablet 650 mg  650 mg Oral Q4H PRN    Or    acetaminophen (TYLENOL) solution 650 mg  650 mg Per NG tube Q4H PRN    Or    acetaminophen (TYLENOL) suppository 650 mg  650 mg Rectal Q4H PRN    sodium chloride (NS) flush 5-40 mL  5-40 mL IntraVENous Q8H    sodium chloride (NS) flush 5-40 mL  5-40 mL IntraVENous PRN    glucose chewable tablet 16 g  4 Tablet Oral PRN    glucagon (GLUCAGEN) injection 1 mg  1 mg IntraMUSCular PRN    dextrose 10 % infusion 0-250 mL  0-250 mL IntraVENous PRN       Labs:  ABG No results for input(s): PHI, PCO2I, PO2I, HCO3I, SO2I, FIO2I in the last 72 hours.      CBC Recent Labs     11/17/22  0047 11/16/22  1520 11/16/22  0123 11/15/22  0420   WBC 5.7  --  7.1 9.4   HGB 6.7* 6.1* 6.3* 7.6*   HCT 21.1* 19.0* 19.8* 23.6*     --  276 275   MCV 90.2  --  87.6 89.4   MCH 28.6  --  27.9 48.7          Metabolic  Panel Recent Labs     11/17/22  0047 11/16/22  0123 11/15/22  0420    137 135*   K 3.7 3.8 4.3    104 104   CO2 27 26 24   GLU 78 134* 184* BUN 19 22* 19   CREA 0.64 0.87 0.74   CA 8.9 8.8 8.8   ALB 2.0* 2.1* 2.0*   ALT 8* 8* 9*          Pertinent Labs                Earl Barr PA-C  11/17/2022

## 2022-11-17 NOTE — PROGRESS NOTES
Occupational Therapy  11/17/22     Patient currently on OT caseload. OT tx attempted at 2:10 PM however nursing currently in room attempting IV placement. Will continue to follow patient and attempt OT at a later time.      Thank you,  DANITA Currie/L

## 2022-11-17 NOTE — PROGRESS NOTES
Bedside and Verbal shift change report given to Rajni Zayas (oncoming nurse) by Rita Stanford RN (offgoing nurse). Report included the following information SBAR, Kardex, Intake/Output, MAR, Recent Results, and Med Rec Status. no rebound tenderness/no guarding/soft/no rigidity

## 2022-11-17 NOTE — PROGRESS NOTES
Problem: Mobility Impaired (Adult and Pediatric)  Goal: *Acute Goals and Plan of Care (Insert Text)  Description:   FUNCTIONAL STATUS PRIOR TO ADMISSION: Patient was modified independent using a single point cane for functional mobility. HOME SUPPORT PRIOR TO ADMISSION: The patient lived alone with several family members living nearby (son next  door). Physical Therapy Goals  Updated 11/11/2022  1. Patient will move from supine to sit and sit to supine  in bed with supervision/set-up within 7 day(s). 2.  Patient will transfer from bed to chair and chair to bed with minimal assistance/contact guard assist using the least restrictive device within 7 day(s). 3.  Patient will perform sit to stand with minimal assistance/contact guard assist within 7 day(s). 4.  Patient will ambulate with moderate assistance  x2 for 15 feet with the least restrictive device within 7 day(s). 5.  Patient will recall 3/3 PPM precautions and demonstrate adherence with mobility with min cues within 7 days. Physical Therapy Goals  Updated s/p ICD placement 11/4/2022  1. Patient will move from supine to sit and sit to supine , scoot up and down, and roll side to side in bed with modified independence within 7 day(s). 2.  Patient will transfer from bed to chair and chair to bed with min assistance using the least restrictive device within 7 day(s). 3.  Patient will perform sit to stand with min assistance within 7 day(s). 4.  Patient will ambulate with minimal assistance/contact guard assist for 50 feet with the least restrictive device within 7 day(s). 5.  Patient will ascend/descend 4 stairs with one handrail(s) with minimal assistance/contact guard assist within 7 day(s). 6.  Patient will improve Black Balance score by 7 points within 7 days. Initiated 11/1/2022  1.   Patient will move from supine to sit and sit to supine , scoot up and down, and roll side to side in bed with modified independence within 7 day(s). 2.  Patient will transfer from bed to chair and chair to bed with modified independence using the least restrictive device within 7 day(s). 3.  Patient will perform sit to stand with supervision/set-up within 7 day(s). 4.  Patient will ambulate with minimal assistance/contact guard assist for 100 feet with the least restrictive device within 7 day(s). 5.  Patient will ascend/descend 4 stairs with one handrail(s) with minimal assistance/contact guard assist within 7 day(s). 6.  Patient will improve Black Balance score by 7 points within 7 days. Outcome: Progressing Towards Goal   PHYSICAL THERAPY TREATMENT  Patient: Katelyn Russell (57 y.o. female)  Date: 11/17/2022  Diagnosis: Stroke ([facility] [address] [I63.9] <principal problem not specified>  Procedure(s) (LRB):  INSERT ICD SINGLE (N/A) 13 Days Post-Op  Precautions: Fall, Skin (SBP<140); LUE ICD precautions  Chart, physical therapy assessment, plan of care and goals were reviewed. ASSESSMENT  Pt seen following discussion with RN. Pt s/p iron infusion yesterday and pain medication this morning. Pt has been limited by near syncope and N/V with sitting EOB previously. Today, pt hesitant to mobilize though warmed to the idea following bed therex. Supine BLE therex performed with good tolerance and rest breaks as needed. Pt then agreeable to bed mobility and sitting EOB for LAQ performance (unilaterally only). Patient continues with skilled PT services and is progressing slowly towards goals. Pt demonstrates improved activity tolerance this morning but remains debilitated with decreased strength/balance/activity tolerance. Her BLE AROM is excellent with therex though her coordination is impaired LLE>RLE-cues provided for slow, controlled motions. Pt left in NAD on RA with VSS when left. Heels floated and re-educated pt on importance of this due to c/o heel/ankle pain and noted boggy heels yesterday (pt reports pain improved today).     Recommend: OOB to chair as tolerated with VS assessment and supervision from visitors/family if possible    Current Level of Function Impacting Discharge (mobility/balance): upwards total A for boost to [facility]    Other factors to consider for discharge: pt with complex medical course: CVA, s/p ICD, EF 10-15%, s/p thoracentesis and hemithorax>pleural effusions         PLAN :  Patient continues to benefit from skilled intervention to address the above impairments. Continue treatment per established plan of care. to address goals. Recommendation for discharge: (in order for the patient to meet his/her long term goals)  Therapy up to 5 days/week in SNF setting    This discharge recommendation:  Has been made in collaboration with the attending provider and/or case management    IF patient discharges home will need the following DME: to be determined (TBD)       SUBJECTIVE:   Patient stated Alejandro Mendezer will do whatever you want me to do, I want to be able to walk. ..    OBJECTIVE DATA SUMMARY:   Critical Behavior:  Neurologic State: Drowsy, Alert  Orientation Level: Oriented to person, Oriented to place, Oriented to time, Disoriented to situation  Cognition: Follows commands  Safety/Judgement: Awareness of environment (Requires reminders of pacemaker precautions)  Functional Mobility Training:  Bed Mobility:  Supine to Sit: Minimum assistance; Additional time;Bed Modified (HOB elevated and rail used on L)  Sit to Supine: Minimum assistance; Additional time;Bed Modified  Scooting: Minimum assistance; Additional time (cues for RUE disuse; total A for boost to [facility] in supine)  Balance:  Sitting: Intact  Sitting - Static: Good (unsupported)  Sitting - Dynamic: Good (unsupported); Fair (occasional) (cues for LUE disuse)  Therapeutic Exercises:       EXERCISE   Sets   Reps   Active Active Assist   Passive Self ROM   Comments   Ankle Pumps  10 [x] [] [] []    Glut Sets  10 [x] [] [] []    Short Arc Quads  10 [x] [] [] []    Heel Slides  10 [x] [] [] []    Hip abd 10 [x] [] [] []       Pain Rating:  NT    Activity Tolerance:   Fair, Poor, SpO2 stable on RA, requires rest breaks, requires frequent rest breaks, and BP stable supine to sit    After treatment patient left in no apparent distress:   Supine in bed, Call bell within reach, and Side rails x 3    COMMUNICATION/COLLABORATION:   The patients plan of care was discussed with: Registered nurse.      Irwin Wyman   Time Calculation: 27 mins

## 2022-11-17 NOTE — PROGRESS NOTES
Transition of Care Plan  RUR- High 22%  DISPOSITION: SNF - Referral pending at [facility]  F/U with PCP/Specialist    Transport: [facility] on will call    Emergency contact: Mónica Shukla [phone]    AMINAH barriers to discharge: None    Transition of Care Plan: PT/OT now recommending SNF at discharge. CM discussed with patient's son, Kim James, and he prefers [facility]. Referral sent. The Plan for Transition of Care is related to the following treatment goals: SNF    The Patient and/or patient representative Abelino Benjamin was provided with a choice of provider and agrees  with the discharge plan. Yes [x] No []    A Freedom of choice list was provided with basic dialogue that supports the patient's individualized plan of care/goals and shares the quality data associated with the providers. Yes [x] No []      CM will follow. TODD Zavaleta.

## 2022-11-17 NOTE — PROGRESS NOTES
Problem: Pressure Injury - Risk of  Goal: *Prevention of pressure injury  Description: Document Jon Scale and appropriate interventions in the flowsheet. Outcome: Progressing Towards Goal  Note: Pressure Injury Interventions:  Sensory Interventions: Assess changes in LOC, Discuss PT/OT consult with provider    Moisture Interventions: Absorbent underpads, Apply protective barrier, creams and emollients    Activity Interventions: PT/OT evaluation, Pressure redistribution bed/mattress(bed type)    Mobility Interventions: PT/OT evaluation, Pressure redistribution bed/mattress (bed type)    Nutrition Interventions: Document food/fluid/supplement intake    Friction and Shear Interventions: HOB 30 degrees or less                Problem: Falls - Risk of  Goal: *Absence of Falls  Description: Document Sourav Fall Risk and appropriate interventions in the flowsheet. Outcome: Progressing Towards Goal  Note: Fall Risk Interventions:  Mobility Interventions: Patient to call before getting OOB, OT consult for ADLs, PT Consult for mobility concerns, Bed/chair exit alarm    Mentation Interventions: Adequate sleep, hydration, pain control, Bed/chair exit alarm    Medication Interventions: Patient to call before getting OOB    Elimination Interventions: Call light in reach, Bed/chair exit alarm    History of Falls Interventions: Bed/chair exit alarm         Problem: Aspiration - Risk of  Goal: *Absence of aspiration  Outcome: Progressing Towards Goal     Problem: Diabetes Self-Management  Goal: *Disease process and treatment process  Description: Define diabetes and identify own type of diabetes; list 3 options for treating diabetes. Outcome: Progressing Towards Goal  Goal: *Incorporating nutritional management into lifestyle  Description: Describe effect of type, amount and timing of food on blood glucose; list 3 methods for planning meals.   Outcome: Progressing Towards Goal  Goal: *Incorporating physical activity into lifestyle  Description: State effect of exercise on blood glucose levels. Outcome: Progressing Towards Goal  Goal: *Developing strategies to promote health/change behavior  Description: Define the ABC's of diabetes; identify appropriate screenings, schedule and personal plan for screenings. Outcome: Progressing Towards Goal  Goal: *Using medications safely  Description: State effect of diabetes medications on diabetes; name diabetes medication taking, action and side effects. Outcome: Progressing Towards Goal  Goal: *Monitoring blood glucose, interpreting and using results  Description: Identify recommended blood glucose targets  and personal targets. Outcome: Progressing Towards Goal  Goal: *Prevention, detection, treatment of acute complications  Description: List symptoms of hyper- and hypoglycemia; describe how to treat low blood sugar and actions for lowering  high blood glucose level. Outcome: Progressing Towards Goal  Goal: *Prevention, detection and treatment of chronic complications  Description: Define the natural course of diabetes and describe the relationship of blood glucose levels to long term complications of diabetes.   Outcome: Progressing Towards Goal  Goal: *Developing strategies to address psychosocial issues  Description: Describe feelings about living with diabetes; identify support needed and support network  Outcome: Progressing Towards Goal  Goal: *Insulin pump training  Outcome: Progressing Towards Goal  Goal: *Sick day guidelines  Outcome: Progressing Towards Goal  Goal: *Patient Specific Goal (EDIT GOAL, INSERT TEXT)  Outcome: Progressing Towards Goal     Problem: Heart Failure: Day 2  Goal: Off Pathway (Use only if patient is Off Pathway)  Outcome: Progressing Towards Goal  Goal: Activity/Safety  Outcome: Progressing Towards Goal  Goal: Consults, if ordered  Outcome: Progressing Towards Goal  Goal: Diagnostic Test/Procedures  Outcome: Progressing Towards Goal  Goal: Nutrition/Diet  Outcome: Progressing Towards Goal  Goal: Discharge Planning  Outcome: Progressing Towards Goal  Goal: Medications  Outcome: Progressing Towards Goal  Goal: Respiratory  Outcome: Progressing Towards Goal  Goal: Treatments/Interventions/Procedures  Outcome: Progressing Towards Goal  Goal: Psychosocial  Outcome: Progressing Towards Goal  Goal: *Oxygen saturation within defined limits  Outcome: Progressing Towards Goal  Goal: *Hemodynamically stable  Outcome: Progressing Towards Goal  Goal: *Optimal pain control at patient's stated goal  Outcome: Progressing Towards Goal  Goal: *Anxiety reduced or absent  Outcome: Progressing Towards Goal  Goal: *Demonstrates progressive activity  Outcome: Progressing Towards Goal

## 2022-11-18 NOTE — PROGRESS NOTES
[facility] Adult  Hospitalist Group                                                                                          Hospitalist Progress Note  Judah Small MD  Answering service: [phone] OR 36 from in house phone        Date of Service:  2022  NAME:  Christofer Morales  :  1940  MRN:  [id number]      Admission Summary:     Pt is a 81 y.o F w/ PMH as above admitted to [address] [place] [address] on 10/31 for suspected CVA w/ finding of acute bl posterior ischemic CVA, now s/p thrombectomy. Pt w/ finding of acute HFrEF 10-15% as part of CVA workup. Cardiology consulted and S/p AICD placement . Pt w/ episodes of hypotension intermittently responsive to small IVF bolus. Complaint of abd pain, n/v, somnolent but protecting airway and rouses to voice and answer questions. Transferred pt to ICU for further management. Hemodynamically stable, and transferred out of unit on      Interval history / Subjective:       Patient seen and examined this evening. She is resting comfortably, on room air. She denied any complaints. She denied cough or hemoptysis. Assessment & Plan:     Large Left Pleural Effusion, concerning for Hemothorax s/p thoracentesis   Acute on chronic blood loss anemia   - Demonstrated on CT abdomen   - Pulmonology consulted  - Discussed case with cardiology, no additional interventions from their end   - HOLDING eliquis   - Hemoglobin under 7, 6.7 improved from 6.1 yesterday. - S/p thoracentesis  with 1500cc removed,   - Hemoglobin under 7 now. Eliquis has not been restart. CXR with slight increase in hemothorax.  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 Apixaban held due to severe anemia, hemothorax  --CT scan of head on 11/5 no hemorrhage, no significant change of infarct  -continue Neurovascular checks  -SBP goal less than 140  -CT Head 11/10 without acute change        Acute on chronic systolic CHF with EF 10 to 15%  -s/p ICD placement 11/4 by cardiology   -Echocardiogram EF of 10 to 15%  -Holding all BP medications   -continue I/O and daily weight monitoring  - Euvolemic     Sepsis due to pneumonia, resolved  -Completed iv cefepime and fluconazole .  -tachycardia improved, afebrile and no leukocytosis   -MRSA negative, d/c vancomycin 11/8  -blood cx 11/6 no growth     JERAMY likely due to intravascular volume depletion, resolved  -avoid nephrotoxin   -monitor renal function   - Cr stable     T2DM with hyperglycemia  -A1c 13.1  -Blood sugars remain labile  -Continue NPH, sliding scale, monitor finger stick glucose   -Diabetes management team is on board  - Poor oral intake     Moderate protein calorie malnutrition   -BMI 18.68 kg/m². -nutritionist on board      Code status: Full Code  Prophylaxis: Eliquis  held given hemothorax and severe anemia besides patient does not want to receive transfusion:restart when hemoglobin stable  Care Plan discussed with: Patient, Nurse and CM  Anticipated Disposition: SNF in 24-48h hrs     Hospital Problems  Date Reviewed: 3/18/2022            Codes Class Noted POA    Stroke [facility]) ICD-10-CM: I63.9  ICD-9-CM: 434.91  10/31/2022 Unknown         Vital Signs:    Last 24hrs VS reviewed since prior progress note.  Most recent are:  Visit Vitals  /66   Pulse (!) 125   Temp 98 °F (36.7 °C)   Resp 26   Ht 5' 6\" (1.676 m)   Wt (P) 55.4 kg (122 lb 2.2 oz)   SpO2 96%   BMI (P) 19.71 kg/m²       No intake or output data in the 24 hours ending 11/17/22 2026       Physical Examination:     I had a face to face encounter with this patient and independently examined them on 11/17/2022 as outlined below:          Constitutional: Patient cachectic, chronically sick looking. On room air not in distress   ENT:  Oral mucosa moist, oropharynx benign. Resp:  Improved aeration over left lung field. No wheezing/rhonchi/rales. No accessory muscle use. CV:  Regular rhythm, normal rate, no murmurs, gallops, rubs    GI:  Soft, non distended, non tender. normoactive bowel sounds, no hepatosplenomegaly     Musculoskeletal:  No edema,      Neurologic:  Conscious and alert, oriented to place and person, moves all extremities. CN II-XII reviewed            Data Review:    Review and/or order of clinical lab test  Review and/or order of tests in the radiology section of CPT  Review and/or order of tests in the medicine section of CPT      Labs:     Recent Labs     11/17/22 0047 11/16/22  1520 11/16/22  0123   WBC 5.7  --  7.1   HGB 6.7* 6.1* 6.3*   HCT 21.1* 19.0* 19.8*     --  276       Recent Labs     11/17/22 0047 11/16/22  0123 11/15/22  0420    137 135*   K 3.7 3.8 4.3    104 104   CO2 27 26 24   BUN 19 22* 19   CREA 0.64 0.87 0.74   GLU 78 134* 184*   CA 8.9 8.8 8.8       Recent Labs     11/17/22 0047 11/16/22  0123 11/15/22  0420   ALT 8* 8* 9*   AP 90 91 99   TBILI 0.5 0.5 0.6   TP 5.6* 5.7* 5.7*   ALB 2.0* 2.1* 2.0*   GLOB 3.6 3.6 3.7       No results for input(s): INR, PTP, APTT, INREXT, INREXT in the last 72 hours. No results for input(s): FE, TIBC, PSAT, FERR in the last 72 hours. No results found for: FOL, RBCF   No results for input(s): PH, PCO2, PO2 in the last 72 hours. No results for input(s): CPK, CKNDX, TROIQ in the last 72 hours.     No lab exists for component: CPKMB  Lab Results   Component Value Date/Time    Cholesterol, total 146 11/01/2022 03:30 AM    HDL Cholesterol 69 11/01/2022 03:30 AM    LDL, calculated 71 11/01/2022 03:30 AM    Triglyceride 30 11/01/2022 03:30 AM    CHOL/HDL Ratio 2.1 11/01/2022 03:30 AM     Lab Results   Component Value Date/Time    Glucose (POC) 108 11/17/2022 04:21 PM    Glucose (POC) 153 (H) 11/17/2022 11:32 AM    Glucose (POC) 138 (H) 11/17/2022 08:02 AM    Glucose (POC) 88 11/16/2022 09:21 PM    Glucose (POC) 128 (H) 11/16/2022 05:10 PM     Lab Results   Component Value Date/Time    Color YELLOW/STRAW 11/06/2022 06:43 PM    Appearance CLEAR 11/06/2022 06:43 PM    Specific gravity 1.025 11/06/2022 06:43 PM    Specific gravity 1.029 10/31/2022 12:54 PM    pH (UA) 5.5 11/06/2022 06:43 PM    Protein Negative 11/06/2022 06:43 PM    Glucose 100 (A) 11/06/2022 06:43 PM    Ketone TRACE (A) 11/06/2022 06:43 PM    Bilirubin Negative 11/06/2022 06:43 PM    Urobilinogen 0.2 11/06/2022 06:43 PM    Nitrites Negative 11/06/2022 06:43 PM    Leukocyte Esterase MODERATE (A) 11/06/2022 06:43 PM    Epithelial cells FEW 11/06/2022 06:43 PM    Bacteria Negative 11/06/2022 06:43 PM    WBC 10-20 11/06/2022 06:43 PM    RBC 0-5 11/06/2022 06:43 PM         Medications Reviewed:     Current Facility-Administered Medications   Medication Dose Route Frequency    iron sucrose (VENOFER) 200 mg in 0.9% sodium chloride 100 mL IVPB  200 mg IntraVENous Q24H    polyethylene glycol (MIRALAX) packet 17 g  17 g Oral BID    insulin NPH (NOVOLIN N, HUMULIN N) injection 4 Units  4 Units SubCUTAneous QHS    HYDROcodone-acetaminophen (NORCO) 5-325 mg per tablet 1 Tablet  1 Tablet Oral Q6H PRN    insulin NPH (NOVOLIN N, HUMULIN N) injection 8 Units  8 Units SubCUTAneous QAM    insulin lispro (HUMALOG) injection   SubCUTAneous AC&HS    dextrose 10 % infusion 0-250 mL  0-250 mL IntraVENous PRN    [Held by provider] apixaban (ELIQUIS) tablet 2.5 mg  2.5 mg Oral BID    aspirin chewable tablet 81 mg  81 mg Oral DAILY    hydrALAZINE (APRESOLINE) 20 mg/mL injection 10 mg  10 mg IntraVENous Q6H PRN    sodium chloride (NS) flush 5-40 mL  5-40 mL IntraVENous Q8H    sodium chloride (NS) flush 5-40 mL  5-40 mL IntraVENous PRN    [Held by provider] carvediloL (COREG) tablet 6.25 mg  6.25 mg Oral BID WITH MEALS    pantoprazole (PROTONIX) tablet 40 mg  40 mg Oral ACB    [Held by provider] traMADoL (ULTRAM) tablet 50 mg  50 mg Oral Q6H PRN    dextrose 10 % infusion 0-250 mL  0-250 mL IntraVENous PRN    senna-docusate (PERICOLACE) 8.6-50 mg per tablet 1 Tablet  1 Tablet Oral DAILY    lidocaine 4 % patch 2 Patch  2 Patch TransDERmal Q24H    atorvastatin (LIPITOR) tablet 10 mg  10 mg Oral QHS    ondansetron (ZOFRAN) injection 4 mg  4 mg IntraVENous Q4H PRN    acetaminophen (TYLENOL) tablet 650 mg  650 mg Oral Q4H PRN    Or    acetaminophen (TYLENOL) solution 650 mg  650 mg Per NG tube Q4H PRN    Or    acetaminophen (TYLENOL) suppository 650 mg  650 mg Rectal Q4H PRN    sodium chloride (NS) flush 5-40 mL  5-40 mL IntraVENous Q8H    sodium chloride (NS) flush 5-40 mL  5-40 mL IntraVENous PRN    glucose chewable tablet 16 g  4 Tablet Oral PRN    glucagon (GLUCAGEN) injection 1 mg  1 mg IntraMUSCular PRN    dextrose 10 % infusion 0-250 mL  0-250 mL IntraVENous PRN     ______________________________________________________________________  EXPECTED LENGTH OF STAY: 7d 2h  ACTUAL LENGTH OF STAY:          17                 Fabio Marks MD

## 2022-11-18 NOTE — ACP (ADVANCE CARE PLANNING)
Advance care planning    Met with son at bedside to discuss current condition, prognosis, treatment plans and goals of care. Therapeutic options, response discussed. High risk for bleeding decompensation with another thoracentesis. Discussed risks/benefits. Family has decided not to proceed due risk of bleeding and patient is a [address] who refuses blood products. Answered all questions and concerns to the best of my ability. Discussed code status with son at bedside. He states family has appointed him as the point of contact. States that they do not want patient to through CPR or defibrillation. Son discussed with family. Patient made DNR. Family would like to continue all current treatments aside from blood product administration. Continue current care including vasopressors.                                                                                Puja Thrasher [facility]     [address] [facility]

## 2022-11-18 NOTE — CONSULTS
CRITICAL CARE ADMISSION NOTE      Name: Toan Gomez   : 1940   MRN: [id number]   Date: 2022      Reason for ICU Admission: Hypotension     ICU PROBLEM LIST   Large left Pleural effusion, possible hemothorax s/p thoracentesis  Possible aspiration pneumonia  CVA s/p thrombectomy  Acute on Chronic CHF HFrEF s/p AICD placement  Hypovolemia  DM2      HISTORY OF PRESENT ILLNESS:   This is an 81year old who initlally presented on 10/31 with acute bilateral CVA s/p thrombectomy. She was treated in ICU and was transferred to [facility] on 2022. She was found to have heart failure with EF 10-15% and AICD was placed during her stay on . She has had intermittent episodes of hypotension, which she responds to fluid.  she began to have increase in somnolence and hypotension, and there was concern for possible bleeding. She was seen by CCM service and transferred to ICU, LR bolus 500 mL was given which she responded to well, pressors were not initiated. She will be watched in ICU for now for higher level care. 24 HOUR EVENTS:     NEUROLOGICAL:    -Neurological checks per unit routine  -ASA on hold for possible hemothorax  -SBP goal less than 140  Plavix stopped due to supra therapeutic response. PULMONOLOGY:   Start Zosyn and Vancomycin for aspiration coverage  Nasal cannula oxygen as needed to maintain saturations greater than 92%  ABG with mentation change    CARDIOVASCULAR:   EF 10-15% s/p ICD  ICD is buzzing, should likely be interrogated.        GASTROINTESTINAL:   No acute issue    RENAL/ELECTROLYTE/FLUIDS:   Trend lytes and replete as needed  Monitor phos- high risk for re-feeding        ENDOCRINE:   Monitor glucose  SSI if needed       HEMATOLOGY/ONCOLOGY:       ID/MICRO:       ANTIBIOTICS TO DATE:Zosyn, Vanc    CULTURES TO DATE:      ICU DAILY CHECKLIST     Code Status:FULL  DVT Prophylaxis:SCDs  T/L/D: PIV  SUP: NONE  Diet: Advance as tolerated  Activity Level:Advance as toelrated  ABCDEF Bundle/Checklist Completed:Yes  Disposition: Stay in ICU  Multidisciplinary Rounds Completed:  Pending  Patient/Family Updated: Yes    [place]:         Review of Systems:     Review of Systems   Unable to perform ROS: Mental acuity      Past Medical History:      has a past medical history of Colon polyps, Diabetes ([address] Diverticulosis, Hypertension, Ill-defined condition, and Pancreatitis. Past Surgical History:      has a past surgical history that includes colonoscopy (N/A, 4/29/2021); hx coronary stent placement (2021); and ir perc art [facility] thromb infusion intracranial (10/31/2022). Home Medications:     Prior to Admission medications    Medication Sig Start Date End Date Taking? Authorizing Provider   sacubitril/valsartan (ENTRESTO PO) Take  by mouth. Yes Provider, Historical   glipiZIDE (GLUCOTROL) 5 mg tablet Take 2 Tablets by mouth Daily (before breakfast). 3/22/22  Yes Alfredo Moses PA-C   apixaban (ELIQUIS) 2.5 mg tablet Take 2.5 mg by mouth daily. Yes Provider, Historical   spironolactone (ALDACTONE) 25 mg tablet Take 25 mg by mouth daily. Yes Provider, Historical   acetaminophen (TYLENOL) 650 mg TbER Take 650 mg by mouth every eight (8) hours as needed for Pain. Indications: pain   Yes Provider, Historical   furosemide (LASIX) 40 mg tablet Take 1 Tablet by mouth daily. 11/24/21  Yes Lukas Gaviria PA-C   pantoprazole (PROTONIX) 40 mg tablet Take 1 Tablet by mouth Daily (before breakfast). 11/25/21  Yes Lukas Gaviria PA-C   clopidogreL (PLAVIX) 75 mg tab Take 1 Tablet by mouth daily. 10/30/21  Yes Mara España MD   carvediloL (COREG) 3.125 mg tablet Take 1 Tablet by mouth two (2) times daily (with meals). 10/29/21  Yes Mara España MD       Allergies/Social/Family History:      Allergies   Allergen Reactions    Insulins Itching    Penicillins Other (comments)     Pt states it makes her pass out      Social History     Tobacco Use    Smoking status: Never    Smokeless tobacco: Never   Substance Use Topics    Alcohol use: Not Currently      Family History   Problem Relation Age of Onset    Diabetes Other          OBJECTIVE:     Labs and Data: Reviewed 11/18/22  Medications: Reviewed 11/18/22  Imaging: Reviewed 11/18/22    Physical Exam  Constitutional:       General: She is awake. Appearance: She is underweight. She is ill-appearing. HENT:      Head: Normocephalic and atraumatic. Mouth/Throat:      Mouth: Mucous membranes are dry. Eyes:      General: Lids are normal.      Extraocular Movements: Extraocular movements intact. Conjunctiva/sclera: Conjunctivae normal.      Pupils: Pupils are equal, round, and reactive to light. Neck:      Trachea: Trachea normal.   Cardiovascular:      Rate and Rhythm: Regular rhythm. Tachycardia present. Heart sounds: Normal heart sounds, S1 normal and S2 normal. No murmur heard. No friction rub. No gallop. Pulmonary:      Effort: Pulmonary effort is normal.      Breath sounds: Normal breath sounds and air entry. Chest:       Abdominal:      General: Abdomen is flat. Bowel sounds are normal. There is no distension. Palpations: Abdomen is soft. There is no mass. Tenderness: There is no abdominal tenderness. Musculoskeletal:      Cervical back: No edema. Decreased range of motion. Right lower leg: No edema. Left lower leg: No edema. Skin:     General: Skin is cool and dry. Capillary Refill: Capillary refill takes 2 to 3 seconds. Coloration: Skin is pale. Neurological:      Mental Status: She is easily aroused. Mental status is at baseline. GCS: GCS eye subscore is 4. GCS verbal subscore is 3. GCS motor subscore is 6.         Visit Vitals  BP (!) 106/58   Pulse 97   Temp 98 °F (36.7 °C)   Resp (!) 32   Ht 5' 6\" (1.676 m)   Wt (P) 55.4 kg (122 lb 2.2 oz)   SpO2 98%   BMI (P) 19.71 kg/m²    O2 Flow Rate (L/min): 0 l/min O2 Device: None (Room air) Temp (24hrs), Av.3 °F (36.8 °C), Min:98 °F (36.7 °C), Max:99 °F (37.2 °C)           Intake/Output:   No intake or output data in the 24 hours ending 22 0316    Imaging    10/31/22    ECHO ADULT COMPLETE 2022    Interpretation Summary    Left Ventricle: Severely reduced left ventricular systolic function with a visually estimated EF of 10 -15%. Left ventricle is moderately dilated. Mildly increased wall thickness. Severe global hypokinesis present. Abnormal diastolic function. Aortic Valve: Tricuspid valve. Mild regurgitation. Mitral Valve: Mild to moderate regurgitation. Interatrial Septum: Agitated saline study was negative with and without provocation. Signed by: Hayley Panchal MD on 2022  4:14 PM           CRITICAL CARE DOCUMENTATION  I had a face to face encounter with the patient, reviewed and interpreted patient data including clinical events, labs, images, vital signs, I/O's, and examined patient. I have discussed the case and the plan and management of the patient's care with the consulting services, the bedside nurses and the respiratory therapist.      NOTE OF PERSONAL INVOLVEMENT IN CARE   This patient has a high probability of imminent, clinically significant deterioration, which requires the highest level of preparedness to intervene urgently. I participated in the decision-making and personally managed or directed the management of the following life and organ supporting interventions that required my frequent assessment to treat or prevent imminent deterioration. I personally spent 45 minutes of critical care time. This is time spent at this critically ill patient's bedside actively involved in patient care as well as the coordination of care. This does not include any procedural time which has been billed separately.       Renetta Novak [facility]     Critical Care Medicine  [employer]

## 2022-11-18 NOTE — PROGRESS NOTES
0730: Bedside and Verbal shift change report given to Dominic Owen RN, Barbara Damon RN (oncoming nurse) by Nate Cadet (offgoing nurse). Report included the following information SBAR, Kardex, ED Summary, Procedure Summary, Intake/Output, MAR, Recent Results, Cardiac Rhythm sinus tach, Alarm Parameters , and Dual Neuro Assessment. 1641: Bladder scan: 292mL     1700: Attempted to place 8 fr weighted & 10 fr DHT multiple times in each nare. Was instructed by NP to stop once resistance was met to avoid causing any bleeding. Was unsuccessful. NP notified. 1930: Bedside and Verbal shift change report given to Philip Barger RN (oncoming nurse) by Dominic Owen RN, Barbara Damon RN (offgoing nurse). Report included the following information SBAR, Kardex, ED Summary, Procedure Summary, Intake/Output, MAR, Recent Results, Cardiac Rhythm sinus tach, and Alarm Parameters . Pt's code status changed from full code to DNR today.

## 2022-11-18 NOTE — PROGRESS NOTES
Pharmacist Note - Vancomycin Dosing    Consult provided for this 81 y.o. female for indication of Aspiration Pneumonia. Antibiotic regimen(s): Cefepime and vancomycin  Patient on vancomycin PTA? NO     Recent Labs     22  0047 22  0123 11/15/22  0420   WBC 5.7 7.1 9.4   CREA 0.64 0.87 0.74   BUN 19 22* 19     Frequency of BMP: Daily  Height: 167.6 cm  Weight: 55.4 kg  Est CrCl: 55.6 ml/min  Temp (24hrs), Av.1 °F (36.7 °C), Min:97.5 °F (36.4 °C), Max:99 °F (37.2 °C)      MRSA Swab ordered (if applicable)? YES    The plan below is expected to result in a target range of AUC/RITO 400-600    Therapy will be initiated with a loading dose of 1500 mg IV x 1 to be followed by a maintenance dose of 1000 mg IV every 18 hours for an expected AUC of 510 mg/L/hr (75% likelihood). Pharmacy to follow patient daily and order levels / make dose adjustments as appropriate. *Vancomycin has been dosed used Bayesian kinetics software to target an AUC/RITO of 400-600, which provides adequate exposure for an assumed infection due to MRSA with an RITO of 1 or less while reducing the risk of nephrotoxicity as seen with traditional trough based dosing goals.

## 2022-11-18 NOTE — PROGRESS NOTES
Pharmacist Note - Vancomycin Dosing - day one    Recent Labs     11/18/22  0312 11/17/22  0047 11/16/22  0123   WBC 18.1* 5.7 7.1   CREA 1.45* 0.64 0.87   BUN 27* 19 22*     Plan:  Scr is significantly elevated. Will hold current dose and check a random level with am labs on 11/19. LD 1500 mg given today at 01:31.

## 2022-11-18 NOTE — PROGRESS NOTES
0215: TRANSFER - IN REPORT:    Verbal report received from RN (name) on Madisyn Mina  being received from 6S(unit) for routine progression of care      Report consisted of patients Situation, Background, Assessment and   Recommendations(SBAR). Information from the following report(s) SBAR, Kardex, Intake/Output, and MAR was reviewed with the receiving nurse. Opportunity for questions and clarification was provided. Assessment completed upon patients arrival to unit and care assumed. 0230: Pt arrived from [facility]. Unable to obtain temperature. 3428: Rectal probe placed. Temp 94.5. Lynnette hugger applied. Intensivist notified. 8833: Critical hg resulted 5.9. Pt is a Religious and refuses blood products. Intensivisit notified. 0400:Per NP, do not draw BC at this time due to low Hg.     0730: Bedside shift change report given to [address]  (oncoming nurse) by Eduar Hood  (offgoing nurse). Report included the following information SBAR, Kardex, Intake/Output, and MAR.

## 2022-11-18 NOTE — PROGRESS NOTES
NUTRITION brief  Recommendations:     -Check magnesium and phosphorus prior to starting tube feeding; replace if BNL    -Supplement with 100 mg B1 and MVI    -Start EN at trophic rate. If lytes stable/no sign of refeeding then slowly advance to goal       Goal: Glucerna 1.5 @ 35 ml/hr with 100 ml water flush q 4 hr       Diet: NPO  Supplements/Nutrition Support: None  Nutrition-related meds: Humalog, NPH, Miralax, Pericolace, Levophed @ 6    New events impacting nutrition plan of care:   Pt transferred to ICU d/t hypotension-initially improved with IVF but now on pressor. Pt somnolent-plan to place NGT for medications and feeding. Very high risk for refeeding. Start at 20 ml/hr-advance once lytes stable/WNL. Expect phosphorus will fall once start nutrition support. Above goal will provide 770 ml, 1155 calories, 64 gm protein, 102 gm CHO and 1185 ml free water (tube feeding/flush) per day to meet at least 93% estimated needs. See full RD assessment from 11/17 for additional details, goals, and monitoring/evaluation.    Estimated Nutrition Needs:   Energy: 9937 (25 kcal/kg)-in Wt used: Current  Protein: 55 (1.1 g/kg)  Wt used: Current   Fluid: 106 Delma Bowens, [facility]

## 2022-11-18 NOTE — PROGRESS NOTES
Comprehensive Nutrition Assessment    Type and Reason for Visit: Reassess    Nutrition Recommendations/Plan:   Continue Regular diet, encourage PO intakes. Given overall limited intakes, will modify ONS to high kcal/high pro vs lower CHO content of the Ensure High Protein currently ordered. Malnutrition Assessment:  Malnutrition Status:  Severe malnutrition (11/03/22 1105)    Context:  Chronic illness     Findings of the 6 clinical characteristics of malnutrition:   Energy Intake:  Mild decrease in energy intake (specify)  Weight Loss:  20% over 1 year     Body Fat Loss:  Severe body fat loss, Buccal region, Triceps   Muscle Mass Loss:  Severe muscle mass loss, Clavicles (pectoralis &deltoids)  Fluid Accumulation:  No significant fluid accumulation,     Strength:  Not performed        Nutrition Assessment:    Past Medical History:   Diagnosis Date    Colon polyps     Diabetes ([address]     Diverticulosis     Hypertension     Ill-defined condition     Pancreatitis      11/17: Pt seen for follow-up this AM. Mostly untouched breakfast but patient asking to leave it in front of her as she may take some more bites. Some sips of Ensure taken. Unable to get much more additional info from her as complaining her backside was uncomfortable/needed to be repositioned - made PCT aware. Pt continues to exhibit features of severe chronic malnutrition and suspect continues to fall short of overall kcal/protein needs. Will continue ONS, but suspect without addition of supplemental nutrition support, she will continue to fall short of nutrition needs - unsure if this falls in line with overall care goals. Pt did finally have a BM after nearly 2 weeks without. 11/10:  Pt seen for follow-up. She was sitting up in bed, but reports feeling sleepy. Breakfast tray with some bites of pancakes taken, nothing else eaten. I offered to re-heat for her, but she says she \"will work on that later. \" John Deem again the need for a bowel movement - still nothing recorded since PTA. Encouraged PO/ONS intakes. Awaiting IPR placement. New bedscale wt taken 127# - suspect not accurate as would indicate 34# wt gain. 11/3: Nutrition review completed for pt d/t low body wt/BMI. Pt reports poor oral intake with some N/V, stomach feeling uneasy over the last month. She reports UBW as 155# prior to \"getting sick\" a few years back. She exhibits severe fat and muscle wasting. She does drink Ensure at home and agreeable to drink while admitted, consumed about 50% of her shake this morning. Tolerating easy to chew diet. Does state some difficulties moving bowels from time to time, on pericolace now but has not had a BM since admission. Does not check blood sugar levels at home d/t financial reasons - spoke with CM to pass on to IPR, as pending IPR placement. Nutritionally Significant Medications:  Novolin, Miralax, Protonix, Pericolace,       Estimated Daily Nutrient Needs:  Energy Requirements Based On: Kcal/kg  Weight Used for Energy Requirements: Current  Energy (kcal/day): 8606-6125 (28-30 kcal/kg)  Weight Used for Protein Requirements: Current  Protein (g/day): 55 (1.1 g/kg)  Method Used for Fluid Requirements: 1 ml/kcal  Fluid (ml/day): 1500    Nutrition Related Findings:   Edema: No data recorded    Last BM: 11/16/22, Formed    Wounds: None      Current Nutrition Therapies:  Diet: Regular  Supplements: Ensure HP TID  Meal intake: No data found. Supplement intake: No data found. Nutrition Support: N/A      Anthropometric Measures:  Height: 5' 6\" (167.6 cm)  Ideal Body Weight (IBW): 130 lbs (59 kg)     Current Body Wt:  55.4 kg (122 lb 2.2 oz), 84.5 % IBW.  Bed scale  Current BMI (kg/m2): 19.7  Usual Body Weight: 68 kg (150 lb)  % Weight Change (Calculated): -26.8  Weight Adjustment: No adjustment                 BMI Category: Underweight (BMI less than 22) age over 72    Wt Readings from Last 10 Encounters:   11/17/22 (P) 55.4 kg (122 lb 2.2 oz)   10/31/22 42.4 kg (93 lb 8 oz)   05/10/22 53.1 kg (117 lb)   03/21/22 56.7 kg (125 lb)   02/23/22 59.7 kg (131 lb 9.8 oz)   11/19/21 62.6 kg (138 lb)   10/22/21 63.5 kg (140 lb)   07/08/21 65.3 kg (143 lb 15.4 oz)   05/05/21 67.5 kg (148 lb 13 oz)   05/05/21 67.5 kg (148 lb 13 oz)           Nutrition Diagnosis:   Unintended weight loss related to early satiety, inadequate protein-energy intake as evidenced by BMI, weight loss    Nutrition Interventions:   Food and/or Nutrient Delivery: Continue oral nutrition supplement, Continue current diet  Nutrition Education/Counseling: Education not indicated  Coordination of Nutrition Care: Continue to monitor while inpatient  Plan of Care discussed with: CM    Goals:  Previous Goal Met: Progressing toward goal(s)  Goals: PO intake 50% or greater, by next RD assessment       Nutrition Monitoring and Evaluation:   Behavioral-Environmental Outcomes: None identified  Food/Nutrient Intake Outcomes: Food and nutrient intake, Diet advancement/tolerance, Supplement intake  Physical Signs/Symptoms Outcomes: GI status, Constipation, Biochemical data, Meal time behavior    Discharge Planning:    Continue oral nutrition supplement, Continue current diet    Phu Ray RD  Available via [employer] or ext [phone]

## 2022-11-18 NOTE — PROGRESS NOTES
Hospitalist Night Cover     Name: Jessica Arredondo  YOB: 1940      Overnight update:        Jessica Arredondo is a 80 yo with a PMH of DM, HTN, pancreatitis, and diverticulitis. She was admitted for acute posterior ischemic CVA s/p thrombectomy. She is also s/p AICD for acute finding of HFrEF 10-15% and thoracentesis due to concern for hemothorax. Noted to have intermittent hypotension and worsened anemia this admission. Rapid response called for hypotension and AMS. Arrived at bedside, patient was responsive only to loud verbal stimulation, diaphoretic, with SBP 70s. Narcan given to patient. Hypotension and AMS:  -1853 norco given, previous doses tolerated, put on hold  -coreg held prior to this episode  -narcan given and effective  -unable to give blood products/albumin due to [employer] reasons. Hgb 6.7  -ABG results normal  -CXR pending  -Head CT pending    This patient is at high risk for clinical deterioration and may require a higher level of care. 0000 reassessed patient, she remains hypotensive, ICU consulted. Patient may require pressors. Spoke with son Nyasia Gann- [phone]) and updated him on patient's change in condition and transfer to ICU. Opportunity for questions provided.      Gerri Roland [facility]-NP

## 2022-11-18 NOTE — PROGRESS NOTES
Physical Therapy Note  11/18/2022    Chart reviewed in prep for PT tx session. Recent events noted - pt transferred to ICU 2* increased lethargy, hypothermia, hypotension, and low Hgb (5.9). Spoke with RN - pt remains on toni hugger and extremely lethargic; currently not appropriate for skilled PT. Will defer and continue to follow as able/appropriate.     Thank you,  Li Mariee, PT, DPT

## 2022-11-18 NOTE — PROGRESS NOTES
Transition of Care Plan  RUR-High Risk  DISPOSITION: SNF   Transport: [facility]  Patient transferred to ICU for hypotension. Per notes responded well to IVF. Patient has been accepted by [facility] and rehab [phone]. Will need ambulance transport at discharge.   Contact Ahmet Kang [phone]   Leonila Carson RN,Care Management

## 2022-11-19 NOTE — PROGRESS NOTES
1930: Bedside and Verbal shift change report given to KANCHAN Buchanan (oncoming nurse) by Marcial De Leon, RN and Adriana Ye RN (offgoing nurse). Report included the following information SBAR, Kardex, ED Summary, OR Summary, Procedure Summary, Intake/Output, MAR, Accordion, Recent Results, Cardiac Rhythm NSR, and Alarm Parameters . Shift summary: Uneventful night and patient slept most of night; Patient remains on 5 of levophed and AM hemoglobin 5.0; this RN notified Juliette Cifuentes NP and no orders were received. 0730: Bedside and Verbal shift change report given to Adriana Ye RN and Marcial De Leon RN (oncoming nurse) by Koffi Hinojosa RN (offgoing nurse). Report included the following information SBAR, Kardex, ED Summary, OR Summary, Procedure Summary, Intake/Output, MAR, Accordion, Recent Results, Cardiac Rhythm NSR, and Alarm Parameters .

## 2022-11-19 NOTE — CONSULTS
NEPHROLOGY CONSULT NOTE     Patient: Samantha Sousa MRN: [id number]  PCP: Kathia Marquez MD   :     1940  Age:   81 y.o. Sex:  female      Referring physician: Vero Quach MD  Reason for consultation: 81 y.o. female with Stroke [facility]) [Q34.1] complicated by JERAMY   Admission Date: 10/31/2022  2:31 PM  LOS: 19 days     DISCUSSION / PLAN :   Acute renal failure  - Serum creatinine 2.30 mg/dl up from 1.28 mg/dl on admission 10/31/22  - Suspect in the setting of pre-renal/ATN secondary to sepsis, volume depletion and hypotension   - Abdominal ultrasound on 22 with stable right sided hydronephrosis  - Place stein due to on-going urinary retention   - Check UA, urine studies, lytes   - Patient received 500 ml bolus of LR today with improvements in her BP  - CTA with large left pleural effusion, small to moderate pericardial effusion   - On gentle mIVF- LR for 12- hours, then stop given    - Patient on vanc/zosyn- recommend changing regiment in light of JERAMY  - Strict I/Os  - Renally dose all medication, Avoid nephrotoxins  - Trend renal indices daily     Acute on chronic sCHF  - EF 10-15%, s/p ICD   - anti-hypertensives on hold     CVA  - s/p thrombectomy     Acute blood loss Anemia  Large left pleural effusion, s/p thoracentesis   - hgb 5.0 today  - Patient is a Jehov[employer] Witness refusing blood products  - IV venofer x 3 doses     DM II  - SSI per ICU team          Subjective:   HPI: Samantha Sousa is a 81 y.o.  female with a past medical history significant for hypertension, prior stroke, and diabetes. Presented to the hospital on 10/31 for suspected CVA- findings with acute bilateral ischemic CVA s/p thrombectomy. Subsequently patient developed acute HFrEF 10-15%, cardiology was consulted and patient underwent placement of AICD on . She then developed large left pleural effusion following AICD placement and had thoracentesis performed.  She then suffered acute blood loss and CT was concerning for hemothorax. She developed hypotension and was then transferred to ICU. She received small bolus of LR with improvement in her BP and was started on gentle IVF hydration in light of worsening renal function. Nephrology was consulted for the assistance in the management of JERAMY  - Serum creatinine 2.30 mg/dl, up from 1.28 mg/dl on 10/31/22  - Recent diagnosis of PNA- completed course of antibiotics-- cefepime and fluconazole  - Now on vanc/zosyn combination for ppx due to possible aspiration pneumonia  - HGB down to 5.0 today, patient is a Jehovah witness and refuses all blood products. IV Venofer ordered   - Goals of care were discussed with the family and patient was recently made DNR  - Per primary RN, patient has been having on-going urinary retention requiring straight catheterization, bladder scan from this morning with 400 ml  - Not currently on any pressor support   - Patient is confused and lethargic on exam. Unable to provide ROS, history obtained via chart review and primary nurse      Past Medical Hx:   Past Medical History:   Diagnosis Date    Colon polyps     Diabetes ([facility] [address]     Diverticulosis     Hypertension     Ill-defined condition     Pancreatitis         Past Surgical Hx:     Past Surgical History:   Procedure Laterality Date    COLONOSCOPY N/A 4/29/2021    COLONOSCOPY performed by Danis Still MD at [facility]  2021    x2    [facility]  10/31/2022       Medications:  Prior to Admission medications    Medication Sig Start Date End Date Taking? Authorizing Provider   sacubitril/valsartan (ENTRESTO PO) Take  by mouth. Yes Provider, Historical   glipiZIDE (GLUCOTROL) 5 mg tablet Take 2 Tablets by mouth Daily (before breakfast). 3/22/22  Yes Ladonna Schmidt PA-C   apixaban (ELIQUIS) 2.5 mg tablet Take 2.5 mg by mouth daily.    Yes Provider, Historical   spironolactone (ALDACTONE) 25 mg tablet Take 25 mg by mouth daily. Yes Provider, Historical   acetaminophen (TYLENOL) 650 mg TbER Take 650 mg by mouth every eight (8) hours as needed for Pain. Indications: pain   Yes Provider, Historical   furosemide (LASIX) 40 mg tablet Take 1 Tablet by mouth daily. 11/24/21  Yes Michel Gaviria PA-C   pantoprazole (PROTONIX) 40 mg tablet Take 1 Tablet by mouth Daily (before breakfast). 11/25/21  Yes Michel Gaviria PA-C   clopidogreL (PLAVIX) 75 mg tab Take 1 Tablet by mouth daily. 10/30/21  Yes Alexis Garcia MD   carvediloL (COREG) 3.125 mg tablet Take 1 Tablet by mouth two (2) times daily (with meals). 10/29/21  Yes Alexis Garcia MD       Allergies   Allergen Reactions    Insulins Itching    Penicillins Other (comments)     Pt states it makes her pass out       Social Hx:  reports that she has never smoked. She has never used smokeless tobacco. She reports that she does not currently use alcohol. She reports that she does not use drugs. Family History   Problem Relation Age of Onset    Diabetes Other        Review of Systems:  Unable to obtain      Objective:    Vitals:    Vitals:    11/19/22 1400 11/19/22 1500 11/19/22 1600 11/19/22 1700   BP: (!) 117/58 (!) 129/51 (!) 127/52 (!) 127/55   Pulse: 95 91 95 92   Resp: 15 18 14 17   Temp:   98.4 °F (36.9 °C)    SpO2:  95% 93% 95%   Weight:       Height:         I&O's:  11/18 0701 - 11/19 0700  In: 1985.4 [I.V.:1985.4]  Out: -   Visit Vitals  BP (!) 127/55   Pulse 92   Temp 98.4 °F (36.9 °C)   Resp 17   Ht 5' 6\" (1.676 m)   Wt (P) 55.4 kg (122 lb 2.2 oz)   SpO2 95%   BMI (P) 19.71 kg/m²       Physical Exam:  General: Lethargic   HEENT: Eyes are PERRL. Conjunctiva without pallor ,erythema. Neck: Supple,no mass palpable  Lungs :  Normal respiratory effort, on oxygen via NC  CVS: RRR, no LE edema  Abdomen: Soft, Non tender  Extremities: No cyanosis, No clubbing  Skin: No rash or lesions.   Neurologic: confused  Psych: unable to evaluate    Laboratory Results:    Lab Results   Component Value Date    BUN 36 (H) 11/19/2022     11/19/2022    K 4.2 11/19/2022     (H) 11/19/2022    CO2 24 11/19/2022       Lab Results   Component Value Date    BUN 36 (H) 11/19/2022    BUN 27 (H) 11/18/2022    BUN 19 11/17/2022    BUN 22 (H) 11/16/2022    BUN 19 11/15/2022    K 4.2 11/19/2022    K 4.8 11/18/2022    K 3.7 11/17/2022    K 3.8 11/16/2022    K 4.3 11/15/2022       Lab Results   Component Value Date    WBC 12.8 (H) 11/19/2022    RBC 1.75 (L) 11/19/2022    HGB 5.0 (LL) 11/19/2022    HCT 16.4 (LL) 11/19/2022    MCV 93.7 11/19/2022    MCH 28.6 11/19/2022    RDW 16.2 (H) 11/19/2022     11/19/2022       Lab Results   Component Value Date    PHOS 3.2 10/31/2022       Urine dipstick:   Lab Results   Component Value Date/Time    Color YELLOW/STRAW 11/06/2022 06:43 PM    Appearance CLEAR 11/06/2022 06:43 PM    Specific gravity 1.025 11/06/2022 06:43 PM    Specific gravity 1.029 10/31/2022 12:54 PM    pH (UA) 5.5 11/06/2022 06:43 PM    Protein Negative 11/06/2022 06:43 PM    Glucose 100 (A) 11/06/2022 06:43 PM    Ketone TRACE (A) 11/06/2022 06:43 PM    Bilirubin Negative 11/06/2022 06:43 PM    Urobilinogen 0.2 11/06/2022 06:43 PM    Nitrites Negative 11/06/2022 06:43 PM    Leukocyte Esterase MODERATE (A) 11/06/2022 06:43 PM    Epithelial cells FEW 11/06/2022 06:43 PM    Bacteria Negative 11/06/2022 06:43 PM    WBC 10-20 11/06/2022 06:43 PM    RBC 0-5 11/06/2022 06:43 PM       I have reviewed the following: All pertinent labs, microbiology data, radiology imaging for my assessment     ECG- Rev: Yes / No  Xray/CT/US/MRI REV: Yes/ No    Care Plan discussed with:  primary rn      Chart reviewed. Medications list Personally Reviewed   [x]      Yes     []               No      Thank you for allowing us to participate in the care of this patient. We will follow patient.  Please dont hesitate to call with any questions    Aparna Grace NP  11/19/2022    [facility]  [address]  [place]  Phone - [phone]  Fax - [phone]  [email or web address]

## 2022-11-19 NOTE — PROGRESS NOTES
Day #2 of Zosyn - Renal Dosing Update  Indication:  Possible aspiration PNA  Current regimen:  3.375 gm IV Q 8 hr  Abx regimen: Zosyn + Vancomycin  Recent Labs     22  0358 22  0312 22  0047   WBC 12.8* 18.1* 5.7   CREA 2.30* 1.45* 0.64   BUN 36* 27* 19     Est CrCl: ~10-20 ml/min; UO: -- ml/kg/hr  Temp (24hrs), Av.9 °F (37.2 °C), Min:97.9 °F (36.6 °C), Max:100.2 °F (37.9 °C)    Cultures:    Blood: NG, final   MRSA screen: (-)   Pleural fluid: NG, final    Plan: Given the patient's worsening Scr, the dose of Zosyn has been adjusted to 3.375 gm IV Q 12 hr (4-hr extended infusion) per [facility] P&T Committee Protocol with respect to renal function. Pharmacy will continue to monitor patient daily and will make dosage adjustments based upon changing renal function.

## 2022-11-19 NOTE — PROGRESS NOTES
Day #2 of Vancomycin - Level/Dosing Update  Indication:  Possible aspiration PNA  Current regimen:  1.5 gm IV x 1 given  @ 0131  Abx regimen:  Zosyn + Vancomycin  ID Following ?: NO  Concomitant nephrotoxic drugs (requires more frequent monitoring): Vasopressors  Frequency of BMP?: Daily    Recent Labs     228 22  0312 22  0047   WBC 12.8* 18.1* 5.7   CREA 2.30* 1.45* 0.64   BUN 36* 27* 19     Est CrCl: ~10-20 ml/min; UO: -- ml/kg/hr  Temp (24hrs), Av.9 °F (37.2 °C), Min:97.9 °F (36.6 °C), Max:100.2 °F (37.9 °C)    Cultures:    Blood: NG, final   MRSA screen: (-)   Pleural fluid: NG, final    MRSA Swab ordered (if applicable)? YES (but not sent yet)    Goal target range Trough 10-15 mcg/mL    Recent level history:  Date/Time Dose & Interval Measured Level (mcg/mL) Associated AUC/RITO Dose Adjustment     @ 8 1.5 gm IV x 1  7.3 mcg/mL (drawn ~26.5 hrs post-dose) -- 750 mg IV x 1 dose ordered                                         Plan: The random vancomycin level drawn this morning was 7.3 mcg/ml (drawn ~26.5 hrs post-dose) which is below goal range. Plan will be to give a one-time dose of 750 mg this am and continue dosing based on levels given the patient's worsening Scr. Pharmacy will continue to monitor patient daily and will make dosage adjustments based upon changing renal function.

## 2022-11-19 NOTE — PROGRESS NOTES
CRITICAL CARE NOTE      Name: Christiano Valdes   : 1940   MRN: [id number]   Date: 2022      REASON FOR ICU ADMISSION:  Hypotension     PRINCIPAL ICU DIAGNOSIS     Large left Pleural effusion, possible hemothorax s/p thoracentesis  Possible aspiration pneumonia  CVA s/p thrombectomy  Acute on Chronic CHF HFrEF s/p AICD placement  Hypovolemia  DM2  Severe Blood Loss Anemia     BRIEF PATIENT SUMMARY     This is an 81year old who initlally presented on 10/31 with acute bilateral CVA s/p thrombectomy. She was treated in ICU and was transferred to [facility] on 2022. She was found to have heart failure with EF 10-15% and AICD was placed during her stay on . She has had intermittent episodes of hypotension, which she responds to fluid.  she began to have increase in somnolence and hypotension, and there was concern for possible bleeding. She was seen by CCM service and transferred to ICU, LR bolus 500 mL was given which she responded to well, pressors were not initiated. She has now been requiring pressors at low dose for the past 24 hours. COMPREHENSIVE ASSESSMENT & PLAN:SYSTEM BASED     24 HOUR EVENTS:   Patient was on levophed @ 5/ hr for the last 24 hours. This was stopped this morning. She did require 500 cc bolus initially this morning to bring MAPS up. Cautious with fluids in the setting of CHFrEF. Started on low rate IVF in the setting of worsening renal function and decreasing urine output. Hemoglobin is 5.0 today. Patient is a Protestant and family/patient have refused all blood products, including Albumin. NEUROLOGICAL:     - Hx of CVA from 22 , B/L superior cerebellar infarct with mild associated petechial hemorrhage and chronic right occipital infarction.   -Neurological checks per unit routine  -ASA on hold for possible hemothorax  -SBP goal less than 140  Plavix stopped due to supra therapeutic response.      PULMONOLOGY:     -c/w Zosyn and Vancomycin for aspiration coverage  -Nasal cannula oxygen as needed to maintain saturations greater than 92%  -ABG 11/17 with Primary Respiratory Alkalosis     CARDIOVASCULAR:     -EF 10-15% s/p ICD  -ICD is buzzing, should likely be interrogated. GASTROINTESTINAL     -Patient has not been eating, attempted dobhoff or NG tube twice yesterday without success, careful d/t hgb of 5.0 and no blood products. Will attempt again today. Failed bedside swallow with nursing.  - speech consult placed    RENAL/ELECTROLYTE/FLUIDS:     Trend lytes and replete as needed  Monitor phos- high risk for re-feeding    ENDOCRINE:   Type II Diabetes --> A1C 13.1 on 11/1/22  C/W NPH BID and SSI   Monitor glucose  BS still elevated despite NPO status at this point        HEMATOLOGY/ONCOLOGY:     Severe blood loss anemia-rr/t Thoracentesis procedure with subsequent bleeding. hemoglobin is 5.0 today. Iron Venofer x 3 doses ordered. Patient continues to refuse blood. INFECTIOUS DISEASE:       ANTIBIOTICS TO DATE: Zosyn & Vanc 11/18- present     CULTURES TO DATE: Urine culture 11/9 pending                                          Thoracentesis Culture 11/14 NGTD x 4 days     ICU DAILY CHECKLIST     Code Status:DNR  DVT Prophylaxis:on hold d/t anemia, SCDs  T/L/D: Tubes: None  Lines: Peripheral IV  Drains: None  SUP: Protonix   Diet: NPO , speech consult   Activity Level:As tolerated   ABCDEF Bundle/Checklist Completed:Yes  Disposition: Stay in ICU  Multidisciplinary Rounds Completed:  N/A  Goals of Care Discussion/Palliative: No  Patient/Family Updated: Yes      [facility]     11/19: Patient off Levo today. Failed bedside swallow. Speech eval placed. Will try dobhoff placement again with improved mental status. Hemoglobin is 5.0. Venofer x 3 doses ordered.     SUBJECTIVE   ROS     OBJECTIVE     Labs and Data: Reviewed 11/19/22  Medications: Reviewed 11/19/22  Imaging: Reviewed 11/19/22    Physical Exam     Visit Vitals  /67 (BP 1 Location: Right arm, BP Patient Position: Lying)   Pulse 95   Temp 98.3 °F (36.8 °C)   Resp 16   Ht 5' 6\" (1.676 m)   Wt (P) 55.4 kg (122 lb 2.2 oz)   SpO2 98%   BMI (P) 19.71 kg/m²    O2 Flow Rate (L/min): 4 l/min O2 Device: Nasal cannula Temp (24hrs), Av.3 °F (36.8 °C), Min:97.9 °F (36.6 °C), Max:98.9 °F (37.2 °C)           Intake/Output:     Intake/Output Summary (Last 24 hours) at 2022 1617  Last data filed at 2022 1250  Gross per 24 hour   Intake 1640.68 ml   Output 410 ml   Net 1230.68 ml       Imaging    10/31/22    ECHO ADULT COMPLETE 2022    Interpretation Summary    Left Ventricle: Severely reduced left ventricular systolic function with a visually estimated EF of 10 -15%. Left ventricle is moderately dilated. Mildly increased wall thickness. Severe global hypokinesis present. Abnormal diastolic function. Aortic Valve: Tricuspid valve. Mild regurgitation. Mitral Valve: Mild to moderate regurgitation. Interatrial Septum: Agitated saline study was negative with and without provocation. Signed by: Jeanie Oates MD on 2022  4:14 PM         CRITICAL CARE DOCUMENTATION  I had a face to face encounter with the patient, reviewed and interpreted patient data including clinical events, labs, images, vital signs, I/O's, and examined patient. I have discussed the case and the plan and management of the patient's care with the consulting services, the bedside nurses and the respiratory therapist.      NOTE OF PERSONAL INVOLVEMENT IN CARE   This patient has a high probability of imminent, clinically significant deterioration, which requires the highest level of preparedness to intervene urgently. I participated in the decision-making and personally managed or directed the management of the following life and organ supporting interventions that required my frequent assessment to treat or prevent imminent deterioration.     I personally spent 65 minutes of critical care time. This is time spent at this critically ill patient's bedside actively involved in patient care as well as the coordination of care. This does not include any procedural time which has been billed separately.     Richar Carlson NP   Critical Care Medicine  [facility]– [place]

## 2022-11-19 NOTE — PROGRESS NOTES
0730: Bedside and Verbal shift change report given to Heather Hendrickson RN, Sterling Mathews RN (oncoming nurse) by Magdalena Mari RN (offgoing nurse). Report included the following information SBAR, Kardex, Procedure Summary, Intake/Output, MAR, Recent Results, Cardiac Rhythm normal sinus, Alarm Parameters , and Dual Neuro Assessment. 1100: Spoke to family member Jacob Ospina: [phone]) and provided updates on patient, including increased command following, verbal response improvements, increased movements in extremities, and recent hgb level (5.0)    Noticed new left eyelid droop. NP notified and assessed. No orders given. 1230: Pt failed bedside swallow screen. 1600: SpO2% fell to 88% after linen change and turn. Increased O2 via nasal cannula to 6L/min. Discharged R peripheral IV.     1900: Sauer placed per MD. Urine labs sent. 1930: Bedside and Verbal shift change report given to [address] (oncoming nurse) by Heather Hendrickson RN, Sterling Mathews RN (offgoing nurse). Report included the following information SBAR, Kardex, Intake/Output, Recent Results, Cardiac Rhythm normal sinus, and Alarm Parameters .

## 2022-11-20 NOTE — PROGRESS NOTES
Day #3 of Zosyn - Renal Dosing Update  Indication:  Possible aspiration PNA  Current regimen:  3.375 gm IV Q 12 hr  Abx regimen: Zosyn + Vancomycin  Recent Labs     22  0152 22  0358 22  0312   WBC 10.9 12.8* 18.1*   CREA 1.20* 2.30* 1.45*   BUN 32* 36* 27*     Est CrCl: ~30 ml/min; UO: 0.8 ml/kg/hr  Temp (24hrs), Av.1 °F (36.7 °C), Min:97.4 °F (36.3 °C), Max:98.4 °F (36.9 °C)    Cultures:    Blood: NG, final   MRSA screen: (-)   Pleural fluid: NG, final   Urine: pending    Plan: Given the patient's improving renal function, the dose of Zosyn has been re-adjusted to 3.375 gm IV Q 8 hr (4-hr extended infusion) per [facility] Protocol with respect to renal function. Pharmacy will continue to monitor patient daily and will make dosage adjustments based upon changing renal function.

## 2022-11-20 NOTE — PROGRESS NOTES
[facility]   [id number] [facility], [id number] [place] [address]  Phone: [phone]   [phone]    [email or web address]     Nephrology Progress Note    Patient Name : Anne Whitney      : 1940     MRN : [id number]  Date of Admission : 10/31/2022  Date of Servive : 22    CC:  Follow up for JERAMY       Assessment and Plan   JERAMY on CKD  -Prerenal azotemia, hypovolemia and hypotension  -Improving with IVF  -Continue with D5 1/2 NS    CKD 3 A  -Baseline creatinine 1.28 mg/dL    Severe anemia  Jew  -MX per primary team    CVA  Chronic systolic CHF: EF 10 to 52%, s/p ICD 11/4  Hypertension  DM2     Interval History:  Seen and examined. Hemodynamically stable. Serum creatinine trending down. Hyponatremia has been addressed with D5 half-normal saline per ICU team already. She is nonoliguric. She is confused unable to provide any history. Hemoglobin continues to trend down. Family has made a DNR given her comorbidities and you have a status    Review of Systems: Review of systems not obtained due to patient factors.     Current Medications:   Current Facility-Administered Medications   Medication Dose Route Frequency    dextrose 5 % - 0.45% NaCl infusion  50 mL/hr IntraVENous CONTINUOUS    piperacillin-tazobactam (ZOSYN) 3.375 g in 0.9% sodium chloride (MBP/ADV) 100 mL MBP  3.375 g IntraVENous Q12H    iron sucrose (VENOFER) 200 mg in 0.9% sodium chloride 100 mL IVPB  200 mg IntraVENous Q24H    Vancomycin: Pharmacy to dose   Other Rx Dosing/Monitoring    epoetin peewee-epbx (RETACRIT) 14,000 Units  14,000 Units SubCUTAneous Q TUE, THU & SAT    insulin lispro (HUMALOG) injection   SubCUTAneous Q6H    polyethylene glycol (MIRALAX) packet 17 g  17 g Oral BID    insulin NPH (NOVOLIN N, HUMULIN N) injection 4 Units  4 Units SubCUTAneous QHS    [Held by provider] HYDROcodone-acetaminophen (NORCO) 5-325 mg per tablet 1 Tablet  1 Tablet Oral Q6H PRN    insulin NPH (NOVOLIN N, HUMULIN N) injection 8 Units  8 Units SubCUTAneous QAM    [Held by provider] apixaban (ELIQUIS) tablet 2.5 mg  2.5 mg Oral BID    [Held by provider] aspirin chewable tablet 81 mg  81 mg Oral DAILY    hydrALAZINE (APRESOLINE) 20 mg/mL injection 10 mg  10 mg IntraVENous Q6H PRN    sodium chloride (NS) flush 5-40 mL  5-40 mL IntraVENous Q8H    sodium chloride (NS) flush 5-40 mL  5-40 mL IntraVENous PRN    [Held by provider] carvediloL (COREG) tablet 6.25 mg  6.25 mg Oral BID WITH MEALS    pantoprazole (PROTONIX) tablet 40 mg  40 mg Oral ACB    [Held by provider] traMADoL (ULTRAM) tablet 50 mg  50 mg Oral Q6H PRN    dextrose 10 % infusion 0-250 mL  0-250 mL IntraVENous PRN    senna-docusate (PERICOLACE) 8.6-50 mg per tablet 1 Tablet  1 Tablet Oral DAILY    lidocaine 4 % patch 2 Patch  2 Patch TransDERmal Q24H    atorvastatin (LIPITOR) tablet 10 mg  10 mg Oral QHS    ondansetron (ZOFRAN) injection 4 mg  4 mg IntraVENous Q4H PRN    acetaminophen (TYLENOL) tablet 650 mg  650 mg Oral Q4H PRN    Or    acetaminophen (TYLENOL) solution 650 mg  650 mg Per NG tube Q4H PRN    Or    acetaminophen (TYLENOL) suppository 650 mg  650 mg Rectal Q4H PRN    sodium chloride (NS) flush 5-40 mL  5-40 mL IntraVENous Q8H    sodium chloride (NS) flush 5-40 mL  5-40 mL IntraVENous PRN    glucose chewable tablet 16 g  4 Tablet Oral PRN    glucagon (GLUCAGEN) injection 1 mg  1 mg IntraMUSCular PRN      Allergies   Allergen Reactions    Insulins Itching    Penicillins Other (comments)     Pt states it makes her pass out       Objective:  Vitals:    Vitals:    11/20/22 0400 11/20/22 0503 11/20/22 0603 11/20/22 0800   BP: (!) 133/52 (!) 134/50 136/65    Pulse: 95 94 95    Resp: 19 25 18    Temp:    98.2 °F (36.8 °C)   SpO2: 97% 96%     Weight:       Height:         Intake and Output:  11/20 0701 - 11/20 1900  In: -   Out: 121 [Urine:120]  11/18 1901 - 11/20 0700  In: 1867.5 [I.V.:1867.5]  Out: 1060 [Urine:1060]    Physical Examination:      General: Chronic ill-appearing  Neck:  Supple, no mass  Resp:  Lungs CTA B/L,   CV:  RRR,  no murmur   GI:  Soft, NT, + BS, no HS megaly    []    High complexity decision making was performed  []    Patient is at high-risk of decompensation with multiple organ involvement    Lab Data Personally Reviewed: I have reviewed all the pertinent labs, microbiology data and radiology studies during assessment. Recent Labs     11/20/22 0152 11/19/22 0358 11/18/22 0312   * 142 140   K 3.8 4.2 4.8   * 110* 106   CO2 29 24 22   GLU 66 159* 231*   BUN 32* 36* 27*   CREA 1.20* 2.30* 1.45*   CA 8.0* 8.4* 8.7   ALB 1.9* 1.9* 2.1*   ALT 68 40 16   INR  --  1.5*  --      Recent Labs     11/20/22 0152 11/19/22 0358 11/18/22 0312   WBC 10.9 12.8* 18.1*   HGB 4.6* 5.0* 5.9*   HCT 14.4* 16.4* 18.9*    279 297     No results found for: SDES  Lab Results   Component Value Date/Time    Culture result: NO GROWTH 4 DAYS Culture performed on Unspun Fluid 11/14/2022 05:00 PM    Culture result: MRSA NOT PRESENT 11/07/2022 06:11 PM    Culture result:  11/07/2022 06:11 PM     Screening of patient nares for MRSA is for surveillance purposes and, if positive, to facilitate isolation considerations in high risk settings. It is not intended for automatic decolonization interventions per se as regimens are not sufficiently effective to warrant routine use.       Culture result: NO GROWTH 5 DAYS 11/06/2022 06:43 PM    Culture result: No growth 6 days 10/31/2022 11:42 AM     Recent Results (from the past 24 hour(s))   GLUCOSE, POC    Collection Time: 11/19/22 12:17 PM   Result Value Ref Range    Glucose (POC) 152 (H) 65 - 117 mg/dL    Performed by [id number], POC    Collection Time: 11/19/22  5:59 PM   Result Value Ref Range    Glucose (POC) 132 (H) 65 - 117 mg/dL    Performed by [id number], UR, RANDOM    Collection Time: 11/19/22  7:36 PM   Result Value Ref Range Creatinine, urine random 168.00 mg/dL   GLUCOSE, POC    Collection Time: 11/19/22 11:28 PM   Result Value Ref Range    Glucose (POC) 95 65 - 117 mg/dL    Performed by Gabriela Durbin    CBC W/O DIFF    Collection Time: 11/20/22  1:52 AM   Result Value Ref Range    WBC 10.9 3.6 - 11.0 K/uL    RBC 1.60 (L) 3.80 - 5.20 M/uL    HGB 4.6 (LL) 11.5 - 16.0 g/dL    HCT 14.4 (LL) 35.0 - 47.0 %    MCV 90.0 80.0 - 99.0 FL    MCH 28.8 26.0 - 34.0 PG    MCHC 31.9 30.0 - 36.5 g/dL    RDW 16.8 (H) 11.5 - 14.5 %    PLATELET 678 304 - 647 K/uL    MPV 10.1 8.9 - 12.9 FL    NRBC 1.1 (H) 0  WBC    ABSOLUTE NRBC 0.12 (H) 0.00 - 8.10 K/uL   METABOLIC PANEL, COMPREHENSIVE    Collection Time: 11/20/22  1:52 AM   Result Value Ref Range    Sodium 147 (H) 136 - 145 mmol/L    Potassium 3.8 3.5 - 5.1 mmol/L    Chloride 113 (H) 97 - 108 mmol/L    CO2 29 21 - 32 mmol/L    Anion gap 5 5 - 15 mmol/L    Glucose 66 65 - 100 mg/dL    BUN 32 (H) 6 - 20 MG/DL    Creatinine 1.20 (H) 0.55 - 1.02 MG/DL    BUN/Creatinine ratio 27 (H) 12 - 20      eGFR 45 (L) >60 ml/min/1.73m2    Calcium 8.0 (L) 8.5 - 10.1 MG/DL    Bilirubin, total 0.5 0.2 - 1.0 MG/DL    ALT (SGPT) 68 12 - 78 U/L    AST (SGOT) 62 (H) 15 - 37 U/L    Alk. phosphatase 75 45 - 117 U/L    Protein, total 5.3 (L) 6.4 - 8.2 g/dL    Albumin 1.9 (L) 3.5 - 5.0 g/dL    Globulin 3.4 2.0 - 4.0 g/dL    A-G Ratio 0.6 (L) 1.1 - 2.2     GLUCOSE, POC    Collection Time: 11/20/22  5:44 AM   Result Value Ref Range    Glucose (POC) 81 65 - 117 mg/dL    Performed by [employer]            I have reviewed the flowsheets. Chart and Pertinent Notes have been reviewed. No change in PMH ,family and social history from Consult note.       Linnea Mobley [facility]

## 2022-11-20 NOTE — PROGRESS NOTES
0730: Bedside and Verbal shift change report given to Say Brandt RN, Sophia Trammell RN (oncoming nurse) by Frank Martinez RN (offgoing nurse). Report included the following information SBAR, Kardex, Intake/Output, MAR, Recent Results, Cardiac Rhythm normal sinus, and Alarm Parameters . 0945: Patient tried to get out of bed and was confused. Bed alarm did not go off. Placed blue box bed alarm under patient. Speech therapy assessed patient. Recommended crush pills with ice cream or apple sauce. 1400: Patient went into a-fib with rvr following a bath and linen change. María Mendoza was contacted and gave telephone orders to give coreg now and start cardizem drip. 1800: Gave updates to daughter in law during visit to the unit. 1930: Bedside and Verbal shift change report given to [address] (oncoming nurse) by Say Brandt RN, Sophia Trammell RN (offgoing nurse). Report included the following information SBAR, Kardex, Intake/Output, MAR, Accordion, Recent Results, Cardiac Rhythm a-fib with rvr, and Alarm Parameters .

## 2022-11-20 NOTE — PROGRESS NOTES
Day #3 of Vancomycin - Dosing Update  Indication:  Possible aspiration PNA  Current regimen:  Based on levels, last dose: 750 mg IV on  @ 1036  Abx regimen:  Zosyn + Vancomycin  ID Following ?: NO  Concomitant nephrotoxic drugs (requires more frequent monitoring): None  Frequency of BMP?: Daily    Recent Labs     22  0152 22  0358 22  0312   WBC 10.9 12.8* 18.1*   CREA 1.20* 2.30* 1.45*   BUN 32* 36* 27*     Est CrCl: ~30 ml/min; UO: 0.8 ml/kg/hr  Temp (24hrs), Av.1 °F (36.7 °C), Min:97.4 °F (36.3 °C), Max:98.4 °F (36.9 °C)    Cultures:    Blood: NG, final   MRSA screen: (-)   Pleural fluid: NG, final   Urine: pending    MRSA Swab ordered (if applicable)? YES (but not sent yet)    Goal target range Trough 10-15 mcg/mL    Recent level history:  Date/Time Dose & Interval Measured Level (mcg/mL) Associated AUC/RITO Dose Adjustment     @ 0358 1.5 gm IV x 1  7.3 mcg/mL (drawn ~26.5 hrs post-dose) -- 750 mg IV x 1 dose ordered                                         Plan: Given the improvement in the patient's Scr, a maintenance dose of 750 mg IV Q 24 hr will be initiated and a random level ordered for tomorrow with am labs. Pharmacy will continue to monitor patient daily and will make dosage adjustments based upon changing renal function.

## 2022-11-20 NOTE — PROGRESS NOTES
[facility] Adult  Hospitalist Group     ICU Transfer/Accept Summary     [place] [address] [place]  DATE OF TRANSFER: 11/20/2022       PATIENT ID: Damon Johns  MRN: [id number]   YOB: 1940    PRIMARY CARE PROVIDER: Idania Bolanos MD   DATE OF ADMISSION: 10/31/2022  2:31 PM    ATTENDING PHYSICIAN: Nargis Castaneda MD  CONSULTATIONS:   IP CONSULT TO NEUROLOGY  IP CONSULT TO NEUROINTERVENTIONAL SURGERY  IP CONSULT TO INTENSIVIST  IP CONSULT TO NEPHROLOGY  IP CONSULT TO CARDIOLOGY  IP CONSULT TO PULMONOLOGY    PROCEDURES/SURGERIES:   Procedure(s):  INSERT ICD SINGLE    REASON FOR ADMISSION: <principal problem not specified>     HOSPITAL PROBLEM LIST:  Patient Active Problem List   Diagnosis Code    GI bleed K92.2    Right sided weakness R53.1    CVA (cerebral vascular accident) ([facility] [address] I63.9    Epigastric pain R10.13    Vasculitis ([facility]) I77.6    Pancreatic mass K86.89    Pancreatitis K85.90    Elevated troponin R77.8    NSTEMI (non-ST elevated myocardial infarction) ([facility]) I21.4    Bursitis of multiple sites M71.9    Abdominal pain R10.9    Acute pancreatitis K85.90    Acute on chronic combined systolic and diastolic ACC/AHA stage C congestive heart failure (HCC) I50.43    Pleural effusion, right J90    Intraductal papillary mucinous neoplasm of pancreas D49.0    Cyst of pancreas K86.2    Stroke ([facility] [address] I63.9         Brief HPI and Hospital Course: This is an 81year old who initially presented on 10/31 with acute bilateral CVA s/p thrombectomy. She was treated in ICU and was transferred to [facility] on 11/7/2022. She was found to have heart failure with EF 10-15% and AICD was placed during her stay on 11/4. She has had intermittent episodes of hypotension, which she responds to fluid. 11/17 she began to have increase in somnolence and hypotension, and there was concern for possible bleeding.  She was seen by [facility] service and transferred back to ICU, on 11/18 requiring pressors briefly. Overnight Events: More awake, answering simple questions, oriented to self, follows commands. Vitals stable, Hgb 4.6 despite interventions. On D5W MIV overnight for mild hypoglycemia 66 while NPO.     Patient is a Jehovah witness refusing blood product, vital signs stable and transferred out of ICU on 11/20       Assessment and Plan:    Acute Ischemic CVA due to posterior occlusion s/p thrombectomy  Acute bilateral superior cerebellar infarction, mild associated petechial hemorrhage  Possible small foci of acute infarction right occipital lobe  -aspirin and eliquis on hold due to hemothorax and low Hgb  -continue lipitor  -patient conscious and alert,   -continue neuro check and supportive care     Large left pleural effusion, possible hemothorax s/p thoracentesis    -SpO2 % on 6 l/m  -pleural fluid cx on 11/14 no growth  -pulmonologist on board    Possible aspiration pneumonia  -Continue IV Zosyn and white vancomycin  -on oxygen support 6 l/m, SpO2  %, monitor pulse ox and wean down oxygen  -afebrile, leukocytosis improving  -blood cx no growth      Acute on chronic HFr EF 10-15% s/p AICD  -compensated  -hypotension improved, off pressor on 11/19  -continue coreg  -not on ACEi/ARB due to hypotension and CKD  -monitor I/O and daily weight   -watch for fluid over load as she is on D5 0.45% NaCl for hypoglycemia,   -cardiology on board    Severe acute blood loss anemia related to hemothorax  -Anglican  -on epoetin peewee, iv iron sucrose   -Hgb 4.6  -Anglican, refused blood transfusion   -high risk for decompensation, DNR     Hypotension, hx of HTN  -off pressor, resolved, BP elevated, restarted on coreg, monitor BP     Hx of A Fib, rate controlled  -on coreg,   -Eliquis and aspirin on hold    T2DM,   -A1c 13.1  -had hypoglycemia, poor oral intake, hold NPH for now  -patient evaluated by speech therapy and placed on dysphagia diet   -Continue D5 0.45% NaCl  -continue sliding scale, hypoglycemic protocol, if improves will d/c IVF    JERAMY on CKD stage III  -creatinine improving, Cr 1.20  -avoid nephrotoxin   -monitor renal function   -nephrologist on board     DNR: ghada risk for decompensation, inpatient mortality, palliative care team on board      Code Status: DNR  DVT prophylaxis : SCD  Disposition: TBD         PHYSICAL EXAMINATION:  Visit Vitals  BP (!) 145/58   Pulse (!) 103   Temp 98.2 °F (36.8 °C)   Resp 15   Ht 5' 6\" (1.676 m)   Wt 52.4 kg (115 lb 8.3 oz)   SpO2 94%   BMI 18.65 kg/m²       General:          Alert, cooperative, no distress  HEENT:           Atraumatic, MMM            Neck:               Supple, symmetrical,  thyroid: non tender  Lungs:             Decrease bronchial breath sound to auscultation bilaterally. No Wheezing or Rhonchi. No rales. Heart:              Regular  rhythm,  No  murmur   No edema  Abdomen:       Soft, non-tender. Not distended. Bowel sounds normal  Extremities:     No cyanosis. No clubbing,   Skin:                Not pale. Not Jaundiced  No rashes   Psych:             Not anxious or agitated. Neurologic:      Conscious and Alert, facial asymmetry, speech soft and difficult to understand, moves all extremities,     Labs:     Recent Labs     11/20/22 0152 11/19/22 0358   WBC 10.9 12.8*   HGB 4.6* 5.0*   HCT 14.4* 16.4*    279     Recent Labs     11/20/22 0152 11/19/22 0358 11/18/22 0312   * 142 140   K 3.8 4.2 4.8   * 110* 106   CO2 29 24 22   BUN 32* 36* 27*   CREA 1.20* 2.30* 1.45*   GLU 66 159* 231*   CA 8.0* 8.4* 8.7     Recent Labs     11/20/22 0152 11/19/22 0358 11/18/22 0312   ALT 68 40 16   AP 75 80 93   TBILI 0.5 0.4 0.8   TP 5.3* 5.3* 5.2*   ALB 1.9* 1.9* 2.1*   GLOB 3.4 3.4 3.1     Recent Labs     11/19/22 0358   INR 1.5*   PTP 14.9*      No results for input(s): FE, TIBC, PSAT, FERR in the last 72 hours.    No results found for: FOL, RBCF   No results for input(s): PH, PCO2, PO2 in the last 72 hours. No results for input(s): CPK, CKNDX, TROIQ in the last 72 hours.     No lab exists for component: CPKMB  Lab Results   Component Value Date/Time    Cholesterol, total 146 11/01/2022 03:30 AM    HDL Cholesterol 69 11/01/2022 03:30 AM    LDL, calculated 71 11/01/2022 03:30 AM    Triglyceride 30 11/01/2022 03:30 AM    CHOL/HDL Ratio 2.1 11/01/2022 03:30 AM     Lab Results   Component Value Date/Time    Glucose (POC) 81 11/20/2022 05:44 AM    Glucose (POC) 95 11/19/2022 11:28 PM    Glucose (POC) 132 (H) 11/19/2022 05:59 PM    Glucose (POC) 152 (H) 11/19/2022 12:17 PM    Glucose (POC) 171 (H) 11/19/2022 05:54 AM     Lab Results   Component Value Date/Time    Color YELLOW/STRAW 11/06/2022 06:43 PM    Appearance CLEAR 11/06/2022 06:43 PM    Specific gravity 1.025 11/06/2022 06:43 PM    Specific gravity 1.029 10/31/2022 12:54 PM    pH (UA) 5.5 11/06/2022 06:43 PM    Protein Negative 11/06/2022 06:43 PM    Glucose 100 (A) 11/06/2022 06:43 PM    Ketone TRACE (A) 11/06/2022 06:43 PM    Bilirubin Negative 11/06/2022 06:43 PM    Urobilinogen 0.2 11/06/2022 06:43 PM    Nitrites Negative 11/06/2022 06:43 PM    Leukocyte Esterase MODERATE (A) 11/06/2022 06:43 PM    Epithelial cells FEW 11/06/2022 06:43 PM    Bacteria Negative 11/06/2022 06:43 PM    WBC 10-20 11/06/2022 06:43 PM    RBC 0-5 11/06/2022 06:43 PM         CODE STATUS:   Full Code   x DNR    Partial    Comfort Care       Signed:   Eva More MD  Date of Service:  11/20/2022  11:50 AM

## 2022-11-20 NOTE — PROGRESS NOTES
[address] [facility] Dheeraj Riley MD regarding pt's BP. Last reading 165/69 (95).     1343 Message read by Dheeraj Riley MD.     Cristhian Scruggs MD would like to continue to monitor pt's BP, doesn't want to give her further PRN medications and drop her BP.

## 2022-11-20 NOTE — PROGRESS NOTES
1930: Bedside and Verbal shift change report given to [address] Isaacse (oncoming nurse) by Jessica Mccarthy RN, Heath Lesches RN (offgoing nurse). Report included the following information SBAR, Kardex, Intake/Output, Recent Results, Cardiac Rhythm normal sinus, and Alarm Parameters .     0730-bedside report given to RN using sbar format

## 2022-11-20 NOTE — PROGRESS NOTES
[facility]    ICU TEAM Progress Note    Name: Samantha Sousa   : 1940   MRN: [id number]   Date: 2022        Subjective:     Reason for ICU Admission: This is an 81year old who initially presented on 10/31 with acute bilateral CVA s/p thrombectomy. She was treated in ICU and was transferred to [facility] on 2022. She was found to have heart failure with EF 10-15% and AICD was placed during her stay on . She has had intermittent episodes of hypotension, which she responds to fluid.  she began to have increase in somnolence and hypotension, and there was concern for possible bleeding. She was seen by CCM service and transferred back to ICU, on  requiring pressors briefly. Overnight Events: More awake, answering simple questions, oriented to self, follows commands. Vitals stable, Hgb 4.6 despite interventions. On D5W MIV overnight for mild hypoglycemia 66 while NPO. Objective:   Vital Signs:  Visit Vitals  BP (!) 145/58   Pulse (!) 103   Temp 98.2 °F (36.8 °C)   Resp 15   Ht 5' 6\" (1.676 m)   Wt 52.4 kg (115 lb 8.3 oz)   SpO2 94%   BMI 18.65 kg/m²    O2 Flow Rate (L/min): 6 l/min O2 Device: Nasal cannula Temp (24hrs), Av.1 °F (36.7 °C), Min:97.4 °F (36.3 °C), Max:98.4 °F (36.9 °C)         Intake/Output:     Intake/Output Summary (Last 24 hours) at 2022 1131  Last data filed at 2022 1000  Gross per 24 hour   Intake 794.17 ml   Output 1306 ml   Net -511.83 ml       Physical Exam:    General:  Alert, cooperative, well noursished, well developed, appears stated age   Eyes:  Sclera anicteric. Pupils equally round and reactive to light. Mouth/Throat: Mucous membranes normal, oral pharynx clear   Neck: Supple   Lungs:   Clear to auscultation bilaterally, good effort   CV:  Regular rate and rhythm,no murmur, click, rub or gallop   Abdomen:   Soft, non-tender.  bowel sounds normal. non-distended   Extremities: No cyanosis or edema   Skin: Skin color, texture, turgor normal. no acute rash or lesions   Lymph nodes: Cervical and supraclavicular normal   Musculoskeletal: No swelling or deformity   Lines/Devices:  Intact, no erythema, drainage or tenderness   Psych: Alert and oriented to self, speech garbled/ soft, follows simple commands, normal mood affect given the setting       LABS AND  DATA: Personally reviewed  Recent Labs     11/20/22 0152 11/19/22 0358   WBC 10.9 12.8*   HGB 4.6* 5.0*   HCT 14.4* 16.4*    279     Recent Labs     11/20/22 0152 11/19/22 0358   * 142   K 3.8 4.2   * 110*   CO2 29 24   BUN 32* 36*   CREA 1.20* 2.30*   GLU 66 159*   CA 8.0* 8.4*     Recent Labs     11/20/22 0152 11/19/22 0358   AP 75 80   TP 5.3* 5.3*   ALB 1.9* 1.9*   GLOB 3.4 3.4     Recent Labs     11/19/22 0358   INR 1.5*   PTP 14.9*      Recent Labs     11/17/22  2302   PHI 7.46*   PCO2I 32.5*   PO2I 81     No results for input(s): CPK, CKMB, TROIQ, BNPP in the last 72 hours. MEDS: Reviewed    Chest X-Ray:  CXR Results  (Last 48 hours)      None        No results found. 11/1/2022: MRI:  IMPRESSION     1. Acute bilateral superior cerebellar infarctions and mild associated petechial  hemorrhage. Possible small foci of acute infarction right occipital lobe. No  significant mass effect. No hydrocephalus. 2. Chronic microvascular ischemic disease. Chronic right occipital infarction. ECHO:  11/5/2022:  Result status: Final result       Left Ventricle: Severely reduced left ventricular systolic function with a visually estimated EF of 10 -15%. Left ventricle is moderately dilated. Mildly increased wall thickness. Severe global hypokinesis present. Abnormal diastolic function. Aortic Valve: Tricuspid valve. Mild regurgitation. Mitral Valve: Mild to moderate regurgitation. Interatrial Septum: Agitated saline study was negative with and without provocation. Multidisciplinary Rounds Completed:   Yes    ABCDEF Bundle/Checklist Completed:  YES-See Plan    Active Problem List:     Problem List  Date Reviewed: 3/18/2022            Codes Class    Stroke [facility]) ICD-10-CM: I63.9  ICD-9-CM: 434.91         Intraductal papillary mucinous neoplasm of pancreas ICD-10-CM: D49.0  ICD-9-CM: 239.0         Cyst of pancreas ICD-10-CM: K86.2  ICD-9-CM: 577.2         Acute on chronic combined systolic and diastolic ACC/AHA stage C congestive heart failure (HCC) ICD-10-CM: I50.43  ICD-9-CM: 428.43, 428.0         Pleural effusion, right ICD-10-CM: J90  ICD-9-CM: 511.9         Abdominal pain ICD-10-CM: R10.9  ICD-9-CM: 789.00         Acute pancreatitis ICD-10-CM: K85.90  ICD-9-CM: 577.0         NSTEMI (non-ST elevated myocardial infarction) [facility] ICD-10-CM: I21.4  ICD-9-CM: 410.70         Bursitis of multiple sites ICD-10-CM: M71.9  ICD-9-CM: 726.8         Pancreatitis ICD-10-CM: K85.90  ICD-9-CM: 577.0         Elevated troponin ICD-10-CM: R77.8  ICD-9-CM: 790.6         Vasculitis ([facility] ICD-10-CM: I77.6  ICD-9-CM: 447.6         Pancreatic mass ICD-10-CM: K86.89  ICD-9-CM: 577.8         Epigastric pain ICD-10-CM: R10.13  ICD-9-CM: 789.06         CVA (cerebral vascular accident) ([facility] ICD-10-CM: I63.9  ICD-9-CM: 434.91         Right sided weakness ICD-10-CM: R53.1  ICD-9-CM: 728.87         GI bleed ICD-10-CM: K92.2  ICD-9-CM: 578.9          ICU Assessment/ Comprehensive Plan of Care:     PRINCIPAL ICU DIAGNOSIS      Large left Pleural effusion, possible hemothorax s/p thoracentesis  Possible aspiration pneumonia  CVA s/p thrombectomy  Acute on Chronic CHF HFrEF 10-15% s/p AICD placement  Hypovolemia  DM2  Acute on chronic anemia    NEUROLOGICAL:      - Hx of CVA from 11/1/22 , B/L superior cerebellar infarct with mild associated petechial hemorrhage and chronic right occipital infarction. -S/p Thrombectomy 10/31/2022  -Neurological checks per unit routine  -ASA on hold for possible hemothorax  -SBP goal less than 140  Plavix stopped due to supra therapeutic response. PULMONOLOGY:      -c/w Zosyn and Vancomycin for aspiration coverage  -Nasal cannula oxygen as needed to maintain saturations greater than 92%  -ABG 11/17 with Primary Respiratory Alkalosis      CARDIOVASCULAR:      -EF 10-15% s/p ICD  -ICD is buzzing, should likely be interrogated. -MAP > 65 goal, off pressors > 24 hr     GASTROINTESTINAL      -Patient has not been eating, attempted dobhoff or NG tube twice yesterday without success, careful d/t hgb of 5.0 and no blood products. - speech consult placed  -SLT saw today, recommends pureed thin liquid diet 11/20     RENAL/ELECTROLYTE/FLUIDS:      Trend lytes and replete as needed  Monitor phos- high risk for re-feeding     ENDOCRINE:   Type II Diabetes --> A1C 13.1 on 11/1/22  C/W NPH BID and SSI   Hold NPH while NPO 2/2 hypoglycemia risk  Continue D5 gtt until taking better PO  Monitor glucose    HEMATOLOGY/ONCOLOGY:      Severe blood loss anemia-rr/t Thoracentesis procedure with subsequent bleeding. hemoglobin is 5.0 today. Iron Venofer x 3 doses ordered. Patient continues to refuse blood.    No active bleeding present     INFECTIOUS DISEASE:         ANTIBIOTICS TO DATE: Zosyn & Vanc 11/18- present, low threshold to DC Vanco     CULTURES TO DATE: Urine culture 11/9 pending                                          Thoracentesis Culture 11/14 NGTD x 4 days      ICU DAILY CHECKLIST      Code Status:DNR  DVT Prophylaxis:on hold d/t anemia, SCDs  T/L/D: Tubes: None  Lines: Peripheral IV  Drains: None  SUP: Protonix   Diet: NPO , speech consult   Activity Level:As tolerated   ABCDEF Bundle/Checklist Completed:Yes  Disposition: Stay in ICU  Multidisciplinary Rounds Completed:  N/A  Goals of Care Discussion/Palliative: No  Patient/Family Updated: Yes     DISPOSITION/COMMUNICATION  Discussed Plan of Care/Code Status: Full Code  Transfer to non-ICU bed-Sign out to Dr. John Merida via PS @ 11:44    CRITICAL CARE CONSULTANT NOTE  I had a face to face encounter with the patient, reviewed and interpreted patient data including clinical events, labs, images, vital signs, I/O's, and examined patient. I have discussed the case and the plan and management of the patient's care with the consulting services, the bedside nurses and the respiratory therapist.      NOTE OF PERSONAL INVOLVEMENT IN CARE    I participated in the decision-making and personally managed or directed the management of the following life and organ supporting interventions that required my frequent assessment to treat or prevent imminent deterioration. I personally spent 45 minutes of critical care time. This is time spent at this critically ill patient's bedside actively involved in patient care as well as the coordination of care and discussions with the patient's family. This does not include any procedural time which has been billed separately.     PETERSON Drummond-BC  Intensivist Nurse Practitioner  [facility]  11/20/2022

## 2022-11-20 NOTE — PROGRESS NOTES
Problem: Dysphagia (Adult)  Goal: *Acute Goals and Plan of Care (Insert Text)  Description: Speech Therapy Goals  Initiated 11/20/22    1. Patient will tolerate pureed diet/thin liquids without adverse effects within 7 days. Outcome: Progressing Towards Goal     SPEECH LANGUAGE PATHOLOGY BEDSIDE SWALLOW EVALUATION  Patient: Susan Mmcanus [de-identified]81 y.o. female)  Date: 11/20/2022  Primary Diagnosis: Stroke ([facility] [I63.9]  Procedure(s) (LRB):  INSERT ICD SINGLE (N/A) 16 Days Post-Op   Precautions:   Fall, Skin (SBP<140)    ASSESSMENT :  Based on the objective data described below, the patient presents with significantly reduced cognition in comparison to previous session with this SLP and SLP student. Pt with suspected delayed oral transit due to this change in mentation. However, pt continues with no overt s/s of aspiration, but does have multiple swallows. Recommend pt initiate diet as outlined below only when awake/alert. Suspect swallow function to mirror mentation    Patient will benefit from skilled intervention to address the above impairments. Patients rehabilitation potential is considered to be Guarded     PLAN :  Recommendations and Planned Interventions:  --pureed diet/thin liquids  --meds crushed in applesauce or placed in ice cream  --good oral care    Frequency/Duration: Patient will be followed by speech-language pathology 3 times a week to address goals. Discharge Recommendations: To Be Determined     SUBJECTIVE:   Patient stated, \"No no no.     OBJECTIVE:     Past Medical History:   Diagnosis Date    Colon polyps     Diabetes ([facility] [address]     Diverticulosis     Hypertension     Ill-defined condition     Pancreatitis      Past Surgical History:   Procedure Laterality Date    COLONOSCOPY N/A 4/29/2021    COLONOSCOPY performed by Nadir Llanes MD at [facility]  2021    x2    IR PERC [facility] THROMB INFUSION INTRACRANIAL  10/31/2022     Prior Level of Function/Home Situation: Home Situation  Home Environment: Private residence  # Steps to Enter: 4  Rails to Enter: Yes  Hand Rails : Left  One/Two Story Residence: One story  Living Alone: Yes (Son next door)  Support Systems: Child(francisco)  Patient Expects to be Discharged to[de-identified] Skilled nursing facility  Current DME Used/Available at Home: Cane, straight  Tub or Shower Type: Tub/Shower combination  Diet prior to admission: regular diet/thin liquids  Current Diet:  NPO   Cognitive and Communication Status:  Neurologic State: Alert, Confused  Orientation Level: Oriented to person, Disoriented to place, Disoriented to situation, Disoriented to time  Cognition: Decreased attention/concentration, Decreased command following  Perception: Appears intact  Perseveration: No perseveration noted  Safety/Judgement: Awareness of environment (Requires reminders of pacemaker precautions)  Oral Assessment:  Oral Assessment  Labial:  (unable to follow commands for oral mech)  P.O. Trials:  Patient Position: upright in bed  Vocal quality prior to P.O.: No impairment  Consistency Presented: Thin liquid;Puree  How Presented: SLP-fed/presented;Straw;Spoon     Bolus Acceptance: No impairment  Bolus Formation/Control: Impaired  Type of Impairment: Delayed  Propulsion: Delayed (# of seconds)  Oral Residue: None  Initiation of Swallow: No impairment  Laryngeal Elevation: Functional  Aspiration Signs/Symptoms: None  Pharyngeal Phase Characteristics: Multiple swallows             Oral Phase Severity: Mild-moderate  Pharyngeal Phase Severity : No impairment    NOMS:   The NOMS functional outcome measure was used to quantify this patient's level of swallowing impairment.   Based on the NOMS, the patient was determined to be at level 3 for swallow function       NOMS Swallowing Levels:  Level 1 (CN): NPO  Level 2 (CM): NPO but takes consistency in therapy  Level 3 (CL): Takes less than 50% of nutrition p.o. and continues with nonoral feedings; and/or safe with mod cues; and/or max diet restriction  Level 4 (CK): Safe swallow but needs mod cues; and/or mod diet restriction; and/or still requires some nonoral feeding/supplements  Level 5 (CJ): Safe swallow with min diet restriction; and/or needs min cues  Level 6 (CI): Independent with p.o.; rare cues; usually self cues; may need to avoid some foods or needs extra time  Level 7 ([address] [place] [address]): Independent for all p.o.  KYRA. (2003). National Outcomes Measurement System (NOMS): Adult Speech-Language Pathology User's Guide. Pain:  Pain Scale 1: Numeric (0 - 10)  Pain Intensity 1: 0       After treatment:   Call bell within reach    COMMUNICATION/EDUCATION:     The patient's plan of care including recommendations, planned interventions, and recommended diet changes were discussed with: Registered nurse. Patient is unable to participate in goal setting and plan of care.     Thank you for this referral.  SY Mckenna  Time Calculation: 15 mins

## 2022-11-21 NOTE — HOSPICE
[facility]  [phone]     Patient Name: Johnson Pedroza  YOB: 1940  Age: 81 y.o. [address] [facility] RN Note:  Hospice consult received, reviewing chart. Will follow up with Unit Nurse and Care Manager to discuss plan of care, patient status and discharge disposition within the hour. Thank you for the opportunity to be of service to this patient. 15:20: Update received from Dr. Glenn Mata. Hospice will visit patient bedside and contact her son to arrange for Hospice information meeting on Tuesday. Currently; Hospice medical director reviewing patient for appropriate Hospice diagnosis and plan of care. 16:30: Visit with patient bedside. She appears very weak, speaks single words in whisper. Admits to being cold; appears short of breath. Respirations up to 36 while speaking. Pt has comfort medications in place. 17:00: Phone call made to patient son, Duran Iyer is not able to meet with Hospice at [facility], and will not be able to meet bedside Tuesday. Hospice plan to have phone information appointment around 11:00 am Tuesday. Plan to review Hospice and possibly transferring patient to the [place] [id number] for short term care. PLAN: Hospice phone meeting at 11:00 am Tuesday.     Gaston Dumont RN, [place] [address] Nurse Liaison  [phone] Mobile  [phone] Office  Available on Perfect Serve

## 2022-11-21 NOTE — PROGRESS NOTES
[place] [id number] [phone] [id number] (COPE)  [facility] [phone] (COPE)          GOALS OF CARE: Comfort focused care, hospice consultation        TREATMENT PREFERENCES:   Code Status: DNR    Advance Care Planning:  [x] [facility] Interdisciplinary Team has updated the ACP Navigator with [address] and Patient Capacity    Primary Decision Maker ([address]):   Relationship to patient:  [] Named in a scanned document   [x] Legal Next of Kin  [] Guardian    No AMD on file, notes indicate Devika Wolff is acting as spokesperson for family     Family Meeting Documentation    Participants: Dr. Gabriella Upton, son Devika Wolff, two grandsons     Discussion: The Palliative Medicine SW met with the patient's son Devika Wolff, along with Dr. Gabriella Upton in order to discuss patient's care goals. Palliative Medicine discussed the patient's current medical condition, remains with low hemoglobin (no blood products), low EF, very weak, etc.     Devika Wolff reflects on the patient's hospitalization, he shares that he wants to to have comfort- reflects on that it was difficult because he wanted her to have blood products, but he ultimately wanted to honor her wishes. We discuss risks of NGT placement due to low hemoglobin/puts her at additional risk of complication, etc.     We discuss options moving forward- Devika Wolff agrees that he wants comfort for his mother. He is very concerned about where she will go when she leaves the hospital- we explain that for rehab she would have to be able to participate regularly with therapy, she is very lethargic, etc.     We provided hospice education- focusing on comfort, and Devika Wolff agrees- ideally he would like IP hospice at the hospice house if patient would qualify. Palliative Medicine provided support- SW also spoke with [facility], regarding above.      Outcome / Plan: DNR, hospice consult       Thank you for including Palliative Medicine in the care of 66 [address] Vazquez [facility]  [phone]

## 2022-11-21 NOTE — PROGRESS NOTES
Problem: Mobility Impaired (Adult and Pediatric)  Goal: *Acute Goals and Plan of Care (Insert Text)  Description:   FUNCTIONAL STATUS PRIOR TO ADMISSION: Patient was modified independent using a single point cane for functional mobility. HOME SUPPORT PRIOR TO ADMISSION: The patient lived alone with several family members living nearby (son next  door). Revised 11/21/22  1. Patient will move from supine to sit and sit to supine  in bed with supervision/set-up within 7 day(s). 2.  Patient will transfer from bed to chair and chair to bed with minimal assistance/contact guard assist using the least restrictive device within 7 day(s). 3.  Patient will perform sit to stand with minimal assistance/contact guard assist within 7 day(s). 4. Patient will recall 3/3 PPM precautions and demonstrate adherence with mobility with min cues within 7 days. Physical Therapy Goals  Updated 11/11/2022  1. Patient will move from supine to sit and sit to supine  in bed with supervision/set-up within 7 day(s). 2.  Patient will transfer from bed to chair and chair to bed with minimal assistance/contact guard assist using the least restrictive device within 7 day(s). 3.  Patient will perform sit to stand with minimal assistance/contact guard assist within 7 day(s). 4.  Patient will ambulate with moderate assistance  x2 for 15 feet with the least restrictive device within 7 day(s). 5.  Patient will recall 3/3 PPM precautions and demonstrate adherence with mobility with min cues within 7 days. Physical Therapy Goals  Updated s/p ICD placement 11/4/2022  1. Patient will move from supine to sit and sit to supine , scoot up and down, and roll side to side in bed with modified independence within 7 day(s). 2.  Patient will transfer from bed to chair and chair to bed with min assistance using the least restrictive device within 7 day(s). 3.  Patient will perform sit to stand with min assistance within 7 day(s).   4. Patient will ambulate with minimal assistance/contact guard assist for 50 feet with the least restrictive device within 7 day(s). 5.  Patient will ascend/descend 4 stairs with one handrail(s) with minimal assistance/contact guard assist within 7 day(s). 6.  Patient will improve Black Balance score by 7 points within 7 days. Initiated 11/1/2022  1. Patient will move from supine to sit and sit to supine , scoot up and down, and roll side to side in bed with modified independence within 7 day(s). 2.  Patient will transfer from bed to chair and chair to bed with modified independence using the least restrictive device within 7 day(s). 3.  Patient will perform sit to stand with supervision/set-up within 7 day(s). 4.  Patient will ambulate with minimal assistance/contact guard assist for 100 feet with the least restrictive device within 7 day(s). 5.  Patient will ascend/descend 4 stairs with one handrail(s) with minimal assistance/contact guard assist within 7 day(s). 6.  Patient will improve Black Balance score by 7 points within 7 days. 11/21/2022 1202 by Ren Power PT  Outcome: Not Progressing Towards Goal   PHYSICAL THERAPY TREATMENT: WEEKLY REASSESSMENT  Patient: Samantha Sousa (63 y.o. female)  Date: 11/21/2022  Primary Diagnosis: Stroke ([address] [I63.9]  Procedure(s) (LRB):  INSERT ICD SINGLE (N/A) 17 Days Post-Op   Precautions:   Fall, Skin (SBP<140)      ASSESSMENT  Patient continues with skilled PT services and is not progressing towards goals. She continues to demonstrate decreased alertness. Patient initially declines mobility but is agreeable to sitting EOB after education. Patient is provided Mod A to achieve supine to sit at EOB. She demonstrates fair sitting balance provided bilateral UE support and CGA. Patient tolerates sitting EOB x2 minutes before initiating returning to supine. She is provided Mod A to achieve sit to supine.  Patient rolls R and L with Min A in order to perform jane. Patient's progression toward goals since last assessment: goals are downgraded to reflect progress     Current Level of Function Impacting Discharge (mobility/balance): Mod A     Other factors to consider for discharge: medical stability, decreased balance, decreased independence with functional mobility, increased need for assistance, inability to ambulate and transfer at this time         PLAN :  Goals have been updated based on progression since last assessment. Patient continues to benefit from skilled intervention to address the above impairments. Recommendations and Planned Interventions: bed mobility training, transfer training, gait training, therapeutic exercises, neuromuscular re-education, edema management/control, patient and family training/education, and therapeutic activities      Frequency/Duration: Patient will be followed by physical therapy:  5 times a week to address goals.     Recommendation for discharge: (in order for the patient to meet his/her long term goals)  Therapy up to 5 days/week in SNF setting    This discharge recommendation:  Has been made in collaboration with the attending provider and/or case management    IF patient discharges home will need the following DME: to be determined (TBD)         SUBJECTIVE:   Patient stated just need to rest.    OBJECTIVE DATA SUMMARY:   HISTORY:    Past Medical History:   Diagnosis Date    Colon polyps     Diabetes ([address]     Diverticulosis     Hypertension     Ill-defined condition     Pancreatitis      Past Surgical History:   Procedure Laterality Date    COLONOSCOPY N/A 4/29/2021    COLONOSCOPY performed by Sriram Antoine MD at [facility] ENDOSCOPY    [address]  2021    x2    IR [facility]  [address]  10/31/2022       Personal factors and/or comorbidities impacting plan of care:     Home Situation  Home Environment: Private residence  # Steps to Enter: 4  Rails to Enter: Yes  [employer] : Left  One/Two Story Residence: One story  Living Alone: Yes (Son next door)  Support Systems: Child(francisco)  Patient Expects to be Discharged to[de-identified] Skilled nursing facility  Current DME Used/Available at Home: Cane, straight  Tub or Shower Type: Tub/Shower combination    EXAMINATION/PRESENTATION/DECISION MAKING:   Critical Behavior:  Neurologic State: Drowsy, Eyes open spontaneously, Confused  Orientation Level: Oriented to person, Oriented to place, Disoriented to situation, Disoriented to time  Cognition: Decreased command following  Safety/Judgement: Awareness of environment (Requires reminders of pacemaker precautions)  Hearing: Auditory  Auditory Impairment: None  Skin:    Edema:   Range Of Motion:  AROM: Generally decreased, functional           PROM: Generally decreased, functional           Strength:    Strength: Generally decreased, functional                    Tone & Sensation:   Tone: Normal              Sensation: Intact               Coordination:  Coordination: Generally decreased, functional  Vision:      Functional Mobility:  Bed Mobility:     Supine to Sit: Moderate assistance  Sit to Supine: Moderate assistance  Scooting: Maximum assistance  Transfers:                             Balance:   Sitting: Impaired; With support  Sitting - Static: Fair (occasional)  Sitting - Dynamic: Not tested  Standing:  (not tested)  Ambulation/Gait Training:                                                         Stairs: Therapeutic Exercises:         Pain Rating:      Activity Tolerance:   Poor    After treatment patient left in no apparent distress:   Supine in bed, Call bell within reach, Bed / chair alarm activated, and Side rails x 3    COMMUNICATION/EDUCATION:   The patients plan of care was discussed with: Registered nurse. Fall prevention education was provided and the patient/caregiver indicated understanding., Patient/family have participated as able in goal setting and plan of care. , and Patient/family agree to work toward stated goals and plan of care.     Thank you for this referral.  Jairo Orellana, PT   Time Calculation: 15 mins

## 2022-11-21 NOTE — PROGRESS NOTES
Day #4 of Vancomycin  Indication:  Possible aspiration PNA  Current regimen:  750 mg IV Q 24 hr  Abx regimen:  Zosyn + Vancomycin  ID Following ?: NO  Concomitant nephrotoxic drugs (requires more frequent monitoring): None  Frequency of BMP?: Not ordered, last done:     Recent Labs     22  0342 22  0152 22  0358   WBC  --  10.9 12.8*   CREA 0.88 1.20* 2.30*   BUN 18 32* 36*     Est CrCl: ~40-45 ml/min; UO: 0.9 ml/kg/hr  Temp (24hrs), Av.2 °F (36.8 °C), Min:97.6 °F (36.4 °C), Max:98.6 °F (37 °C)    Cultures:    Blood: NG, final   MRSA screen: (-)   Pleural fluid: NG, final   Urine: NG, final    MRSA Swab ordered (if applicable)? YES (but not sent yet)    Goal target range Trough 10-15 mcg/mL    Recent level history:  Date/Time Dose & Interval Measured Level (mcg/mL) Associated AUC/RITO Dose Adjustment     @ 0358 1.5 gm IV x 1  7.3 mcg/mL (drawn ~26.5 hrs post-dose) -- 750 mg IV x 1 dose ordered    @ 0342 750 mg IV Q 24 hr 8.3 mcg/mL (drawn ~17 hrs post-dose) 269 Change to 1000 mg IV Q 18 hr                                  Plan: The random vancomycin level drawn this morning correlates with an AUC/RITO of 269, which is below goal range. Plan will be to adjust the dose to 1000 mg IV Q 18 hr for an estimated AUC/RITO of 456. Pharmacy will continue to monitor patient daily and will make dosage adjustments based upon changing renal function. *Random vancomycin levels are used to calculate AUC/RITO, this level should not be interpreted as a trough. Vancomycin has been dosed using Bayesian kinetics software to target an AUC24:RITO of 400-600, which provides adequate exposure for as assumed infection due to MRSA with an RITO of 1 or less while reducing the risk of nephrotoxicity as seen with traditional trough based dosing goals.

## 2022-11-21 NOTE — PROGRESS NOTES
[address] [facility] Adult  Hospitalist Group                                                                                          Hospitalist Progress Note  Armando Campos MD  Answering service: [phone] from in house phone        Date of Service:  2022  NAME:  Caitlin Mcintosh  :  1940  MRN:  [id number]      Admission Summary: This is an 81year old who initially presented on 10/31 with acute bilateral CVA s/p thrombectomy. She was treated in ICU and was transferred to [facility] on 2022. She was found to have heart failure with EF 10-15% and AICD was placed during her stay on . She has had intermittent episodes of hypotension, which she responds to fluid.  she began to have increase in somnolence and hypotension, and there was concern for possible bleeding. She was seen by CCM service and transferred back to ICU, on  requiring pressors briefly.      Patient is a Jehovah witness refusing blood product, vital signs stable and transferred out of ICU on      Interval history / Subjective:   Pt refused to take PO or wear O2 overnight  Very weak-appearing but in NAD  Oriented x2       Assessment & Plan:     Acute Ischemic CVA due to posterior occlusion s/p thrombectomy  Acute bilateral superior cerebellar infarction, mild associated petechial hemorrhage  Possible small foci of acute infarction right occipital lobe  -aspirin and eliquis on hold due to hemothorax and low Hgb  -continue lipitor     Large left pleural effusion, possible hemothorax s/p thoracentesis    -pleural fluid cx on  no growth     Possible aspiration pneumonia  -Continue IV Zosyn and vancomycin d4  -afebrile, leukocytosis improved  -blood cx no growth       Acute on chronic HFr EF 10-15% s/p AICD  -clinically compensated  -hypotension resolved, off pressor on   -continue coreg  -not on ACEi/ARB due to hypotension and CKD  -monitor I/O and daily weight     Severe acute blood loss anemia related to hemothorax  -on epoetin peewee, iv iron sucrose   -Hgb 4.6  -Voodoo, refused blood transfusion   -very high risk for decompensation, DNR  -no need to continue to draw blood for labs since it will not change mgmt     Hypotension- resolved  HTN  -off pressor, resolved, BP elevated, restarted on coreg, monitor BP      Hx of A Fib, rate controlled  -on coreg  -Eliquis and aspirin on hold     T2DM  -A1c 13.1  -had hypoglycemia, poor oral intake, hold NPH for now  -patient evaluated by speech therapy and placed on dysphagia diet   -s/p D5 0.45% NaCl for hypoglycemia yest  -continue sliding scale, hypoglycemic protocol     JERAMY on CKD stage III  -creatinine normalized  -monitor renal function   -nephrologist on board     Failure to thrive, refusing PO  Re-consult palliative care team for [address] to discuss deactivation of ICD, appreciate assistance    Pt is at very high risk for decompensation, inpatient mortality    Code status: DNR  Prophylaxis: SCDs  Care Plan discussed with: Patient/Family, Nurse, and Consultant palliative  Anticipated Disposition:  tbd  Anticipated Discharge: Greater than 48 hours    CRITICAL CARE ATTESTATION:  I had a face to face encounter with the patient, reviewed and interpreted patient data including clinical events, labs, images, vital signs, I/O's, and examined patient. I have discussed the case and the plan and management of the patient's care with the consulting services, the bedside nurses and necessary ancillary providers. NOTE OF PERSONAL INVOLVEMENT IN CARE   This patient has a high probability of imminent, clinically significant deterioration, which requires the highest level of preparedness to intervene urgently. I participated in the decision-making and personally managed or directed the management of the following life and organ supporting interventions that required my frequent assessment to treat or prevent imminent deterioration.      I personally spent 30 minutes of critical care time. This is time spent at this critically ill patient's bedside actively involved in patient care as well as the coordination of care and discussions with the patient's family. This does not include any procedural time which has been billed separately. Hospital Problems  Date Reviewed: 3/18/2022            Codes Class Noted POA    Stroke [facility]) ICD-10-CM: I63.9  ICD-9-CM: 434.91  10/31/2022 Unknown           Review of Systems:   Pertinent items are noted in HPI    Vital Signs:    Last 24hrs VS reviewed since prior progress note. Most recent are:  Visit Vitals  BP (!) 141/61   Pulse 80   Temp 97.6 °F (36.4 °C)   Resp (!) 36   Ht 5' 6\" (1.676 m)   Wt 52.4 kg (115 lb 8.3 oz)   SpO2 100%   BMI 18.65 kg/m²         Intake/Output Summary (Last 24 hours) at 11/21/2022 1109  Last data filed at 11/21/2022 1000  Gross per 24 hour   Intake 1307.21 ml   Output 1056 ml   Net 251.21 ml        Physical Examination:   I had a face to face encounter with this patient and independently examined them on 11/21/2022 as outlined below:    General: Awake, very weak-appearing, no acute distress    EENT:  Anicteric sclerae. MMM  Resp:  Decreased breath sounds, no wheezing or rales. No accessory muscle use  CV:  RRR, 3/6 SM  GI:  Soft, Non distended, Non tender  Neurologic:  Alert and oriented x2, facial asymmetry, speech soft and difficult to understand, too weak to hold head up  Extremities: No edema or cyanosis  Psych:   Not anxious or agitated  Skin:  No rashes.  No jaundice       Data Review:   I personally reviewed: vitals, labs, imaging results, notes    Labs:     Recent Labs     11/20/22 0152 11/19/22 0358   WBC 10.9 12.8*   HGB 4.6* 5.0*   HCT 14.4* 16.4*    279     Recent Labs     11/21/22  0342 11/20/22  0152 11/19/22 0358    147* 142   K 3.5 3.8 4.2   * 113* 110*   CO2 25 29 24   BUN 18 32* 36*   CREA 0.88 1.20* 2.30*   * 66 159*   CA 8.6 8.0* 8.4*     Recent Labs 11/21/22  0342 11/20/22  0152 11/19/22  0358   ALT 49 68 40   AP 83 75 80   TBILI 0.9 0.5 0.4   TP 5.6* 5.3* 5.3*   ALB 2.0* 1.9* 1.9*   GLOB 3.6 3.4 3.4     Recent Labs     11/19/22  0358   INR 1.5*   PTP 14.9*      No results for input(s): FE, TIBC, PSAT, FERR in the last 72 hours. No results found for: FOL, RBCF   No results for input(s): PH, PCO2, PO2 in the last 72 hours. No results for input(s): CPK, CKNDX, TROIQ in the last 72 hours.     No lab exists for component: CPKMB  Lab Results   Component Value Date/Time    Cholesterol, total 146 11/01/2022 03:30 AM    HDL Cholesterol 69 11/01/2022 03:30 AM    LDL, calculated 71 11/01/2022 03:30 AM    Triglyceride 30 11/01/2022 03:30 AM    CHOL/HDL Ratio 2.1 11/01/2022 03:30 AM     Lab Results   Component Value Date/Time    Glucose (POC) 197 (H) 11/21/2022 05:24 AM    Glucose (POC) 263 (H) 11/21/2022 12:55 AM    Glucose (POC) 191 (H) 11/20/2022 06:26 PM    Glucose (POC) 124 (H) 11/20/2022 12:48 PM    Glucose (POC) 81 11/20/2022 05:44 AM     Lab Results   Component Value Date/Time    Color YELLOW/STRAW 11/06/2022 06:43 PM    Appearance CLEAR 11/06/2022 06:43 PM    Specific gravity 1.025 11/06/2022 06:43 PM    Specific gravity 1.029 10/31/2022 12:54 PM    pH (UA) 5.5 11/06/2022 06:43 PM    Protein Negative 11/06/2022 06:43 PM    Glucose 100 (A) 11/06/2022 06:43 PM    Ketone TRACE (A) 11/06/2022 06:43 PM    Bilirubin Negative 11/06/2022 06:43 PM    Urobilinogen 0.2 11/06/2022 06:43 PM    Nitrites Negative 11/06/2022 06:43 PM    Leukocyte Esterase MODERATE (A) 11/06/2022 06:43 PM    Epithelial cells FEW 11/06/2022 06:43 PM    Bacteria Negative 11/06/2022 06:43 PM    WBC 10-20 11/06/2022 06:43 PM    RBC 0-5 11/06/2022 06:43 PM       Medications Reviewed:     Current Facility-Administered Medications   Medication Dose Route Frequency    piperacillin-tazobactam (ZOSYN) 3.375 g in 0.9% sodium chloride (MBP/ADV) 100 mL MBP  3.375 g IntraVENous Q8H    vancomycin (VANCOCIN) 750 mg in 0.9% sodium chloride 250 mL ([id number])  750 mg IntraVENous Q24H    dilTIAZem (CARDIZEM) 125 mg in dextrose 5% 125 mL infusion  0-15 mg/hr IntraVENous TITRATE    carvediloL (COREG) tablet 12.5 mg  12.5 mg Oral BID WITH MEALS    folic acid (FOLVITE) tablet 1 mg  1 mg Oral DAILY    cyanocobalamin (VITAMIN B12) tablet 1,000 mcg  1,000 mcg Oral DAILY    iron sucrose (VENOFER) 200 mg in 0.9% sodium chloride 100 mL IVPB  200 mg IntraVENous Q24H    Vancomycin: Pharmacy to dose   Other Rx Dosing/Monitoring    epoetin peewee-epbx (RETACRIT) 14,000 Units  14,000 Units SubCUTAneous Q TUE, THU & SAT    insulin lispro (HUMALOG) injection   SubCUTAneous Q6H    polyethylene glycol (MIRALAX) packet 17 g  17 g Oral BID    [Held by provider] insulin NPH (NOVOLIN N, HUMULIN N) injection 4 Units  4 Units SubCUTAneous QHS    [Held by provider] HYDROcodone-acetaminophen (NORCO) 5-325 mg per tablet 1 Tablet  1 Tablet Oral Q6H PRN    [Held by provider] insulin NPH (NOVOLIN N, HUMULIN N) injection 8 Units  8 Units SubCUTAneous QAM    [Held by provider] apixaban (ELIQUIS) tablet 2.5 mg  2.5 mg Oral BID    [Held by provider] aspirin chewable tablet 81 mg  81 mg Oral DAILY    hydrALAZINE (APRESOLINE) 20 mg/mL injection 10 mg  10 mg IntraVENous Q6H PRN    sodium chloride (NS) flush 5-40 mL  5-40 mL IntraVENous Q8H    sodium chloride (NS) flush 5-40 mL  5-40 mL IntraVENous PRN    pantoprazole (PROTONIX) tablet 40 mg  40 mg Oral ACB    [Held by provider] traMADoL (ULTRAM) tablet 50 mg  50 mg Oral Q6H PRN    dextrose 10 % infusion 0-250 mL  0-250 mL IntraVENous PRN    senna-docusate (PERICOLACE) 8.6-50 mg per tablet 1 Tablet  1 Tablet Oral DAILY    lidocaine 4 % patch 2 Patch  2 Patch TransDERmal Q24H    atorvastatin (LIPITOR) tablet 10 mg  10 mg Oral QHS    ondansetron (ZOFRAN) injection 4 mg  4 mg IntraVENous Q4H PRN    acetaminophen (TYLENOL) tablet 650 mg  650 mg Oral Q4H PRN    Or    acetaminophen (TYLENOL) solution 650 mg  650 mg Per NG tube Q4H PRN    Or    acetaminophen (TYLENOL) suppository 650 mg  650 mg Rectal Q4H PRN    sodium chloride (NS) flush 5-40 mL  5-40 mL IntraVENous Q8H    sodium chloride (NS) flush 5-40 mL  5-40 mL IntraVENous PRN    glucose chewable tablet 16 g  4 Tablet Oral PRN    glucagon (GLUCAGEN) injection 1 mg  1 mg IntraMUSCular PRN     ______________________________________________________________________  EXPECTED LENGTH OF STAY: 7d 2h  ACTUAL LENGTH OF STAY:          21                 Ancelmo Carrington MD

## 2022-11-21 NOTE — PROGRESS NOTES
Spiritual Care Assessment/Progress Note  [facility]      NAME: Thomas Cox      MRN: [id number]  AGE: 81 y.o.  SEX: female  Synagogue Affiliation: Madelinh witness   Language: English     11/21/2022     Total Time (in minutes): 9     Spiritual Assessment begun in [address] through conversation with:         []Patient        [] Family    [] Friend(s)        Reason for Consult: Palliative Care, Initial/Spiritual Assessment     Spiritual beliefs: (Please include comment if needed)     [] Identifies with a hernandez tradition:         [] Supported by a hernandez community:            [] Claims no spiritual orientation:           [] Seeking spiritual identity:                [] Adheres to an individual form of spirituality:           [x] Not able to assess:                           Identified resources for coping:      [] Prayer                               [] Music                  [] Guided Imagery     [] Family/friends                 [] Pet visits     [] Devotional reading                         [] Unknown     [] Other:                                               Interventions offered during this visit: (See comments for more details)    Patient Interventions: Affirmation of emotions/emotional suffering           Plan of Care:     [] Support spiritual and/or cultural needs    [] Support AMD and/or advance care planning process      [] Support grieving process   [] Coordinate Rites and/or Rituals    [] Coordination with community clergy   [] No spiritual needs identified at this time   [] Detailed Plan of Care below (See Comments)  [] Make referral to Music Therapy  [] Make referral to Pet Therapy     [] Make referral to Addiction services  [] Make referral to [facility]  [] Make referral to Spiritual Care Partner  [] No future visits requested        [] Contact Spiritual Care for further referrals     Comments: Visited Ms Guillermina Shultz in [address] [place] [address] for Palliative Care spiritual assessment; reviewed patient's chart prior to visit. Ms Yani Gibbons was sitting on the side of the bed and PT was working with her. Patient did not respond when name was called and did not attempt to answer questions. Unable to do assessment at that time. : Rev. Nina Elizondo.  Kelly Farr; [facility], to contact [address] call: [phone]

## 2022-11-21 NOTE — PROGRESS NOTES
Problem: Self Care Deficits Care Plan (Adult)  Goal: *Acute Goals and Plan of Care (Insert Text)  Description: FUNCTIONAL STATUS PRIOR TO ADMISSION: Patient was modified independent with ADLs, IADLs, and functional mobility using a SPC. HOME SUPPORT: The patient lived alone; however, reports her son lives next door and two other family members live down the road. Occupational Therapy Goals  Re-Evaluation s/p transfer to ICU 11/21/22, goals modified below  1. Patient will perform self-feeding with LUE as FM/GM assist with minimal assistance within 7 day(s). DOWNGRADED  2. Patient will perform grooming EOB with LUE as FM/GM assist with moderate assistance within 7 day(s). DOWNGRADED  3. Patient will perform anterior neck to thigh bathing with moderate assistance within 7 day(s). DOWNGRADED  4. Patient will perform upper body dressing with moderate assistance within 7 day(s). DOWNGRADED  5. Patient will perform lower body dressing with maximal assistance within 7 day(s). CONTINUE  6. Patient will perform toilet transfers to/from [facility] with moderate assistance within 7 day(s). 7.  Patient will perform all aspects of toileting with maximal assistance within 7 day(s). CONTINUE  8. Patient will participate in upper extremity therapeutic exercise/activities within LUE ICD precautions with moderate assistance within 7 day(s). CONTINUE  9. Patient will utilize energy conservation techniques during functional activities with verbal, visual, and tactile cues within 7 day(s). CONTINUE      Initiated 11/1/2022, continued 11/4/2022, continued 11/11/2022  1. Patient will perform self-feeding with LUE as FM/GM assist with supervision/set-up within 7 day(s). 2.  Patient will perform grooming EOB with LUE as FM/GM assist with minimal assistance/contact guard assist within 7 day(s). 3.  Patient will perform bathing with moderate assistance within 7 day(s).   4.  Patient will perform upper body dressing with supervision/set-up within 7 day(s). 5.  Patient will perform lower body dressing with maximal assistance within 7 day(s). 6.  Patient will perform toilet transfers to/from [facility] with moderate assistance within 7 day(s). 7.  Patient will perform all aspects of toileting with maximal assistance within 7 day(s). 8.  Patient will participate in upper extremity therapeutic exercise/activities with supervision/set-up within 7 day(s). (Within LUE ICD precautions)  9. Patient will utilize energy conservation techniques during functional activities with verbal, visual, and tactile cues within 7 day(s). 10.  Patient will complete the [id number] within 7 days. Outcome: Not Met     OCCUPATIONAL THERAPY RE-EVALUATION  Patient: Joan Villafana [de-identified]81 y.o. female)  Date: 11/21/2022  Diagnosis: Stroke ([facility] [address] [I63.9] <principal problem not specified>  Procedure(s) (LRB):  INSERT ICD SINGLE (N/A) 17 Days Post-Op  Precautions: Fall, Skin (SBP <140, LUE ICD)  Chart, occupational therapy assessment, plan of care, and goals were reviewed. ASSESSMENT  Based on the objective data described below, patient continues with decreased sitting & standing balance, activity tolerance, lower body access, ROM, strength, coordination, cognition (safety, initiation, termination, attention, processing, command following, volition, memory, orientation, insight, judgment) s/p transfer to ICU for lethargy, hypotension, and low HGB (remains low, patient declining pRBCs). She now requires Mod-Max A for bed mobility & standing trials, Mod-Total A for basic ADLs at this time. Global debility and fatigue noted with limited activity, requiring intermittent CGA-Min A for sitting balance with occasional LOB to the L, worsening with fatigue. Simulated lower body dressing and functional reach to distal BLEs in prep for ADLs, requiring Total A and minor facilitation of LUE given prior neurological and LUE ICD precautions.  Attempted supported sitting ADLs d/t worsening balance EOB, patient declining and quickly falling asleep, VSS. Pending progress and goals of care, recommend d/c to SNF to maximize safety and functional independence pending goals of care (palliative consult noted). Current Level of Function Impacting Discharge (ADLs): Mod-Total A for ADLs, Min-Max A for limited OOB mobility    Other factors to consider for discharge: fall risk, PMH, PLOF, goals of care pending         PLAN :  Recommendations and Planned Interventions: self care training, functional mobility training, therapeutic exercise, balance training, visual/perceptual training, therapeutic activities, cognitive retraining, endurance activities, neuromuscular re-education, patient education, home safety training, and family training/education    Frequency/Duration: Patient will be followed by occupational therapy 3 times a week to address goals. Recommend with staff: Recommend with nursing, ADLs with assist, modified bed in chair position 3x/day, and toileting via bedpan. Thank you for completing as able in order to maintain patient strength, endurance and independence. Recommendation for discharge: (in order for the patient to meet his/her long term goals)  Therapy up to 5 days/week in SNF setting    This discharge recommendation:  A follow-up discussion with the attending provider and/or case management is planned    Equipment recommendations for successful discharge (if) home: To be determined (TBD)         SUBJECTIVE:   Patient stated I'm in [address] [place] [address].     OBJECTIVE DATA SUMMARY:   Hospital course since last seen and reason for reevaluation: s/p ICU transfer for lethargy, hypotension, and low HGB    Cognitive/Behavioral Status:  Neurologic State: Alert  Orientation Level: Oriented to person;Oriented to place; Disoriented to situation;Disoriented to time  Cognition: Decreased attention/concentration;Decreased command following; Impaired decision making; Impulsive;Memory loss;Poor safety awareness  Perception: Cues to maintain midline in sitting;Cues to maintain midline in standing  Perseveration: Perseverates during conversation  Safety/Judgement: Decreased awareness of environment;Decreased insight into deficits; Decreased awareness of need for safety;Decreased awareness of need for assistance    Skin: Appears intact, LUE ICD incision noted    Edema: None    Hearing: Auditory  Auditory Impairment: None    Vision/Perceptual:    Tracking: Able to track stimulus in all quadrants w/o difficulty                                Range of Motion:  AROM: Generally decreased, functional  PROM: Generally decreased, functional                      Strength:  Strength: Generally decreased, functional                Coordination:  Coordination: Generally decreased, functional  Fine Motor Skills-Upper: Left Impaired;Right Impaired    Gross Motor Skills-Upper: Left Impaired;Right Impaired    Tone & Sensation:  Tone: Normal  Sensation: Intact                        Functional Mobility and Transfers for ADLs:  Bed Mobility:  Rolling: Moderate assistance  Supine to Sit: Moderate assistance; Additional time  Sit to Supine: Moderate assistance  Scooting: Maximum assistance    Transfers:  Sit to Stand: Maximum assistance;Assist x1  Functional Transfers  Toilet Transfer : Total assistance; Adaptive equipment (infer OOB to BSC, Mod A for rolling)       Balance:  Sitting: Impaired  Sitting - Static: Fair (occasional)  Sitting - Dynamic: Poor (constant support)  Standing: Impaired; With support  Standing - Static: Poor;Constant support    ADL Assessment:  Feeding: Moderate assistance    Oral Facial Hygiene/Grooming: Moderate assistance    Bathing: Maximum assistance      Upper Body Dressing: Maximum assistance    Lower Body Dressing: Total assistance    Toileting:  Total assistance                ADL Intervention and task modifications:       Grooming  Washing Face:  (patient declined)    Lower Body Dressing Assistance  Dressing Assistance: Total assistance(dependent)  Pants With Elastic Waist: Total assistance(dependent) (simulated)  Socks: Total assistance (dependent)  Leg Crossed Method Used: No  Position Performed: Bending forward method;Seated edge of bed;Standing  Cues: Physical assistance; Tactile cues provided;Verbal cues provided;Visual cues provided    Toileting  Toileting Assistance: Total assistance(dependent)  Bladder Hygiene: Total assistance (dependent) (stein)    Cognitive Retraining  Orientation Retraining: Awareness of environment; Reorienting;Situation;Time  Problem Solving: Awareness of environment  Executive Functions: Executing cognitive plans;Regulating behavior  Organizing/Sequencing: Breaking task down  Attention to Task: Single task  Following Commands: Follows one step commands/directions (~60%)  Safety/Judgement: Decreased awareness of environment;Decreased insight into deficits; Decreased awareness of need for safety;Decreased awareness of need for assistance  Cues: Tactile cues provided;Verbal cues provided;Visual cues provided    Functional Measure:    Barthel Index:  Bathin  Bladder: 0  Bowels: 5  Groomin  Dressin  Feedin  Mobility: 0  Stairs: 0  Toilet Use: 0  Transfer (Bed to Chair and Back): 0  Total: 10/100      The Barthel ADL Index: Guidelines  1. The index should be used as a record of what a patient does, not as a record of what a patient could do. 2. The main aim is to establish degree of independence from any help, physical or verbal, however minor and for whatever reason. 3. The need for supervision renders the patient not independent. 4. A patient's performance should be established using the best available evidence. Asking the patient, friends/relatives and nurses are the usual sources, but direct observation and common sense are also important. However direct testing is not needed.   5. Usually the patient's performance over the preceding 24-48 hours is important, but occasionally longer periods will be relevant. 6. Middle categories imply that the patient supplies over 50 per cent of the effort. 7. Use of aids to be independent is allowed. Score Interpretation (from [facility])    Independent   60-79 Minimally independent   40-59 Partially dependent   20-39 Very dependent   <20 Totally dependent     -Scot Jo., Barthel, D.W. (1965). Functional evaluation: the Barthel Index. [address] ([address] (1997). The Barthel activities of daily living index: self-reporting versus actual performance in the old (> or = 75 years). Journal of [address] [place] [address] 45(7), [facility], IVONE, Abhishek Benito., Alex Ortizz. (1999). Measuring the change in disability after inpatient rehabilitation; comparison of the responsiveness of the Barthel Index and Functional [place] Measure. Journal of Neurology, Neurosurgery, and Psychiatry, 66(4), 344-677. Raymon Serrano, N.J.A, FREEDOM Paez, & Della Khan M.A. (2004) Assessment of post-stroke quality of life in cost-effectiveness studies: The usefulness of the Barthel Index and the EuroQoL-5D. Quality of Life Research, 13, 427-43         Pain:  None reported    Activity Tolerance:   Fair, requires rest breaks, and observed SOB with activity despite VSS    After treatment patient left in no apparent distress:   Supine in bed, Call bell within reach, Bed / chair alarm activated, and Side rails x 3    COMMUNICATION/COLLABORATION:   The patients plan of care was discussed with: Physical therapist and Registered nurse.      PAMELA Terry, OTR/L  Time Calculation: 21 mins

## 2022-11-21 NOTE — PROGRESS NOTES
0730: Bedside shift change report given to Ingrid Dc RN (oncoming nurse) by Juliano Santana RN (offgoing nurse). Report included the following information SBAR, Intake/Output, Cardiac Rhythm NSR, and Alarm Parameters . 1135: Order in place for AICD to be deactivated. [employer] rep aware. 1427: ICD turned off by [employer] rep - documentation placed on patient's chart. 1945: Bedside shift change report given to Margie Easton RN (oncoming nurse) by Ingrid Dc RN (offgoing nurse). Report included the following information SBAR, Intake/Output, Cardiac Rhythm NSR, and Alarm Parameters .

## 2022-11-21 NOTE — PROGRESS NOTES
Transition of Care Plan  RUR-  High Risk  DISPOSITION: pending medical progression  F/U with PCP/Specialist    Transport: [facility]   Palliative care team consulted for [address] conversation with patient's son Juancho Cervantes. Patient code status is DNR. Son to visit later today and also meet with Palliative care team. Care management will continue to follow. Gabriella Smith RN,Care Management    2:20 pm Referral made to [facility].    Gabriella Smith RN, Care Management

## 2022-11-21 NOTE — CONSULTS
Palliative Medicine Consult  Francesco: [phone] ([phone])    Patient Name: Jessica Arredondo  YOB: 1940    Date of Initial Consult: 11/8/2022  Reason for Consult: care decisions  Requesting Provider: Dr. Lucio Correa   Primary Care Physician: Yolanda Valdez MD     SUMMARY:   Jessica Arredondo is a 81 y.o. with a past history of DM2, HTN, ischemic CM (dx. 5/2021 EF 40-45%, NSTEMI 10/2021 PCI to RCA, EF 20-25%), hx acute pancreatitis, who was admitted on 10/31/2022 from home with a diagnosis of acute bilateral ischemic posterior CVA. Current medical issues leading to Palliative Medicine involvement include: patient underwent thrombectomy for acute CVA, she also was found to have EF 10-15% on ECHO and had AICD placement 11/4/22. She has made progress with physical therapy and is recommended for inpatient rehab at time of discharge   Now with progressive decline in hemoglobin, does not want blood,  We  are asked to assist with care goals discussions    Patient lives in her own home, with son Tracy Arreola living next door. She has 5 adult children. Catherine Cee. Witness/ does not want blood products. No AMD on chart. PALLIATIVE DIAGNOSES:   End of life care  Acute CVA s/p thrombectomy  Ischemic cardiomyopathy EF 10-15%   Profound Anemia, lethargy  Hypoalbuminemia  Pneumonia, improved  JERAMY, resolved   Palliative medicine encounter        PLAN:   ICU notes reviewed  Note discussions with mpoa, son, Devika Wolff and DNR status as of 11/18/22. Unable to get dobhoff, speech has cleared for pureeds/ thins but she is not eating  Call placed to donald Wolff   He confirms decision for DNR and is ok with my recommendation to deactivate AICD, consult placed to cardiology   He will come to hospital later today to further discuss care goals. He is anxious about next steps, he cannot provide her care at home (he works full time)   Tells me \"does not want her to suffer.   Understands God may call her soon\"  He is having trouble coping with seeing her in this condition. He will ask nurse to call me when he arrives to bedside later today. Addendum: family meeting held with son Ayde Helms and his two sons. Medical update provided. We talk about her wish to avoid blood transfusion and affirm they are right to honor her wishes. In big picture, she is winding down, likely near EOL even if we do give her blood. Recent acute strokes and end stage cardiomyopathy with EF 10%. Education provided about nutrition at EOL, explain recommendation for comfort feeding as tolerated but anticipate she won't want anything. We talk about option of focus on comfort, gentle care, avoiding anything difficult (like labs, dobhoff, procedures etc). Ayde Helms is in agreement with focus on gentle care. Explain option to consult hospice team to consider for [facility] or [facility]. Ayde Helms works full time and cannot provide her care in the home. He wants to make sure she is comfortable and well taken care of. Do not think she is having pain at this time, but is profoundly tired, mostly wants to be left alone. Can still hear, encourage family visits. Ayde Helms is very close to his mother, difficult seeing her this sick, and he wants to keep his memories of her in better times. Ayde Helms understands next step, he will hear from hospice team.    Plan discussed with bedside nurse. Medtonic here for deactivation of AICD.   Add comfort meds PRN  Ok to keep regular meds for now, suspect she may not take oral meds much longer  Comfort diet, attentive mouth care         GOALS OF CARE / TREATMENT PREFERENCES:     GOALS OF CARE:  Patient/Health Care Proxy Stated Goals: Rehabilitation  now goal is comfort    TREATMENT PREFERENCES:   Code Status: DNR    Patient and family's personal goals include: rehab, work to regain prior level of function  11/21/22:  son's goal is comfort, he understands his mom is dying soon    Important upcoming milestones or family events: not discussed today    The patient identifies the following as important for living well: wants to get back to her own home after rehab      Advance Care Planning:  [] The [place] [id number] Team has updated the ACP Navigator with [address] and Patient Capacity      Primary Decision Maker: tracie hobbs - [phone]  Advance Care Planning 11/6/2022   Patient's Healthcare Decision Maker is: Legal Next of Kin   Confirm Advance Directive None   Patient Would Like to Complete Advance Directive -       Medical Interventions: Full interventions       Other:    As far as possible, the palliative care team has discussed with patient / health care proxy about goals of care / treatment preferences for patient.      HISTORY:     History obtained from: chart    CHIEF COMPLAINT: admitted with stroke symptoms     HPI/SUBJECTIVE:    The patient is:   [x] Verbal and participatory  [] Non-participatory due to:     81year old female tells me that day of admission she didn't feel right, not like herself at all, and her sister came to the door and she ended up at the hospital.     Today, she is tired, after having been up in chair now for a while  She's a little sore where AICD was placed and the pain med made her nauseated     11/21/22  patient is lethargic, unable to respond to symptom review  Clinical Pain Assessment (nonverbal scale for severity on nonverbal patients):   Clinical Pain Assessment  Severity: 2     Activity (Movement): Lying quietly, normal position    Duration: for how long has pt been experiencing pain (e.g., 2 days, 1 month, years)  Frequency: how often pain is an issue (e.g., several times per day, once every few days, constant)     FUNCTIONAL ASSESSMENT:     Palliative Performance Scale (PPS):  PPS: 30       PSYCHOSOCIAL/SPIRITUAL SCREENING:     Palliative IDT has assessed this patient for cultural preferences / practices and a referral made as appropriate to needs (Cultural Services, Patient Advocacy, Ethics, etc.)    Any spiritual / Taoism concerns:  [] Yes /  [x] No   If \"Yes\" to discuss with pastoral care during IDT     Does caregiver feel burdened by caring for their loved one:   [] Yes /  [x] No /  [] No Caregiver Present/Available [] No Caregiver [] Pt [facility]  If \"Yes\" to discuss with social work during IDT    Anticipatory grief assessment:   [x] Normal  / [] Maladaptive     If \"Maladaptive\" to discuss with social work during IDT    ESAS Anxiety: Anxiety: 0    ESAS Depression: Depression: 0        REVIEW OF SYSTEMS:     Positive and pertinent negative findings in ROS are noted above in HPI. The following systems were [x] reviewed / [] unable to be reviewed as noted in HPI  Other findings are noted below. Systems: constitutional, ears/nose/mouth/throat, respiratory, gastrointestinal, genitourinary, musculoskeletal, integumentary, neurologic, psychiatric, endocrine. Positive findings noted below. Modified ESAS Completed by: provider   Fatigue: 5 Drowsiness: 2   Depression: 0 Pain: 2   Anxiety: 0 Nausea: 0   Anorexia: 0 Dyspnea: 2           Stool Occurrence(s): 1        PHYSICAL EXAM:     From RN flowsheet:  Wt Readings from Last 3 Encounters:   11/20/22 52.4 kg (115 lb 8.3 oz)   10/31/22 42.4 kg (93 lb 8 oz)   05/10/22 53.1 kg (117 lb)     Blood pressure (!) 141/61, pulse 80, temperature 97.6 °F (36.4 °C), resp. rate (!) 36, height 5' 6\" (1.676 m), weight 52.4 kg (115 lb 8.3 oz), SpO2 100 %.     Pain Scale 1: Numeric (0 - 10)  Pain Intensity 1: 0  Pain Onset 1: acute  Pain Location 1: Back  Pain Orientation 1: Posterior  Pain Description 1: Constant  Pain Intervention(s) 1: Repositioned  Last bowel movement, if known:     Constitutional: thin female NAD  Lethargic, slow responses to symptom questions  Not able to demonstrate insight at this time       HISTORY:     Active Problems:    Stroke [facility]) (10/31/2022)    Past Medical History:   Diagnosis Date    Colon polyps Diabetes ([facility] [address]     Diverticulosis     Hypertension     Ill-defined condition     Pancreatitis       Past Surgical History:   Procedure Laterality Date    COLONOSCOPY N/A 4/29/2021    COLONOSCOPY performed by Cari Haas MD at [facility]  2021    x2    IR PERC ART [address] SASHA ARMIJO  10/31/2022      Family History   Problem Relation Age of Onset    Diabetes Other       History reviewed, no pertinent family history.   Social History     Tobacco Use    Smoking status: Never    Smokeless tobacco: Never   Substance Use Topics    Alcohol use: Not Currently     Allergies   Allergen Reactions    Insulins Itching    Penicillins Other (comments)     Pt states it makes her pass out      Current Facility-Administered Medications   Medication Dose Route Frequency    piperacillin-tazobactam (ZOSYN) 3.375 g in 0.9% sodium chloride (MBP/ADV) 100 mL MBP  3.375 g IntraVENous Q8H    vancomycin (VANCOCIN) 750 mg in 0.9% sodium chloride 250 mL ([id number])  750 mg IntraVENous Q24H    dilTIAZem (CARDIZEM) 125 mg in dextrose 5% 125 mL infusion  0-15 mg/hr IntraVENous TITRATE    carvediloL (COREG) tablet 12.5 mg  12.5 mg Oral BID WITH MEALS    folic acid (FOLVITE) tablet 1 mg  1 mg Oral DAILY    cyanocobalamin (VITAMIN B12) tablet 1,000 mcg  1,000 mcg Oral DAILY    iron sucrose (VENOFER) 200 mg in 0.9% sodium chloride 100 mL IVPB  200 mg IntraVENous Q24H    Vancomycin: Pharmacy to dose   Other Rx Dosing/Monitoring    epoetin peewee-epbx (RETACRIT) 14,000 Units  14,000 Units SubCUTAneous Q TUE, THU & SAT    insulin lispro (HUMALOG) injection   SubCUTAneous Q6H    polyethylene glycol (MIRALAX) packet 17 g  17 g Oral BID    [Held by provider] insulin NPH (NOVOLIN N, HUMULIN N) injection 4 Units  4 Units SubCUTAneous QHS    [Held by provider] HYDROcodone-acetaminophen (NORCO) 5-325 mg per tablet 1 Tablet  1 Tablet Oral Q6H PRN    [Held by provider] insulin NPH (NOVOLIN N, HUMULIN N) injection 8 Units  8 Units SubCUTAneous QAM    [Held by provider] apixaban (ELIQUIS) tablet 2.5 mg  2.5 mg Oral BID    [Held by provider] aspirin chewable tablet 81 mg  81 mg Oral DAILY    hydrALAZINE (APRESOLINE) 20 mg/mL injection 10 mg  10 mg IntraVENous Q6H PRN    sodium chloride (NS) flush 5-40 mL  5-40 mL IntraVENous Q8H    sodium chloride (NS) flush 5-40 mL  5-40 mL IntraVENous PRN    pantoprazole (PROTONIX) tablet 40 mg  40 mg Oral ACB    [Held by provider] traMADoL (ULTRAM) tablet 50 mg  50 mg Oral Q6H PRN    dextrose 10 % infusion 0-250 mL  0-250 mL IntraVENous PRN    senna-docusate (PERICOLACE) 8.6-50 mg per tablet 1 Tablet  1 Tablet Oral DAILY    lidocaine 4 % patch 2 Patch  2 Patch TransDERmal Q24H    atorvastatin (LIPITOR) tablet 10 mg  10 mg Oral QHS    ondansetron (ZOFRAN) injection 4 mg  4 mg IntraVENous Q4H PRN    acetaminophen (TYLENOL) tablet 650 mg  650 mg Oral Q4H PRN    Or    acetaminophen (TYLENOL) solution 650 mg  650 mg Per NG tube Q4H PRN    Or    acetaminophen (TYLENOL) suppository 650 mg  650 mg Rectal Q4H PRN    sodium chloride (NS) flush 5-40 mL  5-40 mL IntraVENous Q8H    sodium chloride (NS) flush 5-40 mL  5-40 mL IntraVENous PRN    glucose chewable tablet 16 g  4 Tablet Oral PRN    glucagon (GLUCAGEN) injection 1 mg  1 mg IntraMUSCular PRN          LAB AND IMAGING FINDINGS:     Lab Results   Component Value Date/Time    WBC 10.9 11/20/2022 01:52 AM    HGB 4.6 (LL) 11/20/2022 01:52 AM    PLATELET 556 13/90/0990 01:52 AM     Lab Results   Component Value Date/Time    Sodium 145 11/21/2022 03:42 AM    Potassium 3.5 11/21/2022 03:42 AM    Chloride 114 (H) 11/21/2022 03:42 AM    CO2 25 11/21/2022 03:42 AM    BUN 18 11/21/2022 03:42 AM    Creatinine 0.88 11/21/2022 03:42 AM    Calcium 8.6 11/21/2022 03:42 AM    Magnesium 1.7 10/31/2022 08:06 PM    Phosphorus 3.2 10/31/2022 08:06 PM      Lab Results   Component Value Date/Time    Alk.  phosphatase 83 11/21/2022 03:42 AM    Protein, total 5.6 (L) 11/21/2022 03:42 AM    Albumin 2.0 (L) 11/21/2022 03:42 AM    Globulin 3.6 11/21/2022 03:42 AM     Lab Results   Component Value Date/Time    INR 1.5 (H) 11/19/2022 03:58 AM    Prothrombin time 14.9 (H) 11/19/2022 03:58 AM    aPTT 31.3 05/04/2021 02:23 PM      Lab Results   Component Value Date/Time    Iron 32 (L) 04/30/2021 01:27 AM    TIBC 206 (L) 04/30/2021 01:27 AM    Iron % saturation 16 (L) 04/30/2021 01:27 AM    Ferritin 106 04/30/2021 01:27 AM      No results found for: PH, PCO2, PO2  No components found for: GLPOC   No results found for: CPK, CKMB             Total time:  65min  Counseling / coordination time, spent as noted above: yes  > 50% counseling / coordination?: care goals, update, hospice care    Prolonged service was provided for  []30 min   []75 min in face to face time in the presence of the patient, spent as noted above. Time Start: 10-1015a and  Time End: 130- 230pm  Note: this can only be billed with  (initial) or [id number] (follow up). If multiple start / stop times, list each separately.

## 2022-11-21 NOTE — PROGRESS NOTES
[facility]   [address] [facility], [address] [place] [address]  Phone: [phone]   [phone]    [email or web address]     Nephrology Progress Note    Patient Name : Ebenezer Miller      : 1940     MRN : [id number]  Date of Admission : 10/31/2022  Date of Servive : 22    CC:  Follow up for JERAMY       Assessment and Plan   JERAMY on CKD  -Prerenal azotemia, hypovolemia and hypotension  -resolved  -cont present care  -daily labs    Hypernatremia:  -  better    CKD 3 A  -Baseline creatinine 1.28 mg/dL    Severe anemia  Tenriism  -MX per primary team    CVA  Chronic systolic CHF: EF 10 to 48%, s/p ICD 11/4  Hypertension  DM2     Interval History:  Seen and examined. Cr normalized this AM.  No cp, sob reported. Hgb remains low. Review of Systems: Review of systems not obtained due to patient factors.     Current Medications:   Current Facility-Administered Medications   Medication Dose Route Frequency    piperacillin-tazobactam (ZOSYN) 3.375 g in 0.9% sodium chloride (MBP/ADV) 100 mL MBP  3.375 g IntraVENous Q8H    vancomycin (VANCOCIN) 750 mg in 0.9% sodium chloride 250 mL ([id number])  750 mg IntraVENous Q24H    dilTIAZem (CARDIZEM) 125 mg in dextrose 5% 125 mL infusion  0-15 mg/hr IntraVENous TITRATE    carvediloL (COREG) tablet 12.5 mg  12.5 mg Oral BID WITH MEALS    folic acid (FOLVITE) tablet 1 mg  1 mg Oral DAILY    cyanocobalamin (VITAMIN B12) tablet 1,000 mcg  1,000 mcg Oral DAILY    iron sucrose (VENOFER) 200 mg in 0.9% sodium chloride 100 mL IVPB  200 mg IntraVENous Q24H    Vancomycin: Pharmacy to dose   Other Rx Dosing/Monitoring    epoetin peewee-epbx (RETACRIT) 14,000 Units  14,000 Units SubCUTAneous Q TUE, THU & SAT    insulin lispro (HUMALOG) injection   SubCUTAneous Q6H    polyethylene glycol (MIRALAX) packet 17 g  17 g Oral BID    [Held by provider] insulin NPH (NOVOLIN N, HUMULIN N) injection 4 Units  4 Units SubCUTAneous QHS    [Held by provider] HYDROcodone-acetaminophen (NORCO) 5-325 mg per tablet 1 Tablet  1 Tablet Oral Q6H PRN    [Held by provider] insulin NPH (NOVOLIN N, HUMULIN N) injection 8 Units  8 Units SubCUTAneous QAM    [Held by provider] apixaban (ELIQUIS) tablet 2.5 mg  2.5 mg Oral BID    [Held by provider] aspirin chewable tablet 81 mg  81 mg Oral DAILY    hydrALAZINE (APRESOLINE) 20 mg/mL injection 10 mg  10 mg IntraVENous Q6H PRN    sodium chloride (NS) flush 5-40 mL  5-40 mL IntraVENous Q8H    sodium chloride (NS) flush 5-40 mL  5-40 mL IntraVENous PRN    pantoprazole (PROTONIX) tablet 40 mg  40 mg Oral ACB    [Held by provider] traMADoL (ULTRAM) tablet 50 mg  50 mg Oral Q6H PRN    dextrose 10 % infusion 0-250 mL  0-250 mL IntraVENous PRN    senna-docusate (PERICOLACE) 8.6-50 mg per tablet 1 Tablet  1 Tablet Oral DAILY    lidocaine 4 % patch 2 Patch  2 Patch TransDERmal Q24H    atorvastatin (LIPITOR) tablet 10 mg  10 mg Oral QHS    ondansetron (ZOFRAN) injection 4 mg  4 mg IntraVENous Q4H PRN    acetaminophen (TYLENOL) tablet 650 mg  650 mg Oral Q4H PRN    Or    acetaminophen (TYLENOL) solution 650 mg  650 mg Per NG tube Q4H PRN    Or    acetaminophen (TYLENOL) suppository 650 mg  650 mg Rectal Q4H PRN    sodium chloride (NS) flush 5-40 mL  5-40 mL IntraVENous Q8H    sodium chloride (NS) flush 5-40 mL  5-40 mL IntraVENous PRN    glucose chewable tablet 16 g  4 Tablet Oral PRN    glucagon (GLUCAGEN) injection 1 mg  1 mg IntraMUSCular PRN      Allergies   Allergen Reactions    Insulins Itching    Penicillins Other (comments)     Pt states it makes her pass out       Objective:  Vitals:    Vitals:    11/21/22 0500 11/21/22 0600 11/21/22 0700 11/21/22 0830   BP: (!) 125/49 139/73 (!) 143/57 132/61   Pulse: 81 81 82 81   Resp: (!) 37 26 (!) 32 25   Temp:    97.6 °F (36.4 °C)   SpO2: 98% 98% 97% 100%   Weight:       Height:         Intake and Output:  11/21 0701 - 11/21 1900  In: -   Out: 75 [Urine:75]  11/19 1901 - 11/21 0700  In: 1913.9 [I.V.:1913.9]  Out: 3749 [Urine:1815]    Physical Examination:      General: Chronic ill-appearing  Neck:  Supple, no mass  Resp:  Lungs CTA B/L,   CV:  RRR,  no murmur   GI:  Soft, NT, + BS, no HS megaly    []    High complexity decision making was performed  []    Patient is at high-risk of decompensation with multiple organ involvement    Lab Data Personally Reviewed: I have reviewed all the pertinent labs, microbiology data and radiology studies during assessment. Recent Labs     11/21/22 0342 11/20/22 0152 11/19/22 0358    147* 142   K 3.5 3.8 4.2   * 113* 110*   CO2 25 29 24   * 66 159*   BUN 18 32* 36*   CREA 0.88 1.20* 2.30*   CA 8.6 8.0* 8.4*   ALB 2.0* 1.9* 1.9*   ALT 49 68 40   INR  --   --  1.5*       Recent Labs     11/20/22 0152 11/19/22 0358   WBC 10.9 12.8*   HGB 4.6* 5.0*   HCT 14.4* 16.4*    279       No results found for: SDES  Lab Results   Component Value Date/Time    Culture result: No growth (<1,000 CFU/ML) 11/19/2022 01:34 PM    Culture result: NO GROWTH 4 DAYS Culture performed on Unspun Fluid 11/14/2022 05:00 PM    Culture result: MRSA NOT PRESENT 11/07/2022 06:11 PM    Culture result:  11/07/2022 06:11 PM     Screening of patient nares for MRSA is for surveillance purposes and, if positive, to facilitate isolation considerations in high risk settings. It is not intended for automatic decolonization interventions per se as regimens are not sufficiently effective to warrant routine use.       Culture result: NO GROWTH 5 DAYS 11/06/2022 06:43 PM     Recent Results (from the past 24 hour(s))   GLUCOSE, POC    Collection Time: 11/20/22 12:48 PM   Result Value Ref Range    Glucose (POC) 124 (H) 65 - 117 mg/dL    Performed by Kiesha Monteiro, POC    Collection Time: 11/20/22  6:26 PM   Result Value Ref Range    Glucose (POC) 191 (H) 65 - 117 mg/dL    Performed by [id number], POC    Collection Time: 11/21/22 12:55 AM Result Value Ref Range    Glucose (POC) 263 (H) 65 - 117 mg/dL    Performed by [address] [place], [facility]    Collection Time: 11/21/22  3:42 AM   Result Value Ref Range    Sodium 145 136 - 145 mmol/L    Potassium 3.5 3.5 - 5.1 mmol/L    Chloride 114 (H) 97 - 108 mmol/L    CO2 25 21 - 32 mmol/L    Anion gap 6 5 - 15 mmol/L    Glucose 176 (H) 65 - 100 mg/dL    BUN 18 6 - 20 MG/DL    Creatinine 0.88 0.55 - 1.02 MG/DL    BUN/Creatinine ratio 20 12 - 20      eGFR >60 >60 ml/min/1.73m2    Calcium 8.6 8.5 - 10.1 MG/DL    Bilirubin, total 0.9 0.2 - 1.0 MG/DL    ALT (SGPT) 49 12 - 78 U/L    AST (SGOT) 32 15 - 37 U/L    Alk. phosphatase 83 45 - 117 U/L    Protein, total 5.6 (L) 6.4 - 8.2 g/dL    Albumin 2.0 (L) 3.5 - 5.0 g/dL    Globulin 3.6 2.0 - 4.0 g/dL    A-G Ratio 0.6 (L) 1.1 - 2.2     VANCOMYCIN, RANDOM    Collection Time: 11/21/22  3:42 AM   Result Value Ref Range    Vancomycin, random 8.3 UG/ML   GLUCOSE, POC    Collection Time: 11/21/22  5:24 AM   Result Value Ref Range    Glucose (POC) 197 (H) 65 - 117 mg/dL    Performed by Ivaldi            I have reviewed the flowsheets. Chart and Pertinent Notes have been reviewed. No change in PMH ,family and social history from Consult note.       Fiordaliza Mcclure MD  [address] [facility]

## 2022-11-22 PROBLEM — I63.9 ACUTE CVA (CEREBROVASCULAR ACCIDENT) (HCC): Status: ACTIVE | Noted: 2022-01-01

## 2022-11-22 NOTE — PROGRESS NOTES
Problem: Falls - Risk of  Goal: *Absence of Falls  Description: Document Breanna Cheng Fall Risk and appropriate interventions in the flowsheet.   Outcome: Progressing Towards Goal  Note: Fall Risk Interventions:                                Problem: Dyspnea Due to End of Life  Goal: Demonstrate understanding of and ability to manage respiratory symptoms at end of life  Outcome: Not Progressing Towards Goal     Problem: Communication Deficit  Goal: Effectively communicate symptoms, needs, and concerns  Outcome: Not Progressing Towards Goal     Problem: Pain  Goal: *Control of acute pain  Outcome: Not Progressing Towards Goal     Problem: Pressure Injury - Risk of  Goal: *Prevention of pressure injury  Outcome: Progressing Towards Goal  Note: Pressure Injury Interventions:                                            Problem: Infection - Risk of, Urinary Catheter-Associated Urinary Tract Infection  Goal: *Absence of infection signs and symptoms  Outcome: Progressing Towards Goal

## 2022-11-22 NOTE — HOSPICE
Call Hospice at time of Death [phone]          17:10: Pt has moved to room 533. Bedside assessment and updated nursing staff. Pt appears restless. Reviewed orders for restlessness and agitation. Valium 2.5mg Q 6 hours in place. Call made to patient's son Justin Zhang, to update him of room changes and provided phone number to the nursing station on [address] [facility]  [phone]    Inpatient Nursing Admission   Patient Name: Dieudonne Lemos  YOB: 1940  Age: 81 y.o. Date of Hospice Admission: 11/22/2022  Hospice Attending Elected by Patient: Zaid Ziegler MD  Primary Care Physician: Do Van MD  Admitting RN: Roxie Yeager RN, [facility]  : Luba Richter LCSW     Level of Care (GIP/Routine/Respite): [facility]  Facility of Care: [facility]   Patient Room: [facility]/     HOSPICE SUMMARY   ER Visits/ Hospitalizations in past year: 4  Hospice Diagnosis: Acute CVA (cerebrovascular accident) [facility]) [I63.9]  Onset Date of Hospice Diagnosis:     Summary of Disease Progression Leading to Hospice Diagnosis:    SUMMARY:   Dieudonne Lemos is a 81 y.o. with a past history of DM2, HTN, ischemic CM (dx. 5/2021 EF 40-45%, NSTEMI 10/2021 PCI to RCA, EF 20-25%), hx acute pancreatitis, who was admitted on 10/31/2022 from home with a diagnosis of acute bilateral ischemic posterior CVA. Current medical issues leading to Palliative Medicine involvement include: patient underwent thrombectomy for acute CVA, she also was found to have EF 10-15% on ECHO and had AICD placement 11/4/22. She has made progress with physical therapy and is recommended for inpatient rehab at time of discharge   Now with progressive decline in hemoglobin, does not want blood,  We  are asked to assist with care goals discussions     Patient lives in her own home, with son Amy Lomeli living next door. She has 5 adult children. García Fent. Witness/ does not want blood products. No AMD on chart.       PALLIATIVE DIAGNOSES:   End of life care  Acute CVA s/p thrombectomy  Ischemic cardiomyopathy EF 10-15%   Profound Anemia, lethargy  Hypoalbuminemia  Pneumonia, improved  JERAMY, resolved   Palliative medicine encounter   Co-Morbidities:   Patient Active Problem List   Diagnosis Code    GI bleed K92.2    Right sided weakness R53.1    CVA (cerebral vascular accident) ([facility] [address] I63.9    Epigastric pain R10.13    Vasculitis ([facility]) I77.6    Pancreatic mass K86.89    Pancreatitis K85.90    Elevated troponin R77.8    NSTEMI (non-ST elevated myocardial infarction) ([facility]) I21.4    Bursitis of multiple sites M71.9    Abdominal pain R10.9    Acute pancreatitis K85.90    Acute on chronic combined systolic and diastolic ACC/AHA stage C congestive heart failure ([facility]) I50.43    Pleural effusion, right J90    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 who was admitted to [facility]. The patient's principle diagnosis of  CVA      has resulted in heart failure      . Functionally, the patient's Palliative Performance Scale has declined over a period of 2 weeks    and is estimated at  20       .  Objective information that support this patients limited prognosis includes:          Recent Results (from the past 24 hour(s))   COVID-19 RAPID TEST    Collection Time: 11/22/22 10:33 AM   Result Value Ref Range    Specimen source Nasopharyngeal      COVID-19 rapid test Not detected NOTD         Latest Reference Range & Units Most Recent   HGB 11.5 - 16.0 g/dL 4.6 (LL)  11/20/22 01:52   HCT 35.0 - 47.0 % 14.4 (LL)  11/20/22 01:52   (LL): Data is critically low     Latest Reference Range & Units Most Recent   Protein, total 6.4 - 8.2 g/dL 5.6 (L)  11/21/22 03:42   Albumin 3.5 - 5.0 g/dL 2.0 (L)  11/21/22 03:42   (L): Data is abnormally low      The patient/family chose comfort measures with the support of Hospice. Patient meets for GIP LOC as evidenced by: Pt unable to take oral medications, food or fluids. Patient/family have decline blood products for acute anemia    Prognosis estimated based on 11/22/22 clinical assessment is:   [] Few to Many Hours  [x] Hours to Days   [] Few to Many Days   [] Days to Weeks   [] Few to Many Weeks   [] Weeks to Months   [] Few to Many Months    ASSESSMENT    Patient self-reports:  [x]  At times    [] No    SYMPTOMS: Pain, shortness of breath with respiratory distress, pt complains of being \"very cold\"    SIGNS/PHYSICAL FINDINGS: Pt cool to touch; weak, and speaks only single words in whisper. Pt appears to be asleep, however, when awake, is short of breath. No edema. KARNOFSKY: 20    FAST for all dementia:      Learning Assessment:  Patient  Is patient willing/able to learn? NO  What is the highest level of education completed? Learning preference (written material, demonstration, visual)? Learning barriers (ESOL, [place], poor vision)? Caregiver  Is caregiver willing to learn care for patient? YES  What is the highest level of education completed? Learning preference (written material, demonstration, visual)? Learning barriers (ESOL, [place], poor vision)? CLINICAL INFORMATION     Wt Readings from Last 3 Encounters:   11/22/22 52.4 kg (115 lb 8.3 oz)   11/20/22 52.4 kg (115 lb 8.3 oz)   10/31/22 42.4 kg (93 lb 8 oz)     Ht Readings from Last 3 Encounters:   11/22/22 5' 6\" (1.676 m)   11/03/22 5' 6\" (1.676 m)   10/31/22 5' 6\" (1.676 m)     Body mass index is 18.65 kg/m².     Visit Vitals  Pulse 78   Resp 16   Ht 5' 6\" (1.676 m)   Wt 52.4 kg (115 lb 8.3 oz)   BMI 18.65 kg/m²       LAB VALUES  No results found for this visit on 11/22/22 (from the past 12 hour(s)). No results found for this visit on 11/22/22 (from the past 6 hour(s)). Lab Results   Component Value Date/Time    Protein, total 5.6 (L) 11/21/2022 03:42 AM    Albumin 2.0 (L) 11/21/2022 03:42 AM       Currently this patient has:  [x] Supplemental O2 [x] Peripheral IV  [] PICC    [] PORT   [] Sauer Catheter [] NG Tube   [] PEG Tube [] Ostomy    [x] AICD: Has ICD been deactivated? [x] Yes [] No:______    PLAN   Plan of Care:    1. GIP level of care needed for symptoms necessitating frequent skilled nursing assessment and administration of parenteral medications. Needs monitoring for need to titrate sx mgt regimen for optimization of comfort. 2. Pt is at high risk of rapid decline and death due to terminal disease process  3. Provide education and support to unit staff caring for hospice patient and family regarding end of life care and Hospice plan of care. Provide staff with direct contact information to reach hospice team [phone]   4. Provide support and frequent rounds for patient comfort and safety ongoing  5. Provide  support ongoing, continue to discuss discharge plan if patient becomes sid and does not require acute nursing care interventions for GIP level of care  6. Provide  and bereavement support ongoing. Pt seems to have good spiritual support. 7. Continue with oxygen for comfort  8. Schedule IV Dilaudid 0.5mg Q 4 hours  9. Add PRN dosing: Dilaudid IV; IV Valium; IV Robinul, IV Toradol, IV Zofran  10. Maintain skin integrity as tolerated for hospice patient, turning and repositioning for comfort, and specialty mattress if appropriate  11. Sauer care per hospital policy for infection prevention  12. Peripheral line care as per hospital policy for infection prevention     Hospice Team Frequency Orders:  Skilled Nurse -  Daily x 7 days /every other day x 7 days with 5 PRN visits for symptom control.  MSW - 1 visit for initial assessment/evaluation for family support and need for volunteer services. Jimbeto Hess - 1 visit for initial assessment/evaluation for spiritual support. ADVANCE CARE PLANNING (Complete in ACP Flow Sheet)   Code Status: DNR  Durable DNR: [x]  Yes  []  No  Code Status Discussed/Confirmed:  YES  Preference for Other Life Sustaining Treatment Discussed/Confirmed:  YES  Hospitalization Preference: [address] Planning 11/6/2022   Patient's Healthcare Decision Maker is: Legal Next of Kin   Confirm Advance Directive None   Patient Would Like to Complete Advance Directive -        Service: [] Yes  []  No      [] Unknown  Appropriate for Pinning Ceremony:  [] Yes     [x] No  [employer]: [address]: [facility] in [facility]     1. Discharge Plan: If patient stabilizes and is safe to transport, family request transfer to the [facility]. Past [facility], family request assistance with facility location. 2. Patient/Family teaching: End of Life education and support provided   3. Response to patient/family teaching: Pt sonSavannah states agreement with Hospice plan of care    SOCIAL/EMOTIONAL/SPIRITUAL NEEDS     Spiritual Issues Identified: Hospice Chaplain support available ongoing for patient and family. Psych/ Social/ Emotional Issues Identified: Hospice social worker support available ongoing. Caregiver Support:  [x] Provided information on End of Life Care   [x] Material Provided: Gone From My Sight or [facility] ( Will leave bedside for family)    Maria Antonia Mcqueen       contacted, discharge to hospice order received  Dr. Quoc Sharp     contacted, agrees to serve as attending provider for hospice and provided verbal certification of terminal illness with life expectancy of 6 months or less. Orders for hospice admission, medications and plan of treatment received. Medication reconciliation completed.   MEDS: See medication list below  DME: Per hospital  Supplies: Per hospital  IDT communication to include MD, , SW, CH and support team    ALLERGIES AND MEDICATIONS     Allergies:    Allergies   Allergen Reactions    Insulins Itching    Penicillins Other (comments)     Pt states it makes her pass out     Current Facility-Administered Medications   Medication Dose Route Frequency    ondansetron (ZOFRAN) injection 4 mg  4 mg IntraVENous Q4H PRN    ketorolac (TORADOL) injection 15 mg  15 mg IntraVENous Q6H PRN    glycopyrrolate (ROBINUL) injection 0.2 mg  0.2 mg IntraVENous Q4H PRN    bisacodyL (DULCOLAX) suppository 10 mg  10 mg Rectal DAILY PRN    HYDROmorphone (DILAUDID) injection 0.5 mg  0.5 mg IntraVENous Q15MIN PRN    HYDROmorphone (DILAUDID) injection 0.5 mg  0.5 mg IntraVENous Q4H    saline peripheral flush soln 5 mL  5 mL InterCATHeter PRN       Thomas Schmidt RN, [place] [id number] Nurse Liaison  [phone] Mobile  [phone] Office  Available on [employer]

## 2022-11-22 NOTE — PROGRESS NOTES
0745: Bedside shift change report given to Gonzalez Werner RN (oncoming nurse) by Michael Means RN (offgoing nurse). Report included the following information SBAR, Intake/Output, Cardiac Rhythm NSR, and Alarm Parameters .

## 2022-11-22 NOTE — HOSPICE
[facility]  [phone]    Social Work Admission Note  Patient Name: Christofer Morales  YOB: 1940  Age: 81 y.o. Date of Visit: 22  Facility of Care: [address] [place] [address]   Patient Room: 710601     Hospice Attending: Little Dumont MD  Hospice Diagnosis: Acute CVA (cerebrovascular accident) [facility]) [I63.9]    Level of Care:    [x]  GIP    []  Respite   []  Routine    Consents/NCD Documentation:     Consents Reviewed:   [x]  Yes  []  No, completed by other hospice staff member. Person Reviewed/Signed with:  []  Patient   [x]  Pts NOK/MPOA  Name: Savannah Capps     Right to NCD Reviewed:   []  Yes  []  No, completed by other hospice staff member. NCD Requested:   []  Yes  []  No    Admission Nurse/Intake Notified NCD was requested:  []  Yes  []  No  [x]  Not requested    Planned Start of Care Date: 2022    Hospice Witness Representative: Sobia Portillo   This LCSW visited the room of pt, no family was present. Pt has AMS. Pt is an 80 y/o AAF with a hospice diagnosis of acute CVA. Pt has multiple comorbidites including acute on chronic CHF, NSTEMI, abd pain,pancreatic mass, DM 2, and DM2, and PCM. Pt was admitted 10/31/2022 due to facial droop and AMS. Pt was living at home with assistance from son Grecia Li who lives next door. Pt ia  and had 6 children, 5 care living; son Savannah Capps, who is pts primary contact, son Carolina Foy ( [phone]), Elyse Valdivia ( [phone]), Hortencia Olszewski, and Leonard Love ( [phone]), who lives in [place]. There is no AMD, family has elected Grecia Li as pts health care decision maker. LCSW spoke to pts children Bety Berber, and Jason De Oliveira, via phone, who are all in agreement with hospice services. Grecia Li and his family, including his 12 y/o son, live next door to pt, and are her primary caregivers.  Grecia Dun reports after his father  in  [place], his mother lived with him for @ 4 years, as she did not want to live alone. LCSW and family discussed hospice services and plan for possible transfer to [facility] if bed availability tomorrow. LCSW and son talked about his coping. Son is very close to his mother and was tearful during our conversation. LCSW provided emotional support and reassurance of my support for coping with pts EOL and transition to in pt hospice. LCSW and Blue discussed feeling of grief. LCSW discussed any feelings of hopelessness and self harm, noted noted. LCSW provided grief support for Blue in processing anticipatory grief and relational loss. LCSW provided active listening as Mitchel Arzate talked about the death of his father and, other losses in the family including the death of his brother in , and the death of his uncle 2 months ago, who was crushed when he tried to stop a toddler in a rolling car. Son has found comfort in his mother's hernandez beliefs,and knowing she will soon be with her brother. LCSW provided reassurance of support from this LCSW and [facility] team. LCSW will continue to assess and monitor pt and family needs. LCSW later met with pts sister Mary and GEMMA VÁSQUEZ at bedside.     ADVANCE CARE PLANNING    Advance Directive:  []  Yes  [x]  No   []  Would like to complete  Primary MPOA:  Secondary MPOA:   TEOOK: Jolly Galindo  Code Status: DDNR   Durable DNR: Pradeep Hamm  _ No  Advance Care Planning 2022   Patient's Healthcare Decision Maker is: Legal Next of Kin   Confirm Advance Directive None   Patient Would Like to Complete Advance Directive -       Relationship Status:  []  Single     []        []      []  Domestic Partner     [x]  /  []  Common Law  []    []  Unknown    If in a relationship, name of partner/spouse:  Duration of relationship:    Sikhism/Spirituality: Soni Para  [x]  Active In Sikhism/Spirituality   []  Not Active   []  N/A  Notes:     Home: [place] [address],[facility] [place] [address] [facility] Provided:  []  [employer]  []  Information on applying for disability  []  FMLA Paperwork  []  Letters Requested by Family   []  [address]  []  Final Arrangements Resources   []  Outside counseling services (individual or group therapy)  [x]  Grief resources   []  Andrea Pal  []  [employer]   []  [address]   []  [employer]   []  Referral Sent to [employer]   []  Referral Sent to Music Therapy   []  Referral Sent to Pet Therapy  [x]  Other Legacy project   Social Work Initial Assessment     Sex:  female    Pronoun Preference:   []  He/Him/His   [x]  She/Her/Hers   []  They/Them/Theirs   []   Patient Name   []   Decline to Answer  []   Unknown  []   Other   Notes:     Race/Ethnicity: (osiris all that apply)  []  [address] or [place] Native  []    [x]  Black or [place] American  []   or   []  [address] [facility] or [facility]  []  White  []  Unknown  []  Other     Inpatient Financial Agreement Completed:   [x]  Yes  []  No    Insurance:   [x]  Medicare   []  Medicaid     []  [employer]    []  Self-Pay/Uninsured   Notes:      Has pt applied for FAP? []  Needs to Apply  []  Application Completed and Submitted   []  Approved/ Expiration Date:   [x]  N/A  Notes:     Has pt applied for Medicaid? []  Needs to Apply  []  Application Completed and Submitted/Application Number:   [x]  N/A  Notes:     Has a long term care screening ([facility]) been requested? []  Requested   []  Not Requested, Needs follow up  []  Completed  [x]  N/A  Notes:     Does the pt have a long term care insurance policy? []  Yes  [x]  No  []  Yes, Needing assistance with paperwork  Notes:      Service:    []  Yes   [x]  No       []  Unknown    Appropriate for Pinning Ceremony:   []  Yes      [x]  No    Is patient using VA benefits? []  Yes      [x]  No  []  Needs assistance with accessing benefits  Notes:       Work History:   []  Full-Time/Part-Time  [x]  Retired []  Other  Notes:     Primary Language: English   []   Needed  []   utilized during visit  []   Waived/ form completed    Ability to express thoughts/needs/feelings  []  Expressed thoughts/feelings/needs without difficulty  []  Requires extra time and cuing  []  Speech limited single words  []  Uses only gestures (eye, blinking eye or head movement/pointing)  [x]  Unable to express thoughts/feelings/needs (speech unintelligible or inappropriate) AMS, not speaking. []  Unresponsive  Notes:      Mental Status:  []  Alert-oriented to:     []  Person     []  Place     []  Time  []  Comatose-responds to:    []   Verbal stimuli    []  Tactile stimuli    []  Painful stimuli  []  Forgetful  [x]  Disoriented/Confused  [x]  Lethargic  []  Agitated  []  Unresponsive  [x]  Other (specify):  AMS, not speaking. Notes:      Patients description of Illness/Current Health Status:    [x]  Patient unable to discuss, AMS, not speaking. []  Patient unwilling to discuss  []  (Specify)        Knowledge/Understanding of Disease Process  Patient:    []  Demonstrates knowledge/understanding of disease process   []  Demonstrates knowledge/understanding of treatment plan   []  Demonstrates knowledge/understanding of prognosis   []  Demonstrates acceptance of prognosis   []  Demonstrates knowledge/understanding of resuscitation status   [x]  Other (specify) AMS, not speaking. Caregiver:   [x]  Demonstrates knowledge/understanding of disease process   [x]  Demonstrates knowledge/understanding of treatment plan   [x]  Demonstrates knowledge/understanding of prognosis   [x]  Demonstrates acceptance of prognosis   [x]  Demonstrates knowledge/understanding of resuscitation status   []  Other (specify)  Notes:      Patients living arrangement/care setting:  Use the PRIOR COLUMN when the PATIENTS current health status necessitated a change in his/her primary residence.     Prior Current Response [x]             []    Patients own home/residence              []             []    Home of family member/friend              []             []    Boarding home/Group Home              []             []    Assisted living facility/long-term center              []             []    Hospital/Acute care facility              []             []    Skilled nursing facility              []             []    Long term care facility/Nursing home              []             [x]    Hospice in Patient      Primary Caregiver:  []  No Primary Caregiver  Name of Primary Caregiver: Cooper Dawson   Primary Caregiver Phone Number: [phone]  Relationship or Primary Caregiver:    []  Spouse/Significant other       [x]  Child        []  Step child       []  Sibling   []  Parent   []  Friend/Neighbor   []  Community/Congregational Volunteer   []  Paid help   []  Other (specify):___________  Notes:       Family members/Significant others:  Cooper Calvert   Relationship:son  Phone Number:[phone]  Actively involved in care? [x]  Yes  []  No    Name:Rashad Woodson   Relationship:son  Phone [phone]  Actively involved in care?   []  Yes  []  No  Jay Arauz daughter [phone], lives in [address] [place] [address]: (select ONE best description)  [x]  Excellent social support system which includes three or more family members or friends  []  Good social support system which includes two or less members or friends  []  [address] support which includes one family member or friend  []  Poor social support; no family members or friends; basically ALONE  Notes:      Emotional Status: (osiris all that apply)    Patient Caregiver Response                 [x]                []    Mood/Affect stable and appropriate                   []                []    Angry                 []                []    Anxious                 []                []    Apprehensive                 []                []    Avoidant []                []    Clinging                 []                []    Depressed                 []                []    Distraught                 []                []    Fearful                 []                []    Flat Affect                 []                []    Helpless                 []                []    Hostile                 []                []    Impulsive                 []                []    Irritable                 []                []    Labile                 []                []    Manic                 []                []    Restlessness                 []                [x]    Sad                 []                []    Strain/Stress                 []                []    Suspicious                 []                [x]     Tearful                 []                 []     Withdrawn          Notes:     Coping Skills (strengths/weakness):    Patient: Coping Skills (strength/weakness): AMS, not speaking. Family/caregiver (strength/weakness): Well supported, very close to pt, tearful,  multiple losses in the family.       [place] of care upon discharge (osiris all that apply):     []  No burden evident   []  Family must administer medications   []  Illness causing financial strain   [x]  Family/Support feels overwhelmed   []  Family/Support sleep disturbed with patients care   []  Patients care causes extra physical stress  of death  []  Illness causes changes in family lifestyle  []  Illness impacting family/support employment  []  Family experiencing increased time demands  []  Patients behavior endangers family  []  Denial of patients illness  []  Concern over outcome of illness/fear  []  Patients behavior embarrassing to family   Notes:      Risk Factors: (osiris all that apply):    [x]  No burden evident   []  Alcohol abuse  []  Financial resources inadequate to meet basic needs (food/house/etc)  []  Financial resources inadequate to meet health care needs (supplies/equipment/medications)  []  Food/nutrition resources inadequate  []  Home environment unsafe/inadequate for home care  []  Homicidal risk  []  Lives alone or without concerned relatives  []  Multiple medications/complex schedule  []  Physical limitations increase likelihood of falls  []  Plan of care/treatments complicated  []  Substance use/abuse  []  Suicidal risk  []  Visual impairment threatens safety/ability to perform self-care  []  Other (specify):     Abuse/Neglect (actual/potential risks):  [x]  No signs of abuse/neglect  []  History of abuse/neglect                 []  Physical          []  Sexual  []  History of domestic violence  []  Lacks adequate physical care  []  Lacks emotional nurturing/support  []  Lacks appropriate stimulation/cognitive experiences  []  Left alone inappropriately  []  Lacks necessary supervision  []  Inadequate or delayed medical care  []  Unsafe environment (i.e guns/drug use/history of violence in the home/etc.)  []  Bruising or other physical signs of injury present  []  Refer to child/adult protective services  []  Other (specify):   Notes:      Current Sources of Stress (in Addition to Current Illness):   []  None reported  []  Bills/Debt    []  Career/Job change    []   (short term)    []   (long term)    []  Death of a child (recent)    []  Death of a parent (recent)   []  Death of a spouse (recent)   []  Employment status changed   []  Family discord    []  Financial loss/Inadequate inther (specify):come  []  Job loss  []  Legal issues unresolved  []  Lifestyle change  []  Marital discord  []  Marriage within the last year  []  Paperwork (insurance/legal/etc) overwhelming  []  Separation/Divorce  [x]  Other (specify): death of pts brother 2 months ago.    Notes:       Interventions/Plan of Care   []  MSW will assess social and emotional factors related to coping with end of life issues  []  MSW will provide community resource planning/referral []  MSW to assist with relocation to different care setting if/when symptoms stabilize  [x]  Pt/Pts family will receive emotional support. []  Pt/Pts family will share the details surrounding the pts disease progression  []  Pt/Pts Family will show expression of grief by participating in life review. []  Pt/Pts Family will be educated and able to verbalize understanding of mental, emotional, and physical symptoms of grief. [x]  Pt/Pts Family will be educated and able to identify skills and social support to help cope with grief. []  Pts family will receive support for grief with emphasis on developmentally appropriate language.   [] Other:   Notes:       Discharge Planning   [x]  Home with family member    []  Return back to nursing home/facility  []  Needs assistance with placement/paid caregivers  []  Short Stay under routine level of care at [facility]   []  Other  Notes:     MSW Assessment Completed by: Shelba Heimlich  11/22/22    Time In:2:00  pm     Time Out:3:30 pm

## 2022-11-22 NOTE — PROGRESS NOTES
Problem: Falls - Risk of  Goal: *Absence of Falls  Description: Document Ani Carey Fall Risk and appropriate interventions in the flowsheet.   Outcome: Progressing Towards Goal  Note: Fall Risk Interventions:  Mobility Interventions: Bed/chair exit alarm, Patient to call before getting OOB    Mentation Interventions: Adequate sleep, hydration, pain control, Bed/chair exit alarm    Medication Interventions: Evaluate medications/consider consulting pharmacy    Elimination Interventions: Bed/chair exit alarm, Patient to call for help with toileting needs    History of Falls Interventions: Door open when patient unattended

## 2022-11-22 NOTE — PROGRESS NOTES
TRANSFER - OUT REPORT:    Verbal report given to [facility], RN (name) on Katelyn Russell  being transferred to room 533  (unit) for routine progression of care       Report consisted of patients Situation, Background, Assessment and   Recommendations(SBAR). Information from the following report(s) SBAR, Intake/Output, and Cardiac Rhythm NSR  was reviewed with the receiving nurse. Lines:   Peripheral IV 11/20/22 Proximal;Right;Upper Arm (Active)   Site Assessment Clean, dry, & intact 11/22/22 1600   Phlebitis Assessment 0 11/22/22 1600   Infiltration Assessment 0 11/22/22 1600   Dressing Status Clean, dry, & intact 11/22/22 1600   Dressing Type Transparent;Tape 11/22/22 1600   Hub Color/Line Status Pink;Flushed;Capped 11/22/22 1600   Action Taken Open ports on tubing capped 11/22/22 1600   Alcohol Cap Used Yes 11/22/22 1600       Extended Dwell Peripheral IV (Active)   Criteria for Appropriate Use Limited/no vessel suitable for conventional peripheral access 11/22/22 1600   Site Assessment Clean, dry, & intact 11/22/22 1600   Phlebitis Assessment 0 11/22/22 1600   Infiltration Assessment 0 11/22/22 1600   Line Status Infusing 11/22/22 1600   Line Care Connections checked and tightened 11/22/22 1600   Alcohol Cap Used Yes 11/22/22 1600   Date of Last Dressing Change 11/18/22 11/22/22 1600   Dressing Type Stabilization/securement device 11/22/22 1600   Dressing Status Clean, dry, & intact 11/22/22 1600   Dressing Intervention Other (Comment) 11/18/22 2000        Opportunity for questions and clarification was provided. [phone]: Patient transported to room 533 with:   Registered Nurse and tech. [facility], RN aware of patient's arrival. Patient's daughter-in-law at bedside prior to transfer and aware of patient's transfer.

## 2022-11-22 NOTE — H&P
[facility] Help to Those in Need  [phone]    Patient Name: Katelyn Russell  YOB: 1940    Date of Provider Hospice Visit: 11/22/22    Level of Care:   [x] General Inpatient (GIP)    [] Routine   [] Respite    Current Location of Care: [facility] [x][] [facility] [] [address] [] [facility] [] Hospice House [facility]    IF [facility], patient referred from:  [] [facility] [] [facility] [] [address] [] [facility] [] Home [] Other:     Date of Original Hospice Admission: 11/22/2022  Hospice Medical Director at time of admission: Dr. Yashira Browne Diagnosis: Acute CVA  Diagnoses RELATED to the terminal prognosis: HFrEF, cardiomyopathy, pleural effusion, anemia, PCM  Other Diagnoses: DM2, HTN, NSTEMI     HOSPICE SUMMARY     Katelyn Russell is a 81y.o. year old AA female who was admitted to [facility]. HPI and course of hospitalization as summarized in palliative care note authored by Dr. Rodriguez Camera:   Katelyn Russell is a 81 y.o. with a past history of DM2, HTN, ischemic CM (dx. 5/2021 EF 40-45%, NSTEMI 10/2021 PCI to RCA, EF 20-25%), hx acute pancreatitis, who was admitted on 10/31/2022 from home with a diagnosis of acute bilateral ischemic posterior CVA. Current medical issues leading to Palliative Medicine involvement include: patient underwent thrombectomy for acute CVA, she also was found to have EF 10-15% on ECHO and had AICD placement 11/4/22. She has made progress with physical therapy and is recommended for inpatient rehab at time of discharge   Now with progressive decline in hemoglobin, does not want blood,  We  are asked to assist with care goals discussions     Patient lives in her own home, with son Luci Moody living next door. She has 5 adult children. Laura Chaidez. Witness/ does not want blood products. No AMD on chart. Addendum: family meeting held with son Trey Hensley and his two sons. Medical update provided.               We talk about her wish to avoid blood transfusion and affirm they are right to honor her wishes. In big picture, she is winding down, likely near EOL even if we do give her blood. Recent acute strokes and end stage cardiomyopathy with EF 10%. Education provided about nutrition at EOL, explain recommendation for comfort feeding as tolerated but anticipate she won't want anything. We talk about option of focus on comfort, gentle care, avoiding anything difficult (like labs, dobhoff, procedures etc). Thuy Marcelo is in agreement with focus on gentle care. The patient's principle diagnosis has resulted in altered mental status, increased work of breathing, restlessness/agitation, and secretions. Functionally, the patient's Karnofsky and/or Palliative Performance Scale has declined over a period of weeks and is estimated at 20. The patient is dependent on the following ADLs: all    Objective information that support this patients limited prognosis includes:   Result status: Final result       Left Ventricle: Severely reduced left ventricular systolic function with a visually estimated EF of 10 -15%. Left ventricle is moderately dilated. Mildly increased wall thickness. Severe global hypokinesis present. Abnormal diastolic function. Aortic Valve: Tricuspid valve. Mild regurgitation. Mitral Valve: Mild to moderate regurgitation. Interatrial Septum: Agitated saline study was negative with and without provocation. Study Result    Narrative & Impression   INDICATION: AMS     EXAM:  HEAD CT WITHOUT CONTRAST     COMPARISON: November 10, 2022     TECHNIQUE:  Routine noncontrast axial head CT was performed. Sagittal and  coronal reconstructions were generated. CT dose reduction was achieved through use of a standardized protocol tailored  for this examination and automatic exposure control for dose modulation. FINDINGS:     Ventricles: Midline, no hydrocephalus. Intracranial Hemorrhage: None.   Brain Parenchyma/Brainstem: Decreased conspicuity of left cerebellar  infarctions, now subacute Small chronic right occipital infarction, unchanged. Patchy periventricular white matter hypodensity supratentorial brain similar to  prior study. Basal Cisterns: Normal.  Paranasal Sinuses: Visualized sinuses are clear. Additional Comments: N/A. IMPRESSION     Evolving, subacute left cerebellar infarction. No new abnormality. The patient/family chose comfort measures with the support of Hospice. HOSPICE DIAGNOSES   Active Symptoms:  1. Altered mental status  2. Non verbal signs of pain  3. Restlessness/agitation  4. Secretions  5. Debility  6. Hospice care     PLAN   Admit to [facility] level of care as pt requires frequent assessment and administration and titration of parenteral medications to manage symptom burden; high risk for rapid decompensation and not stable for transport  Dilaudid 0.5 mg IV every 4 hours with prn dosing available every 15 minutes as needed for breakthrough pain or air hunger  Valium 2.5 mg IV every 6 hours  Robinul 0.2 mg IV every 4 hours as needed for increased oropharyngeal secretions  All other symptom management medications as needed  Continue stein catheter to maintain bladder decompression; stein care per facility protocol  Family present at bedside during provider rounds; reviewed hospice philosophy and goals of care; reviewed symptom management medications and indications and rationale for use; verbalized understanding and agreeable to hospice care plan    and SW to support family needs  Disposition: anticipate death in hospital   Hospice Plan of care was reviewed in detail and agree with current plan of care    Prognosis estimated based on 11/22/22 clinical assessment is:   [x] Hours to Days    [] Days to Weeks    [] Other:    Communicated plan of care with: Hospice Case Manager;  Hospice IDT; Care Team     GOALS OF CARE     Patient/Medical POA stated Goal of Care: Comfort care and symptom managment    [x] I have reviewed and/or updated ACP information in the Advance Care Planning Navigator. This information is available in the [address] link in the patient's chart header. Primary Decision Maker [facility] Agent):   Primary Decision Maker: tracie hobbs - [phone]    Resuscitation Status: DNR  If DNR is there a Durable DNR on file? : [x] Yes [] No (If no, complete Durable DNR)    HISTORY     History obtained from: chart review and discussion with interdisciplinary team    CHIEF COMPLAINT: unable to obtain  The patient is:   [] Verbal  [x] Nonverbal  [] Unresponsive    HPI/SUBJECTIVE:  Family present at bedside. Pt observed resting in bed. Opens eyes to verbal greeting and gentle touch; but not tracking movement. Not following commands.  Became more restless during physical exam.      REVIEW OF SYSTEMS     The following systems were: [] reviewed  [x] unable to be reviewed    Positive ROS include:  Constitutional: fatigue, weakness, in pain, short of breath  Ears/nose/mouth/throat: increased airway secretions  Respiratory:shortness of breath, wheezing  Gastrointestinal:poor appetite, nausea, vomiting, abdominal pain, constipation, diarrhea  Musculoskeletal:pain, deformities, swelling legs  Neurologic:confusion, hallucinations, weakness  Psychiatric:anxiety, feeling depressed, poor sleep  Endocrine:     Adult Non-Verbal Pain Assessment Score: 2    Face  [] 0   No particular expression or smile  [x] 1   Occasional grimace, tearing, frowning, wrinkled forehead  [] 2   Frequent grimace, tearing, frowning, wrinkled forehead    Activity (movement)  [] 0   Lying quietly, normal position  [x] 1   Seeking attention through movement or slow, cautious movement  [] 2   Restless, excessive activity and/or withdrawal reflexes    Guarding  [x] 0   Lying quietly, no positioning of hands over areas of body  [] 1   Splinting areas of the body, tense  [] 2   Rigid, stiff    Physiology (vital signs)  [x] 0   Stable vital signs  [] 1   Change in any of the following: SBP > 20mm Hg; HR > 20/minute  [] 2   Change in any of the following: SBP > 30mm Hg; HR > 25/minute    Respiratory  [x] 0   Baseline RR/SpO2, compliant with ventilator  [] 1   RR > 10 above baseline, or 5% drop SpO2, mild asynchrony with ventilator  [] 2   RR > 20 above baseline, or 10% drop SpO2, asynchrony with ventilator     FUNCTIONAL ASSESSMENT     Palliative Performance Scale (PPS): 20       PSYCHOSOCIAL/SPIRITUAL ASSESSMENT     Active Problems:    Acute CVA (cerebrovascular accident) ([facility] [address] (11/22/2022)      Past Medical History:   Diagnosis Date    Colon polyps     Diabetes ([facility] [address]     Diverticulosis     Hypertension     Ill-defined condition     Pancreatitis       Past Surgical History:   Procedure Laterality Date    COLONOSCOPY N/A 4/29/2021    COLONOSCOPY performed by Rachelle Reyes MD at [facility]  2021    x2    IR PERC ART [address] THROMB INFUSION INTRACRANIAL  10/31/2022      Social History     Tobacco Use    Smoking status: Never    Smokeless tobacco: Never   Substance Use Topics    Alcohol use: Not Currently     Family History   Problem Relation Age of Onset    Diabetes Other       Allergies   Allergen Reactions    Insulins Itching    Penicillins Other (comments)     Pt states it makes her pass out      Current Facility-Administered Medications   Medication Dose Route Frequency    ondansetron (ZOFRAN) injection 4 mg  4 mg IntraVENous Q4H PRN    ketorolac (TORADOL) injection 15 mg  15 mg IntraVENous Q6H PRN    glycopyrrolate (ROBINUL) injection 0.2 mg  0.2 mg IntraVENous Q4H PRN    bisacodyL (DULCOLAX) suppository 10 mg  10 mg Rectal DAILY PRN    HYDROmorphone (DILAUDID) injection 0.5 mg  0.5 mg IntraVENous Q15MIN PRN    HYDROmorphone (DILAUDID) injection 0.5 mg  0.5 mg IntraVENous Q4H    saline peripheral flush soln 5 mL  5 mL InterCATHeter PRN    diazePAM (VALIUM) injection 2.5 mg  2.5 mg IntraVENous Q6H        PHYSICAL EXAM     Wt Readings from Last 3 Encounters:   11/22/22 52.4 kg (115 lb 8.3 oz)   11/20/22 52.4 kg (115 lb 8.3 oz)   10/31/22 42.4 kg (93 lb 8 oz)       Visit Vitals  /63 (BP 1 Location: Right upper arm)   Pulse 84   Temp 98.1 °F (36.7 °C)   Resp 15   Ht 5' 6\" (1.676 m)   Wt 52.4 kg (115 lb 8.3 oz)   SpO2 96%   BMI 18.65 kg/m²       Supplemental O2  [] Yes  [x] NO  Last bowel movement:     Currently this patient has:  [x] Peripheral IV [] PICC  [] PORT [] ICD    [x] Sauer Catheter [] NG Tube   [] PEG Tube    [] Rectal Tube [] Drain  [x] Other: AICD left chest; deactivated 11/21/2022    Constitutional: thin/frail with sunken facial features and temporal wasting; ill-appearing  Eyes: anicteric; no injection; not tracking movement  ENMT: slightly dry oral mucosa  Cardiovascular: RRR; distal pulses intact; no edema  Respiratory: slight increased work of breathing; breath sounds coarse  Gastrointestinal: soft  Musculoskeletal: no contractures or gross deformities; muscle wasting  Skin: dry/flaky  Neurologic: non verbal; not tracking movement; not following commands  Psychiatric: appears restless  Other:     Pertinent Lab and or Imaging Tests:  Lab Results   Component Value Date/Time    Sodium 145 11/21/2022 03:42 AM    Potassium 3.5 11/21/2022 03:42 AM    Chloride 114 (H) 11/21/2022 03:42 AM    CO2 25 11/21/2022 03:42 AM    Anion gap 6 11/21/2022 03:42 AM    Glucose 176 (H) 11/21/2022 03:42 AM    BUN 18 11/21/2022 03:42 AM    Creatinine 0.88 11/21/2022 03:42 AM    BUN/Creatinine ratio 20 11/21/2022 03:42 AM    GFR est AA >60 03/23/2022 04:11 AM    GFR est non-AA >60 03/23/2022 04:11 AM    Calcium 8.6 11/21/2022 03:42 AM     Lab Results   Component Value Date/Time    Protein, total 5.6 (L) 11/21/2022 03:42 AM    Albumin 2.0 (L) 11/21/2022 03:42 AM           Minna MUNGUIA

## 2022-11-22 NOTE — DISCHARGE SUMMARY
Discharge Summary       PATIENT ID: Samantha Sousa  MRN: [id number]   YOB: 1940    DATE OF ADMISSION: 10/31/2022  2:31 PM    DATE OF DISCHARGE: 11/22/2022  PRIMARY CARE PROVIDER: Kathia Marquez MD     ATTENDING PHYSICIAN: Wade Fong MD   DISCHARGING PROVIDER: Waqas Higuera MD    To contact this individual call [phone] and ask the  to page. If unavailable ask to be transferred the Adult Hospitalist Department. CONSULTATIONS: IP CONSULT TO NEUROLOGY  IP CONSULT TO NEUROINTERVENTIONAL SURGERY  IP CONSULT TO INTENSIVIST  IP CONSULT TO NEPHROLOGY  IP CONSULT TO PALLIATIVE CARE - PROVIDER  IP CONSULT TO CARDIOLOGY  IP CONSULT TO CARDIOLOGY  IP CONSULT TO PULMONOLOGY    PROCEDURES/SURGERIES: Procedure(s):  INSERT ICD SINGLE      ADMISSION SUMMARY AND HOSPITAL COURSE:     This is an 81year old who initially presented on 10/31 with acute bilateral CVA s/p thrombectomy. She was treated in ICU and was transferred to [facility] on 11/7/2022. She was found to have heart failure with EF 10-15% and AICD was placed during her stay on 11/4. She has had intermittent episodes of hypotension, which she responds to fluid. 11/17 she began to have increase in somnolence and hypotension, and there was concern for possible bleeding. She was seen by CCM service and transferred back to ICU, on 11/18 requiring pressors briefly.      Patient is a Jehovah witness refusing blood product, vital signs stable and transferred out of ICU on 11/20     Transition to inpatient hospice care  -Management per hospice care  Acute Ischemic CVA due to posterior occlusion s/p thrombectomy  Acute bilateral superior cerebellar infarction, mild associated petechial hemorrhage  Possible small foci of acute infarction right occipital lobe  -aspirin and eliquis on hold due to hemothorax and low Hgb  -stop lipitor   Large left pleural effusion, possible hemothorax s/p thoracentesis    -pleural fluid cx on 11/14 no growth  Possible aspiration pneumonia  -stop IV Zosyn and vancomycin d4  -afebrile, leukocytosis improved  -blood cx no growth    Acute on chronic HFr EF 10-15% s/p AICD  -clinically compensated  -hypotension resolved, off pressor on 11/19  -continue coreg  -not on ACEi/ARB due to hypotension and CKD  Severe acute blood loss anemia related to hemothorax  -on epoetin peewee, iv iron sucrose   -Hgb 4.6  -Gnosticist, refused blood transfusion   -very high risk for decompensation, DNR  -no need to continue to draw blood for labs since it will not change mgmt  Hypotension- resolved  HTN  -off pressor, resolved, BP elevated, restarted on coreg, monitor BP   Hx of A Fib, rate controlled  -on coreg  -Eliquis and aspirin on hold  T2DM  -A1c 13.1  -had hypoglycemia, poor oral intake, hold NPH for now  -patient evaluated by speech therapy and placed on dysphagia diet   -s/p D5 0.45% NaCl for hypoglycemia yest  JERAMY on CKD stage III  -creatinine normalized  -nephrologist on board    Failure to thrive, refusing PO     Pt with poor prognosis, inpatient mortality, hospice care involvement and transition to inpatient hospice care     Code status: DNR  Prophylaxis: SCDs    DISCHARGE DIAGNOSES / PLAN:      Transition to inpatient comfort care   -Management per hospice care team    DIET: Comfort feeding    ACTIVITY: Bedrest    EQUIPMENT needed: None    DISCHARGE MEDICATIONS:  Current Discharge Medication List          DISPOSITION:    Home With:   OT  PT  HH  RN       Long term SNF/Inpatient Rehab    Independent/assisted living   x Hospice    Other:       PATIENT CONDITION AT DISCHARGE:     Functional status   x Poor     Deconditioned     Independent      Cognition     Lucid     Forgetful    x Dementia      Catheters/lines (plus indication)    Sauer     PICC     PEG    x None      Code status     Full code    x DNR      PHYSICAL EXAMINATION AT DISCHARGE:  Patient Vitals for the past 24 hrs:   Pulse Resp   11/22/22 1000 79 18 11/22/22 0800 78 21   11/22/22 0700 79 22   11/22/22 0600 82 21   11/22/22 0500 82 20   11/22/22 0400 78 22   11/22/22 0300 81 22   11/22/22 0200 82 23   11/22/22 0100 81 22   11/22/22 0000 78 22   11/21/22 2300 78 21   11/21/22 2200 90 18   11/21/22 2100 82 19   11/21/22 2000 84 14   11/21/22 1900 77 21      General:          Alert, no distress, appears stated age. HEENT:           Atraumatic, anicteric sclerae, pink conjunctivae                          No oral ulcers, mucosa moist, throat clear, dentition fair  Neck:               Supple, symmetrical  Lungs:             Clear to auscultation bilaterally. No Wheezing or Rhonchi. No rales. Chest wall:      No tenderness  No Accessory muscle use. Heart:              Regular  rhythm,  No  murmur   No edema  Abdomen:        Soft, non-tender. Not distended. Bowel sounds normal  Extremities:     No cyanosis. No clubbing,                            Skin turgor normal, Capillary refill normal  Skin:                Not pale. Not Jaundiced  No rashes   Psych:             Not anxious or agitated.   Neurologic:      Alert, moves all extremities, non verbal, moves extremities       Recent Results (from the past 24 hour(s))   COVID-19 RAPID TEST    Collection Time: 11/22/22 10:33 AM   Result Value Ref Range    Specimen source Nasopharyngeal      COVID-19 rapid test Not detected NOTD        CHRONIC MEDICAL DIAGNOSES:  Problem List as of 11/22/2022 Date Reviewed: 3/18/2022            Codes Class Noted - Resolved    Stroke [facility]) ICD-10-CM: I63.9  ICD-9-CM: 434.91  10/31/2022 - Present        Intraductal papillary mucinous neoplasm of pancreas ICD-10-CM: D49.0  ICD-9-CM: 239.0  3/23/2022 - Present        Cyst of pancreas ICD-10-CM: K86.2  ICD-9-CM: 577.2  3/23/2022 - Present        Acute on chronic combined systolic and diastolic ACC/AHA stage C congestive heart failure ([facility] [address] ICD-10-CM: I50.43  ICD-9-CM: 428.43, 428.0  11/24/2021 - Present        Pleural effusion, right ICD-10-CM: J90  ICD-9-CM: 511.9  11/24/2021 - Present        Abdominal pain ICD-10-CM: R10.9  ICD-9-CM: 789.00  11/20/2021 - Present        Acute pancreatitis ICD-10-CM: K85.90  ICD-9-CM: 470.4  11/20/2021 - Present        NSTEMI (non-ST elevated myocardial infarction) ([facility] ICD-10-CM: I21.4  ICD-9-CM: 410.70  10/30/2021 - Present        Bursitis of multiple sites ICD-10-CM: M71.9  ICD-9-CM: 726.8  10/30/2021 - Present        Pancreatitis ICD-10-CM: K85.90  ICD-9-CM: 624.6  10/22/2021 - Present        Elevated troponin ICD-10-CM: R77.8  ICD-9-CM: 790.6  10/22/2021 - Present        Vasculitis ([facility] ICD-10-CM: I77.6  ICD-9-CM: 447.6  7/8/2021 - Present        Pancreatic mass ICD-10-CM: K86.89  ICD-9-CM: 577.8  7/8/2021 - Present        Epigastric pain ICD-10-CM: R10.13  ICD-9-CM: 789.06  7/4/2021 - Present        CVA (cerebral vascular accident) [facility]) ICD-10-CM: I63.9  ICD-9-CM: 434.91  5/6/2021 - Present        Right sided weakness ICD-10-CM: R53.1  ICD-9-CM: 728.87  5/4/2021 - Present        GI bleed ICD-10-CM: K92.2  ICD-9-CM: 578.9  4/28/2021 - Present           Greater than 45 minutes were spent with the patient on counseling and coordination of care    Signed:   Estelita Maya MD  11/22/2022  1:25 PM

## 2022-11-22 NOTE — PROGRESS NOTES
[address] [facility] Adult  Hospitalist Group                                                                                          Hospitalist Progress Note  Jeison Kwong MD  Answering service: [phone] from in house phone        Date of Service:  2022  NAME:  Johnson Pedroza  :  1940  MRN:  [id number]      Admission Summary: This is an 81year old who initially presented on 10/31 with acute bilateral CVA s/p thrombectomy. She was treated in ICU and was transferred to [facility] on 2022. She was found to have heart failure with EF 10-15% and AICD was placed during her stay on . She has had intermittent episodes of hypotension, which she responds to fluid.  she began to have increase in somnolence and hypotension, and there was concern for possible bleeding. She was seen by CCM service and transferred back to ICU, on  requiring pressors briefly.      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  Acute bilateral superior cerebellar infarction, mild associated petechial hemorrhage  Possible small foci of acute infarction right occipital lobe  -aspirin and eliquis on hold due to hemothorax and low Hgb  -continue lipitor     Large left pleural effusion, possible hemothorax s/p thoracentesis    -pleural fluid cx on  no growth     Possible aspiration pneumonia  -Continue IV Zosyn and vancomycin d4  -afebrile, leukocytosis improved  -blood cx no growth       Acute on chronic HFr EF 10-15% s/p AICD  -clinically compensated  -hypotension resolved, off pressor on   -continue coreg  -not on ACEi/ARB due to hypotension and CKD  -monitor I/O and daily weight     Severe acute blood loss anemia related to hemothorax  -on epoetin peewee, iv iron sucrose   -Hgb 4.6  -Yarsanism, refused blood transfusion   -very high risk for decompensation, DNR  -no need to continue to draw blood for labs since it will not change mgmt     Hypotension- resolved  HTN  -off pressor, resolved, BP elevated, restarted on coreg, monitor BP      Hx of A Fib, rate controlled  -on coreg  -Eliquis and aspirin on hold     T2DM  -A1c 13.1  -had hypoglycemia, poor oral intake, hold NPH for now  -patient evaluated by speech therapy and placed on dysphagia diet   -s/p D5 0.45% NaCl for hypoglycemia yest  -continue sliding scale, hypoglycemic protocol     JERAMY on CKD stage III  -creatinine normalized  -monitor renal function   -nephrologist on board     Failure to thrive, refusing PO  Re-consult palliative care team for [address] to discuss deactivation of ICD, appreciate assistance    Pt with poor prognosis, inpatient mortality, hospice care involvement and transition to inpatient hospice care    Code status: DNR  Prophylaxis: SCDs  Care Plan discussed with: Patient/Family, Nurse, and Consultant palliative  Anticipated Disposition:  tbd  Anticipated Discharge: Greater than 48 hours       Hospital Problems  Date Reviewed: 3/18/2022            Codes Class Noted POA    Stroke [facility]) ICD-10-CM: I63.9  ICD-9-CM: 434.91  10/31/2022 Unknown         Review of Systems:   Pertinent items are noted in HPI    Vital Signs:    Last 24hrs VS reviewed since prior progress note.  Most recent are:  Visit Vitals  BP (!) 127/55   Pulse 79   Temp 96.9 °F (36.1 °C)   Resp 18   Ht 5' 6\" (1.676 m)   Wt 52.4 kg (115 lb 8.3 oz)   SpO2 100%   BMI 18.65 kg/m²         Intake/Output Summary (Last 24 hours) at 11/22/2022 1325  Last data filed at 11/22/2022 7895  Gross per 24 hour   Intake 200 ml   Output 695 ml   Net -495 ml          Physical Examination:   I had a face to face encounter with this patient and independently examined them on 11/22/2022 as outlined below:    General: Awake, very weak-appearing, no acute distress    EENT:  Anicteric sclerae. MMM  Resp:  Decreased breath sounds, no wheezing or rales. No accessory muscle use  CV:  RRR, 3/6 SM  GI:  Soft, Non distended, Non tender  Neurologic:  Conscious and alert, facial asymmetry, nonverbal, does not follow command  Extremities: No edema or cyanosis  Psych:   Not anxious or agitated  Skin:  No rashes. No jaundice       Data Review:   I personally reviewed: vitals, labs, imaging results, notes    Labs:     Recent Labs     11/20/22 0152   WBC 10.9   HGB 4.6*   HCT 14.4*          Recent Labs     11/21/22 0342 11/20/22 0152    147*   K 3.5 3.8   * 113*   CO2 25 29   BUN 18 32*   CREA 0.88 1.20*   * 66   CA 8.6 8.0*       Recent Labs     11/21/22 0342 11/20/22 0152   ALT 49 68   AP 83 75   TBILI 0.9 0.5   TP 5.6* 5.3*   ALB 2.0* 1.9*   GLOB 3.6 3.4       No results for input(s): INR, PTP, APTT, INREXT, INREXT in the last 72 hours. No results for input(s): FE, TIBC, PSAT, FERR in the last 72 hours. No results found for: FOL, RBCF   No results for input(s): PH, PCO2, PO2 in the last 72 hours. No results for input(s): CPK, CKNDX, TROIQ in the last 72 hours.     No lab exists for component: CPKMB  Lab Results   Component Value Date/Time    Cholesterol, total 146 11/01/2022 03:30 AM    HDL Cholesterol 69 11/01/2022 03:30 AM    LDL, calculated 71 11/01/2022 03:30 AM    Triglyceride 30 11/01/2022 03:30 AM    CHOL/HDL Ratio 2.1 11/01/2022 03:30 AM     Lab Results   Component Value Date/Time    Glucose (POC) 275 (H) 11/21/2022 12:38 PM    Glucose (POC) 197 (H) 11/21/2022 05:24 AM    Glucose (POC) 263 (H) 11/21/2022 12:55 AM    Glucose (POC) 191 (H) 11/20/2022 06:26 PM    Glucose (POC) 124 (H) 11/20/2022 12:48 PM     Lab Results   Component Value Date/Time    Color YELLOW/STRAW 11/06/2022 06:43 PM    Appearance CLEAR 11/06/2022 06:43 PM    Specific gravity 1.025 11/06/2022 06:43 PM    Specific gravity 1.029 10/31/2022 12:54 PM    pH (UA) 5.5 11/06/2022 06:43 PM    Protein Negative 11/06/2022 06:43 PM    Glucose 100 (A) 11/06/2022 06:43 PM    Ketone TRACE (A) 11/06/2022 06:43 PM    Bilirubin Negative 11/06/2022 06:43 PM    Urobilinogen 0.2 11/06/2022 06:43 PM    Nitrites Negative 11/06/2022 06:43 PM    Leukocyte Esterase MODERATE (A) 11/06/2022 06:43 PM    Epithelial cells FEW 11/06/2022 06:43 PM    Bacteria Negative 11/06/2022 06:43 PM    WBC 10-20 11/06/2022 06:43 PM    RBC 0-5 11/06/2022 06:43 PM       Medications Reviewed:     Current Facility-Administered Medications   Medication Dose Route Frequency    piperacillin-tazobactam (ZOSYN) 3.375 g in 0.9% sodium chloride (MBP/ADV) 100 mL MBP  3.375 g IntraVENous Q8H    LORazepam (INTENSOL) 2 mg/mL oral concentrate 1 mg  1 mg Oral Q1H PRN    glycopyrrolate (ROBINUL) injection 0.2 mg  0.2 mg IntraVENous Q4H PRN    HYDROmorphone (DILAUDID) injection 0.5 mg  0.5 mg IntraVENous Q15MIN PRN    carvediloL (COREG) tablet 12.5 mg  12.5 mg Oral BID WITH MEALS    folic acid (FOLVITE) tablet 1 mg  1 mg Oral DAILY    cyanocobalamin (VITAMIN B12) tablet 1,000 mcg  1,000 mcg Oral DAILY    iron sucrose (VENOFER) 200 mg in 0.9% sodium chloride 100 mL IVPB  200 mg IntraVENous Q24H    epoetin peewee-epbx (RETACRIT) 14,000 Units  14,000 Units SubCUTAneous Q TUE, THU & SAT    polyethylene glycol (MIRALAX) packet 17 g  17 g Oral BID    [Held by provider] insulin NPH (NOVOLIN N, HUMULIN N) injection 4 Units  4 Units SubCUTAneous QHS    [Held by provider] HYDROcodone-acetaminophen (NORCO) 5-325 mg per tablet 1 Tablet  1 Tablet Oral Q6H PRN    [Held by provider] insulin NPH (NOVOLIN N, HUMULIN N) injection 8 Units  8 Units SubCUTAneous QAM    [Held by provider] apixaban (ELIQUIS) tablet 2.5 mg  2.5 mg Oral BID    [Held by provider] aspirin chewable tablet 81 mg  81 mg Oral DAILY    hydrALAZINE (APRESOLINE) 20 mg/mL injection 10 mg  10 mg IntraVENous Q6H PRN    sodium chloride (NS) flush 5-40 mL  5-40 mL IntraVENous Q8H    sodium chloride (NS) flush 5-40 mL  5-40 mL IntraVENous PRN    pantoprazole (PROTONIX) tablet 40 mg  40 mg Oral ACB    [Held by provider] traMADoL (ULTRAM) tablet 50 mg  50 mg Oral Q6H PRN    senna-docusate (PERICOLACE) 8.6-50 mg per tablet 1 Tablet  1 Tablet Oral DAILY    lidocaine 4 % patch 2 Patch  2 Patch TransDERmal Q24H    ondansetron (ZOFRAN) injection 4 mg  4 mg IntraVENous Q4H PRN    acetaminophen (TYLENOL) tablet 650 mg  650 mg Oral Q4H PRN    Or    acetaminophen (TYLENOL) solution 650 mg  650 mg Per NG tube Q4H PRN    Or    acetaminophen (TYLENOL) suppository 650 mg  650 mg Rectal Q4H PRN    sodium chloride (NS) flush 5-40 mL  5-40 mL IntraVENous Q8H    sodium chloride (NS) flush 5-40 mL  5-40 mL IntraVENous PRN     ______________________________________________________________________  EXPECTED LENGTH OF STAY: 7d 2h  ACTUAL LENGTH OF STAY:          22                 Lovely Ireland MD

## 2022-11-22 NOTE — HOSPICE
[facility]  [phone]    Hospice Liaison Nursing Note   Patient Name: Joan Villafana  YOB: 1940  Age: 81 y.o. Chart reviewed for updates in plan of care. Plan: Meeting with patient's son via phone around 11:00 am. If patient and her son are in agreement with Hospice philosophy and plan of care, [address] [place] [address] will offer Hospice admission. Dr. Mulugeta Brian agrees patient would best be served at the [facility], depending on bed availability. Dr. Mulugeta Brian has provided verbal CTI for the hospice diagnosis of CVA    Please call Hospice team to offer support for patient, family or staff. Thank you for your coordination with the hospice plan of care. 11:15: Phone call to patient's son, Zeeshan Gallardo. Discussed Hospice philosophy, general plan of care, levels of care, services and on call procedures. Annette Encarnacion is tearful, and states that he understands that his mother is dying, and that he is very close to her. Support and reflective listening. Patient does not have ACP documents available through [address] [place] [address]. Zeeshan Gallardo states that he was appointed by his mother, father and siblings to act in this role. Annette Encarnacion is unsure where or if this has been legally documented. Hospice social worker is reviewing legal NOK before calling Zeeshan Gallardo.    Once legal NOK has been established, plan to admit patient into Hospice services at [facility] today. Currently the [facility] does not have a bed. Plan to admit patient at [facility], and hold next available bed at [facility]. If patient remains stable to transfer to the [facility], will transfer. Thank you for the opportunity to be of service to this patient. 13:30: Consents complete from [address] patient into hospice services now. Perfect Serve message sent to Dr. Tiny Huose for DC order. Updated bed placement.       Kristel Maynard RN, [place] [address] Nurse Liaison  [phone] [employer]  [phone] Office

## 2022-11-23 NOTE — PROGRESS NOTES
This was an initial visit to offer support and assess needs. There was no family present. Pt appeared to be resting in bed. [facility] of presence and prayers for pt and family. Followed visit with a call to her son, Emilie Roldan. He noted it is hard for him to get to the hospital but his daughter visited this afternoon and called him to share her visit. No needs verbalized at this time.

## 2022-11-23 NOTE — PROGRESS NOTES
[facility] LCSW note: This LCSW visited the room of pt, who remains with AMS. No family present. LCSW provided a therapeutic presence for pt. LCSW spoke to her son Aimee Campos. LCSW and Blue discussed pts status. LCSW and Aimee Campos talked about mother's daily care. LCSW provided emotional support for Blue in coping with his mother's decline and terminality. LCSW provided reassurance of mother's care and comfort, and support for him. LCSW will continue to assess and monitor pt and family needs.

## 2022-11-23 NOTE — PROGRESS NOTES
Problem: Falls - Risk of  Goal: *Absence of Falls  Description: Document Douglas Apolinar Fall Risk and appropriate interventions in the flowsheet.   Outcome: Progressing Towards Goal  Note: Fall Risk Interventions:  Mobility Interventions: Bed/chair exit alarm    Mentation Interventions: Adequate sleep, hydration, pain control    Medication Interventions: Bed/chair exit alarm    Elimination Interventions: Bed/chair exit alarm    History of Falls Interventions: Door open when patient unattended         Problem: Patient Education: Go to Patient Education Activity  Goal: Patient/Family Education  Outcome: Progressing Towards Goal

## 2022-11-23 NOTE — HOSPICE
16:45: Patient has moved to Room 611.      14:35: Rounding bedside. Patient with continued respirations of 36. Reviewed hospice plan of care with Adeline Rowe NP. New orders titrated. Bedside nurse shared pt IV access is not adequate. New peripheral IV inserted right mid arm #24. [address] [facility]  [phone]    GIP Daily Nursing Note   Patient Name: Anne Whitney  YOB: 1940  Age: 81 y.o. Date of Visit: 11/23/22  Facility of Care: [facility]   Patient Room: 533/     Hospice Attending: Rianna Yousif MD  Hospice Diagnosis: Acute CVA (cerebrovascular accident) [facility]) [I63.9]    Level of Care: GIP    Current GIP Symptoms    1. Respiratory distress; respirations 36 labored  2. Pain  3. Restlessness        ASSESSMENT & PLAN   ASSESSMENT: Pt with eyes closed. Respirations are 36 using accessory muscles. Warm to touch. Turn team bedside. Pt opened her eyes to ADL and remain open fixed on ceiling. PLAN of Care:  1. GIP level of care needed for symptoms necessitating frequent skilled nursing assessment and administration of parenteral medications. Needs monitoring for need to titrate sx mgt regimen for optimization of comfort. 2. Pt is at high risk of rapid decline and death due to terminal disease process  3. Provide education and support to unit staff caring for hospice patient and family regarding end of life care and Hospice plan of care. Provide staff with direct contact information to reach hospice team [phone]   4. Provide support and frequent rounds for patient comfort and safety ongoing  5. Provide  support ongoing, continue to discuss discharge plan if patient becomes sid and does not require acute nursing care interventions for GIP level of care  6. Provide  and bereavement support ongoing. Pt seems to have good spiritual support. 7. Continue with oxygen for comfort  8. Schedule IV Dilaudid 0.5mg Q 4 hours  9.  Add PRN dosing: Dilaudid IV; IV Valium; IV Robinul, IV Toradol, IV Zofran  10. Maintain skin integrity as tolerated for hospice patient, turning and repositioning for comfort, and specialty mattress if appropriate  11. Sauer care per hospital policy for infection prevention  12. Peripheral line care as per hospital policy for infection prevention  13. Increased scheduled IV Dilaudid from 0.5 to 1 mg. Change frequency from Q 4 hours to Q 3 hours with PRN available  14. Increased scheduled Valium from 2.5 mg Q 6 to 5 mg Q 6.  15. Request transfer to [address] [place] [address]  Interventions: Hospice Chaplain available for patient and family support ongoing. Psych/ Social/ Emotional Interventions: Hospice social worker available for patient and family support ongoing. See    Care Coordination Needs: Continue to coordinate Hospice Plan of Care with family and nursing staff     Care plan and New Orders discussed / approved with Dr. Patrick Quiroga, NP    Description History and Chart Review     Narrative History of last 24 hours that demonstrates care cannot be provided in another setting:  Frequent bedside rounding for symptom management, safety and family support. What has been done to control the patient's symptoms in the last 24 hours? Pt is unable to tolerate oral medications. Requiring IV Route dosing for best symptom management:    Does the patient currently require IV medications? Yes  Does the patient currently require scheduled medications? YES  Does the patient currently require a PCA?  NO    List number of doses of PRN medications in last 24 hours:  Medication 1: IV Dilaudid   Number of doses: X 3    Medication 2:   Number of doses:    Medication 3:   Number of doses:    Supporting documentation for GIP need for pain control:  [x] Frequent evaluation by a doctor, nurse practitioner, nurse   [x] Frequent medication adjustment    [x] IVs that cannot be administered at home   [] Aggressive pain management   [] Complicated technical delivery of medications                Supporting documentation for GIP need for symptom control:  [x]  Sudden decline necessitating intensive nursing intervention  []  Uncontrolled / intractable nausea or vomiting   []  Pathological fractures  []  Advanced open wounds requiring frequent skilled care  [x] Unmanageable respiratory distress  [x] New or worsening delirium   [] Delirium with behavior issues: Is 24 hour caregiver present due to safety concerns with agitation? (yes/no)  [] Imminent death - with skilled nursing needs documented above    DISCHARGE PLANNING     1. Discharge Plan: If patient stabilizes and is safe to transport, family request transfer to the [facility]. Past [facility], family request assistance with facility location. 2. Patient/Family teaching: End of Life education and support provided   3.  Response to patient/family teaching: Pt sonCooper states agreement with Hospice plan of care    ASSESSMENT    KARNOFSKY: 10    Prognosis estimated based on 11/23/22 clinical assessment is:   [] Few to Many Hours  [x] Hours to Days   [] Few to Many Days   [] Days to Weeks   [] Few to Many Weeks   [] Weeks to Months   [] Few to Many Months    Quality Measure: Patient self-reports:  [] Yes    [x] No      Adult Non-Verbal Pain Assessment Score: /10     Face  [] 0   No particular expression or smile  [x] 1   Occasional grimace, tearing, frowning, wrinkled forehead  [] 2   Frequent grimace, tearing, frowning, wrinkled forehead     Activity (movement)  [] 0   Lying quietly, normal position  [x] 1   Seeking attention through movement or slow, cautious movement  [] 2   Restless, excessive activity and/or withdrawal reflexes     Guarding  [] 0   Lying quietly, no positioning of hands over areas of body  [] 1   Splinting areas of the body, tense  [x] 2   Rigid, stiff     Physiology (vital signs)  [] 0   Stable vital signs  [x] 1   Change in any of the following: SBP > 20mm Hg; HR > 20/minute  [] 2   Change in any of the following: SBP > 30mm Hg; HR > 25/minute     Respiratory  [] 0   Baseline RR/SpO2, compliant with ventilator  [] 1   RR > 10 above baseline, or 5% drop SpO2, mild asynchrony with ventilator  [x] 2   RR > 20 above baseline, or 10% drop SpO2, asynchrony with ventilator     CLINICAL INFORMATION   Patient Vitals for the past 12 hrs:   Temp Pulse Resp BP SpO2   11/23/22 0811 98.1 °F (36.7 °C) 93 20 (!) 146/69 95 %   11/23/22 0308 97.8 °F (36.6 °C) 92 15 (!) 142/75 96 %       Currently this patient has:  [] Supplemental O2   [x] IV    [] PICC      [] PORT   [] NG Tube    [] PEG Tube   [] Ostomy     [x] Sauer draining _______ urine  [] Other:     SIGNS/PHYSICAL FINDINGS     Skin (including wound):  [] Warm, dry, supple, intact and color normal for race  [] Warm   [] Dry   [] Cool     [] Clammy       [] Diaphoretic    Turgor   [] Normal   [x] Decreased  Color:   [] Pink   [x] Pale   [] Cyanotic   [] Erythema   [] Jaundice   [] Normal for Race  []  Wounds:    Neuro:  [] Lethargy  [x] Restlessness / agitation  [x] Confusion / delirium  [] Hallucinations  [] Responds to maximal stimulation  [] Unresponsive  [] Seizures     Cardiac:  [] Dyspnea on Exertion  [] JVD  [] Murmur  [] Palpitations  [] Hypotension  [x] Hypertension  [x] Tachycardia  [] Bradycardia  [x] Irregular HR  [] Pulses Decreased  [] Pulses Absent  [] Edema:         [] Mottling:          Respiratory:  Breath sounds:    [x] Diminished   [] Wheeze   [] Rhonchi   [] Rales   [] Even and unlabored  [x] Labored:            [] Cough   [] Non Productive   [] Productive    [] Description:           [] Deep suctioned   [] O2 at ___ LPM  [] High flow oxygen greater than 10 LPM  [] Bi-Pap    GI  [x] Abdomen soft non-tender  [] Ascites  [] Nausea  [] Vomiting  [] Incontinent of bowels  [x] Bowel sounds yes  [] Diarrhea  [] Constipation (see above including last bowel movement)  [] Checked for impaction  [x] Last BM 11/22    Nutrition  Diet:________Oral care__  Appetite:   [] Good   [] Fair   [] Poor   [] Tube Feeding       [] Voiding  [] Incontinent   [x] Sauer    Musculoskeletal  [] Balance/[place] Unsteady   [x] Weak   Strength:    [] Normal    [] Limited    [x] Decreasing   Activities:    [] Up as tolerated   [x] Bedridden    [] Specify:    SAFETY  [x] 24 hr. Caregiver   [x] Side rails ? [x] Hospital bed   [x] Reviewed Falls & Safety       ALLERGIES AND MEDICATIONS     Allergies:    Allergies   Allergen Reactions    Insulins Itching    Penicillins Other (comments)     Pt states it makes her pass out       Current Facility-Administered Medications   Medication Dose Route Frequency    ondansetron (ZOFRAN) injection 4 mg  4 mg IntraVENous Q4H PRN    ketorolac (TORADOL) injection 15 mg  15 mg IntraVENous Q6H PRN    glycopyrrolate (ROBINUL) injection 0.2 mg  0.2 mg IntraVENous Q4H PRN    bisacodyL (DULCOLAX) suppository 10 mg  10 mg Rectal DAILY PRN    HYDROmorphone (DILAUDID) injection 0.5 mg  0.5 mg IntraVENous Q15MIN PRN    HYDROmorphone (DILAUDID) injection 0.5 mg  0.5 mg IntraVENous Q4H    saline peripheral flush soln 5 mL  5 mL InterCATHeter PRN    diazePAM (VALIUM) injection 2.5 mg  2.5 mg IntraVENous Q6H          Visit Time In: 09:00  Visit Time Out: 13:00    Aleshia Alvarez RN, [address] Nurse Liaison  [phone] Mobile  [phone] Office  Available on [employer]

## 2022-11-23 NOTE — PROGRESS NOTES
Problem: Falls - Risk of  Goal: *Absence of Falls  Description: Document Andreina Lanier Fall Risk and appropriate interventions in the flowsheet.   Outcome: Progressing Towards Goal  Note: Fall Risk Interventions:  Mobility Interventions: Bed/chair exit alarm    Mentation Interventions: Adequate sleep, hydration, pain control, Bed/chair exit alarm, Door open when patient unattended, More frequent rounding    Medication Interventions: Bed/chair exit alarm    Elimination Interventions: Bed/chair exit alarm, Stay With Me (per policy)    History of Falls Interventions: Bed/chair exit alarm, Door open when patient unattended

## 2022-11-23 NOTE — PROGRESS NOTES
[facility]  [phone]    Patient Name: Rusty Cuenca  YOB: 1940    Date of Provider Hospice Visit: 11/23/22    Level of Care:   [x] General Inpatient (GIP)    [] Routine   [] Respite    Current Location of Care: [facility] [x][] [facility] [] [facility] [] [facility] [] [facility]    [facility], patient referred from:  [] [facility] [] [facility] [] [facility] [] [facility] [] Home [] Other:     Date of Original Hospice Admission: 11/22/2022  Hospice Medical Director at time of admission: Dr. Demarco Serrano Diagnosis: Acute CVA  Diagnoses RELATED to the terminal prognosis: HFrEF, cardiomyopathy, pleural effusion, anemia, PCM  Other Diagnoses: DM2, HTN, NSTEMI     HOSPICE SUMMARY     Rusty Cuenca is a 81y.o. year old AA female who was admitted to [facility]. HPI and course of hospitalization as summarized in palliative care note authored by Dr. Margarita Peteresn:   Rusty Cuenca is a 81 y.o. with a past history of DM2, HTN, ischemic CM (dx. 5/2021 EF 40-45%, NSTEMI 10/2021 PCI to RCA, EF 20-25%), hx acute pancreatitis, who was admitted on 10/31/2022 from home with a diagnosis of acute bilateral ischemic posterior CVA. Current medical issues leading to Palliative Medicine involvement include: patient underwent thrombectomy for acute CVA, she also was found to have EF 10-15% on ECHO and had AICD placement 11/4/22. She has made progress with physical therapy and is recommended for inpatient rehab at time of discharge   Now with progressive decline in hemoglobin, does not want blood,  We  are asked to assist with care goals discussions     Patient lives in her own home, with donald Payne living next door. She has 5 adult children. Micheal Cheng. Witness/ does not want blood products. No AMD on chart. Addendum: family meeting held with son Haleigh Campbell and his two sons. Medical update provided.               We talk about her wish to avoid blood transfusion and affirm they are right to honor her wishes. In big picture, she is winding down, likely near EOL even if we do give her blood. Recent acute strokes and end stage cardiomyopathy with EF 10%. Education provided about nutrition at EOL, explain recommendation for comfort feeding as tolerated but anticipate she won't want anything. We talk about option of focus on comfort, gentle care, avoiding anything difficult (like labs, dobhoff, procedures etc). Crater Zhang is in agreement with focus on gentle care. The patient's principle diagnosis has resulted in altered mental status, increased work of breathing, restlessness/agitation, and secretions. Functionally, the patient's Karnofsky and/or Palliative Performance Scale has declined over a period of weeks and is estimated at 20. The patient is dependent on the following ADLs: all    Objective information that support this patients limited prognosis includes:   Result status: Final result       Left Ventricle: Severely reduced left ventricular systolic function with a visually estimated EF of 10 -15%. Left ventricle is moderately dilated. Mildly increased wall thickness. Severe global hypokinesis present. Abnormal diastolic function. Aortic Valve: Tricuspid valve. Mild regurgitation. Mitral Valve: Mild to moderate regurgitation. Interatrial Septum: Agitated saline study was negative with and without provocation. Study Result    Narrative & Impression   INDICATION: AMS     EXAM:  HEAD CT WITHOUT CONTRAST     COMPARISON: November 10, 2022     TECHNIQUE:  Routine noncontrast axial head CT was performed. Sagittal and  coronal reconstructions were generated. CT dose reduction was achieved through use of a standardized protocol tailored  for this examination and automatic exposure control for dose modulation. FINDINGS:     Ventricles: Midline, no hydrocephalus. Intracranial Hemorrhage: None.   Brain Parenchyma/Brainstem: Decreased conspicuity of left cerebellar  infarctions, now subacute Small chronic right occipital infarction, unchanged. Patchy periventricular white matter hypodensity supratentorial brain similar to  prior study. Basal Cisterns: Normal.  Paranasal Sinuses: Visualized sinuses are clear. Additional Comments: N/A. IMPRESSION     Evolving, subacute left cerebellar infarction. No new abnormality. The patient/family chose comfort measures with the support of Hospice. HOSPICE DIAGNOSES   Active Symptoms:  1. Altered mental status  2. Non verbal signs of pain  3. Restlessness/agitation  4. Secretions  5. Debility  6. Hospice care     PLAN   Continue GIP level of care as pt requires frequent assessment and administration and titration of parenteral medications to manage symptom burden; high risk for rapid decompensation and not stable for transport  Increase Dilaudid to 1 mg IV every 3 hours with prn dosing available every 15 minutes as needed for breakthrough pain or air hunger  Increase Valium to 5 mg IV every 6 hours  Robinul 0.2 mg IV every 4 hours as needed for increased oropharyngeal secretions  All other symptom management medications as needed  Continue stein catheter to maintain bladder decompression; stein care per facility protocol  No family at bedside during provider rounds; liaison to follow up and provide status update   and SW to support family needs  Disposition: anticipate death in hospital   Hospice Plan of care was reviewed in detail and agree with current plan of care    Prognosis estimated based on 11/23/22 clinical assessment is:   [x] Hours to Days    [] Days to Weeks    [] Other:    Communicated plan of care with: Hospice Case Manager; Hospice IDT; Care Team     GOALS OF CARE     Patient/Medical POA stated Goal of Care: Comfort care and symptom managment    [x] I have reviewed and/or updated ACP information in the Advance Care Planning Navigator. This information is available in the [address] link in the patient's chart header. Primary Decision Maker [facility] [place] [facility]):   Primary Decision Maker: tracie hobbs - Ahmet - [phone]    Resuscitation Status: DNR  If DNR is there a Durable DNR on file? : [x] Yes [] No (If no, complete Durable DNR)    HISTORY     History obtained from: chart review and discussion with interdisciplinary team    CHIEF COMPLAINT: unable to obtain  The patient is:   [] Verbal  [x] Nonverbal  [] Unresponsive    HPI/SUBJECTIVE:  Family present at bedside. Pt observed resting in bed. Opens eyes to verbal greeting and gentle touch; but not tracking movement. Not following commands. Became more restless during physical exam.     11/23- No family at bedside. Pt observed resting in bed. Awake/alert but non verbal. Eyes open but gaze fixed and not tracking movement. Increased work of breathing evidenced by tachypnea. Appears restless during physical exam and withdraws from touch. Will make further medication adjustments.       REVIEW OF SYSTEMS     The following systems were: [] reviewed  [x] unable to be reviewed    Positive ROS include:  Constitutional: fatigue, weakness, in pain, short of breath  Ears/nose/mouth/throat: increased airway secretions  Respiratory:shortness of breath, wheezing  Gastrointestinal:poor appetite, nausea, vomiting, abdominal pain, constipation, diarrhea  Musculoskeletal:pain, deformities, swelling legs  Neurologic:confusion, hallucinations, weakness  Psychiatric:anxiety, feeling depressed, poor sleep  Endocrine:     Adult Non-Verbal Pain Assessment Score: 3    Face  [] 0   No particular expression or smile  [x] 1   Occasional grimace, tearing, frowning, wrinkled forehead  [] 2   Frequent grimace, tearing, frowning, wrinkled forehead    Activity (movement)  [] 0   Lying quietly, normal position  [x] 1   Seeking attention through movement or slow, cautious movement  [] 2   Restless, excessive activity and/or withdrawal reflexes    Guarding  [] 0   Lying quietly, no positioning of hands over areas of body  [x] 1   Splinting areas of the body, tense  [] 2   Rigid, stiff    Physiology (vital signs)  [x] 0   Stable vital signs  [] 1   Change in any of the following: SBP > 20mm Hg; HR > 20/minute  [] 2   Change in any of the following: SBP > 30mm Hg; HR > 25/minute    Respiratory  [x] 0   Baseline RR/SpO2, compliant with ventilator  [] 1   RR > 10 above baseline, or 5% drop SpO2, mild asynchrony with ventilator  [] 2   RR > 20 above baseline, or 10% drop SpO2, asynchrony with ventilator     FUNCTIONAL ASSESSMENT     Palliative Performance Scale (PPS): 20       PSYCHOSOCIAL/SPIRITUAL ASSESSMENT     Active Problems:    [facility] (11/22/2022)    Past Medical History:   Diagnosis Date    Colon polyps     Diabetes ([facility]     Diverticulosis     Hypertension     Ill-defined condition     Pancreatitis       Past Surgical History:   Procedure Laterality Date    COLONOSCOPY N/A 4/29/2021    COLONOSCOPY performed by Andres Palma MD at [facility]  2021    x2    IR [facility] THROMB INFUSION INTRACRANIAL  10/31/2022      Social History     Tobacco Use    Smoking status: Never    Smokeless tobacco: Never   Substance Use Topics    Alcohol use: Not Currently     Family History   Problem Relation Age of Onset    Diabetes Other       Allergies   Allergen Reactions    Insulins Itching    Penicillins Other (comments)     Pt states it makes her pass out      Current Facility-Administered Medications   Medication Dose Route Frequency    diazePAM (VALIUM) injection 5 mg  5 mg IntraVENous Q6H    HYDROmorphone (DILAUDID) injection 1 mg  1 mg IntraVENous Q3H    HYDROmorphone (DILAUDID) injection 1 mg  1 mg IntraVENous Q15MIN PRN    ondansetron (ZOFRAN) injection 4 mg  4 mg IntraVENous Q4H PRN    ketorolac (TORADOL) injection 15 mg  15 mg IntraVENous Q6H PRN glycopyrrolate (ROBINUL) injection 0.2 mg  0.2 mg IntraVENous Q4H PRN    bisacodyL (DULCOLAX) suppository 10 mg  10 mg Rectal DAILY PRN    saline peripheral flush soln 5 mL  5 mL InterCATHeter PRN        PHYSICAL EXAM     Wt Readings from Last 3 Encounters:   11/22/22 52.4 kg (115 lb 8.3 oz)   11/20/22 52.4 kg (115 lb 8.3 oz)   10/31/22 42.4 kg (93 lb 8 oz)       Visit Vitals  BP (!) 146/69 (BP 1 Location: Right upper arm, BP Patient Position: At rest;Lying)   Pulse 93   Temp 98.1 °F (36.7 °C)   Resp 20   Ht 5' 6\" (1.676 m)   Wt 52.4 kg (115 lb 8.3 oz)   SpO2 95%   BMI 18.65 kg/m²       Supplemental O2  [] Yes  [x] NO  Last bowel movement:     Currently this patient has:  [x] Peripheral IV [] PICC  [] PORT [] ICD    [x] Asuer Catheter [] NG Tube   [] PEG Tube    [] Rectal Tube [] Drain  [x] Other: AICD left chest; deactivated 11/21/2022    Constitutional: thin/frail with sunken facial features and temporal wasting; ill-appearing  Eyes: anicteric; no injection; not tracking movement  ENMT: slightly dry oral mucosa  Cardiovascular: RRR; distal pulses intact; no edema  Respiratory: increased work of breathing  Gastrointestinal: soft  Musculoskeletal: no contractures or gross deformities; muscle wasting  Skin: dry/flaky  Neurologic: non verbal; not tracking movement; not following commands  Psychiatric: appears restless  Other:     Pertinent Lab and or Imaging Tests:  Lab Results   Component Value Date/Time    Sodium 145 11/21/2022 03:42 AM    Potassium 3.5 11/21/2022 03:42 AM    Chloride 114 (H) 11/21/2022 03:42 AM    CO2 25 11/21/2022 03:42 AM    Anion gap 6 11/21/2022 03:42 AM    Glucose 176 (H) 11/21/2022 03:42 AM    BUN 18 11/21/2022 03:42 AM    Creatinine 0.88 11/21/2022 03:42 AM    BUN/Creatinine ratio 20 11/21/2022 03:42 AM    GFR est AA >60 03/23/2022 04:11 AM    GFR est non-AA >60 03/23/2022 04:11 AM    Calcium 8.6 11/21/2022 03:42 AM     Lab Results   Component Value Date/Time    Protein, total 5.6 (L) 11/21/2022 03:42 AM    Albumin 2.0 (L) 11/21/2022 03:42 AM           Alexey Coronado FNP-C

## 2022-11-24 NOTE — HOSPICE
Death note-[facility] called to notify that patient passed 11/23/22 at 20:40, hospitalist had not been to unit at time of triage call, this writer called son per liaison notes is undocumented MPOA, informed son, Cynthia Ambriz of patients passing and offered condolences. Son stated that he would not be going to hospital and I verified that he wishes to use [facility]. Update given to Mariela at [facility].

## 2022-11-24 NOTE — HOSPICE
[facility]  [phone]    Discharge/Death Nursing Note   Patient Name: Daisy Briseno  YOB: 1940  Age: 81 y.o. Date of Death: 22  Admitted Date: 2022  Time of Death: 20:40    Facility of Care: [facility]  Level of Care: [facility]  Patient Room: [facility]/     Hospice Attending: Dr. Kike Drake Diagnosis: Acute CVA (cerebrovascular accident) [facility]) [I63.9]    Death [de-identified] of death completed by: Lv Olivarez NP    Agency staff WAS NOT present at the time of death    At the time of death the patient was documented as apneic, pulseless, blood pressure absent. The pt  within [facility]    The following were notified of the patient's death: hospice, family, nursing supervisor, .     Medications were disposed of per facility protocol     Discharge Summary   Discharge Reason: Death    Summary of Care Provided:    [x] Post mortem care provided by facility staff  [x] Notification of  home by nursing supervisor  [] Referrals/Community resources provided:   [] Goals completed  [] Durable Medical Equipment vendor notified     Disciplines involved: [x] RN [x] SW [x]  [] ZAZUETA [] Vol [] PT [] OT [] ST [] [facility]    [x] IDT communication/notification    Attending Physician, Dr. Crystal Pryor, notified of death    Bereaved     Leatha Ernie [phone]    Advance Care Planning 2022   Patient's [address] is: Legal Next of Kin   Confirm Advance Directive None   Patient Would Like to Complete Advance Directive -

## 2022-11-24 NOTE — PROGRESS NOTES
Chaplaincy services was notified of pt's death. No family was present when I arrived to the unit. For additional spiritual care, please contact the  on-call at ([phone]). Rev.  Juancarlos Bronson MDiv, MS, [facility]  Staff

## 2022-11-24 NOTE — PROGRESS NOTES
Called to examine patient who has . Nurse Shahla Rasheed has called family and hospice. There was no family present in the room. No response to verbal and tactile stimuli. No respiratory effort. Absent heart sounds and pulses. Pupils fixed and dilated.    Patient pronounced at 2040 PM.

## 2022-11-24 NOTE — HOSPICE
This LMSW contacted patient's son, Ayde Helms to offer condolences and provide support. He was tearful. He stated they are doing ok. They are working with the  home and have an appointment tomorrow. He stated the information the Hospice social worker sent yesterday has been very helpful for them to understand the process of what his mother went through. He extended his appreciation for all the support to his mom and his family. LMSW reminded them of bereavement services. No other needs expressed at this time.

## 2022-12-05 NOTE — DISCHARGE SUMMARY
Discharge Summary    [facility]  [facility]  [phone]      Date of Admission: 11/22/2022  Date of Discharge: 11/23/2022    Johnson Pedroza is a 81y.o. year old who was admitted to [facility] at [facility] with a Hospice diagnosis of Acute CVA (cerebrovascular accident) [facility]) [I63.9]. Pt was admitted for [facility] level care. Per HPI  Active Symptoms:  1. Altered mental status  2. Non verbal signs of pain  3. Restlessness/agitation  4. Secretions  5. Debility  6. Hospice care      PLAN   Admit to [facility] level of care as pt requires frequent assessment and administration and titration of parenteral medications to manage symptom burden; high risk for rapid decompensation and not stable for transport  Dilaudid 0.5 mg IV every 4 hours with prn dosing available every 15 minutes as needed for breakthrough pain or air hunger  Valium 2.5 mg IV every 6 hours  Robinul 0.2 mg IV every 4 hours as needed for increased oropharyngeal secretions  All other symptom management medications as needed  Continue stein catheter to maintain bladder decompression; stein care per facility protocol  Family present at bedside during provider rounds; reviewed hospice philosophy and goals of care; reviewed symptom management medications and indications and rationale for use; verbalized understanding and agreeable to hospice care plan    and SW to support family needs  Disposition: anticipate death in hospital   Hospice Plan of care was reviewed in detail and agree with current plan of care      The patient's care was focused on comfort and the patient passed away on 11/23/2022.

## 2023-01-16 NOTE — PROGRESS NOTES
Occupational Therapy: hold    Chart reviewed. Patient is currently off the floor for ICD implant and has bedrest ordered until 1300. Will defer OT at this time and will follow-up for post-op OT re-evaluation as able and appropriate.     Guy Calvillo OTR/L ADMIT

## (undated) DEVICE — SYRINGE ANGIO 10 CC BRL STD PRNT POLYCARB LT BLU MEDALLION

## (undated) DEVICE — REM POLYHESIVE ADULT PATIENT RETURN ELECTRODE: Brand: VALLEYLAB

## (undated) DEVICE — RUNTHROUGH NS EXTRA FLOPPY PTCA GUIDEWIRE: Brand: RUNTHROUGH

## (undated) DEVICE — Device: Brand: DISPOSABLE ELECTROSURGICAL SNARE

## (undated) DEVICE — GUIDEWIRE VASC L260CM DIA0.035IN TIP L3MM STD EXCHG PTFE J

## (undated) DEVICE — SUTURE ETHBND EXCEL SZ 2 L30IN NONABSORBABLE GRN L40MM V-37 MX69G

## (undated) DEVICE — SYRINGE IRRIG 60ML SFT PLIABLE BLB EZ TO GRP 1 HND USE W/

## (undated) DEVICE — PERCUTANEOUS ENTRY THINWALL NEEDLE  ONE-PART: Brand: COOK

## (undated) DEVICE — MICROPUNCTURE INTRODUCER SET SILHOUETTE TRANSITIONLESS WITH STAINLESS STEEL WIRE GUIDE: Brand: MICROPUNCTURE

## (undated) DEVICE — MEDI-TRACE CADENCE ADULT, DEFIBRILLATION ELECTRODE -RTS  (10 PR/PK) - PHYSIO-CONTROL: Brand: MEDI-TRACE CADENCE

## (undated) DEVICE — 3M™ STERI-DRAPE™ SMALL DRAPE WITH ADHESIVE APERTURE 1092 25/BX,4/CS&#X20;: Brand: STERI-DRAPE™

## (undated) DEVICE — ELECTRODE,RADIOTRANSLUCENT,FOAM,5PK: Brand: MEDLINE

## (undated) DEVICE — Device: Brand: JELCO

## (undated) DEVICE — PRESSURE TUBING: Brand: TRUWAVE

## (undated) DEVICE — MOUTHPIECE ENDOSCP 20X27MM --

## (undated) DEVICE — 48" PROBE COVER W/GEL, ULTRASOUND, STERILE: Brand: SITE-RITE

## (undated) DEVICE — SUTURE STRATAFIX SPRL MCRYL + SZ 2 0 L6IN ABSRB UD SH L26MM SXMP1B408

## (undated) DEVICE — VALVE HEMSTAS 9FR POLYCARB L BOR DBL Y ACCS +

## (undated) DEVICE — CATHETER ANGIO JR4 PIG STD AD 4 FRX110 CM MP QUIK CARE INFIN

## (undated) DEVICE — PROVE COVER: Brand: UNBRANDED

## (undated) DEVICE — CATHETER PULM ART 5FR INFL 0.75CC L110CM BAL DIA8MM SGL WDG

## (undated) DEVICE — DRESSING ANTIMIC FOAM OPTIFOAM POSTOP ADH 4 X 6 IN

## (undated) DEVICE — CANNULA NSL O2 AD 7 FT END-TIDAL CARBON DIOX VENTFLO

## (undated) DEVICE — LINE SAMP LNG AD ORAL NSL PT O2 TBNG SMRT CAPNOLN H +

## (undated) DEVICE — DEVICE INFL SYR BLLN ENDO 30 -- INTELLI

## (undated) DEVICE — GLIDESHEATH SLENDER ACCESS KIT: Brand: GLIDESHEATH SLENDER

## (undated) DEVICE — GEL SUBMUCOSAL LIFT AGNT -- ORISE

## (undated) DEVICE — SYRINGE MED 10ML PUR GAM COMPATIBLE POLYCARB FIX M LUER CONN

## (undated) DEVICE — PTCA DILATATION CATHETER: Brand: EMERGE™

## (undated) DEVICE — COPE MANDRIL WIRE GUIDE STAINLESS STEEL: Brand: COPE

## (undated) DEVICE — 3M™ IOBAN™ 2 ANTIMICROBIAL INCISE DRAPE 6650EZ: Brand: IOBAN™ 2

## (undated) DEVICE — TRAP SURG QUAD PARABOLA SLOT DSGN SFTY SCRN TRAPEASE

## (undated) DEVICE — SUTURE STRATAFIX SPRL PDS + SZ 2-0 L6IN ABSRB VLT L36MM SXPP1B409

## (undated) DEVICE — TOOL INSRT ANGI GDWIRE MTL SS --

## (undated) DEVICE — 3M™ TEGADERM™ TRANSPARENT FILM DRESSING FRAME STYLE, 1626W, 4 IN X 4-3/4 IN (10 CM X 12 CM), 50/CT 4CT/CASE: Brand: 3M™ TEGADERM™

## (undated) DEVICE — Z DUPLICATE USE 2103554 VALVE HEMOSTATIC BLEEDBK CTRL COPILOT

## (undated) DEVICE — CATHETER GUID 6FR L100CM GRN PTFE JR4 TRUELUMEN HYBRID

## (undated) DEVICE — SURGICAL PROCEDURE TRAY CRD CATH 3 PRT

## (undated) DEVICE — TUBING PRESS INJ FLX SH 30IN --

## (undated) DEVICE — WASTEBAG DRIP/ADAPTER: Brand: MEDLINE INDUSTRIES, INC.

## (undated) DEVICE — ENDO KIT 1: Brand: MEDLINE INDUSTRIES, INC.

## (undated) DEVICE — STRIP SKIN CLSR W0.25XL4IN WHT SPUNBOUND FBR NYL HI ADH

## (undated) DEVICE — GAUZE,SPONGE,4"X4",16PLY,STRL,LF,10/TRAY: Brand: MEDLINE

## (undated) DEVICE — SYRINGE ANGIO 20ML WHT POLYCARB VAC PRSS CAP PLUNG FIX M

## (undated) DEVICE — GLIDESHEATH SLENDER NITINOL HYDROPHILIC COATED INTRODUCER SHEATH: Brand: GLIDESHEATH SLENDER

## (undated) DEVICE — DEVICE TORQ DIA0.018-0.038IN GRN ERGO 1 HND FOR PTFE GWIRE

## (undated) DEVICE — SYSTEM INTRO SHTH 9FR L13CM BLK HUB W SIDEPRT SPLITTABLE

## (undated) DEVICE — PTCA DILATATION CATHETER: Brand: NC EMERGE®

## (undated) DEVICE — SYRINGE MED 10ML RED POLYCARB BRL FIX M LUER CONN FLAT GRP

## (undated) DEVICE — Device: Brand: SINGLE USE INJECTOR NM600/610